# Patient Record
Sex: MALE | Race: OTHER | HISPANIC OR LATINO | Employment: UNEMPLOYED | URBAN - METROPOLITAN AREA
[De-identification: names, ages, dates, MRNs, and addresses within clinical notes are randomized per-mention and may not be internally consistent; named-entity substitution may affect disease eponyms.]

---

## 2017-08-04 ENCOUNTER — RESOLUTE PROFESSIONAL BILLING HOSPITAL PROF FEE (OUTPATIENT)
Dept: HOSPITALIST | Facility: MEDICAL CENTER | Age: 37
End: 2017-08-04
Payer: MEDICAID

## 2017-08-04 ENCOUNTER — HOSPITAL ENCOUNTER (INPATIENT)
Facility: MEDICAL CENTER | Age: 37
LOS: 3 days | DRG: 249 | End: 2017-08-07
Attending: EMERGENCY MEDICINE | Admitting: INTERNAL MEDICINE
Payer: MEDICAID

## 2017-08-04 ENCOUNTER — HOSPITAL ENCOUNTER (OUTPATIENT)
Dept: RADIOLOGY | Facility: MEDICAL CENTER | Age: 37
End: 2017-08-04

## 2017-08-04 DIAGNOSIS — I21.4 NSTEMI (NON-ST ELEVATED MYOCARDIAL INFARCTION) (HCC): ICD-10-CM

## 2017-08-04 PROBLEM — Z91.199 MEDICALLY NONCOMPLIANT: Status: ACTIVE | Noted: 2017-08-04

## 2017-08-04 PROBLEM — I25.10 CAD (CORONARY ARTERY DISEASE): Status: ACTIVE | Noted: 2017-08-04

## 2017-08-04 LAB
ANION GAP SERPL CALC-SCNC: 10 MMOL/L (ref 0–11.9)
BUN SERPL-MCNC: 8 MG/DL (ref 8–22)
CALCIUM SERPL-MCNC: 9.1 MG/DL (ref 8.5–10.5)
CHLORIDE SERPL-SCNC: 104 MMOL/L (ref 96–112)
CO2 SERPL-SCNC: 22 MMOL/L (ref 20–33)
CREAT SERPL-MCNC: 0.58 MG/DL (ref 0.5–1.4)
EKG IMPRESSION: NORMAL
EKG IMPRESSION: NORMAL
ERYTHROCYTE [DISTWIDTH] IN BLOOD BY AUTOMATED COUNT: 41.8 FL (ref 35.9–50)
GFR SERPL CREATININE-BSD FRML MDRD: >60 ML/MIN/1.73 M 2
GLUCOSE SERPL-MCNC: 111 MG/DL (ref 65–99)
HCT VFR BLD AUTO: 44.5 % (ref 42–52)
HGB BLD-MCNC: 15.2 G/DL (ref 14–18)
INR PPP: 0.9 (ref 0.87–1.13)
MCH RBC QN AUTO: 30.6 PG (ref 27–33)
MCHC RBC AUTO-ENTMCNC: 34.2 G/DL (ref 33.7–35.3)
MCV RBC AUTO: 89.5 FL (ref 81.4–97.8)
PLATELET # BLD AUTO: 333 K/UL (ref 164–446)
PMV BLD AUTO: 9.7 FL (ref 9–12.9)
POTASSIUM SERPL-SCNC: 3.8 MMOL/L (ref 3.6–5.5)
PROTHROMBIN TIME: 12.4 SEC (ref 12–14.6)
RBC # BLD AUTO: 4.97 M/UL (ref 4.7–6.1)
SODIUM SERPL-SCNC: 136 MMOL/L (ref 135–145)
TROPONIN I SERPL-MCNC: 0.44 NG/ML (ref 0–0.04)
TROPONIN I SERPL-MCNC: 3.74 NG/ML (ref 0–0.04)
TROPONIN I SERPL-MCNC: 7.98 NG/ML (ref 0–0.04)
TSH SERPL DL<=0.005 MIU/L-ACNC: 1.25 UIU/ML (ref 0.3–3.7)
WBC # BLD AUTO: 9 K/UL (ref 4.8–10.8)

## 2017-08-04 PROCEDURE — 85027 COMPLETE CBC AUTOMATED: CPT

## 2017-08-04 PROCEDURE — 770020 HCHG ROOM/CARE - TELE (206)

## 2017-08-04 PROCEDURE — 85610 PROTHROMBIN TIME: CPT

## 2017-08-04 PROCEDURE — 93005 ELECTROCARDIOGRAM TRACING: CPT | Performed by: INTERNAL MEDICINE

## 2017-08-04 PROCEDURE — A9270 NON-COVERED ITEM OR SERVICE: HCPCS | Performed by: INTERNAL MEDICINE

## 2017-08-04 PROCEDURE — 93005 ELECTROCARDIOGRAM TRACING: CPT | Performed by: EMERGENCY MEDICINE

## 2017-08-04 PROCEDURE — 700102 HCHG RX REV CODE 250 W/ 637 OVERRIDE(OP): Performed by: INTERNAL MEDICINE

## 2017-08-04 PROCEDURE — 36415 COLL VENOUS BLD VENIPUNCTURE: CPT

## 2017-08-04 PROCEDURE — 80048 BASIC METABOLIC PNL TOTAL CA: CPT

## 2017-08-04 PROCEDURE — 304562 HCHG STAT O2 MASK/CANNULA

## 2017-08-04 PROCEDURE — 93010 ELECTROCARDIOGRAM REPORT: CPT | Performed by: INTERNAL MEDICINE

## 2017-08-04 PROCEDURE — 99285 EMERGENCY DEPT VISIT HI MDM: CPT

## 2017-08-04 PROCEDURE — 94760 N-INVAS EAR/PLS OXIMETRY 1: CPT

## 2017-08-04 PROCEDURE — 700111 HCHG RX REV CODE 636 W/ 250 OVERRIDE (IP): Performed by: INTERNAL MEDICINE

## 2017-08-04 PROCEDURE — 700105 HCHG RX REV CODE 258: Performed by: INTERNAL MEDICINE

## 2017-08-04 PROCEDURE — 99223 1ST HOSP IP/OBS HIGH 75: CPT | Performed by: INTERNAL MEDICINE

## 2017-08-04 PROCEDURE — 84443 ASSAY THYROID STIM HORMONE: CPT

## 2017-08-04 PROCEDURE — 84484 ASSAY OF TROPONIN QUANT: CPT

## 2017-08-04 RX ORDER — PROMETHAZINE HYDROCHLORIDE 25 MG/1
12.5-25 SUPPOSITORY RECTAL EVERY 4 HOURS PRN
Status: DISCONTINUED | OUTPATIENT
Start: 2017-08-04 | End: 2017-08-07 | Stop reason: HOSPADM

## 2017-08-04 RX ORDER — PROMETHAZINE HYDROCHLORIDE 25 MG/1
12.5-25 TABLET ORAL EVERY 4 HOURS PRN
Status: DISCONTINUED | OUTPATIENT
Start: 2017-08-04 | End: 2017-08-07 | Stop reason: HOSPADM

## 2017-08-04 RX ORDER — ASPIRIN 81 MG/1
324 TABLET, CHEWABLE ORAL DAILY
Status: DISCONTINUED | OUTPATIENT
Start: 2017-08-05 | End: 2017-08-07 | Stop reason: HOSPADM

## 2017-08-04 RX ORDER — ROSUVASTATIN CALCIUM 20 MG/1
20 TABLET, COATED ORAL EVERY EVENING
Status: DISCONTINUED | OUTPATIENT
Start: 2017-08-04 | End: 2017-08-07 | Stop reason: HOSPADM

## 2017-08-04 RX ORDER — LISINOPRIL 5 MG/1
5 TABLET ORAL
Status: DISCONTINUED | OUTPATIENT
Start: 2017-08-04 | End: 2017-08-07 | Stop reason: HOSPADM

## 2017-08-04 RX ORDER — ASPIRIN 300 MG/1
300 SUPPOSITORY RECTAL DAILY
Status: DISCONTINUED | OUTPATIENT
Start: 2017-08-05 | End: 2017-08-07 | Stop reason: HOSPADM

## 2017-08-04 RX ORDER — ONDANSETRON 2 MG/ML
4 INJECTION INTRAMUSCULAR; INTRAVENOUS EVERY 4 HOURS PRN
Status: DISCONTINUED | OUTPATIENT
Start: 2017-08-04 | End: 2017-08-05

## 2017-08-04 RX ORDER — MORPHINE SULFATE 4 MG/ML
2-4 INJECTION, SOLUTION INTRAMUSCULAR; INTRAVENOUS
Status: DISCONTINUED | OUTPATIENT
Start: 2017-08-04 | End: 2017-08-07 | Stop reason: HOSPADM

## 2017-08-04 RX ORDER — SODIUM CHLORIDE 9 MG/ML
INJECTION, SOLUTION INTRAVENOUS CONTINUOUS
Status: DISCONTINUED | OUTPATIENT
Start: 2017-08-04 | End: 2017-08-04

## 2017-08-04 RX ORDER — ATORVASTATIN CALCIUM 20 MG/1
80 TABLET, FILM COATED ORAL EVERY EVENING
Status: DISCONTINUED | OUTPATIENT
Start: 2017-08-04 | End: 2017-08-04

## 2017-08-04 RX ORDER — NITROGLYCERIN 0.4 MG/1
0.4 TABLET SUBLINGUAL
Status: DISCONTINUED | OUTPATIENT
Start: 2017-08-04 | End: 2017-08-05

## 2017-08-04 RX ORDER — BISACODYL 10 MG
10 SUPPOSITORY, RECTAL RECTAL
Status: DISCONTINUED | OUTPATIENT
Start: 2017-08-04 | End: 2017-08-07 | Stop reason: HOSPADM

## 2017-08-04 RX ORDER — ASPIRIN 325 MG
325 TABLET ORAL DAILY
Status: DISCONTINUED | OUTPATIENT
Start: 2017-08-05 | End: 2017-08-07 | Stop reason: HOSPADM

## 2017-08-04 RX ORDER — ONDANSETRON 4 MG/1
4 TABLET, ORALLY DISINTEGRATING ORAL EVERY 4 HOURS PRN
Status: DISCONTINUED | OUTPATIENT
Start: 2017-08-04 | End: 2017-08-07 | Stop reason: HOSPADM

## 2017-08-04 RX ORDER — ASPIRIN 81 MG/1
486 TABLET, CHEWABLE ORAL ONCE
COMMUNITY
End: 2020-11-10 | Stop reason: SDUPTHER

## 2017-08-04 RX ORDER — POLYETHYLENE GLYCOL 3350 17 G/17G
1 POWDER, FOR SOLUTION ORAL
Status: DISCONTINUED | OUTPATIENT
Start: 2017-08-04 | End: 2017-08-07 | Stop reason: HOSPADM

## 2017-08-04 RX ORDER — SODIUM CHLORIDE 9 MG/ML
INJECTION, SOLUTION INTRAVENOUS CONTINUOUS
Status: DISCONTINUED | OUTPATIENT
Start: 2017-08-04 | End: 2017-08-05

## 2017-08-04 RX ORDER — ASPIRIN 325 MG
325 TABLET ORAL EVERY 6 HOURS PRN
Status: DISCONTINUED | OUTPATIENT
Start: 2017-08-04 | End: 2017-08-04

## 2017-08-04 RX ORDER — ACETAMINOPHEN 325 MG/1
650 TABLET ORAL EVERY 6 HOURS PRN
Status: DISCONTINUED | OUTPATIENT
Start: 2017-08-04 | End: 2017-08-07 | Stop reason: HOSPADM

## 2017-08-04 RX ORDER — AMOXICILLIN 250 MG
2 CAPSULE ORAL 2 TIMES DAILY
Status: DISCONTINUED | OUTPATIENT
Start: 2017-08-05 | End: 2017-08-07 | Stop reason: HOSPADM

## 2017-08-04 RX ORDER — LABETALOL HYDROCHLORIDE 5 MG/ML
10 INJECTION, SOLUTION INTRAVENOUS EVERY 4 HOURS PRN
Status: DISCONTINUED | OUTPATIENT
Start: 2017-08-04 | End: 2017-08-07 | Stop reason: HOSPADM

## 2017-08-04 RX ADMIN — SODIUM CHLORIDE: 9 INJECTION, SOLUTION INTRAVENOUS at 20:26

## 2017-08-04 RX ADMIN — ROSUVASTATIN CALCIUM 20 MG: 20 TABLET, FILM COATED ORAL at 20:26

## 2017-08-04 RX ADMIN — ACETAMINOPHEN 650 MG: 325 TABLET, FILM COATED ORAL at 20:36

## 2017-08-04 RX ADMIN — ENOXAPARIN SODIUM 120 MG: 150 INJECTION SUBCUTANEOUS at 20:26

## 2017-08-04 ASSESSMENT — COGNITIVE AND FUNCTIONAL STATUS - GENERAL
MOBILITY SCORE: 24
DAILY ACTIVITIY SCORE: 24
SUGGESTED CMS G CODE MODIFIER MOBILITY: CH
SUGGESTED CMS G CODE MODIFIER DAILY ACTIVITY: CH

## 2017-08-04 ASSESSMENT — PATIENT HEALTH QUESTIONNAIRE - PHQ9
SUM OF ALL RESPONSES TO PHQ9 QUESTIONS 1 AND 2: 0
1. LITTLE INTEREST OR PLEASURE IN DOING THINGS: NOT AT ALL
2. FEELING DOWN, DEPRESSED, IRRITABLE, OR HOPELESS: NOT AT ALL
SUM OF ALL RESPONSES TO PHQ QUESTIONS 1-9: 0

## 2017-08-04 ASSESSMENT — ENCOUNTER SYMPTOMS
FOCAL WEAKNESS: 0
CHILLS: 0
SPEECH CHANGE: 0
FLANK PAIN: 0
WEAKNESS: 0
NERVOUS/ANXIOUS: 0
MEMORY LOSS: 0
BACK PAIN: 0
NAUSEA: 0
ABDOMINAL PAIN: 0
DEPRESSION: 0
HEADACHES: 0
MYALGIAS: 0
COUGH: 0
FEVER: 0
VOMITING: 0
PALPITATIONS: 0
DIZZINESS: 0
SHORTNESS OF BREATH: 0
SENSORY CHANGE: 0

## 2017-08-04 ASSESSMENT — LIFESTYLE VARIABLES
EVER_SMOKED: YES
EVER_SMOKED: YES
DO YOU DRINK ALCOHOL: NO
DO YOU DRINK ALCOHOL: NO

## 2017-08-04 ASSESSMENT — COPD QUESTIONNAIRES
HAVE YOU SMOKED AT LEAST 100 CIGARETTES IN YOUR ENTIRE LIFE: YES
DURING THE PAST 4 WEEKS HOW MUCH DID YOU FEEL SHORT OF BREATH: NONE/LITTLE OF THE TIME
COPD SCREENING SCORE: 2
DO YOU EVER COUGH UP ANY MUCUS OR PHLEGM?: NO/ONLY WITH OCCASIONAL COLDS OR INFECTIONS

## 2017-08-04 ASSESSMENT — PAIN SCALES - GENERAL
PAINLEVEL_OUTOF10: 3
PAINLEVEL_OUTOF10: 2

## 2017-08-04 NOTE — ED NOTES
Med rec completed per pt at bedside  Allergies reviewed - NKDA  No ABX in last 30 days   No prescription medications, no preferred pharmacy

## 2017-08-04 NOTE — IP AVS SNAPSHOT
8/7/2017    Kyle Gonzalez  [address] [place] [address]    Dear Kyle:    [facility] wants to ensure your discharge home is safe and you or your loved ones have had all of your questions answered regarding your care after you leave the hospital.    Below is a list of resources and contact information should you have any questions regarding your hospital stay, follow-up instructions, or active medical symptoms.    Questions or Concerns Regarding… Contact   Medical Questions Related to Your Discharge  (7 days a week, 8am-5pm) Contact a Nurse Care Coordinator   [phone]   Medical Questions Not Related to Your Discharge  (24 hours a day / 7 days a week)  Contact the Nurse Health Line   [phone]    Medications or Discharge Instructions Refer to your discharge packet   or contact your [facility] Primary Care Provider   [phone]   Follow-up Appointment(s) Schedule your appointment via [employer]   or contact Scheduling [phone]   Billing Review your statement via [employer]  or contact Billing [phone]   Medical Records Review your records via [employer]   or contact Medical Records [phone]     You may receive a telephone call within two days of discharge. This call is to make certain you understand your discharge instructions and have the opportunity to have any questions answered. You can also easily access your medical information, test results and upcoming appointments via the CrowdStrike free online health management tool. You can learn more and sign up at [email or web address]. For assistance setting up your CrowdStrike account, please call [phone].    Once again, we want to ensure your discharge home is safe and that you have a clear understanding of any next steps in your care. If you have any questions or concerns, please do not hesitate to contact us, we are here for you. Thank you for choosing [facility] for your healthcare needs.    Sincerely,    Your [facility] Healthcare Team

## 2017-08-04 NOTE — IP AVS SNAPSHOT
Breeze Access Code: [email or web address]  Expires: 9/6/2017  2:42 PM    Your email address is not on file at [facility].  Email Addresses are required for you to sign up for Breeze, please contact [phone] to verify your personal information and to provide your email address prior to attempting to register for Breeze.    Kyle Gonzalez  [address] [place] [address]  [place] [address]    Breeze  A secure, online tool to manage your health information     [facility]’s Breeze® is a secure, online tool that connects you to your personalized health information from the privacy of your home -- day or night - making it very easy for you to manage your healthcare. Once the activation process is completed, you can even access your medical information using the Breeze ferdinand, which is available for free in the Apple Ferdinand store or Google Play store.     To learn more about Breeze, visit [email or web address]    There are two levels of access available (as shown below):   My Chart Features  [facility] Primary Care Doctor [facility]  Specialists [facility]  Urgent  Care Non-[facility] Primary Care Doctor   Email your healthcare team securely and privately 24/7 X X X    Manage appointments: schedule your next appointment; view details of past/upcoming appointments X      Request prescription refills. X      View recent personal medical records, including lab and immunizations X X X X   View health record, including health history, allergies, medications X X X X   Read reports about your outpatient visits, procedures, consult and ER notes X X X X   See your discharge summary, which is a recap of your hospital and/or ER visit that includes your diagnosis, lab results, and care plan X X  X     How to register for Breeze:  Once your e-mail address has been verified, follow the following steps to sign up for Breeze.     1. Go to  [email or web address]  2. Click on the Sign Up Now box, which takes you to the New Member Sign Up page. You will  need to provide the following information:  a. Enter your NeighborMD Access Code exactly as it appears at the top of this page. (You will not need to use this code after you’ve completed the sign-up process. If you do not sign up before the expiration date, you must request a new code.)   b. Enter your date of birth.   c. Enter your home email address.   d. Click Submit, and follow the next screen’s instructions.  3. Create a Software 2000t ID. This will be your NeighborMD login ID and cannot be changed, so think of one that is secure and easy to remember.  4. Create a NeighborMD password. You can change your password at any time.  5. Enter your Password Reset Question and Answer. This can be used at a later time if you forget your password.   6. Enter your e-mail address. This allows you to receive e-mail notifications when new information is available in NeighborMD.  7. Click Sign Up. You can now view your health information.    For assistance activating your NeighborMD account, call [phone]

## 2017-08-04 NOTE — ED NOTES
Chief Complaint   Patient presents with   • Chest Pain     Pt BIB EMS/Flight from [facility]/skilled nursing, pt reports sudden onset CP while at rest/ midsternal radiating to left arm/ denies N/V/ reports hx of MI/ x 3 stents. pt given Nitro x1/ 324mg ASA PTA/ pt from 10/10- 3/10 CP.

## 2017-08-04 NOTE — CONSULTS
Cardiology consultation note      Requesting physician: Dr. Joyce (ER MD)    Reason for consultation: NSTEMI, known CAD with prior multiple stents    HPI    Kyle Gonzalez is a 37 y.o. male with prior inferior infarct with RCA stents in 2013, and NSTEMI with LCX stents in  with severe hypercholesterolemia from likely familial hypercholesterolemia (LDL over 300) who was brought in for evaluation of chest pain. The patient is currently in retirement in [facility].   He reports that he has not been taking his Plavix as well as any of his other medications for sometime. He said he quit smoking about 2 years ago.   He did well until a few days ago when he first noted chest discomfort after he did some push up and jogging. It subsided after he rested. This AM around 11, he was awaken with classic substernal chest heaviness radiating to left arm with some mild dizziness without syncope. He is given nitroglycerin and aspirin prior to arrival and his chest pain went from 10 out of 10 down to 3 out of 10 and on arrival. He is currently chest pain-free.     His initial EKG did not demonstrate any ST elevation and his initial troponin was negative from the transferring facility    EKG here by my review showed ST depression in I and aVL along with inferior scar but no ST elevation  Troponin here is mildly elevated at 0.44    NKDA    Past Medical History   Diagnosis Date   • Hyperlipidemia    • Heart attack    • Hypertension    • Medical non-compliance 2015     Past Surgical History   Procedure Laterality Date   • Pr transcath stent init vessel,open       x4     Family History   Problem Relation Age of Onset   • Diabetes Mother         MI starting in her 30s ,  in her late 40s         Mother  Social History     Social History   • Marital Status: Single     Spouse Name: N/A   • Number of Children: N/A   • Years of Education: N/A     Occupational History   • Not on file.     Social History Main Topics   •  Smoking status: Former Smoker     Quit date: 05/13/2015   • Smokeless tobacco: Not on file   • Alcohol Use: Yes      Comment: 2-3 beers a day   • Drug Use: No   • Sexual Activity: Not on file     Other Topics Concern   • Not on file     Social History Narrative     Review Of Systems:  Abdominal pain when he took atorvastatin in the past  No fever or chills,   No weight loss  No visual change  No nasal discharge, tinnitus or vertigo  No productive cough or hemoptysis  No syncope, orthopnea or PND  No abdominal pain, hematemesis, nausea, vomiting, diarrhea or melena  No claudication, non healing leg wound  No headache, new focal weakness or numbness  All other ROS were negative    Physical Examination;    General well nourished, well developed, not in acute distress    Vital signs: /65 mmHg, Heart rate 68 bpm, RR normal    HEENT: head atraumatic, neck supple, no JVD, no thyromegaly or lymphadenopathy  Chest: good expansion, normal breath sound, no rales or wheezing  Heart PMI not displaced, regular rhythm, normal rate, normal S1 and S2, no murmur, no rub or gallop, no carotid bruits  Abdomen Soft, no rebound tenderness, no mass or bruits  Extremity  No clubbing or cyanosis, no edema  Musculoskeletal no deformity, good range of motion  Skin No ecchymosis or petechiae, no rash  Neurological Alert, orientedx3, no focal deficit  Psychiatry normal mood and affect    Labs from other facility BUN 9 Cr 0.62, LFT NL  Hb 15, plt 329    Impressions    1. NSTEMI. He has severe likely familial hypercholesterolemia. He also has known CAD with multiple prior stents. He has not been on any cholesterol lowering medications. He is likely having progression of his CAD.    2. Hypercholesterolemia but intolerant to atorvastatin in the past    3. Obesity    Recommendations;  Agree with IV heparin, aspirin and betablocker.  Could try rosuvastatin. Will probably need injectable Praluent or Repatha but may have difficulty affording  those.  I advised him to under go cardiac catheterization in AM.  Risks and benefits of the procedure were discussed at length.   The patient understood, accepted the risks and wishes to proceed.   Will follow.We will keep you posted about our findings and further recommendations as they become available.   Please also do not hesitate to call for any questions.  Thank you kindly for allowing me to participate in the care of this patient.

## 2017-08-04 NOTE — IP AVS SNAPSHOT
" <p align=\"LEFT\"><IMG SRC=\"//[email or web address]$/[email or web address]\" alt=\"Image\" WIDTH=\"50%\" HEIGHT=\"200\" BORDER=\"\"></p>                   Name:Kyle Gonzalez  Medical Record Number:[id number]  CSN: [id number]    YOB: 1980   Age: 37 y.o.  Sex: male  HT:1.651 m (5' 5\") WT: 115.6 kg (254 lb 13.6 oz)          Admit Date: 8/4/2017     Discharge Date:   Today's Date: 8/7/2017  Attending Doctor:  Karly Youngblood M.D.                  Allergies:  Review of patient's allergies indicates no known allergies.          Your appointments     Aug 16, 2017  9:00 AM   NEW PATIENT with Catalino Joseph M.D.   [facility]    [address] [place] [id number] [phone]                 Medication List      Take these Medications        Instructions    aspirin 325 MG Tabs   Commonly known as:  ASA   Notes to Patient:  As needed     Take 325 mg by mouth every 6 hours as needed for Mild Pain.   Dose:  325 mg       clopidogrel 75 MG Tabs   Commonly known as:  PLAVIX   Notes to Patient:  Tomorrow morning    Take 1 Tab by mouth every day.   Dose:  75 mg       lisinopril 5 MG Tabs   Commonly known as:  PRINIVIL   Notes to Patient:  Tomorrow morning    Take 1 Tab by mouth every day.   Dose:  5 mg       metoprolol 25 MG Tabs   Commonly known as:  LOPRESSOR   Notes to Patient:  Tonight     Take 1 Tab by mouth 2 Times a Day.   Dose:  25 mg       nitroglycerin 0.4 MG Subl   Commonly known as:  NITROSTAT   Notes to Patient:  As needed     Place 1 Tab under tongue as needed for Chest Pain (angina).   Dose:  0.4 mg       rosuvastatin 20 MG Tabs   Commonly known as:  CRESTOR   Notes to Patient:  Tonight     Take 1 Tab by mouth every evening.   Dose:  20 mg         "

## 2017-08-04 NOTE — IP AVS SNAPSHOT
" Home Care Instructions                                                                                                                  Name:Kyle Gonzalez  Medical Record Number:[id number]  CSN: [id number]    YOB: 1980   Age: 37 y.o.  Sex: male  HT:1.651 m (5' 5\") WT: 115.6 kg (254 lb 13.6 oz)          Admit Date: 8/4/2017     Discharge Date:   Today's Date: 8/7/2017  Attending Doctor:  Karly Youngblood M.D.                  Allergies:  Review of patient's allergies indicates no known allergies.            Discharge Instructions       Discharge Instructions    Discharged to home by car with friend. Discharged via walking, hospital escort: Refused.  Special equipment needed: Not Applicable    Be sure to schedule a follow-up appointment with your primary care doctor or any specialists as instructed.     Discharge Plan:   Diet Plan: Discussed  Activity Level: Discussed  Smoking Cessation Offered: Patient Refused  Confirmed Follow up Appointment: Appointment Scheduled  Confirmed Symptoms Management: Discussed  Medication Reconciliation Updated: Yes  Influenza Vaccine Indication: Patient Refuses    I understand that a diet low in cholesterol, fat, and sodium is recommended for good health. Unless I have been given specific instructions below for another diet, I accept this instruction as my diet prescription.   Other diet: Cardiac    Special Instructions: Diagnosis:  Acute Coronary Syndrome (ACS) is a diagnosis that encompasses cardiac-related chest pain and heart attack. ACS occurs when the blood flow to the heart muscle is severely reduced or cut off completely due to a slow process called atherosclerosis.  Atherosclerosis is a disease in which the coronary arteries become narrow from a buildup of fat, cholesterol, and other substances that combine to form plaque. If the plaque breaks, a blood clot will form and block the blood flow to the heart muscle. This lack of blood flow can cause damage or death to the " heart muscle which is called a heart attack or Myocardial Infarction (MI). There are two different types of MIs:  ST Elevation Myocardial Infarction or STEMI (the most severe type of heart attack) and Non-ST Elevation Myocardial Infarction or NSTEMI.    Treatment Plan:  · Cardiac Diet  - Low fat, low salt, low cholesterol   · Cardiac Rehab  - Your doctor has ordered you a referral to [facility].  Call [phone] to schedule an appointment.  · Attend my follow-up appointment with my Cardiologist.  · Take my medications as prescribed by my doctor  · Exercise daily  · Quit Smoking, Lower my bad cholesterol and raise my good cholesterol, lower my blood pressure and Reduce stress    Medications:  Certain medications are used to treat ACS.  Remember to always take medications as prescribed and never stop talking medications unless told by your doctor.    You have been prescribed the following medicatons:    Aspirin - Aspirin is used as a blood thinning medication and you will require this medication indefinitely.  Anti-platelet/blood thinner - Your Anti-platelet/Blood thinning medication is called Plavix, and is used in combination with aspirin to prevent clots from forming in your heart and/or around your stent.  Your doctor will determine how long you need to be on this medicine.  Beta-Blocker - Beta-Blocker Metoprolol is used to lower blood pressure and heart rate, and/or helps your heart heal after a heart attack.  Statin - Statin Crestor is used to lower cholesterol.  Angiotensin Converting Enzyme (ACE) Inhibitor - Angiotensin Converting Enzyme Inhibitor Lisinopril is used to lower blood pressure and treat heart failure.  Nitroglycerine - Nitroglycerine is used to relieve chest pain.    · Is patient discharged on Warfarin / Coumadin?   No     · Is patient Post Blood Transfusion?  No  Non-ST Segment Elevation Heart Attack  A heart attack (myocardial infarction) happens when some of the heart muscle is injured or dies  because it does not get enough oxygen. A non-ST segment elevation heart attack is a type of heart attack. It happens when the body does not get enough oxygen because an artery carrying blood to the heart muscles (coronary artery) becomes partly or temporarily blocked. This type of heart attack is usually less severe than the type of heart attack in which a coronary artery becomes completely blocked.  CAUSES  The most common cause of this condition is a blocked coronary artery. A coronary artery can become blocked from a gradual buildup of cholesterol, fat, and plaque. A blood clot can form over the plaque and block blood flow.  RISK FACTORS  This condition is more likely to develop in:  · Smokers.  · Males.  · Older adults.  · Overweight and obese adults.  · People with high blood pressure (hypertension), high cholesterol, or diabetes.  · People with a family history of heart disease.  · People who do not get enough exercise.  · People who are under a lot of stress.  · People who drink too much alcohol.  · People who use illegal street drugs that increase the heart rate, such as cocaine and methamphetamines.  SYMPTOMS  Symptoms of this condition include:  · Chest pain or a feeling of pressure in the chest. It may feel like something is crushing or squeezing the chest.  · Discomfort in the upper back or in the area between the shoulder blades.  · Upper back pain.  · Tingling in the hands and arms.  · Shortness of breath.  · Heartburn or indigestion.  · Sudden cold sweats.  · Unexplained sweating.  · Sudden lightheadedness.  · Unexplained feelings of nervousness or anxiety.  · Feeling of tiredness, or not feeling well.  DIAGNOSIS  This condition is diagnosed based on a person's signs and symptoms and a physical exam. You may also have tests done, including:  · Blood tests.  · A chest X-ray.  · An test to measure the electrical activity of the heart (electrocardiogram).  · A test that uses sound waves to produce a  picture of the heart (echocardiogram).  · A test to look at the heart arteries (coronary angiogram).  If you are still having chest pain after 12-24 hours, or if your health care providers think your heart is at risk, you may have a procedure called cardiac catheterization. In this procedure, a long, thin tube is inserted into an artery in your groin and moved up to the arteries in your heart. This procedure helps your health care provider figure out the source of the problem.   TREATMENT  This condition may be treated with:  · Bed rest in the hospital.  · Medicines to relieve chest pain.  · Medicines to protect the heart.  · If you have a blockage, a procedure in which the artery is opened (angioplasty) and a stent is placed to keep the artery open.  After initial treatment you may need to take medicine to:  · Keep your blood from clotting too easily.  · Control your blood pressure.  · Lower your cholesterol.  · Control abnormal heart rhythms (arrhythmias).  HOME CARE INSTRUCTIONS  · Take medicines only as directed by your health care provider.  · Do not take the following medicines unless your health care provider approves:  ¨ Nonsteroidal anti-inflammatory drugs (NSAIDs), such as ibuprofen, naproxen, or celecoxib.  ¨ Vitamin supplements that contain vitamin A, vitamin E, or both.  ¨ Hormone replacement therapy that contains estrogen with or without progestin.  · Make lifestyle changes as directed by your health care provider. These may include:  ¨ Using no tobacco products, including cigarettes, chewing tobacco, and electronic cigarettes. If you are struggling to quit, ask your health care provider for help.  ¨ Exercising as directed by your health care provider. Ask for a list of activities that are safe for you.  ¨ Eating a heart-healthy diet. Work with a registered dietitian to learn healthy eating options.  ¨ Maintaining a healthy weight.  ¨ Managing other medical conditions, like diabetes.  ¨ Reducing  stress.  ¨ Limiting how much alcohol you drink as directed by your health care provider.  SEEK IMMEDIATE MEDICAL CARE IF:  · You have any symptoms of this condition.     This information is not intended to replace advice given to you by your health care provider. Make sure you discuss any questions you have with your health care provider.     Document Released: 07/17/2006 Document Revised: 03/11/2013 Document Reviewed: 11/25/2015  [employer] Patient Education ©2016 [employer]    Angiogram, Angioplasty, or Stent Placement  Care After  One of the following procedures was done today.  ANGIOGRAM:  A catheter was placed through the blood vessel in your groin, contrast was injected into the vessels, and pictures were taken.  ANGIOPLASTY:  A catheter was placed through the blood vessel in your groin and directed to an area of blocked blood flow. A balloon, and possibly a metal stent were used to open the blockage. If no other blockages are present below this area, your symptoms should improve. If blockages are present below this area, surgery may still be necessary.  STENT:  A catheter was placed in your groin through which a metal mesh tube was placed in a narrowed part of the artery to facilitate blood flow.  You were given intravenous sedation. These medications are rapidly cleared from your bloodstream. You may feel some discomfort at the insertion site after the local anesthetic wears off. This discomfort should gradually improve over the next several days.  · Only take over-the-counter or prescription medicines for pain, discomfort, or fever as directed by your caregiver.  · Complications are very uncommon after this procedure. Go to the nearest emergency department if you develop any of the following symptoms:  · Worsening pain.  · Bleeding.  · Swelling at the puncture site.  · Lightheadedness.  · Dizziness or fainting.  · Fever or chills.  · If oozing, bleeding, or a lump appears at the puncture site,  apply firm pressure directly to the site steadily for 15 minutes and go to the emergency department.  · Keep the skin around the insertion site dry. You may take showers after 24 hours. If the area does get wet, dry the skin completely. Avoid baths until the skin puncture site heals, usually 5 to 7 days.  · Development of redness, increased soreness, or swelling may be signs of a skin infection. Contact your physician.  · Rest for the remainder of the day and avoid any heavy lifting (more than 10 pounds or 4.5 kg). Do not operate heavy machinery, drive, or make legal decisions for the first 24 hours after the procedure. Have a responsible person drive you home.  · You may resume your usual diet after the procedure. Avoid alcoholic beverages for 24 hours after the procedure.  Document Released: 12/18/2006 Document Revised: 03/11/2013 Document Reviewed: 10/17/2007  [employer]® Patient Information ©2014 [employer]    Clopidogrel tablets  What is this medicine?  CLOPIDOGREL (kloh PID oh grel) helps to prevent blood clots. This medicine is used to prevent heart attack, stroke, or other vascular events in people who are at high risk.  This medicine may be used for other purposes; ask your health care provider or pharmacist if you have questions.  COMMON BRAND NAME(S): Plavix  What should I tell my health care provider before I take this medicine?  They need to know if you have any of the following conditions:  -bleeding disorder  -bleeding in the brain  -planned surgery  -stomach or intestinal ulcers  -stroke or transient ischemic attack  -an unusual or allergic reaction to clopidogrel, other medicines, foods, dyes, or preservatives  -pregnant or trying to get pregnant  -breast-feeding  How should I use this medicine?  Take this medicine by mouth with a drink of water. Follow the directions on the prescription label. You may take this medicine with or without food. Take your medicine at regular intervals. Do not take  your medicine more often than directed.  Talk to your pediatrician regarding the use of this medicine in children. Special care may be needed.  Overdosage: If you think you have taken too much of this medicine contact a poison control center or emergency room at once.  NOTE: This medicine is only for you. Do not share this medicine with others.  What if I miss a dose?  If you miss a dose, take it as soon as you can. If it is almost time for your next dose, take only that dose. Do not take double or extra doses.  What may interact with this medicine?  -aspirin  -blood thinners like cilostazol, enoxaparin, ticlopidine, and warfarin  -certain medicines for depression like citalopram, fluoxetine, and fluvoxamine  -certain medicines for fungal infections like ketoconazole, fluconazole, and voriconazole  -certain medicines for HIV infection like delavirdine, efavirenz, and etravirine  -certain medicines for seizures like felbamate, oxcarbazepine, and phenytoin  -chloramphenicol  -fluvastatin  -isoniazid, INH  -medicines for inflammation like ibuprofen and naproxen  -modafinil  -nicardipine  -over-the counter supplements like echinacea, feverfew, fish oil, garlic, caitlin, ginkgo, green tea, horse chestnut  -quinine  -stomach acid blockers like cimetidine, omeprazole, and esomeprazole  -tamoxifen  -tolbutamide  -topiramate  -torsemide  This list may not describe all possible interactions. Give your health care provider a list of all the medicines, herbs, non-prescription drugs, or dietary supplements you use. Also tell them if you smoke, drink alcohol, or use illegal drugs. Some items may interact with your medicine.  What should I watch for while using this medicine?  Visit your doctor or health care professional for regular check ups. Do not stop taking your medicine unless your doctor tells you to.  If you are going to have surgery or dental work, tell your doctor or health care professional that you are taking this  medicine.  Certain genetic factors may reduce the effect of this medicine. Your doctor may use genetic tests to determine treatment.  What side effects may I notice from receiving this medicine?  Side effects that you should report to your doctor or health care professional as soon as possible:  -allergic reactions like skin rash, itching or hives, swelling of the face, lips, or tongue  -black, tarry stools  -blood in urine or vomit  -breathing problems  -changes in vision  -fever  -sudden weakness  -unusual bleeding or bruising  Side effects that usually do not require medical attention (report to your doctor or health care professional if they continue or are bothersome):  -constipation or diarrhea  -headache  -pain in back or joints  -stomach upset  This list may not describe all possible side effects. Call your doctor for medical advice about side effects. You may report side effects to FDA at 0-503-FDA-8527.  Where should I keep my medicine?  Keep out of the reach of children.  Store at room temperature of 59 to 86 degrees F (15 to 30 degrees C). Throw away any unused medicine after the expiration date.  NOTE: This sheet is a summary. It may not cover all possible information. If you have questions about this medicine, talk to your doctor, pharmacist, or health care provider.  © 2014, [employer]. (6/8/2010 9:41:49 AM)    Metoprolol tablets  What is this medicine?  METOPROLOL (me TOE proe lole) is a beta-blocker. Beta-blockers reduce the workload on the heart and help it to beat more regularly. This medicine is used to treat high blood pressure and to prevent chest pain. It is also used to after a heart attack and to prevent an additional heart attack from occurring.  This medicine may be used for other purposes; ask your health care provider or pharmacist if you have questions.  COMMON BRAND NAME(S): Lopressor  What should I tell my health care provider before I take this medicine?  They need to  know if you have any of these conditions:  -diabetes  -heart or vessel disease like slow heart rate, worsening heart failure, heart block, sick sinus syndrome or Raynaud's disease  -kidney disease  -liver disease  -lung or breathing disease, like asthma or emphysema  -pheochromocytoma  -thyroid disease  -an unusual or allergic reaction to metoprolol, other beta-blockers, medicines, foods, dyes, or preservatives  -pregnant or trying to get pregnant  -breast-feeding  How should I use this medicine?  Take this medicine by mouth with a drink of water. Follow the directions on the prescription label. Take this medicine immediately after meals. Take your doses at regular intervals. Do not take more medicine than directed. Do not stop taking this medicine suddenly. This could lead to serious heart-related effects.  Talk to your pediatrician regarding the use of this medicine in children. Special care may be needed.  Overdosage: If you think you have taken too much of this medicine contact a poison control center or emergency room at once.  NOTE: This medicine is only for you. Do not share this medicine with others.  What if I miss a dose?  If you miss a dose, take it as soon as you can. If it is almost time for your next dose, take only that dose. Do not take double or extra doses.  What may interact with this medicine?  Do not take this medicine with any of the following medications:  -sotalol  This medicine may also interact with the following medications:  -clonidine  -digoxin  -dobutamine  -epinephrine  -isoproterenol  -medicine to control heart rhythm like quinidine, propafenone  -medicine for depression like monoamine oxidase (MAO) inhibitors, fluoxetine, and paroxetine  -medicine for high blood pressure like calcium channel blockers  -reserpine  This list may not describe all possible interactions. Give your health care provider a list of all the medicines, herbs, non-prescription drugs, or dietary supplements you  use. Also tell them if you smoke, drink alcohol, or use illegal drugs. Some items may interact with your medicine.  What should I watch for while using this medicine?  Visit your doctor or health care professional for regular check ups. Contact your doctor right away if your symptoms worsen. Check your blood pressure and pulse rate regularly. Ask your health care professional what your blood pressure and pulse rate should be, and when you should contact them.  You may get drowsy or dizzy. Do not drive, use machinery, or do anything that needs mental alertness until you know how this medicine affects you. Do not sit or stand up quickly, especially if you are an older patient. This reduces the risk of dizzy or fainting spells. Contact your doctor if these symptoms continue. Alcohol may interfere with the effect of this medicine. Avoid alcoholic drinks.  What side effects may I notice from receiving this medicine?  Side effects that you should report to your doctor or health care professional as soon as possible:  -allergic reactions like skin rash, itching or hives  -cold or numb hands or feet  -depression  -difficulty breathing  -faint  -fever with sore throat  -irregular heartbeat, chest pain  -rapid weight gain  -swollen legs or ankles  Side effects that usually do not require medical attention (report to your doctor or health care professional if they continue or are bothersome):  -anxiety or nervousness  -change in sex drive or performance  -dry skin  -headache  -nightmares or trouble sleeping  -short term memory loss  -stomach upset or diarrhea  -unusually tired  This list may not describe all possible side effects. Call your doctor for medical advice about side effects. You may report side effects to FDA at 1-036-FDA-5730.  Where should I keep my medicine?  Keep out of the reach of children.  Store at room temperature between 15 and 30 degrees C (59 and 86 degrees F). Throw away any unused medicine after the  expiration date.  NOTE: This sheet is a summary. It may not cover all possible information. If you have questions about this medicine, talk to your doctor, pharmacist, or health care provider.  © 2014, [employer]. (2/27/2009 4:11:19 PM)      Rosuvastatin Tablets  What is this medicine?  ROSUVASTATIN (sandee SUNSHINE va sta tin) is known as a HMG-CoA reductase inhibitor or 'statin'. It lowers cholesterol and triglycerides in the blood. This drug may also reduce the risk of heart attack, stroke, or other health problems in patients with risk factors for heart disease. Diet and lifestyle changes are often used with this drug.  This medicine may be used for other purposes; ask your health care provider or pharmacist if you have questions.  COMMON BRAND NAME(S): Crestor  What should I tell my health care provider before I take this medicine?  They need to know if you have any of these conditions:  -frequently drink alcoholic beverages  -kidney disease  -liver disease  -muscle aches or weakness  -other medical condition  -an unusual or allergic reaction to rosuvastatin, other medicines, foods, dyes, or preservatives  -pregnant or trying to get pregnant  -breast-feeding  How should I use this medicine?  Take this medicine by mouth with a glass of water. Follow the directions on the prescription label. You can take this medicine with or without food. Take your doses at regular intervals. Do not take your medicine more often than directed.  Talk to your pediatrician regarding the use of this medicine in children. While this drug may be prescribed for children as young as 10 years old for selected conditions, precautions do apply.  Overdosage: If you think you have taken too much of this medicine contact a poison control center or emergency room at once.  NOTE: This medicine is only for you. Do not share this medicine with others.  What if I miss a dose?  If you miss a dose, take it as soon as you can. If it is almost  time for your next dose, take only that dose. Do not take double or extra doses.  What may interact with this medicine?  Do not take this medicine with any of the following medications:  -herbal medicines like red yeast rice  This medicine may also interact with the following medications:  -alcohol  -antacids containing aluminum hydroxide or magnesium hydroxide  -cyclosporine  -other medicines for high cholesterol  -some medicines for HIV infection  -warfarin  This list may not describe all possible interactions. Give your health care provider a list of all the medicines, herbs, non-prescription drugs, or dietary supplements you use. Also tell them if you smoke, drink alcohol, or use illegal drugs. Some items may interact with your medicine.  What should I watch for while using this medicine?  Visit your doctor or health care professional for regular check-ups. You may need regular tests to make sure your liver is working properly.  Tell your doctor or health care professional right away if you get any unexplained muscle pain, tenderness, or weakness, especially if you also have a fever and tiredness. Your doctor or health care professional may tell you to stop taking this medicine if you develop muscle problems. If your muscle problems do not go away after stopping this medicine, contact your health care professional.  This medicine may affect blood sugar levels. If you have diabetes, check with your doctor or health care professional before you change your diet or the dose of your diabetic medicine.  Avoid taking antacids containing aluminum, calcium or magnesium within 2 hours of taking this medicine.  This drug is only part of a total heart-health program. Your doctor or a dietician can suggest a low-cholesterol and low-fat diet to help. Avoid alcohol and smoking, and keep a proper exercise schedule.  Do not use this drug if you are pregnant or breast-feeding. Serious side effects to an unborn child or to an  infant are possible. Talk to your doctor or pharmacist for more information.  What side effects may I notice from receiving this medicine?  Side effects that you should report to your doctor or health care professional as soon as possible:  -allergic reactions like skin rash, itching or hives, swelling of the face, lips, or tongue  -dark urine  -fever  -joint pain  -muscle cramps, pain  -redness, blistering, peeling or loosening of the skin, including inside the mouth  -trouble passing urine or change in the amount of urine  -unusually weak or tired  -yellowing of the eyes or skin  Side effects that usually do not require medical attention (report to your doctor or health care professional if they continue or are bothersome):  -constipation  -heartburn  -nausea  -stomach gas, pain, upset  This list may not describe all possible side effects. Call your doctor for medical advice about side effects. You may report side effects to FDA at 0-900-FDA-5578.  Where should I keep my medicine?  Keep out of the reach of children.  Store at room temperature between 20 and 25 degrees C (68 and 77 degrees F). Keep container tightly closed (protect from moisture). Throw away any unused medicine after the expiration date.  NOTE: This sheet is a summary. It may not cover all possible information. If you have questions about this medicine, talk to your doctor, pharmacist, or health care provider.  © 2014, Elsevier/Gold Standard. (11/5/2012 4:37:13 PM)  Nitroglycerin sublingual tablets  What is this medicine?  NITROGLYCERIN (keturah troe GLI ser in) is a type of vasodilator. It relaxes blood vessels, increasing the blood and oxygen supply to your heart. This medicine is used to relieve chest pain caused by angina. It is also used to prevent chest pain before activities like climbing stairs, going outdoors in cold weather, or sexual activity.  This medicine may be used for other purposes; ask your health care provider or pharmacist if you  have questions.  COMMON BRAND NAME(S): Nitroquick, Nitrostat, Nitrotab  What should I tell my health care provider before I take this medicine?  They need to know if you have any of these conditions:  -anemia  -head injury, recent stroke, or bleeding in the brain  -liver disease  -previous heart attack  -an unusual or allergic reaction to nitroglycerin, other medicines, foods, dyes, or preservatives  -pregnant or trying to get pregnant  -breast-feeding  How should I use this medicine?  Take this medicine by mouth as needed. At the first sign of an angina attack (chest pain or tightness) place one tablet under your tongue. You can also take this medicine 5 to 10 minutes before an event likely to produce chest pain. Follow the directions on the prescription label. Let the tablet dissolve under the tongue. Do not swallow whole. Replace the dose if you accidentally swallow it. It will help if your mouth is not dry. Saliva around the tablet will help it to dissolve more quickly. Do not eat or drink, smoke or chew tobacco while a tablet is dissolving. If you are not better within 5 minutes after taking ONE dose of nitroglycerin, call 9-1-1 immediately to seek emergency medical care. Do not take more than 3 nitroglycerin tablets over 15 minutes.  If you take this medicine often to relieve symptoms of angina, your doctor or health care professional may provide you with different instructions to manage your symptoms. If symptoms do not go away after following these instructions, it is important to call 9-1-1 immediately. Do not take more than 3 nitroglycerin tablets over 15 minutes.  Talk to your pediatrician regarding the use of this medicine in children. Special care may be needed.  Overdosage: If you think you have taken too much of this medicine contact a poison control center or emergency room at once.  NOTE: This medicine is only for you. Do not share this medicine with others.  What if I miss a dose?  This does not  apply. This medicine is only used as needed.  What may interact with this medicine?  Do not take this medicine with any of the following medications:  -certain migraine medicines like ergotamine and dihydroergotamine (DHE)  -medicines used to treat erectile dysfunction like sildenafil, tadalafil, and vardenafil  This medicine may also interact with the following medications:  -alteplase  -aspirin  -heparin  -medicines for high blood pressure  -medicines for mental depression  -other medicines used to treat angina  -phenothiazines like chlorpromazine, mesoridazine, prochlorperazine, thioridazine  This list may not describe all possible interactions. Give your health care provider a list of all the medicines, herbs, non-prescription drugs, or dietary supplements you use. Also tell them if you smoke, drink alcohol, or use illegal drugs. Some items may interact with your medicine.  What should I watch for while using this medicine?  Tell your doctor or health care professional if you feel your medicine is no longer working.  Keep this medicine with you at all times. Sit or lie down when you take your medicine to prevent falling if you feel dizzy or faint after using it. Try to remain calm. This will help you to feel better faster. If you feel dizzy, take several deep breaths and lie down with your feet propped up, or bend forward with your head resting between your knees.  You may get drowsy or dizzy. Do not drive, use machinery, or do anything that needs mental alertness until you know how this drug affects you. Do not stand or sit up quickly, especially if you are an older patient. This reduces the risk of dizzy or fainting spells. Alcohol can make you more drowsy and dizzy. Avoid alcoholic drinks.  Do not treat yourself for coughs, colds, or pain while you are taking this medicine without asking your doctor or health care professional for advice. Some ingredients may increase your blood pressure.  What side effects  may I notice from receiving this medicine?  Side effects that you should report to your doctor or health care professional as soon as possible:  -blurred vision  -dry mouth  -skin rash  -sweating  -the feeling of extreme pressure in the head  -unusually weak or tired  Side effects that usually do not require medical attention (report to your doctor or health care professional if they continue or are bothersome):  -flushing of the face or neck  -headache  -irregular heartbeat, palpitations  -nausea, vomiting  This list may not describe all possible side effects. Call your doctor for medical advice about side effects. You may report side effects to FDA at 8-867-QDW-9603.  Where should I keep my medicine?  Keep out of the reach of children.  Store at room temperature between 20 and 25 degrees C (68 and 77 degrees F). Store in original container. Protect from light and moisture. Keep tightly closed. Throw away any unused medicine after the expiration date.  NOTE: This sheet is a summary. It may not cover all possible information. If you have questions about this medicine, talk to your doctor, pharmacist, or health care provider.  © 2014, Elsevier/Gold Standard. (7/10/2009 5:16:24 PM)    Depression / Suicide Risk    As you are discharged from this [facility] facility, it is important to learn how to keep safe from harming yourself.    Recognize the warning signs:  · Abrupt changes in personality, positive or negative- including increase in energy   · Giving away possessions  · Change in eating patterns- significant weight changes-  positive or negative  · Change in sleeping patterns- unable to sleep or sleeping all the time   · Unwillingness or inability to communicate  · Depression  · Unusual sadness, discouragement and loneliness  · Talk of wanting to die  · Neglect of personal appearance   · Rebelliousness- reckless behavior  · Withdrawal from people/activities they love  · Confusion- inability to  concentrate     If you or a loved one observes any of these behaviors or has concerns about self-harm, here's what you can do:  · Talk about it- your feelings and reasons for harming yourself  · Remove any means that you might use to hurt yourself (examples: pills, rope, extension cords, firearm)  · Get professional help from the community (Mental Health, Substance Abuse, psychological counseling)  · Do not be alone:Call your Safe Contact- someone whom you trust who will be there for you.  · Call your local CRISIS HOTLINE 902-8602 or 119-464-7771  · Call your local Children's Mobile Crisis Response Team [place] (106) 071-8260 or www.Informed Trades  · Call the toll free National Suicide Prevention Hotlines   · National Suicide Prevention Lifeline 234-879-KCWF (0749)  · [place] [facility] 800-SUICIDE (005-1405)        Your appointments     Aug 16, 2017  9:00 AM   NEW PATIENT with Catalino Joseph M.D.   [facility] (--)    [address] [place] [id number] [phone]                 Discharge Medication Instructions:    Below are the medications your physician expects you to take upon discharge:    Review all your home medications and newly ordered medications with your doctor and/or pharmacist. Follow medication instructions as directed by your doctor and/or pharmacist.    Please keep your medication list with you and share with your physician.               Medication List      START taking these medications        Instructions    Morning Afternoon Evening Bedtime    clopidogrel 75 MG Tabs   Last time this was given:  75 mg on 8/7/2017  9:18 AM   Commonly known as:  PLAVIX   Next Dose Due:  8/8/17   Notes to Patient:  Tomorrow morning        Take 1 Tab by mouth every day.   Dose:  75 mg                        lisinopril 5 MG Tabs   Last time this was given:  5 mg on 8/7/2017  9:18 AM   Commonly known as:  PRINIVIL   Next Dose Due:  8/8/17    Notes to Patient:  Tomorrow morning        Take 1 Tab by mouth every day.   Dose:  5 mg                        metoprolol 25 MG Tabs   Last time this was given:  25 mg on 8/7/2017  9:19 AM   Commonly known as:  LOPRESSOR   Next Dose Due:  8/7/17   Notes to Patient:  Tonight         Take 1 Tab by mouth 2 Times a Day.   Dose:  25 mg                        nitroglycerin 0.4 MG Subl   Commonly known as:  NITROSTAT   Next Dose Due:  8/7/17   Notes to Patient:  As needed         Place 1 Tab under tongue as needed for Chest Pain (angina).   Dose:  0.4 mg                        rosuvastatin 20 MG Tabs   Last time this was given:  20 mg on 8/6/2017  8:54 PM   Commonly known as:  CRESTOR   Next Dose Due:  8/7/17   Notes to Patient:  Tonight         Take 1 Tab by mouth every evening.   Dose:  20 mg                          CONTINUE taking these medications        Instructions    Morning Afternoon Evening Bedtime    aspirin 325 MG Tabs   Last time this was given:  325 mg on 8/7/2017  9:18 AM   Commonly known as:  ASA   Next Dose Due:  8/7/17   Notes to Patient:  As needed         Take 325 mg by mouth every 6 hours as needed for Mild Pain.   Dose:  325 mg                             Where to Get Your Medications      Information about where to get these medications is not yet available     ! Ask your nurse or doctor about these medications    - clopidogrel 75 MG Tabs  - lisinopril 5 MG Tabs  - metoprolol 25 MG Tabs  - nitroglycerin 0.4 MG Subl  - rosuvastatin 20 MG Tabs            Orders for after discharge     REFERRAL TO INTENSIVE CARDIAC REHAB/CARDIAC REHAB    Complete by:  As directed    Qualifying Diagnosis for Cardiac Rehab:    -Myocardial Infarction  -Old Myocardial Infarction  -Coronary Artery Bypass Surgery  -Stable Angina Pectoris  -PTCA Status with or without Stents  -Heart Valve Replaced by other means  -Heart Transplant   -Aneurysm Repair    Provider Exercise Prescription for Treatment Plan:  -Outpatient Cardiac  Rehab (CR)/Intensive Cardiac Rehab (ICR), duration based on patient progress 1-3 times per week up to a total of 36/72 sessions over a period of up to 18/36 weeks    -Progressive interval exercise training for 20-45 minutes, 1-3 times per week in Cardiac Rehab utilizing Treadmill, Elliptical, Stationary Cycling, Arm Ergometer, other conditioning activities and appropriate home program supplement    -Light resistance training 2-4 weeks post PTCA, 2-4 weeks post MI, or 4-6 weeks post CABG, 4-6 weeks post aneurysm repair (20 # max)    -% TARGET HEART RATE OR MET’s:        RPE of 11-16 on a scale of 6-20       GXT Data:  40% - 80% exercise capacity using %HR max       HR below ischemic threshold (if determined, HR restriction)    -Education to promote an active healthy lifestyle and reduction of personal health risk factors    -Follow [facility] Policies and Procedures for Phase II CR/ICR             Instructions           Diet / Nutrition:    Follow any diet instructions given to you by your doctor or the dietician, including how much salt (sodium) you are allowed each day.    If you are overweight, talk to your doctor about a weight reduction plan.    Activity:    Remain physically active following your doctor's instructions about exercise and activity.    Rest often.     Any time you become even a little tired or short of breath, SIT DOWN and rest.    Worsening Symptoms:    Report any of the following signs and symptoms to the doctor's office immediately:    *Pain of jaw, arm, or neck  *Chest pain not relieved by medication                               *Dizziness or loss of consciousness  *Difficulty breathing even when at rest   *More tired than usual                                       *Bleeding drainage or swelling of surgical site  *Swelling of feet, ankles, legs or stomach                 *Fever (>100ºF)  *Pink or blood tinged sputum  *Weight gain (3lbs/day or 5lbs /week)           *Shock from internal  defibrillator (if applicable)  *Palpitations or irregular heartbeats                *Cool and/or numb extremities    Stroke Awareness    Common Risk Factors for Stroke include:    Age  Atrial Fibrillation  Carotid Artery Stenosis  Diabetes Mellitus  Excessive alcohol consumption  High blood pressure  Overweight   Physical inactivity  Smoking    Warning signs and symptoms of a stroke include:    *Sudden numbness or weakness of the face, arm or leg (especially on one side of the body).  *Sudden confusion, trouble speaking or understanding.  *Sudden trouble seeing in one or both eyes.  *Sudden trouble walking, dizziness, loss of balance or coordination.Sudden severe headache with no known cause.    It is very important to get treatment quickly when a stroke occurs. If you experience any of the above warning signs, call 911 immediately.                   Disclaimer         Quit Smoking / Tobacco Use:    I understand the use of any tobacco products increases my chance of suffering from future heart disease or stroke and could cause other illnesses which may shorten my life. Quitting the use of tobacco products is the single most important thing I can do to improve my health. For further information on smoking / tobacco cessation call a Toll Free Quit Line at 1-559.520.4596 (*National Cancer [place]) or 1-518.822.4032 (American Lung Association) or you can access the web based program at www.lungusa.org.    [place] Tobacco Users Help Line:  (583) 347-8891       Toll Free: 1-389.454.4102  Quit Tobacco Program [facility] (011)759-7143    Crisis Hotline:    [place] Crisis Hotline:  9-575-MBTMBUL or 1-186.343.3021    [place] Crisis Hotline:    1-643.812.3293 or 837-319-7271    Discharge Survey:   Thank you for choosing [facility]. We hope we did everything we could to make your hospital stay a pleasant one. You may be receiving a phone survey and we would appreciate your time and participation in  answering the questions. Your input is very valuable to us in our efforts to improve our service to our patients and their families.        My signature on this form indicates that:    1. I have reviewed and understand the above information.  2. My questions regarding this information have been answered to my satisfaction.  3. I have formulated a plan with my discharge nurse to obtain my prescribed medications for home.                  Disclaimer         __________________________________                     __________       ________                       Patient Signature                                                 Date                    Time

## 2017-08-04 NOTE — ED PROVIDER NOTES
ED Provider Note    CHIEF COMPLAINT  Chief Complaint   Patient presents with   • Chest Pain     Pt BIB EMS/Flight from [facility], pt reports sudden onset CP while at rest/ midsternal radiating to left arm/ denies N/V/ reports hx of MI/ x 3 stents. pt given Nitro x1/ 324mg ASA PTA/ pt from 10/10- 3/10 CP.        HPI  Kyle Gonzalez is a 37 y.o. male who presents by helicopter for evaluation of chest pain. The patient is currently in intermediate in [facility]. He has extensive history of coronary artery disease with 2 myocardial infarctions with stents. Last time he had a N STEMI about 2 years ago and was evaluated here. He reports that he has not been taking his Plavix as well as any of his other medications. He said he quit smoking about 2 years ago. He denies any leg swelling hemoptysis night sweats or weight loss. He report classic substernal chest heaviness radiating to left arm with some mild dizziness without syncope. He is given nitroglycerin and aspirin prior to arrival and his chest pain went from 10 out of 10 down to 3 out of 10 and on arrival he is currently chest pain-free. His initial EKG did not demonstrate any clear ischemia and his initial troponin were negative from the transferring facility    REVIEW OF SYSTEMS  See HPI for further details. No night sweats or weight loss numbness tingling weakness fevers or chills All other systems are negative.     PAST MEDICAL HISTORY  Past Medical History   Diagnosis Date   • Hyperlipidemia    • Heart attack    • Hypertension    • Medical non-compliance 9/13/2015    coronary artery disease    FAMILY HISTORY  History of coronary artery disease    SOCIAL HISTORY  Social History     Social History   • Marital Status: Single     Spouse Name: N/A   • Number of Children: N/A   • Years of Education: N/A     Social History Main Topics   • Smoking status: Former Smoker     Quit date: 05/13/2015   • Smokeless tobacco: Not on file   • Alcohol Use: Yes      Comment: 2-3  "beers a day   • Drug Use: No   • Sexual Activity: Not on file     Other Topics Concern   • Not on file     Social History Narrative    Nuys illicit drug use    SURGICAL HISTORY  Past Surgical History   Procedure Laterality Date   • Pr transcath stent init vessel,open       x4       CURRENT MEDICATIONS  No current facility-administered medications for this encounter.    Current outpatient prescriptions:   •  clopidogrel (PLAVIX) 75 MG Tab, Take 1 Tab by mouth every day., Disp: 90 Tab, Rfl: 11  Noncompliant    ALLERGIES  No Known Allergies    PHYSICAL EXAM  VITAL SIGNS: /91 mmHg  Pulse 73  Temp(Src) 37.2 °C (98.9 °F)  Resp 16  Ht 1.651 m (5' 5\")  Wt 113.399 kg (250 lb)  BMI 41.60 kg/m2  SpO2 97% Room air O2: 94    Constitutional: Well developed, Well nourished, No acute distress, Non-toxic appearance.   HENT: Normocephalic, Atraumatic, Bilateral external ears normal, Oropharynx moist, No oral exudates, Nose normal.   Eyes: PERRLA, EOMI, Conjunctiva normal, No discharge.   Neck: Normal range of motion, No tenderness, Supple, No stridor.   Cardiovascular: Normal heart rate, Normal rhythm, No murmurs, No rubs, No gallops.   Thorax & Lungs: Normal breath sounds, No respiratory distress, No wheezing, No chest tenderness.   Abdomen: Bowel sounds normal, Soft, No tenderness, No masses, No pulsatile masses.   Skin: Warm, Dry, No erythema, No rash.   Back: No tenderness, No CVA tenderness.   Extremities: Intact distal pulses, No edema, No tenderness, No cyanosis, No clubbing.   Neurologic: Alert & oriented x 3, Normal motor function, Normal sensory function, No focal deficits noted.   Psychiatric: Affect normal, Judgment normal, Mood normal.     EKG  EKG Interpretation    Interpreted by me    Rhythm: normal sinus   Rate: normal  Axis: normal  Ectopy: none  Conduction: normal  ST Segments: no acute change  T Waves: New T-wave inversions in the inferior leads Q Waves: none    Clinical Impression: no acute changes " and normal EKG    RADIOLOGY/PROCEDURES  Chest x-ray from outside facilities unremarkable  Results for orders placed or performed during the hospital encounter of 17   TROPONIN   Result Value Ref Range    Troponin I 0.44 (H) 0.00 - 0.04 ng/mL   EKG (ER)   Result Value Ref Range    Report       [facility] Emergency Dept.    Test Date:  2017  Pt Name:    CHANDRIKA DAVIDSON             Department: ER  MRN:        [id number]                      Room:       [facility]  Gender:     M                            Technician: [id number]  :        1980                   Requested By:HANK LAUREN  Order #:    [id number]                    Reading MD: HANK LAUREN MD    Measurements  Intervals                                Axis  Rate:       72                           P:          16  MA:         148                          QRS:        -16  QRSD:       82                           T:          -2  QT:         400  QTc:        438    Interpretive Statements  SINUS RHYTHM  EARLY PRECORDIAL R/S TRANSITION  LEFT VENTRICULAR HYPERTROPHY  BORDERLINE T ABNORMALITIES, INFERIOR LEADS  Compared to ECG 2015 14:53:19  T-wave abnormality now present  Myocardial infarct finding no longer present    Electronically Signed On 2017 15:46:19 PDT by HANK LAUREN MD        Laboratory studies from outside facility demonstrated what was OF 8, HEMOGLOBIN OF 15 CREATININE 0.62 CHLORIDE 102 CALCIUM 8.7 ALBUMIN 3.3 POTASSIUM 3.7 PLATELETS 329 TRANSAMINASES NORMAL TOTAL BILIRUBIN 0.3. INITIAL TROPONIN IS LESS THAN 0.017  COURSE & MEDICAL DECISION MAKING  Pertinent Labs & Imaging studies reviewed. (See chart for details)  Patient presented here and did not have any ongoing chest pain. Repeat EKG did not demonstrate any dynamic changes however his troponin is noted to be elevated at 0.44. With his history of known extensive coronary artery disease he'll be admitted to internal medicine. Consultation with cardiology  was obtained with Dr. Ge. He will be admitted to internal medicine with cardiology consultation    FINAL IMPRESSION  1. Angina  2. Indeterminate troponin      Electronically signed by: Jet Joyce, 8/4/2017 3:02 PM

## 2017-08-05 ENCOUNTER — APPOINTMENT (OUTPATIENT)
Dept: RADIOLOGY | Facility: MEDICAL CENTER | Age: 37
DRG: 249 | End: 2017-08-05
Attending: INTERNAL MEDICINE
Payer: MEDICAID

## 2017-08-05 LAB
ALBUMIN SERPL BCP-MCNC: 3.4 G/DL (ref 3.2–4.9)
ALBUMIN/GLOB SERPL: 0.9 G/DL
ALP SERPL-CCNC: 88 U/L (ref 30–99)
ALT SERPL-CCNC: 30 U/L (ref 2–50)
ANION GAP SERPL CALC-SCNC: 7 MMOL/L (ref 0–11.9)
AST SERPL-CCNC: 45 U/L (ref 12–45)
BILIRUB SERPL-MCNC: 0.3 MG/DL (ref 0.1–1.5)
BUN SERPL-MCNC: 10 MG/DL (ref 8–22)
CALCIUM SERPL-MCNC: 9.1 MG/DL (ref 8.5–10.5)
CHLORIDE SERPL-SCNC: 105 MMOL/L (ref 96–112)
CHOLEST SERPL-MCNC: 340 MG/DL (ref 100–199)
CO2 SERPL-SCNC: 24 MMOL/L (ref 20–33)
CREAT SERPL-MCNC: 0.63 MG/DL (ref 0.5–1.4)
ERYTHROCYTE [DISTWIDTH] IN BLOOD BY AUTOMATED COUNT: 43 FL (ref 35.9–50)
GFR SERPL CREATININE-BSD FRML MDRD: >60 ML/MIN/1.73 M 2
GLOBULIN SER CALC-MCNC: 3.7 G/DL (ref 1.9–3.5)
GLUCOSE SERPL-MCNC: 96 MG/DL (ref 65–99)
HCT VFR BLD AUTO: 44.3 % (ref 42–52)
HDLC SERPL-MCNC: 31 MG/DL
HGB BLD-MCNC: 15 G/DL (ref 14–18)
LDLC SERPL CALC-MCNC: 266 MG/DL
LV EJECT FRACT  99904: 50
LV EJECT FRACT MOD 2C 99903: 57.68
LV EJECT FRACT MOD 4C 99902: 47.46
LV EJECT FRACT MOD BP 99901: 52.29
MCH RBC QN AUTO: 31 PG (ref 27–33)
MCHC RBC AUTO-ENTMCNC: 33.9 G/DL (ref 33.7–35.3)
MCV RBC AUTO: 91.5 FL (ref 81.4–97.8)
PLATELET # BLD AUTO: 378 K/UL (ref 164–446)
PMV BLD AUTO: 10.2 FL (ref 9–12.9)
POTASSIUM SERPL-SCNC: 3.7 MMOL/L (ref 3.6–5.5)
PROT SERPL-MCNC: 7.1 G/DL (ref 6–8.2)
RBC # BLD AUTO: 4.84 M/UL (ref 4.7–6.1)
SODIUM SERPL-SCNC: 136 MMOL/L (ref 135–145)
TRIGL SERPL-MCNC: 215 MG/DL (ref 0–149)
TROPONIN I SERPL-MCNC: 4.37 NG/ML (ref 0–0.04)
TROPONIN I SERPL-MCNC: 6.74 NG/ML (ref 0–0.04)
WBC # BLD AUTO: 7.2 K/UL (ref 4.8–10.8)

## 2017-08-05 PROCEDURE — C1769 GUIDE WIRE: HCPCS

## 2017-08-05 PROCEDURE — C1894 INTRO/SHEATH, NON-LASER: HCPCS

## 2017-08-05 PROCEDURE — 36415 COLL VENOUS BLD VENIPUNCTURE: CPT

## 2017-08-05 PROCEDURE — 700101 HCHG RX REV CODE 250

## 2017-08-05 PROCEDURE — 93005 ELECTROCARDIOGRAM TRACING: CPT | Performed by: INTERNAL MEDICINE

## 2017-08-05 PROCEDURE — 93306 TTE W/DOPPLER COMPLETE: CPT

## 2017-08-05 PROCEDURE — C1876 STENT, NON-COA/NON-COV W/DEL: HCPCS

## 2017-08-05 PROCEDURE — 80053 COMPREHEN METABOLIC PANEL: CPT

## 2017-08-05 PROCEDURE — 84484 ASSAY OF TROPONIN QUANT: CPT | Mod: 91

## 2017-08-05 PROCEDURE — 92928 PRQ TCAT PLMT NTRAC ST 1 LES: CPT | Mod: LC

## 2017-08-05 PROCEDURE — A9270 NON-COVERED ITEM OR SERVICE: HCPCS | Performed by: INTERNAL MEDICINE

## 2017-08-05 PROCEDURE — 85027 COMPLETE CBC AUTOMATED: CPT

## 2017-08-05 PROCEDURE — A9270 NON-COVERED ITEM OR SERVICE: HCPCS

## 2017-08-05 PROCEDURE — B2151ZZ FLUOROSCOPY OF LEFT HEART USING LOW OSMOLAR CONTRAST: ICD-10-PCS | Performed by: INTERNAL MEDICINE

## 2017-08-05 PROCEDURE — 92941 PRQ TRLML REVSC TOT OCCL AMI: CPT | Mod: LC

## 2017-08-05 PROCEDURE — 99153 MOD SED SAME PHYS/QHP EA: CPT

## 2017-08-05 PROCEDURE — 93010 ELECTROCARDIOGRAM REPORT: CPT | Mod: 76 | Performed by: INTERNAL MEDICINE

## 2017-08-05 PROCEDURE — C1887 CATHETER, GUIDING: HCPCS

## 2017-08-05 PROCEDURE — 700102 HCHG RX REV CODE 250 W/ 637 OVERRIDE(OP): Performed by: INTERNAL MEDICINE

## 2017-08-05 PROCEDURE — 93010 ELECTROCARDIOGRAM REPORT: CPT | Performed by: INTERNAL MEDICINE

## 2017-08-05 PROCEDURE — C1725 CATH, TRANSLUMIN NON-LASER: HCPCS

## 2017-08-05 PROCEDURE — 99233 SBSQ HOSP IP/OBS HIGH 50: CPT | Performed by: HOSPITALIST

## 2017-08-05 PROCEDURE — 99152 MOD SED SAME PHYS/QHP 5/>YRS: CPT

## 2017-08-05 PROCEDURE — 700111 HCHG RX REV CODE 636 W/ 250 OVERRIDE (IP): Performed by: INTERNAL MEDICINE

## 2017-08-05 PROCEDURE — 700105 HCHG RX REV CODE 258: Performed by: INTERNAL MEDICINE

## 2017-08-05 PROCEDURE — 93458 L HRT ARTERY/VENTRICLE ANGIO: CPT

## 2017-08-05 PROCEDURE — 700111 HCHG RX REV CODE 636 W/ 250 OVERRIDE (IP)

## 2017-08-05 PROCEDURE — B2111ZZ FLUOROSCOPY OF MULTIPLE CORONARY ARTERIES USING LOW OSMOLAR CONTRAST: ICD-10-PCS | Performed by: INTERNAL MEDICINE

## 2017-08-05 PROCEDURE — 700117 HCHG RX CONTRAST REV CODE 255: Performed by: INTERNAL MEDICINE

## 2017-08-05 PROCEDURE — 770020 HCHG ROOM/CARE - TELE (206)

## 2017-08-05 PROCEDURE — 93306 TTE W/DOPPLER COMPLETE: CPT | Mod: 26 | Performed by: INTERNAL MEDICINE

## 2017-08-05 PROCEDURE — 360979 HCHG DIAGNOSTIC CATH

## 2017-08-05 PROCEDURE — 02703DZ DILATION OF CORONARY ARTERY, ONE ARTERY WITH INTRALUMINAL DEVICE, PERCUTANEOUS APPROACH: ICD-10-PCS | Performed by: INTERNAL MEDICINE

## 2017-08-05 PROCEDURE — 700102 HCHG RX REV CODE 250 W/ 637 OVERRIDE(OP)

## 2017-08-05 PROCEDURE — 4A023N7 MEASUREMENT OF CARDIAC SAMPLING AND PRESSURE, LEFT HEART, PERCUTANEOUS APPROACH: ICD-10-PCS | Performed by: INTERNAL MEDICINE

## 2017-08-05 PROCEDURE — 307093 HCHG TR BAND RADIAL

## 2017-08-05 PROCEDURE — 80061 LIPID PANEL: CPT

## 2017-08-05 RX ORDER — MIDAZOLAM HYDROCHLORIDE 1 MG/ML
INJECTION INTRAMUSCULAR; INTRAVENOUS
Status: COMPLETED
Start: 2017-08-05 | End: 2017-08-05

## 2017-08-05 RX ORDER — SODIUM CHLORIDE 9 MG/ML
INJECTION, SOLUTION INTRAVENOUS CONTINUOUS
Status: ACTIVE | OUTPATIENT
Start: 2017-08-05 | End: 2017-08-05

## 2017-08-05 RX ORDER — HALOPERIDOL 5 MG/ML
1 INJECTION INTRAMUSCULAR EVERY 6 HOURS PRN
Status: DISCONTINUED | OUTPATIENT
Start: 2017-08-05 | End: 2017-08-07 | Stop reason: HOSPADM

## 2017-08-05 RX ORDER — ONDANSETRON 2 MG/ML
4 INJECTION INTRAMUSCULAR; INTRAVENOUS EVERY 4 HOURS PRN
Status: DISCONTINUED | OUTPATIENT
Start: 2017-08-05 | End: 2017-08-07 | Stop reason: HOSPADM

## 2017-08-05 RX ORDER — SCOLOPAMINE TRANSDERMAL SYSTEM 1 MG/1
1 PATCH, EXTENDED RELEASE TRANSDERMAL
Status: DISCONTINUED | OUTPATIENT
Start: 2017-08-05 | End: 2017-08-07 | Stop reason: HOSPADM

## 2017-08-05 RX ORDER — HEPARIN SODIUM,PORCINE 1000/ML
VIAL (ML) INJECTION
Status: COMPLETED
Start: 2017-08-05 | End: 2017-08-05

## 2017-08-05 RX ORDER — CLOPIDOGREL BISULFATE 75 MG/1
75 TABLET ORAL DAILY
Status: DISCONTINUED | OUTPATIENT
Start: 2017-08-06 | End: 2017-08-07 | Stop reason: HOSPADM

## 2017-08-05 RX ORDER — DIPHENHYDRAMINE HYDROCHLORIDE 50 MG/ML
25 INJECTION INTRAMUSCULAR; INTRAVENOUS EVERY 6 HOURS PRN
Status: DISCONTINUED | OUTPATIENT
Start: 2017-08-05 | End: 2017-08-07 | Stop reason: HOSPADM

## 2017-08-05 RX ORDER — CLOPIDOGREL BISULFATE 75 MG/1
600 TABLET ORAL ONCE
Status: COMPLETED | OUTPATIENT
Start: 2017-08-05 | End: 2017-08-05

## 2017-08-05 RX ORDER — HEPARIN SODIUM 5000 [USP'U]/ML
5000 INJECTION, SOLUTION INTRAVENOUS; SUBCUTANEOUS EVERY 8 HOURS
Status: DISCONTINUED | OUTPATIENT
Start: 2017-08-05 | End: 2017-08-07 | Stop reason: HOSPADM

## 2017-08-05 RX ORDER — CLOPIDOGREL 300 MG/1
TABLET, FILM COATED ORAL
Status: COMPLETED
Start: 2017-08-05 | End: 2017-08-05

## 2017-08-05 RX ORDER — VERAPAMIL HYDROCHLORIDE 2.5 MG/ML
INJECTION, SOLUTION INTRAVENOUS
Status: COMPLETED
Start: 2017-08-05 | End: 2017-08-05

## 2017-08-05 RX ORDER — DEXAMETHASONE SODIUM PHOSPHATE 4 MG/ML
4 INJECTION, SOLUTION INTRA-ARTICULAR; INTRALESIONAL; INTRAMUSCULAR; INTRAVENOUS; SOFT TISSUE
Status: DISCONTINUED | OUTPATIENT
Start: 2017-08-05 | End: 2017-08-07 | Stop reason: HOSPADM

## 2017-08-05 RX ORDER — BIVALIRUDIN 250 MG/5ML
INJECTION, POWDER, LYOPHILIZED, FOR SOLUTION INTRAVENOUS
Status: COMPLETED
Start: 2017-08-05 | End: 2017-08-05

## 2017-08-05 RX ORDER — NITROGLYCERIN 0.4 MG/1
0.4 TABLET SUBLINGUAL
Status: DISCONTINUED | OUTPATIENT
Start: 2017-08-05 | End: 2017-08-07 | Stop reason: HOSPADM

## 2017-08-05 RX ORDER — LIDOCAINE HYDROCHLORIDE 20 MG/ML
INJECTION, SOLUTION INFILTRATION; PERINEURAL
Status: COMPLETED
Start: 2017-08-05 | End: 2017-08-05

## 2017-08-05 RX ADMIN — VERAPAMIL HYDROCHLORIDE 5 MG: 2.5 INJECTION, SOLUTION INTRAVENOUS at 11:14

## 2017-08-05 RX ADMIN — MIDAZOLAM 2 MG: 1 INJECTION INTRAMUSCULAR; INTRAVENOUS at 11:15

## 2017-08-05 RX ADMIN — IOHEXOL 179 ML: 350 INJECTION, SOLUTION INTRAVENOUS at 12:00

## 2017-08-05 RX ADMIN — METOPROLOL TARTRATE 25 MG: 25 TABLET, FILM COATED ORAL at 10:01

## 2017-08-05 RX ADMIN — CLOPIDOGREL BISULFATE 600 MG: 300 TABLET, FILM COATED ORAL at 11:32

## 2017-08-05 RX ADMIN — HEPARIN SODIUM: 1000 INJECTION, SOLUTION INTRAVENOUS; SUBCUTANEOUS at 10:54

## 2017-08-05 RX ADMIN — SODIUM CHLORIDE: 9 INJECTION, SOLUTION INTRAVENOUS at 12:05

## 2017-08-05 RX ADMIN — HEPARIN SODIUM 5000 UNITS: 5000 INJECTION, SOLUTION INTRAVENOUS; SUBCUTANEOUS at 20:34

## 2017-08-05 RX ADMIN — ROSUVASTATIN CALCIUM 20 MG: 20 TABLET, FILM COATED ORAL at 20:34

## 2017-08-05 RX ADMIN — METOPROLOL TARTRATE 25 MG: 25 TABLET, FILM COATED ORAL at 20:34

## 2017-08-05 RX ADMIN — HEPARIN SODIUM 2000 UNITS: 200 INJECTION, SOLUTION INTRAVENOUS at 11:15

## 2017-08-05 RX ADMIN — LISINOPRIL 5 MG: 5 TABLET ORAL at 10:01

## 2017-08-05 RX ADMIN — NITROGLYCERIN 10 ML: 20 INJECTION INTRAVENOUS at 11:14

## 2017-08-05 RX ADMIN — FENTANYL CITRATE 100 MCG: 50 INJECTION, SOLUTION INTRAMUSCULAR; INTRAVENOUS at 11:15

## 2017-08-05 RX ADMIN — LIDOCAINE HYDROCHLORIDE: 20 INJECTION, SOLUTION INFILTRATION; PERINEURAL at 10:54

## 2017-08-05 RX ADMIN — BIVALIRUDIN 250 MG: 250 INJECTION, POWDER, LYOPHILIZED, FOR SOLUTION INTRAVENOUS at 11:13

## 2017-08-05 RX ADMIN — ASPIRIN 325 MG: 325 TABLET, COATED ORAL at 10:01

## 2017-08-05 ASSESSMENT — ENCOUNTER SYMPTOMS
SHORTNESS OF BREATH: 0
ABDOMINAL PAIN: 0
WHEEZING: 0
DIZZINESS: 0
FOCAL WEAKNESS: 0
SORE THROAT: 0
PALPITATIONS: 0
NAUSEA: 0
CHILLS: 0
DIARRHEA: 0
MYALGIAS: 0
COUGH: 0
DEPRESSION: 0
PHOTOPHOBIA: 0
HEADACHES: 0
VOMITING: 0
FEVER: 0
TINGLING: 0

## 2017-08-05 ASSESSMENT — PATIENT HEALTH QUESTIONNAIRE - PHQ9
1. LITTLE INTEREST OR PLEASURE IN DOING THINGS: NOT AT ALL
SUM OF ALL RESPONSES TO PHQ QUESTIONS 1-9: 0
SUM OF ALL RESPONSES TO PHQ9 QUESTIONS 1 AND 2: 0
2. FEELING DOWN, DEPRESSED, IRRITABLE, OR HOPELESS: NOT AT ALL

## 2017-08-05 ASSESSMENT — PAIN SCALES - GENERAL
PAINLEVEL_OUTOF10: 0

## 2017-08-05 NOTE — PROGRESS NOTES
[facility] Hospitalist Progress Note    Date of Service: 2017    Chief Complaint  37 y.o. male transferred on 2017 from [facility] for chest pain    Interval Problem Update  Now asymptomatic, last trop still trending up    Consultants/Specialty  Cardiology    Disposition  TBD        Review of Systems   Constitutional: Negative for fever and chills.   HENT: Negative for congestion and sore throat.    Eyes: Negative for photophobia.   Respiratory: Negative for cough, shortness of breath and wheezing.    Cardiovascular: Negative for chest pain and palpitations.   Gastrointestinal: Negative for nausea, vomiting, abdominal pain and diarrhea.   Genitourinary: Negative for dysuria.   Musculoskeletal: Negative for myalgias.   Skin: Negative.    Neurological: Negative for dizziness, tingling, focal weakness and headaches.   Psychiatric/Behavioral: Negative for depression and suicidal ideas.      Physical Exam  Laboratory/Imaging   Hemodynamics  Temp (24hrs), Av.6 °C (97.9 °F), Min:36.2 °C (97.1 °F), Max:37.2 °C (98.9 °F)   Temperature: 36.6 °C (97.9 °F)  Pulse  Av.2  Min: 68  Max: 92 Heart Rate (Monitored): 88  Blood Pressure: 108/68 mmHg, NIBP: 125/72 mmHg      Respiratory      Respiration: 16, Pulse Oximetry: 93 %, O2 Daily Delivery Respiratory : Room Air with O2 Available        RUL Breath Sounds: Clear, RML Breath Sounds: Clear, RLL Breath Sounds: Clear, AYAZ Breath Sounds: Clear, LLL Breath Sounds: Clear    Fluids    Intake/Output Summary (Last 24 hours) at 17 0847  Last data filed at 17 0556   Gross per 24 hour   Intake   1490 ml   Output      0 ml   Net   1490 ml       Nutrition  Orders Placed This Encounter   Procedures   • Diet Order     Standing Status: Standing      Number of Occurrences: 1      Standing Expiration Date:      Order Specific Question:  Diet:     Answer:  Cardiac [6]     Order Specific Question:  Diet:     Answer:  2 Gram Sodium [7]     Physical Exam   Constitutional: He  is oriented to person, place, and time. No distress.   HENT:   Head: Normocephalic and atraumatic.   Right Ear: External ear normal.   Left Ear: External ear normal.   Eyes: EOM are normal. Right eye exhibits no discharge. Left eye exhibits no discharge.   Neck: Neck supple. No JVD present.   Cardiovascular: Normal rate, regular rhythm and normal heart sounds.    Pulmonary/Chest: Effort normal and breath sounds normal. No respiratory distress. He exhibits no tenderness.   Abdominal: Soft. Bowel sounds are normal. He exhibits no distension. There is no tenderness.   Musculoskeletal: He exhibits no edema.   Neurological: He is alert and oriented to person, place, and time. No cranial nerve deficit.   Skin: Skin is dry. He is not diaphoretic. No erythema.   Psychiatric: He has a normal mood and affect. His behavior is normal.   Nursing note and vitals reviewed.      Recent Labs      08/04/17 1740 08/05/17   0325   WBC  9.0  7.2   RBC  4.97  4.84   HEMOGLOBIN  15.2  15.0   HEMATOCRIT  44.5  44.3   MCV  89.5  91.5   MCH  30.6  31.0   MCHC  34.2  33.9   RDW  41.8  43.0   PLATELETCT  333  378   MPV  9.7  10.2     Recent Labs      08/04/17   1740  08/05/17   0325   SODIUM  136  136   POTASSIUM  3.8  3.7   CHLORIDE  104  105   CO2  22  24   GLUCOSE  111*  96   BUN  8  10   CREATININE  0.58  0.63   CALCIUM  9.1  9.1     Recent Labs      08/04/17   1740   INR  0.90         Recent Labs      08/05/17   0325   TRIGLYCERIDE  215*   HDL  31*   LDL  266*          Assessment/Plan     * NSTEMI (non-ST elevated myocardial infarction) (CMS-[facility]) (present on admission)  Assessment & Plan  - Patient with known CAD s/p stent and strong family history of early cardiac disease  - Troponin continues to climb but patient currently asymptomatic  - Trend enzymes  - Continue telemonitoring  - Continue ASA, crestor, metoprolol, prinivil  - Cardiology following for cath today    Hyperlipemia (present on admission)  Assessment & Plan  - Treatment  as noted above    Stented coronary artery,  bare metal stent X 2 (present on admission)  Assessment & Plan  - Treatment as noted above    CAD (coronary artery disease)  Assessment & Plan  - Treatment as noted above    Medically noncompliant  Assessment & Plan  - Patient reports being off medications because he can't afford them  - SW consulted for assistance      Labs reviewed and Medications reviewed        DVT Prophylaxis: Heparin

## 2017-08-05 NOTE — H&P
Hospital Medicine History and Physical    Date of Service  8/4/2017    Chief Complaint  Chief Complaint   Patient presents with   • Chest Pain     Pt BIB EMS/Flight from [facility]/alf, pt reports sudden onset CP while at rest/ midsternal radiating to left arm/ denies N/V/ reports hx of MI/ x 3 stents. pt given Nitro x1/ 324mg ASA PTA/ pt from 10/10- 3/10 CP.        History of Presenting Illness  37 y.o. male who presented 8/4/2017 with h/o CAD s/p stent 2014 x 2, medically noncompliant presents with complaints of CP from half-way.  Pt reports onset around 1030 am while resting, describes as aching, last 1 hour, intermittent with associated sob, substernal, neg radiation, neg diaphoresis, n/v.  No recent illness    Quit smoking in may, 30+pack year, + vape- nonnicotine  Incarcerated x 1 month    On exam, CP free, denies sob.    Primary Care Physician  Pcp Pt States None    Consultants  Cardiology, Dr. WATSON    Code Status  Full    Review of Systems  Review of Systems   Constitutional: Negative for fever and chills.   HENT: Negative for congestion and hearing loss.    Respiratory: Negative for cough and shortness of breath.    Cardiovascular: Positive for chest pain. Negative for palpitations and leg swelling.   Gastrointestinal: Negative for nausea, vomiting and abdominal pain.   Genitourinary: Negative for dysuria and flank pain.   Musculoskeletal: Negative for myalgias, back pain and joint pain.   Neurological: Negative for dizziness, sensory change, speech change, focal weakness, weakness and headaches.   Psychiatric/Behavioral: Negative for depression and memory loss. The patient is not nervous/anxious.           Past Medical History  Past Medical History   Diagnosis Date   • Hyperlipidemia    • Heart attack    • Hypertension    • Medical non-compliance 9/13/2015       Surgical History  Past Surgical History   Procedure Laterality Date   • Pr transcath stent init vessel,open       x4       Medications  No current  facility-administered medications on file prior to encounter.     No current outpatient prescriptions on file prior to encounter.       Family History  Family History   Problem Relation Age of Onset   • Diabetes Mother        Social History  Social History   Substance Use Topics   • Smoking status: Former Smoker     Quit date: 2015   • Smokeless tobacco: Not on file   • Alcohol Use: Yes      Comment: 2-3 beers a day       Allergies  No Known Allergies     Physical Exam  Laboratory   Hemodynamics  Temp (24hrs), Av.2 °C (98.9 °F), Min:37.2 °C (98.9 °F), Max:37.2 °C (98.9 °F)   Temperature: 37.2 °C (98.9 °F)  Pulse  Av.1  Min: 68  Max: 92 Heart Rate (Monitored): 88  Blood Pressure: 132/91 mmHg, NIBP: 125/72 mmHg      Respiratory      Respiration: 20, Pulse Oximetry: 97 %             Physical Exam   Constitutional: He is oriented to person, place, and time. He appears well-nourished. No distress.   HENT:   Head: Normocephalic and atraumatic.   Nose: Nose normal.   Eyes: EOM are normal. Pupils are equal, round, and reactive to light. Right eye exhibits no discharge. Left eye exhibits no discharge.   Neck: Neck supple. No JVD present. No thyromegaly present.   Cardiovascular: Normal rate and intact distal pulses.    No murmur heard.  Pulmonary/Chest: Effort normal and breath sounds normal. No respiratory distress. He has no wheezes.   Abdominal: Soft. Bowel sounds are normal. He exhibits no distension. There is no tenderness.   Musculoskeletal: He exhibits no edema or tenderness.   Neurological: He is alert and oriented to person, place, and time. No cranial nerve deficit.   Skin: Skin is warm and dry. No rash noted. He is not diaphoretic. No erythema.   Psychiatric: He has a normal mood and affect. His behavior is normal. Judgment and thought content normal.   Nursing note and vitals reviewed.              No results for input(s): ALTSGPT, ASTSGOT, ALKPHOSPHAT, TBILIRUBIN, DBILIRUBIN, GAMMAGT, AMYLASE,  LIPASE, ALB, PREALBUMIN, GLUCOSE in the last 72 hours.              Lab Results   Component Value Date    TROPONINI 0.44* 08/04/2017       Imaging  ekg SR, NS st-t changes  Cxr neg per er     Assessment/Plan     I anticipate this patient will require at least two midnights for appropriate medical management, necessitating inpatient admission.    * NSTEMI (non-ST elevated myocardial infarction) (CMS-[facility]) (present on admission)  Assessment & Plan  Admit to tele  F/u trop  Cardiology, Dr SHIRLEY Bird ASA, statin, bb, acei  Start lovenox  Npo at [place]n for cath in am  D/w cardiology    Hyperlipemia (present on admission)  Assessment & Plan  Start statin    Old inferior wall myocardial infarction (present on admission)  Assessment & Plan  h/o    Stented coronary artery,  bare metal stent X 2 (present on admission)  Assessment & Plan  As above    CAD (coronary artery disease)  Assessment & Plan  As above    Medically noncompliant  Assessment & Plan  Off all rx d/t cost  SW to assist on dc        VTE prophylaxis: lovenox.

## 2017-08-05 NOTE — PROGRESS NOTES
tech from Lab called with critical result of troponin at 7.98. Critical lab result read back to .   Dr. Delarosa notified of critical lab result at 2816.  Critical lab result read back by Dr. Delarosa's nurse as he was in a cath lab procedure. No new orders at this time

## 2017-08-05 NOTE — CATH LAB
Immediate Post-Operative Note      PreOp Diagnosis: NSTEMI, known LCX and RCA stents    PostOp Diagnosis: 99% OM with ABBY II flow,  RCA, pstent old LCX stents, 70-8-% D1 ostial, NL EF  S/p bare metal stent 2.5x18 Vision stent to OM no residual, ABBY III flow    Procedure(s) :  Coronary Angiography, Left Heart Catheterization, Left Ventriculography    Surgeon(s):  Renetta Sanchez M.D.    Type of Anesthesia: Moderate Sedation    Specimen: None        Findings: As above    Complications: none      Renetta Sanchez M.D.  8/5/2017 11:41 AM

## 2017-08-05 NOTE — ASSESSMENT & PLAN NOTE
- Patient reports being off medications because he can't afford them  - SW consulted for assistance

## 2017-08-05 NOTE — PROGRESS NOTES
Pt arrived to unit via transport at 1630. Pt oriented to room, unit, and plan of care. Tele-monitor placed and monitor room notified. All questions answered at this time. Call light within reach; fall precautions in place. Pt denies chest pain at this time.

## 2017-08-05 NOTE — PROGRESS NOTES
Pt arrived from cath lab.    R radial site with hemoband in place. No bleeding or hematoma.  Monitor placed and monitor room notified.

## 2017-08-05 NOTE — DIETARY
Nutrition Services:   Consult received for cardiac rehab diet education. Attempted to visit pt for cardiac diet education, pt was not in room. RD will follow up for diet education.     Pt with BMI >40 (=43.47). Weight loss counseling not appropriate in acute care setting.   RECOMMEND - Referral to outpatient nutrition services for weight management after D/C.     RD available prn.

## 2017-08-05 NOTE — CATH LAB
Cardiac catheterization report    Procedure date August 5, 2017      PreOp Diagnosis: NSTEMI, prior RCA and LCX stents    PostOp Diagnosis:   1. NSTEMI due to 99% stenosis of the second obtuse marginal (OM) branch of left circumflex artery (LCX)  2. Chronic total occlusion of mid to distal right coronary artery  3. 70-80% ostial large first diagonal branch of the left anterior descending artery stenosis  4. Patent left circumflex artery stents  5. Inferior wall hypokinesis with joseph overall left ventricular systolic function  6. Status post stenting of the second OM with 2.5x18 mm Vision bare metal stent    Procedure(s) :  Coronary Angiography, Left Heart Catheterization, Left Ventriculography  Percutaneous coronary intervention of the second OM of the LCX    Surgeon(s):  Renetta Sanchez M.D.    Type of Anesthesia: Moderate Sedation    Complications: none    Description of the procedure;    After inform consent was obtained, the patient was brought to cardiac catheterization laboratory in fasting state.   Alec test was carried out on the right hand and was found to be negative.  Right wrist and right groin were then prepped and drapped in sterile fashion.  Versed and fentanyl were used for conscious sedation.  Lidocaine 2% was used to anesthetize the area.  A 6 Cymraes Terumo sheath was then placed in the right radial artery using Seldinger technique.  A 2.5 mg of verapamil, 100 microgram of nitroglycerine and 4000 units of heparin were administered into the radial sheath.    Selective angiography of the left coronary artery were then performed in multiple views using 5 Cymraes TIG catheter.   Selective coronary angiography of the right coronary artery was performed using the same catheter.   The catheter was then exchanged to a pigtail catheter.  Left ventriculography was performed using 30 cc of contrast injected over 3 seconds using pigtail catheter.  Left heart pullback was then performed.    After  reviewing diagnostic angiography, it was felt that intervention of the second obtuse marginal branch of the left circumflex artery was indicated.  Bivalrudin was started for anticoagulation.  A 6 Lao EBU 3.5 guide catheter was used.  0.014 BMW wire was then advanced past the stenosis.  The lesion was dilated with 2x15 mm balloon.  A 2.5x18 bare metal Vision stent was deployed with Guidezilla support. The stent was post dilated with 2.5x15 mm non-compliance balloon upto 12 ATMs.  Subsequent angiography showed no significant residual stenosis with good flow.   The guide wire was then removed. The final angiography wasthen performed and confirmed good result.  The guiding catheter was then removed. Radial sheath was then removed.  Hemostasis was obtained using Talentodayumo TR wrist band.  The patient was given loading dose of clopidogrel.  Bivalirudin was reduced to low dose infusion.  The patient tolerated procedure well and left cardiac catheterization laboratory in stable condition.    Findings:     There is no gradient across aortic valve.  Left ventricular end-diastolic pressure was normal.    Left ventriculography showed hypokinesis of the inferior wall.  Overall LV systolic function is normal .  Ejection fraction is calculated to be 50--55 %.    Coronary artery disease    This is right dominant system.    Left main is large and without flow limiting disease.  It bifurcated into left anterior descending and left circumflex artery.    Left anterior descending artery (LAD) is large caliber vessel and extends to the apex.  It gives rises to a large first diagonal branch proximally and a couplel small branches distally.  There was 70-80% stenosis at the ostium of the first diagonal branch but normal antegrade flow.  There is 30% stenosis in the LAD around the first diagonal origin but no flow limiting disease in the left anterior descending artery itself. The antegrade flow is normal.    Left circumflex artery (LCX) is  large caliber. Previously placed stents were noted in the proximal to mid LCX.  The stents were widely patent.  The LCX gives rise to several obtuse marginal branches.  The second OM is medium sized. It had 99% proximal stenosis with ABBY II flow.  There is no other flow limting disease in the LCX system.  The antegrade flow in the main LCX is normal.    Right coronary artery (RCA) is large caliber. It gives rise to several medium sized acute marginal branches and became occluded in its mid portion. Multiple stents were noted in the proximal to distal RCA.  Some collateral flow was seen into posterior descending artery from acute marginal branch.      After intervention, there was 0% residual stenosis in second OM of the LCX artery with ABBY III flow.    Plans: Dual antiplatelet therapy for one year. Risk factor modification.  Limit right wrist movement for 24 hours.      Renetta Sanchez M.D.  8/5/2017 12:43 PM

## 2017-08-05 NOTE — PROGRESS NOTES
Lab called with critical result of troponin of 3.74 at 1836. Critical lab result read back to lab. Cardiology paged to notify at 0700; report and phone given to Kamilah BAUM.

## 2017-08-05 NOTE — ASSESSMENT & PLAN NOTE
- Patient with known CAD s/p stent and strong family history of early cardiac disease  - Troponin peaked and has begun to trend down  - s/p cath with new stent place  - Continue to monitor on telemetry for reperfusion arrhythmias  - Continue ASA, crestor, metoprolol, prinivil  - Cardiology following  - SW following to see if patient qualifies for insurance or assistance programs as he has been medically noncompliant with his cardiac medications because of difficulty with finances

## 2017-08-05 NOTE — PROGRESS NOTES
Patient admission complete. Oriented to smoking and visitor policy, along with unit specifics. Discussed plan of care. Denies questions at this time.

## 2017-08-05 NOTE — CARE PLAN
Problem: Safety  Goal: Will remain free from falls  Outcome: PROGRESSING AS EXPECTED  Patient calls for assistance as needed. Wearing treaded socks.     Problem: Pain Management  Goal: Pain level will decrease to patient’s comfort goal  Outcome: PROGRESSING AS EXPECTED  Patient states chest pain is currently gone- encouraged to let staff know if pain returns

## 2017-08-05 NOTE — DISCHARGE PLANNING
SW received page stating that pt needs help with his medications. Per charge RN, pt has multiple stents and will be getting another one today. She reports that he has not been taking his medications because he can't afford them. ISABELL requested PFA screen pt for medicaid. If pt does not qualify for medicaid, SW to look into info on JOEY and other medication assistance options.

## 2017-08-06 LAB
ANION GAP SERPL CALC-SCNC: 7 MMOL/L (ref 0–11.9)
BUN SERPL-MCNC: 10 MG/DL (ref 8–22)
CALCIUM SERPL-MCNC: 9.2 MG/DL (ref 8.5–10.5)
CHLORIDE SERPL-SCNC: 103 MMOL/L (ref 96–112)
CO2 SERPL-SCNC: 25 MMOL/L (ref 20–33)
CREAT SERPL-MCNC: 0.69 MG/DL (ref 0.5–1.4)
EKG IMPRESSION: NORMAL
EKG IMPRESSION: NORMAL
ERYTHROCYTE [DISTWIDTH] IN BLOOD BY AUTOMATED COUNT: 41.4 FL (ref 35.9–50)
GFR SERPL CREATININE-BSD FRML MDRD: >60 ML/MIN/1.73 M 2
GLUCOSE SERPL-MCNC: 114 MG/DL (ref 65–99)
HCT VFR BLD AUTO: 43.2 % (ref 42–52)
HGB BLD-MCNC: 14.5 G/DL (ref 14–18)
MCH RBC QN AUTO: 30.1 PG (ref 27–33)
MCHC RBC AUTO-ENTMCNC: 33.6 G/DL (ref 33.7–35.3)
MCV RBC AUTO: 89.8 FL (ref 81.4–97.8)
PLATELET # BLD AUTO: 351 K/UL (ref 164–446)
PMV BLD AUTO: 9.8 FL (ref 9–12.9)
POTASSIUM SERPL-SCNC: 3.6 MMOL/L (ref 3.6–5.5)
RBC # BLD AUTO: 4.81 M/UL (ref 4.7–6.1)
SODIUM SERPL-SCNC: 135 MMOL/L (ref 135–145)
WBC # BLD AUTO: 8.5 K/UL (ref 4.8–10.8)

## 2017-08-06 PROCEDURE — 85027 COMPLETE CBC AUTOMATED: CPT

## 2017-08-06 PROCEDURE — 97161 PT EVAL LOW COMPLEX 20 MIN: CPT

## 2017-08-06 PROCEDURE — 700102 HCHG RX REV CODE 250 W/ 637 OVERRIDE(OP): Performed by: INTERNAL MEDICINE

## 2017-08-06 PROCEDURE — A9270 NON-COVERED ITEM OR SERVICE: HCPCS | Performed by: INTERNAL MEDICINE

## 2017-08-06 PROCEDURE — G8978 MOBILITY CURRENT STATUS: HCPCS | Mod: CI

## 2017-08-06 PROCEDURE — 36415 COLL VENOUS BLD VENIPUNCTURE: CPT

## 2017-08-06 PROCEDURE — G8979 MOBILITY GOAL STATUS: HCPCS | Mod: CI

## 2017-08-06 PROCEDURE — G8980 MOBILITY D/C STATUS: HCPCS | Mod: CI

## 2017-08-06 PROCEDURE — 99232 SBSQ HOSP IP/OBS MODERATE 35: CPT | Performed by: HOSPITALIST

## 2017-08-06 PROCEDURE — 770020 HCHG ROOM/CARE - TELE (206)

## 2017-08-06 PROCEDURE — 80048 BASIC METABOLIC PNL TOTAL CA: CPT

## 2017-08-06 RX ADMIN — METOPROLOL TARTRATE 25 MG: 25 TABLET, FILM COATED ORAL at 20:54

## 2017-08-06 RX ADMIN — ROSUVASTATIN CALCIUM 20 MG: 20 TABLET, FILM COATED ORAL at 20:54

## 2017-08-06 RX ADMIN — METOPROLOL TARTRATE 25 MG: 25 TABLET, FILM COATED ORAL at 08:12

## 2017-08-06 RX ADMIN — ASPIRIN 325 MG: 325 TABLET, COATED ORAL at 08:11

## 2017-08-06 RX ADMIN — CLOPIDOGREL 75 MG: 75 TABLET, FILM COATED ORAL at 09:00

## 2017-08-06 RX ADMIN — LISINOPRIL 5 MG: 5 TABLET ORAL at 08:11

## 2017-08-06 ASSESSMENT — ENCOUNTER SYMPTOMS
DIZZINESS: 0
DEPRESSION: 0
COUGH: 0
MYALGIAS: 0
ABDOMINAL PAIN: 0
HEADACHES: 0
CLAUDICATION: 0
WHEEZING: 0
PHOTOPHOBIA: 0
DIARRHEA: 0
CHILLS: 0
PALPITATIONS: 0
FOCAL WEAKNESS: 0
PND: 0
TINGLING: 0
ORTHOPNEA: 0
NAUSEA: 0
VOMITING: 0
FEVER: 0
SORE THROAT: 0
SHORTNESS OF BREATH: 0

## 2017-08-06 ASSESSMENT — COGNITIVE AND FUNCTIONAL STATUS - GENERAL
MOBILITY SCORE: 24
SUGGESTED CMS G CODE MODIFIER MOBILITY: CH

## 2017-08-06 ASSESSMENT — PAIN SCALES - GENERAL
PAINLEVEL_OUTOF10: 0

## 2017-08-06 ASSESSMENT — GAIT ASSESSMENTS
GAIT LEVEL OF ASSIST: SUPERVISED
DISTANCE (FEET): 500

## 2017-08-06 NOTE — THERAPY
"36 y/o male adm for CP dx: NSTEMI, hx MI x 3, CAD, medical non compliance, S/P heart cath. No reports of CP during mobility. Cardiac education, activity progression give. No further acute PT services required at this time.    Physical Therapy Evaluation completed.   Bed Mobility:  Supine to Sit: Modified Independent  Transfers: Sit to Stand: Supervised  Gait: Level Of Assist: Supervised with No Equipment Needed       Plan of Care: Patient with no further skilled PT needs in the acute care setting at this time  Discharge Recommendations: Equipment: No Equipment Needed. Post-acute therapy Currently anticipate no further skilled therapy needs once patient is discharged from the inpatient setting.    See \"Rehab Therapy-Acute\" Patient Summary Report for complete documentation.     "

## 2017-08-06 NOTE — CARE PLAN
Problem: Knowledge Deficit  Goal: Knowledge of disease process/condition, treatment plan, diagnostic tests, and medications will improve  Intervention: Assess knowledge level of disease process/condition, treatment plan, diagnostic tests, and medications  Pt educated regarding plan of care and medications. All questions answered.

## 2017-08-06 NOTE — CARE PLAN
Problem: Communication  Goal: The ability to communicate needs accurately and effectively will improve  Outcome: PROGRESSING AS EXPECTED  POC discussed with pt. at bedside. All questions and concerns have been addressed at this time. Verbal understanding recieved    Problem: Knowledge Deficit  Goal: Knowledge of disease process/condition, treatment plan, diagnostic tests, and medications will improve  Outcome: PROGRESSING AS EXPECTED  Verbal education provided to pt. about disease process/condition and corresponding treatment. All questions and concerns have been addressed at this time.

## 2017-08-06 NOTE — PROGRESS NOTES
[facility] Hospitalist Progress Note    Date of Service: 2017    Chief Complaint  37 y.o. male transferred on 2017 from [facility] for chest pain    Interval Problem Update  Feels better, s/p cath with new stent placed    Consultants/Specialty  Cardiology    Disposition  TBD        Review of Systems   Constitutional: Negative for fever and chills.   HENT: Negative for congestion and sore throat.    Eyes: Negative for photophobia.   Respiratory: Negative for cough, shortness of breath and wheezing.    Cardiovascular: Negative for chest pain and palpitations.   Gastrointestinal: Negative for nausea, vomiting, abdominal pain and diarrhea.   Genitourinary: Negative for dysuria.   Musculoskeletal: Negative for myalgias.   Skin: Negative.    Neurological: Negative for dizziness, tingling, focal weakness and headaches.   Psychiatric/Behavioral: Negative for depression and suicidal ideas.      Physical Exam  Laboratory/Imaging   Hemodynamics  Temp (24hrs), Av.8 °C (98.2 °F), Min:36.3 °C (97.4 °F), Max:37.1 °C (98.7 °F)   Temperature: 36.8 °C (98.2 °F)  Pulse  Av.6  Min: 61  Max: 92    Blood Pressure: 120/76 mmHg      Respiratory      Respiration: 16, Pulse Oximetry: 94 %        RUL Breath Sounds: Clear, RML Breath Sounds: Clear, RLL Breath Sounds: Clear, AYAZ Breath Sounds: Clear, LLL Breath Sounds: Clear    Fluids    Intake/Output Summary (Last 24 hours) at 17 0837  Last data filed at 17 1800   Gross per 24 hour   Intake   1630 ml   Output      0 ml   Net   1630 ml       Nutrition  Orders Placed This Encounter   Procedures   • Diet Order     Standing Status: Standing      Number of Occurrences: 1      Standing Expiration Date:      Order Specific Question:  Diet:     Answer:  Cardiac [6]     Physical Exam   Constitutional: He is oriented to person, place, and time. No distress.   HENT:   Head: Normocephalic and atraumatic.   Right Ear: External ear normal.   Left Ear: External ear normal.    Eyes: EOM are normal. Right eye exhibits no discharge. Left eye exhibits no discharge.   Neck: Neck supple. No JVD present.   Cardiovascular: Normal rate, regular rhythm and normal heart sounds.    Pulmonary/Chest: Effort normal and breath sounds normal. No respiratory distress. He exhibits no tenderness.   Abdominal: Soft. Bowel sounds are normal. He exhibits no distension. There is no tenderness.   Musculoskeletal: He exhibits no edema.   Neurological: He is alert and oriented to person, place, and time. No cranial nerve deficit.   Skin: Skin is dry. He is not diaphoretic. No erythema.   Psychiatric: He has a normal mood and affect. His behavior is normal.   Nursing note and vitals reviewed.      Recent Labs      08/04/17   1740  08/05/17   0325  08/06/17   0133   WBC  9.0  7.2  8.5   RBC  4.97  4.84  4.81   HEMOGLOBIN  15.2  15.0  14.5   HEMATOCRIT  44.5  44.3  43.2   MCV  89.5  91.5  89.8   MCH  30.6  31.0  30.1   MCHC  34.2  33.9  33.6*   RDW  41.8  43.0  41.4   PLATELETCT  333  378  351   MPV  9.7  10.2  9.8     Recent Labs      08/04/17   1740  08/05/17   0325  08/06/17   0133   SODIUM  136  136  135   POTASSIUM  3.8  3.7  3.6   CHLORIDE  104  105  103   CO2  22  24  25   GLUCOSE  111*  96  114*   BUN  8  10  10   CREATININE  0.58  0.63  0.69   CALCIUM  9.1  9.1  9.2     Recent Labs      08/04/17   1740   INR  0.90         Recent Labs      08/05/17   0325   TRIGLYCERIDE  215*   HDL  31*   LDL  266*          Assessment/Plan     * NSTEMI (non-ST elevated myocardial infarction) (CMS-HCC) (present on admission)  Assessment & Plan  - Patient with known CAD s/p stent and strong family history of early cardiac disease  - Troponin peaked and has begun to trend down  - s/p cath with new stent place  - Continue to monitor on telemetry for reperfusion arrhythmias  - Continue ASA, crestor, metoprolol, prinivil  - Cardiology following  - SW following to see if patient qualifies for insurance or assistance programs as he  has been medically noncompliant with his cardiac medications because of difficulty with finances    Hyperlipemia (present on admission)  Assessment & Plan  - Treatment as noted above    Stented coronary artery,  bare metal stent X 2 (present on admission)  Assessment & Plan  - Treatment as noted above    CAD (coronary artery disease)  Assessment & Plan  - Treatment as noted above    Medically noncompliant  Assessment & Plan  - Patient reports being off medications because he can't afford them  - SW consulted for assistance      Labs reviewed and Medications reviewed        DVT Prophylaxis: Heparin

## 2017-08-06 NOTE — PROGRESS NOTES
VS designated to CNA. Unfortunately the vital machine turned off and did not save vital signs for post-procedure vitals. Pt's BP was visualized and was stable throughout the post-procedure period. The lowest blood pressure was 88/57, but the systolic blood pressure averaged between 95 and 105.

## 2017-08-06 NOTE — PROGRESS NOTES
Pt denies chest pain. Ambulating around unit and up to bathroom. Call light in place and bed in lowest position.

## 2017-08-06 NOTE — PROGRESS NOTES
Cardiology Progress Note               Author: Tereso Wilson Date & Time created: 2017  9:10 AM     Interval History:  Consultation for non-STEMI (troponin peaked at 8),     Admitted with    History of CAD, prior inferior MI, RCA stents in , left circumflex stents in , hypercholesterolemia, likely familial hypercholesterolemia, severe, LDL over 300, currently in [facility]term in [facility], obesity    Labs reviewed  Sodium, potassium, creatinine stable            BP = 120/76  HR = 84  NSR          Coronary angiography 17 status post bare metal stent to obtuse marginal (99% obtuse marginal,  RCA, 70-80% diagonal one ostial, normal EF, old left circumflex stents patent)        Echocardiogram on 17, LVEF 50%, mildly reduced LV systolic function, hypokinetic basal inferior wall           Review of Systems   Respiratory: Negative for cough and shortness of breath.    Cardiovascular: Negative for chest pain, palpitations, orthopnea, claudication, leg swelling and PND.       Physical Exam   Constitutional: He is oriented to person, place, and time.   overweight   HENT:   Head: Normocephalic.   Eyes: Conjunctivae are normal.   Neck: No JVD present. No thyromegaly present.   Cardiovascular: Normal rate and regular rhythm.    Pulses:       Carotid pulses are 2+ on the right side, and 2+ on the left side.       Radial pulses are 2+ on the right side, and 2+ on the left side.        Posterior tibial pulses are 2+ on the right side, and 2+ on the left side.   Pulmonary/Chest: He has no wheezes.   Abdominal: Soft.   Musculoskeletal: He exhibits no edema.   Neurological: He is alert and oriented to person, place, and time.   Skin: Skin is warm and dry.       Hemodynamics:  Temp (24hrs), Av.8 °C (98.2 °F), Min:36.3 °C (97.4 °F), Max:37.1 °C (98.7 °F)  Temperature: 36.8 °C (98.2 °F)  Pulse  Av.6  Min: 61  Max: 92   Blood Pressure: 120/76 mmHg     Respiratory:    Respiration: 16, Pulse  Oximetry: 94 %        RUL Breath Sounds: Clear, RML Breath Sounds: Clear, RLL Breath Sounds: Clear, AYAZ Breath Sounds: Clear, LLL Breath Sounds: Clear  Fluids:     Weight: 119.8 kg (264 lb 1.8 oz)  GI/Nutrition:  Orders Placed This Encounter   Procedures   • Diet Order     Standing Status: Standing      Number of Occurrences: 1      Standing Expiration Date:      Order Specific Question:  Diet:     Answer:  Cardiac [6]     Lab Results:  Recent Labs      08/04/17   1740  08/05/17   0325  08/06/17   0133   WBC  9.0  7.2  8.5   RBC  4.97  4.84  4.81   HEMOGLOBIN  15.2  15.0  14.5   HEMATOCRIT  44.5  44.3  43.2   MCV  89.5  91.5  89.8   MCH  30.6  31.0  30.1   MCHC  34.2  33.9  33.6*   RDW  41.8  43.0  41.4   PLATELETCT  333  378  351   MPV  9.7  10.2  9.8     Recent Labs      08/04/17   1740  08/05/17   0325  08/06/17   0133   SODIUM  136  136  135   POTASSIUM  3.8  3.7  3.6   CHLORIDE  104  105  103   CO2  22  24  25   GLUCOSE  111*  96  114*   BUN  8  10  10   CREATININE  0.58  0.63  0.69   CALCIUM  9.1  9.1  9.2     Recent Labs      08/04/17   1740   INR  0.90         Recent Labs      08/04/17   2138  08/05/17   1427  08/05/17   1950   TROPONINI  7.98*  6.74*  4.37*     Recent Labs      08/05/17   0325   TRIGLYCERIDE  215*   HDL  31*   LDL  266*         Medical Decision Making, by Problem:  Active Hospital Problems    Diagnosis   • *NSTEMI (non-ST elevated myocardial infarction) (CMS-HCC) [I21.4]   • Stented coronary artery,  bare metal stent X 2 [Z95.5]   • Hyperlipemia [E78.5]   • CAD (coronary artery disease) [I25.10]   • Medically noncompliant [Z91.19]       Plan:  Non-STEMI (troponin peaked at 8) status post BMS to OM  EF 50%, hypokinetic basal inferior wall  Continue with DAPT ( ASA, Plavix )  Continue with metoprolol 25, lisinopril 5, on Crestor 20  WCY=114, repeat lipid panel after on crestor for 6-8 weeks  Blood pressure well controlled 120/76  No rhythm issues overnight,NSR with BBB  SW to provide  assistance with meds and follow up  We'll schedule  follow-up appointmens with our cardiology office  OK to shower  Medications reviewed and Labs reviewed

## 2017-08-06 NOTE — CARE PLAN
Problem: Safety  Goal: Will remain free from falls  Intervention: Assess risk factors for falls  Fall precautions in place. Bed in lowest position. Non-skid socks in place. Personal possessions within reach. Mobility sign on door. Call light within reach. Pt educated regarding fall prevention and states understanding.

## 2017-08-06 NOTE — PROGRESS NOTES
Report received, pt care assumed.  Pt resting comfortably in bed, denies needs.  POC reviewed with pt, pt denies questions.  Bed in low position, call light in reach, bed alarm refused, will continue to monitor.

## 2017-08-06 NOTE — PROGRESS NOTES
Assumed care of pt. at 1900    Bedside report received from SARIKA Caruso   POC discussed with pt. At bedside and Pt. verbalizes understanding.  Pt. Is Awake and AOx 4  Call light within reach  with appropriate instructions to call for assistance  Bed locked and in lowest position.    Will continue to monitor

## 2017-08-06 NOTE — PROGRESS NOTES
Pt laying in bed; no signs of distress. States no pain at this time. No change from previous assessment. Fall precautions in place. Will continue to monitor. Bed in lowest position and call light in place

## 2017-08-07 VITALS
SYSTOLIC BLOOD PRESSURE: 111 MMHG | TEMPERATURE: 97.1 F | BODY MASS INDEX: 42.46 KG/M2 | RESPIRATION RATE: 18 BRPM | HEART RATE: 67 BPM | WEIGHT: 254.85 LBS | OXYGEN SATURATION: 95 % | HEIGHT: 65 IN | DIASTOLIC BLOOD PRESSURE: 77 MMHG

## 2017-08-07 PROCEDURE — 99239 HOSP IP/OBS DSCHRG MGMT >30: CPT | Performed by: HOSPITALIST

## 2017-08-07 PROCEDURE — A9270 NON-COVERED ITEM OR SERVICE: HCPCS | Performed by: INTERNAL MEDICINE

## 2017-08-07 PROCEDURE — 700102 HCHG RX REV CODE 250 W/ 637 OVERRIDE(OP): Performed by: INTERNAL MEDICINE

## 2017-08-07 RX ORDER — NITROGLYCERIN 0.4 MG/1
0.4 TABLET SUBLINGUAL PRN
Qty: 25 TAB | Refills: 0 | Status: SHIPPED | OUTPATIENT
Start: 2017-08-07 | End: 2017-08-16 | Stop reason: SDUPTHER

## 2017-08-07 RX ORDER — CLOPIDOGREL BISULFATE 75 MG/1
75 TABLET ORAL DAILY
Qty: 30 TAB | Refills: 0 | Status: SHIPPED | OUTPATIENT
Start: 2017-08-07 | End: 2017-08-16 | Stop reason: SDUPTHER

## 2017-08-07 RX ORDER — LISINOPRIL 5 MG/1
5 TABLET ORAL DAILY
Qty: 30 TAB | Refills: 0 | Status: SHIPPED | OUTPATIENT
Start: 2017-08-07 | End: 2017-08-16 | Stop reason: SDUPTHER

## 2017-08-07 RX ORDER — ROSUVASTATIN CALCIUM 20 MG/1
20 TABLET, COATED ORAL EVERY EVENING
Qty: 30 TAB | Refills: 0 | Status: SHIPPED | OUTPATIENT
Start: 2017-08-07 | End: 2017-08-16 | Stop reason: SDUPTHER

## 2017-08-07 RX ADMIN — ASPIRIN 325 MG: 325 TABLET, COATED ORAL at 09:18

## 2017-08-07 RX ADMIN — METOPROLOL TARTRATE 25 MG: 25 TABLET, FILM COATED ORAL at 09:19

## 2017-08-07 RX ADMIN — LISINOPRIL 5 MG: 5 TABLET ORAL at 09:18

## 2017-08-07 RX ADMIN — CLOPIDOGREL 75 MG: 75 TABLET, FILM COATED ORAL at 09:18

## 2017-08-07 ASSESSMENT — COGNITIVE AND FUNCTIONAL STATUS - GENERAL
DAILY ACTIVITIY SCORE: 24
SUGGESTED CMS G CODE MODIFIER MOBILITY: CH
SUGGESTED CMS G CODE MODIFIER DAILY ACTIVITY: CH
MOBILITY SCORE: 24

## 2017-08-07 ASSESSMENT — LIFESTYLE VARIABLES: EVER_SMOKED: NEVER

## 2017-08-07 ASSESSMENT — PAIN SCALES - GENERAL
PAINLEVEL_OUTOF10: 0

## 2017-08-07 ASSESSMENT — ENCOUNTER SYMPTOMS
COUGH: 0
DIZZINESS: 0
PALPITATIONS: 0
SHORTNESS OF BREATH: 0

## 2017-08-07 NOTE — PROGRESS NOTES
Bedside report received. Patient A&O x4. RA. No complaints of pain. POC discussed with patient. Pt waiting on approval from medicaid before he can be discharged.  Patient verbalized understanding. Call light and belongings within reach. Bed locked and in lowest position, alarm and fall precautions in place.

## 2017-08-07 NOTE — CARE PLAN
Problem: Nutritional:  Goal: Patient to verbalize or demonstrate understanding of diet  Outcome: MET Date Met:  08/07/17  Cardiac ed

## 2017-08-07 NOTE — DISCHARGE SUMMARY
CHIEF COMPLAINT ON ADMISSION  Chief Complaint   Patient presents with   • Chest Pain     Pt BIB EMS/Flight from [facility], pt reports sudden onset CP while at rest/ midsternal radiating to left arm/ denies N/V/ reports hx of MI/ x 3 stents. pt given Nitro x1/ 324mg ASA PTA/ pt from 10/10- 3/10 CP.        CODE STATUS  Full Code    HPI & HOSPITAL COURSE  This is a 37 y.o. male with known history of coronary artery disease status post previous stent and strong family history of early cardiovascular disease who presented with chest pain. He was initially seen at an outlying facility and [facility]. Upon assessment in the ER, the patient was noted to have elevated troponin levels and was admitted for a NSTEMI, cardiology was consulted in this patient subsequently underwent cardiac catheterization with placement of a new cardiac stent. We monitored him postoperatively for reperfusion arrhythmias and none were noted. Remained asymptomatic and medically stable. Social work was consulted during this hospitalization to help the patient with medication refills as he is currently uninsured, unemployed, and was medically noncompliant with his medications because of financial difficulties. They have been able to find coverage for one month. At that point, the patient states that his Medicaid will begin plus he will be incarcerated as of September which is next month and his medication will be covered through the halfway. He has been cleared by cardiology for discharge home and remains medically stable.    Therefore, he is discharged in fair and stable condition with close outpatient follow-up.    SPECIFIC OUTPATIENT FOLLOW-UP  Cardiology as scheduled    DISCHARGE PROBLEM LIST  Principal Problem:    NSTEMI (non-ST elevated myocardial infarction) (CMS-[facility]) POA: Yes  Active Problems:    Hyperlipemia POA: Yes    Stented coronary artery,  bare metal stent X 2 POA: Yes       Overview: To [facility] 10/2014    CAD (coronary artery disease) POA: Unknown    Medically noncompliant POA: Unknown  Resolved Problems:    * No resolved hospital problems. *      FOLLOW UP  Future Appointments  Date Time Provider Department Center   8/16/2017 9:00 AM Catalino Joseph M.D. [facility] None     No follow-up provider specified.    MEDICATIONS ON DISCHARGE   Kyle Gonzalez   Home Medication Instructions RICHIE:[id number]    Printed on:08/07/17 0017   Medication Information                      aspirin (ASA) 325 MG Tab  Take 325 mg by mouth every 6 hours as needed for Mild Pain.             clopidogrel (PLAVIX) 75 MG Tab  Take 1 Tab by mouth every day.             lisinopril (PRINIVIL) 5 MG Tab  Take 1 Tab by mouth every day.             metoprolol (LOPRESSOR) 25 MG Tab  Take 1 Tab by mouth 2 Times a Day.             nitroglycerin (NITROSTAT) 0.4 MG SL Tab  Place 1 Tab under tongue as needed for Chest Pain (angina).             rosuvastatin (CRESTOR) 20 MG Tab  Take 1 Tab by mouth every evening.                 DIET  Orders Placed This Encounter   Procedures   • Diet Order     Standing Status: Standing      Number of Occurrences: 1      Standing Expiration Date:      Order Specific Question:  Diet:     Answer:  Cardiac [6]       ACTIVITY  As tolerated.  Weight bearing as tolerated      CONSULTATIONS  Etiology    PROCEDURES  Coronary artery angiogram    LABORATORY  Lab Results   Component Value Date/Time    SODIUM 135 08/06/2017 01:33 AM    POTASSIUM 3.6 08/06/2017 01:33 AM    CHLORIDE 103 08/06/2017 01:33 AM    CO2 25 08/06/2017 01:33 AM    GLUCOSE 114* 08/06/2017 01:33 AM    BUN 10 08/06/2017 01:33 AM    CREATININE 0.69 08/06/2017 01:33 AM        Lab Results   Component Value Date/Time    WBC 8.5 08/06/2017 01:33 AM    HEMOGLOBIN 14.5 08/06/2017 01:33 AM    HEMATOCRIT 43.2 08/06/2017 01:33 AM    PLATELET COUNT 351 08/06/2017 01:33 AM        Total time of the discharge process exceeds 36 minutes

## 2017-08-07 NOTE — CARE PLAN
Problem: Communication  Goal: The ability to communicate needs accurately and effectively will improve  Outcome: MET Date Met:  08/07/17  Discussed POC and medications with patient. Pt verbalized understanding.        Problem: Safety  Goal: Will remain free from injury  Intervention: Provide assistance with mobility  Bed locked and in lowest position. Bed alarm on. Treaded socks provided. Call light and belongings within reach.  Patient educated to call for assistance. Pt verbalized understanding. Hourly rounding in place.

## 2017-08-07 NOTE — CARE PLAN
Problem: Venous Thromboembolism (VTW)/Deep Vein Thrombosis (DVT) Prevention:  Goal: Patient will participate in Venous Thrombosis (VTE)/Deep Vein Thrombosis (DVT)Prevention Measures  Outcome: PROGRESSING AS EXPECTED  Pt receiving heparin for VTE.

## 2017-08-07 NOTE — PROGRESS NOTES
Cardiovascular Nurse Navigator ([id number]) Note:    Reviewed ACS/PCI medications:  Dual Antiplatelet Therapy (DAPT):  aspirin + clopidogrel  Beta-Blocker:  metoprolol  Statin:  rosuvastatin    Consider for aldosterone blockade?   N/a--EF 50%    Intensive Cardiac Rehab (ICR) Referral:  Referred on 8/5; has current inpatient orders for nutrition consult & PT for Phase I ICR    Cardiology Follow-Up:  Dr. OLGA Joseph on 8/16     Inpatient & Discharge Patient Education:  Bedside nursing to continually provide patient education on ACS meds, signs and symptoms to monitor for, and risk factor modification. Also at discharge please complete the “ACS” special instructions on the AVS.  Thank you and please call with questions.

## 2017-08-07 NOTE — DISCHARGE PLANNING
"Medical Social Work    Met with pt at bedside to discuss D/C. Pt states he had not been taking his medications prior to admission due to not being able to afford them. Pt reports he currently isn't working, has no income, and doesn't have much support to assist him. ISABELL informed pt SS dept can possibly assist him with 1 month worth of medications, however, [facility] will not be able to assist again with medications. Pt expressed understanding and stated he expects his medicaid to be active within the next month. Pt also states he will be going to [facility] in September and will be \"taken care of\" there.     ISABELL obtained rx's that pt will need at D/C. Faxed to [facility] to get approved service amount.   "

## 2017-08-07 NOTE — DISCHARGE PLANNING
Medical Social Work    Received $51.39 approved service amount for [facility]. Approved service form completed and emailed to management for consideration.

## 2017-08-07 NOTE — PROGRESS NOTES
Dolly Waddell Fall Risk Assessment:     Last Known Fall: No falls  Mobility: No limitations  Medications: Cardiovascular or central nervous system meds  Mental Status/LOC/Awareness: Awake, alert, and oriented to date, place, and person  Toileting Needs: No needs  Volume/Electrolyte Status: No problems  Communication/Sensory: No deficits  Behavior: Appropriate behavior  Dolly Waddell Fall Risk Total: 2  Fall Risk Level: NO RISK    Universal Fall Precautions:  call light/belongings in reach, bed in low position and locked, wheelchairs and assistive devices out of sight, clutter free and spill free environment, educate on level of risk, educate to call for assistance, adequate lighting, use non-slip footwear, siderails up x 2    Fall Risk Level Interventions:          Patient Specific Interventions:     Medication: not applicable  Mental Status/LOC/Awareness: not applicable  Toileting: not applicable  Volume/Electrolyte Status: not applicable  Communication/Sensory: not applicable  Behavioral: not applicable  Mobility: ensure bed is locked and in lowest position

## 2017-08-07 NOTE — DISCHARGE PLANNING
Medical Social Work    Received approval for  dept to assist pt with his medications. Approved Service form faxed to [facility] to fill pt's rx.

## 2017-08-07 NOTE — PROGRESS NOTES
Pt. being discharged. Pt. educated on discharge instructions and new prescriptions. Pt verbalizes understanding. Follow up appointment made with Dr. Joseph 8/16/17. PIV removed, monitor checked in. Pt. Going home with friend. RN to call transport for escort out, will monitor pt. untill off unit.

## 2017-08-07 NOTE — DISCHARGE INSTRUCTIONS
Discharge Instructions    Discharged to home by car with friend. Discharged via walking, hospital escort: Refused.  Special equipment needed: Not Applicable    Be sure to schedule a follow-up appointment with your primary care doctor or any specialists as instructed.     Discharge Plan:   Diet Plan: Discussed  Activity Level: Discussed  Smoking Cessation Offered: Patient Refused  Confirmed Follow up Appointment: Appointment Scheduled  Confirmed Symptoms Management: Discussed  Medication Reconciliation Updated: Yes  Influenza Vaccine Indication: Patient Refuses    I understand that a diet low in cholesterol, fat, and sodium is recommended for good health. Unless I have been given specific instructions below for another diet, I accept this instruction as my diet prescription.   Other diet: Cardiac    Special Instructions: Diagnosis:  Acute Coronary Syndrome (ACS) is a diagnosis that encompasses cardiac-related chest pain and heart attack. ACS occurs when the blood flow to the heart muscle is severely reduced or cut off completely due to a slow process called atherosclerosis.  Atherosclerosis is a disease in which the coronary arteries become narrow from a buildup of fat, cholesterol, and other substances that combine to form plaque. If the plaque breaks, a blood clot will form and block the blood flow to the heart muscle. This lack of blood flow can cause damage or death to the heart muscle which is called a heart attack or Myocardial Infarction (MI). There are two different types of MIs:  ST Elevation Myocardial Infarction or STEMI (the most severe type of heart attack) and Non-ST Elevation Myocardial Infarction or NSTEMI.    Treatment Plan:  · Cardiac Diet  - Low fat, low salt, low cholesterol   · Cardiac Rehab  - Your doctor has ordered you a referral to [facility].  Call [phone] to schedule an appointment.  · Attend my follow-up appointment with my Cardiologist.  · Take my medications as prescribed by my  doctor  · Exercise daily  · Quit Smoking, Lower my bad cholesterol and raise my good cholesterol, lower my blood pressure and Reduce stress    Medications:  Certain medications are used to treat ACS.  Remember to always take medications as prescribed and never stop talking medications unless told by your doctor.    You have been prescribed the following medicatons:    Aspirin - Aspirin is used as a blood thinning medication and you will require this medication indefinitely.  Anti-platelet/blood thinner - Your Anti-platelet/Blood thinning medication is called Plavix, and is used in combination with aspirin to prevent clots from forming in your heart and/or around your stent.  Your doctor will determine how long you need to be on this medicine.  Beta-Blocker - Beta-Blocker Metoprolol is used to lower blood pressure and heart rate, and/or helps your heart heal after a heart attack.  Statin - Statin Crestor is used to lower cholesterol.  Angiotensin Converting Enzyme (ACE) Inhibitor - Angiotensin Converting Enzyme Inhibitor Lisinopril is used to lower blood pressure and treat heart failure.  Nitroglycerine - Nitroglycerine is used to relieve chest pain.    · Is patient discharged on Warfarin / Coumadin?   No     · Is patient Post Blood Transfusion?  No  Non-ST Segment Elevation Heart Attack  A heart attack (myocardial infarction) happens when some of the heart muscle is injured or dies because it does not get enough oxygen. A non-ST segment elevation heart attack is a type of heart attack. It happens when the body does not get enough oxygen because an artery carrying blood to the heart muscles (coronary artery) becomes partly or temporarily blocked. This type of heart attack is usually less severe than the type of heart attack in which a coronary artery becomes completely blocked.  CAUSES  The most common cause of this condition is a blocked coronary artery. A coronary artery can become blocked from a gradual buildup of  cholesterol, fat, and plaque. A blood clot can form over the plaque and block blood flow.  RISK FACTORS  This condition is more likely to develop in:  · Smokers.  · Males.  · Older adults.  · Overweight and obese adults.  · People with high blood pressure (hypertension), high cholesterol, or diabetes.  · People with a family history of heart disease.  · People who do not get enough exercise.  · People who are under a lot of stress.  · People who drink too much alcohol.  · People who use illegal street drugs that increase the heart rate, such as cocaine and methamphetamines.  SYMPTOMS  Symptoms of this condition include:  · Chest pain or a feeling of pressure in the chest. It may feel like something is crushing or squeezing the chest.  · Discomfort in the upper back or in the area between the shoulder blades.  · Upper back pain.  · Tingling in the hands and arms.  · Shortness of breath.  · Heartburn or indigestion.  · Sudden cold sweats.  · Unexplained sweating.  · Sudden lightheadedness.  · Unexplained feelings of nervousness or anxiety.  · Feeling of tiredness, or not feeling well.  DIAGNOSIS  This condition is diagnosed based on a person's signs and symptoms and a physical exam. You may also have tests done, including:  · Blood tests.  · A chest X-ray.  · An test to measure the electrical activity of the heart (electrocardiogram).  · A test that uses sound waves to produce a picture of the heart (echocardiogram).  · A test to look at the heart arteries (coronary angiogram).  If you are still having chest pain after 12-24 hours, or if your health care providers think your heart is at risk, you may have a procedure called cardiac catheterization. In this procedure, a long, thin tube is inserted into an artery in your groin and moved up to the arteries in your heart. This procedure helps your health care provider figure out the source of the problem.   TREATMENT  This condition may be treated with:  · Bed rest in the  hospital.  · Medicines to relieve chest pain.  · Medicines to protect the heart.  · If you have a blockage, a procedure in which the artery is opened (angioplasty) and a stent is placed to keep the artery open.  After initial treatment you may need to take medicine to:  · Keep your blood from clotting too easily.  · Control your blood pressure.  · Lower your cholesterol.  · Control abnormal heart rhythms (arrhythmias).  HOME CARE INSTRUCTIONS  · Take medicines only as directed by your health care provider.  · Do not take the following medicines unless your health care provider approves:  ¨ Nonsteroidal anti-inflammatory drugs (NSAIDs), such as ibuprofen, naproxen, or celecoxib.  ¨ Vitamin supplements that contain vitamin A, vitamin E, or both.  ¨ Hormone replacement therapy that contains estrogen with or without progestin.  · Make lifestyle changes as directed by your health care provider. These may include:  ¨ Using no tobacco products, including cigarettes, chewing tobacco, and electronic cigarettes. If you are struggling to quit, ask your health care provider for help.  ¨ Exercising as directed by your health care provider. Ask for a list of activities that are safe for you.  ¨ Eating a heart-healthy diet. Work with a registered dietitian to learn healthy eating options.  ¨ Maintaining a healthy weight.  ¨ Managing other medical conditions, like diabetes.  ¨ Reducing stress.  ¨ Limiting how much alcohol you drink as directed by your health care provider.  SEEK IMMEDIATE MEDICAL CARE IF:  · You have any symptoms of this condition.     This information is not intended to replace advice given to you by your health care provider. Make sure you discuss any questions you have with your health care provider.     Document Released: 07/17/2006 Document Revised: 03/11/2013 Document Reviewed: 11/25/2015  [employer] Patient Education ©2016 [employer]    Angiogram, Angioplasty, or Stent Placement  Care After  One  of the following procedures was done today.  ANGIOGRAM:  A catheter was placed through the blood vessel in your groin, contrast was injected into the vessels, and pictures were taken.  ANGIOPLASTY:  A catheter was placed through the blood vessel in your groin and directed to an area of blocked blood flow. A balloon, and possibly a metal stent were used to open the blockage. If no other blockages are present below this area, your symptoms should improve. If blockages are present below this area, surgery may still be necessary.  STENT:  A catheter was placed in your groin through which a metal mesh tube was placed in a narrowed part of the artery to facilitate blood flow.  You were given intravenous sedation. These medications are rapidly cleared from your bloodstream. You may feel some discomfort at the insertion site after the local anesthetic wears off. This discomfort should gradually improve over the next several days.  · Only take over-the-counter or prescription medicines for pain, discomfort, or fever as directed by your caregiver.  · Complications are very uncommon after this procedure. Go to the nearest emergency department if you develop any of the following symptoms:  · Worsening pain.  · Bleeding.  · Swelling at the puncture site.  · Lightheadedness.  · Dizziness or fainting.  · Fever or chills.  · If oozing, bleeding, or a lump appears at the puncture site, apply firm pressure directly to the site steadily for 15 minutes and go to the emergency department.  · Keep the skin around the insertion site dry. You may take showers after 24 hours. If the area does get wet, dry the skin completely. Avoid baths until the skin puncture site heals, usually 5 to 7 days.  · Development of redness, increased soreness, or swelling may be signs of a skin infection. Contact your physician.  · Rest for the remainder of the day and avoid any heavy lifting (more than 10 pounds or 4.5 kg). Do not operate heavy machinery,  drive, or make legal decisions for the first 24 hours after the procedure. Have a responsible person drive you home.  · You may resume your usual diet after the procedure. Avoid alcoholic beverages for 24 hours after the procedure.  Document Released: 12/18/2006 Document Revised: 03/11/2013 Document Reviewed: 10/17/2007  [employer]® Patient Information ©2014 [employer]    Clopidogrel tablets  What is this medicine?  CLOPIDOGREL (kloh PID oh grel) helps to prevent blood clots. This medicine is used to prevent heart attack, stroke, or other vascular events in people who are at high risk.  This medicine may be used for other purposes; ask your health care provider or pharmacist if you have questions.  COMMON BRAND NAME(S): Plavix  What should I tell my health care provider before I take this medicine?  They need to know if you have any of the following conditions:  -bleeding disorder  -bleeding in the brain  -planned surgery  -stomach or intestinal ulcers  -stroke or transient ischemic attack  -an unusual or allergic reaction to clopidogrel, other medicines, foods, dyes, or preservatives  -pregnant or trying to get pregnant  -breast-feeding  How should I use this medicine?  Take this medicine by mouth with a drink of water. Follow the directions on the prescription label. You may take this medicine with or without food. Take your medicine at regular intervals. Do not take your medicine more often than directed.  Talk to your pediatrician regarding the use of this medicine in children. Special care may be needed.  Overdosage: If you think you have taken too much of this medicine contact a poison control center or emergency room at once.  NOTE: This medicine is only for you. Do not share this medicine with others.  What if I miss a dose?  If you miss a dose, take it as soon as you can. If it is almost time for your next dose, take only that dose. Do not take double or extra doses.  What may interact with this  medicine?  -aspirin  -blood thinners like cilostazol, enoxaparin, ticlopidine, and warfarin  -certain medicines for depression like citalopram, fluoxetine, and fluvoxamine  -certain medicines for fungal infections like ketoconazole, fluconazole, and voriconazole  -certain medicines for HIV infection like delavirdine, efavirenz, and etravirine  -certain medicines for seizures like felbamate, oxcarbazepine, and phenytoin  -chloramphenicol  -fluvastatin  -isoniazid, INH  -medicines for inflammation like ibuprofen and naproxen  -modafinil  -nicardipine  -over-the counter supplements like echinacea, feverfew, fish oil, garlic, caitlin, ginkgo, green tea, horse chestnut  -quinine  -stomach acid blockers like cimetidine, omeprazole, and esomeprazole  -tamoxifen  -tolbutamide  -topiramate  -torsemide  This list may not describe all possible interactions. Give your health care provider a list of all the medicines, herbs, non-prescription drugs, or dietary supplements you use. Also tell them if you smoke, drink alcohol, or use illegal drugs. Some items may interact with your medicine.  What should I watch for while using this medicine?  Visit your doctor or health care professional for regular check ups. Do not stop taking your medicine unless your doctor tells you to.  If you are going to have surgery or dental work, tell your doctor or health care professional that you are taking this medicine.  Certain genetic factors may reduce the effect of this medicine. Your doctor may use genetic tests to determine treatment.  What side effects may I notice from receiving this medicine?  Side effects that you should report to your doctor or health care professional as soon as possible:  -allergic reactions like skin rash, itching or hives, swelling of the face, lips, or tongue  -black, tarry stools  -blood in urine or vomit  -breathing problems  -changes in vision  -fever  -sudden weakness  -unusual bleeding or bruising  Side effects  that usually do not require medical attention (report to your doctor or health care professional if they continue or are bothersome):  -constipation or diarrhea  -headache  -pain in back or joints  -stomach upset  This list may not describe all possible side effects. Call your doctor for medical advice about side effects. You may report side effects to FDA at 1-986-VIO-9809.  Where should I keep my medicine?  Keep out of the reach of children.  Store at room temperature of 59 to 86 degrees F (15 to 30 degrees C). Throw away any unused medicine after the expiration date.  NOTE: This sheet is a summary. It may not cover all possible information. If you have questions about this medicine, talk to your doctor, pharmacist, or health care provider.  © 2014, [employer]. (6/8/2010 9:41:49 AM)    Metoprolol tablets  What is this medicine?  METOPROLOL (me TOE proe lole) is a beta-blocker. Beta-blockers reduce the workload on the heart and help it to beat more regularly. This medicine is used to treat high blood pressure and to prevent chest pain. It is also used to after a heart attack and to prevent an additional heart attack from occurring.  This medicine may be used for other purposes; ask your health care provider or pharmacist if you have questions.  COMMON BRAND NAME(S): Lopressor  What should I tell my health care provider before I take this medicine?  They need to know if you have any of these conditions:  -diabetes  -heart or vessel disease like slow heart rate, worsening heart failure, heart block, sick sinus syndrome or Raynaud's disease  -kidney disease  -liver disease  -lung or breathing disease, like asthma or emphysema  -pheochromocytoma  -thyroid disease  -an unusual or allergic reaction to metoprolol, other beta-blockers, medicines, foods, dyes, or preservatives  -pregnant or trying to get pregnant  -breast-feeding  How should I use this medicine?  Take this medicine by mouth with a drink of  water. Follow the directions on the prescription label. Take this medicine immediately after meals. Take your doses at regular intervals. Do not take more medicine than directed. Do not stop taking this medicine suddenly. This could lead to serious heart-related effects.  Talk to your pediatrician regarding the use of this medicine in children. Special care may be needed.  Overdosage: If you think you have taken too much of this medicine contact a poison control center or emergency room at once.  NOTE: This medicine is only for you. Do not share this medicine with others.  What if I miss a dose?  If you miss a dose, take it as soon as you can. If it is almost time for your next dose, take only that dose. Do not take double or extra doses.  What may interact with this medicine?  Do not take this medicine with any of the following medications:  -sotalol  This medicine may also interact with the following medications:  -clonidine  -digoxin  -dobutamine  -epinephrine  -isoproterenol  -medicine to control heart rhythm like quinidine, propafenone  -medicine for depression like monoamine oxidase (MAO) inhibitors, fluoxetine, and paroxetine  -medicine for high blood pressure like calcium channel blockers  -reserpine  This list may not describe all possible interactions. Give your health care provider a list of all the medicines, herbs, non-prescription drugs, or dietary supplements you use. Also tell them if you smoke, drink alcohol, or use illegal drugs. Some items may interact with your medicine.  What should I watch for while using this medicine?  Visit your doctor or health care professional for regular check ups. Contact your doctor right away if your symptoms worsen. Check your blood pressure and pulse rate regularly. Ask your health care professional what your blood pressure and pulse rate should be, and when you should contact them.  You may get drowsy or dizzy. Do not drive, use machinery, or do anything that needs  mental alertness until you know how this medicine affects you. Do not sit or stand up quickly, especially if you are an older patient. This reduces the risk of dizzy or fainting spells. Contact your doctor if these symptoms continue. Alcohol may interfere with the effect of this medicine. Avoid alcoholic drinks.  What side effects may I notice from receiving this medicine?  Side effects that you should report to your doctor or health care professional as soon as possible:  -allergic reactions like skin rash, itching or hives  -cold or numb hands or feet  -depression  -difficulty breathing  -faint  -fever with sore throat  -irregular heartbeat, chest pain  -rapid weight gain  -swollen legs or ankles  Side effects that usually do not require medical attention (report to your doctor or health care professional if they continue or are bothersome):  -anxiety or nervousness  -change in sex drive or performance  -dry skin  -headache  -nightmares or trouble sleeping  -short term memory loss  -stomach upset or diarrhea  -unusually tired  This list may not describe all possible side effects. Call your doctor for medical advice about side effects. You may report side effects to FDA at 6-178-FDA-0420.  Where should I keep my medicine?  Keep out of the reach of children.  Store at room temperature between 15 and 30 degrees C (59 and 86 degrees F). Throw away any unused medicine after the expiration date.  NOTE: This sheet is a summary. It may not cover all possible information. If you have questions about this medicine, talk to your doctor, pharmacist, or health care provider.  © 2014, [employer]. (2/27/2009 4:11:19 PM)      Rosuvastatin Tablets  What is this medicine?  ROSUVASTATIN (sandee SUNSHINE va sta tin) is known as a HMG-CoA reductase inhibitor or 'statin'. It lowers cholesterol and triglycerides in the blood. This drug may also reduce the risk of heart attack, stroke, or other health problems in patients with risk  factors for heart disease. Diet and lifestyle changes are often used with this drug.  This medicine may be used for other purposes; ask your health care provider or pharmacist if you have questions.  COMMON BRAND NAME(S): Crestor  What should I tell my health care provider before I take this medicine?  They need to know if you have any of these conditions:  -frequently drink alcoholic beverages  -kidney disease  -liver disease  -muscle aches or weakness  -other medical condition  -an unusual or allergic reaction to rosuvastatin, other medicines, foods, dyes, or preservatives  -pregnant or trying to get pregnant  -breast-feeding  How should I use this medicine?  Take this medicine by mouth with a glass of water. Follow the directions on the prescription label. You can take this medicine with or without food. Take your doses at regular intervals. Do not take your medicine more often than directed.  Talk to your pediatrician regarding the use of this medicine in children. While this drug may be prescribed for children as young as 10 years old for selected conditions, precautions do apply.  Overdosage: If you think you have taken too much of this medicine contact a poison control center or emergency room at once.  NOTE: This medicine is only for you. Do not share this medicine with others.  What if I miss a dose?  If you miss a dose, take it as soon as you can. If it is almost time for your next dose, take only that dose. Do not take double or extra doses.  What may interact with this medicine?  Do not take this medicine with any of the following medications:  -herbal medicines like red yeast rice  This medicine may also interact with the following medications:  -alcohol  -antacids containing aluminum hydroxide or magnesium hydroxide  -cyclosporine  -other medicines for high cholesterol  -some medicines for HIV infection  -warfarin  This list may not describe all possible interactions. Give your health care provider a  list of all the medicines, herbs, non-prescription drugs, or dietary supplements you use. Also tell them if you smoke, drink alcohol, or use illegal drugs. Some items may interact with your medicine.  What should I watch for while using this medicine?  Visit your doctor or health care professional for regular check-ups. You may need regular tests to make sure your liver is working properly.  Tell your doctor or health care professional right away if you get any unexplained muscle pain, tenderness, or weakness, especially if you also have a fever and tiredness. Your doctor or health care professional may tell you to stop taking this medicine if you develop muscle problems. If your muscle problems do not go away after stopping this medicine, contact your health care professional.  This medicine may affect blood sugar levels. If you have diabetes, check with your doctor or health care professional before you change your diet or the dose of your diabetic medicine.  Avoid taking antacids containing aluminum, calcium or magnesium within 2 hours of taking this medicine.  This drug is only part of a total heart-health program. Your doctor or a dietician can suggest a low-cholesterol and low-fat diet to help. Avoid alcohol and smoking, and keep a proper exercise schedule.  Do not use this drug if you are pregnant or breast-feeding. Serious side effects to an unborn child or to an infant are possible. Talk to your doctor or pharmacist for more information.  What side effects may I notice from receiving this medicine?  Side effects that you should report to your doctor or health care professional as soon as possible:  -allergic reactions like skin rash, itching or hives, swelling of the face, lips, or tongue  -dark urine  -fever  -joint pain  -muscle cramps, pain  -redness, blistering, peeling or loosening of the skin, including inside the mouth  -trouble passing urine or change in the amount of urine  -unusually weak or  tired  -yellowing of the eyes or skin  Side effects that usually do not require medical attention (report to your doctor or health care professional if they continue or are bothersome):  -constipation  -heartburn  -nausea  -stomach gas, pain, upset  This list may not describe all possible side effects. Call your doctor for medical advice about side effects. You may report side effects to FDA at 9-779-FDA-4820.  Where should I keep my medicine?  Keep out of the reach of children.  Store at room temperature between 20 and 25 degrees C (68 and 77 degrees F). Keep container tightly closed (protect from moisture). Throw away any unused medicine after the expiration date.  NOTE: This sheet is a summary. It may not cover all possible information. If you have questions about this medicine, talk to your doctor, pharmacist, or health care provider.  © 2014, Elsevier/Gold Standard. (11/5/2012 4:37:13 PM)  Nitroglycerin sublingual tablets  What is this medicine?  NITROGLYCERIN (keturah troe GLI ser in) is a type of vasodilator. It relaxes blood vessels, increasing the blood and oxygen supply to your heart. This medicine is used to relieve chest pain caused by angina. It is also used to prevent chest pain before activities like climbing stairs, going outdoors in cold weather, or sexual activity.  This medicine may be used for other purposes; ask your health care provider or pharmacist if you have questions.  COMMON BRAND NAME(S): Nitroquick, Nitrostat, Nitrotab  What should I tell my health care provider before I take this medicine?  They need to know if you have any of these conditions:  -anemia  -head injury, recent stroke, or bleeding in the brain  -liver disease  -previous heart attack  -an unusual or allergic reaction to nitroglycerin, other medicines, foods, dyes, or preservatives  -pregnant or trying to get pregnant  -breast-feeding  How should I use this medicine?  Take this medicine by mouth as needed. At the first sign of  an angina attack (chest pain or tightness) place one tablet under your tongue. You can also take this medicine 5 to 10 minutes before an event likely to produce chest pain. Follow the directions on the prescription label. Let the tablet dissolve under the tongue. Do not swallow whole. Replace the dose if you accidentally swallow it. It will help if your mouth is not dry. Saliva around the tablet will help it to dissolve more quickly. Do not eat or drink, smoke or chew tobacco while a tablet is dissolving. If you are not better within 5 minutes after taking ONE dose of nitroglycerin, call 9-1-1 immediately to seek emergency medical care. Do not take more than 3 nitroglycerin tablets over 15 minutes.  If you take this medicine often to relieve symptoms of angina, your doctor or health care professional may provide you with different instructions to manage your symptoms. If symptoms do not go away after following these instructions, it is important to call 9-1-1 immediately. Do not take more than 3 nitroglycerin tablets over 15 minutes.  Talk to your pediatrician regarding the use of this medicine in children. Special care may be needed.  Overdosage: If you think you have taken too much of this medicine contact a poison control center or emergency room at once.  NOTE: This medicine is only for you. Do not share this medicine with others.  What if I miss a dose?  This does not apply. This medicine is only used as needed.  What may interact with this medicine?  Do not take this medicine with any of the following medications:  -certain migraine medicines like ergotamine and dihydroergotamine (DHE)  -medicines used to treat erectile dysfunction like sildenafil, tadalafil, and vardenafil  This medicine may also interact with the following medications:  -alteplase  -aspirin  -heparin  -medicines for high blood pressure  -medicines for mental depression  -other medicines used to treat angina  -phenothiazines like  chlorpromazine, mesoridazine, prochlorperazine, thioridazine  This list may not describe all possible interactions. Give your health care provider a list of all the medicines, herbs, non-prescription drugs, or dietary supplements you use. Also tell them if you smoke, drink alcohol, or use illegal drugs. Some items may interact with your medicine.  What should I watch for while using this medicine?  Tell your doctor or health care professional if you feel your medicine is no longer working.  Keep this medicine with you at all times. Sit or lie down when you take your medicine to prevent falling if you feel dizzy or faint after using it. Try to remain calm. This will help you to feel better faster. If you feel dizzy, take several deep breaths and lie down with your feet propped up, or bend forward with your head resting between your knees.  You may get drowsy or dizzy. Do not drive, use machinery, or do anything that needs mental alertness until you know how this drug affects you. Do not stand or sit up quickly, especially if you are an older patient. This reduces the risk of dizzy or fainting spells. Alcohol can make you more drowsy and dizzy. Avoid alcoholic drinks.  Do not treat yourself for coughs, colds, or pain while you are taking this medicine without asking your doctor or health care professional for advice. Some ingredients may increase your blood pressure.  What side effects may I notice from receiving this medicine?  Side effects that you should report to your doctor or health care professional as soon as possible:  -blurred vision  -dry mouth  -skin rash  -sweating  -the feeling of extreme pressure in the head  -unusually weak or tired  Side effects that usually do not require medical attention (report to your doctor or health care professional if they continue or are bothersome):  -flushing of the face or neck  -headache  -irregular heartbeat, palpitations  -nausea, vomiting  This list may not describe  all possible side effects. Call your doctor for medical advice about side effects. You may report side effects to FDA at 1-099-PCE-6474.  Where should I keep my medicine?  Keep out of the reach of children.  Store at room temperature between 20 and 25 degrees C (68 and 77 degrees F). Store in original container. Protect from light and moisture. Keep tightly closed. Throw away any unused medicine after the expiration date.  NOTE: This sheet is a summary. It may not cover all possible information. If you have questions about this medicine, talk to your doctor, pharmacist, or health care provider.  © 2014, Elsevier/Gold Standard. (7/10/2009 5:16:24 PM)    Depression / Suicide Risk    As you are discharged from this [facility] facility, it is important to learn how to keep safe from harming yourself.    Recognize the warning signs:  · Abrupt changes in personality, positive or negative- including increase in energy   · Giving away possessions  · Change in eating patterns- significant weight changes-  positive or negative  · Change in sleeping patterns- unable to sleep or sleeping all the time   · Unwillingness or inability to communicate  · Depression  · Unusual sadness, discouragement and loneliness  · Talk of wanting to die  · Neglect of personal appearance   · Rebelliousness- reckless behavior  · Withdrawal from people/activities they love  · Confusion- inability to concentrate     If you or a loved one observes any of these behaviors or has concerns about self-harm, here's what you can do:  · Talk about it- your feelings and reasons for harming yourself  · Remove any means that you might use to hurt yourself (examples: pills, rope, extension cords, firearm)  · Get professional help from the community (Mental Health, Substance Abuse, psychological counseling)  · Do not be alone:Call your Safe Contact- someone whom you trust who will be there for you.  · Call your local CRISIS HOTLINE 560-7644 or  824-410-2395  · Call your local Children's Mobile Crisis Response Team [place] (649) 018-1031 or www.iSECUREtrac.BioNitrogen  · Call the toll free National Suicide Prevention Hotlines   · National Suicide Prevention Lifeline 369-429-JCTZ (9436)  · National [employer]berry Line Network 800-SUICIDE (928-4216)

## 2017-08-07 NOTE — PROGRESS NOTES
Cardiology Progress Note               Author: Keven Dunbar Date & Time created: 2017  6:35 AM     Interval History:  : BP 98/66. Pulse 80. Sinus rhythm. No cardiac symptoms. No arrhythmias.    Review of Systems   Respiratory: Negative for cough and shortness of breath.    Cardiovascular: Negative for chest pain and palpitations.   Neurological: Negative for dizziness.       Physical Exam   Constitutional: He is oriented to person, place, and time. He appears well-nourished. No distress.   HENT:   Head: Normocephalic.   Eyes: EOM are normal. No scleral icterus.   Neck: Carotid bruit is not present.   Normal jugular venous pressure.   Cardiovascular: Normal rate, regular rhythm, S1 normal, S2 normal and normal heart sounds.  Exam reveals no gallop and no friction rub.    No murmur heard.  Pulses:       Carotid pulses are 2+ on the right side, and 2+ on the left side.       Radial pulses are 2+ on the right side, and 2+ on the left side.   Pulmonary/Chest: Effort normal and breath sounds normal. He has no wheezes. He has no rhonchi. He has no rales.   Musculoskeletal: He exhibits no edema.   Neurological: He is alert and oriented to person, place, and time.   Skin: Skin is warm and dry.   Psychiatric: He has a normal mood and affect. His behavior is normal.       Hemodynamics:  Temp (24hrs), Av.5 °C (97.7 °F), Min:36 °C (96.8 °F), Max:36.8 °C (98.2 °F)  Temperature: 36.6 °C (97.9 °F)  Pulse  Av.9  Min: 61  Max: 92   Blood Pressure: (!) 98/66 mmHg (rn notified)     Respiratory:    Respiration: 16, Pulse Oximetry: 94 %        RUL Breath Sounds: Clear, RML Breath Sounds: Clear, RLL Breath Sounds: Clear, AYAZ Breath Sounds: Clear, LLL Breath Sounds: Clear  Fluids:     Weight: 115.6 kg (254 lb 13.6 oz)  GI/Nutrition:  Orders Placed This Encounter   Procedures   • Diet Order     Standing Status: Standing      Number of Occurrences: 1      Standing Expiration Date:      Order Specific Question:  Diet:      Answer:  Cardiac [6]     Lab Results:  Recent Labs      08/04/17   1740  08/05/17   0325  08/06/17   0133   WBC  9.0  7.2  8.5   RBC  4.97  4.84  4.81   HEMOGLOBIN  15.2  15.0  14.5   HEMATOCRIT  44.5  44.3  43.2   MCV  89.5  91.5  89.8   MCH  30.6  31.0  30.1   MCHC  34.2  33.9  33.6*   RDW  41.8  43.0  41.4   PLATELETCT  333  378  351   MPV  9.7  10.2  9.8     Recent Labs      08/04/17   1740  08/05/17   0325  08/06/17   0133   SODIUM  136  136  135   POTASSIUM  3.8  3.7  3.6   CHLORIDE  104  105  103   CO2  22  24  25   GLUCOSE  111*  96  114*   BUN  8  10  10   CREATININE  0.58  0.63  0.69   CALCIUM  9.1  9.1  9.2     Recent Labs      08/04/17   1740   INR  0.90         Recent Labs      08/04/17   2138  08/05/17   1427  08/05/17   1950   TROPONINI  7.98*  6.74*  4.37*     Recent Labs      08/05/17   0325   TRIGLYCERIDE  215*   HDL  31*   LDL  266*     08/5/2017 ECHOCARDIOGRAM  Hypokinetic basal inferior wall. Left ventricular ejection fraction is   visually estimated to be 50%.  Trace mitral regurgitation.  Unable to estimate pulmonary artery pressure due to an inadequate   tricuspid regurgitant jet.  No pericardial effusion seen.   Compared to the images of the prior study done 10/21/2014 there has   been no significant change.  Procedure date August 5, 2017      PreOp Diagnosis: NSTEMI, prior RCA and LCX stents    PostOp Diagnosis:    1. NSTEMI due to 99% stenosis of the second obtuse marginal (OM) branch of left circumflex artery (LCX)  2. Chronic total occlusion of mid to distal right coronary artery  3. 70-80% ostial large first diagonal branch of the left anterior descending artery stenosis  4. Patent left circumflex artery stents  5. Inferior wall hypokinesis with joseph overall left ventricular systolic function  6. Status post stenting of the second OM with 2.5x18 mm Vision bare metal stent    Procedure(s) :  Coronary Angiography, Left Heart Catheterization, Left Ventriculography  Percutaneous  coronary intervention of the second OM of the LCX    Surgeon(s):  Renetta Sanchez M.D.      Medical Decision Making, by Problem:  Active Hospital Problems    Diagnosis   • *NSTEMI (non-ST elevated myocardial infarction) (CMS-[facility]) [I21.4]   • Stented coronary artery,  bare metal stent X 2 [Z95.5]   • Hyperlipemia [E78.5]   • CAD (coronary artery disease) [I25.10]   • Medically noncompliant [Z91.19]       Assessment and Plan:  NSTEMI. 8/4/2017.    Circumflex stent. BMS. 8/5/2017.    Previous circumflex stent.  RCA.    Hyperlipidemia. On Crestor.      Plan  Discharge home once medication availability is secured through insurance.  Follow-up in [place] cardiology clinic.    EKG reviewed, Medications reviewed, Labs reviewed and Radiology images reviewed

## 2017-08-16 ENCOUNTER — OFFICE VISIT (OUTPATIENT)
Dept: CARDIOLOGY | Facility: CLINIC | Age: 37
End: 2017-08-16
Payer: MEDICAID

## 2017-08-16 VITALS
BODY MASS INDEX: 39.99 KG/M2 | DIASTOLIC BLOOD PRESSURE: 80 MMHG | WEIGHT: 240 LBS | HEIGHT: 65 IN | SYSTOLIC BLOOD PRESSURE: 120 MMHG | HEART RATE: 84 BPM

## 2017-08-16 DIAGNOSIS — I10 ESSENTIAL HYPERTENSION: ICD-10-CM

## 2017-08-16 DIAGNOSIS — I25.10 MULTIPLE VESSEL CORONARY ARTERY DISEASE: ICD-10-CM

## 2017-08-16 DIAGNOSIS — Z91.199 MEDICALLY NONCOMPLIANT: ICD-10-CM

## 2017-08-16 DIAGNOSIS — Z95.5 STENTED CORONARY ARTERY: ICD-10-CM

## 2017-08-16 DIAGNOSIS — I21.4 NSTEMI (NON-ST ELEVATED MYOCARDIAL INFARCTION) (HCC): Primary | ICD-10-CM

## 2017-08-16 DIAGNOSIS — I25.10 CORONARY ARTERY DISEASE INVOLVING NATIVE CORONARY ARTERY OF NATIVE HEART WITHOUT ANGINA PECTORIS: ICD-10-CM

## 2017-08-16 PROCEDURE — 99214 OFFICE O/P EST MOD 30 MIN: CPT | Performed by: INTERNAL MEDICINE

## 2017-08-16 RX ORDER — ROSUVASTATIN CALCIUM 20 MG/1
20 TABLET, COATED ORAL EVERY EVENING
Qty: 90 TAB | Refills: 3 | Status: SHIPPED | OUTPATIENT
Start: 2017-08-16 | End: 2020-10-25

## 2017-08-16 RX ORDER — LISINOPRIL 5 MG/1
5 TABLET ORAL DAILY
Qty: 90 TAB | Refills: 3 | Status: SHIPPED | OUTPATIENT
Start: 2017-08-16 | End: 2020-10-25

## 2017-08-16 RX ORDER — CLOPIDOGREL BISULFATE 75 MG/1
75 TABLET ORAL DAILY
Qty: 90 TAB | Refills: 3 | Status: SHIPPED | OUTPATIENT
Start: 2017-08-16 | End: 2020-10-25

## 2017-08-16 RX ORDER — NITROGLYCERIN 0.4 MG/1
0.4 TABLET SUBLINGUAL PRN
Qty: 25 TAB | Refills: 5 | Status: SHIPPED | OUTPATIENT
Start: 2017-08-16 | End: 2020-10-25

## 2017-08-16 ASSESSMENT — ENCOUNTER SYMPTOMS: CARDIOVASCULAR NEGATIVE: 1

## 2017-08-16 NOTE — PROGRESS NOTES
"Subjective:   Kyle Gonzalez is a 37 -year-old man with premature coronary artery disease status post former PCI of the circumflex, and more recent PCI of his OM (8/5/2017) in the setting of a non-ST elevation MI.    He tells me today about his anginal chest pain prior to recent PCI to his OM branch at the time of his non-ST elevation MI. He describes it as a pressure-like, \"elephant,\" exertional chest discomfort. He tells me that it would last about 5-10 minutes at a time, was not severe, and relieved with rest and nitroglycerin. He relates to me that he has not had none since his recent hospitalization.    He does have some medical bills pending, and is in the process of getting Medicaid. He tells me though that he is able to afford his medications.    He is back at work.    Past Medical History   Diagnosis Date   • Hyperlipidemia    • Heart attack (CMS-HCC)    • Hypertension    • Medical non-compliance 9/13/2015     Past Surgical History   Procedure Laterality Date   • Pr transcath stent init vessel,open       x4     Family History   Problem Relation Age of Onset   • Diabetes Mother    • Heart Attack Mother    • Heart Attack Father      History   Smoking status   • Former Smoker   • Quit date: 05/13/2015   Smokeless tobacco   • Not on file     No Known Allergies  Outpatient Encounter Prescriptions as of 8/16/2017   Medication Sig Dispense Refill   • rosuvastatin (CRESTOR) 20 MG Tab Take 1 Tab by mouth every evening. 90 Tab 3   • clopidogrel (PLAVIX) 75 MG Tab Take 1 Tab by mouth every day. 90 Tab 3   • lisinopril (PRINIVIL) 5 MG Tab Take 1 Tab by mouth every day. 90 Tab 3   • metoprolol (LOPRESSOR) 25 MG Tab Take 1 Tab by mouth 2 Times a Day. 180 Tab 3   • nitroglycerin (NITROSTAT) 0.4 MG SL Tab Place 1 Tab under tongue as needed for Chest Pain (angina). 25 Tab 5   • aspirin (ASA) 325 MG Tab Take 325 mg by mouth every 6 hours as needed for Mild Pain.     • [DISCONTINUED] clopidogrel (PLAVIX) 75 MG Tab Take 1 " "Tab by mouth every day. 30 Tab 0   • [DISCONTINUED] metoprolol (LOPRESSOR) 25 MG Tab Take 1 Tab by mouth 2 Times a Day. 60 Tab 0   • [DISCONTINUED] lisinopril (PRINIVIL) 5 MG Tab Take 1 Tab by mouth every day. 30 Tab 0   • [DISCONTINUED] nitroglycerin (NITROSTAT) 0.4 MG SL Tab Place 1 Tab under tongue as needed for Chest Pain (angina). 25 Tab 0   • [DISCONTINUED] rosuvastatin (CRESTOR) 20 MG Tab Take 1 Tab by mouth every evening. 30 Tab 0     No facility-administered encounter medications on file as of 8/16/2017.     Review of Systems   Cardiovascular: Negative.    All other systems reviewed and are negative.       Objective:   /80 mmHg  Pulse 84  Ht 1.651 m (5' 5\")  Wt 108.863 kg (240 lb)  BMI 39.94 kg/m2    Physical Exam   Constitutional: He is oriented to person, place, and time. He appears well-developed and well-nourished. No distress.   Pleasant, obese, disheveled, younger man in no distress   HENT:   Head: Normocephalic and atraumatic.   Eyes: Conjunctivae and EOM are normal. Pupils are equal, round, and reactive to light. No scleral icterus.   Neck: Neck supple. No JVD present. No tracheal deviation present.   Cardiovascular: Normal rate, regular rhythm, normal heart sounds and intact distal pulses.  Exam reveals no gallop and no friction rub.    No murmur heard.  Pulses:       Dorsalis pedis pulses are 2+ on the right side, and 2+ on the left side.   No carotid bruits   Pulmonary/Chest: Effort normal and breath sounds normal. No stridor. No respiratory distress. He has no wheezes. He has no rales.   Abdominal: Soft. Bowel sounds are normal. He exhibits no distension.   Musculoskeletal: He exhibits no edema.   Neurological: He is alert and oriented to person, place, and time.   Skin: Skin is warm and dry. No rash noted. He is not diaphoretic. No erythema. No pallor.   Psychiatric: He has a normal mood and affect. Judgment and thought content normal.   Vitals reviewed.    Lab Results   Component " "Value Date/Time    WBC 8.5 08/06/2017 01:33 AM    RBC 4.81 08/06/2017 01:33 AM    HEMOGLOBIN 14.5 08/06/2017 01:33 AM    HEMATOCRIT 43.2 08/06/2017 01:33 AM    MCV 89.8 08/06/2017 01:33 AM    MCH 30.1 08/06/2017 01:33 AM    MCHC 33.6* 08/06/2017 01:33 AM    MPV 9.8 08/06/2017 01:33 AM        Lab Results   Component Value Date/Time    SODIUM 135 08/06/2017 01:33 AM    POTASSIUM 3.6 08/06/2017 01:33 AM    CHLORIDE 103 08/06/2017 01:33 AM    CO2 25 08/06/2017 01:33 AM    GLUCOSE 114* 08/06/2017 01:33 AM    BUN 10 08/06/2017 01:33 AM    CREATININE 0.69 08/06/2017 01:33 AM        Lab Results   Component Value Date/Time    AST(SGOT) 45 08/05/2017 03:25 AM    ALT(SGPT) 30 08/05/2017 03:25 AM        Lab Results   Component Value Date/Time    CHOLESTEROL,* 08/05/2017 03:25 AM    * 08/05/2017 03:25 AM    HDL 31* 08/05/2017 03:25 AM    TRIGLYCERIDES 215* 08/05/2017 03:25 AM       Cardiac catheterization, 8/5/2017:  \"PostOp Diagnosis:    1. NSTEMI due to 99% stenosis of the second obtuse marginal (OM) branch of left circumflex artery (LCX)  2. Chronic total occlusion of mid to distal right coronary artery  3. 70-80% ostial large first diagonal branch of the left anterior descending artery stenosis  4. Patent left circumflex artery stents  5. Inferior wall hypokinesis with joseph overall left ventricular systolic function  6. Status post stenting of the second OM with 2.5x18 mm Vision bare metal stent    Procedure(s) :  Coronary Angiography, Left Heart Catheterization, Left Ventriculography  Percutaneous coronary intervention of the second OM of the LCX\"    Echocardiogram, 8/5/2017:  \"CONCLUSIONS  Hypokinetic basal inferior wall. Left ventricular ejection fraction is   visually estimated to be 50%.  Trace mitral regurgitation.  Unable to estimate pulmonary artery pressure due to an inadequate   tricuspid regurgitant jet.  No pericardial effusion seen.   Compared to the images of the prior study done 10/21/2014 there " "has   been no significant change.\"    Assessment:     1. NSTEMI (non-ST elevated myocardial infarction) (CMS-[facility])  rosuvastatin (CRESTOR) 20 MG Tab    clopidogrel (PLAVIX) 75 MG Tab    lisinopril (PRINIVIL) 5 MG Tab    metoprolol (LOPRESSOR) 25 MG Tab    nitroglycerin (NITROSTAT) 0.4 MG SL Tab    LIPID PANEL   2. Essential hypertension     3. Stented coronary artery,  bare metal stent X 2     4. Multiple vessel coronary artery disease     5. Medically noncompliant     6. Coronary artery disease involving native coronary artery of native heart without angina pectoris         Medical Decision Making:  Today's Assessment / Status / Plan:     He is doing well today in the aftermath of his non-ST elevation myocardial infarction with no anginal chest pain, and adherent to his medical regimen as that has been covered for him. I reinforced the necessity for dual antiplatelet therapy one year out from his event. Certainly, that we'll be most important from 1-3 months out from the placement of his bare metal stent.  Otherwise, his cardiovascular regimen includes lisinopril, metoprolol, Crestor, and sublingual nitroglycerin. I related to him that if her Crestor becomes prohibitively expensive to please call us and we can switch that at that point most likely to atorvastatin.    Catalino Joseph MD  Cardiologist, [facility] [place]     [facility] in 6 months after his lipid panel  "

## 2017-10-17 NOTE — ADDENDUM NOTE
Encounter addended by: Maryann Traylor R.N. on: 10/16/2017  5:12 PM<BR>    Actions taken: Flowsheet accepted

## 2020-10-25 ENCOUNTER — HOSPITAL ENCOUNTER (INPATIENT)
Facility: MEDICAL CENTER | Age: 40
LOS: 2 days | DRG: 246 | End: 2020-10-27
Attending: EMERGENCY MEDICINE | Admitting: INTERNAL MEDICINE

## 2020-10-25 ENCOUNTER — APPOINTMENT (OUTPATIENT)
Dept: CARDIOLOGY | Facility: MEDICAL CENTER | Age: 40
DRG: 246 | End: 2020-10-25
Attending: INTERNAL MEDICINE

## 2020-10-25 ENCOUNTER — APPOINTMENT (OUTPATIENT)
Dept: RADIOLOGY | Facility: MEDICAL CENTER | Age: 40
DRG: 246 | End: 2020-10-25
Attending: EMERGENCY MEDICINE

## 2020-10-25 DIAGNOSIS — I21.4 NSTEMI (NON-ST ELEVATED MYOCARDIAL INFARCTION) (HCC): ICD-10-CM

## 2020-10-25 PROBLEM — K92.1 BLOODY STOOLS: Status: ACTIVE | Noted: 2020-10-25

## 2020-10-25 PROBLEM — I50.20 HEART FAILURE WITH REDUCED EJECTION FRACTION (HCC): Status: ACTIVE | Noted: 2020-10-25

## 2020-10-25 LAB
ALBUMIN SERPL BCP-MCNC: 4.1 G/DL (ref 3.2–4.9)
ALBUMIN/GLOB SERPL: 1 G/DL
ALP SERPL-CCNC: 140 U/L (ref 30–99)
ALT SERPL-CCNC: 41 U/L (ref 2–50)
ANION GAP SERPL CALC-SCNC: 12 MMOL/L (ref 7–16)
APTT PPP: 54.4 SEC (ref 24.7–36)
AST SERPL-CCNC: 34 U/L (ref 12–45)
BASOPHILS # BLD AUTO: 0.6 % (ref 0–1.8)
BASOPHILS # BLD: 0.05 K/UL (ref 0–0.12)
BILIRUB SERPL-MCNC: 0.3 MG/DL (ref 0.1–1.5)
BUN SERPL-MCNC: 13 MG/DL (ref 8–22)
CALCIUM SERPL-MCNC: 9.1 MG/DL (ref 8.5–10.5)
CHLORIDE SERPL-SCNC: 97 MMOL/L (ref 96–112)
CO2 SERPL-SCNC: 23 MMOL/L (ref 20–33)
CREAT SERPL-MCNC: 0.58 MG/DL (ref 0.5–1.4)
EKG IMPRESSION: NORMAL
EOSINOPHIL # BLD AUTO: 0.08 K/UL (ref 0–0.51)
EOSINOPHIL NFR BLD: 0.9 % (ref 0–6.9)
ERYTHROCYTE [DISTWIDTH] IN BLOOD BY AUTOMATED COUNT: 44.3 FL (ref 35.9–50)
GLOBULIN SER CALC-MCNC: 4 G/DL (ref 1.9–3.5)
GLUCOSE SERPL-MCNC: 208 MG/DL (ref 65–99)
HCT VFR BLD AUTO: 48.9 % (ref 42–52)
HGB BLD-MCNC: 16.6 G/DL (ref 14–18)
IMM GRANULOCYTES # BLD AUTO: 0.03 K/UL (ref 0–0.11)
IMM GRANULOCYTES NFR BLD AUTO: 0.3 % (ref 0–0.9)
INR PPP: 0.86 (ref 0.87–1.13)
LV EJECT FRACT  99904: 20
LV EJECT FRACT MOD 2C 99903: 8.15
LV EJECT FRACT MOD 4C 99902: 26.75
LV EJECT FRACT MOD BP 99901: 18.74
LYMPHOCYTES # BLD AUTO: 2.19 K/UL (ref 1–4.8)
LYMPHOCYTES NFR BLD: 24.5 % (ref 22–41)
MCH RBC QN AUTO: 31.5 PG (ref 27–33)
MCHC RBC AUTO-ENTMCNC: 33.9 G/DL (ref 33.7–35.3)
MCV RBC AUTO: 92.8 FL (ref 81.4–97.8)
MONOCYTES # BLD AUTO: 0.65 K/UL (ref 0–0.85)
MONOCYTES NFR BLD AUTO: 7.3 % (ref 0–13.4)
NEUTROPHILS # BLD AUTO: 5.94 K/UL (ref 1.82–7.42)
NEUTROPHILS NFR BLD: 66.4 % (ref 44–72)
NRBC # BLD AUTO: 0 K/UL
NRBC BLD-RTO: 0 /100 WBC
NT-PROBNP SERPL IA-MCNC: 239 PG/ML (ref 0–125)
PLATELET # BLD AUTO: 323 K/UL (ref 164–446)
PMV BLD AUTO: 9.7 FL (ref 9–12.9)
POTASSIUM SERPL-SCNC: 4.3 MMOL/L (ref 3.6–5.5)
PROT SERPL-MCNC: 8.1 G/DL (ref 6–8.2)
PROTHROMBIN TIME: 12 SEC (ref 12–14.6)
RBC # BLD AUTO: 5.27 M/UL (ref 4.7–6.1)
SODIUM SERPL-SCNC: 132 MMOL/L (ref 135–145)
TROPONIN T SERPL-MCNC: 163 NG/L (ref 6–19)
TROPONIN T SERPL-MCNC: 169 NG/L (ref 6–19)
UFH PPP CHRO-ACNC: <0.1 IU/ML
WBC # BLD AUTO: 8.9 K/UL (ref 4.8–10.8)

## 2020-10-25 PROCEDURE — 83880 ASSAY OF NATRIURETIC PEPTIDE: CPT

## 2020-10-25 PROCEDURE — 85730 THROMBOPLASTIN TIME PARTIAL: CPT

## 2020-10-25 PROCEDURE — 700111 HCHG RX REV CODE 636 W/ 250 OVERRIDE (IP): Performed by: INTERNAL MEDICINE

## 2020-10-25 PROCEDURE — 700102 HCHG RX REV CODE 250 W/ 637 OVERRIDE(OP): Performed by: INTERNAL MEDICINE

## 2020-10-25 PROCEDURE — 85610 PROTHROMBIN TIME: CPT

## 2020-10-25 PROCEDURE — 80053 COMPREHEN METABOLIC PANEL: CPT

## 2020-10-25 PROCEDURE — 36415 COLL VENOUS BLD VENIPUNCTURE: CPT

## 2020-10-25 PROCEDURE — A9270 NON-COVERED ITEM OR SERVICE: HCPCS | Performed by: INTERNAL MEDICINE

## 2020-10-25 PROCEDURE — 770020 HCHG ROOM/CARE - TELE (206)

## 2020-10-25 PROCEDURE — 700117 HCHG RX CONTRAST REV CODE 255: Performed by: INTERNAL MEDICINE

## 2020-10-25 PROCEDURE — 93005 ELECTROCARDIOGRAM TRACING: CPT | Performed by: EMERGENCY MEDICINE

## 2020-10-25 PROCEDURE — 84484 ASSAY OF TROPONIN QUANT: CPT

## 2020-10-25 PROCEDURE — 85025 COMPLETE CBC W/AUTO DIFF WBC: CPT

## 2020-10-25 PROCEDURE — 96365 THER/PROPH/DIAG IV INF INIT: CPT

## 2020-10-25 PROCEDURE — 99285 EMERGENCY DEPT VISIT HI MDM: CPT

## 2020-10-25 PROCEDURE — 700111 HCHG RX REV CODE 636 W/ 250 OVERRIDE (IP): Performed by: EMERGENCY MEDICINE

## 2020-10-25 PROCEDURE — 96366 THER/PROPH/DIAG IV INF ADDON: CPT

## 2020-10-25 PROCEDURE — 93306 TTE W/DOPPLER COMPLETE: CPT | Mod: 26 | Performed by: INTERNAL MEDICINE

## 2020-10-25 PROCEDURE — 85520 HEPARIN ASSAY: CPT

## 2020-10-25 PROCEDURE — 93306 TTE W/DOPPLER COMPLETE: CPT

## 2020-10-25 PROCEDURE — 94760 N-INVAS EAR/PLS OXIMETRY 1: CPT

## 2020-10-25 PROCEDURE — 99255 IP/OBS CONSLTJ NEW/EST HI 80: CPT | Performed by: INTERNAL MEDICINE

## 2020-10-25 PROCEDURE — 71045 X-RAY EXAM CHEST 1 VIEW: CPT

## 2020-10-25 RX ORDER — AMOXICILLIN 250 MG
2 CAPSULE ORAL 2 TIMES DAILY
Status: DISCONTINUED | OUTPATIENT
Start: 2020-10-25 | End: 2020-10-27 | Stop reason: HOSPADM

## 2020-10-25 RX ORDER — BISACODYL 10 MG
10 SUPPOSITORY, RECTAL RECTAL
Status: DISCONTINUED | OUTPATIENT
Start: 2020-10-25 | End: 2020-10-27 | Stop reason: HOSPADM

## 2020-10-25 RX ORDER — HEPARIN SODIUM 1000 [USP'U]/ML
2000 INJECTION, SOLUTION INTRAVENOUS; SUBCUTANEOUS PRN
Status: DISCONTINUED | OUTPATIENT
Start: 2020-10-25 | End: 2020-10-26

## 2020-10-25 RX ORDER — POLYETHYLENE GLYCOL 3350 17 G/17G
1 POWDER, FOR SOLUTION ORAL
Status: DISCONTINUED | OUTPATIENT
Start: 2020-10-25 | End: 2020-10-27 | Stop reason: HOSPADM

## 2020-10-25 RX ORDER — LOSARTAN POTASSIUM 25 MG/1
12.5 TABLET ORAL
Status: DISCONTINUED | OUTPATIENT
Start: 2020-10-25 | End: 2020-10-27 | Stop reason: HOSPADM

## 2020-10-25 RX ORDER — FUROSEMIDE 10 MG/ML
20 INJECTION INTRAMUSCULAR; INTRAVENOUS ONCE
Status: COMPLETED | OUTPATIENT
Start: 2020-10-25 | End: 2020-10-25

## 2020-10-25 RX ORDER — HEPARIN SODIUM 5000 [USP'U]/100ML
0-30 INJECTION, SOLUTION INTRAVENOUS CONTINUOUS
Status: DISCONTINUED | OUTPATIENT
Start: 2020-10-25 | End: 2020-10-26

## 2020-10-25 RX ORDER — ROSUVASTATIN CALCIUM 20 MG/1
20 TABLET, COATED ORAL EVERY EVENING
Status: DISCONTINUED | OUTPATIENT
Start: 2020-10-25 | End: 2020-10-25

## 2020-10-25 RX ORDER — NICOTINE 21 MG/24HR
21 PATCH, TRANSDERMAL 24 HOURS TRANSDERMAL
Status: DISCONTINUED | OUTPATIENT
Start: 2020-10-26 | End: 2020-10-27 | Stop reason: HOSPADM

## 2020-10-25 RX ORDER — ROSUVASTATIN CALCIUM 20 MG/1
40 TABLET, COATED ORAL EVERY EVENING
Status: DISCONTINUED | OUTPATIENT
Start: 2020-10-25 | End: 2020-10-27 | Stop reason: HOSPADM

## 2020-10-25 RX ORDER — NITROGLYCERIN 0.4 MG/1
0.4 TABLET SUBLINGUAL
Status: DISCONTINUED | OUTPATIENT
Start: 2020-10-25 | End: 2020-10-27 | Stop reason: HOSPADM

## 2020-10-25 RX ADMIN — HUMAN ALBUMIN MICROSPHERES AND PERFLUTREN 3 ML: 10; .22 INJECTION, SOLUTION INTRAVENOUS at 13:34

## 2020-10-25 RX ADMIN — HEPARIN SODIUM 2000 UNITS: 1000 INJECTION INTRAVENOUS; SUBCUTANEOUS at 19:49

## 2020-10-25 RX ADMIN — LOSARTAN POTASSIUM 12.5 MG: 25 TABLET, FILM COATED ORAL at 18:19

## 2020-10-25 RX ADMIN — FUROSEMIDE 20 MG: 10 INJECTION, SOLUTION INTRAMUSCULAR; INTRAVENOUS at 18:19

## 2020-10-25 RX ADMIN — HEPARIN SODIUM 12 UNITS/KG/HR: 5000 INJECTION, SOLUTION INTRAVENOUS at 12:11

## 2020-10-25 RX ADMIN — ROSUVASTATIN CALCIUM 40 MG: 20 TABLET, FILM COATED ORAL at 18:21

## 2020-10-25 SDOH — HEALTH STABILITY: MENTAL HEALTH: HOW MANY STANDARD DRINKS CONTAINING ALCOHOL DO YOU HAVE ON A TYPICAL DAY?: 10 OR MORE

## 2020-10-25 SDOH — HEALTH STABILITY: MENTAL HEALTH: HOW OFTEN DO YOU HAVE A DRINK CONTAINING ALCOHOL?: 4 OR MORE TIMES A WEEK

## 2020-10-25 ASSESSMENT — LIFESTYLE VARIABLES
AVERAGE NUMBER OF DAYS PER WEEK YOU HAVE A DRINK CONTAINING ALCOHOL: 7
HOW MANY TIMES IN THE PAST YEAR HAVE YOU HAD 5 OR MORE DRINKS IN A DAY: 300
HAVE YOU EVER FELT YOU SHOULD CUT DOWN ON YOUR DRINKING: YES
DOES PATIENT WANT TO STOP DRINKING: YES
CONSUMPTION TOTAL: POSITIVE
EVER HAD A DRINK FIRST THING IN THE MORNING TO STEADY YOUR NERVES TO GET RID OF A HANGOVER: YES
DO YOU DRINK ALCOHOL: YES
ALCOHOL_USE: YES
SUBSTANCE_ABUSE: 0
EVER HAD A DRINK FIRST THING IN THE MORNING TO STEADY YOUR NERVES TO GET RID OF A HANGOVER: YES
TOTAL SCORE: 3
DOES PATIENT WANT TO TALK TO SOMEONE ABOUT QUITTING: YES
TOTAL SCORE: 2
DOES PATIENT WANT TO STOP DRINKING: NO
CONSUMPTION TOTAL: INCOMPLETE
HAVE PEOPLE ANNOYED YOU BY CRITICIZING YOUR DRINKING: NO
TOTAL SCORE: 2
ON A TYPICAL DAY WHEN YOU DRINK ALCOHOL HOW MANY DRINKS DO YOU HAVE: 12
TOTAL SCORE: 3
EVER FELT BAD OR GUILTY ABOUT YOUR DRINKING: NO
HAVE YOU EVER FELT YOU SHOULD CUT DOWN ON YOUR DRINKING: YES
TOTAL SCORE: 3
HAVE PEOPLE ANNOYED YOU BY CRITICIZING YOUR DRINKING: YES
TOTAL SCORE: 2
EVER FELT BAD OR GUILTY ABOUT YOUR DRINKING: NO

## 2020-10-25 ASSESSMENT — ENCOUNTER SYMPTOMS
MYALGIAS: 0
HEARTBURN: 0
PHOTOPHOBIA: 0
DEPRESSION: 0
HALLUCINATIONS: 0
EYE PAIN: 0
PND: 0
SPUTUM PRODUCTION: 0
FALLS: 0
FOCAL WEAKNESS: 0
SHORTNESS OF BREATH: 0
PALPITATIONS: 0
VOMITING: 0
DOUBLE VISION: 0
NAUSEA: 0
NECK PAIN: 0
SORE THROAT: 0
SEIZURES: 0
BACK PAIN: 0
DIZZINESS: 0
COUGH: 0
STRIDOR: 0
CLAUDICATION: 0
DIARRHEA: 0
HEADACHES: 0
HEMOPTYSIS: 0
BLURRED VISION: 0
SPEECH CHANGE: 0
WEIGHT LOSS: 0
TREMORS: 0
WEAKNESS: 0
ORTHOPNEA: 0
CONSTIPATION: 0
TINGLING: 0
CHILLS: 0
FLANK PAIN: 0
FEVER: 0
BLOOD IN STOOL: 1
ABDOMINAL PAIN: 0
LOSS OF CONSCIOUSNESS: 0
SENSORY CHANGE: 0
INSOMNIA: 0
DIAPHORESIS: 0

## 2020-10-25 ASSESSMENT — COGNITIVE AND FUNCTIONAL STATUS - GENERAL
DAILY ACTIVITIY SCORE: 24
MOBILITY SCORE: 24
SUGGESTED CMS G CODE MODIFIER DAILY ACTIVITY: CH
SUGGESTED CMS G CODE MODIFIER MOBILITY: CH

## 2020-10-25 ASSESSMENT — PATIENT HEALTH QUESTIONNAIRE - PHQ9
2. FEELING DOWN, DEPRESSED, IRRITABLE, OR HOPELESS: NOT AT ALL
SUM OF ALL RESPONSES TO PHQ9 QUESTIONS 1 AND 2: 0
1. LITTLE INTEREST OR PLEASURE IN DOING THINGS: NOT AT ALL

## 2020-10-25 ASSESSMENT — PAIN DESCRIPTION - DESCRIPTORS: DESCRIPTORS: OTHER (COMMENT)

## 2020-10-25 NOTE — ED TRIAGE NOTES
"Kyle Gonzalez  Chief Complaint   Patient presents with   • Chest Pain     transfer from [place], elevated troponin     Pt transferred from [place] for further eval for elevated troponin.    Pt woke this am at 0445, with \"severe crushing\" chest pain that radiated to (L) arm, hx of prior MIs with stent placement x 2.    Arrives with heparin drip infusing at 1200un/ hr, that was placed on out pump and continued at that rate.  Also received 324 ASA and 5000 unit herparin bolus PTA.      VSS, no acute distress.  On monitor.  Call light in reach.   "

## 2020-10-25 NOTE — ED PROVIDER NOTES
ED Provider Note    Scribed for Dr. Wilton Mills M.D. by Rosalind Gaston. 10/25/2020  11:17 AM    Primary care provider: None  Means of arrival: Ambulance  History obtained from: Patient  History limited by: None    CHIEF COMPLAINT  •  Transfer from [facility] [place] for treatment of NSTEMI.    HPI  Kyle Gonzalez is a 40 y.o. male with PMHx significant for HTN, DM , medication non-compliance, multiple STEMIs and NSTEMIs ( 4 times ,, &) s/p  X 3 bare metal stents. He presents to the Emergency Department by care flight  for NSTEMI. Patient states that this morning at around ~3:50 AM he started experiencing pressure/heaviness, intermittent, central chest pain lasted for 20 minutes, radiating to the left arm and jaw. Associated with diaphoresis and shortness of breath. Patient states he took Aspirin prior to presenting to the ED at OSH. Patient states chest pain has now subsided and denies any current symptoms. At OSH, patient was found to have elevated serum troponin and was started on ASA and Heparin drip.      REVIEW OF SYSTEMS  As above, all other systems reviewed and are negative. See HPI for further details.       PAST MEDICAL HISTORY  Patient has a past medical history of Heart attack, Hyperlipidemia, Hypertension, and Medical non-compliance (2015).      SURGICAL HISTORY  Patient has a past surgical history that includes transcath stent init vessel,open.      SOCIAL HISTORY  Social History     Tobacco Use   • Smoking status: Current Every Day Smoker     Packs/day: 1.00     Types: Cigarettes     Last attempt to quit: 2015     Years since quittin.4   Substance Use Topics   • Alcohol use: Yes     Frequency: 4 or more times a week     Drinks per session: 10 or more   • Drug use: Yes     Comment: occasional marijuana       Social History     Substance and Sexual Activity   Drug Use Yes    Comment: occasional marijuana        FAMILY HISTORY  Family History   Problem Relation Age  "of Onset   • Diabetes Mother    • Heart Attack Mother    • Heart Attack Father        CURRENT MEDICATIONS  Home Medications     Reviewed by Irasema Mercado R.N. (Registered Nurse) on 10/25/20 at 1211  Med List Status: Complete   Medication Last Dose Status   aspirin (ASA) 81 MG Chew Tab chewable tablet 10/25/2020 Active                ALLERGIES  None    PHYSICAL EXAM  VITAL SIGNS: /89   Pulse 94   Temp 36.8 °C (98.3 °F) (Temporal)   Resp 18   Ht 1.651 m (5' 5\")   Wt 108.8 kg (239 lb 13.8 oz)   SpO2 95%   BMI 39.91 kg/m²     Constitutional: Well developed, Well nourished, not in acute distress, Non-toxic appearance.   HENT: Normocephalic, Atraumatic, Bilateral external ears normal, Oropharynx moist, No oral exudates.   Eyes: PERRLA, EOMI, Conjunctiva normal, No discharge.   Neck: No tenderness, Supple, No stridor.   Lymphatic: No lymphadenopathy noted.   Cardiovascular: Normal heart rate, Normal rhythm.   Thorax & Lungs: Clear to auscultation bilaterally, No respiratory distress, No wheezing, No crackles.   Abdomen: Soft, No tenderness, No masses, No pulsatile masses.   Skin: Warm, Dry, No erythema, No rash.   Extremities:, No edema No cyanosis.   Musculoskeletal: No tenderness to palpation or major deformities noted.  Intact distal pulses  Neurologic: Awake, alert. Moves all extremities spontaneously.  Psychiatric: Affect normal, Judgment normal, Mood normal.       EKG  See labs below. Interpreted by me.        LABS  Results for orders placed or performed during the hospital encounter of 10/25/20   CBC WITH DIFFERENTIAL   Result Value Ref Range    WBC 8.9 4.8 - 10.8 K/uL    RBC 5.27 4.70 - 6.10 M/uL    Hemoglobin 16.6 14.0 - 18.0 g/dL    Hematocrit 48.9 42.0 - 52.0 %    MCV 92.8 81.4 - 97.8 fL    MCH 31.5 27.0 - 33.0 pg    MCHC 33.9 33.7 - 35.3 g/dL    RDW 44.3 35.9 - 50.0 fL    Platelet Count 323 164 - 446 K/uL    MPV 9.7 9.0 - 12.9 fL    Neutrophils-Polys 66.40 44.00 - 72.00 %    Lymphocytes " 24.50 22.00 - 41.00 %    Monocytes 7.30 0.00 - 13.40 %    Eosinophils 0.90 0.00 - 6.90 %    Basophils 0.60 0.00 - 1.80 %    Immature Granulocytes 0.30 0.00 - 0.90 %    Nucleated RBC 0.00 /100 WBC    Neutrophils (Absolute) 5.94 1.82 - 7.42 K/uL    Lymphs (Absolute) 2.19 1.00 - 4.80 K/uL    Monos (Absolute) 0.65 0.00 - 0.85 K/uL    Eos (Absolute) 0.08 0.00 - 0.51 K/uL    Baso (Absolute) 0.05 0.00 - 0.12 K/uL    Immature Granulocytes (abs) 0.03 0.00 - 0.11 K/uL    NRBC (Absolute) 0.00 K/uL   COMP METABOLIC PANEL   Result Value Ref Range    Sodium 132 (L) 135 - 145 mmol/L    Potassium 4.3 3.6 - 5.5 mmol/L    Chloride 97 96 - 112 mmol/L    Co2 23 20 - 33 mmol/L    Anion Gap 12.0 7.0 - 16.0    Glucose 208 (H) 65 - 99 mg/dL    Bun 13 8 - 22 mg/dL    Creatinine 0.58 0.50 - 1.40 mg/dL    Calcium 9.1 8.5 - 10.5 mg/dL    AST(SGOT) 34 12 - 45 U/L    ALT(SGPT) 41 2 - 50 U/L    Alkaline Phosphatase 140 (H) 30 - 99 U/L    Total Bilirubin 0.3 0.1 - 1.5 mg/dL    Albumin 4.1 3.2 - 4.9 g/dL    Total Protein 8.1 6.0 - 8.2 g/dL    Globulin 4.0 (H) 1.9 - 3.5 g/dL    A-G Ratio 1.0 g/dL   TROPONIN   Result Value Ref Range    Troponin T 169 (H) 6 - 19 ng/L   aPTT   Result Value Ref Range    APTT 54.4 (H) 24.7 - 36.0 sec   Prothrombin Time   Result Value Ref Range    PT 12.0 12.0 - 14.6 sec    INR 0.86 (L) 0.87 - 1.13   ESTIMATED GFR   Result Value Ref Range    GFR If African American >60 >60 mL/min/1.73 m 2    GFR If Non African American >60 >60 mL/min/1.73 m 2   EKG (NOW)   Result Value Ref Range    Report       [facility] Emergency Dept.    Test Date:  2020-10-25  Pt Name:    CHANDRIKA DAVIDSON             Department: ER  MRN:        [id number]                      Room:        01  Gender:     Male                         Technician: [id number]  :        1980                   Requested By:WALDO GUDINO  Order #:    [id number]                    Reading MD: WALDO GUDINO MD    Measurements  Intervals                                 Axis  Rate:       98                           P:          22  TN:         144                          QRS:        -11  QRSD:       86                           T:          5  QT:         364  QTc:        465    Interpretive Statements  SINUS RHYTHM  LEFT VENTRICULAR HYPERTROPHY  INFERIOR INFARCT, AGE INDETERMINATE  Compared to ECG 08/05/2017 12:17:34  No significant changes  Electronically Signed On 10- 12:45:00 PDT by WALDO GUDINO MD        All labs reviewed by me.       RADIOLOGY  DX-CHEST-PORTABLE (1 VIEW)   Final Result      No acute cardiopulmonary abnormality.      EC-ECHOCARDIOGRAM COMPLETE W/O CONT    (Results Pending)     The radiologist's interpretation of all radiological studies have been reviewed by me.      COURSE & MEDICAL DECISION MAKING  Pertinent Labs & Imaging studies reviewed. (See chart for details)    Differential Diagnoses include but are not limited to: NSTEMI,demand ischemia,muscloskeltal pain    11:18 AM Obtained and reviewed the records from the outlying facility.    11:25 AM Patient seen and examined at bedside. I verified that the patient was wearing a mask and I was wearing appropriate PPE every time I entered the room. The patient's mask was on the patient at all times during my encounter except for a brief view of the oropharynx.    Initial orders in the Emergency Department included stat EKG,CXR and laboratory testing: CBC,CMP,Troponin.  Initial treatment in the Emergency Department included continuation of heparin drip.    11:55 AM Paged Cardiology.    11:59 AM Critical lab result reported: troponin is elevated at 169.    12:10 PM Spoke with Dr. Franco, Cardiology, regarding the patient's presenting symptoms and diagnostic results. She agrees with admission and will evaluate the patient.     12:23 PM Spoke with Dr. Zepeda, Hospitalist, regarding the patient's presenting symptoms and diagnostic results. They will assess the patient for hospitalization.        DISPOSITION  Patient will be hospitalized by Dr. Zepeda, Hospitalist, in stable condition.       DIAGNOSIS  1. NSTEMI (non-ST elevated myocardial infarction) (HCC)           IRosalind (Scribe), am scribing for, and in the presence of, Wilton Mills M.D.    Electronically signed by: Rosalind Gaston (Scribe), 10/25/2020    Wilton ESPINOZA M.D. personally performed the services described in this documentation, as scribed by Rosalind Gaston in my presence, and it is both accurate and complete. C.    The note accurately reflects work and decisions made by me.  Wilton Mills M.D.  10/25/2020  1:26 PM

## 2020-10-25 NOTE — ED NOTES
No assessment changes,  Crackers and water given, updated on  POC.  Denies other needs at this time

## 2020-10-25 NOTE — CONSULTS
"Cardiology Initial Consultation    Date of Service  10/25/2020    Referring Physician  Wilton Mills M.D.    Reason for Consultation  NSTEMI    History of Presenting Illness  Kyle Gonzalez is a 40 y.o. male with premature coronary disease who was transferred from outside hospital for management of an NSTEMI.    Patient's first PCI was in 2013 to the RCA.  In 2014 he underwent PCI to the circumflex.  Had mild in-stent restenosis in the RCA at that time.  In 2017 he underwent PCI to his OM.    He reports being incarcerated for a year and was released in December 2019.  Since then he has been off of all medications except aspirin partly because of insurance issues but also partly because he thought that they were \"not working\" for him.  He was in his usual state of health till about 2 weeks ago when he started noticing chest discomfort with walking that would resolve within a few minutes with resting.  This morning he woke up at 4 AM with severe substernal chest pressure/burning pain radiating to his jaw associated with dyspnea and diaphoresis but no nausea.  His symptoms continue to progress and he decided to call 911.  He received aspirin and nitroglycerin which helped with his symptoms and after receiving Norco he has been chest pain-free.  He reports that prior to his PCI he had similar symptoms however they were more severe today than they have been in the past.    He denies any heart failure symptoms.    Review of Systems  Review of Systems   Constitutional: Negative for malaise/fatigue.   Respiratory: Negative for shortness of breath.    Cardiovascular: Positive for chest pain. Negative for palpitations, orthopnea, leg swelling and PND.   Gastrointestinal: Negative for abdominal pain.   Musculoskeletal: Negative for falls.   Neurological: Negative for dizziness and loss of consciousness.   Psychiatric/Behavioral: Negative for depression.   All other systems reviewed and are negative.      Past Medical " History  Coronary artery disease  Hypertension  Hyperlipidemia  Diabetes    Surgical History  Status post PCI as above    Family History  Multiple family members have premature coronary artery disease.  Mother  in her early 40s of cardiac complications.    Social History  He smokes a pack a day and has been smoking since the age of 13 or 14.  He drinks a 12 pack of beer a day.  Used to smoke and snort methamphetamines and cocaine but has not done so in about 8 years.    Home Medications   Prior to Admission Medications   Prescriptions Last Dose Informant Patient Reported? Taking?   aspirin (ASA) 81 MG Chew Tab chewable tablet 10/25/2020 at [facility] Family Member Yes No   Sig: Take 486 mg by mouth Once.      Facility-Administered Medications: None       Inpatient Medications  • heparin  0-30 Units/kg/hr (Adjusted) Continuous   • heparin  2,000 Units PRN       Allergies  No Known Allergies    Vital signs in last 24 hours  Temp:  [36.8 °C (98.3 °F)] 36.8 °C (98.3 °F)  Pulse:  [94] 94  Resp:  [18] 18  BP: (134)/(89) 134/89  SpO2:  [95 %] 95 %    Physical Exam  Physical Exam   Constitutional: He is oriented to person, place, and time and well-developed, well-nourished, and in no distress. No distress.   HENT:   Head: Normocephalic and atraumatic.   Eyes: Conjunctivae are normal. No scleral icterus.   Neck: Normal range of motion. Neck supple. No JVD present.   Cardiovascular: Normal rate, regular rhythm and intact distal pulses. Exam reveals no gallop and no friction rub.   No murmur heard.  Pulmonary/Chest: Effort normal and breath sounds normal. No respiratory distress. He has no wheezes. He has no rales. He exhibits no tenderness.   Abdominal: Soft. Bowel sounds are normal. He exhibits no distension. There is no abdominal tenderness.   Musculoskeletal: Normal range of motion.         General: No edema.   Neurological: He is alert and oriented to person, place, and time.   Skin: Skin is warm and dry. No rash noted. He  is not diaphoretic.   Psychiatric: Mood, affect and judgment normal.   Nursing note and vitals reviewed.      Lab Review  Recent Labs     10/25/20  1115   WBC 8.9   RBC 5.27   HEMOGLOBIN 16.6   HEMATOCRIT 48.9   PLATELETCT 323   MCV 92.8   MPV 9.7     Recent Labs     10/25/20  1115   SODIUM 132*   POTASSIUM 4.3   CHLORIDE 97   CO2 23   GLUCOSE 208*   BUN 13   CREATININE 0.58      Lab Results   Component Value Date/Time    TROPONINI 4.37 (H) 08/05/2017 07:50 PM    TROPONINT 169 (H) 10/25/2020 11:15 AM       Lab Results   Component Value Date/Time    ASTSGOT 34 10/25/2020 11:15 AM    ALTSGPT 41 10/25/2020 11:15 AM     Lab Results   Component Value Date/Time    CHOLSTRLTOT 340 (H) 08/05/2017 03:25 AM     (H) 08/05/2017 03:25 AM    HDL 31 (A) 08/05/2017 03:25 AM    TRIGLYCERIDE 215 (H) 08/05/2017 03:25 AM       Labs reviewed as noted above.  Normal hemoglobin and creatinine elevated troponin.    Cardiac Imaging and Procedures Review  EKG performed today at 11:10 AM was personally reviewed and per my interpretation shows sinus rhythm in the 90s.  Left ventricular hypertrophy.  Inferior Q waves.    Echocardiogram performed today was personally reviewed and per my interpretation showed severe cardiomyopathy.     Assessment/Plan    NSTEMI:  Known premature coronary disease:  Cardiomyopathy:    Patient is currently chest pain free.  Echocardiogram performed today shows severe cardiomyopathy which is a new diagnosis for the patient.  He will be referred for a coronary angiogram tomorrow for ischemic evaluation.   N.p.o. after midnight.  He does not appear severely fluid overloaded.  We will give 1 dose of Lasix 20 mg IV.  Reassess fluid status tomorrow.  BNP ordered.  Start aspirin and statin.  Agree with heparin drip.  We will start losartan 12.5 mg once daily for afterload reduction.  Holding off on beta-blockers for now as his tachycardia is likely physiologic.  Holding off on Aldactone for now as well.  Need to  first maximize afterload reduction and beta-blockers.    Thank you for allowing me to participate in the care of this patient. Please do not hesitate to contact me with any questions.    Shira Franco MD, [facility]  Cardiologist  [facility]

## 2020-10-25 NOTE — PROGRESS NOTES
[facility] TRIAGE [facility]ICER ER REPORT  Consult/Admission requested by: Dr Mills  Chief complaint: chest pain  Pertinent history/ER Course: Mr. Gonzalez has a hx of multiple prior cardiac stents that presented to [facility] with CP and troponin of 1.4 (which would be 1,400 by our new troponin). Reportedly he has not been taking his meds per Dr. Mills. He is on a heparin drip. Cardiology has been consulted.  Code Status: full until addressed by admission team  Patient meets admission criterion: Yes..  Recommendations given or work up & consultations requested per triage officer: none  Consultants involved and pertinent input from consultants: Cardiology has been consulted.  Admission status: To be reviewed by case management and admitting physician.   Admission order placed: To be placed by admitting physician.   Floor requested: will require tele  Assigned hospitalist: Dr. Burden [facility] senior resident has graciously agreed to admit.

## 2020-10-25 NOTE — H&P
"History & Physical Note    Date of Admission: 10/25/2020  Admission Status: Inpatient  [facility] Team: [facility]ellow [facility]  Attending: JOE West  Senior Resident: Dr. House  Contact Number: [phone]    Chief Complaint: chest pain     History of Present Illness (HPI):   Kyle is a 40 y.o. male:  With PMH tobacco abuse, alcohol use disorder, CAD s/p 3 bare metal stents placements ( 2013, 2014 and 2017) , strong family history of heart diseases who presented on 10/25/2020 by care flight for acute chest pain.  Patient reports that early this morning around 3:34 AM he woke up from sleep due to severe substernal chest pain which was pressure-like.  He took 1 pill of aspirin and had a can of beer ( states \" it thins the blood \".) . He started to get short of breath, diaphoretic and the pain was radiating to his left jaw and left arm which prompted him to call 911.  While in the EMS his chest pain resolved after taking another pill of aspirin and norco .He was last taken to [facility].  Was found to have elevated troponin.Patient was started on heparin drip and transferred to [facility] for further management of NSTEMI .   Patient reports that he had his first chest pain in 2013 when he had CAD and stent placed.  Last time he was admitted in hospital for chest pain was in 2017 when he had another PCI and stent placement.  He was sent on statin, beta-blockers, ACE inhibitors, aspirin and Plavix but he has been noncompliant with his medications.  Reports taking his medications when he was in senior care but since he has been released in last December 2019 he has not been taking any of his medications except aspirin.  He reports having intermittent chest discomfort and shortness of breath for last couple of weeks which was associated with exertion and relieved with rest.      Review of Systems:  Review of Systems   Constitutional: Positive for malaise/fatigue. Negative for chills, diaphoresis, fever and weight loss. "   HENT: Negative for congestion, hearing loss and sore throat.    Eyes: Negative for blurred vision, double vision, photophobia and pain.   Respiratory: Negative for cough, hemoptysis, sputum production, shortness of breath and stridor.         SOB resolved now   Cardiovascular: Positive for chest pain. Negative for palpitations, orthopnea, claudication, leg swelling and PND.   Gastrointestinal: Positive for blood in stool. Negative for abdominal pain, constipation, diarrhea, heartburn, melena, nausea and vomiting.   Genitourinary: Negative for dysuria, flank pain, frequency, hematuria and urgency.   Musculoskeletal: Negative for back pain, falls, joint pain, myalgias and neck pain.   Skin: Negative for itching and rash.   Neurological: Negative for dizziness, tingling, tremors, sensory change, speech change, focal weakness, seizures, loss of consciousness, weakness and headaches.   Psychiatric/Behavioral: Negative for depression, hallucinations, substance abuse and suicidal ideas. The patient does not have insomnia.        .  Past Medical History:   Past Medical History was reviewed with patient.   has a past medical history of Diabetes ([facility]), Heart attack ([facility]), Hyperlipidemia, Hypertension, and Medical non-compliance (9/13/2015).    Past Surgical History: Past Surgical History was reviewed with patient.   has a past surgical history that includes pr transcath stent init vessel,open.    Medications: Medications have been reviewed with patient.  Prior to Admission Medications   Prescriptions Last Dose Informant Patient Reported? Taking?   aspirin (ASA) 81 MG Chew Tab chewable tablet 10/25/2020 at ONCE Family Member Yes No   Sig: Take 486 mg by mouth Once.      Facility-Administered Medications: None        Allergies: Allergies have been reviewed with patient.  No Known Allergies    Family History:  family history includes Diabetes in his mother; Heart Attack in his father and mother.     Social History:   Tobacco:  1 PPD , > 25 pack years  Alcohol: 6-12 cans of beer daily   Recreational drugs (illegal and prescription):  None ( remote h/o meth,cocaine and weed use - no IVDU)  Employment: previously    Activity Level: sedentary  Living situation: lives alone   Recent travel:  none  Primary Care Provider: reviewed Pcp Pt States None  Physical Exam:   Vitals:  Temp:  [36.8 °C (98.3 °F)] 36.8 °C (98.3 °F)  Pulse:  [84-94] 89  Resp:  [18] 18  BP: (117-134)/(56-89) 122/59  SpO2:  [93 %-96 %] 95 %    Physical Exam  Constitutional:       General: He is not in acute distress.     Appearance: Normal appearance. He is obese. He is not ill-appearing, toxic-appearing or diaphoretic.   HENT:      Head: Normocephalic and atraumatic.      Right Ear: External ear normal.      Left Ear: External ear normal.      Nose: Nose normal. No congestion or rhinorrhea.      Mouth/Throat:      Mouth: Mucous membranes are moist.      Pharynx: Oropharynx is clear. No oropharyngeal exudate or posterior oropharyngeal erythema.   Eyes:      General: No scleral icterus.     Extraocular Movements: Extraocular movements intact.      Conjunctiva/sclera: Conjunctivae normal.      Pupils: Pupils are equal, round, and reactive to light.   Neck:      Musculoskeletal: Normal range of motion and neck supple.      Vascular: No carotid bruit.      Comments: No JVD  Cardiovascular:      Rate and Rhythm: Normal rate and regular rhythm.      Pulses: Normal pulses.      Heart sounds: Normal heart sounds. No murmur.   Pulmonary:      Effort: Pulmonary effort is normal. No respiratory distress.      Breath sounds: Normal breath sounds. No wheezing or rales.   Abdominal:      General: Bowel sounds are normal. There is no distension.      Palpations: Abdomen is soft. There is no mass.      Tenderness: There is no abdominal tenderness. There is no right CVA tenderness, left CVA tenderness or guarding.      Hernia: No hernia is present.   Musculoskeletal: Normal  range of motion.      Right lower leg: No edema.      Left lower leg: No edema.   Skin:     General: Skin is warm.      Capillary Refill: Capillary refill takes less than 2 seconds.      Coloration: Skin is not jaundiced or pale.      Findings: No bruising or erythema.   Neurological:      General: No focal deficit present.      Mental Status: He is alert and oriented to person, place, and time.      Cranial Nerves: No cranial nerve deficit.      Sensory: No sensory deficit.      Motor: No weakness.      Coordination: Coordination normal.   Psychiatric:         Mood and Affect: Mood normal.         Behavior: Behavior normal.         Labs:     Recent Labs     10/25/20  1115   WBC 8.9   RBC 5.27   HEMOGLOBIN 16.6   HEMATOCRIT 48.9   MCV 92.8   MCH 31.5   RDW 44.3   PLATELETCT 323   MPV 9.7   NEUTSPOLYS 66.40   LYMPHOCYTES 24.50   MONOCYTES 7.30   EOSINOPHILS 0.90   BASOPHILS 0.60     Recent Labs     10/25/20  1115   SODIUM 132*   POTASSIUM 4.3   CHLORIDE 97   CO2 23   GLUCOSE 208*   BUN 13     Recent Labs     10/25/20  1115   ALBUMIN 4.1   TBILIRUBIN 0.3   ALKPHOSPHAT 140*   TOTPROTEIN 8.1   ALTSGPT 41   ASTSGOT 34   CREATININE 0.58       EKG:    No ST elevation or depression     Results for orders placed or performed during the hospital encounter of 10/25/20   EKG (NOW)   Result Value Ref Range    Report       [facility] Emergency Dept.    Test Date:  2020-10-25  Pt Name:    CHANDRIKA DAVIDSON             Department: ER  MRN:        [id number]                      Room:       [facility]  Gender:     Male                         Technician: [id number]  :        1980                   Requested By:WALDO GUDINO  Order #:    [id number]                    Reading MD: WALDO GUDINO MD    Measurements  Intervals                                Axis  Rate:       98                           P:          22  VA:         144                          QRS:        -11  QRSD:       86                           T:           5  QT:         364  QTc:        465    Interpretive Statements  SINUS RHYTHM  LEFT VENTRICULAR HYPERTROPHY  INFERIOR INFARCT, AGE INDETERMINATE  Compared to ECG 08/05/2017 12:17:34  No significant changes  Electronically Signed On 10- 12:45:00 PDT by WALDO GUDINO MD         Imaging:   EC-ECHOCARDIOGRAM COMPLETE W/ CONT   Final Result      DX-CHEST-PORTABLE (1 VIEW)   Final Result      No acute cardiopulmonary abnormality.        Echo : Showed severe cardiomyopathy . HFrEF 20% . Severely reduced LV function. Grade I diastolic dysfunction.    Previous Data Review: reviewed    Problem Representation:     * NSTEMI (non-ST elevated myocardial infarction) (HCC)- (present on admission)  Assessment & Plan  40 y.o with history of multiple CAD s/p stent had a chest pain.   Pain resolved when he presented to [facility].   Troponin elevated but no ST changes in EKG.   Echocardiogram performed showed severely reduced left ventricular systolic function with EF of 20%.  Cardiology was consulted in the ED  Plan:  Admit to telemetry floor   Continue to trend troponin  EKG prn for chest pain  Start IV heparin drip  Aspirin and statin added   Cardiology planning coronary angiogram tomorrow for ischemic evaluation.    N.p.o. after midnight.      Heart failure with reduced ejection fraction (HCC)- (present on admission)  Assessment & Plan  Patient with remote history of meth , cocaine, marijuana use , CAD with multiple episodes of chest pain s/p stents.  presented for chest pain evaluation.   Echo done today 10/25/2020 which showed severe cardiomyopathy. Reduced EF of 20% ( 2017 EF 50%)  Plan:   telemetry   Cardiology on board   Undergoing angiogram tomorrow  On physical exam does not look fluid overloaded  1 time dose of Lasix 20 mg IV ordered by cardiology . Reassess fluid status tomorrow.    BNP pending.  Started on  losartan 12.5 mg once daily for afterload reduction.  Holding off on beta-blockers for now as his  tachycardia is likely physiologic.  Restarted statin therapy   counseling provided for medication compliance     Tobacco use- (present on admission)  Assessment & Plan  1 PPD - active smoker   More than 25 pack years ( smoking since age 14 )  Plan:  Counseling for smoking cessation   Nicotine patches     Essential hypertension- (present on admission)  Assessment & Plan  Stable  BP   Will start on low dose losartan 12.5 mg for afterload reduction     Medical non-compliance- (present on admission)  Assessment & Plan  counseling provided   Recommended  and heart failure clinic consult     Hyperlipemia- (present on admission)  Assessment & Plan  History of hypercholesterolemia with elevated total cholesterol (> 300) , Elev LDL > 200 and TG   Supposed to be taking statin but non compliant with medications   Plan:  Start on high intensity statin   Lipid panel pending     Multiple vessel coronary artery disease- (present on admission)  Assessment & Plan  Non compliance with medications   Not taking any cardiac meds for more than 1 year  H/o stents placement in multiple Coronary arteries ( RCA, OM and circumflex)    Plan:  Start on statin, aspirin   currnetly on IV heparin drip   Would need Plavix on discharge         Bloody stools  Assessment & Plan  Intermittent episode of blood in stool - streaks of fresh red blood noticed in stools   Currently no blood in stools as per patient   Plan:  Will do occult blood in stool   Most likely not an acute problem   Possible h/o fissures vs hemorrhoids

## 2020-10-25 NOTE — ED NOTES
Med Rec complete per phone interview with pt and pt's family  Allergies Reviewed  No ABX in the last 14 days    Pt took once dose of ASPRIN  =486 mg (81 mg X 6 tablets) this morning.  Pt reports pt has not been taking any RX's for more than 1 year.

## 2020-10-25 NOTE — ED NOTES
tech from Lab called with critical result of trop 169 at 1159. Critical lab result read back to tech.   Dr. Mills notified of critical lab result at 1159.  Critical lab result read back by Dr. Mills.

## 2020-10-26 ENCOUNTER — HOSPITAL ENCOUNTER (OUTPATIENT)
Dept: RADIOLOGY | Facility: MEDICAL CENTER | Age: 40
End: 2020-10-26

## 2020-10-26 ENCOUNTER — APPOINTMENT (OUTPATIENT)
Dept: CARDIOLOGY | Facility: MEDICAL CENTER | Age: 40
DRG: 246 | End: 2020-10-26
Attending: PHYSICIAN ASSISTANT

## 2020-10-26 ENCOUNTER — PATIENT OUTREACH (OUTPATIENT)
Dept: HEALTH INFORMATION MANAGEMENT | Facility: OTHER | Age: 40
End: 2020-10-26

## 2020-10-26 LAB
ACT BLD: 175 SEC (ref 74–137)
ACT BLD: 340 SEC (ref 74–137)
ANION GAP SERPL CALC-SCNC: 12 MMOL/L (ref 7–16)
ANION GAP SERPL CALC-SCNC: 14 MMOL/L (ref 7–16)
APTT PPP: 42 SEC (ref 24.7–36)
BASOPHILS # BLD AUTO: 0.5 % (ref 0–1.8)
BASOPHILS # BLD: 0.04 K/UL (ref 0–0.12)
BUN SERPL-MCNC: 16 MG/DL (ref 8–22)
BUN SERPL-MCNC: 17 MG/DL (ref 8–22)
CALCIUM SERPL-MCNC: 9.4 MG/DL (ref 8.5–10.5)
CALCIUM SERPL-MCNC: 9.5 MG/DL (ref 8.5–10.5)
CHLORIDE SERPL-SCNC: 93 MMOL/L (ref 96–112)
CHLORIDE SERPL-SCNC: 96 MMOL/L (ref 96–112)
CHOLEST SERPL-MCNC: 479 MG/DL (ref 100–199)
CO2 SERPL-SCNC: 24 MMOL/L (ref 20–33)
CO2 SERPL-SCNC: 24 MMOL/L (ref 20–33)
CREAT SERPL-MCNC: 0.59 MG/DL (ref 0.5–1.4)
CREAT SERPL-MCNC: 0.64 MG/DL (ref 0.5–1.4)
EOSINOPHIL # BLD AUTO: 0.04 K/UL (ref 0–0.51)
EOSINOPHIL NFR BLD: 0.5 % (ref 0–6.9)
ERYTHROCYTE [DISTWIDTH] IN BLOOD BY AUTOMATED COUNT: 45.1 FL (ref 35.9–50)
ERYTHROCYTE [DISTWIDTH] IN BLOOD BY AUTOMATED COUNT: 45.2 FL (ref 35.9–50)
GLUCOSE SERPL-MCNC: 251 MG/DL (ref 65–99)
GLUCOSE SERPL-MCNC: 274 MG/DL (ref 65–99)
HCT VFR BLD AUTO: 48.4 % (ref 42–52)
HCT VFR BLD AUTO: 49.6 % (ref 42–52)
HDLC SERPL-MCNC: 46 MG/DL
HGB BLD-MCNC: 16.3 G/DL (ref 14–18)
HGB BLD-MCNC: 16.6 G/DL (ref 14–18)
IMM GRANULOCYTES # BLD AUTO: 0.04 K/UL (ref 0–0.11)
IMM GRANULOCYTES NFR BLD AUTO: 0.5 % (ref 0–0.9)
LDLC SERPL CALC-MCNC: ABNORMAL MG/DL
LYMPHOCYTES # BLD AUTO: 2.2 K/UL (ref 1–4.8)
LYMPHOCYTES NFR BLD: 27.4 % (ref 22–41)
MAGNESIUM SERPL-MCNC: 1.9 MG/DL (ref 1.5–2.5)
MCH RBC QN AUTO: 31.3 PG (ref 27–33)
MCH RBC QN AUTO: 31.5 PG (ref 27–33)
MCHC RBC AUTO-ENTMCNC: 33.5 G/DL (ref 33.7–35.3)
MCHC RBC AUTO-ENTMCNC: 33.7 G/DL (ref 33.7–35.3)
MCV RBC AUTO: 93.4 FL (ref 81.4–97.8)
MCV RBC AUTO: 93.4 FL (ref 81.4–97.8)
MONOCYTES # BLD AUTO: 0.57 K/UL (ref 0–0.85)
MONOCYTES NFR BLD AUTO: 7.1 % (ref 0–13.4)
NEUTROPHILS # BLD AUTO: 5.14 K/UL (ref 1.82–7.42)
NEUTROPHILS NFR BLD: 64 % (ref 44–72)
NRBC # BLD AUTO: 0 K/UL
NRBC BLD-RTO: 0 /100 WBC
PLATELET # BLD AUTO: 299 K/UL (ref 164–446)
PLATELET # BLD AUTO: 326 K/UL (ref 164–446)
PMV BLD AUTO: 9.7 FL (ref 9–12.9)
PMV BLD AUTO: 9.9 FL (ref 9–12.9)
POTASSIUM SERPL-SCNC: 3.8 MMOL/L (ref 3.6–5.5)
POTASSIUM SERPL-SCNC: 3.9 MMOL/L (ref 3.6–5.5)
RBC # BLD AUTO: 5.18 M/UL (ref 4.7–6.1)
RBC # BLD AUTO: 5.31 M/UL (ref 4.7–6.1)
SODIUM SERPL-SCNC: 129 MMOL/L (ref 135–145)
SODIUM SERPL-SCNC: 134 MMOL/L (ref 135–145)
TRIGL SERPL-MCNC: 819 MG/DL (ref 0–149)
TROPONIN T SERPL-MCNC: 114 NG/L (ref 6–19)
UFH PPP CHRO-ACNC: 0.1 IU/ML
UFH PPP CHRO-ACNC: 0.77 IU/ML
UFH PPP CHRO-ACNC: <0.1 IU/ML
UFH PPP CHRO-ACNC: <0.1 IU/ML
WBC # BLD AUTO: 7.4 K/UL (ref 4.8–10.8)
WBC # BLD AUTO: 8 K/UL (ref 4.8–10.8)

## 2020-10-26 PROCEDURE — 99152 MOD SED SAME PHYS/QHP 5/>YRS: CPT | Performed by: INTERNAL MEDICINE

## 2020-10-26 PROCEDURE — 700102 HCHG RX REV CODE 250 W/ 637 OVERRIDE(OP): Performed by: STUDENT IN AN ORGANIZED HEALTH CARE EDUCATION/TRAINING PROGRAM

## 2020-10-26 PROCEDURE — 700111 HCHG RX REV CODE 636 W/ 250 OVERRIDE (IP)

## 2020-10-26 PROCEDURE — 700102 HCHG RX REV CODE 250 W/ 637 OVERRIDE(OP): Performed by: PHYSICIAN ASSISTANT

## 2020-10-26 PROCEDURE — 85520 HEPARIN ASSAY: CPT

## 2020-10-26 PROCEDURE — 92928 PRQ TCAT PLMT NTRAC ST 1 LES: CPT | Mod: LD | Performed by: INTERNAL MEDICINE

## 2020-10-26 PROCEDURE — 4A023N7 MEASUREMENT OF CARDIAC SAMPLING AND PRESSURE, LEFT HEART, PERCUTANEOUS APPROACH: ICD-10-PCS | Performed by: INTERNAL MEDICINE

## 2020-10-26 PROCEDURE — 99223 1ST HOSP IP/OBS HIGH 75: CPT | Mod: GC | Performed by: INTERNAL MEDICINE

## 2020-10-26 PROCEDURE — 84484 ASSAY OF TROPONIN QUANT: CPT

## 2020-10-26 PROCEDURE — A9270 NON-COVERED ITEM OR SERVICE: HCPCS | Performed by: STUDENT IN AN ORGANIZED HEALTH CARE EDUCATION/TRAINING PROGRAM

## 2020-10-26 PROCEDURE — A9270 NON-COVERED ITEM OR SERVICE: HCPCS | Performed by: INTERNAL MEDICINE

## 2020-10-26 PROCEDURE — 700102 HCHG RX REV CODE 250 W/ 637 OVERRIDE(OP)

## 2020-10-26 PROCEDURE — C1887 CATHETER, GUIDING: HCPCS

## 2020-10-26 PROCEDURE — 85027 COMPLETE CBC AUTOMATED: CPT

## 2020-10-26 PROCEDURE — 80061 LIPID PANEL: CPT

## 2020-10-26 PROCEDURE — 93005 ELECTROCARDIOGRAM TRACING: CPT | Performed by: INTERNAL MEDICINE

## 2020-10-26 PROCEDURE — 93571 IV DOP VEL&/PRESS C FLO 1ST: CPT | Mod: 26,52,LD | Performed by: INTERNAL MEDICINE

## 2020-10-26 PROCEDURE — A9270 NON-COVERED ITEM OR SERVICE: HCPCS

## 2020-10-26 PROCEDURE — 83735 ASSAY OF MAGNESIUM: CPT

## 2020-10-26 PROCEDURE — A9270 NON-COVERED ITEM OR SERVICE: HCPCS | Performed by: PHYSICIAN ASSISTANT

## 2020-10-26 PROCEDURE — 027034Z DILATION OF CORONARY ARTERY, ONE ARTERY WITH DRUG-ELUTING INTRALUMINAL DEVICE, PERCUTANEOUS APPROACH: ICD-10-PCS | Performed by: INTERNAL MEDICINE

## 2020-10-26 PROCEDURE — 700101 HCHG RX REV CODE 250

## 2020-10-26 PROCEDURE — 700117 HCHG RX CONTRAST REV CODE 255: Performed by: INTERNAL MEDICINE

## 2020-10-26 PROCEDURE — B2111ZZ FLUOROSCOPY OF MULTIPLE CORONARY ARTERIES USING LOW OSMOLAR CONTRAST: ICD-10-PCS | Performed by: INTERNAL MEDICINE

## 2020-10-26 PROCEDURE — 4A033BC MEASUREMENT OF ARTERIAL PRESSURE, CORONARY, PERCUTANEOUS APPROACH: ICD-10-PCS | Performed by: INTERNAL MEDICINE

## 2020-10-26 PROCEDURE — 700111 HCHG RX REV CODE 636 W/ 250 OVERRIDE (IP): Performed by: EMERGENCY MEDICINE

## 2020-10-26 PROCEDURE — 85025 COMPLETE CBC W/AUTO DIFF WBC: CPT

## 2020-10-26 PROCEDURE — 85347 COAGULATION TIME ACTIVATED: CPT

## 2020-10-26 PROCEDURE — 99232 SBSQ HOSP IP/OBS MODERATE 35: CPT | Mod: 25 | Performed by: INTERNAL MEDICINE

## 2020-10-26 PROCEDURE — 36415 COLL VENOUS BLD VENIPUNCTURE: CPT

## 2020-10-26 PROCEDURE — 93458 L HRT ARTERY/VENTRICLE ANGIO: CPT | Mod: 26,59 | Performed by: INTERNAL MEDICINE

## 2020-10-26 PROCEDURE — B2151ZZ FLUOROSCOPY OF LEFT HEART USING LOW OSMOLAR CONTRAST: ICD-10-PCS | Performed by: INTERNAL MEDICINE

## 2020-10-26 PROCEDURE — 85730 THROMBOPLASTIN TIME PARTIAL: CPT

## 2020-10-26 PROCEDURE — 770020 HCHG ROOM/CARE - TELE (206)

## 2020-10-26 PROCEDURE — 700102 HCHG RX REV CODE 250 W/ 637 OVERRIDE(OP): Performed by: INTERNAL MEDICINE

## 2020-10-26 PROCEDURE — 80048 BASIC METABOLIC PNL TOTAL CA: CPT

## 2020-10-26 RX ORDER — MAGNESIUM SULFATE HEPTAHYDRATE 40 MG/ML
4 INJECTION, SOLUTION INTRAVENOUS ONCE
Status: DISCONTINUED | OUTPATIENT
Start: 2020-10-26 | End: 2020-10-26

## 2020-10-26 RX ORDER — MIDAZOLAM HYDROCHLORIDE 1 MG/ML
INJECTION INTRAMUSCULAR; INTRAVENOUS
Status: COMPLETED
Start: 2020-10-26 | End: 2020-10-26

## 2020-10-26 RX ORDER — HEPARIN SODIUM 200 [USP'U]/100ML
INJECTION, SOLUTION INTRAVENOUS
Status: DISCONTINUED
Start: 2020-10-26 | End: 2020-10-26

## 2020-10-26 RX ORDER — METOPROLOL SUCCINATE 25 MG/1
25 TABLET, EXTENDED RELEASE ORAL EVERY EVENING
Status: DISCONTINUED | OUTPATIENT
Start: 2020-10-26 | End: 2020-10-27 | Stop reason: HOSPADM

## 2020-10-26 RX ORDER — VERAPAMIL HYDROCHLORIDE 2.5 MG/ML
INJECTION, SOLUTION INTRAVENOUS
Status: COMPLETED
Start: 2020-10-26 | End: 2020-10-26

## 2020-10-26 RX ORDER — HEPARIN SODIUM,PORCINE 1000/ML
VIAL (ML) INJECTION
Status: DISCONTINUED
Start: 2020-10-26 | End: 2020-10-26

## 2020-10-26 RX ORDER — SPIRONOLACTONE 25 MG/1
25 TABLET ORAL
Status: DISCONTINUED | OUTPATIENT
Start: 2020-10-26 | End: 2020-10-27 | Stop reason: HOSPADM

## 2020-10-26 RX ORDER — EZETIMIBE 10 MG/1
10 TABLET ORAL DAILY
Status: DISCONTINUED | OUTPATIENT
Start: 2020-10-26 | End: 2020-10-27 | Stop reason: HOSPADM

## 2020-10-26 RX ORDER — HEPARIN SODIUM,PORCINE 1000/ML
VIAL (ML) INJECTION
Status: COMPLETED
Start: 2020-10-26 | End: 2020-10-26

## 2020-10-26 RX ORDER — POTASSIUM CHLORIDE 20 MEQ/1
20 TABLET, EXTENDED RELEASE ORAL ONCE
Status: COMPLETED | OUTPATIENT
Start: 2020-10-26 | End: 2020-10-26

## 2020-10-26 RX ORDER — HEPARIN SODIUM 200 [USP'U]/100ML
INJECTION, SOLUTION INTRAVENOUS
Status: COMPLETED
Start: 2020-10-26 | End: 2020-10-26

## 2020-10-26 RX ORDER — LIDOCAINE HYDROCHLORIDE 20 MG/ML
INJECTION, SOLUTION INFILTRATION; PERINEURAL
Status: COMPLETED
Start: 2020-10-26 | End: 2020-10-26

## 2020-10-26 RX ADMIN — METOPROLOL SUCCINATE 25 MG: 25 TABLET, EXTENDED RELEASE ORAL at 17:10

## 2020-10-26 RX ADMIN — FENTANYL CITRATE 100 MCG: 50 INJECTION, SOLUTION INTRAMUSCULAR; INTRAVENOUS at 15:34

## 2020-10-26 RX ADMIN — EZETIMIBE 10 MG: 10 TABLET ORAL at 13:40

## 2020-10-26 RX ADMIN — LOSARTAN POTASSIUM 12.5 MG: 25 TABLET, FILM COATED ORAL at 05:43

## 2020-10-26 RX ADMIN — MIDAZOLAM HYDROCHLORIDE 2 MG: 1 INJECTION, SOLUTION INTRAMUSCULAR; INTRAVENOUS at 15:34

## 2020-10-26 RX ADMIN — HEPARIN SODIUM 2000 UNITS: 200 INJECTION, SOLUTION INTRAVENOUS at 14:50

## 2020-10-26 RX ADMIN — DOCUSATE SODIUM 50 MG AND SENNOSIDES 8.6 MG 2 TABLET: 8.6; 5 TABLET, FILM COATED ORAL at 17:11

## 2020-10-26 RX ADMIN — FENTANYL CITRATE 100 MCG: 50 INJECTION, SOLUTION INTRAMUSCULAR; INTRAVENOUS at 14:55

## 2020-10-26 RX ADMIN — LIDOCAINE HYDROCHLORIDE: 20 INJECTION, SOLUTION INFILTRATION; PERINEURAL at 14:50

## 2020-10-26 RX ADMIN — TICAGRELOR 180 MG: 90 TABLET ORAL at 15:40

## 2020-10-26 RX ADMIN — NITROGLYCERIN 10 ML: 20 INJECTION INTRAVENOUS at 14:50

## 2020-10-26 RX ADMIN — ROSUVASTATIN CALCIUM 40 MG: 20 TABLET, FILM COATED ORAL at 17:10

## 2020-10-26 RX ADMIN — MIDAZOLAM HYDROCHLORIDE 2 MG: 1 INJECTION, SOLUTION INTRAMUSCULAR; INTRAVENOUS at 14:55

## 2020-10-26 RX ADMIN — POTASSIUM CHLORIDE 20 MEQ: 1500 TABLET, EXTENDED RELEASE ORAL at 13:40

## 2020-10-26 RX ADMIN — ASPIRIN 81 MG: 81 TABLET, COATED ORAL at 05:43

## 2020-10-26 RX ADMIN — HEPARIN SODIUM 2000 UNITS: 1000 INJECTION INTRAVENOUS; SUBCUTANEOUS at 02:43

## 2020-10-26 RX ADMIN — IOHEXOL 190 ML: 350 INJECTION, SOLUTION INTRAVENOUS at 15:40

## 2020-10-26 RX ADMIN — SPIRONOLACTONE 25 MG: 25 TABLET ORAL at 10:52

## 2020-10-26 RX ADMIN — NICOTINE TRANSDERMAL SYSTEM 21 MG: 21 PATCH, EXTENDED RELEASE TRANSDERMAL at 05:44

## 2020-10-26 RX ADMIN — VERAPAMIL HYDROCHLORIDE 5 MG: 2.5 INJECTION, SOLUTION INTRAVENOUS at 14:50

## 2020-10-26 RX ADMIN — HEPARIN SODIUM: 1000 INJECTION INTRAVENOUS; SUBCUTANEOUS at 15:34

## 2020-10-26 RX ADMIN — HEPARIN SODIUM 20 UNITS/KG/HR: 5000 INJECTION, SOLUTION INTRAVENOUS at 08:32

## 2020-10-26 RX ADMIN — HEPARIN SODIUM 2000 UNITS: 1000 INJECTION INTRAVENOUS; SUBCUTANEOUS at 12:32

## 2020-10-26 ASSESSMENT — ENCOUNTER SYMPTOMS
BRUISES/BLEEDS EASILY: 0
MYALGIAS: 0
WEAKNESS: 0
SHORTNESS OF BREATH: 0
TINGLING: 0
SEIZURES: 0
INSOMNIA: 0
DIAPHORESIS: 0
FLANK PAIN: 0
PHOTOPHOBIA: 0
SENSORY CHANGE: 0
SPUTUM PRODUCTION: 0
BLOOD IN STOOL: 1
CONSTIPATION: 0
BLURRED VISION: 0
EYE PAIN: 0
LOSS OF CONSCIOUSNESS: 0
FOCAL WEAKNESS: 0
STRIDOR: 0
DEPRESSION: 0
NECK PAIN: 0
COUGH: 0
HEMOPTYSIS: 0
SORE THROAT: 0
DIZZINESS: 0
WEIGHT LOSS: 0
DOUBLE VISION: 0
DIARRHEA: 0
PALPITATIONS: 0
FALLS: 0
HEARTBURN: 0
HEADACHES: 0
ABDOMINAL PAIN: 0
TREMORS: 0
CHILLS: 0
CLAUDICATION: 0
CHEST TIGHTNESS: 0
FATIGUE: 0
ORTHOPNEA: 0
VOMITING: 0
ABDOMINAL DISTENTION: 0
BACK PAIN: 0
UNEXPECTED WEIGHT CHANGE: 0
SPEECH CHANGE: 0
HALLUCINATIONS: 0
FEVER: 0
PND: 0
NAUSEA: 0
LIGHT-HEADEDNESS: 0

## 2020-10-26 ASSESSMENT — LIFESTYLE VARIABLES: SUBSTANCE_ABUSE: 0

## 2020-10-26 ASSESSMENT — PAIN DESCRIPTION - PAIN TYPE: TYPE: ACUTE PAIN

## 2020-10-26 NOTE — PROGRESS NOTES
Notified Dr. Hendrix, [facility] yellow team, that patient Heparin Xa is still not therapeutic at <.15. Will wait for orders

## 2020-10-26 NOTE — PROGRESS NOTES
"Daily Progress Note:     Date of Service: 10/26/2020  Primary Team: [facility] Yellow Team   Attending: Dr. Cornelio Payan M.D.   Senior Resident: Dr. Hendrix  Contact:  [phone]        Chief Complaint: chest pain      Subjective:Patient denies chest pain this morning, felt that he had it \"coming on\" and it self-resolved; states he has not exerted himself; no sob, diaphoresis, vomit          Review of Systems   Constitutional: Positive for malaise/fatigue. Negative for chills, diaphoresis, fever and weight loss.   HENT: Negative for congestion, ear pain, hearing loss, sore throat and tinnitus.    Eyes: Negative for blurred vision, double vision, photophobia and pain.   Respiratory: Negative for cough, hemoptysis, sputum production, shortness of breath and stridor.         SOB resolved now   Cardiovascular: Negative for chest pain, palpitations, orthopnea, claudication, leg swelling and PND.   Gastrointestinal: Positive for blood in stool. Negative for abdominal pain, constipation, diarrhea, heartburn, melena, nausea and vomiting.   Genitourinary: Negative for dysuria, flank pain, frequency, hematuria and urgency.   Musculoskeletal: Negative for back pain, falls, joint pain, myalgias and neck pain.   Skin: Negative for itching and rash.   Neurological: Negative for dizziness, tingling, tremors, sensory change, speech change, focal weakness, seizures, loss of consciousness, weakness and headaches.   Endo/Heme/Allergies: Negative for environmental allergies. Does not bruise/bleed easily.   Psychiatric/Behavioral: Negative for depression, hallucinations, substance abuse and suicidal ideas. The patient does not have insomnia.         Disposition/Barriers to discharge:   Clinical improvement    Consultants/Specialty  Cardiology          Physical Exam   Physical Exam  Constitutional:       General: He is not in acute distress.     Appearance: Normal appearance. He is obese. He is not ill-appearing, toxic-appearing or " diaphoretic.   HENT:      Head: Normocephalic and atraumatic.      Right Ear: External ear normal.      Left Ear: External ear normal.      Nose: Nose normal. No congestion or rhinorrhea.      Mouth/Throat:      Mouth: Mucous membranes are moist.      Pharynx: Oropharynx is clear. No oropharyngeal exudate or posterior oropharyngeal erythema.   Eyes:      General: No scleral icterus.     Extraocular Movements: Extraocular movements intact.      Conjunctiva/sclera: Conjunctivae normal.      Pupils: Pupils are equal, round, and reactive to light.   Neck:      Musculoskeletal: Normal range of motion and neck supple.      Vascular: No carotid bruit.      Comments: No JVD  Cardiovascular:      Rate and Rhythm: Normal rate and regular rhythm.      Pulses: Normal pulses.      Heart sounds: Normal heart sounds. No murmur.   Pulmonary:      Effort: Pulmonary effort is normal. No respiratory distress.      Breath sounds: Normal breath sounds. No wheezing or rales.   Abdominal:      General: Bowel sounds are normal. There is no distension.      Palpations: Abdomen is soft. There is no mass.      Tenderness: There is no abdominal tenderness. There is no right CVA tenderness, left CVA tenderness or guarding.      Hernia: No hernia is present.   Musculoskeletal: Normal range of motion.      Right lower leg: No edema.      Left lower leg: No edema.   Skin:     General: Skin is warm.      Capillary Refill: Capillary refill takes less than 2 seconds.      Coloration: Skin is not jaundiced or pale.      Findings: No bruising or erythema.   Neurological:      General: No focal deficit present.      Mental Status: He is alert and oriented to person, place, and time.      Cranial Nerves: No cranial nerve deficit.      Sensory: No sensory deficit.      Motor: No weakness.      Coordination: Coordination normal.   Psychiatric:         Mood and Affect: Mood normal.         Behavior: Behavior normal.       Vitals:    10/25/20 2321 10/26/20  0540 10/26/20 0800 10/26/20 1200   BP: 106/66 122/73 123/80 131/80   Pulse: 86 85 (!) 102 96   Resp: 16 16 16 17   Temp: 36 °C (96.8 °F) 36.3 °C (97.3 °F) 36.3 °C (97.3 °F) 36.8 °C (98.2 °F)   TempSrc: Temporal Temporal Temporal Temporal   SpO2: 95% 95% 95% 93%   Weight:       Height:          Body mass index is 39.91 kg/m².   Pulse Oximetry: 93 %, O2 (LPM): 0, O2 Delivery Device: None - Room Air      Recent Labs     10/25/20  1115 10/26/20  0206 10/26/20  1202   WBC 8.9 7.4 8.0   RBC 5.27 5.18 5.31   HEMOGLOBIN 16.6 16.3 16.6   HEMATOCRIT 48.9 48.4 49.6   MCV 92.8 93.4 93.4   MCH 31.5 31.5 31.3   RDW 44.3 45.1 45.2   PLATELETCT 323 299 326   MPV 9.7 9.7 9.9   NEUTSPOLYS 66.40  --  64.00   LYMPHOCYTES 24.50  --  27.40   MONOCYTES 7.30  --  7.10   EOSINOPHILS 0.90  --  0.50   BASOPHILS 0.60  --  0.50      Recent Labs     10/25/20  1115 10/26/20  0206 10/26/20  1202   SODIUM 132* 129* 134*   POTASSIUM 4.3 3.8 3.9   CHLORIDE 97 93* 96   CO2 23 24 24   GLUCOSE 208* 274* 251*   BUN 13 17 16      OUTSIDE IMAGES-DX CHEST   Final Result      EC-ECHOCARDIOGRAM COMPLETE W/ CONT   Final Result      DX-CHEST-PORTABLE (1 VIEW)   Final Result      No acute cardiopulmonary abnormality.      CL-LEFT HEART CATHETERIZATION WITH POSSIBLE INTERVENTION    (Results Pending)      metoprolol SR, 25 mg, Oral, Q EVENING  spironolactone, 25 mg, Oral, Q DAY  ezetimibe, 10 mg, Oral, DAILY  heparin, , ,   [START ON 10/27/2020] aspirin EC, 81 mg, Oral, DAILY  [START ON 10/27/2020] ticagrelor, 90 mg, Oral, BID  senna-docusate, 2 Tab, Oral, BID  losartan, 12.5 mg, Oral, Q DAY  rosuvastatin, 40 mg, Oral, Q EVENING  nicotine, 21 mg, Transdermal, Daily-0600       Home Medications     Reviewed by Irasema Mercado R.N. (Registered Nurse) on 10/25/20 at 1211  Med List Status: Complete   Medication Last Dose Status   aspirin (ASA) 81 MG Chew Tab chewable tablet 10/25/2020 Active                 Assessment/Plan   * NSTEMI (non-ST elevated myocardial  infarction) (HCC)- (present on admission)  Assessment & Plan  39 y/o male with a PMH of CAD s/p stent presented to ED with chest pain. Troponin was elevated with no ST changes. Echocardiogram severely reduced LVSF with EF of 20%.    Cath was done today 10/25.    Plan:  - start Metoprolol SR this evening  - start [place] 25mg today   -high intensity statin  - losartan 12.5mg    Heart failure with reduced ejection fraction (HCC)- (present on admission)  Assessment & Plan  DDx: ischemic cardiomyopathy vs secondary to etoh    Plan:  -follow cardio recs- losartan 12.5 mg  -Metoprolol SR  -spironolactone 25 mg  -f/u hiv       Tobacco use- (present on admission)  Assessment & Plan  1 PPD - active smoker   More than 25 pack years ( smoking since age 14 )      Plan:  Smoking cessation counseling  Nicotine patches not ideal in setting of NSTEMI    Essential hypertension- (present on admission)  Assessment & Plan  Plan:ARB as noted under HF assessment     Hyperlipemia- (present on admission)  Assessment & Plan  , , LDL erroneous d/t TG per lab read    Plan to start:  High- intensity statin   Zetia        Bloody stools  Assessment & Plan  DDx: h/o fissures vs hemorrhoids, suspect secondary to this as he has noticed this intermittently as in his past    Plan:   -f/u occult test

## 2020-10-26 NOTE — ASSESSMENT & PLAN NOTE
DDx: h/o fissures vs hemorrhoids, suspect secondary to this as he has noticed this intermittently as in his past    Plan:   -f/u occult test

## 2020-10-26 NOTE — CARE PLAN
Problem: Venous Thromboembolism (VTW)/Deep Vein Thrombosis (DVT) Prevention:  Goal: Patient will participate in Venous Thrombosis (VTE)/Deep Vein Thrombosis (DVT)Prevention Measures  Outcome: PROGRESSING AS EXPECTED     Problem: Knowledge Deficit  Goal: Knowledge of disease process/condition, treatment plan, diagnostic tests, and medications will improve  Outcome: PROGRESSING AS EXPECTED  Goal: Knowledge of the prescribed therapeutic regimen will improve  Outcome: PROGRESSING AS EXPECTED     Problem: Fluid Volume:  Goal: Will maintain balanced intake and output  Outcome: PROGRESSING AS EXPECTED

## 2020-10-26 NOTE — HEART FAILURE PROGRAM
Patient has a diagnosis of nSTEMI. Notes say angiogram today. No cath report yet.    Echocardiogram shows EF of 20 %. Patient has been diagnosed with cardiomyopathy, not HF.    Both cardiology and Hospital Medicine are noting that patient is not fluid overloaded. Therefore, he should be educated on monitoring for fluid overload and seeking help immediately but he does not yet have a heart failure diagnosis..     Below are the defect free care measures that must be met should patient continue with an ACS diagnosis after angiogram.     Meds to Beds BEDSIDE NURSING RESPONSIBILITIES:    If not already done, please assess patient for Meds to Beds Opt-In which can be found in the admit navigator. Evidence shows that meds to beds improves medication compliance and patient outcomes.      ACS Measures:    1. ASA prescribed at discharge  2. P2Y12 Inhibitor prescribed at discharge; Please note: for ACS patients who are treated medically without PCI and stenting, DAPT has been demonstrated to reduce recurrent CV events. Source: 2013 ACCF/AHA Guideline for the management of STEMI  3. Beta blockade prescribed at discharge, if patient also has HFrEF (EF less than or equal to 40%), this needs to be one of the three evidence based beta blockers: carvedilol, bisoprolol, Toprol XL  4. High intensity statin prescribed at discharge (atorvastatin 40 mg or rosuvastatin 20 mg)  5. ACE-I or ARB prescribed on discharge for LVEF less than 40%  6. Aldosterone blockade prescribed for patients with EF less than 40% AND history of diabetes mellitus OR history of heart failure, heart failure on presentation or in-hospital event  7. Intensive Cardiac Rehab referral order is placed  8. Use the Acute Coronary Syndrome discharge instructions to document that patient has been provided with the contact information for Intensive Cardiac Rehab  9. Evaluation of LV systolic function can be by angiogram, or echo before discharge, can not be a future plan  for LVSF assessment  10. Daily documentation in education tab of ACS education  11. Smoking cessation documented if indicated       What if any of the above ACS Measures are contraindicated?    · Request that the discharging provider document the medication/intervention and the contraindication specifically in a progress note  · For example: “no ACE-I meds due to hypotension” is not enough. It needs to say: “No ACE-I, ARNI, ARB due to hypotension”; “No Beta Blockade due to bradycardia”…

## 2020-10-26 NOTE — ASSESSMENT & PLAN NOTE
DDx: ischemic cardiomyopathy vs secondary to etoh    Plan:  -follow cardio recs- losartan 12.5 mg  -Metoprolol SR  -spironolactone 25 mg  -f/u hiv

## 2020-10-26 NOTE — ASSESSMENT & PLAN NOTE
39 y/o male with a PMH of CAD s/p stent presented to ED with chest pain. Troponin was elevated with no ST changes. Echocardiogram severely reduced LVSF with EF of 20%.    Cath was done today 10/25.    Plan:  - start Metoprolol SR this evening  - start [place] 25mg today   -high intensity statin  - losartan 12.5mg

## 2020-10-26 NOTE — NON-PROVIDER
Internal Medicine Admitting History and Physical    Note Author: Danna Soto, Student       Name Kyle Gonzalez       1980   Age/Sex 40 y.o. male   MRN [id number]   Code Status FULL     Chief Complaint:  Chest pain with radiation to the L arm.      HPI:  Kyle is a 39 yo male with a PMHx of CAD s/p 3 bare metal stent placements (, , ), tobacco abuse, alcohol use disorder and strong family history of heart disease who presented by care flight for acute chest pain. His chest pain began this morning at 3:34 am. He states he was sleeping at the time when the chest pain woke him up. He describes the pain as a substernal chest pain. He took an aspirin and had a can of beer to try and relieve his symptoms but he said the chest pain got progressively worse. He started feeling short of breath, diaphoretic, and the pain began to radiate to his left jaw and left arm. He called EMS who gave him aspirin and norco and this resolved his pain. [place] [facility] [place] found to have elevated troponin levels. There they began a heparin drip and transferred him to [facility].  Pt states last time he was in the hospital was in 2017 when he was admitted for chest pain and received another PCI and stent placement. He was discharged with a statin, beta-blocker, ACE inhibitor, ASA, and plavix but he said he has only been compliant with the ASA. He states his symptoms of chest pain and discomfort have not recurred since EMS gave him ASA and norco.         ROS              Past Medical History (Chronic medical problem, known complications and current treatment)    DM, Heart attack, Hyperlipidemia, HTN    Past Surgical History:  Past Surgical History:   Procedure Laterality Date   • [place] TRANSCATH STENT INIT VESSEL,OPEN      x4       Current Outpatient Medications:  Home Medications     Reviewed by Irasema Mercado R.N. (Registered Nurse) on 10/25/20 at 1211  Med List Status: Complete   Medication Last Dose  Status   aspirin (ASA) 81 MG Chew Tab chewable tablet 10/25/2020 Active              Meds now:   Losartan 12.5 given yesterday and today   Nicotine patch started today  Rosuvastatin 40 mg yesterday   Heparin received yesterday IV     Medication Allergy/Sensitivities:  No Known Allergies        Family History  Father - Heart attack  Mother - Heart attack.  of stroke at 45       Social History  Living situation- Lives with his two kids and girlfriend  Occupation- previous   Smoking hx- 25 pack year history  EtOH- 6-12 cans of beers daily  recreational/illicit drugs- denies any recent use. Admits to coke and meth use a few years ago.     Physical Exam     Vitals:    10/25/20 1615 10/25/20 1941 10/25/20 2321 10/26/20 0540   BP:  127/79 106/66 122/73   Pulse: 89 94 86 85   Resp:  16 16 16   Temp:  37.1 °C (98.8 °F) 36 °C (96.8 °F) 36.3 °C (97.3 °F)   TempSrc:  Temporal Temporal Temporal   SpO2:  96% 95% 95%   Weight:       Height:         Body mass index is 39.91 kg/m².  O2 therapy: Pulse Oximetry: 95 %, O2 Delivery Device: None - Room Air  Vitals: 97.3   HR: 85  BP: 122/73  RR: 16  Physical Exam  Physical Exam  Constitutional:       General: He is not in acute distress.     Appearance: Normal appearance. He is obese. He is not ill-appearing, toxic-appearing or diaphoretic.   HENT:      Head: Normocephalic and atraumatic.      Right Ear: External ear normal.      Left Ear: External ear normal.      Nose: Nose normal. No congestion or rhinorrhea.      Mouth/Throat:      Mouth: Mucous membranes are moist.      Pharynx: Oropharynx is clear. No oropharyngeal exudate or posterior oropharyngeal erythema.   Eyes:      General: No scleral icterus.     Extraocular Movements: Extraocular movements intact.      Conjunctiva/sclera: Conjunctivae normal.      Pupils: Pupils are equal, round, and reactive to light.   Neck:      Musculoskeletal: Normal range of motion and neck supple.      Vascular: No carotid  bruit.      Comments: No JVD  Cardiovascular:      Rate and Rhythm: Normal rate and regular rhythm.      Pulses: Normal pulses.      Heart sounds: Normal heart sounds. No murmur.   Pulmonary:      Effort: Pulmonary effort is normal. No respiratory distress.      Breath sounds: Normal breath sounds. No wheezing or rales.   Abdominal:      General: Bowel sounds are normal. There is no distension.      Palpations: Abdomen is soft. There is no mass.      Tenderness: There is no abdominal tenderness. There is no right CVA tenderness, left CVA tenderness or guarding.      Hernia: No hernia is present.   Musculoskeletal: Normal range of motion.      Right lower leg: No edema.      Left lower leg: No edema.   Skin:     General: Skin is warm.      Capillary Refill: Capillary refill takes less than 2 seconds.      Coloration: Skin is not jaundiced or pale.      Findings: No bruising or erythema.   Neurological:      General: No focal deficit present.      Mental Status: He is alert and oriented to person, place, and time.      Cranial Nerves: No cranial nerve deficit.      Sensory: No sensory deficit.      Motor: No weakness.      Coordination: Coordination normal.   Psychiatric:         Mood and Affect: Mood normal.         Behavior: Behavior normal.     Data Review     Lab Data Review:  Recent Results (from the past 24 hour(s))   EKG (NOW)    Collection Time: 10/25/20 11:10 AM   Result Value Ref Range    Report       [facility] Emergency Dept.    Test Date:  2020-10-25  Pt Name:    CHANDRIKA DAVIDSON             Department: ER  MRN:        [id number]                      Room:       [facility]  Gender:     Male                         Technician: [id number]  :        1980                   Requested By:WALDO GUDINO  Order #:    [id number]                    Reading MD: WALDO GUDINO MD    Measurements  Intervals                                Axis  Rate:       98                           P:           22  NY:         144                          QRS:        -11  QRSD:       86                           T:          5  QT:         364  QTc:        465    Interpretive Statements  SINUS RHYTHM  LEFT VENTRICULAR HYPERTROPHY  INFERIOR INFARCT, AGE INDETERMINATE  Compared to ECG 08/05/2017 12:17:34  No significant changes  Electronically Signed On 10- 12:45:00 PDT by WALDO GUDINO MD     CBC WITH DIFFERENTIAL    Collection Time: 10/25/20 11:15 AM   Result Value Ref Range    WBC 8.9 4.8 - 10.8 K/uL    RBC 5.27 4.70 - 6.10 M/uL    Hemoglobin 16.6 14.0 - 18.0 g/dL    Hematocrit 48.9 42.0 - 52.0 %    MCV 92.8 81.4 - 97.8 fL    MCH 31.5 27.0 - 33.0 pg    MCHC 33.9 33.7 - 35.3 g/dL    RDW 44.3 35.9 - 50.0 fL    Platelet Count 323 164 - 446 K/uL    MPV 9.7 9.0 - 12.9 fL    Neutrophils-Polys 66.40 44.00 - 72.00 %    Lymphocytes 24.50 22.00 - 41.00 %    Monocytes 7.30 0.00 - 13.40 %    Eosinophils 0.90 0.00 - 6.90 %    Basophils 0.60 0.00 - 1.80 %    Immature Granulocytes 0.30 0.00 - 0.90 %    Nucleated RBC 0.00 /100 WBC    Neutrophils (Absolute) 5.94 1.82 - 7.42 K/uL    Lymphs (Absolute) 2.19 1.00 - 4.80 K/uL    Monos (Absolute) 0.65 0.00 - 0.85 K/uL    Eos (Absolute) 0.08 0.00 - 0.51 K/uL    Baso (Absolute) 0.05 0.00 - 0.12 K/uL    Immature Granulocytes (abs) 0.03 0.00 - 0.11 K/uL    NRBC (Absolute) 0.00 K/uL   COMP METABOLIC PANEL    Collection Time: 10/25/20 11:15 AM   Result Value Ref Range    Sodium 132 (L) 135 - 145 mmol/L    Potassium 4.3 3.6 - 5.5 mmol/L    Chloride 97 96 - 112 mmol/L    Co2 23 20 - 33 mmol/L    Anion Gap 12.0 7.0 - 16.0    Glucose 208 (H) 65 - 99 mg/dL    Bun 13 8 - 22 mg/dL    Creatinine 0.58 0.50 - 1.40 mg/dL    Calcium 9.1 8.5 - 10.5 mg/dL    AST(SGOT) 34 12 - 45 U/L    ALT(SGPT) 41 2 - 50 U/L    Alkaline Phosphatase 140 (H) 30 - 99 U/L    Total Bilirubin 0.3 0.1 - 1.5 mg/dL    Albumin 4.1 3.2 - 4.9 g/dL    Total Protein 8.1 6.0 - 8.2 g/dL    Globulin 4.0 (H) 1.9 - 3.5 g/dL    A-G Ratio 1.0  g/dL   TROPONIN    Collection Time: 10/25/20 11:15 AM   Result Value Ref Range    Troponin T 169 (H) 6 - 19 ng/L   aPTT    Collection Time: 10/25/20 11:15 AM   Result Value Ref Range    APTT 54.4 (H) 24.7 - 36.0 sec   Prothrombin Time    Collection Time: 10/25/20 11:15 AM   Result Value Ref Range    PT 12.0 12.0 - 14.6 sec    INR 0.86 (L) 0.87 - 1.13   ESTIMATED GFR    Collection Time: 10/25/20 11:15 AM   Result Value Ref Range    GFR If African American >60 >60 mL/min/1.73 m 2    GFR If Non African American >60 >60 mL/min/1.73 m 2   EC-ECHOCARDIOGRAM COMPLETE W/ CONT    Collection Time: 10/25/20  1:31 PM   Result Value Ref Range    Eject.Frac. MOD BP 18.74     Eject.Frac. MOD 4C 26.75     Eject.Frac. MOD 2C 8.153     Left Ventrical Ejection Fraction 20    Troponin in four (4) hours    Collection Time: 10/25/20  4:10 PM   Result Value Ref Range    Troponin T 163 (H) 6 - 19 ng/L   proBrain Natriuretic Peptide, NT    Collection Time: 10/25/20  4:10 PM   Result Value Ref Range    NT-proBNP 239 (H) 0 - 125 pg/mL   Heparin Xa (Unfractionated)    Collection Time: 10/25/20  6:54 PM   Result Value Ref Range    Heparin Xa (UFH) <0.10 IU/mL   Heparin Xa (Unfractionated)    Collection Time: 10/26/20  2:06 AM   Result Value Ref Range    Heparin Xa (UFH) <0.10 IU/mL   CBC without Differential    Collection Time: 10/26/20  2:06 AM   Result Value Ref Range    WBC 7.4 4.8 - 10.8 K/uL    RBC 5.18 4.70 - 6.10 M/uL    Hemoglobin 16.3 14.0 - 18.0 g/dL    Hematocrit 48.4 42.0 - 52.0 %    MCV 93.4 81.4 - 97.8 fL    MCH 31.5 27.0 - 33.0 pg    MCHC 33.7 33.7 - 35.3 g/dL    RDW 45.1 35.9 - 50.0 fL    Platelet Count 299 164 - 446 K/uL    MPV 9.7 9.0 - 12.9 fL   Basic Metabolic Panel (BMP)    Collection Time: 10/26/20  2:06 AM   Result Value Ref Range    Sodium 129 (L) 135 - 145 mmol/L    Potassium 3.8 3.6 - 5.5 mmol/L    Chloride 93 (L) 96 - 112 mmol/L    Co2 24 20 - 33 mmol/L    Glucose 274 (H) 65 - 99 mg/dL    Bun 17 8 - 22 mg/dL     Creatinine 0.64 0.50 - 1.40 mg/dL    Calcium 9.5 8.5 - 10.5 mg/dL    Anion Gap 12.0 7.0 - 16.0   Lipid Profile (Tomorrow AM)    Collection Time: 10/26/20  2:06 AM   Result Value Ref Range    Cholesterol,Tot 479 (H) 100 - 199 mg/dL    Triglycerides 819 (H) 0 - 149 mg/dL    HDL 46 >=40 mg/dL    LDL see below <100 mg/dL   TROPONIN    Collection Time: 10/26/20  2:06 AM   Result Value Ref Range    Troponin T 114 (H) 6 - 19 ng/L   ESTIMATED GFR    Collection Time: 10/26/20  2:06 AM   Result Value Ref Range    GFR If African American >60 >60 mL/min/1.73 m 2    GFR If Non African American >60 >60 mL/min/1.73 m 2    Troponin: 163 elevated yesterday 10/26: 114  BNP: 239 elevated   Cholesterol:479 elevated   Triglycerides: 819 elevated    Imaging/Procedures Review:    EC-ECHOCARDIOGRAM COMPLETE W/ CONT   Final Result      DX-CHEST-PORTABLE (1 VIEW)   Final Result      No acute cardiopulmonary abnormality.                  Echo: Severely reduced left ventricular systolic function. Left ventricular ejection fraction is visually estimated to be 20%.  Grade I diastolic dysfunction.    Assessment/Plan         1. NSTEMI-   · 40 y.o with history of multiple CAD s/p stent had a chest pain. Pain resolved when he presented to [facility]. Troponin elevated but no ST changes in EKG. Echocardiogram performed showed severely reduced left ventricular systolic function with EF of 20%. Cardiology was consulted in the ED   - Admit to telemetry floor    - Continue to trend troponin   - EKG prn for chest pain   - Start IV heparin drip   - Aspirin and statin added    - Cardiology planning coronary angiogram tomorrow for ischemic evaluation.    - Given one dose of lasix 20mg IV last night     - N.p.o. after midnight.    2. Heart failure with reduced ejection fraction-  · Patient with remote history of meth , cocaine, marijuana use , CAD with multiple episodes of chest pain s/p stents.  presented for chest pain evaluation. Echo done today 10/25/2020  which showed severe cardiomyopathy. Reduced EF of 20% ( 2017 EF 50%). On physical exam does not look fluid overloaded.   - Undergoing angiogram tomorrow   - 1 time dose of Lasix 20 mg IV ordered by cardiology  . Reassess fluid status tomorrow.     - BNP pending.   - Started on  losartan 12.5 mg once daily for afterload  reduction.   - Holding off on beta-blockers for now as his tachycardia is  likely physiologic.   - Restarted statin therapy    - holding beta blockers as tachy is likely physiologic    - holding off on aldactone need to maximize after load reduction      3. Tobacco use disorder-  • Over 25 pack year history    - pt counseled on smoking cessation    - begin nicotine patches  4. Essential HTN-   · BP stable    - Will begin pt on 12.5 mg of losartan for afterload  Reduction  5. Hyperlipidemia-    · History of hypercholesterolemia with elevated total cholesterol (> 300) , Elev LDL > 200 and TG. Supposed to be taking statin but non compliant with medications    - Start on high intensity statin    - Lipid panel pending

## 2020-10-26 NOTE — ASSESSMENT & PLAN NOTE
Non compliance with medications   Not taking any cardiac meds for more than 1 year  H/o stents placement in multiple Coronary arteries ( RCA, OM and circumflex)    Plan:  Start on statin, aspirin   currnetly on IV heparin drip   Would need Plavix on discharge

## 2020-10-26 NOTE — THERAPY
Physical Therapy Contact Note    PT cardiac rehab consult received and acknowledged. Per chart review and discussion with RN patient pending angiogram. Will hold cardiac rehab education until cardiac diagnostics and intervention complete for accurate assessment of needs.    Danette Zimmerman, PT, DPT  [phone]

## 2020-10-26 NOTE — PROGRESS NOTES
Cardiology Follow Up Progress Note    Date of Service  10/26/2020    Attending Physician  No att. providers found    Chief Complaint   Chest pain    HPI  Kyle Gonzalez is a 40 y.o. male admitted 10/25/2020 with NSTEMI. Echo prior to cath showed severely reduced EF of 20% with global hypokinesis.     Current medical problems include known CAD s/p PCI to RCA in  (at 34yo), PCI to OM in 2017, poorly controled hyperlipidemia, likely FH, tobacco use, history of incarceration, released Dec 2019 with medication noncompliance (due to affordability).     Interim Events  No chest pains overnight, remains on heparin ggt. He denies leg swelling or orthopnea.     This morning we discussed his new diagnosis of heart failure, he expressed motivation to quit substance abuse including tobacco use and alcohol.     His mother  at age 45 from stroke, his father had a heart attack in his early 50s. He does not know if his siblings have heart disease.     Review of Systems  Review of Systems   Constitutional: Negative for chills, diaphoresis, fatigue, fever and unexpected weight change.   Respiratory: Negative for cough, chest tightness and shortness of breath.    Cardiovascular: Negative for chest pain, palpitations and leg swelling.   Gastrointestinal: Negative for abdominal distention.   Neurological: Negative for dizziness and light-headedness.   Hematological: Does not bruise/bleed easily.       Vital signs in last 24 hours  Temp:  [36 °C (96.8 °F)-37.1 °C (98.8 °F)] 36.3 °C (97.3 °F)  Pulse:  [84-94] 85  Resp:  [16-18] 16  BP: (106-134)/(56-89) 122/73  SpO2:  [93 %-96 %] 95 %    Physical Exam  Physical Exam  Vitals signs reviewed.   Constitutional:       General: He is not in acute distress.     Comments: Obese male, BMI39   HENT:      Head: Normocephalic and atraumatic.   Eyes:      General: No scleral icterus.  Neck:      Comments: No JVD  Cardiovascular:      Rate and Rhythm: Normal rate and regular rhythm.      Heart  sounds: No murmur.      Comments: Radial pulses 2+ bilaterally  PT pulses 2+ bilaterally    Pulmonary:      Effort: Pulmonary effort is normal. No respiratory distress.      Breath sounds: No rales.   Abdominal:      General: There is no distension.   Musculoskeletal:      Right lower leg: No edema.      Left lower leg: No edema.   Skin:     General: Skin is warm and dry.   Neurological:      General: No focal deficit present.      Mental Status: He is alert.      Gait: Gait normal.   Psychiatric:         Judgment: Judgment normal.         Lab Review  Lab Results   Component Value Date/Time    WBC 7.4 10/26/2020 02:06 AM    RBC 5.18 10/26/2020 02:06 AM    HEMOGLOBIN 16.3 10/26/2020 02:06 AM    HEMATOCRIT 48.4 10/26/2020 02:06 AM    MCV 93.4 10/26/2020 02:06 AM    MCH 31.5 10/26/2020 02:06 AM    MCHC 33.7 10/26/2020 02:06 AM    MPV 9.7 10/26/2020 02:06 AM      Lab Results   Component Value Date/Time    SODIUM 129 (L) 10/26/2020 02:06 AM    POTASSIUM 3.8 10/26/2020 02:06 AM    CHLORIDE 93 (L) 10/26/2020 02:06 AM    CO2 24 10/26/2020 02:06 AM    GLUCOSE 274 (H) 10/26/2020 02:06 AM    BUN 17 10/26/2020 02:06 AM    CREATININE 0.64 10/26/2020 02:06 AM      Lab Results   Component Value Date/Time    ASTSGOT 34 10/25/2020 11:15 AM    ALTSGPT 41 10/25/2020 11:15 AM     Lab Results   Component Value Date/Time    CHOLSTRLTOT 479 (H) 10/26/2020 02:06 AM    LDL see below 10/26/2020 02:06 AM    HDL 46 10/26/2020 02:06 AM    TRIGLYCERIDE 819 (H) 10/26/2020 02:06 AM    TROPONINT 114 (H) 10/26/2020 02:06 AM       Recent Labs     10/25/20  1610   NTPROBNP 239*       Cardiac Imaging and Procedures Review  EKG 10/25/20: sinus rhythm with LVH and inferior infarct, no acute ST segment changes, similar compared to previous in 2017    Echocardiogram:  10/25/20  CONCLUSIONS  Severely reduced left ventricular systolic function. Left ventricular   ejection fraction is visually estimated to be 20%.  Grade I diastolic dysfunction.    Left  Ventricle  Normal left ventricular chamber size. Normal left ventricular wall   thickness. Severely reduced left ventricular systolic function. Left   ventricular ejection fraction is visually estimated to be 20%. Global   hypokinesis. Grade I diastolic dysfunction.       Cardiac Catheterization:  10/19/17     FINDINGS:  The right coronary artery:  The RCA is a dominant vessel and that   the PDA and a well-developed posterolateral ventricular system emanate from   it.  There are previously placed stents in the proximal and mid segment with   in-stent restenosis of approximately 20% longitudinally throughout the stented   segment.  An acute marginal is jailed by the stents and has a high grade   ostial stenosis of 90%.  There are luminal irregularities throughout the   remainder of the vessel, but no high-grade obstruction.     The left main is a large vessel that bifurcates into the LAD and circumflex.    The LAD gives off a single diagonal vessel.  Again, luminal irregularities are   seen throughout this system.  The LAD is somewhat long wrapping around the   apex.  It gives rise to a proximal modest size first diagonal as well as a   large second diagonal and small third diagonal.  The ostium of the second   diagonal has a modest 30% to 40% stenosis.     The circumflex is a nondominant vessel, but is very large and gives off a   first, second, and third obtuse marginal vessel.  Immediately after the   takeoff of the first obtuse marginal, there is a high-grade 80% stenosis that   is focal.  Proximal to this in the proximal to mid transition, there is a   high-grade 80% to 90% stenosis that is complex, ulcerated and eccentric.     Left ventricular end-diastolic pressure is estimated at 11 mmHg and again no   ventriculogram was performed.  No aortic pullback gradient was noted.     Post-intervention, there are widely patent, well-apposed, well-expanded stents   in the mid and distal portion of the circumflex.  The  first obtuse marginal   is jailed but ABBY-3 flow is noted throughout the stented segment and distal   to that as well.  No evidence of dissection or distal thromboembolism is   noted.     IMPRESSION:  1.  Mild in-stent restenosis of the previously stented right coronary artery.  2.  High grade tandem lesions in the mid circumflex and distal circumflex.  3.  Successful placement of overlapping stents of 4.0 x 12 mm bare metal stent   distally and a 4.0 x 18 mm bare metal stent more proximally.  4.  Normal end-diastolic pressure.    Assessment/Plan  NSTEMI in setting of known CAD  - Cath today, discussed the risks, benefits and alternatives with patient, also dicussed possibility of triple vessel disease and requiring bypass surgery    New diagnosis of Cardiomyopathy  - losartan 12.5mg  - start Metoprolol SR this evening  - start Wilfrido 25mg today  - differentials include ischemic, ETOH, will check HIV    Mixed dyslipidemia  - no xanthomas on exam. Likely FH given LDL>200 and his history, will require a PCSK9 inhibitor most likely  - high intensity statin and will start zetia    Cath today, start GDMT for HF  Staffed with Dr. Rainey

## 2020-10-26 NOTE — PROGRESS NOTES
2 RN Skin Check    2 RN skin check complete.   Devices in place: PIV, telemetry monitoring.  Skin assessed under devices: yes.  Confirmed pressure ulcers found on: n/a.  New potential pressure ulcers noted on n/a. Wound consult placed N/A.  The following interventions in place Pillows.    Pt skin is CDI

## 2020-10-26 NOTE — PROGRESS NOTES
· GALEN Toro went to visit patient bedside on 10/26/2020 but patient was not in his hospital room. CHW checked [place] Medicaid web site to check eligibility for health insurance. As of 10-, patient is ineligible.      · GALEN Toro attempted to introduce CCM services again on 10/27/2020 but katiuska was not in his bed again.      · Patient discharged over night on 10/27/2020. CHW Vinson called patients mobile phone on 10/28/2020.  The voicemail box was not set up yet so no voicemail as left. CHW called the patient's home phone number and it is his fathers cell. His father was at work and could not talk. Stated that the patient was at home and to call him. Asked father to have son call me back.     · GALEN Toro attempted to call patient via mobile phone on 10/29/2020. Patient forwarded my call and has a voicemail box that is not set up yet. Unble to leave a message. CHW will call patient back on 11/2/2020 for last follow up attempt.     · CHW Vinson called patient on mobile phone in demographics and received a message stating that [employer] was unable to complete my call due to restrictions on this line. Unable to reach patient or leave a message.     Plan: CHW was unable to complete assessment with patient due to lack of contact x3 times. CCM will not follow at this time.

## 2020-10-26 NOTE — PROGRESS NOTES
Pt on unit from ED, assumed pt care. Pt assessment complete. Pt A&Ox4. Reviewed plan of care with pt. Tele box on and rhythm verified. Chart and labs reviewed. Bed in lowest position, and 2 side rails up. Pt educated on call light; call light with in reach.

## 2020-10-26 NOTE — OP REPORT
Cardiac catheterization report    Procedure date: 10/26/2020    Referring physician: Dr. Jin Cifuentes    Pre-operative Diagnosis:  1. NSTEMI  2. History of stenting of the left circumflex in 2017 and remote right coronary artery stent with known chronic total occlusion of the right coronary artery    Post-operative Diagnosis:   1.  Severely reduced left ventricle systolic function again fraction 25%  2.  70-80% ostial left anterior descending artery stenosis, IFR less than 0.6  3.  Chronic total occlusion of mid to distal right coronary artery with faint visualization of distal right coronary artery from left to right collateral  4.  Status post stenting of the ostial left anterior descending artery with 3.5 x 8 mm Synergy drug eluting stent with no significant residual stenosis ABBY-3 flow    Procedure:  1.  Left heart catheterization with left ventriculography  2.  Selective coronary angiography  3.  Instantaneous wave free ratio analysis of the left anterior descending artery  4.  Percutaneous coronary intervention of the left anterior descending artery  5. Supervision of moderate conscious sedation    Complications: None    Description of Procedure:  After informed consent was obtained, the patient was brought to cardiac catheterization laboratory in fasting state.   Alec test was carried out on the right hand and was found to be negative.  Right wrist and right groin were then prepped and drapped in sterile fashion.  Versed and fentanyl were used for conscious sedation.  Lidocaine 2% was used to anesthetize the area.  A 6 Yi Terumo sheath was then placed in the right radial artery using Seldinger technique.  A 2.5 mg of verapamil, 100 microgram of nitroglycerine and 3000 units of heparin were administered into the radial sheath.    Selective angiography of the right and left coronary artery were performed in multiple views using 5 Yi TIG catheter.   The TIG catheter was used to enter the left  ventricle.    Left ventricular pressure was recorded.   Left ventriculography was performed using 16 cc of contrast injected over 2 seconds.     After reviewing of diagnostic angiography, we decided to further interrogate the stenosis in the left anterior descending (LAD) with instantaneous wave free ratio (IFR) analysis.  A 6 Zimbabwean JL3.5 guide catheter was used.  Additional heparin was administered for anticoagulation.  After appropriate calibration, an IFR wire was advanced pass the stenosis into the distal portion of the artery.  The FFR was consistent with flow-limiting stenosis, we therefore proceeded with intervention.    The lesion was dilated with a 3x12 mm balloon.  A 3.5x8 mm Synergydrug eluting stent was deployed and post dilated with 3.5 mm non-compliance balloon.  Subsequent angiography showed no significant residual stenosis with ABBY III flow.   The guide wire was subsequently removed and final angiography was performed.  It confirmed good result. The guide catheter was then removed.   The radial sheath was subsequently removed.  Hemostasis was obtained using a Terumo TR wrist band.  The patient was then given a loading dose of Brilinta.  The patient tolerated procedure well and left cardiac catheterization laboratory in stable condition.    I supervised monitoring the patient under moderate conscious sedation beginning at 2:42 PM until the end of the case at 3:42 PM.    Findings:  There is no gradient across aortic valve.  Left ventricular end-diastolic pressure was 14 mmHg.    Left ventriculography showed global hypokinesis.  Overall LV systolic function is severely reduced.  Ejection fraction is calculated to be 25 %.    Two vessel coronary artery disease    This is right dominant system.    Left main is large and without flow limiting disease.  It bifurcated into left anterior descending and left circumflex artery.     Left anterior descending artery is large caliber vessel and extends to the  apex.  It gives rises to a couple diagonal branches.  At the beginning, there was eccentric 70-80% at the ostium of the left anterior descending artery (LAD).  The antegrade flow is normal.    Left circumflex artery is large in caliber.   It gives rise to several obtuse marginal branches.  Previously placed stent was noted in the mid portion of the left circumflex artery.  The stent was patent.  There is no other significant disease in the LCX system.  The antegrade flow is normal.    Right coronary artery (RCA) is medium in caliber. It is occluded in the mid portion after small acute marginal branch. Faint visualization of distal RCA was noted via collaterals from left system.      After intervention, there was no residual stenosis in LAD with ABBY III flow.      Plans;  Dual antiplatelet therapy for one year.   Continue low-dose bivalirudin infusion for 4 hours.   Risk factor modification.  Limit right wrist movement for 24 hours.  At the end of the case, the catheter and the radial sheath were removed.   Hemostasis was obtained using Terumo TR wrist band.  The patient tolerated procedure well and left cardiac catheterization laboratory in stable condition.        Renetta Sanchez M.D.

## 2020-10-26 NOTE — ASSESSMENT & PLAN NOTE
1 PPD - active smoker   More than 25 pack years ( smoking since age 14 )      Plan:  Smoking cessation counseling  Nicotine patches not ideal in setting of NSTEMI

## 2020-10-26 NOTE — PROGRESS NOTES
Bedside report received, patient awake sitting at edge of bed, tele monitor in place, on room air. RN assessed needs, no additional needs at this time. Call light within reach, patient verbalized the understanding on the need to call when needing assistance. Bed locked in lowest position, non skid socks on, bed rails up x2, hourly rounding in place.

## 2020-10-27 VITALS
RESPIRATION RATE: 18 BRPM | HEART RATE: 92 BPM | BODY MASS INDEX: 39.96 KG/M2 | HEIGHT: 65 IN | SYSTOLIC BLOOD PRESSURE: 131 MMHG | DIASTOLIC BLOOD PRESSURE: 82 MMHG | TEMPERATURE: 97.7 F | WEIGHT: 239.86 LBS | OXYGEN SATURATION: 96 %

## 2020-10-27 LAB
ANION GAP SERPL CALC-SCNC: 12 MMOL/L (ref 7–16)
BASOPHILS # BLD AUTO: 0.5 % (ref 0–1.8)
BASOPHILS # BLD: 0.04 K/UL (ref 0–0.12)
BUN SERPL-MCNC: 16 MG/DL (ref 8–22)
CALCIUM SERPL-MCNC: 9.1 MG/DL (ref 8.5–10.5)
CHLORIDE SERPL-SCNC: 97 MMOL/L (ref 96–112)
CO2 SERPL-SCNC: 23 MMOL/L (ref 20–33)
CREAT SERPL-MCNC: 0.54 MG/DL (ref 0.5–1.4)
EKG IMPRESSION: NORMAL
EOSINOPHIL # BLD AUTO: 0.09 K/UL (ref 0–0.51)
EOSINOPHIL NFR BLD: 1 % (ref 0–6.9)
ERYTHROCYTE [DISTWIDTH] IN BLOOD BY AUTOMATED COUNT: 44.9 FL (ref 35.9–50)
GLUCOSE SERPL-MCNC: 206 MG/DL (ref 65–99)
HCT VFR BLD AUTO: 49.1 % (ref 42–52)
HGB BLD-MCNC: 16.3 G/DL (ref 14–18)
HIV 1+2 AB+HIV1 P24 AG SERPL QL IA: NORMAL
IMM GRANULOCYTES # BLD AUTO: 0.12 K/UL (ref 0–0.11)
IMM GRANULOCYTES NFR BLD AUTO: 1.4 % (ref 0–0.9)
LYMPHOCYTES # BLD AUTO: 2.11 K/UL (ref 1–4.8)
LYMPHOCYTES NFR BLD: 24.6 % (ref 22–41)
MAGNESIUM SERPL-MCNC: 1.9 MG/DL (ref 1.5–2.5)
MCH RBC QN AUTO: 31.2 PG (ref 27–33)
MCHC RBC AUTO-ENTMCNC: 33.2 G/DL (ref 33.7–35.3)
MCV RBC AUTO: 94.1 FL (ref 81.4–97.8)
MONOCYTES # BLD AUTO: 0.66 K/UL (ref 0–0.85)
MONOCYTES NFR BLD AUTO: 7.7 % (ref 0–13.4)
NEUTROPHILS # BLD AUTO: 5.57 K/UL (ref 1.82–7.42)
NEUTROPHILS NFR BLD: 64.8 % (ref 44–72)
NRBC # BLD AUTO: 0 K/UL
NRBC BLD-RTO: 0 /100 WBC
PLATELET # BLD AUTO: 324 K/UL (ref 164–446)
PMV BLD AUTO: 10.1 FL (ref 9–12.9)
POTASSIUM SERPL-SCNC: 3.9 MMOL/L (ref 3.6–5.5)
RBC # BLD AUTO: 5.22 M/UL (ref 4.7–6.1)
SODIUM SERPL-SCNC: 132 MMOL/L (ref 135–145)
WBC # BLD AUTO: 8.6 K/UL (ref 4.8–10.8)

## 2020-10-27 PROCEDURE — 99232 SBSQ HOSP IP/OBS MODERATE 35: CPT | Performed by: INTERNAL MEDICINE

## 2020-10-27 PROCEDURE — 700102 HCHG RX REV CODE 250 W/ 637 OVERRIDE(OP): Performed by: STUDENT IN AN ORGANIZED HEALTH CARE EDUCATION/TRAINING PROGRAM

## 2020-10-27 PROCEDURE — 83735 ASSAY OF MAGNESIUM: CPT

## 2020-10-27 PROCEDURE — 700102 HCHG RX REV CODE 250 W/ 637 OVERRIDE(OP): Performed by: INTERNAL MEDICINE

## 2020-10-27 PROCEDURE — A9270 NON-COVERED ITEM OR SERVICE: HCPCS | Performed by: PHYSICIAN ASSISTANT

## 2020-10-27 PROCEDURE — 85025 COMPLETE CBC W/AUTO DIFF WBC: CPT

## 2020-10-27 PROCEDURE — A9270 NON-COVERED ITEM OR SERVICE: HCPCS | Performed by: STUDENT IN AN ORGANIZED HEALTH CARE EDUCATION/TRAINING PROGRAM

## 2020-10-27 PROCEDURE — 80048 BASIC METABOLIC PNL TOTAL CA: CPT

## 2020-10-27 PROCEDURE — RXMED WILLOW AMBULATORY MEDICATION CHARGE: Performed by: STUDENT IN AN ORGANIZED HEALTH CARE EDUCATION/TRAINING PROGRAM

## 2020-10-27 PROCEDURE — 36415 COLL VENOUS BLD VENIPUNCTURE: CPT

## 2020-10-27 PROCEDURE — 93010 ELECTROCARDIOGRAM REPORT: CPT | Performed by: INTERNAL MEDICINE

## 2020-10-27 PROCEDURE — A9270 NON-COVERED ITEM OR SERVICE: HCPCS | Performed by: INTERNAL MEDICINE

## 2020-10-27 PROCEDURE — 87389 HIV-1 AG W/HIV-1&-2 AB AG IA: CPT

## 2020-10-27 PROCEDURE — 99239 HOSP IP/OBS DSCHRG MGMT >30: CPT | Mod: GC | Performed by: INTERNAL MEDICINE

## 2020-10-27 PROCEDURE — 700102 HCHG RX REV CODE 250 W/ 637 OVERRIDE(OP): Performed by: PHYSICIAN ASSISTANT

## 2020-10-27 PROCEDURE — 700111 HCHG RX REV CODE 636 W/ 250 OVERRIDE (IP): Performed by: STUDENT IN AN ORGANIZED HEALTH CARE EDUCATION/TRAINING PROGRAM

## 2020-10-27 RX ORDER — SPIRONOLACTONE 25 MG/1
25 TABLET ORAL DAILY
Qty: 30 TAB | Refills: 3 | Status: SHIPPED | OUTPATIENT
Start: 2020-10-28 | End: 2020-11-10 | Stop reason: SDUPTHER

## 2020-10-27 RX ORDER — LOSARTAN POTASSIUM 25 MG/1
12.5 TABLET ORAL DAILY
Qty: 30 TAB | Refills: 3 | Status: SHIPPED | OUTPATIENT
Start: 2020-10-28 | End: 2020-11-10 | Stop reason: SDUPTHER

## 2020-10-27 RX ORDER — CLOPIDOGREL BISULFATE 75 MG/1
75 TABLET ORAL DAILY
Qty: 30 TAB | Refills: 3 | Status: SHIPPED | OUTPATIENT
Start: 2020-10-28 | End: 2020-11-10 | Stop reason: SDUPTHER

## 2020-10-27 RX ORDER — NITROGLYCERIN 0.4 MG/1
0.4 TABLET SUBLINGUAL PRN
Qty: 25 TAB | Refills: 3 | Status: SHIPPED | OUTPATIENT
Start: 2020-10-27 | End: 2021-03-22 | Stop reason: SDUPTHER

## 2020-10-27 RX ORDER — METOPROLOL SUCCINATE 25 MG/1
25 TABLET, EXTENDED RELEASE ORAL EVERY EVENING
Qty: 30 TAB | Refills: 3 | Status: SHIPPED | OUTPATIENT
Start: 2020-10-27 | End: 2020-11-10 | Stop reason: SDUPTHER

## 2020-10-27 RX ORDER — CLOPIDOGREL BISULFATE 75 MG/1
75 TABLET ORAL DAILY
Status: DISCONTINUED | OUTPATIENT
Start: 2020-10-28 | End: 2020-10-27 | Stop reason: HOSPADM

## 2020-10-27 RX ORDER — CLOPIDOGREL 300 MG/1
600 TABLET, FILM COATED ORAL ONCE
Status: COMPLETED | OUTPATIENT
Start: 2020-10-27 | End: 2020-10-27

## 2020-10-27 RX ORDER — EZETIMIBE 10 MG/1
10 TABLET ORAL DAILY
Qty: 30 TAB | Refills: 3 | Status: SHIPPED | OUTPATIENT
Start: 2020-10-28 | End: 2020-11-10 | Stop reason: SDUPTHER

## 2020-10-27 RX ORDER — ROSUVASTATIN CALCIUM 40 MG/1
40 TABLET, COATED ORAL EVERY EVENING
Qty: 30 TAB | Refills: 11 | Status: SHIPPED | OUTPATIENT
Start: 2020-10-27 | End: 2020-11-10 | Stop reason: SDUPTHER

## 2020-10-27 RX ADMIN — METOPROLOL SUCCINATE 25 MG: 25 TABLET, EXTENDED RELEASE ORAL at 17:03

## 2020-10-27 RX ADMIN — ENOXAPARIN SODIUM 40 MG: 40 INJECTION SUBCUTANEOUS at 08:20

## 2020-10-27 RX ADMIN — EZETIMIBE 10 MG: 10 TABLET ORAL at 05:12

## 2020-10-27 RX ADMIN — NICOTINE TRANSDERMAL SYSTEM 21 MG: 21 PATCH, EXTENDED RELEASE TRANSDERMAL at 05:13

## 2020-10-27 RX ADMIN — CLOPIDOGREL BISULFATE 600 MG: 300 TABLET, FILM COATED ORAL at 17:04

## 2020-10-27 RX ADMIN — ASPIRIN 81 MG: 81 TABLET, COATED ORAL at 05:12

## 2020-10-27 RX ADMIN — ROSUVASTATIN CALCIUM 40 MG: 20 TABLET, FILM COATED ORAL at 17:02

## 2020-10-27 RX ADMIN — SPIRONOLACTONE 25 MG: 25 TABLET ORAL at 05:12

## 2020-10-27 RX ADMIN — TICAGRELOR 90 MG: 90 TABLET ORAL at 05:14

## 2020-10-27 RX ADMIN — LOSARTAN POTASSIUM 12.5 MG: 25 TABLET, FILM COATED ORAL at 05:12

## 2020-10-27 ASSESSMENT — ENCOUNTER SYMPTOMS
PALPITATIONS: 0
DIZZINESS: 0
COUGH: 0
CHEST TIGHTNESS: 0
SHORTNESS OF BREATH: 0
UNEXPECTED WEIGHT CHANGE: 0
ABDOMINAL DISTENTION: 0
LIGHT-HEADEDNESS: 0
BRUISES/BLEEDS EASILY: 0
CHILLS: 0
FATIGUE: 0
FEVER: 0
DIAPHORESIS: 0

## 2020-10-27 NOTE — DISCHARGE SUMMARY
Discharge Summary    Date of Admission: 10/25/2020  Date of Discharge: 10/27/20  Discharging Attending: Cornelio Payan M.D.   Discharging Senior Resident: Dr. Hendrix      CHIEF COMPLAINT ON ADMISSION  Chief Complaint   Patient presents with   • Chest Pain     transfer from [place], elevated troponin       Reason for Admission  NSTEMI    Admission Date  10/25/2020    CODE STATUS  Full Code    HPI & HOSPITAL COURSE  Kyle Gonzalez is a 39 yo male with a PMHx of HTN, multiple STEMIs and NSTEMIs (4 times 2013,2014,2015&2017) s/p 3 bare metal stents who arrived via care flight from [place] for further evaluation of an elevated troponin level of 1,400, concerning for an NSTEMI. He was given heparin drip infusing at 1200un/hr and ASA and 5000 unit heparin bolus before arriving in the ED. Once in the ED the patient's chest pain had resolved. ED orders included stat EKG, CXR, and laboratory testing: CBC, CMP, Troponin. Initial treatment in the ED included continuation of heparin drip.    NSTEMI:  The patient had a NSTEMI workup. Repeat troponin levels were found to be high at 169>163>114, elevated Cholesterol 479, LDL erroneous due to elevated triglyceride, HDL 46, and Triglyceride 819. EKG was performed and showed a sinus rhythm in the 90s with Left ventricular hyperophy and inferior Q waves. Echo showed severe cardiomyopathy with an EF of 20% and Grade I diastolic dysfunction. Per cardiology orders, the patient was referred for a coronary angiogram for ischemic evaluation and started on 1 dose of Lasix 20 mg IV, losartan 12.5 mg once daily for afterload reduction and beta-blockers as well as aldactone were held. NT-proBNP  ordered was found to be elevated at 239. Catheterization was done on 10/26/20 and showed severely reduced left ventricular systolic function at 25% with 70-80% ostial LAD stenosis, IFR less than 0.6. Also showed, chronic total occlusion of mid to distal right coronary artery with faint  visualization of distal right coronary artery from left to right collateral. Stent was placed in the ostial LAD artery with 3.5 x 8 mm synergy drug eluting stent with no significant residual stenosis. Patient started on a regimen recommended by interventional radiology including dual antiplatelet therapy for one year and low-dose bivalirudin infusion for 4 hours. Patient started on losartan 12.5 mg, metoprolol SR 25mg, spironolactone 25mg, crestor 40 mg plus Zetia 10, ASA 81mg, plavix with a loading dose of 600mg and continuous dose of 75mg once daily for 12 months. Patient referred for cardiac rehab. He was told to follow up with a primary care physician and cardiologist to manage his outpatient care. Told if his symptoms ever become severe again that he should come to the hospital immediately.    Past history of bloody stools: Patient has occasional streaks of blood in stools but not at this time of hospitalization. Vital signs stable. Bowel movements normal. Also instructed him to follow up with this primary care provider as occult was ordered inpatient but lab was never run. Guaiac negative.         Therefore, he is discharged in good and stable condition to home with close outpatient follow-up.    The patient met 2-midnight criteria for an inpatient stay at the time of discharge.    PHYSICAL EXAM ON DISCHARGE  Temp:  [36 °C (96.8 °F)-36.6 °C (97.9 °F)] 36.5 °C (97.7 °F)  Pulse:  [] 92  Resp:  [17-18] 18  BP: (100-131)/(56-89) 131/82  SpO2:  [93 %-98 %] 96 %    Physical Exam  Constitutional:       Appearance: Normal appearance.   HENT:      Head: Normocephalic and atraumatic.      Right Ear: Tympanic membrane normal.      Left Ear: Tympanic membrane normal.      Nose: Nose normal.      Mouth/Throat:      Mouth: Mucous membranes are moist.      Pharynx: Oropharynx is clear.   Eyes:      Extraocular Movements: Extraocular movements intact.      Conjunctiva/sclera: Conjunctivae normal.      Pupils: Pupils are  equal, round, and reactive to light.   Neck:      Musculoskeletal: No neck rigidity or muscular tenderness.   Cardiovascular:      Rate and Rhythm: Normal rate.      Heart sounds: No murmur. No gallop.    Pulmonary:      Effort: No respiratory distress.      Breath sounds: No stridor. No wheezing.   Abdominal:      General: There is no distension.      Tenderness: There is no abdominal tenderness. There is no rebound.   Musculoskeletal:         General: No deformity or signs of injury.   Skin:     Coloration: Skin is not jaundiced or pale.   Neurological:      Mental Status: He is alert.      Motor: No weakness.      Coordination: Coordination normal.          Discharge Date  10/27/20    FOLLOW UP ITEMS POST DISCHARGE  Cardiology and PCP follow-up    DISCHARGE DIAGNOSES  Principal Problem:    NSTEMI (non-ST elevated myocardial infarction) (HCC) POA: Yes  Active Problems:    Heart failure with reduced ejection fraction (HCC) POA: Yes    Hyperlipemia POA: Yes    Essential hypertension POA: Yes    Tobacco use POA: Yes    Bloody stools POA: No  Resolved Problems:    Multiple vessel coronary artery disease POA: Yes      Overview: Apr 2012 ACS, Cor angio: 90% RC>stent. LAD 40% p D1;Cx-50% p OM1    Medical non-compliance POA: Yes      FOLLOW UP  Future Appointments   Date Time Provider Department Center   11/2/2020  9:00 AM GUTIERREZ [facility] None     [address] [address] [phone]  Schedule an appointment as soon as possible for a visit  Please call to establish with a Primary Care Physicain and schedule your hospital follow up. Thank you.    [facility]  [address]  [address]  [facility] [id number] [phone]  Call  Your doctor has referred you to Cardiac Rehab, which is important to your recovery. Please call to make an appointment.    [facility] [place]  [address]   [id number]    [phone]  Schedule an appointment as soon as possible for a visit  establish PCP      MEDICATIONS ON DISCHARGE     Medication List      START taking these medications      Instructions   clopidogrel 75 MG Tabs  Start taking on: October 28, 2020  Commonly known as: PLAVIX   Take 1 tablet by mouth every day.  Dose: 75 mg     ezetimibe 10 MG Tabs  Start taking on: October 28, 2020  Commonly known as: ZETIA   Take 1 tablet by mouth every day.  Dose: 10 mg     losartan 25 MG Tabs  Start taking on: October 28, 2020  Commonly known as: COZAAR   Take one half tablet by mouth every day.  Dose: 12.5 mg     metoprolol SR 25 MG Tb24  Commonly known as: TOPROL XL   Take 1 tablet by mouth every evening.  Dose: 25 mg     nitroglycerin 0.4 MG Subl  Commonly known as: NITROSTAT   Doctor's comments: Patient advised as above :  up to 3 doses (if systolic blood pressure >90 and not on any viagra). call MD or report to hospital if not resolved after 3 doses  Place 1 Tab under tongue as needed for Chest Pain (up to 3 doses) if systolic BP >90 and no not on any viagra). call MD /hospital if not resolved after  Dose: 0.4 mg     rosuvastatin 40 MG tablet  Commonly known as: CRESTOR   Take 1 tablet by mouth every evening.  Dose: 40 mg     spironolactone 25 MG Tabs  Start taking on: October 28, 2020  Commonly known as: ALDACTONE   Take 1 tablet by mouth every day.  Dose: 25 mg        CONTINUE taking these medications      Instructions   aspirin 81 MG Chew chewable tablet  Commonly known as: ASA   Take 486 mg by mouth Once.  Dose: 486 mg            Allergies  No Known Allergies    DIET  Orders Placed This Encounter   Procedures   • Diet Order Cardiac, Diabetic     Standing Status:   Standing     Number of Occurrences:   1     Order Specific Question:   Diet:     Answer:   Cardiac [6]     Order Specific Question:   Diet:     Answer:   Diabetic [3]       ACTIVITY  As tolerated.  Weight bearing as tolerated    CONSULTATIONS    Jake with Cardiology consulted.  Treatment options were discussed and plan of care agreed upon.    PROCEDURES  Angiogram, Dr. Sanchez, no complications

## 2020-10-27 NOTE — PROGRESS NOTES
Bedside report received, patient awake sitting in chair, tele monitor in place, on room air. RN assessed needs, no additional needs at this time. Call light within reach, patient verbalized the understanding on the need to call when needing assistance. Bed locked in lowest position, non skid socks on, bed rails up x2, hourly rounding in place.

## 2020-10-27 NOTE — DISCHARGE PLANNING
Medical Social Work    Referral: Medications    Intervention: LSW contacted [facility] and spoke w/ Rhonda regarding Approved Service prices for pt's medications. Rhonda confirms receipt of medications in their system and Rhonda will call this LSW back w/ adrian.     Plan: LSW awaiting call back.

## 2020-10-27 NOTE — DISCHARGE PLANNING
Anticipated Discharge Disposition: Home w/ approved services for medications    Action: Patient chart reviewed.  Spoke with Dr. Hendrix.  Patient does not have any insurance coverage, therefore, we need to set him up with the cheapest options for medications to make sure he is going to be able to afford them.  Discussed with LI[facility] Pizano cardiology also.      Barriers to Discharge: pending medication orders for approved services.     Plan: RN CM to follow up with patient and pharmacy re: medications.

## 2020-10-27 NOTE — PROGRESS NOTES
Cardiovascular Nurse Navigator ([phone]) Note:    Reviewed ACS/PCI medications:  Dual Antiplatelet Therapy (DAPT):  aspirin + Brilinta  Beta-Blocker:  Toprol  Statin:  Crestor+Zetia  ACEI/ARB:  Cozaar  Aldosterone blocking agent:   Spironolactone    Intensive Cardiac Rehab (ICR) Referral:  Referred on October 26; has current inpatient orders for nutrition consult & PT for Phase I ICR  ICR follow-up and contact info added to AVS:  yes    Cardiology Follow-Up:   Please verify follow-up appointment with Cardiology prior to discharge.    Meds to Beds:  Patient received a stent; please consider opting in to Meds-to-Bed program via the admission navigator.    Inpatient & Discharge Patient Education:  Bedside nursing to continually provide patient education on ACS meds, signs and symptoms to monitor for, and risk factor modification. Also at discharge please select and complete the “ACS” special instructions on the AVS.      Thank you and please call with questions.

## 2020-10-27 NOTE — DISCHARGE PLANNING
Care Transition Team Assessment    ALEKSANDER, , Ham Gonzalez, [phone]    RN CM spoke with patient at bedside to complete transition assessment.  Patient lives in a mobile home with his 2 daughters and their 2 minor children.  States that when he is working full time he brings home around ~$2000/mo after child support is taken from his check. He has no PCP.  Discussed getting set up with one and he would like to follow up with the clinic in [place].  Information placed on discharge instructions as to how to contact the clinic to get an appointment set up.  Patient does not have insurance and requested that meds be as affordable as possible.  Patient states he will have a ride home on discharge.      DC Plan:  Home-approved services for meds.     Information Source  Orientation : Oriented x 4  Information Given By: Patient  Who is responsible for making decisions for patient? : Patient    Readmission Evaluation  Is this a readmission?: No    Elopement Risk  Legal Hold: No  Ambulatory or Self Mobile in Wheelchair: Yes  Disoriented: No  Psychiatric Symptoms: None  History of Wandering: No  Elopement this Admit: No  Vocalizing Wanting to Leave: No  Displays Behaviors, Body Language Wanting to Leave: No-Not at Risk for Elopement  Elopement Risk: Not at Risk for Elopement    Interdisciplinary Discharge Planning  Primary Care Physician: none-discussed establishing with the [facility] based clinic in [place]  Lives with - Patient's Self Care Capacity: Adult Children, Child Less than 18 Years of Age  Patient or legal guardian wants to designate a caregiver: No  Support Systems: Family Member(s), Friends / Neighbors  Housing / Facility: Mobile Home  Do You Take your Prescribed Medications Regularly: No  Reasons Why Not Taking Medications : Didn't Refill Prescriptions   Able to Return to Previous ADL's: Yes  Mobility Issues: No  Prior Services: Home-Independent  Patient Prefers to be Discharged to:: Home  Assistance  Needed: No  Durable Medical Equipment: Not Applicable    Discharge Preparedness  What is your plan after discharge?: Home with help  What are your discharge supports?: Child  Prior Functional Level: Ambulatory, Drives Self, Independent with Activities of Daily Living, Independent with Medication Management  Difficulity with ADLs: None  Difficulity with IADLs: None    Functional Assesment  Prior Functional Level: Ambulatory, Drives Self, Independent with Activities of Daily Living, Independent with Medication Management    Finances  Financial Barriers to Discharge: Yes  Average Monthly Income: 2000 $  Source of Income: Employed  Prescription Coverage: No  Prescription Coverage Comments: discussed using cheapest options for medications w/ Dr. Hendrix and cardiac APRN    Vision / Hearing Impairment  Vision Impairment : No  Hearing Impairment : No         Advance Directive  Advance Directive?: None  Advance Directive offered?: AD Booklet refused    Domestic Abuse  Have you ever been the victim of abuse or violence?: No  Physical Abuse or Sexual Abuse: No  Verbal Abuse or Emotional Abuse: No  Possible Abuse/Neglect Reported to:: Not Applicable    Psychological Assessment  History of Substance Abuse: Methamphetamine  Date Last Used - Methamphetamine: remote hx  Substance Abuse Comments: drinks a beer daily, but no meth use  History of Psychiatric Problems: No  Non-compliant with Treatment: Yes  Newly Diagnosed Illness: Yes    Discharge Risks or Barriers  Discharge risks or barriers?: No PCP, Uninsured / underinsured, Non-adherence to medication or treatment  Patient risk factors: Noncompliance, Uninsured or underinsured, Complex medical needs    Anticipated Discharge Information  Discharge Disposition: Discharged to home/self care (01)  Discharge Address: [address] [place] [address] [address]   [address] [address]  Discharge Contact Phone Number: [phone]

## 2020-10-27 NOTE — DISCHARGE PLANNING
Ongoing:    EMILY received call from Bethany simeon/ [facility] to Beds and she quoted the following approved service prices:    Plavix $8.76  Zetia $8.85  Crestor $9.45  Losartan $8.01  Metoprolol $8.52  Spironolactone $8.13  Nitroglycerin $10.13    LATONIAW completed Approved Services for $61.85 and faxed completed form to healthcare [facility] fax# [phone]. Meds to beds to deliver medications to pt's bedside within the hour.

## 2020-10-27 NOTE — PROGRESS NOTES
Bedside report received from night RN; assumed pt care. Pt assessment complete. Pt A&Ox4. Reviewed plan of care with pt. Tele box on and rhythm verified. Chart and labs reviewed. Bed in lowest position, and 2 side rails up. Pt educated on call light; call light with in reach.

## 2020-10-27 NOTE — PROGRESS NOTES
"Cardiology Follow Up Progress Note    Date of Service  10/27/2020    Attending Physician  No att. providers found    Chief Complaint   Chest pain    HPI  Kyle Gonzalez is a 40 y.o. male admitted 10/25/2020 with NSTEMI. Echo prior to cath showed severely reduced EF of 20% with global hypokinesis.     Current medical problems include known CAD s/p PCI to RCA in  (at 32yo), PCI to OM in , poorly controled hyperlipidemia, likely FH, tobacco use, history of incarceration, released Dec 2019 with medication noncompliance (due to affordability).     His mother  at age 45 from stroke, his father had a heart attack in his early 50s. He does not know if his siblings have heart disease.     Interim Events  Sitting up in bed today, no chest pain/pressure overnight. Has occasional shortness of breath, feels like his breath \"catches\", resolves quickly. Started just after the cath yesterday.     SR 72-93, BPs 100-130s    Review of Systems  Review of Systems   Constitutional: Negative for chills, diaphoresis, fatigue, fever and unexpected weight change.   Respiratory: Negative for cough, chest tightness and shortness of breath.    Cardiovascular: Negative for chest pain, palpitations and leg swelling.   Gastrointestinal: Negative for abdominal distention.   Neurological: Negative for dizziness and light-headedness.   Hematological: Does not bruise/bleed easily.       Vital signs in last 24 hours  Temp:  [36 °C (96.8 °F)-36.8 °C (98.2 °F)] 36.2 °C (97.2 °F)  Pulse:  [] 71  Resp:  [17-18] 17  BP: (100-131)/(56-89) 127/56  SpO2:  [92 %-98 %] 97 %    Physical Exam  Physical Exam  Vitals signs reviewed.   Constitutional:       General: He is not in acute distress.     Comments: Obese male, BMI39   HENT:      Head: Normocephalic and atraumatic.   Eyes:      General: No scleral icterus.  Neck:      Comments: No JVD  Cardiovascular:      Rate and Rhythm: Normal rate and regular rhythm.      Heart sounds: No murmur.      " Comments: Radial pulses 2+ bilaterally  PT pulses 2+ bilaterally    Pulmonary:      Effort: Pulmonary effort is normal. No respiratory distress.      Breath sounds: No rales.   Abdominal:      General: There is no distension.   Musculoskeletal:      Right lower leg: No edema.      Left lower leg: No edema.   Skin:     General: Skin is warm and dry.   Neurological:      General: No focal deficit present.      Mental Status: He is alert.      Gait: Gait normal.   Psychiatric:         Judgment: Judgment normal.         Lab Review  Lab Results   Component Value Date/Time    WBC 8.6 10/27/2020 04:39 AM    RBC 5.22 10/27/2020 04:39 AM    HEMOGLOBIN 16.3 10/27/2020 04:39 AM    HEMATOCRIT 49.1 10/27/2020 04:39 AM    MCV 94.1 10/27/2020 04:39 AM    MCH 31.2 10/27/2020 04:39 AM    MCHC 33.2 (L) 10/27/2020 04:39 AM    MPV 10.1 10/27/2020 04:39 AM      Lab Results   Component Value Date/Time    SODIUM 132 (L) 10/27/2020 04:39 AM    POTASSIUM 3.9 10/27/2020 04:39 AM    CHLORIDE 97 10/27/2020 04:39 AM    CO2 23 10/27/2020 04:39 AM    GLUCOSE 206 (H) 10/27/2020 04:39 AM    BUN 16 10/27/2020 04:39 AM    CREATININE 0.54 10/27/2020 04:39 AM      Lab Results   Component Value Date/Time    ASTSGOT 34 10/25/2020 11:15 AM    ALTSGPT 41 10/25/2020 11:15 AM     Lab Results   Component Value Date/Time    CHOLSTRLTOT 479 (H) 10/26/2020 02:06 AM    LDL see below 10/26/2020 02:06 AM    HDL 46 10/26/2020 02:06 AM    TRIGLYCERIDE 819 (H) 10/26/2020 02:06 AM    TROPONINT 114 (H) 10/26/2020 02:06 AM       Recent Labs     10/25/20  1610   NTPROBNP 239*       Cardiac Imaging and Procedures Review  EKG 10/25/20: sinus rhythm with LVH and inferior infarct, no acute ST segment changes, similar compared to previous in 2017    Echocardiogram:  10/25/20  CONCLUSIONS  Severely reduced left ventricular systolic function. Left ventricular   ejection fraction is visually estimated to be 20%.  Grade I diastolic dysfunction.    Left Ventricle  Normal left  ventricular chamber size. Normal left ventricular wall   thickness. Severely reduced left ventricular systolic function. Left   ventricular ejection fraction is visually estimated to be 20%. Global   hypokinesis. Grade I diastolic dysfunction.       Cardiac Catheterization:  10/19/17   IMPRESSION:  1.  Mild in-stent restenosis of the previously stented right coronary artery.  2.  High grade tandem lesions in the mid circumflex and distal circumflex.  3.  Successful placement of overlapping stents of 4.0 x 12 mm bare metal stent   distally and a 4.0 x 18 mm bare metal stent more proximally.  4.  Normal end-diastolic pressure.    Cath 10-26-20  Post-operative Diagnosis:   1.  Severely reduced left ventricle systolic function again fraction 25%  2.  70-80% ostial left anterior descending artery stenosis, IFR less than 0.6  3.  Chronic total occlusion of mid to distal right coronary artery with faint visualization of distal right coronary artery from left to right collateral  4.  Status post stenting of the ostial left anterior descending artery with 3.5 x 8 mm Synergy drug eluting stent with no significant residual stenosis ABBY-3 flow     Assessment/Plan  NSTEMI in setting of known CAD  - s/p TABATHA to ostial LAD with  of mid to distal RCA  - DAPT: Aspirin 81mg, Brilinta 90mg bid (likely cause of intermittent dyspnea)  - high intensity statin: Crestor 40mg plus Zetia 10  - beta blocker: metop SR  - Cardiac rehab: referred    Cardiomyopathy, likely ischemic  - no acute exacerbation, LVEDP low on cath  - losartan 12.5mg  - Metoprolol SR 25  - Cabins 25mg   - most likely ischemic, other differentials include ETOH,  HIV nonreactive    Mixed dyslipidemia  - no xanthomas on exam. Likely FH given LDL>200 and his history, will require a PCSK9 inhibitor most likely  - high intensity statin and will start zetia    Addendum: Patient not currently insured, Brilinta is not affordable, will change to Plavix, load with 600mg tonight,  12 hours after last Brilinta dose, then continue 75mg once daily for 12mo AT LEAST. Discussed with primary team.  I have arranged for follow up with our clinic in [place] and have discussed the cost of self-pay. Patient thinks he can afford it. I have also provided information for [facility] health [place].     Cardiology will sign off, please call with questions.     Staffed with Dr. Rainey

## 2020-10-27 NOTE — DISCHARGE INSTRUCTIONS
Discharge Instructions    Discharged to home by car with relative. Discharged via wheelchair, hospital escort: Yes.  Special equipment needed: Not Applicable    Be sure to schedule a follow-up appointment with your primary care doctor or any specialists as instructed.     Discharge Plan:   Diet Plan: Discussed  Activity Level: Discussed  Confirmed Follow up Appointment: Appointment Scheduled  Confirmed Symptoms Management: Discussed  Medication Reconciliation Updated: Yes  Influenza Vaccine Indication: Patient Refuses    I understand that a diet low in cholesterol, fat, and sodium is recommended for good health. Unless I have been given specific instructions below for another diet, I accept this instruction as my diet prescription.   Other diet: cardiac, diabetic    Special Instructions:   HF Patient Discharge Instructions  · Monitor your weight daily, and maintain a weight chart, to track your weight changes.   · Activity as tolerated, unless your Doctor has ordered otherwise. Other activity order: regular.  · Follow a low fat, low cholesterol, low salt diet unless instructed otherwise by your Doctor. Read the labels on the back of food products and track your intake of fat, cholesterol and salt.   · Fluid Restriction No. If a Fluid Restriction has been ordered by your Doctor, measure fluids with a measuring cup to ensure that you are not exceeding the restriction.   · No smoking.  · Oxygen No. If your Doctor has ordered that you wear Oxygen at home, it is important to wear it as ordered.  · Did you receive an explanation from staff on the importance of taking each of your medications and why it is necessary to keep taking them unless your doctor says to stop? Yes  · Were all of your questions answered about how to manage your heart failure and what to do if you have increased signs and symptoms after you go home? Yes  · Do you feel like your heart failure care team involved you in the care treatment plan and  allowed you to make decisions regarding your care while in the hospital and addressed any discharge needs you might have? Yes    See the educational handout provided at discharge for more information on monitoring your daily weight, activity and diet. This also explains more about Heart Failure, symptoms of a flare-up and some of the tests that you have undergone.     Warning Signs of a Flare-Up include:  · Swelling in the ankles or lower legs.  · Shortness of breath, while at rest, or while doing normal activities.   · Shortness of breath at night when in bed, or coughing in bed.   · Requiring more pillows to sleep at night, or needing to sit up at night to sleep.  · Feeling weak, dizzy or fatigued.     When to call your Doctor:  · Call [facility] seven days a week from 8:00 a.m. to 8:00 p.m. for medical questions [phone].  · Call your Primary Care Physician or Cardiologist if:   1. You experience any pain radiating to your jaw or neck.  2. You have any difficulty breathing.  3. You experience weight gain of 3 lbs in a day or 5 lbs in a week.   4. You feel any palpitations or irregular heartbeats.  5. You become dizzy or lose consciousness.   If you have had an angiogram or had a pacemaker or AICD placed, and experience:  1. Bleeding, drainage or swelling at the surgical / puncture site.  2. Fever greater than 100.0 F  3. Shock from internal defibrillator.  4. Cool and / or numb extremities.      · Is patient discharged on Warfarin / Coumadin?   No     Ezetimibe; Simvastatin tablets  What is this medicine?  EZETIMIBE; SIMVASTATIN (ez ET i mibe; SIM va stat in) blocks the body's ability to absorb and make cholesterol. It is used to lower cholesterol. It is only for patients whose cholesterol level is not controlled by diet.  This medicine may be used for other purposes; ask your health care provider or pharmacist if you have questions.  COMMON BRAND NAME(S): Vytorin  What should I tell my health  care provider before I take this medicine?  They need to know if you have any of these conditions:  diabetes  frequently drink alcoholic beverages  kidney or liver disease  muscle aches or weakness  an unusual or allergic reaction to ezetimibe, simvastatin, other medicines, foods, dyes, or preservatives  pregnant or trying to get pregnant  breast-feeding  How should I use this medicine?  Take this medicine by mouth with a glass of water. Follow the directions on the prescription label. Take this medicine in the evening with or without food. Take your doses at regular intervals. Do not take your medicine more often than directed.  Talk to your pediatrician regarding the use of this medicine in children. While this drug may be prescribed for children as young as 10 years, precautions do apply.  Overdosage: If you think you have taken too much of this medicine contact a poison control center or emergency room at once.  NOTE: This medicine is only for you. Do not share this medicine with others.  What if I miss a dose?  If you miss a dose, take it as soon as you can. If it is almost time for your next dose, take only that dose. Do not take double or extra doses.  What may interact with this medicine?  Do not take this medicine with any of the following:  antiviral medicines for HIV or AIDS  certain antibiotics like clarithromycin, erythromycin, telithromycin  certain medicines for fungal infections like ketoconazole, itraconazole, posaconazole, voriconazole  conivaptan  cyclosporine  danazol  gemfibrozil  idelalisib  mifepristone, RU-486  nefazodone  supplements like red yeast rice  This medicine may also interact with the following medications:  alcohol  certain medicines for blood pressure or heart disease like amlodipine, diltiazem, verapamil  certain medicines for irregular heart beat like amiodarone and dronedarone  certain medicines that treat or prevent blood clots like warfarin  colchicine  digoxin  grapefruit  juice  lomitapide  niacin  ranolazine  This list may not describe all possible interactions. Give your health care provider a list of all the medicines, herbs, non-prescription drugs, or dietary supplements you use. Also tell them if you smoke, drink alcohol, or use illegal drugs. Some items may interact with your medicine.  What should I watch for while using this medicine?  Visit your doctor or healthcare provider for regular check-ups. You may need regular tests to make sure your liver is working properly.  Tell your doctor or healthcare provider right away if you get any unexplained muscle pain, tenderness, or weakness, especially if you also have a fever and tiredness. Your doctor or healthcare provider may tell you to stop taking this medicine if you develop muscle problems. If your muscle problems do not go away after stopping this medicine, contact your health care professional.  This medicine may increase blood sugar. Ask your healthcare provider if changes in diet or medicines are needed if you have diabetes.  This drug is only part of a total heart-health program. Your doctor or a dietician can suggest a low-cholesterol and low-fat diet to help. Avoid alcohol and smoking, and keep a proper exercise schedule.  Do not use this drug if you are pregnant or breast-feeding. Serious side effects to an unborn child or to an infant are possible. Talk to your doctor or pharmacist for more information.  If you are going to have surgery tell your healthcare provider that you are taking this drug.  Some drugs may increase the risk of side effects from this medicine. If you are given certain antibiotics or antifungals, your doctor or healthcare provider may stop this medicine for a short time. Check with your doctor or pharmacist for advice.  This medicine may cause a decrease in Co-Enzyme Q-10. You should make sure that you get enough Co-Enzyme Q-10 while you are taking this medicine. Discuss the foods you eat and the  vitamins you take with your healthcare provider.  What side effects may I notice from receiving this medicine?  Side effects that you should report to your doctor or health care professional as soon as possible:  allergic reactions like skin rash, itching or hives, swelling of the face, lips, or tongue  confusion  dark yellow or brown urine  depression  fever  loss of memory  muscle pain, tenderness, cramps, or weakness  redness, blistering, peeling or loosening of the skin, including inside the mouth  signs and symptoms of high blood sugar such as being more thirsty or hungry or having to urinate more than normal. You may also feel very tired or have blurry vision.  trouble passing urine or change in the amount of urine  unusually weak  yellowing of the skin or eyes  Side effects that usually do not require medical attention (report to your doctor or health care professional if they continue or are bothersome):  constipation  gas  headache  heartburn  indigestion  stomach pain  This list may not describe all possible side effects. Call your doctor for medical advice about side effects. You may report side effects to FDA at 9-444-FDA-8301.  Where should I keep my medicine?  Keep out of the reach of children.  Store at room temperature between 20 and 25 degrees C (68 and 77 degrees F). Keep container tightly closed. Throw away any unused medicine after the expiration date.  NOTE: This sheet is a summary. It may not cover all possible information. If you have questions about this medicine, talk to your doctor, pharmacist, or health care provider.  © 2020 [employer] Standard (2020-03-09 22:27:39)  Losartan tablets  What is this medicine?  LOSARTAN (merline ARACELI tan) is used to treat high blood pressure and to reduce the risk of stroke in certain patients. This drug also slows the progression of kidney disease in patients with diabetes.  This medicine may be used for other purposes; ask your health care provider or  pharmacist if you have questions.  COMMON BRAND NAME(S): Cozaar  What should I tell my health care provider before I take this medicine?  They need to know if you have any of these conditions:  heart failure  kidney or liver disease  an unusual or allergic reaction to losartan, other medicines, foods, dyes, or preservatives  pregnant or trying to get pregnant  breast-feeding  How should I use this medicine?  Take this medicine by mouth with a glass of water. Follow the directions on the prescription label. This medicine can be taken with or without food. Take your doses at regular intervals. Do not take your medicine more often than directed.  Talk to your pediatrician regarding the use of this medicine in children. Special care may be needed.  Overdosage: If you think you have taken too much of this medicine contact a poison control center or emergency room at once.  NOTE: This medicine is only for you. Do not share this medicine with others.  What if I miss a dose?  If you miss a dose, take it as soon as you can. If it is almost time for your next dose, take only that dose. Do not take double or extra doses.  What may interact with this medicine?  blood pressure medicines  diuretics, especially triamterene, spironolactone, or amiloride  fluconazole  NSAIDs, medicines for pain and inflammation, like ibuprofen or naproxen  potassium salts or potassium supplements  rifampin  This list may not describe all possible interactions. Give your health care provider a list of all the medicines, herbs, non-prescription drugs, or dietary supplements you use. Also tell them if you smoke, drink alcohol, or use illegal drugs. Some items may interact with your medicine.  What should I watch for while using this medicine?  Visit your doctor or health care professional for regular checks on your progress. Check your blood pressure as directed. Ask your doctor or health care professional what your blood pressure should be and when  you should contact him or her. Call your doctor or health care professional if you notice an irregular or fast heart beat.  Women should inform their doctor if they wish to become pregnant or think they might be pregnant. There is a potential for serious side effects to an unborn child, particularly in the second or third trimester. Talk to your health care professional or pharmacist for more information.  You may get drowsy or dizzy. Do not drive, use machinery, or do anything that needs mental alertness until you know how this drug affects you. Do not stand or sit up quickly, especially if you are an older patient. This reduces the risk of dizzy or fainting spells. Alcohol can make you more drowsy and dizzy. Avoid alcoholic drinks.  Avoid salt substitutes unless you are told otherwise by your doctor or health care professional.  Do not treat yourself for coughs, colds, or pain while you are taking this medicine without asking your doctor or health care professional for advice. Some ingredients may increase your blood pressure.  What side effects may I notice from receiving this medicine?  Side effects that you should report to your doctor or health care professional as soon as possible:  confusion, dizziness, light headedness or fainting spells  decreased amount of urine passed  difficulty breathing or swallowing, hoarseness, or tightening of the throat  fast or irregular heart beat, palpitations, or chest pain  skin rash, itching  swelling of your face, lips, tongue, hands, or feet  Side effects that usually do not require medical attention (report to your doctor or health care professional if they continue or are bothersome):  cough  decreased sexual function or desire  headache  nasal congestion or stuffiness  nausea or stomach pain  sore or cramping muscles  This list may not describe all possible side effects. Call your doctor for medical advice about side effects. You may report side effects to FDA at  1-800-FDA-1088.  Where should I keep my medicine?  Keep out of the reach of children.  Store at room temperature between 15 and 30 degrees C (59 and 86 degrees F). Protect from light. Keep container tightly closed. Throw away any unused medicine after the expiration date.  NOTE: This sheet is a summary. It may not cover all possible information. If you have questions about this medicine, talk to your doctor, pharmacist, or health care provider.  © 2020 [employer]/Gold Standard (2009-02-27 16:42:18)  Metoprolol extended-release capsules  What is this medicine?  METOPROLOL (me TOE proe lole) is a beta-blocker. Beta-blockers reduce the workload on the heart and help it to beat more regularly. This medicine is used to treat high blood pressure and to prevent chest pain. It is also used after a heart attack to prevent an additional heart attack from occurring.  This medicine may be used for other purposes; ask your health care provider or pharmacist if you have questions.  COMMON BRAND NAME(S): KRYSTAL  What should I tell my health care provider before I take this medicine?  They need to know if you have any of these conditions:  diabetes  heart disease  liver disease  lung or breathing disease, like asthma  pheochromocytoma  thyroid disease  an unusual or allergic reaction to metoprolol, other beta-blockers, medicines, foods, dyes, or preservatives  pregnant or trying to get pregnant  breast-feeding  How should I use this medicine?  Take this medicine by mouth. The capsules can be swallowed whole or opened carefully and the contents sprinkled over a small amount (teaspoonful) of soft food, such as applesauce, pudding, or yogurt. This mixture must be swallowed within 60 minutes and not stored for future use. Do not chew this medicine. You can take it with or without food. If it upsets your stomach, take it with food. Take your medicine at regular intervals. Do not take it more often than directed. Do not stop taking  except on your doctor's advice.  Talk to your pediatrician regarding the use of this medicine in children. While this drug may be prescribed for children as young as 6 for selected conditions, precautions do apply.  Overdosage: If you think you have taken too much of this medicine contact a poison control center or emergency room at once.  NOTE: This medicine is only for you. Do not share this medicine with others.  What if I miss a dose?  If you miss a dose, take it as soon as you can. If it is almost time for your next dose, take only that dose. Do not take double or extra doses.  What may interact with this medicine?  This medicine may interact with the following medications:  certain medicines for blood pressure, heart disease, irregular heart beat  epinephrine  fluoxetine  MAOIs like Carbex, Eldepryl, Marplan, Nardil, and Parnate  paroxetine  reserpine  This list may not describe all possible interactions. Give your health care provider a list of all the medicines, herbs, non-prescription drugs, or dietary supplements you use. Also tell them if you smoke, drink alcohol, or use illegal drugs. Some items may interact with your medicine.  What should I watch for while using this medicine?  You may get drowsy or dizzy. Do not drive, use machinery, or do anything that needs mental alertness until you know how this medicine affects you. Do not stand or sit up quickly, especially if you are an older patient. This reduces the risk of dizzy or fainting spells. Alcohol may interfere with the effect of this medicine. Avoid alcoholic drinks.  Visit your doctor or health care professional for regular checks on your progress. Check your blood pressure as directed. Ask your doctor or health care professional what your blood pressure should be and when you should contact him or her.  Do not treat yourself for coughs, colds, or pain while you are using this medicine without asking your doctor or health care professional for  advice. Some ingredients may increase your blood pressure.  This medicine may increase blood sugar. Ask your healthcare provider if changes in diet or medicines are needed if you have diabetes.  What side effects may I notice from receiving this medicine?  Side effects that you should report to your doctor or health care professional as soon as possible:  allergic reactions like skin rash, itching or hives, swelling of the face, lips, or tongue  cold hands or feet  signs and symptoms of high blood sugar such as being more thirsty or hungry or having to urinate more than normal. You may also feel very tired or have blurry vision.  signs and symptoms of low blood pressure like dizziness; feeling faint or lightheaded, falls; unusually weak or tired  signs of worsening heart failure like breathing problems, swelling in your legs and feet  suicidal thoughts or other mood changes  unusually slow heartbeat  Side effects that usually do not require medical attention (report these to your doctor or health care professional if they continue or are bothersome):  anxious  change in sex drive or performance  diarrhea  headache  trouble sleeping  upset stomach  This list may not describe all possible side effects. Call your doctor for medical advice about side effects. You may report side effects to FDA at 3-523-FDA-5328.  Where should I keep my medicine?  Keep out of the reach of children.  Store at room temperature between 15 and 30 degrees C (59 and 86 degrees F). Throw away any unused medicine after the expiration date.  NOTE: This sheet is a summary. It may not cover all possible information. If you have questions about this medicine, talk to your doctor, pharmacist, or health care provider.  © 2020 [employer] Standard (2019-10-08 11:07:10)  Clopidogrel tablets  What is this medicine?  CLOPIDOGREL (kloh PID oh grel) helps to prevent blood clots. This medicine is used to prevent heart attack, stroke, or other vascular  events in people who are at high risk.  This medicine may be used for other purposes; ask your health care provider or pharmacist if you have questions.  COMMON BRAND NAME(S): Plavix  What should I tell my health care provider before I take this medicine?  They need to know if you have any of the following conditions:  · bleeding disorders  · bleeding in the brain  · having surgery  · history of stomach bleeding  · an unusual or allergic reaction to clopidogrel, other medicines, foods, dyes, or preservatives  · pregnant or trying to get pregnant  · breast-feeding  How should I use this medicine?  Take this medicine by mouth with a glass of water. Follow the directions on the prescription label. You may take this medicine with or without food. If it upsets your stomach, take it with food. Take your medicine at regular intervals. Do not take it more often than directed. Do not stop taking except on your doctor's advice.  A special MedGuide will be given to you by the pharmacist with each prescription and refill. Be sure to read this information carefully each time.  Talk to your pediatrician regarding the use of this medicine in children. Special care may be needed.  Overdosage: If you think you have taken too much of this medicine contact a poison control center or emergency room at once.  NOTE: This medicine is only for you. Do not share this medicine with others.  What if I miss a dose?  If you miss a dose, take it as soon as you can. If it is almost time for your next dose, take only that dose. Do not take double or extra doses.  What may interact with this medicine?  Do not take this medicine with the following medications:  · dasabuvir; ombitasvir; paritaprevir; ritonavir  · defibrotide  · selexipag  This medicine may also interact with the following medications:  · certain medicines that treat or prevent blood clots like warfarin  · narcotic medicines for pain  · NSAIDs, medicines for pain and inflammation,  like ibuprofen or naproxen  · repaglinide  · SNRIs, medicines for depression, like desvenlafaxine, duloxetine, levomilnacipran, venlafaxine  · SSRIs, medicines for depression, like citalopram, escitalopram, fluoxetine, fluvoxamine, paroxetine, sertraline  · stomach acid blockers like cimetidine, esomeprazole, omeprazole  This list may not describe all possible interactions. Give your health care provider a list of all the medicines, herbs, non-prescription drugs, or dietary supplements you use. Also tell them if you smoke, drink alcohol, or use illegal drugs. Some items may interact with your medicine.  What should I watch for while using this medicine?  Visit your doctor or health care professional for regular check-ups. Do not stop taking your medicine unless your doctor tells you to.  Notify your doctor or health care professional and seek emergency treatment if you develop breathing problems; changes in vision; chest pain; severe, sudden headache; pain, swelling, warmth in the leg; trouble speaking; sudden numbness or weakness of the face, arm or leg. These can be signs that your condition has gotten worse.  If you are going to have surgery or dental work, tell your doctor or health care professional that you are taking this medicine.  Certain genetic factors may reduce the effect of this medicine. Your doctor may use genetic tests to determine treatment.  Only take aspirin if you are instructed to. Low doses of aspirin are used with this medicine to treat some conditions. Taking aspirin with this medicine can increase your risk of bleeding so you must be careful. Talk to your doctor or pharmacist if you have questions.  What side effects may I notice from receiving this medicine?  Side effects that you should report to your doctor or health care professional as soon as possible:  · allergic reactions like skin rash, itching or hives, swelling of the face, lips, or tongue  · signs and symptoms of bleeding such  as bloody or black, tarry stools; red or dark-brown urine; spitting up blood or brown material that looks like coffee grounds; red spots on the skin; unusual bruising or bleeding from the eye, gums, or nose  · signs and symptoms of a blood clot such as breathing problems; changes in vision; chest pain; severe, sudden headache; pain, swelling, warmth in the leg; trouble speaking; sudden numbness or weakness of the face, arm or leg  · signs and symptoms of low blood sugar such as feeling anxious; confusion; dizziness; increased hunger; unusually weak or tired; increased sweating; shakiness; cold, clammy skin; irritable; headache; blurred vision; fast heartbeat; loss of consciousness  Side effects that usually do not require medical attention (report to your doctor or health care professional if they continue or are bothersome):  · constipation  · diarrhea  · headache  · upset stomach  This list may not describe all possible side effects. Call your doctor for medical advice about side effects. You may report side effects to FDA at 3-538-VAD-8719.  Where should I keep my medicine?  Keep out of the reach of children.  Store at room temperature of 59 to 86 degrees F (15 to 30 degrees C). Throw away any unused medicine after the expiration date.  NOTE: This sheet is a summary. It may not cover all possible information. If you have questions about this medicine, talk to your doctor, pharmacist, or health care provider.  © 2020 Elsevier/Gold Standard (2019-05-20 15:03:38)      Depression / Suicide Risk    As you are discharged from this [facility] facility, it is important to learn how to keep safe from harming yourself.    Recognize the warning signs:  · Abrupt changes in personality, positive or negative- including increase in energy   · Giving away possessions  · Change in eating patterns- significant weight changes-  positive or negative  · Change in sleeping patterns- unable to sleep or sleeping all the  time   · Unwillingness or inability to communicate  · Depression  · Unusual sadness, discouragement and loneliness  · Talk of wanting to die  · Neglect of personal appearance   · Rebelliousness- reckless behavior  · Withdrawal from people/activities they love  · Confusion- inability to concentrate     If you or a loved one observes any of these behaviors or has concerns about self-harm, here's what you can do:  · Talk about it- your feelings and reasons for harming yourself  · Remove any means that you might use to hurt yourself (examples: pills, rope, extension cords, firearm)  · Get professional help from the community (Mental Health, Substance Abuse, psychological counseling)  · Do not be alone:Call your Safe Contact- someone whom you trust who will be there for you.  · Call your local CRISIS HOTLINE 971-1887 or 209-449-2571  · Call your local Children's Mobile Crisis Response Team [place] (618) 417-9521 or www.[place]  · Call the toll free National Suicide Prevention Hotlines   · National Suicide Prevention Lifeline 767-557-ZEHP (6778)  · National Hope Line Network 800-SUICIDE (967-8986)

## 2020-10-27 NOTE — CARE PLAN
Problem: Venous Thromboembolism (VTW)/Deep Vein Thrombosis (DVT) Prevention:  Goal: Patient will participate in Venous Thrombosis (VTE)/Deep Vein Thrombosis (DVT)Prevention Measures  Outcome: PROGRESSING AS EXPECTED     Problem: Knowledge Deficit  Goal: Knowledge of disease process/condition, treatment plan, diagnostic tests, and medications will improve  Outcome: PROGRESSING AS EXPECTED  Goal: Knowledge of the prescribed therapeutic regimen will improve  Outcome: PROGRESSING AS EXPECTED     Problem: Discharge Barriers/Planning  Goal: Patient's continuum of care needs will be met  Outcome: PROGRESSING AS EXPECTED

## 2020-10-28 ENCOUNTER — PHARMACY VISIT (OUTPATIENT)
Dept: PHARMACY | Facility: MEDICAL CENTER | Age: 40
End: 2020-10-28
Payer: COMMERCIAL

## 2020-10-28 NOTE — PROGRESS NOTES
Pt. D/c'd with self to home. Discharge teaching completed, no further questions or concerns. All personal belongings with pt. Pt is A&OX4. No signs of distress. All lines removed, tele box removed and placed in monitor room, and monitors notified. Pt with doctors note, and meds to beds

## 2020-10-28 NOTE — DISCHARGE PLANNING
Meds-to-Beds: Discharge prescription orders listed below delivered to patient's bedside. RN notified. Patient counseled.       Kyle Gonzalez   Home Medication Instructions RICHIE:[id number]    Printed on:10/28/20 0840   Medication Information                      clopidogrel (PLAVIX) 75 MG Tab  Take 1 tablet by mouth every day.             ezetimibe (ZETIA) 10 MG Tab  Take 1 tablet by mouth every day.             losartan (COZAAR) 25 MG Tab  Take one half tablet by mouth every day.             metoprolol SR (TOPROL XL) 25 MG TABLET SR 24 HR  Take 1 tablet by mouth every evening.             nitroglycerin (NITROSTAT) 0.4 MG SL Tab  Place 1 Tab under tongue as needed for Chest Pain (up to 3 doses) if systolic BP >90 and no not on any viagra). call MD /hospital if not resolved after             rosuvastatin (CRESTOR) 40 MG tablet  Take 1 tablet by mouth every evening.             spironolactone (ALDACTONE) 25 MG Tab  Take 1 tablet by mouth every day.                 Lizeth Reilly, Pharmacy Intern

## 2020-11-09 NOTE — DOCUMENTATION QUERY
"                                                                         [facility]                                                                       Query Response Note      PATIENT:               CHANDRIKA DAVIDSON  ACCT #:                  [id number]  MRN:                     [id number]  :                      1980  ADMIT DATE:       10/25/2020 11:16 AM  DISCH DATE:        10/27/2020 6:29 PM  RESPONDING  PROVIDER #:        [id number]           QUERY TEXT:    Congestive Heart Failure is documented in the Medical Record. Please clarify the acuity (includes probable or suspected).     NOTE:  If an appropriate response is not listed below, please respond with a new note.    The patient's Clinical Indicators include:  Patient is a 39 y/o male with significant cardiac history transferred from [facility] for NSTEMI.  TABATHA was placed in LAD artery and ECHO on 10/25 showed EF 20%.  Patient received 1 dose of Lasix 20 mg IV push.  Please clarify the acuity of the patient's systolic congestive heart failure.    H&P notes \"Acute systolic heart failure without decompensation.\"      10/26 progress note \"This  morning we discussed his new diagnosis of heart failure...\"    10/27 progress note \"chronic systolic heart failure: Etiology is ischemic and nonischemic.\"    Risk Factors: obesity, medication non-compliance, smoking, alcohol use, HLD  Options provided:   -- Acute Systolic heart failure   -- Chronic Systolic heart failure   -- Acute on Chronic Systolic heart failure   -- Unable to determine      Query created by: Nicolasa Murphy on 11/3/2020 7:44 AM    RESPONSE TEXT:    Acute Systolic heart failure          Electronically signed by:  OSVALDO OLIVEROS MD 2020 8:21 AM              "

## 2020-11-10 ENCOUNTER — OFFICE VISIT (OUTPATIENT)
Dept: CARDIOLOGY | Facility: MEDICAL CENTER | Age: 40
End: 2020-11-10

## 2020-11-10 VITALS
OXYGEN SATURATION: 95 % | RESPIRATION RATE: 18 BRPM | SYSTOLIC BLOOD PRESSURE: 108 MMHG | HEART RATE: 64 BPM | BODY MASS INDEX: 39.65 KG/M2 | DIASTOLIC BLOOD PRESSURE: 82 MMHG | HEIGHT: 65 IN | WEIGHT: 238 LBS

## 2020-11-10 DIAGNOSIS — E78.01 FAMILIAL HYPERCHOLESTEREMIA: ICD-10-CM

## 2020-11-10 DIAGNOSIS — I50.20 HEART FAILURE WITH REDUCED EJECTION FRACTION (HCC): ICD-10-CM

## 2020-11-10 DIAGNOSIS — E78.00 PURE HYPERCHOLESTEROLEMIA: ICD-10-CM

## 2020-11-10 DIAGNOSIS — I25.5 ACC/AHA STAGE B CONGESTIVE HEART FAILURE DUE TO ISCHEMIC CARDIOMYOPATHY (HCC): ICD-10-CM

## 2020-11-10 DIAGNOSIS — I21.4 NSTEMI (NON-ST ELEVATED MYOCARDIAL INFARCTION) (HCC): ICD-10-CM

## 2020-11-10 DIAGNOSIS — I10 ESSENTIAL HYPERTENSION: ICD-10-CM

## 2020-11-10 DIAGNOSIS — I50.9 ACC/AHA STAGE B CONGESTIVE HEART FAILURE DUE TO ISCHEMIC CARDIOMYOPATHY (HCC): ICD-10-CM

## 2020-11-10 DIAGNOSIS — I25.5 ISCHEMIC CARDIOMYOPATHY: ICD-10-CM

## 2020-11-10 DIAGNOSIS — I25.10 CORONARY ARTERY DISEASE INVOLVING NATIVE CORONARY ARTERY OF NATIVE HEART WITHOUT ANGINA PECTORIS: ICD-10-CM

## 2020-11-10 PROCEDURE — 99213 OFFICE O/P EST LOW 20 MIN: CPT | Performed by: PHYSICIAN ASSISTANT

## 2020-11-10 RX ORDER — LOSARTAN POTASSIUM 25 MG/1
25 TABLET ORAL DAILY
Qty: 30 TAB | Refills: 11 | Status: SHIPPED | OUTPATIENT
Start: 2020-11-10 | End: 2020-12-16

## 2020-11-10 RX ORDER — ASPIRIN 81 MG/1
81 TABLET, CHEWABLE ORAL ONCE
Qty: 100 TAB | Refills: 3 | Status: SHIPPED | OUTPATIENT
Start: 2020-11-10 | End: 2020-11-10

## 2020-11-10 RX ORDER — EZETIMIBE 10 MG/1
10 TABLET ORAL DAILY
Qty: 30 TAB | Refills: 11 | Status: SHIPPED | OUTPATIENT
Start: 2020-11-10 | End: 2021-12-07

## 2020-11-10 RX ORDER — ROSUVASTATIN CALCIUM 40 MG/1
40 TABLET, COATED ORAL EVERY EVENING
Qty: 30 TAB | Refills: 11 | Status: SHIPPED | OUTPATIENT
Start: 2020-11-10 | End: 2022-02-23

## 2020-11-10 RX ORDER — CLOPIDOGREL BISULFATE 75 MG/1
75 TABLET ORAL DAILY
Qty: 30 TAB | Refills: 11 | Status: ON HOLD | OUTPATIENT
Start: 2020-11-10 | End: 2021-12-07

## 2020-11-10 RX ORDER — METOPROLOL SUCCINATE 25 MG/1
25 TABLET, EXTENDED RELEASE ORAL EVERY EVENING
Qty: 30 TAB | Refills: 3 | Status: SHIPPED | OUTPATIENT
Start: 2020-11-10 | End: 2021-03-22

## 2020-11-10 RX ORDER — SPIRONOLACTONE 25 MG/1
25 TABLET ORAL DAILY
Qty: 30 TAB | Refills: 11 | Status: ON HOLD
Start: 2020-11-10 | End: 2021-08-29

## 2020-11-10 ASSESSMENT — ENCOUNTER SYMPTOMS
ORTHOPNEA: 0
NERVOUS/ANXIOUS: 1
FALLS: 0
NAUSEA: 0
PALPITATIONS: 1
PND: 0
ROS GI COMMENTS: NO ABDOMINAL BLOATING, NO EARLY SATIETY
DIZZINESS: 0
SHORTNESS OF BREATH: 0

## 2020-11-10 ASSESSMENT — FIBROSIS 4 INDEX: FIB4 SCORE: 0.64

## 2020-11-10 NOTE — LETTER
November 10, 2020    To Whom It May Concern:         This is confirmation that Kyle Gonzalez attended his scheduled appointment with Jerica Joseph P.A.-C. on 11/10/20.         If you have any questions please do not hesitate to call me at the phone number listed below.    Sincerely,          Phyllis Herrera, Med Ass't  [phone]

## 2020-11-10 NOTE — PROGRESS NOTES
Chief Complaint   Patient presents with   • Congestive Heart Failure     Heart failure with reduced ejection fraction         Subjective:   Kyle Gonzalez is a 40 y.o. male who presents today for follow-up with his significant other Sheree.     Initially seen in  by Dr. Catalino Joseph and lost to follow up. He was seen in the hospital by Dr. Franco 2020 when he was admitted for chest pain, found to have NSTEMI s/p TABATHA to ostial LAD. Echo prior to cath showed severely reduced EF of 20% with global hypokinesis.      Current medical problems include premature CAD s/p PCI to RCA in  (at 34yo), PCI to OM in , poorly controled hyperlipidemia, likely FH, tobacco use, history of incarceration, released Dec 2019 with medication noncompliance (due to affordability).     Kyle denies any chest pain/pressure with exertion. He has dyspnea with moderate to heavy exertion such as playing in the snow or running across the parking lot today. Otherwise he denies nocturnal cough, orthopnea, early satiety, nausea, leg swelling, abdominal swelling, weight gain.     His biggest complaint is anxiety. He is anxious about working and affording his medications and doctor appointments. He is also providing for his daughter.     Since his discharge he has been taking his medications are prescribed. He has not had any missed doses. Additionally he has almost quit smoking, he takes a few puffs a day. He is not drinking alcohol at this point either.     He has been having trouble with keeping his water intake less than 2L, he loves to drink water. Sheree is cooking for him and helping him manage his salt intake.    His mother  at age 45 from stroke, his father had a heart attack in his early 50s. He does not know if his siblings have heart disease.     Past Medical History:   Diagnosis Date   • Diabetes (HCC)    • Heart attack (HCC)    • Hyperlipidemia    • Hypertension    • Medical non-compliance 2015          Past Surgical History:   Procedure Laterality Date   • MA TRANSCATH STENT INIT VESSEL,OPEN      x4         Family History   Problem Relation Age of Onset   • Diabetes Mother    • Heart Attack Mother    • Heart Attack Father          Social History     Tobacco Use   Smoking Status Current Some Day Smoker   • Packs/day: 1.00   • Types: Cigarettes   • Last attempt to quit: 2015   • Years since quittin.5   Smokeless Tobacco Never Used          Social History     Substance and Sexual Activity   Alcohol Use Yes   • Frequency: 4 or more times a week   • Drinks per session: 10 or more         No Known Allergies      Outpatient Encounter Medications as of 11/10/2020   Medication Sig Dispense Refill   • aspirin (ASA) 81 MG Chew Tab chewable tablet Take 1 Tab by mouth Once for 1 dose. 100 Tab 3   • losartan (COZAAR) 25 MG Tab Take 1 Tab by mouth every day. 30 Tab 11   • clopidogrel (PLAVIX) 75 MG Tab Take 1 tablet by mouth every day. 30 Tab 11   • ezetimibe (ZETIA) 10 MG Tab Take 1 tablet by mouth every day. 30 Tab 11   • metoprolol SR (TOPROL XL) 25 MG TABLET SR 24 HR Take 1 tablet by mouth every evening. 30 Tab 3   • rosuvastatin (CRESTOR) 40 MG tablet Take 1 tablet by mouth every evening. 30 Tab 11   • spironolactone (ALDACTONE) 25 MG Tab Take 1 tablet by mouth every day. 30 Tab 11   • nitroglycerin (NITROSTAT) 0.4 MG SL Tab Place 1 Tab under tongue as needed for Chest Pain (up to 3 doses) if systolic BP >90 and no not on any viagra). call MD /hospital if not resolved after 25 Tab 3   • [DISCONTINUED] ezetimibe (ZETIA) 10 MG Tab Take 1 tablet by mouth every day. 30 Tab 3   • [DISCONTINUED] losartan (COZAAR) 25 MG Tab Take one half tablet by mouth every day. 30 Tab 3   • [DISCONTINUED] metoprolol SR (TOPROL XL) 25 MG TABLET SR 24 HR Take 1 tablet by mouth every evening. 30 Tab 3   • [DISCONTINUED] rosuvastatin (CRESTOR) 40 MG tablet Take 1 tablet by mouth every evening. 30 Tab 11   • [DISCONTINUED]  "spironolactone (ALDACTONE) 25 MG Tab Take 1 tablet by mouth every day. 30 Tab 3   • [DISCONTINUED] clopidogrel (PLAVIX) 75 MG Tab Take 1 tablet by mouth every day. 30 Tab 3   • [DISCONTINUED] aspirin (ASA) 81 MG Chew Tab chewable tablet Take 486 mg by mouth Once.       No facility-administered encounter medications on file as of 11/10/2020.          Review of Systems   Constitutional: Negative for malaise/fatigue.        No weight gain   Respiratory: Negative for shortness of breath.    Cardiovascular: Positive for palpitations (at night). Negative for chest pain, orthopnea, leg swelling and PND.   Gastrointestinal: Negative for nausea.        No abdominal bloating, no early satiety   Musculoskeletal: Negative for falls.   Neurological: Negative for dizziness.        No lightheadedness    Psychiatric/Behavioral: The patient is nervous/anxious.           Objective:   /82 (BP Location: Left arm, Patient Position: Sitting, BP Cuff Size: Adult)   Pulse 64   Resp 18   Ht 1.651 m (5' 5\")   Wt 108 kg (238 lb)   SpO2 95%   BMI 39.61 kg/m²        Physical Exam  Vitals signs reviewed.   Constitutional:       General: He is not in acute distress.     Comments: Obese male, BMI 39. Anxious    HENT:      Head: Normocephalic and atraumatic.   Eyes:      General: No scleral icterus.  Neck:      Comments: JVP 7-8cm  Cardiovascular:      Rate and Rhythm: Normal rate and regular rhythm.      Heart sounds: No murmur.      Comments: Radial pulses 2+ bilaterally  PT pulses 2+ bilaterally    Pulmonary:      Effort: Pulmonary effort is normal. No respiratory distress.      Breath sounds: No rales.   Abdominal:      General: There is no distension.      Palpations: Abdomen is soft.   Musculoskeletal:      Right lower leg: No edema.      Left lower leg: No edema.   Skin:     General: Skin is warm and dry.   Neurological:      General: No focal deficit present.      Mental Status: He is alert.      Gait: Gait normal. "   Psychiatric:         Judgment: Judgment normal.          EKG 10/25/20: sinus rhythm with LVH and inferior infarct, no acute ST segment changes, similar compared to previous in 2017     Echocardiogram:  10/25/20  CONCLUSIONS  Severely reduced left ventricular systolic function. Left ventricular   ejection fraction is visually estimated to be 20%.  Grade I diastolic dysfunction.     Left Ventricle  Normal left ventricular chamber size. Normal left ventricular wall   thickness. Severely reduced left ventricular systolic function. Left   ventricular ejection fraction is visually estimated to be 20%. Global   hypokinesis. Grade I diastolic dysfunction.        Cardiac Catheterization:  10/19/17   IMPRESSION:  1.  Mild in-stent restenosis of the previously stented right coronary artery.  2.  High grade tandem lesions in the mid circumflex and distal circumflex.  3.  Successful placement of overlapping stents of 4.0 x 12 mm bare metal stent   distally and a 4.0 x 18 mm bare metal stent more proximally.  4.  Normal end-diastolic pressure.     Cath 10-26-20  Post-operative Diagnosis:   1.  Severely reduced left ventricle systolic function again fraction 25%  2.  70-80% ostial left anterior descending artery stenosis, IFR less than 0.6  3.  Chronic total occlusion of mid to distal right coronary artery with faint visualization of distal right coronary artery from left to right collateral  4.  Status post stenting of the ostial left anterior descending artery with 3.5 x 8 mm Synergy drug eluting stent with no significant residual stenosis ABBY-3 flow  Assessment:     1. Ischemic cardiomyopathy     2. ACC/AHA stage B congestive heart failure due to ischemic cardiomyopathy (HCC)     3. Heart failure with reduced ejection fraction (HCC)     4. NSTEMI (non-ST elevated myocardial infarction) ([facility])     5. Pure hypercholesterolemia     6. Essential hypertension     7. Coronary artery disease involving native coronary artery of  native heart without angina pectoris     8. Familial hypercholesteremia          Medical Decision Making:  Today's Assessment / Plan:    recent NSTEMI  Coronary artery disease without angina  - s/p TABATHA to ostial LAD with  of mid to distal RCA (previous BMS to RCA and LCx)  - DAPT: Aspirin 81mg, Plavix 75mg (ideally would be on Brilinta however he is uninsured and cannot afford this, once he is insured I will try to change P2Y12 inhibitor)  - high intensity statin: Crestor 40mg plus Zetia 10  - beta blocker: metop SR  - Cardiac rehab: referred  - may return to work without restrictions     Ischemic Cardiomyopathy  HFrEF Stage B   - no acute exacerbation I hospital, LVEDP low on cath  - losartan increase to 25mg  - Metoprolol SR 25  - Wilfrido 25mg   - most likely ischemic, other differentials include ETOH,  his HIV screening was nonreactive  - recheck echo after 3mo of optimized GDMT, if EF remains low then will discuss ICD placement     Mixed dyslipidemia  Familial Hypercholesterolemia   -  Likely FH given LDL>200 and his history, will require a PCSK9 inhibitor most likely but again I am limited by cost at this point. Ideally will check Lipids in January 2021 and adjust medication regimen to achieve LDL<70  - continue high intensity statin and zetia     Suspected sleep apnea    RTC in 1mo, ideally he will have insurance and we can order lipid panel, TTE

## 2020-11-10 NOTE — LETTER
November 10, 2020    To Whom It May Concern:         This is confirmation that Kyle Gonzalez is a patient at the [facility] and he attended his scheduled appointment with Jerica Joseph P.A.-C. on 11/10/20.     He may return to work on 11/15/20 without restrictions.          If you have any questions please do not hesitate to call me at the phone number listed below.    Sincerely,        Jerica Joseph P.A.-C.  [phone]

## 2020-12-16 ENCOUNTER — OFFICE VISIT (OUTPATIENT)
Dept: CARDIOLOGY | Facility: MEDICAL CENTER | Age: 40
End: 2020-12-16

## 2020-12-16 VITALS
BODY MASS INDEX: 38.57 KG/M2 | HEIGHT: 66 IN | OXYGEN SATURATION: 95 % | WEIGHT: 240 LBS | HEART RATE: 88 BPM | DIASTOLIC BLOOD PRESSURE: 87 MMHG | SYSTOLIC BLOOD PRESSURE: 135 MMHG

## 2020-12-16 DIAGNOSIS — E78.01 FAMILIAL HYPERCHOLESTEROLEMIA: ICD-10-CM

## 2020-12-16 DIAGNOSIS — Z95.5 STENTED CORONARY ARTERY: ICD-10-CM

## 2020-12-16 DIAGNOSIS — I50.20 HEART FAILURE WITH REDUCED EJECTION FRACTION (HCC): ICD-10-CM

## 2020-12-16 DIAGNOSIS — I25.10 CORONARY ARTERY DISEASE INVOLVING NATIVE CORONARY ARTERY OF NATIVE HEART WITHOUT ANGINA PECTORIS: ICD-10-CM

## 2020-12-16 DIAGNOSIS — I10 ESSENTIAL HYPERTENSION: ICD-10-CM

## 2020-12-16 PROCEDURE — 99213 OFFICE O/P EST LOW 20 MIN: CPT | Performed by: PHYSICIAN ASSISTANT

## 2020-12-16 RX ORDER — LOSARTAN POTASSIUM 50 MG/1
50 TABLET ORAL DAILY
Qty: 30 TAB | Refills: 11 | Status: ON HOLD
Start: 2020-12-16 | End: 2021-08-29

## 2020-12-16 ASSESSMENT — FIBROSIS 4 INDEX: FIB4 SCORE: 0.64

## 2020-12-16 NOTE — PATIENT INSTRUCTIONS
1) increase losartan from 25 mg to 50 mg in the morning.    2) try to get a BP cuff.  Goal Bp 120/70, HR goal <70.

## 2020-12-16 NOTE — PROGRESS NOTES
"      Cardiology Clinic Follow-up Note    Date of note:    12/16/2020  Primary Care Provider: Pcp Pt States None    Name:             Kyle Gonzalez  YOB: 1980  MRN:               [id number]    CC: HFrEF    Primary Cardiologist: Dr. Franco    Patient HPI:   Kyle Gonzalez is a 40 y.o. male with PMH including HFrEF 20%, CAD with hx of PCI to the ostial LAD in 10/2020, RCA in 2013 and 2014, OM in 2017, likely FH, hx of tobacco and ETOH use.  He also has suspected sleep apnea.  He has significant family hx of cardiovascular disease.      Interim History:  Mr. Gonzalez was last seen in this cardiology office by DARLENE Ramesh.  At that time he was doing better - almost quit smoking, had been taking his meds.  He did not have insurance at his last appt.    ROS     Past medical history, social history and family history reviewed.  No changes other than what is outlined in HPI.    Current Outpatient Medications   Medication Sig Dispense Refill   • losartan (COZAAR) 25 MG Tab Take 1 Tab by mouth every day. 30 Tab 11   • clopidogrel (PLAVIX) 75 MG Tab Take 1 tablet by mouth every day. 30 Tab 11   • ezetimibe (ZETIA) 10 MG Tab Take 1 tablet by mouth every day. 30 Tab 11   • metoprolol SR (TOPROL XL) 25 MG TABLET SR 24 HR Take 1 tablet by mouth every evening. 30 Tab 3   • rosuvastatin (CRESTOR) 40 MG tablet Take 1 tablet by mouth every evening. 30 Tab 11   • spironolactone (ALDACTONE) 25 MG Tab Take 1 tablet by mouth every day. 30 Tab 11   • nitroglycerin (NITROSTAT) 0.4 MG SL Tab Place 1 Tab under tongue as needed for Chest Pain (up to 3 doses) if systolic BP >90 and no not on any viagra). call MD /hospital if not resolved after 25 Tab 3     No current facility-administered medications for this visit.        No Known Allergies      Physical Exam:  Ambulatory Vitals  /87 (BP Location: Left arm, Patient Position: Sitting)   Pulse 88   Ht 1.676 m (5' 6\")   Wt 108.9 kg (240 lb)   SpO2 95%    "   BP Readings from Last 4 Encounters:   12/16/20 135/87   11/10/20 108/82   10/27/20 131/82   08/16/17 120/80       Weight/BMI: Body mass index is 38.74 kg/m².  Wt Readings from Last 4 Encounters:   12/16/20 108.9 kg (240 lb)   11/10/20 108 kg (238 lb)   10/25/20 108.8 kg (239 lb 13.8 oz)   08/16/17 108.9 kg (240 lb)       General: no apparent distress  Lungs: normal effort, without crackles, no wheezing or rhonchi.  Heart: normal rate, regular rhythm, no murmur, no rub  Ext:  no lower extremity edema.  Abdomen: soft, non tender, non distended,  Neurological: No focal deficits, no facial asymmetry.  Normal speech.  Psychiatric: Appropriate affect, alert and oriented x 3.   Skin: Warm extremities, no rash.      Lab Data Review:  Lab Results   Component Value Date/Time    CHOLSTRLTOT 479 (H) 10/26/2020 02:06 AM    LDL see below 10/26/2020 02:06 AM    HDL 46 10/26/2020 02:06 AM    TRIGLYCERIDE 819 (H) 10/26/2020 02:06 AM       Lab Results   Component Value Date/Time    SODIUM 132 (L) 10/27/2020 04:39 AM    POTASSIUM 3.9 10/27/2020 04:39 AM    CHLORIDE 97 10/27/2020 04:39 AM    CO2 23 10/27/2020 04:39 AM    GLUCOSE 206 (H) 10/27/2020 04:39 AM    BUN 16 10/27/2020 04:39 AM    CREATININE 0.54 10/27/2020 04:39 AM     Lab Results   Component Value Date/Time    ALKPHOSPHAT 140 (H) 10/25/2020 11:15 AM    ASTSGOT 34 10/25/2020 11:15 AM    ALTSGPT 41 10/25/2020 11:15 AM    TBILIRUBIN 0.3 10/25/2020 11:15 AM      Lab Results   Component Value Date/Time    WBC 8.6 10/27/2020 04:39 AM         Cardiac Imaging and Procedures Review:      Echo 10/25/20:  CONCLUSIONS  Severely reduced left ventricular systolic function. Left ventricular   ejection fraction is visually estimated to be 20%.  Grade I diastolic dysfunction.    [facility] 4/10/2013 Dr. Roe:  RCA - 3.5x38-mm Xience drug-eluting stent    [facility] 10/20/14:  IMPRESSION:  1.  Mild in-stent restenosis of the previously stented right coronary artery.  2.  High grade tandem lesions in the  mid circumflex and distal circumflex.  3.  Successful placement of overlapping stents of 4.0 x 12 mm bare metal stent   distally and a 4.0 x 18 mm bare metal stent more proximally.  4.  Normal end-diastolic pressure.    [facility] 2017:  PostOp Diagnosis:   1. NSTEMI due to 99% stenosis of the second obtuse marginal (OM) branch of left circumflex artery (LCX)  2. Chronic total occlusion of mid to distal right coronary artery  3. 70-80% ostial large first diagonal branch of the left anterior descending artery stenosis  4. Patent left circumflex artery stents  5. Inferior wall hypokinesis with joseph overall left ventricular systolic function  6. Status post stenting of the second OM with 2.5x18 mm Vision bare metal stent    [facility] 10/26/20:  Post-operative Diagnosis:   1.  Severely reduced left ventricle systolic function again fraction 25%  2.  70-80% ostial left anterior descending artery stenosis, IFR less than 0.6  3.  Chronic total occlusion of mid to distal right coronary artery with faint visualization of distal right coronary artery from left to right collateral  4.  Status post stenting of the ostial left anterior descending artery with 3.5 x 8 mm Synergy drug eluting stent with no significant residual stenosis ABBY-3 flow      Assessment and Clinical Decision Makin  CAD   -   Continue lifelong DAPT given risk   -   Continue high intensity statin + Zetia and BB    2  Ischemic cardiomyopathy EF 20%, chronic HFrEF, NYHA II, stage B  -   Appears compensated  -   Continue medial therapy including coreg, losartan, lorri, Toprol XL  -   Plan to advance GDMT as tolerated   -   At this time cannot afford to get repeat echo or ICD without insurance.     3  Familial hyperlipidemia   -   Continue Crestor 40 + Zetia 10  -   Will defer repeat lipid panel at this time due to ins coverage.    4   Essential hypertension   -   BP elevated today, increase losartan from 25 to 50 daily  -   Get monitor at home.  I will call him  in 1 week to see how his BP is doing.     Follow up in 3 months.   Will follow up by phone in regards to BP.       Ashley Millard PA-C     [facility]

## 2020-12-28 ENCOUNTER — TELEPHONE (OUTPATIENT)
Dept: CARDIOLOGY | Facility: MEDICAL CENTER | Age: 40
End: 2020-12-28

## 2020-12-29 NOTE — TELEPHONE ENCOUNTER
I called Kyle to check on his BP after I Increased losartan.  Needs BP cuff, if he did not get one, I will work on getting him one.     I left a message with his father to return my call.     Ashley Millard PA-C  12/28/2020

## 2021-03-17 ENCOUNTER — OFFICE VISIT (OUTPATIENT)
Dept: CARDIOLOGY | Facility: MEDICAL CENTER | Age: 41
End: 2021-03-17

## 2021-03-17 VITALS
HEIGHT: 65 IN | BODY MASS INDEX: 42.82 KG/M2 | WEIGHT: 257 LBS | SYSTOLIC BLOOD PRESSURE: 136 MMHG | RESPIRATION RATE: 14 BRPM | OXYGEN SATURATION: 96 % | DIASTOLIC BLOOD PRESSURE: 80 MMHG | HEART RATE: 98 BPM

## 2021-03-17 DIAGNOSIS — I10 ESSENTIAL HYPERTENSION: ICD-10-CM

## 2021-03-17 DIAGNOSIS — E78.2 MIXED HYPERLIPIDEMIA: ICD-10-CM

## 2021-03-17 DIAGNOSIS — I25.10 CORONARY ARTERY DISEASE INVOLVING NATIVE CORONARY ARTERY OF NATIVE HEART WITHOUT ANGINA PECTORIS: ICD-10-CM

## 2021-03-17 DIAGNOSIS — R00.2 PALPITATIONS: ICD-10-CM

## 2021-03-17 DIAGNOSIS — Z79.899 ENCOUNTER FOR LONG-TERM (CURRENT) USE OF HIGH-RISK MEDICATION: ICD-10-CM

## 2021-03-17 DIAGNOSIS — I25.5 ISCHEMIC CARDIOMYOPATHY: ICD-10-CM

## 2021-03-17 PROCEDURE — 99215 OFFICE O/P EST HI 40 MIN: CPT | Performed by: INTERNAL MEDICINE

## 2021-03-17 RX ORDER — FENOFIBRATE 48 MG/1
48 TABLET, COATED ORAL DAILY
Qty: 30 TABLET | Refills: 11 | Status: SHIPPED | OUTPATIENT
Start: 2021-03-17 | End: 2021-06-16 | Stop reason: SDUPTHER

## 2021-03-17 ASSESSMENT — ENCOUNTER SYMPTOMS
DEPRESSION: 0
NERVOUS/ANXIOUS: 1
SHORTNESS OF BREATH: 0
DIZZINESS: 0
ORTHOPNEA: 0
ABDOMINAL PAIN: 0
PALPITATIONS: 0
PND: 0
LOSS OF CONSCIOUSNESS: 0
FALLS: 0

## 2021-03-17 ASSESSMENT — FIBROSIS 4 INDEX: FIB4 SCORE: 0.66

## 2021-03-17 NOTE — PROGRESS NOTES
Chief Complaint   Patient presents with   • MI (Non ST Segment Elevation MI)   • Congestive Heart Failure     F/V Dx: Heart failure with reduced ejection fraction    • Coronary Artery Disease     F/V Dx: Stented coronary artery,  bare metal stent X 2 & Coronary artery disease involving native coronary artery of native heart without angina pectoris       Subjective:   Kyle Gonzalez is a 41 y.o. male who presents today to follow-up on coronary artery disease.    Pertinent history:  Coronary disease status post multivessel PCI (LAD, RCA, OM), most recent to the LAD in October 2020.  Ischemic cardiomyopathy  Hypertension  Hyperlipidemia  Diabetes      Patient reports having substernal chest pressure prior to his PCIs which has not recurred since his most recent PCI.  He reports being anxious and for a few days reported having palpitations because he was under a lot of stress but his symptoms have resolved since then.  His blood pressures at home are mostly in the 120s to 130s systolic.  He denies any heart failure symptoms.    Past Medical History:   Diagnosis Date   • Diabetes (HCC)    • Heart attack (HCC)    • Hyperlipidemia    • Hypertension    • Medical non-compliance 9/13/2015     Past Surgical History:   Procedure Laterality Date   • [place] TRANSCATH STENT INIT VESSEL,OPEN      x4     Family History   Problem Relation Age of Onset   • Diabetes Mother    • Heart Attack Mother    • Heart Attack Father      Social History     Socioeconomic History   • Marital status: Single     Spouse name: Not on file   • Number of children: Not on file   • Years of education: Not on file   • Highest education level: Not on file   Occupational History   • Not on file   Tobacco Use   • Smoking status: Current Some Day Smoker     Packs/day: 1.00     Years: 27.00     Pack years: 27.00     Types: Cigarettes     Start date: 3/17/1994   • Smokeless tobacco: Never Used   Substance and Sexual Activity   • Alcohol use: Yes   • Drug use: Yes      Comment: occasional marijuana    • Sexual activity: Not on file   Other Topics Concern   • Not on file   Social History Narrative   • Not on file     Social Determinants of Health     Financial Resource Strain:    • Difficulty of Paying Living Expenses:    Food Insecurity:    • Worried About Running Out of Food in the Last Year:    • Ran Out of Food in the Last Year:    Transportation Needs:    • Lack of Transportation (Medical):    • Lack of Transportation (Non-Medical):    Physical Activity:    • Days of Exercise per Week:    • Minutes of Exercise per Session:    Stress:    • Feeling of Stress :    Social Connections:    • Frequency of Communication with Friends and Family:    • Frequency of Social Gatherings with Friends and Family:    • Attends Holiness Services:    • Active Member of Clubs or Organizations:    • Attends Club or Organization Meetings:    • Marital Status:    Intimate Partner Violence:    • Fear of Current or Ex-Partner:    • Emotionally Abused:    • Physically Abused:    • Sexually Abused:      No Known Allergies  Outpatient Encounter Medications as of 3/17/2021   Medication Sig Dispense Refill   • fenofibrate (TRICOR) 48 MG Tab Take 1 tablet by mouth every day. 30 tablet 11   • losartan (COZAAR) 50 MG Tab Take 1 Tab by mouth every day. 30 Tab 11   • aspirin EC (ECOTRIN) 81 MG Tablet Delayed Response Take 1 Tab by mouth every day. 100 Tab 3   • clopidogrel (PLAVIX) 75 MG Tab Take 1 tablet by mouth every day. 30 Tab 11   • ezetimibe (ZETIA) 10 MG Tab Take 1 tablet by mouth every day. 30 Tab 11   • metoprolol SR (TOPROL XL) 25 MG TABLET SR 24 HR Take 1 tablet by mouth every evening. 30 Tab 3   • rosuvastatin (CRESTOR) 40 MG tablet Take 1 tablet by mouth every evening. 30 Tab 11   • spironolactone (ALDACTONE) 25 MG Tab Take 1 tablet by mouth every day. 30 Tab 11   • nitroglycerin (NITROSTAT) 0.4 MG SL Tab Place 1 Tab under tongue as needed for Chest Pain (up to 3 doses) if systolic BP >90 and  "no not on any viagra). call MD /hospital if not resolved after 25 Tab 3     No facility-administered encounter medications on file as of 3/17/2021.     Review of Systems   Constitutional: Negative for malaise/fatigue.   Respiratory: Negative for shortness of breath.    Cardiovascular: Negative for chest pain, palpitations, orthopnea, leg swelling and PND.   Gastrointestinal: Negative for abdominal pain.   Musculoskeletal: Negative for falls.   Neurological: Negative for dizziness and loss of consciousness.   Psychiatric/Behavioral: Negative for depression. The patient is nervous/anxious.    All other systems reviewed and are negative.       Objective:   /80 (BP Location: Left arm, Patient Position: Sitting, BP Cuff Size: Adult)   Pulse 98   Resp 14   Ht 1.651 m (5' 5\")   Wt 117 kg (257 lb)   SpO2 96%   BMI 42.77 kg/m²     Physical Exam   Constitutional: He is oriented to person, place, and time. He appears well-developed and well-nourished. No distress.   HENT:   Head: Normocephalic and atraumatic.   Eyes: Conjunctivae are normal. No scleral icterus.   Cardiovascular: Normal rate, regular rhythm and normal heart sounds. Exam reveals no gallop and no friction rub.   No murmur heard.  Pulmonary/Chest: Effort normal and breath sounds normal. No respiratory distress. He has no wheezes. He has no rales.   Musculoskeletal:         General: No edema.      Cervical back: Normal range of motion and neck supple.   Neurological: He is alert and oriented to person, place, and time.   Skin: Skin is warm and dry. He is not diaphoretic.   Psychiatric: He has a normal mood and affect. His behavior is normal. Judgment and thought content normal.   Nursing note and vitals reviewed.    Labs performed October 2020 were reviewed and showed normal renal function.  Triglycerides 819.    Echocardiogram performed October 2020 was personally reviewed and per my interpretation showed severely reduced LV systolic function with an " ejection fraction of 20%.    Assessment:     1. Coronary artery disease involving native coronary artery of native heart without angina pectoris     2. Ischemic cardiomyopathy  Basic Metabolic Panel   3. Palpitations     4. Mixed hyperlipidemia  Lipid Profile   5. Essential hypertension     6. Encounter for long-term (current) use of high-risk medication       Medical Decision Making:  Today's Assessment / Status / Plan:     Patient has known history of coronary disease status post multivessel PCI.  He is free of anginal symptoms.  Continue aspirin and Plavix.    Ischemic cardiomyopathy diagnosed in October 2020.  We discussed a repeat echocardiogram however patient is uninsured at this time and does not think he can afford this test.  He will work on getting an insurance plan and let us know.  We have discussed the importance of repeating an echocardiogram as he may need a ICD.  Continue losartan, metoprolol XL and Aldactone at current dose.    For hyperlipidemia his triglycerides are severely elevated.  Discussed fenofibrate which the patient is agreeable to starting.  Continue Zetia and Crestor at current dose.  He understands that myalgias may be more prevalent in patients who are on Crestor already.  Should he start experiencing the symptoms, he will let us know.  Lipid panel ordered to be done in 2 months after starting fenofibrate.    Hypertension fairly well controlled.  Medications as above.    His palpitations were only transient and he is currently symptom-free.  Possibly related to anxiety.  If he has recurrent symptoms, he will let us know.    Continues to chew tobacco.  We discussed the benefits of tobacco cessation.    Return to clinic in 3 months or earlier if needed.    Thank you for allowing me to participate in the care of this patient. Please do not hesitate to contact me with any questions.    Shira Franco MD, [facility]  Cardiologist  [facility]    PLEASE NOTE:  This dictation was created using voice recognition software.

## 2021-03-22 ENCOUNTER — TELEPHONE (OUTPATIENT)
Dept: CARDIOLOGY | Facility: MEDICAL CENTER | Age: 41
End: 2021-03-22

## 2021-03-22 DIAGNOSIS — I25.10 CORONARY ARTERY DISEASE INVOLVING NATIVE CORONARY ARTERY OF NATIVE HEART WITHOUT ANGINA PECTORIS: ICD-10-CM

## 2021-03-22 DIAGNOSIS — I10 ESSENTIAL HYPERTENSION: ICD-10-CM

## 2021-03-22 DIAGNOSIS — Z95.5 STENTED CORONARY ARTERY: ICD-10-CM

## 2021-03-22 DIAGNOSIS — R00.2 PALPITATIONS: ICD-10-CM

## 2021-03-22 DIAGNOSIS — I25.5 ISCHEMIC CARDIOMYOPATHY: ICD-10-CM

## 2021-03-22 RX ORDER — METOPROLOL SUCCINATE 25 MG/1
TABLET, EXTENDED RELEASE ORAL
Qty: 90 TABLET | Refills: 3 | Status: SHIPPED
Start: 2021-03-22 | End: 2021-08-06

## 2021-03-22 RX ORDER — NITROGLYCERIN 0.4 MG/1
0.4 TABLET SUBLINGUAL PRN
Qty: 25 TABLET | Refills: 1 | Status: ON HOLD | OUTPATIENT
Start: 2021-03-22 | End: 2021-08-29

## 2021-03-22 NOTE — TELEPHONE ENCOUNTER
AA    Pt called regarding Rx metoprolol SR (TOPROL XL) 25 MG TABLET SR 24 HR. Pt states he has 3 days left of medication and no more refills. Pt also states he lost the btl for Rx nitroglycerin (NITROSTAT) 0.4 MG SL Tab, that pt received in the hospital and is requesting a script for that as well. Pt uses the [facility] in [place]. Please call back if you have any further questions at [phone].      Thank you.

## 2021-04-15 ENCOUNTER — TELEPHONE (OUTPATIENT)
Dept: CARDIOLOGY | Facility: MEDICAL CENTER | Age: 41
End: 2021-04-15

## 2021-04-15 NOTE — TELEPHONE ENCOUNTER
Called Shira's pharmacy, spoke with Melody. She confirms prescription that was sent on 03/22/21 for 90 tabs/3 refills. She states a refill was picked up on 03/22 and so a refill is not ready at this time.    Called pt to discuss. He states when he picked up his refill, he only got 30 tablets. Advised pt to reach out to [facility]'s pharmacy to confirm as he should've gotten 90 tablets. If obtaining a refill is still an issue, he will give us a call back. Pt has 5 days left of medication. Pt verbalized understanding and appreciative of call.

## 2021-04-15 NOTE — TELEPHONE ENCOUNTER
AA    Pt called regarding Rx metoprolol SR (TOPROL XL) 25 MG TABLET SR 24 HR. Pt states that he only has 5 days left of this medication and would like it sent to the [facility] in [place]. Pt pharmacy is telling him that he has no refills and needs to call over to the Dr office. Pt also states that he thought it was a little weird when they only gave him a 30 day supply last month. Pt remembers talking about having a year supply of this medication with a 90 day dispense amount. Please call pt back to verify at [phone]    Thank you

## 2021-05-17 ENCOUNTER — TELEPHONE (OUTPATIENT)
Dept: CARDIOLOGY | Facility: MEDICAL CENTER | Age: 41
End: 2021-05-17

## 2021-05-17 NOTE — TELEPHONE ENCOUNTER
AA    Pt called asking for his lab orders to be sent to   [facility]   [address] [place] [id number]  # [phone]    Please call Pt when the orders have been sent to [phone].    Thank you

## 2021-05-17 NOTE — TELEPHONE ENCOUNTER
Spoke to patient in regards to standing lab orders, informed patient orders have been faxed to  [facility]:[id number] [facility] [facility] [address] [place]  [phone] [phone]. Confirmation fax has been to [facility]. Patient was grateful for call.

## 2021-05-22 DIAGNOSIS — I25.5 ISCHEMIC CARDIOMYOPATHY: ICD-10-CM

## 2021-05-22 DIAGNOSIS — E78.2 MIXED HYPERLIPIDEMIA: ICD-10-CM

## 2021-06-11 ENCOUNTER — TELEPHONE (OUTPATIENT)
Dept: CARDIOLOGY | Facility: MEDICAL CENTER | Age: 41
End: 2021-06-11

## 2021-06-11 NOTE — TELEPHONE ENCOUNTER
----- Message from Shira Franco M.D. sent at 6/10/2021  6:58 PM PDT -----    Labs reviewed.  Triglycerides not at goal.  Please increase fenofibrate to 145 mg daily.  Thank you  AA

## 2021-06-16 ENCOUNTER — OFFICE VISIT (OUTPATIENT)
Dept: CARDIOLOGY | Facility: MEDICAL CENTER | Age: 41
End: 2021-06-16
Payer: COMMERCIAL

## 2021-06-16 VITALS
HEIGHT: 65 IN | WEIGHT: 261 LBS | BODY MASS INDEX: 43.49 KG/M2 | OXYGEN SATURATION: 96 % | HEART RATE: 112 BPM | SYSTOLIC BLOOD PRESSURE: 132 MMHG | DIASTOLIC BLOOD PRESSURE: 80 MMHG

## 2021-06-16 DIAGNOSIS — I10 ESSENTIAL HYPERTENSION: ICD-10-CM

## 2021-06-16 DIAGNOSIS — E78.01 FAMILIAL HYPERCHOLESTEROLEMIA: ICD-10-CM

## 2021-06-16 DIAGNOSIS — R00.2 PALPITATIONS: Primary | ICD-10-CM

## 2021-06-16 DIAGNOSIS — I50.20 HEART FAILURE WITH REDUCED EJECTION FRACTION (HCC): ICD-10-CM

## 2021-06-16 DIAGNOSIS — I25.10 CORONARY ARTERY DISEASE INVOLVING NATIVE CORONARY ARTERY OF NATIVE HEART WITHOUT ANGINA PECTORIS: ICD-10-CM

## 2021-06-16 PROCEDURE — 99215 OFFICE O/P EST HI 40 MIN: CPT | Performed by: INTERNAL MEDICINE

## 2021-06-16 RX ORDER — FENOFIBRATE 120 MG/1
120 TABLET ORAL DAILY
Qty: 60 TABLET | Refills: 5 | Status: SHIPPED
Start: 2021-06-16 | End: 2021-08-06

## 2021-06-16 ASSESSMENT — ENCOUNTER SYMPTOMS
PALPITATIONS: 1
FALLS: 0
LOSS OF CONSCIOUSNESS: 0
DEPRESSION: 0
ABDOMINAL PAIN: 0
SHORTNESS OF BREATH: 0
PND: 0
ORTHOPNEA: 0
DIZZINESS: 0

## 2021-06-16 ASSESSMENT — FIBROSIS 4 INDEX: FIB4 SCORE: 0.66

## 2021-06-16 NOTE — PROGRESS NOTES
Chief Complaint   Patient presents with   • MI (Non ST Segment Elevation MI)     F/V Dx: Heart failure with reduced ejection fraction (HCC)   • Coronary Artery Disease     F/V Dx: Coronary artery disease involving native coronary artery of native heart without angina pectoris   • HTN (Controlled)   • Hyperlipidemia       Subjective:   Kyle Gonzalez is a 41 y.o. male who presents today to follow-up on coronary artery disease.    Pertinent history:  Coronary disease status post multivessel PCI (LAD, RCA, OM), most recent to the LAD in October 2020.  Ischemic cardiomyopathy  Hypertension  Hyperlipidemia  Diabetes      Patient's main concern today is palpitations that he experiences in the morning usually if he worked really hard the day before.  His symptoms usually occur for a few seconds but he has multiple episodes over 30 minutes usually with any associated dizziness.  His symptoms occur about twice a month.    He denies any heart failure or anginal symptoms.    Past Medical History:   Diagnosis Date   • Diabetes (HCC)    • Heart attack (HCC)    • Hyperlipidemia    • Hypertension    • Medical non-compliance 9/13/2015     Past Surgical History:   Procedure Laterality Date   • [place] TRANSCATH STENT INIT VESSEL,OPEN      x4     Family History   Problem Relation Age of Onset   • Diabetes Mother    • Heart Attack Mother    • Heart Attack Father      Social History     Socioeconomic History   • Marital status: Single     Spouse name: Not on file   • Number of children: Not on file   • Years of education: Not on file   • Highest education level: Not on file   Occupational History   • Not on file   Tobacco Use   • Smoking status: Current Some Day Smoker     Packs/day: 1.00     Years: 27.00     Pack years: 27.00     Types: Cigarettes     Start date: 3/17/1994   • Smokeless tobacco: Never Used   Vaping Use   • Vaping Use: Never used   Substance and Sexual Activity   • Alcohol use: Yes   • Drug use: Yes     Comment:  occasional marijuana    • Sexual activity: Not on file   Other Topics Concern   • Not on file   Social History Narrative   • Not on file     Social Determinants of Health     Financial Resource Strain:    • Difficulty of Paying Living Expenses:    Food Insecurity:    • Worried About Running Out of Food in the Last Year:    • Ran Out of Food in the Last Year:    Transportation Needs:    • Lack of Transportation (Medical):    • Lack of Transportation (Non-Medical):    Physical Activity:    • Days of Exercise per Week:    • Minutes of Exercise per Session:    Stress:    • Feeling of Stress :    Social Connections:    • Frequency of Communication with Friends and Family:    • Frequency of Social Gatherings with Friends and Family:    • Attends Denominational Services:    • Active Member of Clubs or Organizations:    • Attends Club or Organization Meetings:    • Marital Status:    Intimate Partner Violence:    • Fear of Current or Ex-Partner:    • Emotionally Abused:    • Physically Abused:    • Sexually Abused:      No Known Allergies  Outpatient Encounter Medications as of 6/16/2021   Medication Sig Dispense Refill   • fenofibrate 120 MG Tab Take 1 tablet by mouth every day. 60 tablet 5   • metoprolol SR (TOPROL XL) 25 MG TABLET SR 24 HR TAKE ONE TABLET BY MOUTH EVERY EVENING 90 tablet 3   • nitroglycerin (NITROSTAT) 0.4 MG SL Tab Place 1 Tab under tongue as needed for Chest Pain (up to 3 doses) if systolic BP >90 and no not on any viagra). call MD /hospital if not resolved after 25 tablet 1   • losartan (COZAAR) 50 MG Tab Take 1 Tab by mouth every day. 30 Tab 11   • aspirin EC (ECOTRIN) 81 MG Tablet Delayed Response Take 1 Tab by mouth every day. 100 Tab 3   • clopidogrel (PLAVIX) 75 MG Tab Take 1 tablet by mouth every day. 30 Tab 11   • ezetimibe (ZETIA) 10 MG Tab Take 1 tablet by mouth every day. 30 Tab 11   • rosuvastatin (CRESTOR) 40 MG tablet Take 1 tablet by mouth every evening. 30 Tab 11   • spironolactone  "(ALDACTONE) 25 MG Tab Take 1 tablet by mouth every day. 30 Tab 11   • [DISCONTINUED] fenofibrate (TRICOR) 48 MG Tab Take 1 tablet by mouth every day. 30 tablet 11     No facility-administered encounter medications on file as of 6/16/2021.     Review of Systems   Constitutional: Negative for malaise/fatigue.   Respiratory: Negative for shortness of breath.    Cardiovascular: Positive for palpitations. Negative for chest pain, orthopnea, leg swelling and PND.   Gastrointestinal: Negative for abdominal pain.   Musculoskeletal: Negative for falls.   Neurological: Negative for dizziness and loss of consciousness.   Psychiatric/Behavioral: Negative for depression.   All other systems reviewed and are negative.       Objective:   /80 (BP Location: Left arm, Patient Position: Sitting, BP Cuff Size: Adult)   Pulse (!) 112   Ht 1.651 m (5' 5\")   Wt 118 kg (261 lb)   SpO2 96%   BMI 43.43 kg/m²     Physical Exam   Constitutional: He is oriented to person, place, and time. He appears well-developed. No distress.   HENT:   Head: Normocephalic and atraumatic.   Eyes: Conjunctivae are normal. No scleral icterus.   Cardiovascular: Normal rate, regular rhythm and normal heart sounds. Exam reveals no gallop and no friction rub.   No murmur heard.  Pulmonary/Chest: Effort normal and breath sounds normal. No respiratory distress. He has no wheezes. He has no rales.   Musculoskeletal:      Cervical back: Normal range of motion and neck supple.   Neurological: He is alert and oriented to person, place, and time.   Skin: Skin is warm and dry. He is not diaphoretic.   Psychiatric: His behavior is normal. Judgment and thought content normal.   Nursing note and vitals reviewed.    Echocardiogram performed October 2020 showed severely reduced LV systolic function with an ejection fraction of 20%.    Assessment:     1. Palpitations  EC-ECHOCARDIOGRAM LTD W/O CONT   2. Coronary artery disease involving native coronary artery of native " heart without angina pectoris     3. Heart failure with reduced ejection fraction (HCC)  Cardiac Event Monitor   4. Essential hypertension     5. Familial hypercholesterolemia       Medical Decision Making:  Today's Assessment / Status / Plan:     Patient reports occasional palpitations.  He will be referred for an ambulatory EKG monitor for further evaluation.  If he is unable to afford this test, he will let us know.    He has known coronary artery disease status post multivessel PCI.  No anginal symptoms.  Continue aspirin and Plavix.    For his ischemic cardiomyopathy that was diagnosed in October 2020, we have been unable to repeat an echocardiogram because of patient being uninsured and financial constraints.  He now has access to healthcare and is willing to get a repeat echocardiogram done.  Limited echocardiogram ordered to reevaluate LV function.    Continue spironolactone, Toprol-XL and losartan at current dose.  Hypertension is well controlled.    Triglycerides still not at goal though improved from before.  Will increase fenofibrate to 120 mg daily.  Continue Crestor and Zetia at current dose.      41 minutes were spent on this patient's encounter today.  I was with the patient for 26 minutes.  The rest of the time was spent with chart review, documentation and coordination of care.    Return to clinic in 3 months or earlier if needed.    Thank you for allowing me to participate in the care of this patient. Please do not hesitate to contact me with any questions.    Shira Franco MD, [facility]  Cardiologist  [facility]    PLEASE NOTE: This dictation was created using voice recognition software.

## 2021-06-21 NOTE — TELEPHONE ENCOUNTER
Pt had appt w/ AA on 6/16, after AA orginially sent this message. AA increased pt's fenofibrate to 120 mg instead at that appt.

## 2021-06-25 ENCOUNTER — TELEPHONE (OUTPATIENT)
Dept: CARDIOLOGY | Facility: MEDICAL CENTER | Age: 41
End: 2021-06-25

## 2021-06-25 NOTE — TELEPHONE ENCOUNTER
AA    Pt called stating his prescription of fenofibrate 120 MG Tab is $1000 and is asking if there is a different medication he can take that is less expensive. Pt states he refilled the fenofibrate 48mg which cost $25.21 and is doubling the dose.   Pt would also like to discuss the cardiac event monitor AA told Pt to get.   Please call Pt back to discuss at [phone].    Thank you

## 2021-06-25 NOTE — TELEPHONE ENCOUNTER
Returned call. Pt states his pharmacy told him it is possible that the 120 mg of fenofibrate could cost up to $1000, but did not tell him what the actual co-pay would be, so he just asked for his 48 mg rx which costs $25 and will double up on his pills.     I asked pt to call his pharmacy and ask what his exact co-pay will be, informed him the 120 mg rx has been sent in. Advised pt that if co-pay if affordable then great, no further steps needed. If it is not affordable, I instructed pt to call his insurance and see if a tier exception can be done and also asked him to ask about their formulary alternatives in case tier exception is not able to be done or it is denied, then give us a call to update us. Also informed him about patient assistance.     Pt also wanted to know when he will get his heart monitor, I informed pt that I would ask the schedulers to call him to get this set up. He also asked about the cost of it, I informed him he will need to contact his insurance about this.

## 2021-08-04 ENCOUNTER — TELEPHONE (OUTPATIENT)
Dept: CARDIOLOGY | Facility: MEDICAL CENTER | Age: 41
End: 2021-08-04

## 2021-08-04 ENCOUNTER — NON-PROVIDER VISIT (OUTPATIENT)
Dept: CARDIOLOGY | Facility: MEDICAL CENTER | Age: 41
End: 2021-08-04
Payer: COMMERCIAL

## 2021-08-04 DIAGNOSIS — R00.2 PALPITATIONS: ICD-10-CM

## 2021-08-04 PROCEDURE — 99999 PR NO CHARGE: CPT | Performed by: STUDENT IN AN ORGANIZED HEALTH CARE EDUCATION/TRAINING PROGRAM

## 2021-08-04 NOTE — TELEPHONE ENCOUNTER
Home enrollment completed for the 30 day [employer] MCT Heart monitoring program per Patel Dunbar MD.  Monitor to be shipped to patient by [employer].  >Pending Baseline.  >Pending EOS.

## 2021-08-06 ENCOUNTER — TELEPHONE (OUTPATIENT)
Dept: CARDIOLOGY | Facility: MEDICAL CENTER | Age: 41
End: 2021-08-06

## 2021-08-06 ENCOUNTER — APPOINTMENT (OUTPATIENT)
Dept: RADIOLOGY | Facility: MEDICAL CENTER | Age: 41
DRG: 871 | End: 2021-08-06
Attending: EMERGENCY MEDICINE
Payer: MEDICAID

## 2021-08-06 ENCOUNTER — HOSPITAL ENCOUNTER (INPATIENT)
Facility: MEDICAL CENTER | Age: 41
LOS: 24 days | DRG: 871 | End: 2021-08-30
Attending: EMERGENCY MEDICINE | Admitting: STUDENT IN AN ORGANIZED HEALTH CARE EDUCATION/TRAINING PROGRAM
Payer: MEDICAID

## 2021-08-06 DIAGNOSIS — J96.01 SEPSIS WITH ACUTE HYPOXIC RESPIRATORY FAILURE AND SEPTIC SHOCK, DUE TO UNSPECIFIED ORGANISM (HCC): ICD-10-CM

## 2021-08-06 DIAGNOSIS — U07.1 PNEUMONIA DUE TO COVID-19 VIRUS: ICD-10-CM

## 2021-08-06 DIAGNOSIS — A41.9 SEPSIS WITH ACUTE HYPOXIC RESPIRATORY FAILURE AND SEPTIC SHOCK, DUE TO UNSPECIFIED ORGANISM (HCC): ICD-10-CM

## 2021-08-06 DIAGNOSIS — T80.818A EXTRAVASATION OF OTHER VESICANT AGENT, INITIAL ENCOUNTER: ICD-10-CM

## 2021-08-06 DIAGNOSIS — J96.01 ACUTE RESPIRATORY FAILURE WITH HYPOXIA (HCC): ICD-10-CM

## 2021-08-06 DIAGNOSIS — J12.82 PNEUMONIA DUE TO COVID-19 VIRUS: ICD-10-CM

## 2021-08-06 DIAGNOSIS — R65.21 SEPSIS WITH ACUTE HYPOXIC RESPIRATORY FAILURE AND SEPTIC SHOCK, DUE TO UNSPECIFIED ORGANISM (HCC): ICD-10-CM

## 2021-08-06 DIAGNOSIS — R09.02 HYPOXIA: ICD-10-CM

## 2021-08-06 PROBLEM — E87.20 LACTIC ACIDOSIS: Status: ACTIVE | Noted: 2021-08-06

## 2021-08-06 PROBLEM — E87.1 HYPONATREMIA: Status: ACTIVE | Noted: 2021-08-06

## 2021-08-06 PROBLEM — E66.01 MORBID OBESITY WITH BMI OF 40.0-44.9, ADULT (HCC): Status: ACTIVE | Noted: 2021-08-06

## 2021-08-06 PROBLEM — E11.9 TYPE 2 DIABETES MELLITUS WITHOUT COMPLICATION, WITHOUT LONG-TERM CURRENT USE OF INSULIN (HCC): Status: ACTIVE | Noted: 2021-08-06

## 2021-08-06 PROBLEM — D72.829 LEUKOCYTOSIS: Status: ACTIVE | Noted: 2021-08-06

## 2021-08-06 LAB
ALBUMIN SERPL BCP-MCNC: 3.4 G/DL (ref 3.2–4.9)
ALBUMIN/GLOB SERPL: 0.8 G/DL
ALP SERPL-CCNC: 79 U/L (ref 30–99)
ALT SERPL-CCNC: 64 U/L (ref 2–50)
AMPHET UR QL SCN: NEGATIVE
ANION GAP SERPL CALC-SCNC: 15 MMOL/L (ref 7–16)
AST SERPL-CCNC: 112 U/L (ref 12–45)
BARBITURATES UR QL SCN: NEGATIVE
BASOPHILS # BLD AUTO: 0.4 % (ref 0–1.8)
BASOPHILS # BLD: 0.02 K/UL (ref 0–0.12)
BENZODIAZ UR QL SCN: NEGATIVE
BILIRUB SERPL-MCNC: 0.2 MG/DL (ref 0.1–1.5)
BUN SERPL-MCNC: 12 MG/DL (ref 8–22)
BZE UR QL SCN: NEGATIVE
CALCIUM SERPL-MCNC: 8.3 MG/DL (ref 8.5–10.5)
CANNABINOIDS UR QL SCN: NEGATIVE
CHLORIDE SERPL-SCNC: 92 MMOL/L (ref 96–112)
CO2 SERPL-SCNC: 18 MMOL/L (ref 20–33)
CREAT SERPL-MCNC: 0.86 MG/DL (ref 0.5–1.4)
CRP SERPL HS-MCNC: 6.68 MG/DL (ref 0–0.75)
D DIMER PPP IA.FEU-MCNC: 1.72 UG/ML (FEU) (ref 0–0.5)
EKG IMPRESSION: NORMAL
EOSINOPHIL # BLD AUTO: 0 K/UL (ref 0–0.51)
EOSINOPHIL NFR BLD: 0 % (ref 0–6.9)
ERYTHROCYTE [DISTWIDTH] IN BLOOD BY AUTOMATED COUNT: 43.2 FL (ref 35.9–50)
EST. AVERAGE GLUCOSE BLD GHB EST-MCNC: 278 MG/DL
GLOBULIN SER CALC-MCNC: 4.2 G/DL (ref 1.9–3.5)
GLUCOSE BLD-MCNC: 331 MG/DL (ref 65–99)
GLUCOSE BLD-MCNC: 337 MG/DL (ref 65–99)
GLUCOSE SERPL-MCNC: 309 MG/DL (ref 65–99)
HBA1C MFR BLD: 11.3 % (ref 4–5.6)
HCT VFR BLD AUTO: 44 % (ref 42–52)
HGB BLD-MCNC: 14.9 G/DL (ref 14–18)
IMM GRANULOCYTES # BLD AUTO: 0.02 K/UL (ref 0–0.11)
IMM GRANULOCYTES NFR BLD AUTO: 0.4 % (ref 0–0.9)
LACTATE BLD-SCNC: 1.6 MMOL/L (ref 0.5–2)
LACTATE BLD-SCNC: 2.2 MMOL/L (ref 0.5–2)
LYMPHOCYTES # BLD AUTO: 0.82 K/UL (ref 1–4.8)
LYMPHOCYTES NFR BLD: 15.2 % (ref 22–41)
MAGNESIUM SERPL-MCNC: 2 MG/DL (ref 1.5–2.5)
MCH RBC QN AUTO: 29.7 PG (ref 27–33)
MCHC RBC AUTO-ENTMCNC: 33.9 G/DL (ref 33.7–35.3)
MCV RBC AUTO: 87.8 FL (ref 81.4–97.8)
METHADONE UR QL SCN: NEGATIVE
MONOCYTES # BLD AUTO: 0.24 K/UL (ref 0–0.85)
MONOCYTES NFR BLD AUTO: 4.5 % (ref 0–13.4)
NEUTROPHILS # BLD AUTO: 4.29 K/UL (ref 1.82–7.42)
NEUTROPHILS NFR BLD: 79.5 % (ref 44–72)
NRBC # BLD AUTO: 0 K/UL
NRBC BLD-RTO: 0 /100 WBC
NT-PROBNP SERPL IA-MCNC: 65 PG/ML (ref 0–125)
OPIATES UR QL SCN: NEGATIVE
OXYCODONE UR QL SCN: NEGATIVE
PCP UR QL SCN: NEGATIVE
PLATELET # BLD AUTO: 197 K/UL (ref 164–446)
PMV BLD AUTO: 9.9 FL (ref 9–12.9)
POTASSIUM SERPL-SCNC: 3.8 MMOL/L (ref 3.6–5.5)
PROCALCITONIN SERPL-MCNC: 0.87 NG/ML
PROPOXYPH UR QL SCN: NEGATIVE
PROT SERPL-MCNC: 7.6 G/DL (ref 6–8.2)
RBC # BLD AUTO: 5.01 M/UL (ref 4.7–6.1)
SODIUM SERPL-SCNC: 125 MMOL/L (ref 135–145)
TROPONIN T SERPL-MCNC: 18 NG/L (ref 6–19)
WBC # BLD AUTO: 5.4 K/UL (ref 4.8–10.8)

## 2021-08-06 PROCEDURE — 83880 ASSAY OF NATRIURETIC PEPTIDE: CPT

## 2021-08-06 PROCEDURE — 82962 GLUCOSE BLOOD TEST: CPT

## 2021-08-06 PROCEDURE — 700105 HCHG RX REV CODE 258: Performed by: STUDENT IN AN ORGANIZED HEALTH CARE EDUCATION/TRAINING PROGRAM

## 2021-08-06 PROCEDURE — 99285 EMERGENCY DEPT VISIT HI MDM: CPT

## 2021-08-06 PROCEDURE — 80307 DRUG TEST PRSMV CHEM ANLYZR: CPT

## 2021-08-06 PROCEDURE — 96372 THER/PROPH/DIAG INJ SC/IM: CPT

## 2021-08-06 PROCEDURE — 83735 ASSAY OF MAGNESIUM: CPT

## 2021-08-06 PROCEDURE — 71045 X-RAY EXAM CHEST 1 VIEW: CPT

## 2021-08-06 PROCEDURE — 700102 HCHG RX REV CODE 250 W/ 637 OVERRIDE(OP): Performed by: EMERGENCY MEDICINE

## 2021-08-06 PROCEDURE — 87040 BLOOD CULTURE FOR BACTERIA: CPT

## 2021-08-06 PROCEDURE — 84484 ASSAY OF TROPONIN QUANT: CPT

## 2021-08-06 PROCEDURE — A9270 NON-COVERED ITEM OR SERVICE: HCPCS | Performed by: STUDENT IN AN ORGANIZED HEALTH CARE EDUCATION/TRAINING PROGRAM

## 2021-08-06 PROCEDURE — 80053 COMPREHEN METABOLIC PANEL: CPT

## 2021-08-06 PROCEDURE — 99223 1ST HOSP IP/OBS HIGH 75: CPT | Performed by: STUDENT IN AN ORGANIZED HEALTH CARE EDUCATION/TRAINING PROGRAM

## 2021-08-06 PROCEDURE — 86140 C-REACTIVE PROTEIN: CPT

## 2021-08-06 PROCEDURE — 83605 ASSAY OF LACTIC ACID: CPT

## 2021-08-06 PROCEDURE — 83036 HEMOGLOBIN GLYCOSYLATED A1C: CPT

## 2021-08-06 PROCEDURE — A9270 NON-COVERED ITEM OR SERVICE: HCPCS | Performed by: EMERGENCY MEDICINE

## 2021-08-06 PROCEDURE — 770020 HCHG ROOM/CARE - TELE (206)

## 2021-08-06 PROCEDURE — 700102 HCHG RX REV CODE 250 W/ 637 OVERRIDE(OP): Performed by: STUDENT IN AN ORGANIZED HEALTH CARE EDUCATION/TRAINING PROGRAM

## 2021-08-06 PROCEDURE — 85379 FIBRIN DEGRADATION QUANT: CPT

## 2021-08-06 PROCEDURE — 93005 ELECTROCARDIOGRAM TRACING: CPT | Performed by: EMERGENCY MEDICINE

## 2021-08-06 PROCEDURE — 700105 HCHG RX REV CODE 258: Performed by: EMERGENCY MEDICINE

## 2021-08-06 PROCEDURE — 84145 PROCALCITONIN (PCT): CPT

## 2021-08-06 PROCEDURE — 85025 COMPLETE CBC W/AUTO DIFF WBC: CPT

## 2021-08-06 RX ORDER — CLONIDINE HYDROCHLORIDE 0.1 MG/1
0.1 TABLET ORAL EVERY 6 HOURS PRN
Status: DISCONTINUED | OUTPATIENT
Start: 2021-08-06 | End: 2021-08-17

## 2021-08-06 RX ORDER — METOPROLOL SUCCINATE 25 MG/1
25 TABLET, EXTENDED RELEASE ORAL EVERY EVENING
Status: DISCONTINUED | OUTPATIENT
Start: 2021-08-06 | End: 2021-08-13

## 2021-08-06 RX ORDER — AMOXICILLIN 250 MG
2 CAPSULE ORAL 2 TIMES DAILY
Status: DISCONTINUED | OUTPATIENT
Start: 2021-08-06 | End: 2021-08-30 | Stop reason: HOSPADM

## 2021-08-06 RX ORDER — INSULIN LISPRO 100 [IU]/ML
1-6 INJECTION, SOLUTION INTRAVENOUS; SUBCUTANEOUS EVERY 6 HOURS
Status: DISCONTINUED | OUTPATIENT
Start: 2021-08-06 | End: 2021-08-08

## 2021-08-06 RX ORDER — PROMETHAZINE HYDROCHLORIDE 25 MG/1
12.5-25 SUPPOSITORY RECTAL EVERY 4 HOURS PRN
Status: DISCONTINUED | OUTPATIENT
Start: 2021-08-06 | End: 2021-08-17

## 2021-08-06 RX ORDER — ONDANSETRON 4 MG/1
4 TABLET, ORALLY DISINTEGRATING ORAL EVERY 4 HOURS PRN
Status: DISCONTINUED | OUTPATIENT
Start: 2021-08-06 | End: 2021-08-30 | Stop reason: HOSPADM

## 2021-08-06 RX ORDER — POLYETHYLENE GLYCOL 3350 17 G/17G
1 POWDER, FOR SOLUTION ORAL
Status: DISCONTINUED | OUTPATIENT
Start: 2021-08-06 | End: 2021-08-30 | Stop reason: HOSPADM

## 2021-08-06 RX ORDER — LOSARTAN POTASSIUM 50 MG/1
50 TABLET ORAL DAILY
Status: DISCONTINUED | OUTPATIENT
Start: 2021-08-06 | End: 2021-08-13

## 2021-08-06 RX ORDER — EZETIMIBE 10 MG/1
10 TABLET ORAL DAILY
Status: DISCONTINUED | OUTPATIENT
Start: 2021-08-06 | End: 2021-08-30 | Stop reason: HOSPADM

## 2021-08-06 RX ORDER — ENALAPRILAT 1.25 MG/ML
1.25 INJECTION INTRAVENOUS EVERY 6 HOURS PRN
Status: DISCONTINUED | OUTPATIENT
Start: 2021-08-06 | End: 2021-08-17

## 2021-08-06 RX ORDER — SODIUM CHLORIDE, SODIUM LACTATE, POTASSIUM CHLORIDE, CALCIUM CHLORIDE 600; 310; 30; 20 MG/100ML; MG/100ML; MG/100ML; MG/100ML
INJECTION, SOLUTION INTRAVENOUS CONTINUOUS
Status: DISCONTINUED | OUTPATIENT
Start: 2021-08-06 | End: 2021-08-07

## 2021-08-06 RX ORDER — ACETAMINOPHEN 325 MG/1
650 TABLET ORAL ONCE
Status: COMPLETED | OUTPATIENT
Start: 2021-08-06 | End: 2021-08-06

## 2021-08-06 RX ORDER — FENOFIBRATE 48 MG/1
48 TABLET, COATED ORAL EVERY MORNING
COMMUNITY
End: 2022-01-13

## 2021-08-06 RX ORDER — METOPROLOL SUCCINATE 25 MG/1
25 TABLET, EXTENDED RELEASE ORAL EVERY EVENING
Status: ON HOLD | COMMUNITY
End: 2021-08-29

## 2021-08-06 RX ORDER — GUAIFENESIN 600 MG/1
600 TABLET, EXTENDED RELEASE ORAL EVERY 12 HOURS PRN
Status: ON HOLD | COMMUNITY
End: 2021-12-07

## 2021-08-06 RX ORDER — PROMETHAZINE HYDROCHLORIDE 25 MG/1
12.5-25 TABLET ORAL EVERY 4 HOURS PRN
Status: DISCONTINUED | OUTPATIENT
Start: 2021-08-06 | End: 2021-08-17

## 2021-08-06 RX ORDER — DEXTROSE MONOHYDRATE 25 G/50ML
50 INJECTION, SOLUTION INTRAVENOUS
Status: DISCONTINUED | OUTPATIENT
Start: 2021-08-06 | End: 2021-08-08

## 2021-08-06 RX ORDER — ACETAMINOPHEN 500 MG
1000 TABLET ORAL 3 TIMES DAILY
Status: DISCONTINUED | OUTPATIENT
Start: 2021-08-06 | End: 2021-08-29

## 2021-08-06 RX ORDER — BISACODYL 10 MG
10 SUPPOSITORY, RECTAL RECTAL
Status: DISCONTINUED | OUTPATIENT
Start: 2021-08-06 | End: 2021-08-30 | Stop reason: HOSPADM

## 2021-08-06 RX ORDER — ONDANSETRON 2 MG/ML
4 INJECTION INTRAMUSCULAR; INTRAVENOUS EVERY 4 HOURS PRN
Status: DISCONTINUED | OUTPATIENT
Start: 2021-08-06 | End: 2021-08-30 | Stop reason: HOSPADM

## 2021-08-06 RX ORDER — ACETAMINOPHEN 500 MG
1000 TABLET ORAL EVERY 6 HOURS PRN
Status: ON HOLD | COMMUNITY
End: 2021-12-07

## 2021-08-06 RX ORDER — DEXAMETHASONE SODIUM PHOSPHATE 4 MG/ML
6 INJECTION, SOLUTION INTRA-ARTICULAR; INTRALESIONAL; INTRAMUSCULAR; INTRAVENOUS; SOFT TISSUE DAILY
Status: COMPLETED | OUTPATIENT
Start: 2021-08-07 | End: 2021-08-15

## 2021-08-06 RX ORDER — CLOPIDOGREL BISULFATE 75 MG/1
75 TABLET ORAL DAILY
Status: DISCONTINUED | OUTPATIENT
Start: 2021-08-06 | End: 2021-08-30 | Stop reason: HOSPADM

## 2021-08-06 RX ORDER — DEXAMETHASONE 4 MG/1
6 TABLET ORAL ONCE
Status: COMPLETED | OUTPATIENT
Start: 2021-08-06 | End: 2021-08-06

## 2021-08-06 RX ORDER — GUAIFENESIN/DEXTROMETHORPHAN 100-10MG/5
10 SYRUP ORAL EVERY 6 HOURS PRN
Status: DISCONTINUED | OUTPATIENT
Start: 2021-08-06 | End: 2021-08-30 | Stop reason: HOSPADM

## 2021-08-06 RX ORDER — SODIUM CHLORIDE 9 MG/ML
500 INJECTION, SOLUTION INTRAVENOUS ONCE
Status: COMPLETED | OUTPATIENT
Start: 2021-08-06 | End: 2021-08-06

## 2021-08-06 RX ORDER — LABETALOL HYDROCHLORIDE 5 MG/ML
10 INJECTION, SOLUTION INTRAVENOUS EVERY 4 HOURS PRN
Status: DISCONTINUED | OUTPATIENT
Start: 2021-08-06 | End: 2021-08-30 | Stop reason: HOSPADM

## 2021-08-06 RX ORDER — SPIRONOLACTONE 25 MG/1
25 TABLET ORAL DAILY
Status: DISCONTINUED | OUTPATIENT
Start: 2021-08-06 | End: 2021-08-13

## 2021-08-06 RX ORDER — ROSUVASTATIN CALCIUM 20 MG/1
40 TABLET, COATED ORAL EVERY EVENING
Status: DISCONTINUED | OUTPATIENT
Start: 2021-08-06 | End: 2021-08-30 | Stop reason: HOSPADM

## 2021-08-06 RX ORDER — PROCHLORPERAZINE EDISYLATE 5 MG/ML
5-10 INJECTION INTRAMUSCULAR; INTRAVENOUS EVERY 4 HOURS PRN
Status: DISCONTINUED | OUTPATIENT
Start: 2021-08-06 | End: 2021-08-17

## 2021-08-06 RX ADMIN — SODIUM CHLORIDE 500 ML: 9 INJECTION, SOLUTION INTRAVENOUS at 13:45

## 2021-08-06 RX ADMIN — ACETAMINOPHEN 1000 MG: 500 TABLET, FILM COATED ORAL at 20:42

## 2021-08-06 RX ADMIN — ACETAMINOPHEN 650 MG: 325 TABLET, FILM COATED ORAL at 11:00

## 2021-08-06 RX ADMIN — INSULIN LISPRO 4 UNITS: 100 INJECTION, SOLUTION INTRAVENOUS; SUBCUTANEOUS at 14:00

## 2021-08-06 RX ADMIN — SODIUM CHLORIDE, POTASSIUM CHLORIDE, SODIUM LACTATE AND CALCIUM CHLORIDE: 600; 310; 30; 20 INJECTION, SOLUTION INTRAVENOUS at 20:43

## 2021-08-06 RX ADMIN — INSULIN LISPRO 5 UNITS: 100 INJECTION, SOLUTION INTRAVENOUS; SUBCUTANEOUS at 22:49

## 2021-08-06 RX ADMIN — DEXAMETHASONE 6 MG: 4 TABLET ORAL at 11:15

## 2021-08-06 ASSESSMENT — ENCOUNTER SYMPTOMS
SPUTUM PRODUCTION: 1
FEVER: 1
WEAKNESS: 1
COUGH: 1
DIARRHEA: 1
SHORTNESS OF BREATH: 1
PSYCHIATRIC NEGATIVE: 1
PALPITATIONS: 1
CONSTIPATION: 0
EYES NEGATIVE: 1
HEADACHES: 1
DIAPHORESIS: 1
BLOOD IN STOOL: 0
CHILLS: 1
BACK PAIN: 1

## 2021-08-06 ASSESSMENT — LIFESTYLE VARIABLES
HAVE YOU EVER FELT YOU SHOULD CUT DOWN ON YOUR DRINKING: NO
ON A TYPICAL DAY WHEN YOU DRINK ALCOHOL HOW MANY DRINKS DO YOU HAVE: 2
HOW MANY TIMES IN THE PAST YEAR HAVE YOU HAD 5 OR MORE DRINKS IN A DAY: 0
HAVE PEOPLE ANNOYED YOU BY CRITICIZING YOUR DRINKING: NO
DOES PATIENT WANT TO STOP DRINKING: CANNOT ASSESS
TOTAL SCORE: 0
HAVE YOU EVER FELT YOU SHOULD CUT DOWN ON YOUR DRINKING: NO
TOTAL SCORE: 0
TOTAL SCORE: 0
EVER FELT BAD OR GUILTY ABOUT YOUR DRINKING: NO
TOTAL SCORE: 0
CONSUMPTION TOTAL: NEGATIVE
DO YOU DRINK ALCOHOL: NO
EVER HAD A DRINK FIRST THING IN THE MORNING TO STEADY YOUR NERVES TO GET RID OF A HANGOVER: NO
TOTAL SCORE: 0
EVER HAD A DRINK FIRST THING IN THE MORNING TO STEADY YOUR NERVES TO GET RID OF A HANGOVER: NO
AVERAGE NUMBER OF DAYS PER WEEK YOU HAVE A DRINK CONTAINING ALCOHOL: 3
DOES PATIENT WANT TO STOP DRINKING: NO
ALCOHOL_USE: NO
TOTAL SCORE: 0
EVER FELT BAD OR GUILTY ABOUT YOUR DRINKING: NO
CONSUMPTION TOTAL: INCOMPLETE
HAVE PEOPLE ANNOYED YOU BY CRITICIZING YOUR DRINKING: NO

## 2021-08-06 ASSESSMENT — COGNITIVE AND FUNCTIONAL STATUS - GENERAL
DAILY ACTIVITIY SCORE: 24
SUGGESTED CMS G CODE MODIFIER DAILY ACTIVITY: CH
MOBILITY SCORE: 24
SUGGESTED CMS G CODE MODIFIER MOBILITY: CH

## 2021-08-06 ASSESSMENT — PATIENT HEALTH QUESTIONNAIRE - PHQ9
1. LITTLE INTEREST OR PLEASURE IN DOING THINGS: NOT AT ALL
9. THOUGHTS THAT YOU WOULD BE BETTER OFF DEAD, OR OF HURTING YOURSELF: NOT AT ALL
2. FEELING DOWN, DEPRESSED, IRRITABLE, OR HOPELESS: SEVERAL DAYS
7. TROUBLE CONCENTRATING ON THINGS, SUCH AS READING THE NEWSPAPER OR WATCHING TELEVISION: NOT AT ALL
5. POOR APPETITE OR OVEREATING: SEVERAL DAYS
SUM OF ALL RESPONSES TO PHQ9 QUESTIONS 1 AND 2: 1
8. MOVING OR SPEAKING SO SLOWLY THAT OTHER PEOPLE COULD HAVE NOTICED. OR THE OPPOSITE, BEING SO FIGETY OR RESTLESS THAT YOU HAVE BEEN MOVING AROUND A LOT MORE THAN USUAL: NOT AT ALL
4. FEELING TIRED OR HAVING LITTLE ENERGY: SEVERAL DAYS

## 2021-08-06 ASSESSMENT — FIBROSIS 4 INDEX
FIB4 SCORE: 2.84
FIB4 SCORE: 2.84

## 2021-08-06 ASSESSMENT — PAIN DESCRIPTION - PAIN TYPE
TYPE: ACUTE PAIN
TYPE: ACUTE PAIN

## 2021-08-06 NOTE — ED PROVIDER NOTES
ED Provider Note    CHIEF COMPLAINT  No chief complaint on file.      HPI  Kyle Gonzalez is a 41 y.o. male with a history of coronary artery disease, status post MI, status post multiple stent placement, hypertension, hyperlipidemia, type 2 diabetes mellitus, medical noncompliance, who presents with complaints of fever, chills, muscle aches and pains, cough, and shortness of breath.  The patient says he first became ill with a fever and chills about a week ago.  He had a positive test for Covid in [facility] 3 days ago.  He noticed since yesterday he has been feeling short of breath worse with exertional activity.  The shortness of breath came worse today so he presents emergency department.  He is found to be hypoxic with oxygen saturation of 82% on room air and was placed on supplemental oxygen.  The patient continues to have fever, chills, and body aches and pains.  He has had a cough productive of a clear to whitish sputum.  He did have an episode of vomiting after a coughing episode, but denies any significant diarrhea.    REVIEW OF SYSTEMS  See HPI for further details. All other systems are negative.     PAST MEDICAL HISTORY  Past Medical History:   Diagnosis Date   • Diabetes (HCC)    • Heart attack (HCC)    • Hyperlipidemia    • Hypertension    • Medical non-compliance 9/13/2015       FAMILY HISTORY  Family History   Problem Relation Age of Onset   • Diabetes Mother    • Heart Attack Mother    • Heart Attack Father        SOCIAL HISTORY  Social History     Tobacco Use   • Smoking status: Current Some Day Smoker     Packs/day: 1.00     Years: 27.00     Pack years: 27.00     Types: Cigarettes     Start date: 3/17/1994   • Smokeless tobacco: Never Used   Vaping Use   • Vaping Use: Never used   Substance Use Topics   • Alcohol use: Yes   • Drug use: Yes     Comment: occasional marijuana       Social History     Substance and Sexual Activity   Drug Use Yes    Comment: occasional marijuana        SURGICAL  HISTORY  Past Surgical History:   Procedure Laterality Date   • MA TRANSCATH STENT INIT VESSEL,OPEN      x4       CURRENT MEDICATIONS  Home Medications     Reviewed by Josey Landers (Pharmacy Tech) on 08/06/21 at 1105  Med List Status: Complete    Medication Last Dose Status    acetaminophen (TYLENOL) 500 MG Tab 8/4/2021 Active    aspirin EC (ECOTRIN) 81 MG Tablet Delayed Response 8/5/2021 Active    clopidogrel (PLAVIX) 75 MG Tab 8/5/2021 Active    DM-Doxylamine-Acetaminophen (NYQUIL HBP COLD & FLU) 15-6. MG/15ML Liquid 8/6/2021 Active    ezetimibe (ZETIA) 10 MG Tab 8/5/2021 Active    fenofibrate (TRICOR) 48 MG Tab 8/5/2021 Active    guaiFENesin ER (MUCINEX) 600 MG TABLET SR 12 HR 8/5/2021 Active    losartan (COZAAR) 50 MG Tab 8/5/2021 Active    metoprolol SR (TOPROL XL) 25 MG TABLET SR 24 HR 8/5/2021 Active    nitroglycerin (NITROSTAT) 0.4 MG SL Tab >2 months Active    rosuvastatin (CRESTOR) 40 MG tablet 8/5/2021 Active    spironolactone (ALDACTONE) 25 MG Tab 8/5/2021 Active                ALLERGIES  No Known Allergies    PHYSICAL EXAM0  VITAL SIGNS: Blood Pressure 103/58   Pulse (Abnormal) 109   Temperature (Abnormal) 39.2 °C (102.5 °F) (Temporal)   Respiration (Abnormal) 25   Oxygen Saturation 89%   Constitutional: Awake, alert, in no acute distress, Non-toxic appearance.   HENT: Atraumatic. Bilateral external ears normal, mucous membranes highly dry, throat nonerythematous without exudates, nose is normal.  Eyes: PERRL, EOMI, conjunctiva moist, noninjected.  Neck: Nontender, Normal range of motion, No nuchal rigidity, No stridor.   Lymphatic: No lymphadenopathy noted.   Cardiovascular: Regular rhythm, tachycardic, rate in the 120s, no murmurs, rubs, gallops.  Thorax & Lungs:  Good breath sounds bilaterally, bibasilar crackles, no wheezes or retractions.  No chest tenderness.  Abdomen: Bowel sounds normal, Soft, nontender, nondistended, no rebound, guarding, masses.  Back: No CVA or spinal  "tenderness.  Extremities: Intact distal pulses, No edema, No tenderness.   Skin: Warm, Dry, No rashes.   Musculoskeletal: No joint swelling or tenderness.  Neurologic: Alert & oriented x 3, sensory and motor function normal. No focal deficits.   Psychiatric: Affect normal, Judgment normal, Mood normal.     EKG  EKG Interpretation:  Interpreted by me    Rhythm: Sinus tachycardia  Rate: 116  Intervals: Normal  Axis: normal  Ectopy: none  Conduction: normal, left ventricular hypertrophy  ST Segments: no evidence of elevation or depression  T Waves: Inverted T wave in lead III  Q Waves: 3, aVF  Clinical Impression: No acute injury or ischemic pattern.   Comparison to previous EKG from October 2020 shows no acute changes.      LABS  Labs Reviewed   LACTIC ACID - Abnormal; Notable for the following components:       Result Value    Lactic Acid 2.2 (*)     All other components within normal limits   CBC WITH DIFFERENTIAL - Abnormal; Notable for the following components:    Neutrophils-Polys 79.50 (*)     Lymphocytes 15.20 (*)     Lymphs (Absolute) 0.82 (*)     All other components within normal limits   COMP METABOLIC PANEL - Abnormal; Notable for the following components:    Sodium 125 (*)     Chloride 92 (*)     Co2 18 (*)     Glucose 309 (*)     Calcium 8.3 (*)     AST(SGOT) 112 (*)     ALT(SGPT) 64 (*)     Globulin 4.2 (*)     All other components within normal limits   BLOOD CULTURE    Narrative:     Per Hospital Policy: Only change Specimen Src: to \"Line\" if  specified by physician order.   BLOOD CULTURE    Narrative:     Per Hospital Policy: Only change Specimen Src: to \"Line\" if  specified by physician order.   PROBRAIN NATRIURETIC PEPTIDE, NT   TROPONIN   ESTIMATED GFR   LACTIC ACID   LACTIC ACID       All labs reviewed by me.      RADIOLOGY/PROCEDURES  DX-CHEST-PORTABLE (1 VIEW)   Final Result      Bilateral peripheral pulmonary airspace process consistent with Covid pneumonia          The radiologist's " interpretations of all radiological studies have been reviewed by me.        COURSE & MEDICAL DECISION MAKING  Pertinent Labs & Imaging studies reviewed. (See chart for details)  The patient presents with the above complaints.  He was noted be hypoxic in triage and was placed on supplemental oxygen.  He was febrile and was given a dose of Tylenol.  Sepsis protocol was initiated.  The patient was not given any IV fluid bolus given that he has had a positive test for Covid.      X-ray shows bilateral peripheral pulmonary airspace process consistent with Covid pneumonia per radiology.  CBC shows a normal white count of 5400, 80% polys, chemistry shows a sodium 125, CO2 18, glucose 309, SGOT 112, SGPT 64, total bilirubin 0.2, lactic acid 2.2.  Opponent 18.  The patient was started on normal saline because of his low sodium and elevated blood sugar.  He was given a dose of insulin 5 units IV.  He was given a dose of Decadron for his Covid pneumonia.  Given his symptoms and hypoxia, patient will be admitted.  Case was discussed with the hospitalist Dr. Cm.  At his request, Dr. Cornell of infectious disease was consulted and will see the patient.      FINAL IMPRESSION  1. Pneumonia due to COVID-19 virus    2. Hypoxia          Electronically signed by: Dangelo Hawkins M.D., 8/6/2021 10:44 AM

## 2021-08-06 NOTE — TELEPHONE ENCOUNTER
Pt's. Ex-wife called. Pt. Tested positive for Covid 2 days. Today he has increased chest pressure and dyspnea.   Sheree was advised that pt. Should go to ER now.

## 2021-08-06 NOTE — ASSESSMENT & PLAN NOTE
This is Sepsis Present on admission  SIRS criteria identified on my evaluation include: Fever, with temperature greater than 101 deg F, Tachycardia, with heart rate greater than 90 BPM, Tachypnea, with respirations greater than 20 per minute and Leukocytosis, with WBC greater than 12,000  Source is Covid pneumonia  Sepsis protocol initiated  Fluid resuscitation ordered per protocol  IV antibiotics as appropriate for source of sepsis  While organ dysfunction may be noted elsewhere in this problem list or in the chart, degree of organ dysfunction does not meet CMS criteria for severe sepsis  Treated successfully

## 2021-08-06 NOTE — ASSESSMENT & PLAN NOTE
Secondary to Covid pneumonia  Continue oxygen weaning, the patient received full dose and appropriate COVID-19 treatment  Patient is on therapeutic dose of Lovenox for DVT.  Will defer  CTA of pulmonary artery, as it would not change the management  Self proning as tolerated  Lasix with holding parameters  8/25 on 15 L oxygen mask  8/26 10 L oxygen mask  8/27 on 9 L oxygen mask  8/28 on 8 L oxygen mask, SPO2 93%.  8/29 on 6 L oxygen mask SPO2 93%

## 2021-08-06 NOTE — ASSESSMENT & PLAN NOTE
Patient with overall improving status now after appropriate treatment, steroid completed  Ongoing pulmonary care, oxygen weaning  Proning  Repeat procalcitonin is low

## 2021-08-06 NOTE — ASSESSMENT & PLAN NOTE
Contributing to severity of Covid pneumonia hypoxic respiratory failure  Counseling on weight reduction

## 2021-08-06 NOTE — ED NOTES
Med rec complete per Pt and Pt's significant other at bedside, with medication bottles provided by Pt. Medication bottles reviewed and returned.  Allergies reviewed with Pt. No known drug allergies.  No oral antibiotics in last 30 days.  Pt takes ASPIRIN and PLAVIX.  Pt's [facility]: [facility] in [place], [place].

## 2021-08-06 NOTE — H&P
Hospital Medicine History & Physical Note    Date of Service  8/6/2021    Primary Care Physician  Pcp Pt States None    Consultants  Infectious disease (Dr. Cornell)    Code Status  Full Code    Chief Complaint  Dyspnea    History of Presenting Illness  41-year-old morbidly obese male with past medical history of hypertension, dyslipidemia, multiple STEMIs and NSTEMIs (4 times 2013,2014,2015,2017, 2020) s/p 4 stents, ischemic cardiomyopathy, HFrEF last echocardiogram on 10/2020 with ejection fraction at 20%, grade 1 diastolic dysfunction and Covid positive test 3 days ago presented emergency department on 8/6/2021 with a 1 week history of worsening fever, chills, myalgias, back pain, dry cough and dyspnea at rest and exertion.  Patient reporting that he was in [place] last Friday when he started noticing fevers and chills.  Patient admitting to not being vaccinated.  Tested positive for Covid 3 days ago.  Dyspnea, coughing and watery diarrhea started 4 days ago and has persisted.  No melena, hematochezia, orthostatic changes or no loss of consciousness    At the emergency department, vital signs with tachycardia in the 120s, tachypnea in the 20s, 102.5 fever.  Patient requiring 6 L nasal cannula to maintain saturations.  No leukocytosis or anemia on CBC.  Chemistry with hyponatremia, hypochlorhydria, non-anion gap metabolic acidosis, hyperglycemia in the 300s and elevated LFTs.  Troponin, proBNP normal.  Lactic acid 2.2.  Chest x-ray consistent with Covid pneumonia.  Patient was admitted to telemetry for sepsis secondary to Covid complicated by acute hypoxic respiratory failure requiring oxygen supplementation and IV steroids.    I discussed the plan of care with patient.    Review of Systems  Review of Systems   Constitutional: Positive for chills, diaphoresis, fever and malaise/fatigue.   HENT: Negative.    Eyes: Negative.    Respiratory: Positive for cough, sputum production and shortness of breath.     Cardiovascular: Positive for chest pain and palpitations. Negative for leg swelling.   Gastrointestinal: Positive for diarrhea. Negative for blood in stool, constipation and melena.   Genitourinary: Negative.    Musculoskeletal: Positive for back pain.   Skin: Negative.    Neurological: Positive for weakness and headaches.   Endo/Heme/Allergies: Negative.    Psychiatric/Behavioral: Negative.        Past Medical History   has a past medical history of Diabetes (HCC), Heart attack (HCC), Hyperlipidemia, Hypertension, and Medical non-compliance (9/13/2015).    Surgical History   has a past surgical history that includes pr transcath stent init vessel,open.     Family History  family history includes Diabetes in his mother; Heart Attack in his father and mother.   Family history reviewed with patient. There is no family history that is pertinent to the chief complaint.     Social History   reports that he has been smoking cigarettes. He started smoking about 27 years ago. He has a 27.00 pack-year smoking history. He has never used smokeless tobacco. He reports current alcohol use. He reports current drug use.    Allergies  No Known Allergies    Medications  Prior to Admission Medications   Prescriptions Last Dose Informant Patient Reported? Taking?   DM-Doxylamine-Acetaminophen (NYQUIL HBP COLD & FLU) 15-6. MG/15ML Liquid 8/6/2021 at 0300 Patient Yes Yes   Sig: Take 30 mL by mouth every 6 hours as needed (Sleep, Mild Pain, Fever, Cough).   acetaminophen (TYLENOL) 500 MG Tab 8/4/2021 at PM Patient Yes Yes   Sig: Take 1,000 mg by mouth every 6 hours as needed for Mild Pain or Fever. 2 tablets = 1,000 mg.   aspirin EC (ECOTRIN) 81 MG Tablet Delayed Response 8/5/2021 at 0900 Patient No No   Sig: Take 1 Tab by mouth every day.   clopidogrel (PLAVIX) 75 MG Tab 8/5/2021 at 0900 Rx Bottle (For Med Information) No No   Sig: Take 1 tablet by mouth every day.   ezetimibe (ZETIA) 10 MG Tab 8/5/2021 at 0900 Rx Bottle (For  Med Information) No No   Sig: Take 1 tablet by mouth every day.   fenofibrate (TRICOR) 48 MG Tab 8/5/2021 at 0900 Rx Bottle (For Med Information) Yes Yes   Sig: Take 48 mg by mouth every day.   guaiFENesin ER (MUCINEX) 600 MG TABLET SR 12 HR 8/5/2021 at 1200 Patient Yes Yes   Sig: Take 600 mg by mouth every 12 hours as needed for Cough.   losartan (COZAAR) 50 MG Tab 8/5/2021 at 0900 Rx Bottle (For Med Information) No No   Sig: Take 1 Tab by mouth every day.   metoprolol SR (TOPROL XL) 25 MG TABLET SR 24 HR 8/5/2021 at 1900 Rx Bottle (For Med Information) Yes Yes   Sig: Take 25 mg by mouth every evening.   nitroglycerin (NITROSTAT) 0.4 MG SL Tab >2 months at PRN Rx Bottle (For Med Information) No No   Sig: Place 1 Tab under tongue as needed for Chest Pain (up to 3 doses) if systolic BP >90 and no not on any viagra). call MD /hospital if not resolved after   rosuvastatin (CRESTOR) 40 MG tablet 8/5/2021 at 1900 Rx Bottle (For Med Information) No No   Sig: Take 1 tablet by mouth every evening.   spironolactone (ALDACTONE) 25 MG Tab 8/5/2021 at 0900 Rx Bottle (For Med Information) No No   Sig: Take 1 tablet by mouth every day.      Facility-Administered Medications: None       Physical Exam  Temp:  [39.2 °C (102.5 °F)] 39.2 °C (102.5 °F)  Pulse:  [109-127] 109  Resp:  [25] 25  BP: (103-108)/(57-64) 103/58  SpO2:  [86 %-90 %] 89 %    Physical Exam  Constitutional:       General: He is not in acute distress.     Appearance: Normal appearance. He is obese. He is ill-appearing. He is not toxic-appearing or diaphoretic.   HENT:      Head: Normocephalic and atraumatic.      Nose: Nose normal. No congestion.      Mouth/Throat:      Mouth: Mucous membranes are dry.   Eyes:      Extraocular Movements: Extraocular movements intact.      Pupils: Pupils are equal, round, and reactive to light.   Cardiovascular:      Rate and Rhythm: Regular rhythm. Tachycardia present.      Pulses: Normal pulses.      Heart sounds: Normal heart  sounds.   Pulmonary:      Effort: Pulmonary effort is normal.      Breath sounds: Rales present.   Abdominal:      General: Bowel sounds are normal. There is distension.      Palpations: Abdomen is soft.      Tenderness: There is no abdominal tenderness. There is no guarding or rebound.   Musculoskeletal:         General: No swelling. Normal range of motion.      Cervical back: Normal range of motion and neck supple.      Right lower leg: No edema.      Left lower leg: No edema.   Skin:     General: Skin is warm.      Coloration: Skin is not jaundiced.   Neurological:      General: No focal deficit present.      Mental Status: He is alert and oriented to person, place, and time. Mental status is at baseline.      Cranial Nerves: No cranial nerve deficit.   Psychiatric:         Mood and Affect: Mood normal.         Behavior: Behavior normal.         Thought Content: Thought content normal.         Judgment: Judgment normal.         Laboratory:  Recent Labs     08/06/21  1055   WBC 5.4   RBC 5.01   HEMOGLOBIN 14.9   HEMATOCRIT 44.0   MCV 87.8   MCH 29.7   MCHC 33.9   RDW 43.2   PLATELETCT 197   MPV 9.9     Recent Labs     08/06/21  1055   SODIUM 125*   POTASSIUM 3.8   CHLORIDE 92*   CO2 18*   GLUCOSE 309*   BUN 12   CREATININE 0.86   CALCIUM 8.3*     Recent Labs     08/06/21  1055   ALTSGPT 64*   ASTSGOT 112*   ALKPHOSPHAT 79   TBILIRUBIN 0.2   GLUCOSE 309*         Recent Labs     08/06/21  1055   NTPROBNP 65         Recent Labs     08/06/21  1055   TROPONINT 18       Imaging:  DX-CHEST-PORTABLE (1 VIEW)   Final Result      Bilateral peripheral pulmonary airspace process consistent with Covid pneumonia        Assessment/Plan:  I anticipate this patient will require at least two midnights for appropriate medical management, necessitating inpatient admission.    * Sepsis (HCC)- (present on admission)  Assessment & Plan  This is Sepsis Present on admission  SIRS criteria identified on my evaluation include: Fever, with  temperature greater than 101 deg F, Tachycardia, with heart rate greater than 90 BPM, Tachypnea, with respirations greater than 20 per minute and Leukocytosis, with WBC greater than 12,000  Source is Covid pneumonia  Sepsis protocol initiated  Fluid resuscitation ordered per protocol  IV antibiotics as appropriate for source of sepsis  While organ dysfunction may be noted elsewhere in this problem list or in the chart, degree of organ dysfunction does not meet CMS criteria for severe sepsis    Covid on nasopharyngeal swab and chest x-ray  Complicated by acute hypoxic respiratory failure for which patient is requiring 6 L nasal cannula  Judicious IV hydration in the setting of hypotension  Pending procalcitonin, if elevated will start antibiotics  Continue steroid regimen  Follow blood cultures  Labs in a.m.    Acute respiratory failure with hypoxia (HCC)- (present on admission)  Assessment & Plan  Secondary to Covid pneumonia  Requiring 6 L to maintain saturation  Treatment for Covid pneumonia  Frequent pronation and incentive spirometer  Up to chair for all meals  Titrate oxygen as tolerable    Pneumonia due to COVID-19 virus- (present on admission)  Assessment & Plan  Noted on nasopharyngeal swab 3 days ago and chest x-ray  Morbid obesity and cardiac history contributing to complication  Requiring 6 L nasal cannula to maintain saturation  Admit to Covid floor  Diabetic diet  Judicious IV hydration in the setting of diarrhea and dehydration and labs  Continue Decadron  EDP discussed case with ID for remdesivir indication, ID recommending to hold off for now and continue to monitor  Pending procalcitonin, CRP, and D-dimer  Labs on a.m.    Morbid obesity with BMI of 40.0-44.9, adult (HCC)- (present on admission)  Assessment & Plan  Contributing to severity of Covid pneumonia hypoxic respiratory failure  Counseling on weight reduction    Hyponatremia- (present on admission)  Assessment & Plan  Secondary to dehydration  and diarrhea from Covid  Judicious IV hydration in the setting of Covid pneumonia and history of heart failure  Labs in a.m.    Type 2 diabetes mellitus without complication, without long-term current use of insulin ([facility])  Assessment & Plan  Reports chronic history of diabetes and admits to not taking Metformin  Diabetic diet  Insulin sliding scale  Lantus 5  Frequent Accu-Cheks  Repeat A1c    Lactic acidosis- (present on admission)  Assessment & Plan  Lactic acid 2.2  Secondary to Covid pneumonia/sepsis  IV hydration  Repeat lactic acid    Tobacco use- (present on admission)  Assessment & Plan  Counseling on tobacco cessation    Essential hypertension- (present on admission)  Assessment & Plan  Hold BP meds in the setting of sepsis and hypotension  As needed antihypertensives    Hyperlipemia- (present on admission)  Assessment & Plan  Continue home statin      VTE prophylaxis: enoxaparin ppx

## 2021-08-06 NOTE — ASSESSMENT & PLAN NOTE
Reports chronic history of diabetes and admits to not taking Metformin  Diabetic diet  Insulin sliding scale  Increase glargine to 23 units and lispro 7 units TID-AC, c/w [facility] insulin  A1c 11.3  Will likely need insulin on discharge  Diabetes education was done

## 2021-08-07 LAB
ALBUMIN SERPL BCP-MCNC: 3.5 G/DL (ref 3.2–4.9)
ALBUMIN/GLOB SERPL: 0.9 G/DL
ALP SERPL-CCNC: 75 U/L (ref 30–99)
ALT SERPL-CCNC: 70 U/L (ref 2–50)
ANION GAP SERPL CALC-SCNC: 16 MMOL/L (ref 7–16)
AST SERPL-CCNC: 114 U/L (ref 12–45)
BASOPHILS # BLD AUTO: 0 % (ref 0–1.8)
BASOPHILS # BLD: 0 K/UL (ref 0–0.12)
BILIRUB SERPL-MCNC: 0.2 MG/DL (ref 0.1–1.5)
BUN SERPL-MCNC: 13 MG/DL (ref 8–22)
CALCIUM SERPL-MCNC: 8.9 MG/DL (ref 8.5–10.5)
CHLORIDE SERPL-SCNC: 98 MMOL/L (ref 96–112)
CO2 SERPL-SCNC: 19 MMOL/L (ref 20–33)
CREAT SERPL-MCNC: 0.74 MG/DL (ref 0.5–1.4)
EOSINOPHIL # BLD AUTO: 0 K/UL (ref 0–0.51)
EOSINOPHIL NFR BLD: 0 % (ref 0–6.9)
ERYTHROCYTE [DISTWIDTH] IN BLOOD BY AUTOMATED COUNT: 46.9 FL (ref 35.9–50)
GLOBULIN SER CALC-MCNC: 4.1 G/DL (ref 1.9–3.5)
GLUCOSE BLD-MCNC: 289 MG/DL (ref 65–99)
GLUCOSE BLD-MCNC: 299 MG/DL (ref 65–99)
GLUCOSE BLD-MCNC: 353 MG/DL (ref 65–99)
GLUCOSE SERPL-MCNC: 307 MG/DL (ref 65–99)
GRAM STN SPEC: NORMAL
HCT VFR BLD AUTO: 45.2 % (ref 42–52)
HGB BLD-MCNC: 14.9 G/DL (ref 14–18)
LYMPHOCYTES # BLD AUTO: 0.62 K/UL (ref 1–4.8)
LYMPHOCYTES NFR BLD: 13 % (ref 22–41)
MANUAL DIFF BLD: NORMAL
MCH RBC QN AUTO: 29.9 PG (ref 27–33)
MCHC RBC AUTO-ENTMCNC: 33 G/DL (ref 33.7–35.3)
MCV RBC AUTO: 90.8 FL (ref 81.4–97.8)
MONOCYTES # BLD AUTO: 0.29 K/UL (ref 0–0.85)
MONOCYTES NFR BLD AUTO: 6.1 % (ref 0–13.4)
MORPHOLOGY BLD-IMP: NORMAL
NEUTROPHILS # BLD AUTO: 3.88 K/UL (ref 1.82–7.42)
NEUTROPHILS NFR BLD: 74.8 % (ref 44–72)
NEUTS BAND NFR BLD MANUAL: 6.1 % (ref 0–10)
NRBC # BLD AUTO: 0 K/UL
NRBC BLD-RTO: 0 /100 WBC
PLATELET # BLD AUTO: 237 K/UL (ref 164–446)
PLATELET BLD QL SMEAR: NORMAL
PMV BLD AUTO: 10.1 FL (ref 9–12.9)
POTASSIUM SERPL-SCNC: 4.5 MMOL/L (ref 3.6–5.5)
PROT SERPL-MCNC: 7.6 G/DL (ref 6–8.2)
RBC # BLD AUTO: 4.98 M/UL (ref 4.7–6.1)
RBC BLD AUTO: NORMAL
SIGNIFICANT IND 70042: NORMAL
SITE SITE: NORMAL
SODIUM SERPL-SCNC: 133 MMOL/L (ref 135–145)
SOURCE SOURCE: NORMAL
WBC # BLD AUTO: 4.8 K/UL (ref 4.8–10.8)

## 2021-08-07 PROCEDURE — A9270 NON-COVERED ITEM OR SERVICE: HCPCS | Performed by: STUDENT IN AN ORGANIZED HEALTH CARE EDUCATION/TRAINING PROGRAM

## 2021-08-07 PROCEDURE — 85007 BL SMEAR W/DIFF WBC COUNT: CPT

## 2021-08-07 PROCEDURE — 87070 CULTURE OTHR SPECIMN AEROBIC: CPT

## 2021-08-07 PROCEDURE — 700111 HCHG RX REV CODE 636 W/ 250 OVERRIDE (IP): Performed by: STUDENT IN AN ORGANIZED HEALTH CARE EDUCATION/TRAINING PROGRAM

## 2021-08-07 PROCEDURE — XW033E5 INTRODUCTION OF REMDESIVIR ANTI-INFECTIVE INTO PERIPHERAL VEIN, PERCUTANEOUS APPROACH, NEW TECHNOLOGY GROUP 5: ICD-10-PCS | Performed by: INTERNAL MEDICINE

## 2021-08-07 PROCEDURE — 700105 HCHG RX REV CODE 258: Performed by: INTERNAL MEDICINE

## 2021-08-07 PROCEDURE — 87205 SMEAR GRAM STAIN: CPT

## 2021-08-07 PROCEDURE — 87077 CULTURE AEROBIC IDENTIFY: CPT

## 2021-08-07 PROCEDURE — 700102 HCHG RX REV CODE 250 W/ 637 OVERRIDE(OP): Performed by: STUDENT IN AN ORGANIZED HEALTH CARE EDUCATION/TRAINING PROGRAM

## 2021-08-07 PROCEDURE — 80053 COMPREHEN METABOLIC PANEL: CPT

## 2021-08-07 PROCEDURE — 99232 SBSQ HOSP IP/OBS MODERATE 35: CPT | Performed by: STUDENT IN AN ORGANIZED HEALTH CARE EDUCATION/TRAINING PROGRAM

## 2021-08-07 PROCEDURE — 85027 COMPLETE CBC AUTOMATED: CPT

## 2021-08-07 PROCEDURE — 82962 GLUCOSE BLOOD TEST: CPT

## 2021-08-07 PROCEDURE — 36415 COLL VENOUS BLD VENIPUNCTURE: CPT

## 2021-08-07 PROCEDURE — 700101 HCHG RX REV CODE 250: Performed by: INTERNAL MEDICINE

## 2021-08-07 PROCEDURE — 770020 HCHG ROOM/CARE - TELE (206)

## 2021-08-07 RX ORDER — AZITHROMYCIN 250 MG/1
500 TABLET, FILM COATED ORAL DAILY
Status: COMPLETED | OUTPATIENT
Start: 2021-08-07 | End: 2021-08-09

## 2021-08-07 RX ADMIN — ASPIRIN 81 MG: 81 TABLET, COATED ORAL at 05:34

## 2021-08-07 RX ADMIN — REMDESIVIR 200 MG: 100 INJECTION, POWDER, LYOPHILIZED, FOR SOLUTION INTRAVENOUS at 08:41

## 2021-08-07 RX ADMIN — ENOXAPARIN SODIUM 40 MG: 40 INJECTION SUBCUTANEOUS at 05:34

## 2021-08-07 RX ADMIN — INSULIN LISPRO 3 UNITS: 100 INJECTION, SOLUTION INTRAVENOUS; SUBCUTANEOUS at 05:29

## 2021-08-07 RX ADMIN — AZITHROMYCIN MONOHYDRATE 500 MG: 250 TABLET ORAL at 09:56

## 2021-08-07 RX ADMIN — CLOPIDOGREL BISULFATE 75 MG: 75 TABLET ORAL at 05:34

## 2021-08-07 RX ADMIN — INSULIN GLARGINE 5 UNITS: 100 INJECTION, SOLUTION SUBCUTANEOUS at 13:00

## 2021-08-07 RX ADMIN — CEFTRIAXONE SODIUM 2 G: 10 INJECTION, POWDER, FOR SOLUTION INTRAVENOUS at 09:56

## 2021-08-07 RX ADMIN — EZETIMIBE 10 MG: 10 TABLET ORAL at 05:34

## 2021-08-07 RX ADMIN — DEXAMETHASONE SODIUM PHOSPHATE 6 MG: 4 INJECTION, SOLUTION INTRA-ARTICULAR; INTRALESIONAL; INTRAMUSCULAR; INTRAVENOUS; SOFT TISSUE at 05:33

## 2021-08-07 RX ADMIN — GUAIFENESIN SYRUP AND DEXTROMETHORPHAN 10 ML: 100; 10 SYRUP ORAL at 18:05

## 2021-08-07 RX ADMIN — ROSUVASTATIN CALCIUM 40 MG: 20 TABLET, FILM COATED ORAL at 17:20

## 2021-08-07 RX ADMIN — INSULIN LISPRO 3 UNITS: 100 INJECTION, SOLUTION INTRAVENOUS; SUBCUTANEOUS at 12:07

## 2021-08-07 RX ADMIN — INSULIN LISPRO 5 UNITS: 100 INJECTION, SOLUTION INTRAVENOUS; SUBCUTANEOUS at 17:18

## 2021-08-07 RX ADMIN — ACETAMINOPHEN 1000 MG: 500 TABLET, FILM COATED ORAL at 09:55

## 2021-08-07 RX ADMIN — ACETAMINOPHEN 1000 MG: 500 TABLET, FILM COATED ORAL at 20:21

## 2021-08-07 NOTE — PROGRESS NOTES
[facility] Medicine Daily Progress Note    Date of Service  8/7/2021    Chief Complaint  Kyle Gonzalez is a 41 y.o. male admitted 8/6/2021 with dyspnea.    Hospital Course  41-year-old morbidly obese male with past medical history of hypertension, dyslipidemia, multiple STEMIs and NSTEMIs (4 times 2013,2014,2015,2017, 2020) s/p 4 stents, ischemic cardiomyopathy, HFrEF last echocardiogram on 10/2020 with ejection fraction at 20%, grade 1 diastolic dysfunction and Covid positive test 3 days ago presented emergency department on 8/6/2021 with a 1 week history of worsening fever, chills, myalgias, back pain, dry cough and dyspnea at rest and exertion.  Patient reporting that he was in [place] last Friday when he started noticing fevers and chills.  Patient admitting to not being vaccinated.  Tested positive for Covid 3 days ago.  Dyspnea, coughing and watery diarrhea started 4 days ago and has persisted.  No melena, hematochezia, orthostatic changes or no loss of consciousness     At the emergency department, vital signs with tachycardia in the 120s, tachypnea in the 20s, 102.5 fever.  Patient requiring 6 L nasal cannula to maintain saturations.  No leukocytosis or anemia on CBC.  Chemistry with hyponatremia, hypochlorhydria, non-anion gap metabolic acidosis, hyperglycemia in the 300s and elevated LFTs.  Troponin, proBNP normal.  Lactic acid 2.2.  Chest x-ray consistent with Covid pneumonia.  Patient was admitted to telemetry for sepsis secondary to Covid complicated by acute hypoxic respiratory failure requiring oxygen supplementation and IV steroids.    Interval Problem Update  Admitted yesterday for acute hypoxic respiratory failure due to COVID pneumonia.  On 13L oxymask, continue to wean.  On decadron, approved for remdesivir by ID today, start remdesivir.  Monitor CMP.  Proning, incentive spirometry.  Stop IV fluids, goal euvolemia. Encouraged PO intake.  Procalcitonin elevated, start antibiotics for possible  superimposed bacterial pneumonia. Sputum culture ordered.  Does not appear to be in acute CHF exacerbation.    I have personally seen and examined the patient at bedside. I discussed the plan of care with patient.    Consultants/Specialty  none    Code Status  Full Code    Disposition  Patient is not medically cleared.   Anticipate discharge to to home with close outpatient follow-up.  I have placed the appropriate orders for post-discharge needs.    Review of Systems  ROS     Physical Exam  Temp:  [36.1 °C (97 °F)-37.8 °C (100 °F)] 36.6 °C (97.8 °F)  Pulse:  [66-89] 80  Resp:  [18-24] 20  BP: (100-108)/(52-64) 103/63  SpO2:  [89 %-94 %] 90 %    Physical Exam    Fluids    Intake/Output Summary (Last 24 hours) at 8/7/2021 1138  Last data filed at 8/7/2021 1000  Gross per 24 hour   Intake 740 ml   Output 850 ml   Net -110 ml       Laboratory  Recent Labs     08/06/21  1055 08/07/21  0153   WBC 5.4 4.8   RBC 5.01 4.98   HEMOGLOBIN 14.9 14.9   HEMATOCRIT 44.0 45.2   MCV 87.8 90.8   MCH 29.7 29.9   MCHC 33.9 33.0*   RDW 43.2 46.9   PLATELETCT 197 237   MPV 9.9 10.1     Recent Labs     08/06/21  1055 08/07/21  0153   SODIUM 125* 133*   POTASSIUM 3.8 4.5   CHLORIDE 92* 98   CO2 18* 19*   GLUCOSE 309* 307*   BUN 12 13   CREATININE 0.86 0.74   CALCIUM 8.3* 8.9                   Imaging  DX-CHEST-PORTABLE (1 VIEW)   Final Result      Bilateral peripheral pulmonary airspace process consistent with Covid pneumonia           Assessment/Plan  * Sepsis (HCC)- (present on admission)  Assessment & Plan  This is Sepsis Present on admission  SIRS criteria identified on my evaluation include: Fever, with temperature greater than 101 deg F, Tachycardia, with heart rate greater than 90 BPM, Tachypnea, with respirations greater than 20 per minute and Leukocytosis, with WBC greater than 12,000  Source is Covid pneumonia  Sepsis protocol initiated  Fluid resuscitation ordered per protocol  IV antibiotics as appropriate for source of  sepsis  While organ dysfunction may be noted elsewhere in this problem list or in the chart, degree of organ dysfunction does not meet CMS criteria for severe sepsis  On steroids and remdesivir, as well as antibiotics for possible superimposed bacterial pneumonia    Type 2 diabetes mellitus without complication, without long-term current use of insulin (HCC)  Assessment & Plan  Reports chronic history of diabetes and admits to not taking Metformin  Diabetic diet  Insulin sliding scale  Lantus 5  Frequent Accu-Cheks  Repeat A1c    Acute respiratory failure with hypoxia (HCC)- (present on admission)  Assessment & Plan  Secondary to Covid pneumonia  See covid problem    Morbid obesity with BMI of 40.0-44.9, adult (HCC)- (present on admission)  Assessment & Plan  Contributing to severity of Covid pneumonia hypoxic respiratory failure  Counseling on weight reduction    Lactic acidosis- (present on admission)  Assessment & Plan  Lactic acid 2.2  Secondary to Covid pneumonia/sepsis  IV hydration  Repeat lactic acid    Hyponatremia- (present on admission)  Assessment & Plan  Secondary to dehydration and diarrhea from Covid  monitor    Pneumonia due to COVID-19 virus- (present on admission)  Assessment & Plan  Noted on nasopharyngeal swab 3 days ago and chest x-ray  Morbid obesity and cardiac history contributing to complication  Requiring 13 L oxymask, continue to wean  Continue Decadron, remdesivir started by ID team  procalcitonin elevated, started on empiric antibiotics for possible superimposed bacterial pneumonia  Monitor CMP    Tobacco use- (present on admission)  Assessment & Plan  Counseling on tobacco cessation    Essential hypertension- (present on admission)  Assessment & Plan  Hold BP meds in the setting of sepsis and hypotension  As needed antihypertensives    Hyperlipemia- (present on admission)  Assessment & Plan  Continue home statin       VTE prophylaxis: enoxaparin ppx    I have performed a physical exam  and reviewed and updated ROS and Plan today (8/7/2021). In review of yesterday's note (8/6/2021), there are no changes except as documented above.

## 2021-08-07 NOTE — CARE PLAN
Problem: Respiratory  Goal: Patient will achieve/maintain optimum respiratory ventilation and gas exchange  Outcome: Not Progressing     Problem: Psychosocial  Goal: Patient's level of anxiety will decrease  Outcome: Progressing     Problem: Mobility  Goal: Patient's capacity to carry out activities will improve  Outcome: Progressing   The patient is Watcher - Medium risk of patient condition declining or worsening    Shift Goals  Clinical Goals: Oxygen above 90%  Patient Goals: Rest    Progress made toward(s) clinical / shift goals:  Patient requiring increase in supplemental oxygen, 13L oxmask at this time. Changes made to medication regimen this AM and patient educated on all changes. He verbalizes understanding and is agreeable to treatment plans put into place a this time. Patient remains a standby assist and is able to ambulate to the bathroom with a steady gait however he reports tiring quickly.

## 2021-08-07 NOTE — DIETARY
"Nutrition services: Day 1 of admit.  Kyle Gonzalez is a 41 y.o. male with admitting DX of sepsis - COVID-19 pneumonia.   Consult received per nutrition admit screen; reported weight loss and poor appetite PTA (MST score = 3). BMI > 40 as well.     Per current department guidelines dietary staff not permitted to enter COVID isolation rooms. Called cell phone listed in chart and verified pt name and .     Pt reports sudden 10 lb weight loss with onset of illness. States he weighed himself last week and was 265 lb, then had dropped to 255 lb by  (2 days into illness per pt report). Pt states appetite has been decreased since 8/3 when he first had symptoms of COVID (x 4 days).     Noted hx of diabetes and cardiovascular disease. Offered additional information for therapeutic diet modification for chronic disease(s), however pt had no questions / concerns regarding long term diet.     Assessment:  Height: 165.1 cm (5' 5\")  Weight: 116 kg (255 lb)  Body mass index is 42.43 kg/m²., BMI classification: obese, class III  Diet/Intake: consistent CHO; % x 2 meals     Evaluation:   1. COVID-19 pneumonia per H&P - on 13 L O2 via oxymask at this time  2. Hx includes: diabetes, HTN, dyslipidemia, multiple STEMIs and NSTEMIs, s/p 4 stents, ischemic cardiomyopathy, and HF  3. Pt reported 10 lb (4%) weight loss in < 1 week - severe   4. MAR: azithromycin, rocephin, decadron, zetia, insulin glargine, SSI, remdesivir, crestor, pericolace, spironolactone  5. Labs: Na 133, glucose 307, A1C = 11.3%     Malnutrition Risk: Pt reported severe weight loss of 4% in < 1 week, however does not meet additional criteria at this time.     Recommendations/Plan:  1. No diet education consult at this time - however RD offered to provide d/t hx of DM and CVD - pt declined   2. BMI > 40 with morbid obesity; would benefit from referral to outpatient nutrition services if interested in lifestyle modification    3. Encourage intake of " meals  4. Document intake of all meals as % taken in ADL's to provide interdisciplinary communication across all shifts.   5. Monitor weight.  6. Nutrition rep will continue to see patient for ongoing meal and snack preferences.   7. RD to monitor PO intake, wt trends, and nutrition labs/meds    RD to follow

## 2021-08-07 NOTE — CARE PLAN
Problem: Nutritional:  Goal: Achieve adequate nutritional intake  Description: Patient will consume >50% of meals  Outcome: Progressing   % consumed of both meals recorded at this time. RD following.

## 2021-08-07 NOTE — CARE PLAN
The patient is Stable - Low risk of patient condition declining or worsening    Shift Goals  Clinical Goals: Oxygen above 90%  Patient Goals: Rest    Progress made toward(s) clinical / shift goals:  on 12L oxymask, satting 91-94%    Patient is not progressing towards the following goals:n/a      Problem: Knowledge Deficit - Standard  Goal: Patient and family/care givers will demonstrate understanding of plan of care, disease process/condition, diagnostic tests and medications  Outcome: Progressing     Problem: Psychosocial  Goal: Patient's level of anxiety will decrease  Outcome: Progressing  Goal: Patient's ability to verbalize feelings about condition will improve  Outcome: Progressing  Goal: Patient's ability to re-evaluate and adapt role responsibilities will improve  Outcome: Progressing  Goal: Patient and family will demonstrate ability to cope with life altering diagnosis and/or procedure  Outcome: Progressing  Goal: Spiritual and cultural needs incorporated into hospitalization  Outcome: Progressing     Problem: Communication  Goal: The ability to communicate needs accurately and effectively will improve  Outcome: Progressing     Problem: Discharge Barriers/Planning  Goal: Patient's continuum of care needs are met  Outcome: Progressing     Problem: Hemodynamics  Goal: Patient's hemodynamics, fluid balance and neurologic status will be stable or improve  Outcome: Progressing     Problem: Respiratory  Goal: Patient will achieve/maintain optimum respiratory ventilation and gas exchange  Outcome: Progressing     Problem: Fluid Volume  Goal: Fluid volume balance will be maintained  Outcome: Progressing     Problem: Dysphagia  Goal: Dysphagia will improve  Outcome: Progressing     Problem: Risk for Aspiration  Goal: Patient's risk for aspiration will be absent or decrease  Outcome: Progressing     Problem: Nutrition  Goal: Patient's nutritional and fluid intake will be adequate or improve  Outcome:  Progressing  Goal: Enteral nutrition will be maintained or improve  Outcome: Progressing  Goal: Enteral nutrition will be maintained or improve  Outcome: Progressing     Problem: Urinary Elimination  Goal: Establish and maintain regular urinary output  Outcome: Progressing     Problem: Bowel Elimination  Goal: Establish and maintain regular bowel function  Outcome: Progressing     Problem: Gastrointestinal Irritability  Goal: Nausea and vomiting will be absent or improve  Outcome: Progressing  Goal: Diarrhea will be absent or improved  Outcome: Progressing     Problem: Mobility  Goal: Patient's capacity to carry out activities will improve  Outcome: Progressing     Problem: Self Care  Goal: Patient will have the ability to perform ADLs independently or with assistance (bathe, groom, dress, toilet and feed)  Outcome: Progressing     Problem: Infection - Standard  Goal: Patient will remain free from infection  Outcome: Progressing     Problem: Wound/ / Incision Healing  Goal: Patient's wound/surgical incision will decrease in size and heals properly  Outcome: Progressing

## 2021-08-07 NOTE — PROGRESS NOTES
4 Eyes Skin Assessment Completed by Tasia Thrasher, RN and Kaylin Charlton, SARIKA.    Head WDL  Ears WDL  Nose WDL  Mouth WDL  Neck WDL  Breast/Chest WDL  Shoulder Blades WDL  Spine WDL  (R) Arm/Elbow/Hand WDL  (L) Arm/Elbow/Hand WDL  Abdomen WDL  Groin WDL  Scrotum/Coccyx/Buttocks WDL  (R) Leg WDL  (L) Leg WDL  (R) Heel/Foot/Toe WDL  (L) Heel/Foot/Toe WDL          Devices In Places Tele Box, Blood Pressure Cuff and Oxy Mask      Interventions In Place Pillows and Pressure Redistribution Mattress    Possible Skin Injury No    Pictures Uploaded Into Epic N/A  Wound Consult Placed N/A  RN Wound Prevention Protocol Ordered No

## 2021-08-07 NOTE — PROGRESS NOTES
Brief ID note:    -Remdesivir approval requested by Dr. Boyce  -Chart reviewed.  Patient admitted with COVID-19 pneumonia with progressive oxygen requirements, currently on 9L  -Patient is already on dexamethasone, prophylactic Lovenox  -Remdesivir approved. Will initiate a 5-day course of IV remdesivir  -Monitor daily CMP. Discontinue remdesivir if ALT >500 or if creatinine clearance <30  -Continue management per hospitalist teams. Please call us back if formal consult requested

## 2021-08-08 LAB
ALBUMIN SERPL BCP-MCNC: 3.2 G/DL (ref 3.2–4.9)
ALBUMIN/GLOB SERPL: 0.8 G/DL
ALP SERPL-CCNC: 81 U/L (ref 30–99)
ALT SERPL-CCNC: 62 U/L (ref 2–50)
ANION GAP SERPL CALC-SCNC: 15 MMOL/L (ref 7–16)
AST SERPL-CCNC: 84 U/L (ref 12–45)
BILIRUB SERPL-MCNC: 0.3 MG/DL (ref 0.1–1.5)
BUN SERPL-MCNC: 16 MG/DL (ref 8–22)
CALCIUM SERPL-MCNC: 8.9 MG/DL (ref 8.5–10.5)
CHLORIDE SERPL-SCNC: 98 MMOL/L (ref 96–112)
CO2 SERPL-SCNC: 21 MMOL/L (ref 20–33)
CREAT SERPL-MCNC: 0.56 MG/DL (ref 0.5–1.4)
GLOBULIN SER CALC-MCNC: 4.2 G/DL (ref 1.9–3.5)
GLUCOSE BLD-MCNC: 308 MG/DL (ref 65–99)
GLUCOSE BLD-MCNC: 328 MG/DL (ref 65–99)
GLUCOSE BLD-MCNC: 356 MG/DL (ref 65–99)
GLUCOSE SERPL-MCNC: 291 MG/DL (ref 65–99)
POTASSIUM SERPL-SCNC: 4.2 MMOL/L (ref 3.6–5.5)
PROT SERPL-MCNC: 7.4 G/DL (ref 6–8.2)
SODIUM SERPL-SCNC: 134 MMOL/L (ref 135–145)

## 2021-08-08 PROCEDURE — 700105 HCHG RX REV CODE 258: Performed by: INTERNAL MEDICINE

## 2021-08-08 PROCEDURE — 700102 HCHG RX REV CODE 250 W/ 637 OVERRIDE(OP): Performed by: STUDENT IN AN ORGANIZED HEALTH CARE EDUCATION/TRAINING PROGRAM

## 2021-08-08 PROCEDURE — 99233 SBSQ HOSP IP/OBS HIGH 50: CPT | Performed by: STUDENT IN AN ORGANIZED HEALTH CARE EDUCATION/TRAINING PROGRAM

## 2021-08-08 PROCEDURE — 36415 COLL VENOUS BLD VENIPUNCTURE: CPT

## 2021-08-08 PROCEDURE — 700111 HCHG RX REV CODE 636 W/ 250 OVERRIDE (IP): Performed by: STUDENT IN AN ORGANIZED HEALTH CARE EDUCATION/TRAINING PROGRAM

## 2021-08-08 PROCEDURE — A9270 NON-COVERED ITEM OR SERVICE: HCPCS | Performed by: STUDENT IN AN ORGANIZED HEALTH CARE EDUCATION/TRAINING PROGRAM

## 2021-08-08 PROCEDURE — 700101 HCHG RX REV CODE 250: Performed by: INTERNAL MEDICINE

## 2021-08-08 PROCEDURE — 82962 GLUCOSE BLOOD TEST: CPT | Mod: 91

## 2021-08-08 PROCEDURE — 770020 HCHG ROOM/CARE - TELE (206)

## 2021-08-08 PROCEDURE — 80053 COMPREHEN METABOLIC PANEL: CPT

## 2021-08-08 RX ORDER — DEXTROSE MONOHYDRATE 25 G/50ML
50 INJECTION, SOLUTION INTRAVENOUS
Status: DISCONTINUED | OUTPATIENT
Start: 2021-08-08 | End: 2021-08-10

## 2021-08-08 RX ADMIN — REMDESIVIR 100 MG: 100 INJECTION, POWDER, LYOPHILIZED, FOR SOLUTION INTRAVENOUS at 17:35

## 2021-08-08 RX ADMIN — AZITHROMYCIN MONOHYDRATE 500 MG: 250 TABLET ORAL at 06:26

## 2021-08-08 RX ADMIN — ACETAMINOPHEN 1000 MG: 500 TABLET, FILM COATED ORAL at 20:31

## 2021-08-08 RX ADMIN — GUAIFENESIN SYRUP AND DEXTROMETHORPHAN 10 ML: 100; 10 SYRUP ORAL at 00:27

## 2021-08-08 RX ADMIN — GUAIFENESIN SYRUP AND DEXTROMETHORPHAN 10 ML: 100; 10 SYRUP ORAL at 17:34

## 2021-08-08 RX ADMIN — ROSUVASTATIN CALCIUM 40 MG: 20 TABLET, FILM COATED ORAL at 19:30

## 2021-08-08 RX ADMIN — EZETIMIBE 10 MG: 10 TABLET ORAL at 06:27

## 2021-08-08 RX ADMIN — INSULIN LISPRO 3 UNITS: 100 INJECTION, SOLUTION INTRAVENOUS; SUBCUTANEOUS at 00:35

## 2021-08-08 RX ADMIN — ACETAMINOPHEN 1000 MG: 500 TABLET, FILM COATED ORAL at 17:34

## 2021-08-08 RX ADMIN — INSULIN LISPRO 4 UNITS: 100 INJECTION, SOLUTION INTRAVENOUS; SUBCUTANEOUS at 12:22

## 2021-08-08 RX ADMIN — ENOXAPARIN SODIUM 40 MG: 40 INJECTION SUBCUTANEOUS at 06:25

## 2021-08-08 RX ADMIN — GUAIFENESIN SYRUP AND DEXTROMETHORPHAN 10 ML: 100; 10 SYRUP ORAL at 10:09

## 2021-08-08 RX ADMIN — ONDANSETRON 4 MG: 2 INJECTION INTRAMUSCULAR; INTRAVENOUS at 08:22

## 2021-08-08 RX ADMIN — GUAIFENESIN SYRUP AND DEXTROMETHORPHAN 10 ML: 100; 10 SYRUP ORAL at 17:40

## 2021-08-08 RX ADMIN — INSULIN HUMAN 5 UNITS: 100 INJECTION, SOLUTION PARENTERAL at 20:36

## 2021-08-08 RX ADMIN — DEXAMETHASONE SODIUM PHOSPHATE 6 MG: 4 INJECTION, SOLUTION INTRA-ARTICULAR; INTRALESIONAL; INTRAMUSCULAR; INTRAVENOUS; SOFT TISSUE at 06:28

## 2021-08-08 RX ADMIN — INSULIN LISPRO 4 UNITS: 100 INJECTION, SOLUTION INTRAVENOUS; SUBCUTANEOUS at 08:22

## 2021-08-08 RX ADMIN — CEFTRIAXONE SODIUM 2 G: 10 INJECTION, POWDER, FOR SOLUTION INTRAVENOUS at 10:09

## 2021-08-08 RX ADMIN — ASPIRIN 81 MG: 81 TABLET, COATED ORAL at 06:26

## 2021-08-08 RX ADMIN — CLOPIDOGREL BISULFATE 75 MG: 75 TABLET ORAL at 06:26

## 2021-08-08 RX ADMIN — INSULIN GLARGINE 8 UNITS: 100 INJECTION, SOLUTION SUBCUTANEOUS at 12:23

## 2021-08-08 RX ADMIN — ACETAMINOPHEN 1000 MG: 500 TABLET, FILM COATED ORAL at 08:22

## 2021-08-08 RX ADMIN — INSULIN LISPRO 5 UNITS: 100 INJECTION, SOLUTION INTRAVENOUS; SUBCUTANEOUS at 17:35

## 2021-08-08 ASSESSMENT — ENCOUNTER SYMPTOMS
PALPITATIONS: 0
COUGH: 1
HEADACHES: 0
INSOMNIA: 0
BLURRED VISION: 0
WHEEZING: 0
ABDOMINAL PAIN: 0
SENSORY CHANGE: 0
SPUTUM PRODUCTION: 1
VOMITING: 0
FOCAL WEAKNESS: 0
NAUSEA: 0
EYE PAIN: 0
CHILLS: 0
SHORTNESS OF BREATH: 1
BACK PAIN: 0
DIZZINESS: 0
FEVER: 0

## 2021-08-08 ASSESSMENT — LIFESTYLE VARIABLES: SUBSTANCE_ABUSE: 0

## 2021-08-08 ASSESSMENT — PAIN DESCRIPTION - PAIN TYPE: TYPE: ACUTE PAIN

## 2021-08-08 NOTE — CARE PLAN
The patient is Watcher - Medium risk of patient condition declining or worsening    Shift Goals  Clinical Goals: decrease 02 needs  Patient Goals: comfort and rest  Family Goals: breathe better    Progress made toward(s) clinical / shift goals:    Problem: Knowledge Deficit - Standard  Goal: Patient and family/care givers will demonstrate understanding of plan of care, disease process/condition, diagnostic tests and medications  Outcome: Progressing       Patient is not progressing towards the following goals:      Problem: Respiratory  Goal: Patient will achieve/maintain optimum respiratory ventilation and gas exchange  Outcome: Not Progressing  Note: Patient to wean to room air

## 2021-08-08 NOTE — PROGRESS NOTES
Assumed care of patient. Chart review done. Patient AAOx4. Medicated for nausea. Pain control regiment reviewed. Bed low and locked.

## 2021-08-08 NOTE — PROGRESS NOTES
[facility] Medicine Daily Progress Note    Date of Service  8/8/2021    Chief Complaint  Kyle Gonzalez is a 41 y.o. male admitted 8/6/2021 with dyspnea.    Hospital Course  41-year-old morbidly obese male with past medical history of hypertension, dyslipidemia, multiple STEMIs and NSTEMIs (4 times 2013,2014,2015,2017, 2020) s/p 4 stents, ischemic cardiomyopathy, HFrEF last echocardiogram on 10/2020 with ejection fraction at 20%, grade 1 diastolic dysfunction and Covid positive test 3 days ago presented emergency department on 8/6/2021 with a 1 week history of worsening fever, chills, myalgias, back pain, dry cough and dyspnea at rest and exertion.  Patient reporting that he was in [place] last Friday when he started noticing fevers and chills.  Patient admitting to not being vaccinated.  Tested positive for Covid 3 days ago.  Dyspnea, coughing and watery diarrhea started 4 days ago and has persisted.  No melena, hematochezia, orthostatic changes or no loss of consciousness     At the emergency department, vital signs with tachycardia in the 120s, tachypnea in the 20s, 102.5 fever.  Patient requiring 6 L nasal cannula to maintain saturations.  No leukocytosis or anemia on CBC.  Chemistry with hyponatremia, hypochlorhydria, non-anion gap metabolic acidosis, hyperglycemia in the 300s and elevated LFTs.  Troponin, proBNP normal.  Lactic acid 2.2.  Chest x-ray consistent with Covid pneumonia.  Patient was admitted to telemetry for sepsis secondary to Covid complicated by acute hypoxic respiratory failure requiring oxygen supplementation and IV steroids.    Interval Problem Update  No acute events overnight.  On 6L oxymask, continue to wean.  On remdesivir and decadron, continue.  Monitor CMP.  On antibiotics for superimposed bacterial pneumonia.  Patient denies focal complaints.    I have personally seen and examined the patient at bedside. I discussed the plan of care with  patient.    Consultants/Specialty  none    Code Status  Full Code    Disposition  Patient is not medically cleared.   Anticipate discharge to to home with close outpatient follow-up.  I have placed the appropriate orders for post-discharge needs.    Review of Systems  Review of Systems   Constitutional: Negative for chills and fever.   Eyes: Negative for blurred vision and pain.   Respiratory: Positive for cough, sputum production and shortness of breath. Negative for wheezing.    Cardiovascular: Negative for chest pain, palpitations and leg swelling.   Gastrointestinal: Negative for abdominal pain, nausea and vomiting.   Genitourinary: Negative for dysuria and urgency.   Musculoskeletal: Negative for back pain.   Skin: Negative for itching and rash.   Neurological: Negative for dizziness, sensory change, focal weakness and headaches.   Psychiatric/Behavioral: Negative for substance abuse. The patient does not have insomnia.         Physical Exam  Temp:  [36 °C (96.8 °F)-36.6 °C (97.8 °F)] 36.5 °C (97.7 °F)  Pulse:  [78-92] 92  Resp:  [18-20] 18  BP: ()/(57-69) 101/61  SpO2:  [86 %-94 %] 90 %    Physical Exam  Constitutional:       General: He is not in acute distress.     Appearance: He is not ill-appearing.   HENT:      Head: Normocephalic and atraumatic.      Right Ear: External ear normal.      Left Ear: External ear normal.      Mouth/Throat:      Pharynx: No oropharyngeal exudate or posterior oropharyngeal erythema.   Eyes:      Extraocular Movements: Extraocular movements intact.      Pupils: Pupils are equal, round, and reactive to light.   Cardiovascular:      Rate and Rhythm: Normal rate and regular rhythm.      Pulses: Normal pulses.      Heart sounds: Normal heart sounds.   Pulmonary:      Effort: Pulmonary effort is normal. No respiratory distress.      Breath sounds: Rhonchi present. No wheezing or rales.   Abdominal:      General: Bowel sounds are normal. There is no distension.      Palpations:  Abdomen is soft.      Tenderness: There is no abdominal tenderness. There is no guarding.   Musculoskeletal:         General: No swelling or tenderness.      Cervical back: Normal range of motion and neck supple.   Skin:     General: Skin is warm and dry.   Neurological:      General: No focal deficit present.      Mental Status: He is oriented to person, place, and time.      Sensory: No sensory deficit.      Motor: No weakness.   Psychiatric:         Mood and Affect: Mood normal.         Behavior: Behavior normal.         Fluids    Intake/Output Summary (Last 24 hours) at 8/8/2021 1221  Last data filed at 8/8/2021 0800  Gross per 24 hour   Intake 2600 ml   Output --   Net 2600 ml       Laboratory  Recent Labs     08/06/21  1055 08/07/21  0153   WBC 5.4 4.8   RBC 5.01 4.98   HEMOGLOBIN 14.9 14.9   HEMATOCRIT 44.0 45.2   MCV 87.8 90.8   MCH 29.7 29.9   MCHC 33.9 33.0*   RDW 43.2 46.9   PLATELETCT 197 237   MPV 9.9 10.1     Recent Labs     08/06/21  1055 08/07/21  0153 08/08/21  0846   SODIUM 125* 133* 134*   POTASSIUM 3.8 4.5 4.2   CHLORIDE 92* 98 98   CO2 18* 19* 21   GLUCOSE 309* 307* 291*   BUN 12 13 16   CREATININE 0.86 0.74 0.56   CALCIUM 8.3* 8.9 8.9                   Imaging  DX-CHEST-PORTABLE (1 VIEW)   Final Result      Bilateral peripheral pulmonary airspace process consistent with Covid pneumonia           Assessment/Plan  * Sepsis (HCC)- (present on admission)  Assessment & Plan  This is Sepsis Present on admission  SIRS criteria identified on my evaluation include: Fever, with temperature greater than 101 deg F, Tachycardia, with heart rate greater than 90 BPM, Tachypnea, with respirations greater than 20 per minute and Leukocytosis, with WBC greater than 12,000  Source is Covid pneumonia  Sepsis protocol initiated  Fluid resuscitation ordered per protocol  IV antibiotics as appropriate for source of sepsis  While organ dysfunction may be noted elsewhere in this problem list or in the chart, degree of  organ dysfunction does not meet CMS criteria for severe sepsis  On steroids and remdesivir, as well as antibiotics for possible superimposed bacterial pneumonia    Type 2 diabetes mellitus without complication, without long-term current use of insulin ([facility])  Assessment & Plan  Reports chronic history of diabetes and admits to not taking Metformin  Diabetic diet  Insulin sliding scale  Increase long acting insulin to 8 U daily due to hyperglycemia  A1c 11.3  Will likely need insulin on discharge    Acute respiratory failure with hypoxia (HCC)- (present on admission)  Assessment & Plan  Secondary to Covid pneumonia  See covid problem    Morbid obesity with BMI of 40.0-44.9, adult ([facility])- (present on admission)  Assessment & Plan  Contributing to severity of Covid pneumonia hypoxic respiratory failure  Counseling on weight reduction    Lactic acidosis- (present on admission)  Assessment & Plan  Lactic acid 2.2  Secondary to Covid pneumonia/sepsis  IV hydration  Repeat lactic acid    Hyponatremia- (present on admission)  Assessment & Plan  Secondary to dehydration and diarrhea from Covid  monitor    Pneumonia due to COVID-19 virus- (present on admission)  Assessment & Plan  Noted on nasopharyngeal swab 3 days ago and chest x-ray  Morbid obesity and cardiac history contributing to complication  Requiring 13 L oxymask, continue to wean  Continue Decadron, remdesivir started by ID team  procalcitonin elevated, started on empiric antibiotics for possible superimposed bacterial pneumonia  Monitor CMP    Tobacco use- (present on admission)  Assessment & Plan  Counseling on tobacco cessation    Essential hypertension- (present on admission)  Assessment & Plan  Hold BP meds in the setting of sepsis and hypotension  As needed antihypertensives    Hyperlipemia- (present on admission)  Assessment & Plan  Continue home statin       VTE prophylaxis: enoxaparin ppx    I have performed a physical exam and reviewed and updated ROS and  Plan today (8/8/2021). In review of yesterday's note (8/7/2021), there are no changes except as documented above.

## 2021-08-08 NOTE — CARE PLAN
Problem: Knowledge Deficit - Standard  Goal: Patient and family/care givers will demonstrate understanding of plan of care, disease process/condition, diagnostic tests and medications  Outcome: Progressing     Problem: Psychosocial  Goal: Patient's level of anxiety will decrease  Outcome: Progressing  Goal: Patient's ability to verbalize feelings about condition will improve  Outcome: Progressing  Goal: Patient's ability to re-evaluate and adapt role responsibilities will improve  Outcome: Progressing  Goal: Patient and family will demonstrate ability to cope with life altering diagnosis and/or procedure  Outcome: Progressing  Goal: Spiritual and cultural needs incorporated into hospitalization  Outcome: Progressing     Problem: Communication  Goal: The ability to communicate needs accurately and effectively will improve  Outcome: Progressing     Problem: Discharge Barriers/Planning  Goal: Patient's continuum of care needs are met  Outcome: Progressing     Problem: Hemodynamics  Goal: Patient's hemodynamics, fluid balance and neurologic status will be stable or improve  Outcome: Progressing     Problem: Respiratory  Goal: Patient will achieve/maintain optimum respiratory ventilation and gas exchange  Outcome: Progressing     Problem: Fluid Volume  Goal: Fluid volume balance will be maintained  Outcome: Progressing     Problem: Dysphagia  Goal: Dysphagia will improve  Outcome: Progressing     Problem: Risk for Aspiration  Goal: Patient's risk for aspiration will be absent or decrease  Outcome: Progressing     Problem: Nutrition  Goal: Patient's nutritional and fluid intake will be adequate or improve  Outcome: Progressing  Goal: Enteral nutrition will be maintained or improve  Outcome: Progressing  Goal: Enteral nutrition will be maintained or improve  Outcome: Progressing     Problem: Urinary Elimination  Goal: Establish and maintain regular urinary output  Outcome: Progressing     Problem: Bowel  Elimination  Goal: Establish and maintain regular bowel function  Outcome: Progressing     Problem: Gastrointestinal Irritability  Goal: Nausea and vomiting will be absent or improve  Outcome: Progressing  Goal: Diarrhea will be absent or improved  Outcome: Progressing     Problem: Mobility  Goal: Patient's capacity to carry out activities will improve  Outcome: Progressing     Problem: Self Care  Goal: Patient will have the ability to perform ADLs independently or with assistance (bathe, groom, dress, toilet and feed)  Outcome: Progressing     Problem: Infection - Standard  Goal: Patient will remain free from infection  Outcome: Progressing     Problem: Wound/ / Incision Healing  Goal: Patient's wound/surgical incision will decrease in size and heals properly  Outcome: Progressing   The patient is Stable - Low risk of patient condition declining or worsening    Shift Goals  Clinical Goals: decrease Oxygen demand  Patient Goals: stop coughing  Family Goals: breathe better    Progress made toward(s) clinical / shift goals: patient remains on 6 L oxymask with sats between 90-96% while proning. However, he de-sats very quickly to the low 80's and has slow recovery. BG remains elevated covered per ISS.Up to bathroom without falls or injuries. Patient slept last night.    Patient is not progressing towards the following goals:

## 2021-08-09 LAB
ALBUMIN SERPL BCP-MCNC: 3.2 G/DL (ref 3.2–4.9)
ALBUMIN/GLOB SERPL: 0.7 G/DL
ALP SERPL-CCNC: 87 U/L (ref 30–99)
ALT SERPL-CCNC: 64 U/L (ref 2–50)
ANION GAP SERPL CALC-SCNC: 14 MMOL/L (ref 7–16)
AST SERPL-CCNC: 82 U/L (ref 12–45)
BACTERIA SPEC RESP CULT: NORMAL
BILIRUB SERPL-MCNC: 0.3 MG/DL (ref 0.1–1.5)
BUN SERPL-MCNC: 16 MG/DL (ref 8–22)
CALCIUM SERPL-MCNC: 8.8 MG/DL (ref 8.5–10.5)
CHLORIDE SERPL-SCNC: 94 MMOL/L (ref 96–112)
CO2 SERPL-SCNC: 24 MMOL/L (ref 20–33)
CREAT SERPL-MCNC: 0.59 MG/DL (ref 0.5–1.4)
GLOBULIN SER CALC-MCNC: 4.4 G/DL (ref 1.9–3.5)
GLUCOSE BLD-MCNC: 256 MG/DL (ref 65–99)
GLUCOSE BLD-MCNC: 319 MG/DL (ref 65–99)
GLUCOSE BLD-MCNC: 350 MG/DL (ref 65–99)
GLUCOSE SERPL-MCNC: 321 MG/DL (ref 65–99)
GRAM STN SPEC: NORMAL
POTASSIUM SERPL-SCNC: 4.1 MMOL/L (ref 3.6–5.5)
PROT SERPL-MCNC: 7.6 G/DL (ref 6–8.2)
SIGNIFICANT IND 70042: NORMAL
SITE SITE: NORMAL
SODIUM SERPL-SCNC: 132 MMOL/L (ref 135–145)
SOURCE SOURCE: NORMAL

## 2021-08-09 PROCEDURE — 700111 HCHG RX REV CODE 636 W/ 250 OVERRIDE (IP): Performed by: STUDENT IN AN ORGANIZED HEALTH CARE EDUCATION/TRAINING PROGRAM

## 2021-08-09 PROCEDURE — 700101 HCHG RX REV CODE 250: Performed by: INTERNAL MEDICINE

## 2021-08-09 PROCEDURE — 700102 HCHG RX REV CODE 250 W/ 637 OVERRIDE(OP): Performed by: STUDENT IN AN ORGANIZED HEALTH CARE EDUCATION/TRAINING PROGRAM

## 2021-08-09 PROCEDURE — A9270 NON-COVERED ITEM OR SERVICE: HCPCS | Performed by: STUDENT IN AN ORGANIZED HEALTH CARE EDUCATION/TRAINING PROGRAM

## 2021-08-09 PROCEDURE — 82962 GLUCOSE BLOOD TEST: CPT

## 2021-08-09 PROCEDURE — 80053 COMPREHEN METABOLIC PANEL: CPT

## 2021-08-09 PROCEDURE — 770020 HCHG ROOM/CARE - TELE (206)

## 2021-08-09 PROCEDURE — 99232 SBSQ HOSP IP/OBS MODERATE 35: CPT | Performed by: STUDENT IN AN ORGANIZED HEALTH CARE EDUCATION/TRAINING PROGRAM

## 2021-08-09 PROCEDURE — 36415 COLL VENOUS BLD VENIPUNCTURE: CPT

## 2021-08-09 PROCEDURE — 700105 HCHG RX REV CODE 258: Performed by: INTERNAL MEDICINE

## 2021-08-09 RX ADMIN — ACETAMINOPHEN 1000 MG: 500 TABLET, FILM COATED ORAL at 08:04

## 2021-08-09 RX ADMIN — INSULIN HUMAN 6 UNITS: 100 INJECTION, SOLUTION PARENTERAL at 20:56

## 2021-08-09 RX ADMIN — ACETAMINOPHEN 1000 MG: 500 TABLET, FILM COATED ORAL at 20:49

## 2021-08-09 RX ADMIN — INSULIN HUMAN 6 UNITS: 100 INJECTION, SOLUTION PARENTERAL at 12:35

## 2021-08-09 RX ADMIN — ROSUVASTATIN CALCIUM 40 MG: 20 TABLET, FILM COATED ORAL at 18:26

## 2021-08-09 RX ADMIN — INSULIN HUMAN 5 UNITS: 100 INJECTION, SOLUTION PARENTERAL at 08:04

## 2021-08-09 RX ADMIN — ONDANSETRON 4 MG: 4 TABLET, ORALLY DISINTEGRATING ORAL at 08:04

## 2021-08-09 RX ADMIN — REMDESIVIR 100 MG: 100 INJECTION, POWDER, LYOPHILIZED, FOR SOLUTION INTRAVENOUS at 18:26

## 2021-08-09 RX ADMIN — ASPIRIN 81 MG: 81 TABLET, COATED ORAL at 05:26

## 2021-08-09 RX ADMIN — CLOPIDOGREL BISULFATE 75 MG: 75 TABLET ORAL at 05:26

## 2021-08-09 RX ADMIN — INSULIN HUMAN 6 UNITS: 100 INJECTION, SOLUTION PARENTERAL at 18:27

## 2021-08-09 RX ADMIN — EZETIMIBE 10 MG: 10 TABLET ORAL at 05:26

## 2021-08-09 RX ADMIN — ENOXAPARIN SODIUM 40 MG: 40 INJECTION SUBCUTANEOUS at 05:25

## 2021-08-09 RX ADMIN — DEXAMETHASONE SODIUM PHOSPHATE 6 MG: 4 INJECTION, SOLUTION INTRA-ARTICULAR; INTRALESIONAL; INTRAMUSCULAR; INTRAVENOUS; SOFT TISSUE at 05:27

## 2021-08-09 RX ADMIN — AZITHROMYCIN MONOHYDRATE 500 MG: 250 TABLET ORAL at 05:26

## 2021-08-09 RX ADMIN — ACETAMINOPHEN 1000 MG: 500 TABLET, FILM COATED ORAL at 16:26

## 2021-08-09 RX ADMIN — GUAIFENESIN SYRUP AND DEXTROMETHORPHAN 10 ML: 100; 10 SYRUP ORAL at 08:04

## 2021-08-09 RX ADMIN — CEFTRIAXONE SODIUM 2 G: 10 INJECTION, POWDER, FOR SOLUTION INTRAVENOUS at 05:25

## 2021-08-09 ASSESSMENT — ENCOUNTER SYMPTOMS
SHORTNESS OF BREATH: 1
EYE PAIN: 0
SPUTUM PRODUCTION: 1
HEADACHES: 0
INSOMNIA: 0
FOCAL WEAKNESS: 0
PALPITATIONS: 0
BACK PAIN: 0
NAUSEA: 0
COUGH: 1
DIZZINESS: 0
VOMITING: 0
SENSORY CHANGE: 0
FEVER: 0
ABDOMINAL PAIN: 0
CHILLS: 0
BLURRED VISION: 0
WHEEZING: 0

## 2021-08-09 ASSESSMENT — PAIN DESCRIPTION - PAIN TYPE: TYPE: ACUTE PAIN

## 2021-08-09 ASSESSMENT — LIFESTYLE VARIABLES: SUBSTANCE_ABUSE: 0

## 2021-08-09 NOTE — CARE PLAN
The patient is Watcher - Medium risk of patient condition declining or worsening    Shift Goals  Clinical Goals: wean 02  Patient Goals: get better and go home  Family Goals: Get better    Progress made toward(s) clinical / shift goals:    Problem: Knowledge Deficit - Standard  Goal: Patient and family/care givers will demonstrate understanding of plan of care, disease process/condition, diagnostic tests and medications  Outcome: Progressing   Patient able to verbalize the understanding of pulmonary toilet    Patient is not progressing towards the following goals:      Problem: Discharge Barriers/Planning  Goal: Patient's continuum of care needs are met  Outcome: Not Progressing  Note: Patient to discharge home when medically cleared

## 2021-08-09 NOTE — PROGRESS NOTES
Assumed care of patient. Chart review done. Patient AAOx4. On 6L NC with desaturations with movement. Pain control regiment reviewed. Bed low and locked.

## 2021-08-09 NOTE — PROGRESS NOTES
[facility] Medicine Daily Progress Note    Date of Service  8/9/2021    Chief Complaint  Kyle Gonzalez is a 41 y.o. male admitted 8/6/2021 with dyspnea.    Hospital Course  41-year-old morbidly obese male with past medical history of hypertension, dyslipidemia, multiple STEMIs and NSTEMIs (4 times 2013,2014,2015,2017, 2020) s/p 4 stents, ischemic cardiomyopathy, HFrEF last echocardiogram on 10/2020 with ejection fraction at 20%, grade 1 diastolic dysfunction and Covid positive test 3 days ago presented emergency department on 8/6/2021 with a 1 week history of worsening fever, chills, myalgias, back pain, dry cough and dyspnea at rest and exertion.  Patient reporting that he was in [place] last Friday when he started noticing fevers and chills.  Patient admitting to not being vaccinated.  Tested positive for Covid 3 days ago.  Dyspnea, coughing and watery diarrhea started 4 days ago and has persisted.  No melena, hematochezia, orthostatic changes or no loss of consciousness     At the emergency department, vital signs with tachycardia in the 120s, tachypnea in the 20s, 102.5 fever.  Patient requiring 6 L nasal cannula to maintain saturations.  No leukocytosis or anemia on CBC.  Chemistry with hyponatremia, hypochlorhydria, non-anion gap metabolic acidosis, hyperglycemia in the 300s and elevated LFTs.  Troponin, proBNP normal.  Lactic acid 2.2.  Chest x-ray consistent with Covid pneumonia.  Patient was admitted to telemetry for sepsis secondary to Covid complicated by acute hypoxic respiratory failure requiring oxygen supplementation and IV steroids.    Interval Problem Update  No acute events overnight.  On 6L oxymask, continue to wean.  On remdesivir and decadron, continue.  Monitor CMP.  Increase glargine dose from 8 to 12 units daily, titrate as needed. Patient will need insulin on discharge home.  Home monitoring also ordered.  On antibiotics for superimposed bacterial pneumonia.  Patient denies focal  complaints.    I have personally seen and examined the patient at bedside. I discussed the plan of care with patient.    Consultants/Specialty  none    Code Status  Full Code    Disposition  Patient is not medically cleared.   Anticipate discharge to to home with close outpatient follow-up.  I have placed the appropriate orders for post-discharge needs.    Review of Systems  Review of Systems   Constitutional: Negative for chills and fever.   Eyes: Negative for blurred vision and pain.   Respiratory: Positive for cough, sputum production and shortness of breath. Negative for wheezing.    Cardiovascular: Negative for chest pain, palpitations and leg swelling.   Gastrointestinal: Negative for abdominal pain, nausea and vomiting.   Genitourinary: Negative for dysuria and urgency.   Musculoskeletal: Negative for back pain.   Skin: Negative for itching and rash.   Neurological: Negative for dizziness, sensory change, focal weakness and headaches.   Psychiatric/Behavioral: Negative for substance abuse. The patient does not have insomnia.         Physical Exam  Temp:  [36.1 °C (97 °F)-36.4 °C (97.6 °F)] 36.1 °C (97 °F)  Pulse:  [69-84] 84  Resp:  [18-20] 20  BP: ()/(58-70) 103/65  SpO2:  [89 %-95 %] 93 %    Physical Exam  Constitutional:       General: He is not in acute distress.     Appearance: He is not ill-appearing.   HENT:      Head: Normocephalic and atraumatic.      Right Ear: External ear normal.      Left Ear: External ear normal.      Mouth/Throat:      Pharynx: No oropharyngeal exudate or posterior oropharyngeal erythema.   Eyes:      Extraocular Movements: Extraocular movements intact.      Pupils: Pupils are equal, round, and reactive to light.   Cardiovascular:      Rate and Rhythm: Normal rate and regular rhythm.      Pulses: Normal pulses.      Heart sounds: Normal heart sounds.   Pulmonary:      Effort: Pulmonary effort is normal. No respiratory distress.      Breath sounds: Rhonchi present. No  wheezing or rales.   Abdominal:      General: Bowel sounds are normal. There is no distension.      Palpations: Abdomen is soft.      Tenderness: There is no abdominal tenderness. There is no guarding.   Musculoskeletal:         General: No swelling or tenderness.      Cervical back: Normal range of motion and neck supple.   Skin:     General: Skin is warm and dry.   Neurological:      General: No focal deficit present.      Mental Status: He is oriented to person, place, and time.      Sensory: No sensory deficit.      Motor: No weakness.   Psychiatric:         Mood and Affect: Mood normal.         Behavior: Behavior normal.         Fluids    Intake/Output Summary (Last 24 hours) at 8/9/2021 1220  Last data filed at 8/9/2021 0900  Gross per 24 hour   Intake 1460 ml   Output --   Net 1460 ml       Laboratory  Recent Labs     08/07/21  0153   WBC 4.8   RBC 4.98   HEMOGLOBIN 14.9   HEMATOCRIT 45.2   MCV 90.8   MCH 29.9   MCHC 33.0*   RDW 46.9   PLATELETCT 237   MPV 10.1     Recent Labs     08/07/21  0153 08/08/21  0846 08/09/21  0850   SODIUM 133* 134* 132*   POTASSIUM 4.5 4.2 4.1   CHLORIDE 98 98 94*   CO2 19* 21 24   GLUCOSE 307* 291* 321*   BUN 13 16 16   CREATININE 0.74 0.56 0.59   CALCIUM 8.9 8.9 8.8                   Imaging  DX-CHEST-PORTABLE (1 VIEW)   Final Result      Bilateral peripheral pulmonary airspace process consistent with Covid pneumonia           Assessment/Plan  * Sepsis (HCC)- (present on admission)  Assessment & Plan  This is Sepsis Present on admission  SIRS criteria identified on my evaluation include: Fever, with temperature greater than 101 deg F, Tachycardia, with heart rate greater than 90 BPM, Tachypnea, with respirations greater than 20 per minute and Leukocytosis, with WBC greater than 12,000  Source is Covid pneumonia  Sepsis protocol initiated  Fluid resuscitation ordered per protocol  IV antibiotics as appropriate for source of sepsis  While organ dysfunction may be noted elsewhere  in this problem list or in the chart, degree of organ dysfunction does not meet CMS criteria for severe sepsis  On steroids and remdesivir, as well as antibiotics for possible superimposed bacterial pneumonia    Type 2 diabetes mellitus without complication, without long-term current use of insulin (HCC)  Assessment & Plan  Reports chronic history of diabetes and admits to not taking Metformin  Diabetic diet  Insulin sliding scale  Increase long acting insulin to 12 U daily due to hyperglycemia  A1c 11.3  Will likely need insulin on discharge    Acute respiratory failure with hypoxia (HCC)- (present on admission)  Assessment & Plan  Secondary to Covid pneumonia  See covid problem    Morbid obesity with BMI of 40.0-44.9, adult (HCC)- (present on admission)  Assessment & Plan  Contributing to severity of Covid pneumonia hypoxic respiratory failure  Counseling on weight reduction    Lactic acidosis- (present on admission)  Assessment & Plan  Lactic acid 2.2  Secondary to Covid pneumonia/sepsis  IV hydration  Repeat lactic acid    Hyponatremia- (present on admission)  Assessment & Plan  Secondary to dehydration and diarrhea from Covid  monitor    Pneumonia due to COVID-19 virus- (present on admission)  Assessment & Plan  Noted on nasopharyngeal swab 3 days ago and chest x-ray  Morbid obesity and cardiac history contributing to complication  Requiring 13 L oxymask, continue to wean  Continue Decadron, remdesivir started by ID team  procalcitonin elevated, started on empiric antibiotics for possible superimposed bacterial pneumonia  Monitor CMP    Tobacco use- (present on admission)  Assessment & Plan  Counseling on tobacco cessation    Essential hypertension- (present on admission)  Assessment & Plan  Hold BP meds in the setting of sepsis and hypotension  As needed antihypertensives    Hyperlipemia- (present on admission)  Assessment & Plan  Continue home statin       VTE prophylaxis: enoxaparin ppx    I have performed a  physical exam and reviewed and updated ROS and Plan today (8/9/2021). In review of yesterday's note (8/8/2021), there are no changes except as documented above.

## 2021-08-09 NOTE — CARE PLAN
Problem: Knowledge Deficit - Standard  Goal: Patient and family/care givers will demonstrate understanding of plan of care, disease process/condition, diagnostic tests and medications  Outcome: Not Progressing     Problem: Psychosocial  Goal: Patient's level of anxiety will decrease  Outcome: Not Progressing  Goal: Patient's ability to verbalize feelings about condition will improve  Outcome: Not Progressing  Goal: Patient's ability to re-evaluate and adapt role responsibilities will improve  Outcome: Not Progressing  Goal: Patient and family will demonstrate ability to cope with life altering diagnosis and/or procedure  Outcome: Not Progressing  Goal: Spiritual and cultural needs incorporated into hospitalization  Outcome: Not Progressing     Problem: Communication  Goal: The ability to communicate needs accurately and effectively will improve  Outcome: Not Progressing     Problem: Discharge Barriers/Planning  Goal: Patient's continuum of care needs are met  Outcome: Not Progressing     Problem: Hemodynamics  Goal: Patient's hemodynamics, fluid balance and neurologic status will be stable or improve  Outcome: Not Progressing     Problem: Respiratory  Goal: Patient will achieve/maintain optimum respiratory ventilation and gas exchange  Outcome: Not Progressing     Problem: Fluid Volume  Goal: Fluid volume balance will be maintained  Outcome: Not Progressing     Problem: Dysphagia  Goal: Dysphagia will improve  Outcome: Not Progressing     Problem: Risk for Aspiration  Goal: Patient's risk for aspiration will be absent or decrease  Outcome: Not Progressing     Problem: Nutrition  Goal: Patient's nutritional and fluid intake will be adequate or improve  Outcome: Not Progressing  Goal: Enteral nutrition will be maintained or improve  Outcome: Not Progressing  Goal: Enteral nutrition will be maintained or improve  Outcome: Not Progressing     Problem: Urinary Elimination  Goal: Establish and maintain regular urinary  output  Outcome: Not Progressing     Problem: Bowel Elimination  Goal: Establish and maintain regular bowel function  Outcome: Not Progressing     Problem: Gastrointestinal Irritability  Goal: Nausea and vomiting will be absent or improve  Outcome: Not Progressing  Goal: Diarrhea will be absent or improved  Outcome: Not Progressing     Problem: Mobility  Goal: Patient's capacity to carry out activities will improve  Outcome: Not Progressing     Problem: Self Care  Goal: Patient will have the ability to perform ADLs independently or with assistance (bathe, groom, dress, toilet and feed)  Outcome: Not Progressing     Problem: Infection - Standard  Goal: Patient will remain free from infection  Outcome: Not Progressing     Problem: Wound/ / Incision Healing  Goal: Patient's wound/surgical incision will decrease in size and heals properly  Outcome: Not Progressing   The patient is Watcher - Medium risk of patient condition declining or worsening    Shift Goals  Clinical Goals: Maintain stable O2 saturations  Patient Goals: breathe easier  Family Goals: Get better    Progress made toward(s) clinical / shift goals:      Patient is not progressing towards the following goals:patient remains on 6 liters  of O2 unable to be weaned down.      Problem: Knowledge Deficit - Standard  Goal: Patient and family/care givers will demonstrate understanding of plan of care, disease process/condition, diagnostic tests and medications  Outcome: Not Progressing     Problem: Psychosocial  Goal: Patient's level of anxiety will decrease  Outcome: Not Progressing  Goal: Patient's ability to verbalize feelings about condition will improve  Outcome: Not Progressing  Goal: Patient's ability to re-evaluate and adapt role responsibilities will improve  Outcome: Not Progressing  Goal: Patient and family will demonstrate ability to cope with life altering diagnosis and/or procedure  Outcome: Not Progressing  Goal: Spiritual and cultural needs  incorporated into hospitalization  Outcome: Not Progressing     Problem: Communication  Goal: The ability to communicate needs accurately and effectively will improve  Outcome: Not Progressing     Problem: Discharge Barriers/Planning  Goal: Patient's continuum of care needs are met  Outcome: Not Progressing     Problem: Hemodynamics  Goal: Patient's hemodynamics, fluid balance and neurologic status will be stable or improve  Outcome: Not Progressing     Problem: Respiratory  Goal: Patient will achieve/maintain optimum respiratory ventilation and gas exchange  Outcome: Not Progressing     Problem: Fluid Volume  Goal: Fluid volume balance will be maintained  Outcome: Not Progressing     Problem: Dysphagia  Goal: Dysphagia will improve  Outcome: Not Progressing     Problem: Risk for Aspiration  Goal: Patient's risk for aspiration will be absent or decrease  Outcome: Not Progressing     Problem: Nutrition  Goal: Patient's nutritional and fluid intake will be adequate or improve  Outcome: Not Progressing  Goal: Enteral nutrition will be maintained or improve  Outcome: Not Progressing  Goal: Enteral nutrition will be maintained or improve  Outcome: Not Progressing     Problem: Urinary Elimination  Goal: Establish and maintain regular urinary output  Outcome: Not Progressing     Problem: Bowel Elimination  Goal: Establish and maintain regular bowel function  Outcome: Not Progressing     Problem: Gastrointestinal Irritability  Goal: Nausea and vomiting will be absent or improve  Outcome: Not Progressing  Goal: Diarrhea will be absent or improved  Outcome: Not Progressing     Problem: Mobility  Goal: Patient's capacity to carry out activities will improve  Outcome: Not Progressing     Problem: Self Care  Goal: Patient will have the ability to perform ADLs independently or with assistance (bathe, groom, dress, toilet and feed)  Outcome: Not Progressing     Problem: Infection - Standard  Goal: Patient will remain free from  infection  Outcome: Not Progressing     Problem: Wound/ / Incision Healing  Goal: Patient's wound/surgical incision will decrease in size and heals properly  Outcome: Not Progressing

## 2021-08-10 LAB
ALBUMIN SERPL BCP-MCNC: 3.1 G/DL (ref 3.2–4.9)
ALBUMIN/GLOB SERPL: 0.7 G/DL
ALP SERPL-CCNC: 88 U/L (ref 30–99)
ALT SERPL-CCNC: 59 U/L (ref 2–50)
ANION GAP SERPL CALC-SCNC: 14 MMOL/L (ref 7–16)
AST SERPL-CCNC: 70 U/L (ref 12–45)
BILIRUB SERPL-MCNC: 0.3 MG/DL (ref 0.1–1.5)
BUN SERPL-MCNC: 14 MG/DL (ref 8–22)
CALCIUM SERPL-MCNC: 8.6 MG/DL (ref 8.5–10.5)
CHLORIDE SERPL-SCNC: 97 MMOL/L (ref 96–112)
CO2 SERPL-SCNC: 22 MMOL/L (ref 20–33)
CREAT SERPL-MCNC: 0.51 MG/DL (ref 0.5–1.4)
GLOBULIN SER CALC-MCNC: 4.2 G/DL (ref 1.9–3.5)
GLUCOSE BLD-MCNC: 311 MG/DL (ref 65–99)
GLUCOSE BLD-MCNC: 324 MG/DL (ref 65–99)
GLUCOSE BLD-MCNC: 357 MG/DL (ref 65–99)
GLUCOSE SERPL-MCNC: 321 MG/DL (ref 65–99)
POTASSIUM SERPL-SCNC: 4.1 MMOL/L (ref 3.6–5.5)
PROT SERPL-MCNC: 7.3 G/DL (ref 6–8.2)
SODIUM SERPL-SCNC: 133 MMOL/L (ref 135–145)

## 2021-08-10 PROCEDURE — 700102 HCHG RX REV CODE 250 W/ 637 OVERRIDE(OP): Performed by: STUDENT IN AN ORGANIZED HEALTH CARE EDUCATION/TRAINING PROGRAM

## 2021-08-10 PROCEDURE — 99233 SBSQ HOSP IP/OBS HIGH 50: CPT | Performed by: STUDENT IN AN ORGANIZED HEALTH CARE EDUCATION/TRAINING PROGRAM

## 2021-08-10 PROCEDURE — 700111 HCHG RX REV CODE 636 W/ 250 OVERRIDE (IP): Performed by: STUDENT IN AN ORGANIZED HEALTH CARE EDUCATION/TRAINING PROGRAM

## 2021-08-10 PROCEDURE — 700105 HCHG RX REV CODE 258: Performed by: INTERNAL MEDICINE

## 2021-08-10 PROCEDURE — 80053 COMPREHEN METABOLIC PANEL: CPT

## 2021-08-10 PROCEDURE — 700101 HCHG RX REV CODE 250: Performed by: INTERNAL MEDICINE

## 2021-08-10 PROCEDURE — 36415 COLL VENOUS BLD VENIPUNCTURE: CPT

## 2021-08-10 PROCEDURE — 770020 HCHG ROOM/CARE - TELE (206)

## 2021-08-10 PROCEDURE — A9270 NON-COVERED ITEM OR SERVICE: HCPCS | Performed by: STUDENT IN AN ORGANIZED HEALTH CARE EDUCATION/TRAINING PROGRAM

## 2021-08-10 PROCEDURE — 82962 GLUCOSE BLOOD TEST: CPT | Mod: 91

## 2021-08-10 RX ORDER — INSULIN LISPRO 100 [IU]/ML
2-9 INJECTION, SOLUTION INTRAVENOUS; SUBCUTANEOUS
Status: DISCONTINUED | OUTPATIENT
Start: 2021-08-11 | End: 2021-08-11

## 2021-08-10 RX ORDER — FUROSEMIDE 10 MG/ML
10 INJECTION INTRAMUSCULAR; INTRAVENOUS
Status: DISCONTINUED | OUTPATIENT
Start: 2021-08-11 | End: 2021-08-11

## 2021-08-10 RX ORDER — DEXTROSE MONOHYDRATE 25 G/50ML
50 INJECTION, SOLUTION INTRAVENOUS
Status: DISCONTINUED | OUTPATIENT
Start: 2021-08-10 | End: 2021-08-11

## 2021-08-10 RX ORDER — INSULIN LISPRO 100 [IU]/ML
5 INJECTION, SOLUTION INTRAVENOUS; SUBCUTANEOUS
Status: DISCONTINUED | OUTPATIENT
Start: 2021-08-11 | End: 2021-08-11

## 2021-08-10 RX ADMIN — ASPIRIN 81 MG: 81 TABLET, COATED ORAL at 04:52

## 2021-08-10 RX ADMIN — INSULIN HUMAN 6 UNITS: 100 INJECTION, SOLUTION PARENTERAL at 08:40

## 2021-08-10 RX ADMIN — DEXAMETHASONE SODIUM PHOSPHATE 6 MG: 4 INJECTION, SOLUTION INTRA-ARTICULAR; INTRALESIONAL; INTRAMUSCULAR; INTRAVENOUS; SOFT TISSUE at 04:53

## 2021-08-10 RX ADMIN — GUAIFENESIN SYRUP AND DEXTROMETHORPHAN 10 ML: 100; 10 SYRUP ORAL at 08:40

## 2021-08-10 RX ADMIN — CLOPIDOGREL BISULFATE 75 MG: 75 TABLET ORAL at 04:58

## 2021-08-10 RX ADMIN — ACETAMINOPHEN 1000 MG: 500 TABLET, FILM COATED ORAL at 08:40

## 2021-08-10 RX ADMIN — ACETAMINOPHEN 1000 MG: 500 TABLET, FILM COATED ORAL at 20:23

## 2021-08-10 RX ADMIN — INSULIN HUMAN 5 UNITS: 100 INJECTION, SOLUTION PARENTERAL at 20:27

## 2021-08-10 RX ADMIN — ACETAMINOPHEN 1000 MG: 500 TABLET, FILM COATED ORAL at 15:00

## 2021-08-10 RX ADMIN — CEFTRIAXONE SODIUM 2 G: 10 INJECTION, POWDER, FOR SOLUTION INTRAVENOUS at 04:53

## 2021-08-10 RX ADMIN — ROSUVASTATIN CALCIUM 40 MG: 20 TABLET, FILM COATED ORAL at 17:36

## 2021-08-10 RX ADMIN — INSULIN HUMAN 6 UNITS: 100 INJECTION, SOLUTION PARENTERAL at 17:36

## 2021-08-10 RX ADMIN — REMDESIVIR 100 MG: 100 INJECTION, POWDER, LYOPHILIZED, FOR SOLUTION INTRAVENOUS at 17:35

## 2021-08-10 RX ADMIN — ENOXAPARIN SODIUM 40 MG: 40 INJECTION SUBCUTANEOUS at 04:53

## 2021-08-10 RX ADMIN — EZETIMIBE 10 MG: 10 TABLET ORAL at 04:52

## 2021-08-10 RX ADMIN — DOCUSATE SODIUM 50 MG AND SENNOSIDES 8.6 MG 2 TABLET: 8.6; 5 TABLET, FILM COATED ORAL at 17:36

## 2021-08-10 RX ADMIN — INSULIN HUMAN 8 UNITS: 100 INJECTION, SOLUTION PARENTERAL at 12:50

## 2021-08-10 RX ADMIN — GUAIFENESIN SYRUP AND DEXTROMETHORPHAN 10 ML: 100; 10 SYRUP ORAL at 17:41

## 2021-08-10 ASSESSMENT — ENCOUNTER SYMPTOMS
COUGH: 1
DIZZINESS: 0
INSOMNIA: 0
SHORTNESS OF BREATH: 1
WHEEZING: 0
EYE PAIN: 0
CHILLS: 0
VOMITING: 0
ABDOMINAL PAIN: 0
SENSORY CHANGE: 0
SPUTUM PRODUCTION: 1
FEVER: 0
FOCAL WEAKNESS: 0
PALPITATIONS: 0
BACK PAIN: 0
NAUSEA: 0
BLURRED VISION: 0
HEADACHES: 0

## 2021-08-10 ASSESSMENT — LIFESTYLE VARIABLES: SUBSTANCE_ABUSE: 0

## 2021-08-10 ASSESSMENT — PAIN DESCRIPTION - PAIN TYPE: TYPE: ACUTE PAIN

## 2021-08-10 NOTE — PROGRESS NOTES
Assumed care of patient. Chart review done. Patient AAOx4.On 6L NC.  Pain control regiment reviewed. Bed low and locked.

## 2021-08-10 NOTE — CARE PLAN
Problem: Knowledge Deficit - Standard  Goal: Patient and family/care givers will demonstrate understanding of plan of care, disease process/condition, diagnostic tests and medications  Outcome: Not Progressing     Problem: Psychosocial  Goal: Patient's level of anxiety will decrease  Outcome: Not Progressing  Goal: Patient's ability to verbalize feelings about condition will improve  Outcome: Not Progressing  Goal: Patient's ability to re-evaluate and adapt role responsibilities will improve  Outcome: Not Progressing  Goal: Patient and family will demonstrate ability to cope with life altering diagnosis and/or procedure  Outcome: Not Progressing  Goal: Spiritual and cultural needs incorporated into hospitalization  Outcome: Not Progressing     Problem: Communication  Goal: The ability to communicate needs accurately and effectively will improve  Outcome: Not Progressing     Problem: Discharge Barriers/Planning  Goal: Patient's continuum of care needs are met  Outcome: Not Progressing     Problem: Hemodynamics  Goal: Patient's hemodynamics, fluid balance and neurologic status will be stable or improve  Outcome: Not Progressing     Problem: Respiratory  Goal: Patient will achieve/maintain optimum respiratory ventilation and gas exchange  Outcome: Not Progressing     Problem: Fluid Volume  Goal: Fluid volume balance will be maintained  Outcome: Not Progressing     Problem: Dysphagia  Goal: Dysphagia will improve  Outcome: Not Progressing     Problem: Risk for Aspiration  Goal: Patient's risk for aspiration will be absent or decrease  Outcome: Not Progressing     Problem: Nutrition  Goal: Patient's nutritional and fluid intake will be adequate or improve  Outcome: Not Progressing  Goal: Enteral nutrition will be maintained or improve  Outcome: Not Progressing  Goal: Enteral nutrition will be maintained or improve  Outcome: Not Progressing     Problem: Urinary Elimination  Goal: Establish and maintain regular urinary  output  Outcome: Not Progressing     Problem: Bowel Elimination  Goal: Establish and maintain regular bowel function  Outcome: Not Progressing     Problem: Gastrointestinal Irritability  Goal: Nausea and vomiting will be absent or improve  Outcome: Not Progressing  Goal: Diarrhea will be absent or improved  Outcome: Not Progressing     Problem: Mobility  Goal: Patient's capacity to carry out activities will improve  Outcome: Not Progressing     Problem: Self Care  Goal: Patient will have the ability to perform ADLs independently or with assistance (bathe, groom, dress, toilet and feed)  Outcome: Not Progressing     Problem: Infection - Standard  Goal: Patient will remain free from infection  Outcome: Not Progressing     Problem: Wound/ / Incision Healing  Goal: Patient's wound/surgical incision will decrease in size and heals properly  Outcome: Not Progressing   The patient is Stable - Low risk of patient condition declining or worsening    Shift Goals  Clinical Goals: decrease O2  Patient Goals: lower oxgyen use and control BG  Family Goals: Get better    Progress made toward(s) clinical / shift goals:     Patient is not progressing towards the following goals: patient still requiring 6 liters of O2, unable to wean down this shift      Problem: Knowledge Deficit - Standard  Goal: Patient and family/care givers will demonstrate understanding of plan of care, disease process/condition, diagnostic tests and medications  Outcome: Not Progressing     Problem: Psychosocial  Goal: Patient's level of anxiety will decrease  Outcome: Not Progressing  Goal: Patient's ability to verbalize feelings about condition will improve  Outcome: Not Progressing  Goal: Patient's ability to re-evaluate and adapt role responsibilities will improve  Outcome: Not Progressing  Goal: Patient and family will demonstrate ability to cope with life altering diagnosis and/or procedure  Outcome: Not Progressing  Goal: Spiritual and cultural needs  incorporated into hospitalization  Outcome: Not Progressing     Problem: Communication  Goal: The ability to communicate needs accurately and effectively will improve  Outcome: Not Progressing     Problem: Discharge Barriers/Planning  Goal: Patient's continuum of care needs are met  Outcome: Not Progressing     Problem: Hemodynamics  Goal: Patient's hemodynamics, fluid balance and neurologic status will be stable or improve  Outcome: Not Progressing     Problem: Respiratory  Goal: Patient will achieve/maintain optimum respiratory ventilation and gas exchange  Outcome: Not Progressing     Problem: Fluid Volume  Goal: Fluid volume balance will be maintained  Outcome: Not Progressing     Problem: Dysphagia  Goal: Dysphagia will improve  Outcome: Not Progressing     Problem: Risk for Aspiration  Goal: Patient's risk for aspiration will be absent or decrease  Outcome: Not Progressing     Problem: Nutrition  Goal: Patient's nutritional and fluid intake will be adequate or improve  Outcome: Not Progressing  Goal: Enteral nutrition will be maintained or improve  Outcome: Not Progressing  Goal: Enteral nutrition will be maintained or improve  Outcome: Not Progressing     Problem: Urinary Elimination  Goal: Establish and maintain regular urinary output  Outcome: Not Progressing     Problem: Bowel Elimination  Goal: Establish and maintain regular bowel function  Outcome: Not Progressing     Problem: Gastrointestinal Irritability  Goal: Nausea and vomiting will be absent or improve  Outcome: Not Progressing  Goal: Diarrhea will be absent or improved  Outcome: Not Progressing     Problem: Mobility  Goal: Patient's capacity to carry out activities will improve  Outcome: Not Progressing     Problem: Self Care  Goal: Patient will have the ability to perform ADLs independently or with assistance (bathe, groom, dress, toilet and feed)  Outcome: Not Progressing     Problem: Infection - Standard  Goal: Patient will remain free from  infection  Outcome: Not Progressing     Problem: Wound/ / Incision Healing  Goal: Patient's wound/surgical incision will decrease in size and heals properly  Outcome: Not Progressing

## 2021-08-11 LAB
ANION GAP SERPL CALC-SCNC: 12 MMOL/L (ref 7–16)
BACTERIA BLD CULT: NORMAL
BACTERIA BLD CULT: NORMAL
BUN SERPL-MCNC: 13 MG/DL (ref 8–22)
CALCIUM SERPL-MCNC: 8.4 MG/DL (ref 8.5–10.5)
CHLORIDE SERPL-SCNC: 101 MMOL/L (ref 96–112)
CO2 SERPL-SCNC: 25 MMOL/L (ref 20–33)
CREAT SERPL-MCNC: 0.48 MG/DL (ref 0.5–1.4)
ERYTHROCYTE [DISTWIDTH] IN BLOOD BY AUTOMATED COUNT: 44.3 FL (ref 35.9–50)
GLUCOSE BLD-MCNC: 249 MG/DL (ref 65–99)
GLUCOSE BLD-MCNC: 315 MG/DL (ref 65–99)
GLUCOSE BLD-MCNC: 388 MG/DL (ref 65–99)
GLUCOSE SERPL-MCNC: 163 MG/DL (ref 65–99)
HCT VFR BLD AUTO: 44.2 % (ref 42–52)
HGB BLD-MCNC: 14.9 G/DL (ref 14–18)
MCH RBC QN AUTO: 30.2 PG (ref 27–33)
MCHC RBC AUTO-ENTMCNC: 33.7 G/DL (ref 33.7–35.3)
MCV RBC AUTO: 89.7 FL (ref 81.4–97.8)
PLATELET # BLD AUTO: 450 K/UL (ref 164–446)
PMV BLD AUTO: 9.7 FL (ref 9–12.9)
POTASSIUM SERPL-SCNC: 3.6 MMOL/L (ref 3.6–5.5)
RBC # BLD AUTO: 4.93 M/UL (ref 4.7–6.1)
SIGNIFICANT IND 70042: NORMAL
SIGNIFICANT IND 70042: NORMAL
SITE SITE: NORMAL
SITE SITE: NORMAL
SODIUM SERPL-SCNC: 138 MMOL/L (ref 135–145)
SOURCE SOURCE: NORMAL
SOURCE SOURCE: NORMAL
WBC # BLD AUTO: 10.7 K/UL (ref 4.8–10.8)

## 2021-08-11 PROCEDURE — 700111 HCHG RX REV CODE 636 W/ 250 OVERRIDE (IP): Performed by: STUDENT IN AN ORGANIZED HEALTH CARE EDUCATION/TRAINING PROGRAM

## 2021-08-11 PROCEDURE — 80053 COMPREHEN METABOLIC PANEL: CPT

## 2021-08-11 PROCEDURE — 99233 SBSQ HOSP IP/OBS HIGH 50: CPT | Performed by: STUDENT IN AN ORGANIZED HEALTH CARE EDUCATION/TRAINING PROGRAM

## 2021-08-11 PROCEDURE — 770020 HCHG ROOM/CARE - TELE (206)

## 2021-08-11 PROCEDURE — 85027 COMPLETE CBC AUTOMATED: CPT

## 2021-08-11 PROCEDURE — 700102 HCHG RX REV CODE 250 W/ 637 OVERRIDE(OP): Performed by: STUDENT IN AN ORGANIZED HEALTH CARE EDUCATION/TRAINING PROGRAM

## 2021-08-11 PROCEDURE — 82962 GLUCOSE BLOOD TEST: CPT | Mod: 91

## 2021-08-11 PROCEDURE — 36415 COLL VENOUS BLD VENIPUNCTURE: CPT

## 2021-08-11 PROCEDURE — 700105 HCHG RX REV CODE 258: Performed by: INTERNAL MEDICINE

## 2021-08-11 PROCEDURE — A9270 NON-COVERED ITEM OR SERVICE: HCPCS | Performed by: STUDENT IN AN ORGANIZED HEALTH CARE EDUCATION/TRAINING PROGRAM

## 2021-08-11 PROCEDURE — 700101 HCHG RX REV CODE 250: Performed by: INTERNAL MEDICINE

## 2021-08-11 RX ORDER — FUROSEMIDE 10 MG/ML
20 INJECTION INTRAMUSCULAR; INTRAVENOUS
Status: DISCONTINUED | OUTPATIENT
Start: 2021-08-12 | End: 2021-08-12

## 2021-08-11 RX ORDER — INSULIN LISPRO 100 [IU]/ML
2-9 INJECTION, SOLUTION INTRAVENOUS; SUBCUTANEOUS
Status: DISCONTINUED | OUTPATIENT
Start: 2021-08-11 | End: 2021-08-14

## 2021-08-11 RX ORDER — DEXTROSE MONOHYDRATE 25 G/50ML
50 INJECTION, SOLUTION INTRAVENOUS
Status: DISCONTINUED | OUTPATIENT
Start: 2021-08-11 | End: 2021-08-14

## 2021-08-11 RX ORDER — INSULIN LISPRO 100 [IU]/ML
7 INJECTION, SOLUTION INTRAVENOUS; SUBCUTANEOUS
Status: DISCONTINUED | OUTPATIENT
Start: 2021-08-11 | End: 2021-08-14

## 2021-08-11 RX ADMIN — INSULIN LISPRO 5 UNITS: 100 INJECTION, SOLUTION INTRAVENOUS; SUBCUTANEOUS at 09:11

## 2021-08-11 RX ADMIN — ASPIRIN 81 MG: 81 TABLET, COATED ORAL at 06:00

## 2021-08-11 RX ADMIN — REMDESIVIR 100 MG: 100 INJECTION, POWDER, LYOPHILIZED, FOR SOLUTION INTRAVENOUS at 20:06

## 2021-08-11 RX ADMIN — CLOPIDOGREL BISULFATE 75 MG: 75 TABLET ORAL at 06:00

## 2021-08-11 RX ADMIN — FUROSEMIDE 10 MG: 20 INJECTION, SOLUTION INTRAMUSCULAR; INTRAVENOUS at 06:02

## 2021-08-11 RX ADMIN — INSULIN GLARGINE 23 UNITS: 100 INJECTION, SOLUTION SUBCUTANEOUS at 17:46

## 2021-08-11 RX ADMIN — DEXAMETHASONE SODIUM PHOSPHATE 6 MG: 4 INJECTION, SOLUTION INTRA-ARTICULAR; INTRALESIONAL; INTRAMUSCULAR; INTRAVENOUS; SOFT TISSUE at 05:59

## 2021-08-11 RX ADMIN — INSULIN LISPRO 5 UNITS: 100 INJECTION, SOLUTION INTRAVENOUS; SUBCUTANEOUS at 20:12

## 2021-08-11 RX ADMIN — INSULIN LISPRO 8 UNITS: 100 INJECTION, SOLUTION INTRAVENOUS; SUBCUTANEOUS at 12:41

## 2021-08-11 RX ADMIN — INSULIN LISPRO 5 UNITS: 100 INJECTION, SOLUTION INTRAVENOUS; SUBCUTANEOUS at 12:40

## 2021-08-11 RX ADMIN — INSULIN LISPRO 3 UNITS: 100 INJECTION, SOLUTION INTRAVENOUS; SUBCUTANEOUS at 09:11

## 2021-08-11 RX ADMIN — ENOXAPARIN SODIUM 40 MG: 40 INJECTION SUBCUTANEOUS at 06:00

## 2021-08-11 RX ADMIN — DOCUSATE SODIUM 50 MG AND SENNOSIDES 8.6 MG 2 TABLET: 8.6; 5 TABLET, FILM COATED ORAL at 17:46

## 2021-08-11 RX ADMIN — DOCUSATE SODIUM 50 MG AND SENNOSIDES 8.6 MG 2 TABLET: 8.6; 5 TABLET, FILM COATED ORAL at 05:59

## 2021-08-11 RX ADMIN — INSULIN LISPRO 6 UNITS: 100 INJECTION, SOLUTION INTRAVENOUS; SUBCUTANEOUS at 17:48

## 2021-08-11 RX ADMIN — ACETAMINOPHEN 1000 MG: 500 TABLET, FILM COATED ORAL at 20:07

## 2021-08-11 RX ADMIN — ACETAMINOPHEN 1000 MG: 500 TABLET, FILM COATED ORAL at 16:16

## 2021-08-11 RX ADMIN — EZETIMIBE 10 MG: 10 TABLET ORAL at 06:00

## 2021-08-11 RX ADMIN — ROSUVASTATIN CALCIUM 40 MG: 20 TABLET, FILM COATED ORAL at 17:48

## 2021-08-11 RX ADMIN — ACETAMINOPHEN 1000 MG: 500 TABLET, FILM COATED ORAL at 08:22

## 2021-08-11 RX ADMIN — CEFTRIAXONE SODIUM 2 G: 10 INJECTION, POWDER, FOR SOLUTION INTRAVENOUS at 06:00

## 2021-08-11 RX ADMIN — INSULIN LISPRO 7 UNITS: 100 INJECTION, SOLUTION INTRAVENOUS; SUBCUTANEOUS at 17:48

## 2021-08-11 ASSESSMENT — ENCOUNTER SYMPTOMS
WHEEZING: 0
BLURRED VISION: 0
COUGH: 1
NAUSEA: 0
EYE PAIN: 0
FEVER: 0
FOCAL WEAKNESS: 0
SENSORY CHANGE: 0
HEADACHES: 0
INSOMNIA: 0
ABDOMINAL PAIN: 0
SHORTNESS OF BREATH: 1
BACK PAIN: 0
CHILLS: 0
PALPITATIONS: 0
VOMITING: 0
DIZZINESS: 0
SPUTUM PRODUCTION: 1

## 2021-08-11 ASSESSMENT — PAIN DESCRIPTION - PAIN TYPE
TYPE: ACUTE PAIN
TYPE: ACUTE PAIN

## 2021-08-11 ASSESSMENT — LIFESTYLE VARIABLES: SUBSTANCE_ABUSE: 0

## 2021-08-11 NOTE — PROGRESS NOTES
[facility] Medicine Daily Progress Note    Date of Service  8/10/2021    Chief Complaint  Kyle Gonzalez is a 41 y.o. male admitted 8/6/2021 with dyspnea.    Hospital Course  41-year-old morbidly obese male with past medical history of hypertension, dyslipidemia, multiple STEMIs and NSTEMIs (4 times 2013,2014,2015,2017, 2020) s/p 4 stents, ischemic cardiomyopathy, HFrEF last echocardiogram on 10/2020 with ejection fraction at 20%, grade 1 diastolic dysfunction and Covid positive test 3 days ago presented emergency department on 8/6/2021 with a 1 week history of worsening fever, chills, myalgias, back pain, dry cough and dyspnea at rest and exertion.  Patient reporting that he was in [place] last Friday when he started noticing fevers and chills.  Patient admitting to not being vaccinated.  Tested positive for Covid 3 days ago.  Dyspnea, coughing and watery diarrhea started 4 days ago and has persisted.  No melena, hematochezia, orthostatic changes or no loss of consciousness     At the emergency department, vital signs with tachycardia in the 120s, tachypnea in the 20s, 102.5 fever.  Patient requiring 6 L nasal cannula to maintain saturations.  No leukocytosis or anemia on CBC.  Chemistry with hyponatremia, hypochlorhydria, non-anion gap metabolic acidosis, hyperglycemia in the 300s and elevated LFTs.  Troponin, proBNP normal.  Lactic acid 2.2.  Chest x-ray consistent with Covid pneumonia.  Patient was admitted to telemetry for sepsis secondary to Covid complicated by acute hypoxic respiratory failure requiring oxygen supplementation and IV steroids.    Interval Problem Update  No acute events overnight.  On 6L oxymask, continue to wean.  On remdesivir and decadron, continue.  Monitor CMP.  Increase glargine dose from 8 to 12 to 16 units daily, titrate as needed. Patient will need insulin on discharge home.  Home monitoring also ordered.  On antibiotics for superimposed bacterial pneumonia.  Patient denies focal  complaints.    I have personally seen and examined the patient at bedside. I discussed the plan of care with patient.    Consultants/Specialty  none    Code Status  Full Code    Disposition  Patient is not medically cleared.   Anticipate discharge to to home with close outpatient follow-up.  I have placed the appropriate orders for post-discharge needs.    Review of Systems  Review of Systems   Constitutional: Negative for chills and fever.   Eyes: Negative for blurred vision and pain.   Respiratory: Positive for cough, sputum production and shortness of breath. Negative for wheezing.    Cardiovascular: Negative for chest pain, palpitations and leg swelling.   Gastrointestinal: Negative for abdominal pain, nausea and vomiting.   Genitourinary: Negative for dysuria and urgency.   Musculoskeletal: Negative for back pain.   Skin: Negative for itching and rash.   Neurological: Negative for dizziness, sensory change, focal weakness and headaches.   Psychiatric/Behavioral: Negative for substance abuse. The patient does not have insomnia.         Physical Exam  Temp:  [35.9 °C (96.7 °F)-36.6 °C (97.9 °F)] 36.3 °C (97.3 °F)  Pulse:  [66-89] 70  Resp:  [17-20] 18  BP: ()/(56-79) 112/73  SpO2:  [89 %-96 %] 91 %    Physical Exam  Constitutional:       General: He is not in acute distress.     Appearance: He is not ill-appearing.   HENT:      Head: Normocephalic and atraumatic.      Right Ear: External ear normal.      Left Ear: External ear normal.      Mouth/Throat:      Pharynx: No oropharyngeal exudate or posterior oropharyngeal erythema.   Eyes:      Extraocular Movements: Extraocular movements intact.      Pupils: Pupils are equal, round, and reactive to light.   Cardiovascular:      Rate and Rhythm: Normal rate and regular rhythm.      Pulses: Normal pulses.      Heart sounds: Normal heart sounds.   Pulmonary:      Effort: Pulmonary effort is normal. No respiratory distress.      Breath sounds: Rhonchi and rales  present. No wheezing.   Abdominal:      General: Bowel sounds are normal. There is no distension.      Palpations: Abdomen is soft.      Tenderness: There is no abdominal tenderness. There is no guarding.   Musculoskeletal:         General: No swelling or tenderness.      Cervical back: Normal range of motion and neck supple.   Skin:     General: Skin is warm and dry.   Neurological:      General: No focal deficit present.      Mental Status: He is oriented to person, place, and time.      Sensory: No sensory deficit.      Motor: No weakness.   Psychiatric:         Mood and Affect: Mood normal.         Behavior: Behavior normal.         Fluids    Intake/Output Summary (Last 24 hours) at 8/10/2021 1752  Last data filed at 8/10/2021 0900  Gross per 24 hour   Intake 240 ml   Output --   Net 240 ml       Laboratory      Recent Labs     08/08/21  0846 08/09/21  0850 08/10/21  0931   SODIUM 134* 132* 133*   POTASSIUM 4.2 4.1 4.1   CHLORIDE 98 94* 97   CO2 21 24 22   GLUCOSE 291* 321* 321*   BUN 16 16 14   CREATININE 0.56 0.59 0.51   CALCIUM 8.9 8.8 8.6                   Imaging  DX-CHEST-PORTABLE (1 VIEW)   Final Result      Bilateral peripheral pulmonary airspace process consistent with Covid pneumonia           Assessment/Plan  * Sepsis (HCC)- (present on admission)  Assessment & Plan  This is Sepsis Present on admission  SIRS criteria identified on my evaluation include: Fever, with temperature greater than 101 deg F, Tachycardia, with heart rate greater than 90 BPM, Tachypnea, with respirations greater than 20 per minute and Leukocytosis, with WBC greater than 12,000  Source is Covid pneumonia  Sepsis protocol initiated  Fluid resuscitation ordered per protocol  IV antibiotics as appropriate for source of sepsis  While organ dysfunction may be noted elsewhere in this problem list or in the chart, degree of organ dysfunction does not meet CMS criteria for severe sepsis  On steroids and remdesivir, as well as  antibiotics for possible superimposed bacterial pneumonia    Type 2 diabetes mellitus without complication, without long-term current use of insulin (HCC)  Assessment & Plan  Reports chronic history of diabetes and admits to not taking Metformin  Diabetic diet  Insulin sliding scale  Increase long acting insulin to 12 U daily due to hyperglycemia  A1c 11.3  Will likely need insulin on discharge    Acute respiratory failure with hypoxia (HCC)- (present on admission)  Assessment & Plan  Secondary to Covid pneumonia  See covid problem    Morbid obesity with BMI of 40.0-44.9, adult (HCC)- (present on admission)  Assessment & Plan  Contributing to severity of Covid pneumonia hypoxic respiratory failure  Counseling on weight reduction    Lactic acidosis- (present on admission)  Assessment & Plan  Lactic acid 2.2  Secondary to Covid pneumonia/sepsis  IV hydration  Repeat lactic acid    Hyponatremia- (present on admission)  Assessment & Plan  Secondary to dehydration and diarrhea from Covid  monitor    Pneumonia due to COVID-19 virus- (present on admission)  Assessment & Plan  Noted on nasopharyngeal swab 3 days ago and chest x-ray  Morbid obesity and cardiac history contributing to complication  Requiring 13 L oxymask, continue to wean  Continue Decadron, remdesivir started by ID team  procalcitonin elevated, started on empiric antibiotics for possible superimposed bacterial pneumonia  Monitor CMP    Tobacco use- (present on admission)  Assessment & Plan  Counseling on tobacco cessation    Essential hypertension- (present on admission)  Assessment & Plan  Hold BP meds in the setting of sepsis and hypotension  As needed antihypertensives    Hyperlipemia- (present on admission)  Assessment & Plan  Continue home statin       VTE prophylaxis: enoxaparin ppx    I have performed a physical exam and reviewed and updated ROS and Plan today (8/10/2021). In review of yesterday's note (8/9/2021), there are no changes except as  documented above.

## 2021-08-11 NOTE — PROGRESS NOTES
[facility] Medicine Daily Progress Note    Date of Service  8/11/2021    Chief Complaint  Kyle Gonzalez is a 41 y.o. male admitted 8/6/2021 with dyspnea.    Hospital Course  41-year-old morbidly obese male with past medical history of hypertension, dyslipidemia, multiple STEMIs and NSTEMIs (4 times 2013,2014,2015,2017, 2020) s/p 4 stents, ischemic cardiomyopathy, HFrEF last echocardiogram on 10/2020 with ejection fraction at 20%, grade 1 diastolic dysfunction and Covid positive test 3 days ago presented emergency department on 8/6/2021 with a 1 week history of worsening fever, chills, myalgias, back pain, dry cough and dyspnea at rest and exertion.  Patient reporting that he was in [place] last Friday when he started noticing fevers and chills.  Patient admitting to not being vaccinated.  Tested positive for Covid 3 days ago.  Dyspnea, coughing and watery diarrhea started 4 days ago and has persisted.  No melena, hematochezia, orthostatic changes or no loss of consciousness     At the emergency department, vital signs with tachycardia in the 120s, tachypnea in the 20s, 102.5 fever.  Patient requiring 6 L nasal cannula to maintain saturations.  No leukocytosis or anemia on CBC.  Chemistry with hyponatremia, hypochlorhydria, non-anion gap metabolic acidosis, hyperglycemia in the 300s and elevated LFTs.  Troponin, proBNP normal.  Lactic acid 2.2.  Chest x-ray consistent with Covid pneumonia.  Patient was admitted to telemetry for sepsis secondary to Covid complicated by acute hypoxic respiratory failure requiring oxygen supplementation and IV steroids.    Interval Problem Update  No acute events overnight.  On 6L oxymask, continue to wean.  Started on lasix 20 mg daily   S/p remdesivir and decadron, continue.  Monitor CMP.  Increase glargine dose to 23, started on lispro 7 units tid, and titrate as needed. Patient will need insulin on discharge home.  Home monitoring also ordered.  Completed Rocephin for superimposed  bacterial pneumonia.  Patient denies focal complaints.    I have personally seen and examined the patient at bedside. I discussed the plan of care with patient.    Consultants/Specialty  none    Code Status  Full Code    Disposition  Patient is not medically cleared.   Anticipate discharge to to home with close outpatient follow-up.  I have placed the appropriate orders for post-discharge needs.    Review of Systems  Review of Systems   Constitutional: Negative for chills and fever.   Eyes: Negative for blurred vision and pain.   Respiratory: Positive for cough, sputum production and shortness of breath. Negative for wheezing.    Cardiovascular: Negative for chest pain, palpitations and leg swelling.   Gastrointestinal: Negative for abdominal pain, nausea and vomiting.   Genitourinary: Negative for dysuria and urgency.   Musculoskeletal: Negative for back pain.   Skin: Negative for itching and rash.   Neurological: Negative for dizziness, sensory change, focal weakness and headaches.   Psychiatric/Behavioral: Negative for substance abuse. The patient does not have insomnia.         Physical Exam  Temp:  [35.9 °C (96.7 °F)-36.7 °C (98 °F)] 35.9 °C (96.7 °F)  Pulse:  [70-88] 79  Resp:  [17-19] 18  BP: ()/(54-89) 111/67  SpO2:  [89 %-95 %] 90 %    Physical Exam  Constitutional:       General: He is not in acute distress.     Appearance: He is not ill-appearing.   HENT:      Head: Normocephalic and atraumatic.      Right Ear: External ear normal.      Left Ear: External ear normal.      Mouth/Throat:      Pharynx: No oropharyngeal exudate or posterior oropharyngeal erythema.   Eyes:      Extraocular Movements: Extraocular movements intact.      Pupils: Pupils are equal, round, and reactive to light.   Cardiovascular:      Rate and Rhythm: Normal rate and regular rhythm.      Pulses: Normal pulses.      Heart sounds: Normal heart sounds.   Pulmonary:      Effort: Pulmonary effort is normal. No respiratory distress.       Breath sounds: Rhonchi and rales present. No wheezing.   Abdominal:      General: Bowel sounds are normal. There is no distension.      Palpations: Abdomen is soft.      Tenderness: There is no abdominal tenderness. There is no guarding.   Musculoskeletal:         General: No swelling or tenderness.      Cervical back: Normal range of motion and neck supple.   Skin:     General: Skin is warm and dry.   Neurological:      General: No focal deficit present.      Mental Status: He is oriented to person, place, and time.      Sensory: No sensory deficit.      Motor: No weakness.   Psychiatric:         Mood and Affect: Mood normal.         Behavior: Behavior normal.         Fluids    Intake/Output Summary (Last 24 hours) at 8/11/2021 1415  Last data filed at 8/11/2021 0613  Gross per 24 hour   Intake --   Output 200 ml   Net -200 ml       Laboratory  Recent Labs     08/11/21  0440   WBC 10.7   RBC 4.93   HEMOGLOBIN 14.9   HEMATOCRIT 44.2   MCV 89.7   MCH 30.2   MCHC 33.7   RDW 44.3   PLATELETCT 450*   MPV 9.7     Recent Labs     08/09/21  0850 08/10/21  0931 08/11/21  0440   SODIUM 132* 133* 138   POTASSIUM 4.1 4.1 3.6   CHLORIDE 94* 97 101   CO2 24 22 25   GLUCOSE 321* 321* 163*   BUN 16 14 13   CREATININE 0.59 0.51 0.48*   CALCIUM 8.8 8.6 8.4*                   Imaging  DX-CHEST-PORTABLE (1 VIEW)   Final Result      Bilateral peripheral pulmonary airspace process consistent with Covid pneumonia           Assessment/Plan  * Sepsis (HCC)- (present on admission)  Assessment & Plan  This is Sepsis Present on admission  SIRS criteria identified on my evaluation include: Fever, with temperature greater than 101 deg F, Tachycardia, with heart rate greater than 90 BPM, Tachypnea, with respirations greater than 20 per minute and Leukocytosis, with WBC greater than 12,000  Source is Covid pneumonia  Sepsis protocol initiated  Fluid resuscitation ordered per protocol  IV antibiotics as appropriate for source of sepsis  While  organ dysfunction may be noted elsewhere in this problem list or in the chart, degree of organ dysfunction does not meet CMS criteria for severe sepsis  On steroids and remdesivir, as well as antibiotics for possible superimposed bacterial pneumonia    Type 2 diabetes mellitus without complication, without long-term current use of insulin ([facility])  Assessment & Plan  Reports chronic history of diabetes and admits to not taking Metformin  Diabetic diet  Insulin sliding scale  Increase glargine to 23 units and lispro 7 units TID-, c/w [facility] insulin  A1c 11.3  Will likely need insulin on discharge    Acute respiratory failure with hypoxia (HCC)- (present on admission)  Assessment & Plan  Secondary to Covid pneumonia  See covid problem    Morbid obesity with BMI of 40.0-44.9, adult ([facility])- (present on admission)  Assessment & Plan  Contributing to severity of Covid pneumonia hypoxic respiratory failure  Counseling on weight reduction    Lactic acidosis- (present on admission)  Assessment & Plan  Lactic acid 2.2  Secondary to Covid pneumonia/sepsis  IV hydration  Repeat lactic acid    Hyponatremia- (present on admission)  Assessment & Plan  Secondary to dehydration and diarrhea from Covid  monitor    Pneumonia due to COVID-19 virus- (present on admission)  Assessment & Plan  Noted on nasopharyngeal swab 3 days ago and chest x-ray  Morbid obesity and cardiac history contributing to complication  Requiring 13 L oxymask, continue to wean now on 6-8 L  Continue Decadron, S/P remdesivir started by ID team  procalcitonin elevated, completed empiric ceftriaxone (8/11/21) for possible superimposed bacterial pneumonia  Monitor CMP    Heart failure with reduced ejection fraction (HCC)- (present on admission)  Assessment & Plan  ACEi and BB, Aldactone held due to hypotension  C/w lasix 20 mg daily       Coronary artery disease involving native coronary artery of native heart without angina pectoris- (present on admission)  Assessment  & Plan  Home ACEi, BB aldactone held due to low BP  Resume as tolerated by BP  C/w dapt, statin    Tobacco use- (present on admission)  Assessment & Plan  Counseling on tobacco cessation    Essential hypertension- (present on admission)  Assessment & Plan  Hold BP meds in the setting of sepsis and hypotension  As needed antihypertensives    Stented coronary artery,  bare metal stent X 2- (present on admission)  Assessment & Plan  C/w DAPT    Hyperlipemia- (present on admission)  Assessment & Plan  Continue home statin       VTE prophylaxis: enoxaparin ppx    I have performed a physical exam and reviewed and updated ROS and Plan today (8/11/2021). In review of yesterday's note (8/10/2021), there are no changes except as documented above.

## 2021-08-11 NOTE — CARE PLAN
The patient is Watcher - Medium risk of patient condition declining or worsening    Shift Goals  Clinical Goals:  (decrease 02)  Patient Goals: decrease 02 needs and control blood sugars   Family Goals: Get better    Progress made toward(s) clinical / shift goals:    Problem: Self Care  Goal: Patient will have the ability to perform ADLs independently or with assistance (bathe, groom, dress, toilet and feed)  Outcome: Progressing       Patient is not progressing towards the following goals:      Problem: Respiratory  Goal: Patient will achieve/maintain optimum respiratory ventilation and gas exchange  Outcome: Not Progressing  Note: Patient will wean to room air and be free of shortness of breath      Shift summery- No acute events this shift. 02 sats ~90% on 6l NC. Medicated for cough.

## 2021-08-12 ENCOUNTER — APPOINTMENT (OUTPATIENT)
Dept: RADIOLOGY | Facility: MEDICAL CENTER | Age: 41
DRG: 871 | End: 2021-08-12
Attending: STUDENT IN AN ORGANIZED HEALTH CARE EDUCATION/TRAINING PROGRAM
Payer: MEDICAID

## 2021-08-12 PROBLEM — I82.622 ACUTE DEEP VEIN THROMBOSIS (DVT) OF BRACHIAL VEIN OF LEFT UPPER EXTREMITY (HCC): Status: ACTIVE | Noted: 2021-08-12

## 2021-08-12 PROBLEM — E87.6 HYPOKALEMIA: Status: ACTIVE | Noted: 2021-08-12

## 2021-08-12 PROBLEM — J81.0 ACUTE PULMONARY EDEMA (HCC): Status: ACTIVE | Noted: 2021-08-12

## 2021-08-12 LAB
ALBUMIN SERPL BCP-MCNC: 2.9 G/DL (ref 3.2–4.9)
ALBUMIN/GLOB SERPL: 0.7 G/DL
ALP SERPL-CCNC: 83 U/L (ref 30–99)
ALT SERPL-CCNC: 51 U/L (ref 2–50)
ANION GAP SERPL CALC-SCNC: 12 MMOL/L (ref 7–16)
ANION GAP SERPL CALC-SCNC: 14 MMOL/L (ref 7–16)
AST SERPL-CCNC: 57 U/L (ref 12–45)
BASE EXCESS BLDA CALC-SCNC: 1 MMOL/L (ref -4–3)
BILIRUB SERPL-MCNC: 0.3 MG/DL (ref 0.1–1.5)
BODY TEMPERATURE: ABNORMAL CENTIGRADE
BUN SERPL-MCNC: 12 MG/DL (ref 8–22)
BUN SERPL-MCNC: 13 MG/DL (ref 8–22)
CALCIUM SERPL-MCNC: 8.4 MG/DL (ref 8.5–10.5)
CALCIUM SERPL-MCNC: 8.4 MG/DL (ref 8.5–10.5)
CHLORIDE SERPL-SCNC: 101 MMOL/L (ref 96–112)
CHLORIDE SERPL-SCNC: 99 MMOL/L (ref 96–112)
CO2 SERPL-SCNC: 25 MMOL/L (ref 20–33)
CO2 SERPL-SCNC: 25 MMOL/L (ref 20–33)
CREAT SERPL-MCNC: 0.48 MG/DL (ref 0.5–1.4)
CREAT SERPL-MCNC: 0.54 MG/DL (ref 0.5–1.4)
CRP SERPL HS-MCNC: 7.92 MG/DL (ref 0–0.75)
D DIMER PPP IA.FEU-MCNC: 17.22 UG/ML (FEU) (ref 0–0.5)
EKG IMPRESSION: NORMAL
EKG IMPRESSION: NORMAL
ERYTHROCYTE [DISTWIDTH] IN BLOOD BY AUTOMATED COUNT: 45 FL (ref 35.9–50)
FERRITIN SERPL-MCNC: 2297 NG/ML (ref 22–322)
GLOBULIN SER CALC-MCNC: 4 G/DL (ref 1.9–3.5)
GLUCOSE BLD-MCNC: 140 MG/DL (ref 65–99)
GLUCOSE BLD-MCNC: 215 MG/DL (ref 65–99)
GLUCOSE BLD-MCNC: 218 MG/DL (ref 65–99)
GLUCOSE BLD-MCNC: 251 MG/DL (ref 65–99)
GLUCOSE BLD-MCNC: 263 MG/DL (ref 65–99)
GLUCOSE SERPL-MCNC: 134 MG/DL (ref 65–99)
GLUCOSE SERPL-MCNC: 163 MG/DL (ref 65–99)
HCO3 BLDA-SCNC: 22 MMOL/L (ref 17–25)
HCT VFR BLD AUTO: 44.7 % (ref 42–52)
HGB BLD-MCNC: 14.8 G/DL (ref 14–18)
MCH RBC QN AUTO: 29.9 PG (ref 27–33)
MCHC RBC AUTO-ENTMCNC: 33.1 G/DL (ref 33.7–35.3)
MCV RBC AUTO: 90.3 FL (ref 81.4–97.8)
NT-PROBNP SERPL IA-MCNC: 406 PG/ML (ref 0–125)
NT-PROBNP SERPL IA-MCNC: 467 PG/ML (ref 0–125)
PCO2 BLDA: 25 MMHG (ref 26–37)
PH BLDA: 7.56 [PH] (ref 7.4–7.5)
PLATELET # BLD AUTO: 467 K/UL (ref 164–446)
PMV BLD AUTO: 9.5 FL (ref 9–12.9)
PO2 BLDA: 55.3 MMHG (ref 64–87)
POTASSIUM SERPL-SCNC: 3.1 MMOL/L (ref 3.6–5.5)
POTASSIUM SERPL-SCNC: 3.6 MMOL/L (ref 3.6–5.5)
PROCALCITONIN SERPL-MCNC: 0.12 NG/ML
PROT SERPL-MCNC: 6.9 G/DL (ref 6–8.2)
RBC # BLD AUTO: 4.95 M/UL (ref 4.7–6.1)
SAO2 % BLDA: 90 % (ref 93–99)
SODIUM SERPL-SCNC: 138 MMOL/L (ref 135–145)
SODIUM SERPL-SCNC: 138 MMOL/L (ref 135–145)
TROPONIN T SERPL-MCNC: 18 NG/L (ref 6–19)
TROPONIN T SERPL-MCNC: 18 NG/L (ref 6–19)
TROPONIN T SERPL-MCNC: 40 NG/L (ref 6–19)
WBC # BLD AUTO: 10.4 K/UL (ref 4.8–10.8)

## 2021-08-12 PROCEDURE — 93010 ELECTROCARDIOGRAM REPORT: CPT | Performed by: INTERNAL MEDICINE

## 2021-08-12 PROCEDURE — 770022 HCHG ROOM/CARE - ICU (200)

## 2021-08-12 PROCEDURE — 99255 IP/OBS CONSLTJ NEW/EST HI 80: CPT | Mod: 25,GC | Performed by: INTERNAL MEDICINE

## 2021-08-12 PROCEDURE — 86140 C-REACTIVE PROTEIN: CPT

## 2021-08-12 PROCEDURE — A9270 NON-COVERED ITEM OR SERVICE: HCPCS | Performed by: STUDENT IN AN ORGANIZED HEALTH CARE EDUCATION/TRAINING PROGRAM

## 2021-08-12 PROCEDURE — 93970 EXTREMITY STUDY: CPT

## 2021-08-12 PROCEDURE — 82962 GLUCOSE BLOOD TEST: CPT | Mod: 91

## 2021-08-12 PROCEDURE — 71045 X-RAY EXAM CHEST 1 VIEW: CPT

## 2021-08-12 PROCEDURE — 85027 COMPLETE CBC AUTOMATED: CPT

## 2021-08-12 PROCEDURE — 99233 SBSQ HOSP IP/OBS HIGH 50: CPT | Performed by: STUDENT IN AN ORGANIZED HEALTH CARE EDUCATION/TRAINING PROGRAM

## 2021-08-12 PROCEDURE — 93005 ELECTROCARDIOGRAM TRACING: CPT | Performed by: STUDENT IN AN ORGANIZED HEALTH CARE EDUCATION/TRAINING PROGRAM

## 2021-08-12 PROCEDURE — 700111 HCHG RX REV CODE 636 W/ 250 OVERRIDE (IP): Performed by: STUDENT IN AN ORGANIZED HEALTH CARE EDUCATION/TRAINING PROGRAM

## 2021-08-12 PROCEDURE — 99255 IP/OBS CONSLTJ NEW/EST HI 80: CPT | Mod: GC | Performed by: INTERNAL MEDICINE

## 2021-08-12 PROCEDURE — 700102 HCHG RX REV CODE 250 W/ 637 OVERRIDE(OP): Performed by: STUDENT IN AN ORGANIZED HEALTH CARE EDUCATION/TRAINING PROGRAM

## 2021-08-12 PROCEDURE — 99291 CRITICAL CARE FIRST HOUR: CPT | Performed by: INTERNAL MEDICINE

## 2021-08-12 PROCEDURE — 83880 ASSAY OF NATRIURETIC PEPTIDE: CPT

## 2021-08-12 PROCEDURE — 82803 BLOOD GASES ANY COMBINATION: CPT

## 2021-08-12 PROCEDURE — 82728 ASSAY OF FERRITIN: CPT

## 2021-08-12 PROCEDURE — 94640 AIRWAY INHALATION TREATMENT: CPT

## 2021-08-12 PROCEDURE — 84145 PROCALCITONIN (PCT): CPT

## 2021-08-12 PROCEDURE — 80048 BASIC METABOLIC PNL TOTAL CA: CPT

## 2021-08-12 PROCEDURE — 84484 ASSAY OF TROPONIN QUANT: CPT

## 2021-08-12 PROCEDURE — 93010 ELECTROCARDIOGRAM REPORT: CPT | Mod: 77 | Performed by: INTERNAL MEDICINE

## 2021-08-12 PROCEDURE — 85379 FIBRIN DEGRADATION QUANT: CPT

## 2021-08-12 PROCEDURE — 94669 MECHANICAL CHEST WALL OSCILL: CPT

## 2021-08-12 RX ORDER — POTASSIUM CHLORIDE 20 MEQ/1
40 TABLET, EXTENDED RELEASE ORAL ONCE
Status: COMPLETED | OUTPATIENT
Start: 2021-08-12 | End: 2021-08-12

## 2021-08-12 RX ORDER — POTASSIUM CHLORIDE 20 MEQ/1
40 TABLET, EXTENDED RELEASE ORAL DAILY
Status: DISCONTINUED | OUTPATIENT
Start: 2021-08-12 | End: 2021-08-12

## 2021-08-12 RX ORDER — FUROSEMIDE 40 MG/1
40 TABLET ORAL ONCE
Status: COMPLETED | OUTPATIENT
Start: 2021-08-12 | End: 2021-08-12

## 2021-08-12 RX ORDER — FUROSEMIDE 10 MG/ML
40 INJECTION INTRAMUSCULAR; INTRAVENOUS
Status: DISCONTINUED | OUTPATIENT
Start: 2021-08-12 | End: 2021-08-14

## 2021-08-12 RX ADMIN — FUROSEMIDE 40 MG: 20 INJECTION, SOLUTION INTRAMUSCULAR; INTRAVENOUS at 16:58

## 2021-08-12 RX ADMIN — INSULIN LISPRO 5 UNITS: 100 INJECTION, SOLUTION INTRAVENOUS; SUBCUTANEOUS at 17:42

## 2021-08-12 RX ADMIN — FUROSEMIDE 20 MG: 10 INJECTION, SOLUTION INTRAMUSCULAR; INTRAVENOUS at 06:00

## 2021-08-12 RX ADMIN — INSULIN LISPRO 7 UNITS: 100 INJECTION, SOLUTION INTRAVENOUS; SUBCUTANEOUS at 07:43

## 2021-08-12 RX ADMIN — INSULIN LISPRO 7 UNITS: 100 INJECTION, SOLUTION INTRAVENOUS; SUBCUTANEOUS at 12:33

## 2021-08-12 RX ADMIN — POTASSIUM CHLORIDE 40 MEQ: 20 TABLET, EXTENDED RELEASE ORAL at 13:21

## 2021-08-12 RX ADMIN — ACETAMINOPHEN 1000 MG: 500 TABLET, FILM COATED ORAL at 20:33

## 2021-08-12 RX ADMIN — EZETIMIBE 10 MG: 10 TABLET ORAL at 04:01

## 2021-08-12 RX ADMIN — ASPIRIN 81 MG: 81 TABLET, COATED ORAL at 04:01

## 2021-08-12 RX ADMIN — POTASSIUM CHLORIDE 40 MEQ: 20 TABLET, EXTENDED RELEASE ORAL at 07:43

## 2021-08-12 RX ADMIN — ENOXAPARIN SODIUM 120 MG: 120 INJECTION SUBCUTANEOUS at 20:33

## 2021-08-12 RX ADMIN — ACETAMINOPHEN 1000 MG: 500 TABLET, FILM COATED ORAL at 12:33

## 2021-08-12 RX ADMIN — ROSUVASTATIN CALCIUM 40 MG: 20 TABLET, FILM COATED ORAL at 17:40

## 2021-08-12 RX ADMIN — INSULIN LISPRO 7 UNITS: 100 INJECTION, SOLUTION INTRAVENOUS; SUBCUTANEOUS at 17:42

## 2021-08-12 RX ADMIN — CLOPIDOGREL BISULFATE 75 MG: 75 TABLET ORAL at 04:01

## 2021-08-12 RX ADMIN — ENOXAPARIN SODIUM 40 MG: 40 INJECTION SUBCUTANEOUS at 04:01

## 2021-08-12 RX ADMIN — DOCUSATE SODIUM 50 MG AND SENNOSIDES 8.6 MG 2 TABLET: 8.6; 5 TABLET, FILM COATED ORAL at 17:41

## 2021-08-12 RX ADMIN — INSULIN LISPRO 3 UNITS: 100 INJECTION, SOLUTION INTRAVENOUS; SUBCUTANEOUS at 12:34

## 2021-08-12 RX ADMIN — DEXAMETHASONE SODIUM PHOSPHATE 6 MG: 4 INJECTION, SOLUTION INTRA-ARTICULAR; INTRALESIONAL; INTRAMUSCULAR; INTRAVENOUS; SOFT TISSUE at 04:01

## 2021-08-12 RX ADMIN — INSULIN LISPRO 3 UNITS: 100 INJECTION, SOLUTION INTRAVENOUS; SUBCUTANEOUS at 07:43

## 2021-08-12 RX ADMIN — FUROSEMIDE 40 MG: 40 TABLET ORAL at 20:33

## 2021-08-12 RX ADMIN — INSULIN GLARGINE 23 UNITS: 100 INJECTION, SOLUTION SUBCUTANEOUS at 17:42

## 2021-08-12 ASSESSMENT — COPD QUESTIONNAIRES
COPD SCREENING SCORE: 4
HAVE YOU SMOKED AT LEAST 100 CIGARETTES IN YOUR ENTIRE LIFE: YES
DO YOU EVER COUGH UP ANY MUCUS OR PHLEGM?: YES, A FEW DAYS A WEEK OR MONTH
DURING THE PAST 4 WEEKS HOW MUCH DID YOU FEEL SHORT OF BREATH: SOME OF THE TIME

## 2021-08-12 ASSESSMENT — ENCOUNTER SYMPTOMS
VOMITING: 0
FEVER: 1
EYE PAIN: 0
BLURRED VISION: 0
NAUSEA: 0
FEVER: 0
COUGH: 1
SENSORY CHANGE: 0
HEADACHES: 0
SHORTNESS OF BREATH: 1
DIAPHORESIS: 0
FOCAL WEAKNESS: 0
CHILLS: 0
HALLUCINATIONS: 0
PALPITATIONS: 0
DEPRESSION: 0
SPUTUM PRODUCTION: 0
IRREGULAR HEARTBEAT: 1
ABDOMINAL PAIN: 0
WHEEZING: 0
BACK PAIN: 0
INSOMNIA: 0
DIZZINESS: 0
FALLS: 0
SPUTUM PRODUCTION: 1

## 2021-08-12 ASSESSMENT — PAIN DESCRIPTION - PAIN TYPE
TYPE: ACUTE PAIN
TYPE: ACUTE PAIN

## 2021-08-12 ASSESSMENT — LIFESTYLE VARIABLES: SUBSTANCE_ABUSE: 0

## 2021-08-12 ASSESSMENT — FIBROSIS 4 INDEX: FIB4 SCORE: 0.68

## 2021-08-12 NOTE — CARE PLAN
The patient is Watcher - Medium risk of patient condition declining or worsening    Shift Goals  Clinical Goals: Maintain stable O2 level  Patient Goals: decrease O2 demand  Family Goals: go home      Problem: Respiratory  Goal: Patient will achieve/maintain optimum respiratory ventilation and gas exchange       Pt continues to require oxygen, breathing 10 Lpm via nasal cannula.

## 2021-08-12 NOTE — CONSULTS
Cardiology Initial Consult Note    Date of note:    8/12/2021      Consulting Physician: Mendoza Livingston M.D.    Patient ID:    Name:   Kyle Gonzalez     YOB: 1980  Age:   41 y.o.  male   MRN:   [id number]      Reason for Consultation: Evaluation for decompensated heart failure    HPI:    Kyle Gonzalez is a 41 y.o.-year-old male with history of hypercholesterolemia ( evaluation and treatment limited by financial constraints), Hx of CAD s/p PCI ( LAD, RCA, OM) and most recently LAD 10/2020 on ASA and Plavix, Hx of ischemic cardiomyopathy ( last echo 10/2020 LVEF 20%), hypertension, T2DM ( A1C 11.3%), former 20+pack year smoker ( quit around 12/2020) who admitted on 08/06/2021 for COVID pneumonia.     Patient had recent travel to [place] and tested positive for COVID 3 days prior to admission. At time of admission he required 6L supplemental oxygen, noted to be tachycardic, tachypneic and febrile. He has been stable on 6L O2 since admission but had sudden increase in oxygen requirement, desaturation in to 68%, requiring 60L HFNC at 100% FiO2. CXR shows worsening bibasilar effusion, elevated D-dimer, ferritin, CRP. Cardiology was consulted for evaluation for decompensated heart failure.     Patient report prior to admission, he did not have chest pain, significant palpitation, dyspnea on exertion, or lower extremity edema. His last echocardiogram was in 10/2020 and has been unable to repeat echocardiogram because of financial constraints. He is on rosuvastatin, fenofibrate, zetia for hypercholesterolemia.      Review of Systems   Constitutional: Positive for fever. Negative for chills and diaphoresis.   HENT: Negative for congestion and hearing loss.    Cardiovascular: Positive for irregular heartbeat. Negative for chest pain and leg swelling.        Report sporadic palpitation. Denies current palpitation   Respiratory: Positive for cough and shortness of breath.    Endocrine: Negative for cold  intolerance and heat intolerance.   Skin: Negative for dry skin and rash.   Musculoskeletal: Negative for falls and gout.   Gastrointestinal: Negative for abdominal pain, nausea and vomiting.   Genitourinary: Negative for bladder incontinence and dysuria.   Neurological: Negative for focal weakness and headaches.   Psychiatric/Behavioral: Negative for depression and hallucinations.        All others reviewed and negative.      Past Medical History:   Diagnosis Date   • Diabetes (HCC)    • Heart attack (HCC)    • Hyperlipidemia    • Hypertension    • Medical non-compliance 9/13/2015       Past Surgical History:   Procedure Laterality Date   • LA TRANSCATH STENT INIT VESSEL,OPEN      x4         Medications Prior to Admission   Medication Sig Dispense Refill Last Dose   • fenofibrate (TRICOR) 48 MG Tab Take 48 mg by mouth every day.   8/5/2021 at 0900   • metoprolol SR (TOPROL XL) 25 MG TABLET SR 24 HR Take 25 mg by mouth every evening.   8/5/2021 at 1900   • acetaminophen (TYLENOL) 500 MG Tab Take 1,000 mg by mouth every 6 hours as needed for Mild Pain or Fever. 2 tablets = 1,000 mg.   8/4/2021 at PM   • DM-Doxylamine-Acetaminophen (NYQUIL HBP COLD & FLU) 15-6. MG/15ML Liquid Take 30 mL by mouth every 6 hours as needed (Sleep, Mild Pain, Fever, Cough).   8/6/2021 at 0300   • guaiFENesin ER (MUCINEX) 600 MG TABLET SR 12 HR Take 600 mg by mouth every 12 hours as needed for Cough.   8/5/2021 at 1200   • nitroglycerin (NITROSTAT) 0.4 MG SL Tab Place 1 Tab under tongue as needed for Chest Pain (up to 3 doses) if systolic BP >90 and no not on any viagra). call MD /hospital if not resolved after 25 tablet 1 >2 months at PRN   • losartan (COZAAR) 50 MG Tab Take 1 Tab by mouth every day. 30 Tab 11 8/5/2021 at 0900   • aspirin EC (ECOTRIN) 81 MG Tablet Delayed Response Take 1 Tab by mouth every day. 100 Tab 3 8/5/2021 at 0900   • clopidogrel (PLAVIX) 75 MG Tab Take 1 tablet by mouth every day. 30 Tab 11 8/5/2021 at 0900    • ezetimibe (ZETIA) 10 MG Tab Take 1 tablet by mouth every day. 30 Tab 11 8/5/2021 at 0900   • rosuvastatin (CRESTOR) 40 MG tablet Take 1 tablet by mouth every evening. 30 Tab 11 8/5/2021 at 1900   • spironolactone (ALDACTONE) 25 MG Tab Take 1 tablet by mouth every day. 30 Tab 11 8/5/2021 at 0900     Current Facility-Administered Medications   Medication Dose Route Frequency Provider Last Rate Last Admin   • furosemide (LASIX) injection 40 mg  40 mg Intravenous BID DIURETIC Brittaney Mclaughlin M.D.       • insulin glargine (Semglee) injection  23 Units Subcutaneous Q EVENING Mendoza Livingston M.D.   23 Units at 08/11/21 1746    And   • insulin lispro (AdmeLOG) injection  7 Units Subcutaneous TID AC Mendoza Livingston M.D.   7 Units at 08/12/21 1233    And   • insulin lispro (AdmeLOG) injection  2-9 Units Subcutaneous 4X/DAY ACHS Mendoza Livingston M.D.   3 Units at 08/12/21 1234    And   • glucose 4 g chewable tablet 16 g  16 g Oral Q15 MIN PRN Mendoza Livingston M.D.        And   • dextrose 50% (D50W) injection 50 mL  50 mL Intravenous Q15 MIN PRN Mendoza Livingston M.D.       • senna-docusate (PERICOLACE or SENOKOT S) 8.6-50 MG per tablet 2 tablet  2 Tablet Oral BID Ayaan Chapa M.D.   2 Tablet at 08/11/21 1746    And   • polyethylene glycol/lytes (MIRALAX) PACKET 1 Packet  1 Packet Oral QDAY PRN Ayaan Chapa M.D.        And   • magnesium hydroxide (MILK OF MAGNESIA) suspension 30 mL  30 mL Oral QDAY PRN Ayaan Chapa M.D.        And   • bisacodyl (DULCOLAX) suppository 10 mg  10 mg Rectal QDAY PRN Ayaan Chapa M.D.       • Respiratory Therapy Consult   Nebulization Continuous RT Ayaan Chapa M.D.       • enoxaparin (LOVENOX) inj 40 mg  40 mg Subcutaneous DAILY Ayaan D Cm Sammi, M.D.   40 mg at 08/12/21 0401   • acetaminophen (TYLENOL) tablet 1,000 mg  1,000 mg Oral TID Ayaan Chapa M.D.   1,000 mg at 08/12/21 1233   • cloNIDine (CATAPRES) tablet 0.1  mg  0.1 mg Oral Q6HRS PRN Ayaan Chapa M.D.       • enalaprilat (VASOTEC) injection 1.25 mg  1.25 mg Intravenous Q6HRS PRN Ayaan Chapa M.D.       • labetalol (NORMODYNE/TRANDATE) injection 10 mg  10 mg Intravenous Q4HRS PRN Ayaan Chapa M.D.       • ondansetron (ZOFRAN) syringe/vial injection 4 mg  4 mg Intravenous Q4HRS PRN Ayaan Chapa M.D.   4 mg at 08/08/21 0822   • ondansetron (ZOFRAN ODT) dispertab 4 mg  4 mg Oral Q4HRS PRN Ayaan Chapa M.D.   4 mg at 08/09/21 0804   • promethazine (PHENERGAN) tablet 12.5-25 mg  12.5-25 mg Oral Q4HRS PRN Ayaan Chapa M.D.       • promethazine (PHENERGAN) suppository 12.5-25 mg  12.5-25 mg Rectal Q4HRS PRN Ayaan Chapa M.D.       • prochlorperazine (COMPAZINE) injection 5-10 mg  5-10 mg Intravenous Q4HRS PRN Ayaan Chapa M.D.       • guaiFENesin dextromethorphan (ROBITUSSIN DM) 100-10 MG/5ML syrup 10 mL  10 mL Oral Q6HRS PRN Ayaan Chapa M.D.   10 mL at 08/10/21 1741   • aspirin EC (ECOTRIN) tablet 81 mg  81 mg Oral DAILY Ayaan Chapa M.D.   81 mg at 08/12/21 0401   • clopidogrel (PLAVIX) tablet 75 mg  75 mg Oral DAILY Ayaan Chapa M.D.   75 mg at 08/12/21 0401   • [Held by provider] losartan (COZAAR) tablet 50 mg  50 mg Oral DAILY Ayaan Chapa M.D.       • [Held by provider] metoprolol SR (TOPROL XL) tablet 25 mg  25 mg Oral Q EVENING Ayaan Chapa M.D.       • rosuvastatin (CRESTOR) tablet 40 mg  40 mg Oral Q EVENING Ayaan Chapa M.D.   40 mg at 08/11/21 1748   • [Held by provider] spironolactone (ALDACTONE) tablet 25 mg  25 mg Oral DAILY Ayaan Chapa M.D.       • ezetimibe (ZETIA) tablet 10 mg  10 mg Oral DAILY Ayaan Chapa M.D.   10 mg at 08/12/21 0401   • dexamethasone (DECADRON) injection 6 mg  6 mg Intravenous DAILY Ayaan Chapa M.D.   6 mg at 08/12/21  "0401         No Known Allergies      Family History   Problem Relation Age of Onset   • Diabetes Mother    • Heart Attack Mother    • Heart Attack Father          Social History     Socioeconomic History   • Marital status: Single     Spouse name: Not on file   • Number of children: Not on file   • Years of education: Not on file   • Highest education level: Not on file   Occupational History   • Not on file   Tobacco Use   • Smoking status: Current Some Day Smoker     Packs/day: 1.00     Years: 27.00     Pack years: 27.00     Types: Cigarettes     Start date: 3/17/1994   • Smokeless tobacco: Never Used   Vaping Use   • Vaping Use: Never used   Substance and Sexual Activity   • Alcohol use: Yes   • Drug use: Yes     Comment: occasional marijuana    • Sexual activity: Not on file   Other Topics Concern   • Not on file   Social History Narrative   • Not on file     Social Determinants of Health     Financial Resource Strain:    • Difficulty of Paying Living Expenses:    Food Insecurity:    • Worried About Running Out of Food in the Last Year:    • Ran Out of Food in the Last Year:    Transportation Needs:    • Lack of Transportation (Medical):    • Lack of Transportation (Non-Medical):    Physical Activity:    • Days of Exercise per Week:    • Minutes of Exercise per Session:    Stress:    • Feeling of Stress :    Social Connections:    • Frequency of Communication with Friends and Family:    • Frequency of Social Gatherings with Friends and Family:    • Attends [employer] Services:    • Active Member of Clubs or Organizations:    • Attends Club or Organization Meetings:    • Marital Status:    Intimate Partner Violence:    • Fear of Current or Ex-Partner:    • Emotionally Abused:    • Physically Abused:    • Sexually Abused:          Physical Exam  Body mass index is 42.43 kg/m².  BP (!) 96/81   Pulse 95   Temp 36.4 °C (97.6 °F) (Temporal)   Resp 20   Ht 1.651 m (5' 5\")   Wt 116 kg (255 lb)   SpO2 91%   Vitals: "    08/12/21 0400 08/12/21 0816 08/12/21 1208 08/12/21 1229   BP: 125/89 107/55  (!) 96/81   Pulse: 100 87 84 95   Resp: 18 18 20 20   Temp: 36.9 °C (98.4 °F) 36 °C (96.8 °F)  36.4 °C (97.6 °F)   TempSrc: Temporal Temporal  Temporal   SpO2: 89% 94% 89% 91%   Weight:       Height:         Oxygen Therapy:  Pulse Oximetry: 91 %, O2 (LPM): 60, O2 Delivery Device: Heated High Flow Nasal Cannula    General: not in acute distressed on HFNC 60 L, laying prone, able to answer question in short sentences  Eyes: nl conjunctiva  ENT: OP clear  Neck: limited by prone position   Lungs: prone, crackles in bibasilar, R> L   Heart: distant due to body habitus, RRR, no murmurs, no rubs or gallops, NO edema bilateral lower extremities. No LV/RV heave on cardiac palpatation. 2+ bilateral radial pulses.  Abdomen: soft, non tender, non distended, no masses, normal bowel sounds.  No HSM.  Extremities/MSK: no clubbing, no cyanosis  Neurological: face symmetric, speech fluent  Psychiatric: Appropriate affect, A/O x 3  Skin: Warm extremities        Labs (personally reviewed and notable for):   Trop 18    K 3.1  Cr 0.54  Lipid panel 479/819/46/cannot be calculated    Cardiac Imaging and Procedures Review:    EKG dated 08/12/2021 : My personal interpretation is SR , rate 100, left axis, QT prolonged, LVH ( AVL > 13), no St changes, inferior q III, AVF    Echo dated 10/25/2020: My personal interpretation is LVEF 20%, global hypokinesis    Telemetry (last 24 hours): sinus rhythm    [facility] (10/26/2020)  Post-operative Diagnosis:   1.  Severely reduced left ventricle systolic function again fraction 25%  2.  70-80% ostial left anterior descending artery stenosis, IFR less than 0.6  3.  Chronic total occlusion of mid to distal right coronary artery with faint visualization of distal right coronary artery from left to right collateral  4.  Status post stenting of the ostial left anterior descending artery with 3.5 x 8 mm Synergy drug eluting  stent with no significant residual stenosis ABBY-3 flow    Radiology test Review:  CXR: bibasilar opacity    Impression and Medical Decision Making:  Principal Problem:    Sepsis (HCC) POA: Yes  Active Problems:    Hyperlipemia POA: Yes    Stented coronary artery,  bare metal stent X 2 POA: Yes      Overview: To LCX 10/2014    Essential hypertension POA: Yes    Tobacco use POA: Yes    Coronary artery disease involving native coronary artery of native heart without angina pectoris POA: Yes    Heart failure with reduced ejection fraction (HCC) POA: Yes    Pneumonia due to COVID-19 virus POA: Yes    Hyponatremia POA: Yes    Lactic acidosis POA: Yes    Morbid obesity with BMI of 40.0-44.9, adult (HCC) POA: Yes    Acute respiratory failure with hypoxia (HCC) POA: Yes    Type 2 diabetes mellitus without complication, without long-term current use of insulin (HCC) POA: Unknown  Resolved Problems:    * No resolved hospital problems. *        Recommendations:    # COVID PNA on dexamethasone, completed remdesivir 08/11  # Pulmonary Edema  # Acute hypoxic respiratory failure  # Elevated Dimer 17  # Elevated CRP 7.9  - pending work up for PE DVT ultrasound of UE/UL, per pulmonology recommend full dose AC for presumed PE given hypercoagulable state  - If starting full dose AC for probable PE, please stop ASA, to avoid triple therapy for risk of bleed   - continue dexamethasone  - patient started on lasix therapy      # CAD s/p PCI ( LAD, RCA, OM) and most recently LAD 10/2020  # Hx of ischemic cardiomyopathy  # Acute on Chronic HFrEF, last echo showed LVEF 20-25%  # Hypotension  # Stable troponin  - Per history gather and clinical exam patient not significantly intravascularly fluid overloaded. Symptoms likely significantly contributed by fluid accumulation in lung interstitium due to current infection.  - losartan, metoprolol and spironolactone held for hypotension  Plan  - Recommend daily reassessment of fluid status/ lung  status with IV Lasix  - Monitor renal function  - restart losartan when appropriate  - pending repeat echocardiogram    # hypercholesterolemia  - continue rosuvastatin zetia, and fenofibrate  - outpatient consideration for workup/ treatment of hypercholesterolemia limited by financial constraint    # electrolyte abnormality  # hypokalemia 3.1, replaced      Solitario Marsh M.D.

## 2021-08-12 NOTE — CONSULTS
Pulmonary Consultation Note    Patient ID:   Name:             Kyle Gonzalez     YOB: 1980  Age:                 41 y.o.  male   MRN:               [id number]  Referring Provider: Dr. Mendoza Livingston MD                                                  History of Present Illness:       Mr. Gonzalez is a 41 YOM with PMHx of HTN, multiple STEMIs & NSTEMIs (4 total episodes, s/p 4 stents), HFrEF (EF 20%, 10/2020), morbid obesity, presenting to the ED on 8/6 with fevers, chills, cough, SOB at rest and on exertion, found to be positive for COVID-19. Patient was not vaccinated for COVID and admitted ot recent travel to [place]. ROS was positive for SOB, non-productive cough, and diarrhea.        In the ED, patient initially required 6L with HR > 100, RR > 20, and temperature of 102.5. CMP significant for hyponatremia, NAGMA, and elevated LFTs.        Patient had been stable on 6L since admission - however, overnight, patient's oxygen saturations dropped as low 68%, requiring 15L initially. Since then, his hypoxia has worsened - now on 60L HFNC at 100% FiO2. Repeat CXR showed worsening BL infiltrates, consistent with worsening pulmonary edema. CRP, D-dimer, and ferritin were elevated at 7.92, 17.22, and 2297, respectively.  Pulmonology consulted due to worsening acute hypoxic respiratory failure.     Review of Systems: Review of Systems   Constitutional: Negative for chills and fever.   Eyes: Negative for blurred vision and pain.   Respiratory: Positive for cough (non-productive) and shortness of breath. Negative for sputum production and wheezing.    Cardiovascular: Negative for chest pain, palpitations and leg swelling.   Gastrointestinal: Negative for abdominal pain, nausea and vomiting.   Genitourinary: Negative for dysuria and urgency.   Musculoskeletal: Negative for back pain.   Skin: Negative for itching and rash.   Neurological: Negative for dizziness, sensory change, focal weakness and headaches.  "  Psychiatric/Behavioral: Negative for substance abuse. The patient does not have insomnia.        Past Medical History:   Past Medical History:   Diagnosis Date   • Diabetes (HCC)    • Heart attack (HCC)    • Hyperlipidemia    • Hypertension    • Medical non-compliance 9/13/2015       Past Surgical History:   Past Surgical History:   Procedure Laterality Date   • TX TRANSCATH STENT INIT VESSEL,OPEN      x4       Family History: family history includes Diabetes in his mother; Heart Attack in his father and mother.    Social History:  reports that he has been smoking cigarettes. He started smoking about 27 years ago. He has a 27.00 pack-year smoking history. He has never used smokeless tobacco. He reports current alcohol use. He reports current drug use.    Objective:   Vitals/ General Appearance:   Weight/BMI: Body mass index is 42.43 kg/m².  /55   Pulse 87   Temp 36 °C (96.8 °F) (Temporal)   Resp 18   Ht 1.651 m (5' 5\")   Wt 116 kg (255 lb)   SpO2 94%   Vitals:    08/12/21 0000 08/12/21 0224 08/12/21 0400 08/12/21 0816   BP: 111/57  125/89 107/55   Pulse: 69 (!) 101 100 87   Resp: 16 20 18 18   Temp: 37.4 °C (99.3 °F)  36.9 °C (98.4 °F) 36 °C (96.8 °F)   TempSrc: Temporal  Temporal Temporal   SpO2: 95% 89% 89% 94%   Weight:       Height:         Oxygen Therapy:  Pulse Oximetry: 94 %, O2 (LPM): 60, O2 Delivery Device: Heated High Flow Nasal Cannula    Physical Exam  Constitutional:       General: He is not in acute distress.     Appearance: He is not ill-appearing.   HENT:      Head: Normocephalic and atraumatic.      Right Ear: External ear normal.      Left Ear: External ear normal.      Mouth/Throat:      Pharynx: No oropharyngeal exudate or posterior oropharyngeal erythema.   Eyes:      Extraocular Movements: Extraocular movements intact.      Pupils: Pupils are equal, round, and reactive to light.   Cardiovascular:      Rate and Rhythm: Normal rate and regular rhythm.      Pulses: Normal pulses.     "  Heart sounds: Normal heart sounds.   Pulmonary:      Effort: Respiratory distress (moderate) present.      Breath sounds: Rales (BL) present. No wheezing or rhonchi.      Comments: On 60L HFNC, 100% FiO2  Abdominal:      General: Bowel sounds are normal. There is no distension.      Palpations: Abdomen is soft.      Tenderness: There is no abdominal tenderness. There is no guarding.   Musculoskeletal:         General: No swelling or tenderness.      Cervical back: Normal range of motion and neck supple.   Skin:     General: Skin is warm and dry.   Neurological:      General: No focal deficit present.      Mental Status: He is oriented to person, place, and time.      Sensory: No sensory deficit.      Motor: No weakness.   Psychiatric:         Mood and Affect: Mood normal.         Behavior: Behavior normal.         Labs:  Recent Labs     08/11/21  0440 08/12/21  0131   WBC 10.7 10.4   RBC 4.93 4.95   HEMOGLOBIN 14.9 14.8   HEMATOCRIT 44.2 44.7   MCV 89.7 90.3   MCH 30.2 29.9   MCHC 33.7 33.1*   RDW 44.3 45.0   PLATELETCT 450* 467*   MPV 9.7 9.5                 Recent Labs     08/10/21  0931 08/11/21  0440 08/12/21  0131   SODIUM 133* 138  138 138   POTASSIUM 4.1 3.6  3.6 3.1*   CHLORIDE 97 101  101 99   CO2 22 25  25 25   GLUCOSE 321* 163*  163* 134*   BUN 14 13  13 12     Recent Labs     08/10/21  0931 08/11/21  0440 08/12/21  0131   SODIUM 133* 138  138 138   POTASSIUM 4.1 3.6  3.6 3.1*   CHLORIDE 97 101  101 99   CO2 22 25  25 25   BUN 14 13  13 12   CREATININE 0.51 0.48*  0.48* 0.54   CALCIUM 8.6 8.4*  8.4* 8.4*     Results for orders placed or performed during the hospital encounter of 08/04/17   Echocardiogram Comp W/O Cont   Result Value Ref Range    Eject.Frac. MOD BP 52.29     Eject.Frac. MOD 4C 47.46     Eject.Frac. MOD 2C 57.68     Left Ventrical Ejection Fraction 50          Imaging:   DX-CHEST-PORTABLE (1 VIEW)   Final Result         1.  Pulmonary edema and/or infiltrates are identified,  which appear somewhat increased since the prior exam.      DX-CHEST-PORTABLE (1 VIEW)   Final Result      Bilateral peripheral pulmonary airspace process consistent with Covid pneumonia      US-EXTREMITY VENOUS LOWER BILAT    (Results Pending)   US-EXTREMITY VENOUS UPPER BILAT    (Results Pending)       Hospital Medications:    Current Facility-Administered Medications:   •  furosemide (LASIX) injection 20 mg, 20 mg, Intravenous, Q DAY, Mendoza Livingston M.D., 20 mg at 08/12/21 0600  •  insulin glargine (Semglee) injection, 23 Units, Subcutaneous, Q EVENING, 23 Units at 08/11/21 1746 **AND** insulin lispro (AdmeLOG) injection, 7 Units, Subcutaneous, TID AC, 7 Units at 08/12/21 0743 **AND** insulin lispro (AdmeLOG) injection, 2-9 Units, Subcutaneous, 4X/DAY ACHS, 3 Units at 08/12/21 0743 **AND** POC blood glucose manual result, , , Q AC AND BEDTIME(S) **AND** NOTIFY MD and PharmD, , , Once **AND** glucose 4 g chewable tablet 16 g, 16 g, Oral, Q15 MIN PRN **AND** dextrose 50% (D50W) injection 50 mL, 50 mL, Intravenous, Q15 MIN PRN, Mendoza Livingston M.D.  •  senna-docusate (PERICOLACE or SENOKOT S) 8.6-50 MG per tablet 2 tablet, 2 Tablet, Oral, BID, 2 Tablet at 08/11/21 1746 **AND** polyethylene glycol/lytes (MIRALAX) PACKET 1 Packet, 1 Packet, Oral, QDAY PRN **AND** magnesium hydroxide (MILK OF MAGNESIA) suspension 30 mL, 30 mL, Oral, QDAY PRN **AND** bisacodyl (DULCOLAX) suppository 10 mg, 10 mg, Rectal, QDAY PRN, Ayaan Chapa M.D.  •  Respiratory Therapy Consult, , Nebulization, Continuous RT, Ayaan Chapa M.D.  •  enoxaparin (LOVENOX) inj 40 mg, 40 mg, Subcutaneous, DAILY, Ayaan Chapa M.D., 40 mg at 08/12/21 0401  •  acetaminophen (TYLENOL) tablet 1,000 mg, 1,000 mg, Oral, TID, Ayaan Chapa M.D., 1,000 mg at 08/11/21 2007  •  cloNIDine (CATAPRES) tablet 0.1 mg, 0.1 mg, Oral, Q6HRS PRN, Ayaan Chapa M.D.  •  enalaprilat (VASOTEC) injection 1.25 mg, 1.25 mg,  Intravenous, Q6HRS PRN, Ayaan Chapa M.D.  •  labetalol (NORMODYNE/TRANDATE) injection 10 mg, 10 mg, Intravenous, Q4HRS PRN, Ayaan Chapa M.D.  •  ondansetron (ZOFRAN) syringe/vial injection 4 mg, 4 mg, Intravenous, Q4HRS PRN, Ayaan Chapa M.D., 4 mg at 08/08/21 0822  •  ondansetron (ZOFRAN ODT) dispertab 4 mg, 4 mg, Oral, Q4HRS PRN, Ayaan Chapa M.D., 4 mg at 08/09/21 0804  •  promethazine (PHENERGAN) tablet 12.5-25 mg, 12.5-25 mg, Oral, Q4HRS PRN, Ayaan Chapa M.D.  •  promethazine (PHENERGAN) suppository 12.5-25 mg, 12.5-25 mg, Rectal, Q4HRS PRN, Ayaan Chapa M.D.  •  prochlorperazine (COMPAZINE) injection 5-10 mg, 5-10 mg, Intravenous, Q4HRS PRN, Ayaan Chapa M.D.  •  guaiFENesin dextromethorphan (ROBITUSSIN DM) 100-10 MG/5ML syrup 10 mL, 10 mL, Oral, Q6HRS PRN, Ayaan Chapa M.D., 10 mL at 08/10/21 1741  •  aspirin EC (ECOTRIN) tablet 81 mg, 81 mg, Oral, DAILY, Ayaan Chapa M.D., 81 mg at 08/12/21 0401  •  clopidogrel (PLAVIX) tablet 75 mg, 75 mg, Oral, DAILY, Ayaan Chapa M.D., 75 mg at 08/12/21 0401  •  [Held by provider] losartan (COZAAR) tablet 50 mg, 50 mg, Oral, DAILY, Ayaan Chapa M.D.  •  [Held by provider] metoprolol SR (TOPROL XL) tablet 25 mg, 25 mg, Oral, Q EVENING, Ayaan Chapa M.D.  •  rosuvastatin (CRESTOR) tablet 40 mg, 40 mg, Oral, Q EVENING, Ayaan Chapa M.D., 40 mg at 08/11/21 1748  •  [Held by provider] spironolactone (ALDACTONE) tablet 25 mg, 25 mg, Oral, DAILY, Ayaan Chapa M.D.  •  ezetimibe (ZETIA) tablet 10 mg, 10 mg, Oral, DAILY, Ayaan Chapa M.D., 10 mg at 08/12/21 0401  •  dexamethasone (DECADRON) injection 6 mg, 6 mg, Intravenous, DAILY, Ayaan Chapa M.D., 6 mg at 08/12/21 0401    Current Outpatient Medications:  Medications Prior to Admission   Medication Sig Dispense Refill  Last Dose   • fenofibrate (TRICOR) 48 MG Tab Take 48 mg by mouth every day.   8/5/2021 at 0900   • metoprolol SR (TOPROL XL) 25 MG TABLET SR 24 HR Take 25 mg by mouth every evening.   8/5/2021 at 1900   • acetaminophen (TYLENOL) 500 MG Tab Take 1,000 mg by mouth every 6 hours as needed for Mild Pain or Fever. 2 tablets = 1,000 mg.   8/4/2021 at PM   • DM-Doxylamine-Acetaminophen (NYQUIL HBP COLD & FLU) 15-6. MG/15ML Liquid Take 30 mL by mouth every 6 hours as needed (Sleep, Mild Pain, Fever, Cough).   8/6/2021 at 0300   • guaiFENesin ER (MUCINEX) 600 MG TABLET SR 12 HR Take 600 mg by mouth every 12 hours as needed for Cough.   8/5/2021 at 1200   • nitroglycerin (NITROSTAT) 0.4 MG SL Tab Place 1 Tab under tongue as needed for Chest Pain (up to 3 doses) if systolic BP >90 and no not on any viagra). call MD /hospital if not resolved after 25 tablet 1 >2 months at PRN   • losartan (COZAAR) 50 MG Tab Take 1 Tab by mouth every day. 30 Tab 11 8/5/2021 at 0900   • aspirin EC (ECOTRIN) 81 MG Tablet Delayed Response Take 1 Tab by mouth every day. 100 Tab 3 8/5/2021 at 0900   • clopidogrel (PLAVIX) 75 MG Tab Take 1 tablet by mouth every day. 30 Tab 11 8/5/2021 at 0900   • ezetimibe (ZETIA) 10 MG Tab Take 1 tablet by mouth every day. 30 Tab 11 8/5/2021 at 0900   • rosuvastatin (CRESTOR) 40 MG tablet Take 1 tablet by mouth every evening. 30 Tab 11 8/5/2021 at 1900   • spironolactone (ALDACTONE) 25 MG Tab Take 1 tablet by mouth every day. 30 Tab 11 8/5/2021 at 0900       Medication Allergy:  No Known Allergies    Assessment and Plan:  #Acute hypoxic respiratory failure  #COVID-19 pneumonia  #Pulmonary edema  - Initially required 6L but now on 60L HFNC at 100% FiO2  - Currently on Dexamethasone, has finished Remdesivir on 8/11  - Continue Dexamethasone  - Currently on IV Lasix (20 mg, QD) - recommend IV Lasix (40 mg, BID)     - Monitor with strict I&Os     - K low at 3.1, recommend potassium supplements  - High risk for  thrombus/clot but would not tolerate CTA due to high oxygen demand - ordered venous US of BUE and BLE     - If signs of clot, recommend full dose AC  - Hold BP meds    Thank you for this consult, Pulmonology will continue to follow.

## 2021-08-12 NOTE — CARE PLAN
The patient is Watcher - Medium risk of patient condition declining or worsening    Shift Goals  Clinical Goals: O2 above 90%  Patient Goals: decrease O2 demands  Family Goals: go home    Progress made toward(s) clinical / shift goals:  at rest pt increase to 95%    Patient is not progressing towards the following goals:      Problem: Respiratory  Goal: Patient will achieve/maintain optimum respiratory ventilation and gas exchange  Outcome: Progressing  Flowsheets (Taken 8/12/2021 0000 by Saba Baez, C.N.A.)  O2 Delivery Device: Oxymask  Note: When pt is at rest O2 increase to 95%

## 2021-08-12 NOTE — PROGRESS NOTES
Pt stated he got up and voided in the urinal, at which time the O2 decreased to 68%. Oxymask was turned from 6L to 15L. Over the duration of 5-10 minutes pt recovered to about 83-86%. RT called. NRB at 15L place on pt, pt laid on their side. O2 came up to 98%. Lab came into the room, pt sat up and O2 decreased back to 75%, recovery to 85% took several minutes. RT called again.

## 2021-08-13 ENCOUNTER — APPOINTMENT (OUTPATIENT)
Dept: CARDIOLOGY | Facility: MEDICAL CENTER | Age: 41
DRG: 871 | End: 2021-08-13
Attending: STUDENT IN AN ORGANIZED HEALTH CARE EDUCATION/TRAINING PROGRAM
Payer: MEDICAID

## 2021-08-13 LAB
ALBUMIN SERPL BCP-MCNC: 3 G/DL (ref 3.2–4.9)
ALP SERPL-CCNC: 84 U/L (ref 30–99)
ALT SERPL-CCNC: 27 U/L (ref 2–50)
ANION GAP SERPL CALC-SCNC: 14 MMOL/L (ref 7–16)
AST SERPL-CCNC: 37 U/L (ref 12–45)
BASOPHILS # BLD AUTO: 0.4 % (ref 0–1.8)
BASOPHILS # BLD: 0.04 K/UL (ref 0–0.12)
BILIRUB CONJ SERPL-MCNC: <0.2 MG/DL (ref 0.1–0.5)
BILIRUB INDIRECT SERPL-MCNC: ABNORMAL MG/DL (ref 0–1)
BILIRUB SERPL-MCNC: 0.4 MG/DL (ref 0.1–1.5)
BUN SERPL-MCNC: 13 MG/DL (ref 8–22)
CALCIUM SERPL-MCNC: 8.3 MG/DL (ref 8.5–10.5)
CHLORIDE SERPL-SCNC: 100 MMOL/L (ref 96–112)
CO2 SERPL-SCNC: 21 MMOL/L (ref 20–33)
CREAT SERPL-MCNC: 0.6 MG/DL (ref 0.5–1.4)
CRP SERPL HS-MCNC: 10.78 MG/DL (ref 0–0.75)
D DIMER PPP IA.FEU-MCNC: 8.82 UG/ML (FEU) (ref 0–0.5)
EOSINOPHIL # BLD AUTO: 0.13 K/UL (ref 0–0.51)
EOSINOPHIL NFR BLD: 1.2 % (ref 0–6.9)
ERYTHROCYTE [DISTWIDTH] IN BLOOD BY AUTOMATED COUNT: 42.7 FL (ref 35.9–50)
FERRITIN SERPL-MCNC: 2095 NG/ML (ref 22–322)
GLUCOSE BLD-MCNC: 162 MG/DL (ref 65–99)
GLUCOSE BLD-MCNC: 255 MG/DL (ref 65–99)
GLUCOSE BLD-MCNC: 299 MG/DL (ref 65–99)
GLUCOSE BLD-MCNC: 301 MG/DL (ref 65–99)
GLUCOSE SERPL-MCNC: 133 MG/DL (ref 65–99)
HAV IGM SERPL QL IA: NORMAL
HBV CORE IGM SER QL: NORMAL
HBV SURFACE AG SER QL: NORMAL
HCT VFR BLD AUTO: 41.7 % (ref 42–52)
HCV AB SER QL: NORMAL
HGB BLD-MCNC: 14.6 G/DL (ref 14–18)
IMM GRANULOCYTES # BLD AUTO: 0.24 K/UL (ref 0–0.11)
IMM GRANULOCYTES NFR BLD AUTO: 2.2 % (ref 0–0.9)
LV EJECT FRACT  99904: 55
LV EJECT FRACT MOD 4C 99902: 57.56
LYMPHOCYTES # BLD AUTO: 1.17 K/UL (ref 1–4.8)
LYMPHOCYTES NFR BLD: 10.9 % (ref 22–41)
MAGNESIUM SERPL-MCNC: 2 MG/DL (ref 1.5–2.5)
MCH RBC QN AUTO: 30.4 PG (ref 27–33)
MCHC RBC AUTO-ENTMCNC: 35 G/DL (ref 33.7–35.3)
MCV RBC AUTO: 86.9 FL (ref 81.4–97.8)
MONOCYTES # BLD AUTO: 0.31 K/UL (ref 0–0.85)
MONOCYTES NFR BLD AUTO: 2.9 % (ref 0–13.4)
NEUTROPHILS # BLD AUTO: 8.85 K/UL (ref 1.82–7.42)
NEUTROPHILS NFR BLD: 82.4 % (ref 44–72)
NRBC # BLD AUTO: 0.02 K/UL
NRBC BLD-RTO: 0.2 /100 WBC
PHOSPHATE SERPL-MCNC: 2.7 MG/DL (ref 2.5–4.5)
PLATELET # BLD AUTO: 488 K/UL (ref 164–446)
PMV BLD AUTO: 9.3 FL (ref 9–12.9)
POTASSIUM SERPL-SCNC: 3.5 MMOL/L (ref 3.6–5.5)
PROT SERPL-MCNC: 7.4 G/DL (ref 6–8.2)
RBC # BLD AUTO: 4.8 M/UL (ref 4.7–6.1)
SODIUM SERPL-SCNC: 135 MMOL/L (ref 135–145)
WBC # BLD AUTO: 10.7 K/UL (ref 4.8–10.8)

## 2021-08-13 PROCEDURE — 80076 HEPATIC FUNCTION PANEL: CPT

## 2021-08-13 PROCEDURE — 700102 HCHG RX REV CODE 250 W/ 637 OVERRIDE(OP): Performed by: STUDENT IN AN ORGANIZED HEALTH CARE EDUCATION/TRAINING PROGRAM

## 2021-08-13 PROCEDURE — 94640 AIRWAY INHALATION TREATMENT: CPT

## 2021-08-13 PROCEDURE — 86480 TB TEST CELL IMMUN MEASURE: CPT

## 2021-08-13 PROCEDURE — 93306 TTE W/DOPPLER COMPLETE: CPT

## 2021-08-13 PROCEDURE — 700111 HCHG RX REV CODE 636 W/ 250 OVERRIDE (IP): Performed by: INTERNAL MEDICINE

## 2021-08-13 PROCEDURE — 770022 HCHG ROOM/CARE - ICU (200)

## 2021-08-13 PROCEDURE — 82962 GLUCOSE BLOOD TEST: CPT | Mod: 91

## 2021-08-13 PROCEDURE — 700111 HCHG RX REV CODE 636 W/ 250 OVERRIDE (IP): Performed by: STUDENT IN AN ORGANIZED HEALTH CARE EDUCATION/TRAINING PROGRAM

## 2021-08-13 PROCEDURE — 99291 CRITICAL CARE FIRST HOUR: CPT | Performed by: INTERNAL MEDICINE

## 2021-08-13 PROCEDURE — 94660 CPAP INITIATION&MGMT: CPT

## 2021-08-13 PROCEDURE — 700102 HCHG RX REV CODE 250 W/ 637 OVERRIDE(OP): Performed by: INTERNAL MEDICINE

## 2021-08-13 PROCEDURE — 85025 COMPLETE CBC W/AUTO DIFF WBC: CPT

## 2021-08-13 PROCEDURE — A9270 NON-COVERED ITEM OR SERVICE: HCPCS | Performed by: STUDENT IN AN ORGANIZED HEALTH CARE EDUCATION/TRAINING PROGRAM

## 2021-08-13 PROCEDURE — 80048 BASIC METABOLIC PNL TOTAL CA: CPT

## 2021-08-13 PROCEDURE — 84100 ASSAY OF PHOSPHORUS: CPT

## 2021-08-13 PROCEDURE — 700105 HCHG RX REV CODE 258: Performed by: INTERNAL MEDICINE

## 2021-08-13 PROCEDURE — 700117 HCHG RX CONTRAST REV CODE 255: Performed by: STUDENT IN AN ORGANIZED HEALTH CARE EDUCATION/TRAINING PROGRAM

## 2021-08-13 PROCEDURE — 80074 ACUTE HEPATITIS PANEL: CPT

## 2021-08-13 PROCEDURE — A9270 NON-COVERED ITEM OR SERVICE: HCPCS | Performed by: INTERNAL MEDICINE

## 2021-08-13 PROCEDURE — 93306 TTE W/DOPPLER COMPLETE: CPT | Mod: 26 | Performed by: INTERNAL MEDICINE

## 2021-08-13 PROCEDURE — 85379 FIBRIN DEGRADATION QUANT: CPT

## 2021-08-13 PROCEDURE — 86140 C-REACTIVE PROTEIN: CPT

## 2021-08-13 PROCEDURE — 5A09357 ASSISTANCE WITH RESPIRATORY VENTILATION, LESS THAN 24 CONSECUTIVE HOURS, CONTINUOUS POSITIVE AIRWAY PRESSURE: ICD-10-PCS | Performed by: INTERNAL MEDICINE

## 2021-08-13 PROCEDURE — 83735 ASSAY OF MAGNESIUM: CPT

## 2021-08-13 PROCEDURE — 82728 ASSAY OF FERRITIN: CPT

## 2021-08-13 RX ORDER — DEXMEDETOMIDINE HYDROCHLORIDE 4 UG/ML
.1-1.5 INJECTION, SOLUTION INTRAVENOUS CONTINUOUS
Status: DISCONTINUED | OUTPATIENT
Start: 2021-08-13 | End: 2021-08-21

## 2021-08-13 RX ADMIN — ACETAMINOPHEN 1000 MG: 500 TABLET, FILM COATED ORAL at 20:56

## 2021-08-13 RX ADMIN — CLOPIDOGREL BISULFATE 75 MG: 75 TABLET ORAL at 05:22

## 2021-08-13 RX ADMIN — INSULIN GLARGINE 23 UNITS: 100 INJECTION, SOLUTION SUBCUTANEOUS at 18:38

## 2021-08-13 RX ADMIN — ACETAMINOPHEN 1000 MG: 500 TABLET, FILM COATED ORAL at 09:41

## 2021-08-13 RX ADMIN — ACETAMINOPHEN 1000 MG: 500 TABLET, FILM COATED ORAL at 15:07

## 2021-08-13 RX ADMIN — ENOXAPARIN SODIUM 120 MG: 120 INJECTION SUBCUTANEOUS at 07:40

## 2021-08-13 RX ADMIN — TOCILIZUMAB 400 MG: 20 INJECTION, SOLUTION, CONCENTRATE INTRAVENOUS at 11:33

## 2021-08-13 RX ADMIN — HUMAN ALBUMIN MICROSPHERES AND PERFLUTREN 3 ML: 10; .22 INJECTION, SOLUTION INTRAVENOUS at 11:27

## 2021-08-13 RX ADMIN — DEXAMETHASONE SODIUM PHOSPHATE 6 MG: 4 INJECTION, SOLUTION INTRA-ARTICULAR; INTRALESIONAL; INTRAMUSCULAR; INTRAVENOUS; SOFT TISSUE at 05:22

## 2021-08-13 RX ADMIN — POTASSIUM BICARBONATE 50 MEQ: 978 TABLET, EFFERVESCENT ORAL at 09:41

## 2021-08-13 RX ADMIN — INSULIN LISPRO 7 UNITS: 100 INJECTION, SOLUTION INTRAVENOUS; SUBCUTANEOUS at 07:00

## 2021-08-13 RX ADMIN — EZETIMIBE 10 MG: 10 TABLET ORAL at 05:23

## 2021-08-13 RX ADMIN — INSULIN LISPRO 5 UNITS: 100 INJECTION, SOLUTION INTRAVENOUS; SUBCUTANEOUS at 11:00

## 2021-08-13 RX ADMIN — INSULIN LISPRO 5 UNITS: 100 INJECTION, SOLUTION INTRAVENOUS; SUBCUTANEOUS at 18:38

## 2021-08-13 RX ADMIN — DOCUSATE SODIUM 50 MG AND SENNOSIDES 8.6 MG 2 TABLET: 8.6; 5 TABLET, FILM COATED ORAL at 05:23

## 2021-08-13 RX ADMIN — ROSUVASTATIN CALCIUM 40 MG: 20 TABLET, FILM COATED ORAL at 18:34

## 2021-08-13 RX ADMIN — INSULIN LISPRO 7 UNITS: 100 INJECTION, SOLUTION INTRAVENOUS; SUBCUTANEOUS at 11:00

## 2021-08-13 RX ADMIN — FUROSEMIDE 40 MG: 20 INJECTION, SOLUTION INTRAMUSCULAR; INTRAVENOUS at 15:07

## 2021-08-13 RX ADMIN — INSULIN LISPRO 2 UNITS: 100 INJECTION, SOLUTION INTRAVENOUS; SUBCUTANEOUS at 07:00

## 2021-08-13 RX ADMIN — INSULIN LISPRO 7 UNITS: 100 INJECTION, SOLUTION INTRAVENOUS; SUBCUTANEOUS at 18:37

## 2021-08-13 RX ADMIN — INSULIN LISPRO 6 UNITS: 100 INJECTION, SOLUTION INTRAVENOUS; SUBCUTANEOUS at 20:57

## 2021-08-13 RX ADMIN — ENOXAPARIN SODIUM 120 MG: 120 INJECTION SUBCUTANEOUS at 18:39

## 2021-08-13 RX ADMIN — FUROSEMIDE 40 MG: 20 INJECTION, SOLUTION INTRAMUSCULAR; INTRAVENOUS at 05:23

## 2021-08-13 ASSESSMENT — ENCOUNTER SYMPTOMS
SHORTNESS OF BREATH: 1
HEADACHES: 0
DOUBLE VISION: 0
SEIZURES: 0
DIARRHEA: 0
HALLUCINATIONS: 0
SINUS PAIN: 0
BRUISES/BLEEDS EASILY: 0
STRIDOR: 0
WEAKNESS: 0
SPUTUM PRODUCTION: 0
ABDOMINAL PAIN: 0
EYE DISCHARGE: 0
CLAUDICATION: 0
HEMOPTYSIS: 0
SORE THROAT: 0
NAUSEA: 0
COUGH: 1
DEPRESSION: 0
MYALGIAS: 0
BACK PAIN: 0
FEVER: 0
VOMITING: 0
CHILLS: 0

## 2021-08-13 ASSESSMENT — PAIN DESCRIPTION - PAIN TYPE
TYPE: ACUTE PAIN

## 2021-08-13 ASSESSMENT — FIBROSIS 4 INDEX: FIB4 SCORE: 0.65

## 2021-08-13 ASSESSMENT — PULMONARY FUNCTION TESTS
EPAP_CMH2O: 8
EPAP_CMH2O: 8

## 2021-08-13 ASSESSMENT — LIFESTYLE VARIABLES: SUBSTANCE_ABUSE: 0

## 2021-08-13 NOTE — PROGRESS NOTES
Upon receiving pt, RN observed pt in high 80s with high flow oxygen at 60L 100%. Pt began coughing after RN attempted to have pt in prone position. {t stated chest was tight and began stated he wasn't sure what was going on and that he could not breathe. RN put non rebreather on pt @ 15L on top of the high flow. RN requested RT come to evaluate pt ASAP. During coughing spells pt desaturated down to high 60s. RN paged on call MD in regards to concerns of increased oxygen demands. MD put in STAT orders for an ABG, EKG, troponin, oral Lasix, and PEP therapy. Orders activated. After no urine output. RN bladder scanned pt and found 105ml of urine. Pt voided 100ml of urine after scan. MD updated as results came in. Pt remains in mid 80s with high flow oxygen at 60L at 100% with non rebreather at 15L. RRT present and monitoring the pt while waiting for transfer to [facility] [id number]. Report called in to SARIKA Salinas

## 2021-08-13 NOTE — ASSESSMENT & PLAN NOTE
Acute hypoxic respiratory failure due to COVID-19 pneumonia  Intermittent humidified high-flow nasal cannula 25L/60% and NIPPV w/ BiPAP for FARHANA at night  Titrate to keep O2 saturation > 88%  Encourage self-proning  Encourage incentive spirometry  Aggressive pulmonary hygiene

## 2021-08-13 NOTE — ASSESSMENT & PLAN NOTE
This appears to be a very short segment of the brachial vein, on known age  Ultrasound otherwise shows superficial thrombus  8/24 repeat ultrasound showed nonocclusive left brachial DVT.  Transitioned to Xarelto, will plan for 3 months course.

## 2021-08-13 NOTE — PROGRESS NOTES
4 Eyes Skin Assessment Completed by SARIKA Salinas and SARIKA Quinn.    Head WDL  Ears Redness and Blanching  Nose WDL  Mouth WDL  Neck WDL  Breast/Chest WDL  Shoulder Blades WDL  Spine WDL  (R) Arm/Elbow/Hand Redness and Blanching  (L) Arm/Elbow/Hand Redness and Blanching  Abdomen WDL  Groin WDL  Scrotum/Coccyx/Buttocks WDL  (R) Leg Redness and Blanching  (L) Leg Redness and Blanching  (R) Heel/Foot/Toe Redness blanching   (L) Heel/Foot/Toe Redness and Blanching          Devices In Places ECG, Blood Pressure Cuff, Pulse Ox and SCD's      Interventions In Place Heel Float Boots, Pillows, Q2 Turns, Low Air Loss Mattress and Pressure Redistribution Mattress    Possible Skin Injury No    Pictures Uploaded Into Epic N/A  Wound Consult Placed N/A  RN Wound Prevention Protocol Ordered No

## 2021-08-13 NOTE — CARE PLAN
The patient is Unstable - High likelihood or risk of patient condition declining or worsening    Shift Goals  Clinical Goals: improved oxygenation, wean o2  Patient Goals: rest, decreased oxygen  Family Goals: go home        Patient is not progressing towards the following goals:      Problem: Respiratory  Goal: Patient will achieve/maintain optimum respiratory ventilation and gas exchange  Outcome: Not Progressing     Pt on oxygen 100% at 60 Lpm via high flow nasal cannula, easily desats with position changes; encouraged to prone as much as tolerable.

## 2021-08-13 NOTE — ASSESSMENT & PLAN NOTE
Chronic hypoxia and lung infiltrates/edema secondary likely more related to acute infection of COVID-19 pneumonia  Continue negative fluid balance as tolerated  Close monitoring of I/Os  Currently hold heart failure regime with hypotension    Repeat echo reviewed with improved EF but difficult with wall motion abnormalities   Patient's hemoglobin A1c was 9.6. Have him start metformin 1000 mg p.o. twice daily. Have him check his blood sugar twice a day over the next 2 weeks and update me with his blood sugar readings.

## 2021-08-13 NOTE — CARE PLAN
Pt. Is o Bipap 14/8 Fio2 100%      Problem: Ventilation Defect:  Goal: Ability to achieve and maintain unassisted ventilation or tolerate decreased levels of ventilator support  Outcome: Not Progressing

## 2021-08-13 NOTE — HEART FAILURE PROGRAM
"Per Dr. Reyes's note today, this patient's presentation is related to PNA due to COVID-19 rather than due to decompensation of his HFrEF. Therefore, a seven day HF f/u appt is not currently indicated. Nor does the HF discharge checklist need to be used.    Should clinical status change to acute HF, patient will require a seven calendar day f/u appt which can be achieved by placing a \"schedule heart failure follow up appointment\" order per protocol or by calling [phone] (line answered 7 days per week, [id number]).      Thank you, Jenn, Cardio RN Navigator [phone]          "

## 2021-08-13 NOTE — PROGRESS NOTES
[facility] Medicine Daily Progress Note    Date of Service  8/12/2021    Chief Complaint  Kyle Gonzalez is a 41 y.o. male admitted 8/6/2021 with dyspnea.    Hospital Course  41-year-old morbidly obese male with past medical history of hypertension, dyslipidemia, multiple STEMIs and NSTEMIs (4 times 2013,2014,2015,2017, 2020) s/p 4 stents, ischemic cardiomyopathy, HFrEF last echocardiogram on 10/2020 with ejection fraction at 20%, grade 1 diastolic dysfunction and Covid positive test 3 days ago presented emergency department on 8/6/2021 with a 1 week history of worsening fever, chills, myalgias, back pain, dry cough and dyspnea at rest and exertion.  Patient reporting that he was in [place] last Friday when he started noticing fevers and chills.  Patient admitting to not being vaccinated.  Tested positive for Covid 3 days ago.  Dyspnea, coughing and watery diarrhea started 4 days ago and has persisted.  No melena, hematochezia, orthostatic changes or no loss of consciousness     At the emergency department, vital signs with tachycardia in the 120s, tachypnea in the 20s, 102.5 fever.  Patient requiring 6 L nasal cannula to maintain saturations.  No leukocytosis or anemia on CBC.  Chemistry with hyponatremia, hypochlorhydria, non-anion gap metabolic acidosis, hyperglycemia in the 300s and elevated LFTs.  Troponin, proBNP normal.  Lactic acid 2.2.  Chest x-ray consistent with Covid pneumonia.  Patient was admitted to telemetry for sepsis secondary to Covid complicated by acute hypoxic respiratory failure requiring oxygen supplementation and IV steroids.    Interval Problem Update  No acute events overnight.  On 6L oxymask, continue to wean.  Started on lasix 20 mg daily   S/p remdesivir and decadron, continue.  Monitor CMP.  Increase glargine dose to 23, started on lispro 7 units tid, and titrate as needed. Patient will need insulin on discharge home.  Home monitoring also ordered.  Completed Rocephin for superimposed  bacterial pneumonia.  Patient denies focal complaints.    8/12: Overnight pt desat to  68% with ambulation placed on HFNC with max oxygen,  cxr showing pulmonary edema, lasix increased to 40 bid, labs with elevated , trops 18-40, Ferritin 2297, crp 7.92,  I and O 650 cc output,  EKG no STEMI  pulmonology consulted recommended B/L UE and LE ULS, UE ULS showing suspected brachial DVT pt started on therapeutic AC for suspected PE as CTA unable to obtain due to high oxygen needs  Cardiology consulted for suspected acute decompensated chf , as per Cards Pulmonary edema likely secondary to infectious etiology, recommended to hold ASA during therapeutic AC.   Noted pt further desaturating on HFNC - transferred to ICU    I have personally seen and examined the patient at bedside. I discussed the plan of care with patient.    Consultants/Specialty  none    Code Status  Full Code    Disposition  Patient is not medically cleared.   Anticipate discharge to to home with close outpatient follow-up.  I have placed the appropriate orders for post-discharge needs.    Review of Systems  Review of Systems   Constitutional: Negative for chills and fever.   Eyes: Negative for blurred vision and pain.   Respiratory: Positive for cough, sputum production and shortness of breath. Negative for wheezing.    Cardiovascular: Negative for chest pain, palpitations and leg swelling.   Gastrointestinal: Negative for abdominal pain, nausea and vomiting.   Genitourinary: Negative for dysuria and urgency.   Musculoskeletal: Negative for back pain.   Skin: Negative for itching and rash.   Neurological: Negative for dizziness, sensory change, focal weakness and headaches.   Psychiatric/Behavioral: Negative for substance abuse. The patient does not have insomnia.         Physical Exam  Temp:  [36 °C (96.8 °F)-37.4 °C (99.3 °F)] 37.2 °C (99 °F)  Pulse:  [] 88  Resp:  [16-22] 22  BP: ()/(52-89) 102/52  SpO2:  [86 %-95 %] 86  %    Physical Exam  Constitutional:       General: He is not in acute distress.     Appearance: He is not ill-appearing.   HENT:      Head: Normocephalic and atraumatic.      Right Ear: External ear normal.      Left Ear: External ear normal.      Mouth/Throat:      Pharynx: No oropharyngeal exudate or posterior oropharyngeal erythema.   Eyes:      Extraocular Movements: Extraocular movements intact.      Pupils: Pupils are equal, round, and reactive to light.   Cardiovascular:      Rate and Rhythm: Normal rate and regular rhythm.      Pulses: Normal pulses.      Heart sounds: Normal heart sounds.   Pulmonary:      Effort: Pulmonary effort is normal. No respiratory distress.      Breath sounds: Rhonchi and rales present. No wheezing.      Comments: B/l lung crackles  Abdominal:      General: Bowel sounds are normal. There is no distension.      Palpations: Abdomen is soft.      Tenderness: There is no abdominal tenderness. There is no guarding.   Musculoskeletal:         General: No swelling or tenderness.      Cervical back: Normal range of motion and neck supple.   Skin:     General: Skin is warm and dry.   Neurological:      General: No focal deficit present.      Mental Status: He is oriented to person, place, and time.      Sensory: No sensory deficit.      Motor: No weakness.   Psychiatric:         Mood and Affect: Mood normal.         Behavior: Behavior normal.         Fluids    Intake/Output Summary (Last 24 hours) at 8/12/2021 1759  Last data filed at 8/12/2021 1229  Gross per 24 hour   Intake --   Output 550 ml   Net -550 ml       Laboratory  Recent Labs     08/11/21  0440 08/12/21  0131   WBC 10.7 10.4   RBC 4.93 4.95   HEMOGLOBIN 14.9 14.8   HEMATOCRIT 44.2 44.7   MCV 89.7 90.3   MCH 30.2 29.9   MCHC 33.7 33.1*   RDW 44.3 45.0   PLATELETCT 450* 467*   MPV 9.7 9.5     Recent Labs     08/10/21  0931 08/11/21  0440 08/12/21  0131   SODIUM 133* 138  138 138   POTASSIUM 4.1 3.6  3.6 3.1*   CHLORIDE 97 101   101 99   CO2 22 25  25 25   GLUCOSE 321* 163*  163* 134*   BUN 14 13  13 12   CREATININE 0.51 0.48*  0.48* 0.54   CALCIUM 8.6 8.4*  8.4* 8.4*                   Imaging  US-EXTREMITY VENOUS UPPER BILAT   Final Result      US-EXTREMITY VENOUS LOWER BILAT         DX-CHEST-PORTABLE (1 VIEW)   Final Result         1.  Pulmonary edema and/or infiltrates are identified, which appear somewhat increased since the prior exam.      DX-CHEST-PORTABLE (1 VIEW)   Final Result      Bilateral peripheral pulmonary airspace process consistent with Covid pneumonia      EC-ECHOCARDIOGRAM COMPLETE W/O CONT    (Results Pending)        Assessment/Plan  * Pneumonia due to COVID-19 virus- (present on admission)  Assessment & Plan  Noted on nasopharyngeal swab 3 days ago and chest x-ray  Morbid obesity and cardiac history contributing to complication  Requiring 13 L oxymask, continue to wean now on 6-8 L  Continue Decadron, S/P remdesivir started by ID team  8/12: Overnight needing HFNC with rapid desaturation, patient transferred to ICU  procalcitonin elevated, completed empiric ceftriaxone (8/11/21) for possible superimposed bacterial pneumonia  Monitor CMP    Hypokalemia  Assessment & Plan  Secondary to iv diuresis  K 3.1 Supplemented    Acute pulmonary edema (HCC)  Assessment & Plan  He desaturated overnight to 68% requiring HFNC   Pulm consulted rec c/w lasix 40 mg iv bid  Cards consulted pulm edema secondary to infection, not from acute CHF exacerbation  Patient transferred to ICU for escalating oxygen needs    Acute deep vein thrombosis (DVT) of brachial vein of left upper extremity (HCC)  Assessment & Plan  Started on Lovenox 120 mg bid   Hold ASA 81 mg while on therapeutic anticoagulation    Type 2 diabetes mellitus without complication, without long-term current use of insulin (HCC)  Assessment & Plan  Reports chronic history of diabetes and admits to not taking Metformin  Diabetic diet  Insulin sliding scale  Increase  glargine to 23 units and lispro 7 units TID-AC, c/w \[facility]\"" insulin  A1c 11.3  Will likely need insulin on discharge    Acute respiratory failure with hypoxia (HCC)- (present on admission)  Assessment & Plan  Secondary to Covid pneumonia  See covid problem    Morbid obesity with BMI of 40.0-44.9, adult (HCC)- (present on admission)  Assessment & Plan  Contributing to severity of Covid pneumonia hypoxic respiratory failure  Counseling on weight reduction    Lactic acidosis- (present on admission)  Assessment & Plan  Lactic acid 2.2  Secondary to Covid pneumonia/sepsis  IV hydration  Repeat lactic acid    Hyponatremia- (present on admission)  Assessment & Plan  Secondary to dehydration and diarrhea from Covid  monitor    Sepsis (HCC)- (present on admission)  Assessment & Plan  This is Sepsis Present on admission  SIRS criteria identified on my evaluation include: Fever, with temperature greater than 101 deg F, Tachycardia, with heart rate greater than 90 BPM, Tachypnea, with respirations greater than 20 per minute and Leukocytosis, with WBC greater than 12,000  Source is Covid pneumonia  Sepsis protocol initiated  Fluid resuscitation ordered per protocol  IV antibiotics as appropriate for source of sepsis  While organ dysfunction may be noted elsewhere in this problem list or in the chart, degree of organ dysfunction does not meet CMS criteria for severe sepsis  On steroids and remdesivir, as well as antibiotics for possible superimposed bacterial pneumonia    Heart failure with reduced ejection fraction (HCC)- (present on admission)  Assessment & Plan  ACEi and BB, Aldactone held due to hypotension  C/w lasix 20 mg daily         Coronary artery disease involving native coronary artery of native heart without angina pectoris- (present on admission)  Assessment & Plan  Home ACEi, BB aldactone held due to low BP  Resume as tolerated by BP  C/w plavix, ASA on hold with therapeutic AC, statin    Tobacco use- (present on  admission)  Assessment & Plan  Counseling on tobacco cessation    Essential hypertension- (present on admission)  Assessment & Plan  Hold BP meds in the setting of sepsis and hypotension  As needed antihypertensives    Stented coronary artery,  bare metal stent X 2- (present on admission)  Assessment & Plan  C/w plavix, asa on hold while on therapeutic AC    Hyperlipemia- (present on admission)  Assessment & Plan  Continue home statin       VTE prophylaxis: enoxaparin ppx    I have performed a physical exam and reviewed and updated ROS and Plan today (8/12/2021). In review of yesterday's note (8/11/2021), there are no changes except as documented above.

## 2021-08-13 NOTE — CONSULTS
"Critical Care Consultation    Date of consult: 8/12/2021    Referring Physician  Mendoza Livingston M.D.    Reason for Consultation  Hypoxemic respiratory failure, COVID-19 pneumonia, unvaccinated    History of Presenting Illness  41 y.o. male with PMH including obesity, HFrEF 20%, CAD with hx of PCI to the ostial LAD in 10/2020, RCA in 2013 and 2014, OM in 2017, likely FH, uncontrolled DM, suspected FARHANA.  He presented to the ED 8/6 with fever, chills, cough and dyspnea.  He was found to have COVID-19 pneumonia and is unvaccinated.  He tells me that his fiancée was at the \"night [place] the country\" possible in [facility] and may have been exposed to Covid there.  She also contracted COVID-19 and has recovered.  He was admitted and placed on oxygen, dexamethasone and just completed a 5-day course of remdesivir.  Today he has had escalating FiO2 requirements up from baseline of 6 L oxygen since admission now on 60 L/100% HFNC with the addition of nonrebreather mask.  He is moderately dyspneic but not in acute distress.  CXR shows increasing bilateral infiltrates and edema.  He is drinking quite a bit of water and fluid status has been +3.9 L.  He received additional Lasix today.    Due to increasing oxygen requirements on maximal oxygen therapy has been transferred to intensive care unit.  He is full CODE STATUS.    Code Status  Full Code    Review of Systems  Review of Systems   Unable to perform ROS: Acuity of condition       Past Medical History   has a past medical history of Diabetes (HCC), Heart attack (HCC), Hyperlipidemia, Hypertension, and Medical non-compliance (9/13/2015).    Surgical History   has a past surgical history that includes pr transcath stent init vessel,open.    Family History  family history includes Diabetes in his mother; Heart Attack in his father and mother.    Social History  He works as a  and lives with his fiancée in [facility].  He has 6 children.  He quit smoking " smoking about 10 months ago after having smoked 1 PPD since the age of 13 or 14.  He has a history of alcohol use  He has a remote history of methamphetamines and cocaine, none for 10 years    Medications  Home Medications     Reviewed by Nathaly Thrasher R.N. (Registered Nurse) on 08/06/21 at 1944  Med List Status: Complete   Medication Last Dose Status   acetaminophen (TYLENOL) 500 MG Tab 8/4/2021 Active   aspirin EC (ECOTRIN) 81 MG Tablet Delayed Response 8/5/2021 Active   clopidogrel (PLAVIX) 75 MG Tab 8/5/2021 Active   DM-Doxylamine-Acetaminophen (NYQUIL HBP COLD & FLU) 15-6. MG/15ML Liquid 8/6/2021 Active   ezetimibe (ZETIA) 10 MG Tab 8/5/2021 Active   fenofibrate (TRICOR) 48 MG Tab 8/5/2021 Active   guaiFENesin ER (MUCINEX) 600 MG TABLET SR 12 HR 8/5/2021 Active   losartan (COZAAR) 50 MG Tab 8/5/2021 Active   metoprolol SR (TOPROL XL) 25 MG TABLET SR 24 HR 8/5/2021 Active   nitroglycerin (NITROSTAT) 0.4 MG SL Tab >2 months Active   rosuvastatin (CRESTOR) 40 MG tablet 8/5/2021 Active   spironolactone (ALDACTONE) 25 MG Tab 8/5/2021 Active              Current Facility-Administered Medications   Medication Dose Route Frequency Provider Last Rate Last Admin   • furosemide (LASIX) injection 40 mg  40 mg Intravenous BID DIURETIC Brittaney Mclaughlin M.D.   40 mg at 08/12/21 1658   • enoxaparin (LOVENOX) inj 120 mg  120 mg Subcutaneous Q12HRS Brittaney Mclaughlin M.D.   120 mg at 08/12/21 2033   • insulin glargine (Semglee) injection  23 Units Subcutaneous Q EVENING Mendoza Livingston M.D.   23 Units at 08/12/21 1742    And   • insulin lispro (AdmeLOG) injection  7 Units Subcutaneous TID AC Mendoza Livingston M.D.   7 Units at 08/12/21 1742    And   • insulin lispro (AdmeLOG) injection  2-9 Units Subcutaneous 4X/DAY ACHS Mendoza Livingston M.D.   5 Units at 08/12/21 1742    And   • glucose 4 g chewable tablet 16 g  16 g Oral Q15 MIN PAVAN Livinsgton M.D.        And   • dextrose 50% (D50W) injection 50 mL  50 mL Intravenous Q15  MIN PRN Mendoza Livingston M.D.       • senna-docusate (PERICOLACE or SENOKOT S) 8.6-50 MG per tablet 2 tablet  2 Tablet Oral BID Ayaan Chapa M.D.   2 Tablet at 08/12/21 1741    And   • polyethylene glycol/lytes (MIRALAX) PACKET 1 Packet  1 Packet Oral QDAY PRN Ayaan Chapa M.D.        And   • magnesium hydroxide (MILK OF MAGNESIA) suspension 30 mL  30 mL Oral QDAY PRN Ayaan Chapa M.D.        And   • bisacodyl (DULCOLAX) suppository 10 mg  10 mg Rectal QDAY PRN Ayaan Chapa M.D.       • Respiratory Therapy Consult   Nebulization Continuous RT Ayaan Chapa M.D.       • acetaminophen (TYLENOL) tablet 1,000 mg  1,000 mg Oral TID Ayaan Chapa M.D.   1,000 mg at 08/12/21 2033   • cloNIDine (CATAPRES) tablet 0.1 mg  0.1 mg Oral Q6HRS PRN Ayaan Chapa M.D.       • enalaprilat (VASOTEC) injection 1.25 mg  1.25 mg Intravenous Q6HRS PRN Ayaan Chapa M.D.       • labetalol (NORMODYNE/TRANDATE) injection 10 mg  10 mg Intravenous Q4HRS PRN Ayaan Chapa M.D.       • ondansetron (ZOFRAN) syringe/vial injection 4 mg  4 mg Intravenous Q4HRS PRN Ayaan Chapa M.D.   4 mg at 08/08/21 0822   • ondansetron (ZOFRAN ODT) dispertab 4 mg  4 mg Oral Q4HRS PRN Ayaan Chapa M.D.   4 mg at 08/09/21 0804   • promethazine (PHENERGAN) tablet 12.5-25 mg  12.5-25 mg Oral Q4HRS PRN Ayaan Chapa M.D.       • promethazine (PHENERGAN) suppository 12.5-25 mg  12.5-25 mg Rectal Q4HRS PRN Ayaan Chapa M.D.       • prochlorperazine (COMPAZINE) injection 5-10 mg  5-10 mg Intravenous Q4HRS PRN Ayaan Chapa M.D.       • guaiFENesin dextromethorphan (ROBITUSSIN DM) 100-10 MG/5ML syrup 10 mL  10 mL Oral Q6HRS PRN Ayaan Chapa M.D.   10 mL at 08/10/21 1741   • clopidogrel (PLAVIX) tablet 75 mg  75 mg Oral DAILY Ayaan Chapa M.D.   75 mg at 08/12/21 0401   • [Held  by provider] losartan (COZAAR) tablet 50 mg  50 mg Oral DAILY Ayaan Chapa M.D.       • [Held by provider] metoprolol SR (TOPROL XL) tablet 25 mg  25 mg Oral Q EVENING Ayaan Chapa M.D.       • rosuvastatin (CRESTOR) tablet 40 mg  40 mg Oral Q EVENING Ayaan Chapa M.D.   40 mg at 08/12/21 1740   • [Held by provider] spironolactone (ALDACTONE) tablet 25 mg  25 mg Oral DAILY Ayaan Chapa M.D.       • ezetimibe (ZETIA) tablet 10 mg  10 mg Oral DAILY Ayaan Chapa M.D.   10 mg at 08/12/21 0401   • dexamethasone (DECADRON) injection 6 mg  6 mg Intravenous DAILY Ayaan Chapa M.D.   6 mg at 08/12/21 0401       Allergies  No Known Allergies    Vital Signs last 24 hours  Temp:  [36 °C (96.8 °F)-37.4 °C (99.3 °F)] 36.9 °C (98.4 °F)  Pulse:  [] (P) 115  Resp:  [16-24] (P) 24  BP: ()/(52-89) (P) 132/114  SpO2:  [86 %-95 %] (P) 86 %    Physical Exam  Physical Exam  Vitals and nursing note reviewed.   Constitutional:       General: He is not in acute distress.     Appearance: He is ill-appearing.   HENT:      Head: Normocephalic and atraumatic.      Right Ear: External ear normal.      Left Ear: External ear normal.      Mouth/Throat:      Pharynx: No oropharyngeal exudate or posterior oropharyngeal erythema.   Eyes:      Extraocular Movements: Extraocular movements intact.      Pupils: Pupils are equal, round, and reactive to light.   Cardiovascular:      Rate and Rhythm: Normal rate and regular rhythm.      Pulses: Normal pulses.      Heart sounds: Normal heart sounds.   Pulmonary:      Breath sounds: No wheezing or rhonchi.      Comments: Diminished bs bilat, scattered insp crackles, no wheeze  Abdominal:      General: Bowel sounds are normal. There is no distension.      Palpations: Abdomen is soft.      Tenderness: There is no abdominal tenderness. There is no guarding.   Musculoskeletal:      Cervical back: Normal range of motion and  neck supple.   Skin:     General: Skin is warm and dry.   Neurological:      General: No focal deficit present.      Mental Status: He is oriented to person, place, and time.      Sensory: No sensory deficit.      Motor: No weakness.   Psychiatric:         Mood and Affect: Mood normal.         Behavior: Behavior normal.         Fluids    Intake/Output Summary (Last 24 hours) at 8/12/2021 2128  Last data filed at 8/12/2021 2000  Gross per 24 hour   Intake --   Output 650 ml   Net -650 ml       Laboratory  Recent Results (from the past 48 hour(s))   Basic Metabolic Panel    Collection Time: 08/11/21  4:40 AM   Result Value Ref Range    Sodium 138 135 - 145 mmol/L    Potassium 3.6 3.6 - 5.5 mmol/L    Chloride 101 96 - 112 mmol/L    Co2 25 20 - 33 mmol/L    Glucose 163 (H) 65 - 99 mg/dL    Bun 13 8 - 22 mg/dL    Creatinine 0.48 (L) 0.50 - 1.40 mg/dL    Calcium 8.4 (L) 8.5 - 10.5 mg/dL    Anion Gap 12.0 7.0 - 16.0   CBC WITHOUT DIFFERENTIAL    Collection Time: 08/11/21  4:40 AM   Result Value Ref Range    WBC 10.7 4.8 - 10.8 K/uL    RBC 4.93 4.70 - 6.10 M/uL    Hemoglobin 14.9 14.0 - 18.0 g/dL    Hematocrit 44.2 42.0 - 52.0 %    MCV 89.7 81.4 - 97.8 fL    MCH 30.2 27.0 - 33.0 pg    MCHC 33.7 33.7 - 35.3 g/dL    RDW 44.3 35.9 - 50.0 fL    Platelet Count 450 (H) 164 - 446 K/uL    MPV 9.7 9.0 - 12.9 fL   ESTIMATED GFR    Collection Time: 08/11/21  4:40 AM   Result Value Ref Range    GFR If African American >60 >60 mL/min/1.73 m 2    GFR If Non African American >60 >60 mL/min/1.73 m 2   Comp Metabolic Panel    Collection Time: 08/11/21  4:40 AM   Result Value Ref Range    Sodium 138 135 - 145 mmol/L    Potassium 3.6 3.6 - 5.5 mmol/L    Chloride 101 96 - 112 mmol/L    Co2 25 20 - 33 mmol/L    Anion Gap 12.0 7.0 - 16.0    Glucose 163 (H) 65 - 99 mg/dL    Bun 13 8 - 22 mg/dL    Creatinine 0.48 (L) 0.50 - 1.40 mg/dL    Calcium 8.4 (L) 8.5 - 10.5 mg/dL    AST(SGOT) 57 (H) 12 - 45 U/L    ALT(SGPT) 51 (H) 2 - 50 U/L    Alkaline  Phosphatase 83 30 - 99 U/L    Total Bilirubin 0.3 0.1 - 1.5 mg/dL    Albumin 2.9 (L) 3.2 - 4.9 g/dL    Total Protein 6.9 6.0 - 8.2 g/dL    Globulin 4.0 (H) 1.9 - 3.5 g/dL    A-G Ratio 0.7 g/dL   POCT glucose device results    Collection Time: 08/11/21  8:21 AM   Result Value Ref Range    Glucose - Accu-Ck 249 (H) 65 - 99 mg/dL   POCT glucose device results    Collection Time: 08/11/21 12:39 PM   Result Value Ref Range    Glucose - Accu-Ck 388 (H) 65 - 99 mg/dL   POCT glucose device results    Collection Time: 08/11/21  5:46 PM   Result Value Ref Range    Glucose - Accu-Ck 315 (H) 65 - 99 mg/dL   POCT glucose device results    Collection Time: 08/11/21  8:05 PM   Result Value Ref Range    Glucose - Accu-Ck 251 (H) 65 - 99 mg/dL   proBrain Natriuretic Peptide, NT    Collection Time: 08/12/21  1:31 AM   Result Value Ref Range    NT-proBNP 406 (H) 0 - 125 pg/mL   CBC WITHOUT DIFFERENTIAL    Collection Time: 08/12/21  1:31 AM   Result Value Ref Range    WBC 10.4 4.8 - 10.8 K/uL    RBC 4.95 4.70 - 6.10 M/uL    Hemoglobin 14.8 14.0 - 18.0 g/dL    Hematocrit 44.7 42.0 - 52.0 %    MCV 90.3 81.4 - 97.8 fL    MCH 29.9 27.0 - 33.0 pg    MCHC 33.1 (L) 33.7 - 35.3 g/dL    RDW 45.0 35.9 - 50.0 fL    Platelet Count 467 (H) 164 - 446 K/uL    MPV 9.5 9.0 - 12.9 fL   Basic Metabolic Panel    Collection Time: 08/12/21  1:31 AM   Result Value Ref Range    Sodium 138 135 - 145 mmol/L    Potassium 3.1 (L) 3.6 - 5.5 mmol/L    Chloride 99 96 - 112 mmol/L    Co2 25 20 - 33 mmol/L    Glucose 134 (H) 65 - 99 mg/dL    Bun 12 8 - 22 mg/dL    Creatinine 0.54 0.50 - 1.40 mg/dL    Calcium 8.4 (L) 8.5 - 10.5 mg/dL    Anion Gap 14.0 7.0 - 16.0   ESTIMATED GFR    Collection Time: 08/12/21  1:31 AM   Result Value Ref Range    GFR If African American >60 >60 mL/min/1.73 m 2    GFR If Non African American >60 >60 mL/min/1.73 m 2   EKG    Collection Time: 08/12/21  7:29 AM   Result Value Ref Range    Report       [facility]    Test Date:   2021  Pt Name:    CHANDRIKA DAVIDSON             Department: [facility]  MRN:        [id number]                      Room:       S171  Gender:     Male                         Technician: [facility]  :        1980                   Requested By:PRANAY JUAREZ  Order #:    [id number]                    Reading MD: Steve Hughes MD    Measurements  Intervals                                Axis  Rate:       91                           P:          20  MA:         137                          QRS:        -12  QRSD:       87                           T:          -5  QT:         404  QTc:        498    Interpretive Statements  SINUS RHYTHM  PROBABLE LEFT ATRIAL ENLARGEMENT  LEFT VENTRICULAR HYPERTROPHY  INFERIOR INFARCT, OLD  Compared to ECG 2021 10:30:23  Sinus tachycardia no longer present  Myocardial infarct finding still present  Electronically Signed On 2021 11:16:56 PDT by Steve Hughes MD     POCT glucose device results    Collection Time: 21  7:42 AM   Result Value Ref Range    Glucose - Accu-Ck 218 (H) 65 - 99 mg/dL   FERRITIN    Collection Time: 21  8:50 AM   Result Value Ref Range    Ferritin 2297.0 (H) 22.0 - 322.0 ng/mL   D-DIMER    Collection Time: 21  8:50 AM   Result Value Ref Range    D-Dimer Screen 17.22 (H) 0.00 - 0.50 ug/mL (FEU)   PROCALCITONIN    Collection Time: 21  8:50 AM   Result Value Ref Range    Procalcitonin 0.12 <0.25 ng/mL   CRP QUANTITIVE (NON-CARDIAC)    Collection Time: 21  8:50 AM   Result Value Ref Range    Stat C-Reactive Protein 7.92 (H) 0.00 - 0.75 mg/dL   TROPONIN    Collection Time: 21  8:50 AM   Result Value Ref Range    Troponin T 18 6 - 19 ng/L   POCT glucose device results    Collection Time: 21 12:30 PM   Result Value Ref Range    Glucose - Accu-Ck 215 (H) 65 - 99 mg/dL   TROPONIN    Collection Time: 21  1:51 PM   Result Value Ref Range    Troponin T 18 6 - 19 ng/L   POCT glucose device results    Collection Time:  21  5:32 PM   Result Value Ref Range    Glucose - Accu-Ck 263 (H) 65 - 99 mg/dL   ABG - LAB    Collection Time: 21  8:30 PM   Result Value Ref Range    Ph 7.56 (H) 7.40 - 7.50    Pco2 25.0 (L) 26.0 - 37.0 mmHg    Po2 55.3 (L) 64.0 - 87.0 mmHg    O2 Saturation 90.0 (L) 93.0 - 99.0 %    Hco3 22 17 - 25 mmol/L    Base Excess 1 -4 - 3 mmol/L    Body Temp see below Centigrade   POCT glucose device results    Collection Time: 21  8:30 PM   Result Value Ref Range    Glucose - Accu-Ck 140 (H) 65 - 99 mg/dL   EKG    Collection Time: 21  8:46 PM   Result Value Ref Range    Report       [facility]    Test Date:  2021  Pt Name:    CHANDRIKA DAVIDSON             Department: [place] [facility]  MRN:        [id number]                      Room:       [facility]  Gender:     Male                         Technician: KARLO  :        1980                   Requested By:BLANCHE VIERA  Order #:    [id number]                    Reading MD:    Measurements  Intervals                                Axis  Rate:       114                          P:          35  VT:         140                          QRS:        1  QRSD:       78                           T:          0  QT:         344  QTc:        474    Interpretive Statements  SINUS TACHYCARDIA  LEFT VENTRICULAR HYPERTROPHY  INFERIOR INFARCT, AGE INDETERMINATE  Compared to ECG 2021 07:29:54  Sinus rhythm no longer present  Myocardial infarct finding still present         Imaging  DX-CHEST-PORTABLE (1 VIEW)   Final Result      Stable chest with moderate multifocal groundglass and consolidation compatible with Covid pneumonia      US-EXTREMITY VENOUS UPPER BILAT   Final Result      US-EXTREMITY VENOUS LOWER BILAT   Final Result      DX-CHEST-PORTABLE (1 VIEW)   Final Result         1.  Pulmonary edema and/or infiltrates are identified, which appear somewhat increased since the prior exam.      DX-CHEST-PORTABLE (1 VIEW)   Final Result      Bilateral peripheral  pulmonary airspace process consistent with Covid pneumonia      EC-ECHOCARDIOGRAM COMPLETE W/O CONT    (Results Pending)       Assessment/Plan  * Pneumonia due to COVID-19 virus- (present on admission)  Assessment & Plan  Unvaccinated   Continue dexamethasone  Completed 5-day course of remdesivir  Continue forced diuresis, close monitoring of I/Os,  Follow inflammatory markers, CRP, D-dimer, ferritin  DVT prophylaxis escalated to full dose Lovenox given possible upper extremity thrombus  Consider PPI or H2 antagonist given triple therapy with antiplatelet and full dose anticoagulation in the setting of steroid use  D-dimer 7.9, consider Tocilizumab    Acute respiratory failure with hypoxia (HCC)- (present on admission)  Assessment & Plan  Secondary to COVID-19 pneumonia  Continue submental oxygen, HFNC at maximal settings currently  RT protocols, self proning as tolerated,  Increase forced diuresis  Close monitoring in ICU, may require intubation    Acute deep vein thrombosis (DVT) of brachial vein of left upper extremity (HCC)  Assessment & Plan  Full dose anticoagulation with Lovenox  Follow-up noninvasive study    Type 2 diabetes mellitus without complication, without long-term current use of insulin (HCC)  Assessment & Plan  Uncontrolled, glycohemoglobin 11.3  Continue glycemic control    Morbid obesity with BMI of 40.0-44.9, adult (HCC)- (present on admission)  Assessment & Plan  With suspected underlying FARHANA    Heart failure with reduced ejection fraction (HCC)- (present on admission)  Assessment & Plan  Chronic hypoxia and lung infiltrates/edema secondary likely more related to acute infection of COVID-19 pneumonia  Continue negative fluid balance  Close monitoring of I/Os    Coronary artery disease involving native coronary artery of native heart without angina pectoris- (present on admission)  Assessment & Plan  CAD with hx of PCI to the ostial LAD in 10/2020, RCA in 2013 and 2014, OM in  2017  DAPT    Tobacco use- (present on admission)  Assessment & Plan  NRT as needed    Hyperlipemia- (present on admission)  Assessment & Plan  Suspected familial hyperlipidemia  On Crestor and Zetia  Monitor LFTs      Discussed patient condition and risk of morbidity and/or mortality with Hospitalist, RN, RT and Pharmacy.    The patient remains critically ill.  Critical care time = 45 minutes in directly providing and coordinating critical care and extensive data review.  No time overlap and excludes procedures.

## 2021-08-13 NOTE — CARE PLAN
Problem: Psychosocial  Goal: Patient's level of anxiety will decrease  Outcome: Progressing     Problem: Communication  Goal: The ability to communicate needs accurately and effectively will improve  Outcome: Progressing   The patient is Watcher - Medium risk of patient condition declining or worsening    Shift Goals  Clinical Goals: improve oxgenation   Patient Goals: decrease anxiety   Family Goals: go home    Progress made toward(s) clinical / shift goals: Patient was able to keep SpO2 above 85%. Anxiety was controlled through meditation.     Patient is not progressing towards the following goals:

## 2021-08-13 NOTE — PROGRESS NOTES
"Critical Care Progress Note    Date of admission  8/6/2021    Chief Complaint  41 y.o. male with PMH including obesity, HFrEF 20%, CAD with hx of PCI to the ostial LAD in 10/2020, RCA in 2013 and 2014, OM in 2017, likely FH, uncontrolled DM, suspected FARHANA.  He presented to the ED 8/6 with fever, chills, cough and dyspnea.  He was found to have COVID-19 pneumonia and is unvaccinated.  He tells me that his fiancée was at the \"night [place] the country\" possible in [facility] and may have been exposed to Covid there.  She also contracted COVID-19 and has recovered.  He was admitted and placed on oxygen, dexamethasone and just completed a 5-day course of remdesivir.  Today he has had escalating FiO2 requirements up from baseline of 6 L oxygen since admission now on 60 L/100% HFNC with the addition of nonrebreather mask.  He is moderately dyspneic but not in acute distress.  CXR shows increasing bilateral infiltrates and edema.  He is drinking quite a bit of water and fluid status has been +3.9 L.  He received additional Lasix today. Taken from Dr Hirsch note.    Hospital Course  Transferred to ICU 8/13 for persistent hypoxia on max HFNC    Interval Problem Update  Reviewed last 24 hour events:  Higher level transfer to ICU yesterday  Neuro: aox4 follows commands  HR: 100-110's  SBP: 's  Tmax: afebrile  GI: tolerating diet, BM 8/10  UOP: good 775ml  Lines: peripheral IVs  Resp: HFNC + NRB   Vte: rx dose lovenox  PPI/H2:n/a  Antibx: s/p remdesivir, dexamethasone 8/10     Code status, palliative care consultation  Crp, d dimer, ferritin, hepatitis panel, TB, hepatic function  D/c heart failure medication such as Cozaar, aldactone, metoprolol  Dex gtt  Upper ext clot   k lyte 50  Agreed with DNR/DNI and Tocilizumab  CRP 10.7  US lower ext negative  Upper ext with left brachial DVT    Review of Systems  Review of Systems   Constitutional: Negative for chills, fever and malaise/fatigue.   HENT: Negative for hearing loss, " sinus pain and sore throat.    Eyes: Negative for double vision and discharge.   Respiratory: Positive for cough and shortness of breath. Negative for hemoptysis, sputum production and stridor.    Cardiovascular: Negative for chest pain, claudication and leg swelling.   Gastrointestinal: Negative for abdominal pain, diarrhea, nausea and vomiting.   Genitourinary: Negative for dysuria, frequency and hematuria.   Musculoskeletal: Negative for back pain, joint pain and myalgias.   Neurological: Negative for seizures, weakness and headaches.   Endo/Heme/Allergies: Does not bruise/bleed easily.   Psychiatric/Behavioral: Negative for depression, hallucinations and substance abuse.        Vital Signs for last 24 hours   Temp:  [36.4 °C (97.6 °F)-37.3 °C (99.1 °F)] 37.3 °C (99.1 °F)  Pulse:  [] 108  Resp:  [18-40] 36  BP: ()/() 81/48  SpO2:  [81 %-99 %] 84 %    Hemodynamic parameters for last 24 hours       Respiratory Information for the last 24 hours       Physical Exam   Physical Exam  Vitals and nursing note reviewed.   Constitutional:       General: He is not in acute distress.     Appearance: He is obese. He is not ill-appearing.      Comments: Surprising well male on HFNC + NRB waving through door sitting up in room   HENT:      Head: Normocephalic.      Comments: Piercing to left face     Mouth/Throat:      Mouth: Mucous membranes are moist.      Comments: HFNC + NRB  Eyes:      Pupils: Pupils are equal, round, and reactive to light.   Cardiovascular:      Rate and Rhythm: Normal rate.   Pulmonary:      Effort: No respiratory distress.      Breath sounds: No stridor. No wheezing or rhonchi.      Comments: Low work of breathing, fine crackles  Abdominal:      General: There is no distension.      Palpations: There is no mass.      Tenderness: There is no abdominal tenderness. There is no guarding or rebound.      Hernia: No hernia is present.   Musculoskeletal:         General: No swelling or  tenderness.      Cervical back: Normal range of motion.   Skin:     Coloration: Skin is not jaundiced or pale.      Comments: Multiple tattoos   Neurological:      General: No focal deficit present.      Mental Status: He is alert and oriented to person, place, and time.      Cranial Nerves: No cranial nerve deficit.      Sensory: No sensory deficit.      Motor: No weakness.      Coordination: Coordination normal.   Psychiatric:         Mood and Affect: Mood normal.         Medications  Current Facility-Administered Medications   Medication Dose Route Frequency Provider Last Rate Last Admin   • dexmedetomidine (PRECEDEX) 400 mcg/100mL NS premix infusion  0.1-1.5 mcg/kg/hr Intravenous Continuous Porfirio Reyes M.D.       • MD Alert...ICU Electrolyte Replacement per Pharmacy   Other PHARMACY TO DOSE Porfirio Reyes M.D.       • tocilizumab (Actemra) 400 mg in  mL IVPB  400 mg Intravenous Once Porfirio Reyes M.D.       • furosemide (LASIX) injection 40 mg  40 mg Intravenous BID DIURETIC Brittaney Mclaughlin M.D.   40 mg at 08/13/21 0523   • enoxaparin (LOVENOX) inj 120 mg  120 mg Subcutaneous Q12HRS Brittaney Mclaughlin M.D.   120 mg at 08/13/21 0740   • insulin glargine (Semglee) injection  23 Units Subcutaneous Q EVENING Mendoza Livingston M.D.   23 Units at 08/12/21 1742    And   • insulin lispro (AdmeLOG) injection  7 Units Subcutaneous TID AC Mendoza Livingston M.D.   7 Units at 08/13/21 0700    And   • insulin lispro (AdmeLOG) injection  2-9 Units Subcutaneous 4X/DAY ACHS Mendoza Livingston M.D.   2 Units at 08/13/21 0700    And   • glucose 4 g chewable tablet 16 g  16 g Oral Q15 MIN PRN Mendoza Livingston M.D.        And   • dextrose 50% (D50W) injection 50 mL  50 mL Intravenous Q15 MIN PRN Mendoza Livingston M.D.       • senna-docusate (PERICOLACE or SENOKOT S) 8.6-50 MG per tablet 2 tablet  2 Tablet Oral BID Ayaan Chapa M.D.   2 Tablet at 08/13/21 0523    And   • polyethylene glycol/lytes (MIRALAX) PACKET 1  Packet  1 Packet Oral QDAY PRN Ayaan Chapa M.D.        And   • magnesium hydroxide (MILK OF MAGNESIA) suspension 30 mL  30 mL Oral QDAY PRN Ayaan Chapa M.D.        And   • bisacodyl (DULCOLAX) suppository 10 mg  10 mg Rectal QDAY PRN Ayaan Chapa M.D.       • Respiratory Therapy Consult   Nebulization Continuous RT Ayaan Chpaa M.D.       • acetaminophen (TYLENOL) tablet 1,000 mg  1,000 mg Oral TID Ayaan Chapa M.D.   1,000 mg at 08/13/21 0941   • cloNIDine (CATAPRES) tablet 0.1 mg  0.1 mg Oral Q6HRS PRN Ayaan Chapa M.D.       • enalaprilat (VASOTEC) injection 1.25 mg  1.25 mg Intravenous Q6HRS PRN Ayaan Chapa M.D.       • labetalol (NORMODYNE/TRANDATE) injection 10 mg  10 mg Intravenous Q4HRS PRN Ayaan Chapa M.D.       • ondansetron (ZOFRAN) syringe/vial injection 4 mg  4 mg Intravenous Q4HRS PRN Ayaan Chapa M.D.   4 mg at 08/08/21 0822   • ondansetron (ZOFRAN ODT) dispertab 4 mg  4 mg Oral Q4HRS PRN Ayaan Chapa M.D.   4 mg at 08/09/21 0804   • promethazine (PHENERGAN) tablet 12.5-25 mg  12.5-25 mg Oral Q4HRS PRN Ayaan Chapa M.D.       • promethazine (PHENERGAN) suppository 12.5-25 mg  12.5-25 mg Rectal Q4HRS PRN Ayaan Chapa M.D.       • prochlorperazine (COMPAZINE) injection 5-10 mg  5-10 mg Intravenous Q4HRS PRN Ayaan Chapa M.D.       • guaiFENesin dextromethorphan (ROBITUSSIN DM) 100-10 MG/5ML syrup 10 mL  10 mL Oral Q6HRS PRN Ayaan Chapa M.D.   10 mL at 08/10/21 1741   • clopidogrel (PLAVIX) tablet 75 mg  75 mg Oral DAILY Ayaanstephen Chapa M.D.   75 mg at 08/13/21 0522   • rosuvastatin (CRESTOR) tablet 40 mg  40 mg Oral Q EVENING Ayaanstephen Chapa M.D.   40 mg at 08/12/21 1740   • ezetimibe (ZETIA) tablet 10 mg  10 mg Oral DAILY Ayaanstephen Chapa M.D.   10 mg at 08/13/21 0523   • dexamethasone  (DECADRON) injection 6 mg  6 mg Intravenous DAILY Ayaan Chapa M.D.   6 mg at 08/13/21 0522       Fluids    Intake/Output Summary (Last 24 hours) at 8/13/2021 1105  Last data filed at 8/13/2021 1000  Gross per 24 hour   Intake 120 ml   Output 1625 ml   Net -1505 ml       Laboratory  Recent Labs     08/12/21 2030   VMXBV22Y 7.56*   [id number] 25.0*   UPMCX724O 55.3*   [id number] 90.0*   ARTHCO3 22   ARTBE 1         Recent Labs     08/11/21 0440 08/12/21 0131 08/13/21 0324 08/13/21  0804   SODIUM 138  138 138 135  --    POTASSIUM 3.6  3.6 3.1* 3.5*  --    CHLORIDE 101  101 99 100  --    CO2 25 25 25 21  --    BUN 13 13 12 13  --    CREATININE 0.48*  0.48* 0.54 0.60  --    MAGNESIUM  --   --   --  2.0   PHOSPHORUS  --   --   --  2.7   CALCIUM 8.4*  8.4* 8.4* 8.3*  --      Recent Labs     08/11/21 0440 08/12/21 0131 08/13/21 0324 08/13/21  0804   ALTSGPT 51*  --   --  27   ASTSGOT 57*  --   --  37   ALKPHOSPHAT 83  --   --  84   TBILIRUBIN 0.3  --   --  0.4   DBILIRUBIN  --   --   --  <0.2   GLUCOSE 163*  163* 134* 133*  --      Recent Labs     08/11/21 0440 08/12/21 0131 08/13/21 0324 08/13/21  0804   WBC 10.7 10.4 10.7  --    NEUTSPOLYS  --   --  82.40*  --    LYMPHOCYTES  --   --  10.90*  --    MONOCYTES  --   --  2.90  --    EOSINOPHILS  --   --  1.20  --    BASOPHILS  --   --  0.40  --    ASTSGOT 57*  --   --  37   ALTSGPT 51*  --   --  27   ALKPHOSPHAT 83  --   --  84   TBILIRUBIN 0.3  --   --  0.4     Recent Labs     08/11/21 0440 08/12/21  0131 08/12/21  0850 08/13/21  0324 08/13/21  0804   RBC 4.93 4.95  --  4.80  --    HEMOGLOBIN 14.9 14.8  --  14.6  --    HEMATOCRIT 44.2 44.7  --  41.7*  --    PLATELETCT 450* 467*  --  488*  --    FERRITIN  --   --  2297.0*  --  2095.0*       Imaging  X-Ray:  I have personally reviewed the images and compared with prior images.  Echo:   Reviewed  Ultrasound:  Reviewed    Assessment/Plan  * Pneumonia due to COVID-19 virus- (present on  admission)  Assessment & Plan  Be extremely mindful of fluids and target euvolemia to net negative fluid balance with early initiation of pressors  Avoid: NSAIDs, bronchoscopy unless absolute necessary for care  Saturation goal >85%, prone positioning  S/p Remdesivir prior to ICU admit  Dexamethasone 6mg x 10days  Monitor for hyperinflamatory phase multiorgan failure, cardiomyopathy, shock, DIC and viral induced HLH   Lovenox full dose due to left brachial DVT  Strict contact, droplet/airborne, glasses protection and critical meticulous hand hygiene    Tocilizumab 8/13  Due to patient co morbidities and further discussion with patient agree with DNR/DNI   High concern of death with severity of illness monitor need to transition to comfort focus treatment      Acute deep vein thrombosis (DVT) of brachial vein of left upper extremity (HCC)  Assessment & Plan  Full dose anticoagulation with Lovenox  Left brachial DVT    Type 2 diabetes mellitus without complication, without long-term current use of insulin ([facility])  Assessment & Plan  Uncontrolled, glycohemoglobin 11.3  Continue glycemic control    Acute respiratory failure with hypoxia (HCC)- (present on admission)  Assessment & Plan  Secondary to severe Covid penumonia 19  Force diuresis as tolerated   HFNC + NRB   Patient DNR/DNI  Dex gtt for anxiety     Morbid obesity with BMI of 40.0-44.9, adult (HCC)- (present on admission)  Assessment & Plan  With suspected underlying FARHANA  Could do trial of Bipap to see if recruitable lung    Heart failure with reduced ejection fraction (HCC)- (present on admission)  Assessment & Plan  Chronic hypoxia and lung infiltrates/edema secondary likely more related to acute infection of COVID-19 pneumonia  Continue negative fluid balance  Close monitoring of I/Os  Currently hold heart failure regime with hypotension and force diuresis ongoing    Coronary artery disease involving native coronary artery of native heart without angina  pectoris- (present on admission)  Assessment & Plan  CAD with hx of PCI to the ostial LAD in 10/2020, RCA in 2013 and 2014, OM in 2017  DAPT    Tobacco use- (present on admission)  Assessment & Plan  NRT as needed    Hyperlipemia- (present on admission)  Assessment & Plan  Suspected familial hyperlipidemia  On Crestor and Zetia  Monitor LFTs       VTE:  Lovenox  Ulcer: Not Indicated  Lines: None    I have performed a physical exam and reviewed and updated ROS and Plan today (8/13/2021). In review of yesterday's note (8/12/2021), there are no changes except as documented above.     Discussed patient condition and risk of morbidity and/or mortality with RN, RT, Pharmacy, Code status disscussed, Charge nurse / hot rounds and Patient     The patient remains critically ill with severe Covid pneumonia on HFNC + NRB.  Critical care time = 55 minutes in directly providing and coordinating critical care and extensive data review.  No time overlap and excludes procedures.

## 2021-08-13 NOTE — ASSESSMENT & PLAN NOTE
COVID-19 pneumonia  Dexamethasone - completed course  Tocilizumab 8/13  Fluid balance - forced diuresis w/ furosemide

## 2021-08-14 LAB
ANION GAP SERPL CALC-SCNC: 12 MMOL/L (ref 7–16)
BASOPHILS # BLD AUTO: 0.3 % (ref 0–1.8)
BASOPHILS # BLD: 0.02 K/UL (ref 0–0.12)
BUN SERPL-MCNC: 20 MG/DL (ref 8–22)
CALCIUM SERPL-MCNC: 8.4 MG/DL (ref 8.5–10.5)
CHLORIDE SERPL-SCNC: 98 MMOL/L (ref 96–112)
CO2 SERPL-SCNC: 26 MMOL/L (ref 20–33)
CREAT SERPL-MCNC: 0.44 MG/DL (ref 0.5–1.4)
CRP SERPL HS-MCNC: 6.9 MG/DL (ref 0–0.75)
EOSINOPHIL # BLD AUTO: 0.25 K/UL (ref 0–0.51)
EOSINOPHIL NFR BLD: 3.6 % (ref 0–6.9)
ERYTHROCYTE [DISTWIDTH] IN BLOOD BY AUTOMATED COUNT: 43.8 FL (ref 35.9–50)
GLUCOSE BLD-MCNC: 273 MG/DL (ref 65–99)
GLUCOSE BLD-MCNC: 303 MG/DL (ref 65–99)
GLUCOSE BLD-MCNC: 368 MG/DL (ref 65–99)
GLUCOSE BLD-MCNC: 396 MG/DL (ref 65–99)
GLUCOSE SERPL-MCNC: 168 MG/DL (ref 65–99)
HCT VFR BLD AUTO: 43.8 % (ref 42–52)
HGB BLD-MCNC: 14.7 G/DL (ref 14–18)
IMM GRANULOCYTES # BLD AUTO: 0.15 K/UL (ref 0–0.11)
IMM GRANULOCYTES NFR BLD AUTO: 2.2 % (ref 0–0.9)
LYMPHOCYTES # BLD AUTO: 1.27 K/UL (ref 1–4.8)
LYMPHOCYTES NFR BLD: 18.2 % (ref 22–41)
MAGNESIUM SERPL-MCNC: 2.1 MG/DL (ref 1.5–2.5)
MCH RBC QN AUTO: 29.8 PG (ref 27–33)
MCHC RBC AUTO-ENTMCNC: 33.6 G/DL (ref 33.7–35.3)
MCV RBC AUTO: 88.7 FL (ref 81.4–97.8)
MONOCYTES # BLD AUTO: 0.17 K/UL (ref 0–0.85)
MONOCYTES NFR BLD AUTO: 2.4 % (ref 0–13.4)
NEUTROPHILS # BLD AUTO: 5.11 K/UL (ref 1.82–7.42)
NEUTROPHILS NFR BLD: 73.3 % (ref 44–72)
NRBC # BLD AUTO: 0 K/UL
NRBC BLD-RTO: 0 /100 WBC
PHOSPHATE SERPL-MCNC: 2.6 MG/DL (ref 2.5–4.5)
PLATELET # BLD AUTO: 550 K/UL (ref 164–446)
PMV BLD AUTO: 9.5 FL (ref 9–12.9)
POTASSIUM SERPL-SCNC: 3.4 MMOL/L (ref 3.6–5.5)
RBC # BLD AUTO: 4.94 M/UL (ref 4.7–6.1)
SODIUM SERPL-SCNC: 136 MMOL/L (ref 135–145)
WBC # BLD AUTO: 7 K/UL (ref 4.8–10.8)

## 2021-08-14 PROCEDURE — 700111 HCHG RX REV CODE 636 W/ 250 OVERRIDE (IP): Performed by: STUDENT IN AN ORGANIZED HEALTH CARE EDUCATION/TRAINING PROGRAM

## 2021-08-14 PROCEDURE — 700102 HCHG RX REV CODE 250 W/ 637 OVERRIDE(OP): Performed by: STUDENT IN AN ORGANIZED HEALTH CARE EDUCATION/TRAINING PROGRAM

## 2021-08-14 PROCEDURE — 770022 HCHG ROOM/CARE - ICU (200)

## 2021-08-14 PROCEDURE — 80048 BASIC METABOLIC PNL TOTAL CA: CPT

## 2021-08-14 PROCEDURE — 83735 ASSAY OF MAGNESIUM: CPT

## 2021-08-14 PROCEDURE — A9270 NON-COVERED ITEM OR SERVICE: HCPCS | Performed by: STUDENT IN AN ORGANIZED HEALTH CARE EDUCATION/TRAINING PROGRAM

## 2021-08-14 PROCEDURE — 86140 C-REACTIVE PROTEIN: CPT

## 2021-08-14 PROCEDURE — 99291 CRITICAL CARE FIRST HOUR: CPT | Performed by: INTERNAL MEDICINE

## 2021-08-14 PROCEDURE — 700105 HCHG RX REV CODE 258: Performed by: INTERNAL MEDICINE

## 2021-08-14 PROCEDURE — 85025 COMPLETE CBC W/AUTO DIFF WBC: CPT

## 2021-08-14 PROCEDURE — 82962 GLUCOSE BLOOD TEST: CPT

## 2021-08-14 PROCEDURE — 84100 ASSAY OF PHOSPHORUS: CPT

## 2021-08-14 PROCEDURE — 700102 HCHG RX REV CODE 250 W/ 637 OVERRIDE(OP): Performed by: INTERNAL MEDICINE

## 2021-08-14 PROCEDURE — A9270 NON-COVERED ITEM OR SERVICE: HCPCS | Performed by: INTERNAL MEDICINE

## 2021-08-14 PROCEDURE — 94640 AIRWAY INHALATION TREATMENT: CPT

## 2021-08-14 RX ORDER — INSULIN LISPRO 100 [IU]/ML
3-14 INJECTION, SOLUTION INTRAVENOUS; SUBCUTANEOUS
Status: DISCONTINUED | OUTPATIENT
Start: 2021-08-14 | End: 2021-08-16

## 2021-08-14 RX ORDER — DEXTROSE MONOHYDRATE 25 G/50ML
50 INJECTION, SOLUTION INTRAVENOUS
Status: DISCONTINUED | OUTPATIENT
Start: 2021-08-14 | End: 2021-08-16

## 2021-08-14 RX ORDER — SODIUM CHLORIDE, SODIUM LACTATE, POTASSIUM CHLORIDE, CALCIUM CHLORIDE 600; 310; 30; 20 MG/100ML; MG/100ML; MG/100ML; MG/100ML
500 INJECTION, SOLUTION INTRAVENOUS ONCE
Status: COMPLETED | OUTPATIENT
Start: 2021-08-14 | End: 2021-08-14

## 2021-08-14 RX ORDER — INSULIN LISPRO 100 [IU]/ML
0.2 INJECTION, SOLUTION INTRAVENOUS; SUBCUTANEOUS
Status: DISCONTINUED | OUTPATIENT
Start: 2021-08-14 | End: 2021-08-16

## 2021-08-14 RX ORDER — POTASSIUM CHLORIDE 20 MEQ/1
60 TABLET, EXTENDED RELEASE ORAL ONCE
Status: COMPLETED | OUTPATIENT
Start: 2021-08-14 | End: 2021-08-14

## 2021-08-14 RX ADMIN — INSULIN LISPRO 7 UNITS: 100 INJECTION, SOLUTION INTRAVENOUS; SUBCUTANEOUS at 17:58

## 2021-08-14 RX ADMIN — SODIUM CHLORIDE, POTASSIUM CHLORIDE, SODIUM LACTATE AND CALCIUM CHLORIDE 500 ML: 600; 310; 30; 20 INJECTION, SOLUTION INTRAVENOUS at 03:26

## 2021-08-14 RX ADMIN — INSULIN LISPRO 12 UNITS: 100 INJECTION, SOLUTION INTRAVENOUS; SUBCUTANEOUS at 11:00

## 2021-08-14 RX ADMIN — ACETAMINOPHEN 1000 MG: 500 TABLET, FILM COATED ORAL at 15:19

## 2021-08-14 RX ADMIN — ACETAMINOPHEN 1000 MG: 500 TABLET, FILM COATED ORAL at 21:08

## 2021-08-14 RX ADMIN — INSULIN LISPRO 10 UNITS: 100 INJECTION, SOLUTION INTRAVENOUS; SUBCUTANEOUS at 21:08

## 2021-08-14 RX ADMIN — INSULIN LISPRO 8 UNITS: 100 INJECTION, SOLUTION INTRAVENOUS; SUBCUTANEOUS at 08:28

## 2021-08-14 RX ADMIN — ROSUVASTATIN CALCIUM 40 MG: 20 TABLET, FILM COATED ORAL at 17:54

## 2021-08-14 RX ADMIN — INSULIN LISPRO 7 UNITS: 100 INJECTION, SOLUTION INTRAVENOUS; SUBCUTANEOUS at 08:28

## 2021-08-14 RX ADMIN — ACETAMINOPHEN 1000 MG: 500 TABLET, FILM COATED ORAL at 08:49

## 2021-08-14 RX ADMIN — CLOPIDOGREL BISULFATE 75 MG: 75 TABLET ORAL at 05:13

## 2021-08-14 RX ADMIN — EZETIMIBE 10 MG: 10 TABLET ORAL at 05:13

## 2021-08-14 RX ADMIN — POTASSIUM CHLORIDE 60 MEQ: 20 TABLET, EXTENDED RELEASE ORAL at 09:47

## 2021-08-14 RX ADMIN — ENOXAPARIN SODIUM 120 MG: 120 INJECTION SUBCUTANEOUS at 17:54

## 2021-08-14 RX ADMIN — ENOXAPARIN SODIUM 120 MG: 120 INJECTION SUBCUTANEOUS at 05:13

## 2021-08-14 RX ADMIN — DEXAMETHASONE SODIUM PHOSPHATE 6 MG: 4 INJECTION, SOLUTION INTRA-ARTICULAR; INTRALESIONAL; INTRAMUSCULAR; INTRAVENOUS; SOFT TISSUE at 05:14

## 2021-08-14 RX ADMIN — INSULIN LISPRO 7 UNITS: 100 INJECTION, SOLUTION INTRAVENOUS; SUBCUTANEOUS at 11:00

## 2021-08-14 ASSESSMENT — ENCOUNTER SYMPTOMS
SPUTUM PRODUCTION: 0
DIARRHEA: 0
SORE THROAT: 0
BRUISES/BLEEDS EASILY: 0
ABDOMINAL PAIN: 0
HALLUCINATIONS: 0
HEMOPTYSIS: 0
CLAUDICATION: 0
CHILLS: 0
SINUS PAIN: 0
SHORTNESS OF BREATH: 1
DEPRESSION: 0
HEADACHES: 0
MYALGIAS: 0
BACK PAIN: 0
WEAKNESS: 0
EYE DISCHARGE: 0
SEIZURES: 0
VOMITING: 0
DOUBLE VISION: 0
NAUSEA: 0
STRIDOR: 0
COUGH: 1
FEVER: 0

## 2021-08-14 ASSESSMENT — LIFESTYLE VARIABLES: SUBSTANCE_ABUSE: 0

## 2021-08-14 ASSESSMENT — PAIN DESCRIPTION - PAIN TYPE: TYPE: CHRONIC PAIN

## 2021-08-14 ASSESSMENT — FIBROSIS 4 INDEX: FIB4 SCORE: 0.52

## 2021-08-14 NOTE — PROGRESS NOTES
"Critical Care Progress Note    Date of admission  8/6/2021    Chief Complaint  41 y.o. male with PMH including obesity, HFrEF 20%, CAD with hx of PCI to the ostial LAD in 10/2020, RCA in 2013 and 2014, OM in 2017, likely FH, uncontrolled DM, suspected FARHANA.  He presented to the ED 8/6 with fever, chills, cough and dyspnea.  He was found to have COVID-19 pneumonia and is unvaccinated.  He tells me that his fiancée was at the \"night [place] the country[place] possible in [facility] and may have been exposed to Covid there.  She also contracted COVID-19 and has recovered.  He was admitted and placed on oxygen, dexamethasone and just completed a 5-day course of remdesivir.  Today he has had escalating FiO2 requirements up from baseline of 6 L oxygen since admission now on 60 L/100% HFNC with the addition of nonrebreather mask.  He is moderately dyspneic but not in acute distress.  CXR shows increasing bilateral infiltrates and edema.  He is drinking quite a bit of water and fluid status has been +3.9 L.  He received additional Lasix today. Taken from Dr Hirsch note.    Hospital Course  Transferred to ICU 8/13 for persistent hypoxia on max HFNC    Interval Problem Update  Reviewed last 24 hour events:  Neuro: neuro intact  HR: 60-80's  SBP: 82-90  Tmax: afebrile  GI: tolerate diet, BM 8/13  UOP: good urinal  Lines: peripheral IV  Resp: HFNC + NRB as needed didn't use bipap   Vte: Rx dose lovenox  PPI/H2:n/a  Antibx: s/p remdesivir, dexamethasone 9/10    Crp, mag, phos   Consider midodrine  Echo reviewed  Glucose coverage 24 units of sliding -> 30 with high sliding  Feels good today up sitting in chair  Trial of nocturnal bipap    Review of Systems  Review of Systems   Constitutional: Negative for chills, fever and malaise/fatigue.   HENT: Negative for hearing loss, sinus pain and sore throat.    Eyes: Negative for double vision and discharge.   Respiratory: Positive for cough and shortness of breath. Negative for hemoptysis, sputum " production and stridor.    Cardiovascular: Negative for chest pain, claudication and leg swelling.   Gastrointestinal: Negative for abdominal pain, diarrhea, nausea and vomiting.   Genitourinary: Negative for dysuria, frequency and hematuria.   Musculoskeletal: Negative for back pain, joint pain and myalgias.   Neurological: Negative for seizures, weakness and headaches.   Endo/Heme/Allergies: Does not bruise/bleed easily.   Psychiatric/Behavioral: Negative for depression, hallucinations and substance abuse.        Vital Signs for last 24 hours   Temp:  [36.1 °C (97 °F)-37.2 °C (99 °F)] 36.1 °C (97 °F)  Pulse:  [] 93  Resp:  [15-40] 28  BP: ()/(44-78) 112/76  SpO2:  [78 %-94 %] 84 %    Hemodynamic parameters for last 24 hours       Respiratory Information for the last 24 hours       Physical Exam   Physical Exam  Vitals and nursing note reviewed.   Constitutional:       General: He is not in acute distress.     Appearance: He is obese. He is not ill-appearing.      Comments: Surprising well appearing male sitting up in chair on NC   HENT:      Head: Normocephalic.      Comments: Piercing to left face     Mouth/Throat:      Mouth: Mucous membranes are moist.      Comments: [facility]   Eyes:      Pupils: Pupils are equal, round, and reactive to light.   Cardiovascular:      Rate and Rhythm: Normal rate.   Pulmonary:      Effort: No respiratory distress.      Breath sounds: No stridor. No wheezing or rhonchi.      Comments: Low work of breathing, fine crackles at bilateral bases  Abdominal:      General: There is no distension.      Palpations: There is no mass.      Tenderness: There is no abdominal tenderness. There is no guarding or rebound.      Hernia: No hernia is present.   Musculoskeletal:         General: No swelling or tenderness.      Cervical back: Normal range of motion.   Skin:     Coloration: Skin is not jaundiced or pale.      Comments: Multiple tattoos   Neurological:      General: No focal  deficit present.      Mental Status: He is alert and oriented to person, place, and time.      Cranial Nerves: No cranial nerve deficit.      Sensory: No sensory deficit.      Motor: No weakness.      Coordination: Coordination normal.   Psychiatric:         Mood and Affect: Mood normal.         Medications  Current Facility-Administered Medications   Medication Dose Route Frequency Provider Last Rate Last Admin   • insulin glargine (Semglee) injection  30 Units Subcutaneous Q EVENING Porfirio Reyes M.D.        And   • insulin lispro (AdmeLOG) injection  0.2 Units/kg/day Subcutaneous TID AC Porfirio Reyes M.D.   7 Units at 08/14/21 1100    And   • insulin lispro (AdmeLOG) injection  3-14 Units Subcutaneous 4X/DAY ACHS Porfirio Reyes M.D.   12 Units at 08/14/21 1100    And   • glucose 4 g chewable tablet 16 g  16 g Oral Q15 MIN PRN Porfirio Reyes M.D.        And   • dextrose 50% (D50W) injection 50 mL  50 mL Intravenous Q15 MIN PRN Porfirio Reyes M.D.       • dexmedetomidine (PRECEDEX) 400 mcg/100mL NS premix infusion  0.1-1.5 mcg/kg/hr Intravenous Continuous Porfirio Reyes M.D.       • MD Alert...ICU Electrolyte Replacement per Pharmacy   Other PHARMACY TO DOSE Porfirio Reyes M.D.       • enoxaparin (LOVENOX) inj 120 mg  120 mg Subcutaneous Q12HRS Brittaney Mclaughlin M.D.   120 mg at 08/14/21 0513   • senna-docusate (PERICOLACE or SENOKOT S) 8.6-50 MG per tablet 2 tablet  2 Tablet Oral BID Ayaan Chapa M.D.   2 Tablet at 08/13/21 0523    And   • polyethylene glycol/lytes (MIRALAX) PACKET 1 Packet  1 Packet Oral QDAY PRN Ayaan Chapa M.D.        And   • magnesium hydroxide (MILK OF MAGNESIA) suspension 30 mL  30 mL Oral QDAY PRN Ayaan Chapa M.D.        And   • bisacodyl (DULCOLAX) suppository 10 mg  10 mg Rectal QDAY PRN Ayaan Chapa M.D.       • Respiratory Therapy Consult   Nebulization Continuous RT Ayaan Chapa M.D.       •  acetaminophen (TYLENOL) tablet 1,000 mg  1,000 mg Oral TID Ayaan Chapa M.D.   1,000 mg at 08/14/21 0849   • cloNIDine (CATAPRES) tablet 0.1 mg  0.1 mg Oral Q6HRS PRN Ayaan Chapa M.D.       • enalaprilat (VASOTEC) injection 1.25 mg  1.25 mg Intravenous Q6HRS PRN Ayaan Chapa M.D.       • labetalol (NORMODYNE/TRANDATE) injection 10 mg  10 mg Intravenous Q4HRS PRN Ayaan Chapa M.D.       • ondansetron (ZOFRAN) syringe/vial injection 4 mg  4 mg Intravenous Q4HRS PRN Ayaan Chapa M.D.   4 mg at 08/08/21 0822   • ondansetron (ZOFRAN ODT) dispertab 4 mg  4 mg Oral Q4HRS PRN Ayaan Chapa M.D.   4 mg at 08/09/21 0804   • promethazine (PHENERGAN) tablet 12.5-25 mg  12.5-25 mg Oral Q4HRS PRN Ayaan Chapa M.D.       • promethazine (PHENERGAN) suppository 12.5-25 mg  12.5-25 mg Rectal Q4HRS PRN Ayaan Chapa M.D.       • prochlorperazine (COMPAZINE) injection 5-10 mg  5-10 mg Intravenous Q4HRS PRN Ayaan Chapa M.D.       • guaiFENesin dextromethorphan (ROBITUSSIN DM) 100-10 MG/5ML syrup 10 mL  10 mL Oral Q6HRS PRN Ayaan Chapa M.D.   10 mL at 08/10/21 1741   • clopidogrel (PLAVIX) tablet 75 mg  75 mg Oral DAILY Ayaanstephen Chapa M.D.   75 mg at 08/14/21 0513   • rosuvastatin (CRESTOR) tablet 40 mg  40 mg Oral Q EVENING Ayaanstephen Chapa, M.D.   40 mg at 08/13/21 1834   • ezetimibe (ZETIA) tablet 10 mg  10 mg Oral DAILY Ayaan Chapa M.D.   10 mg at 08/14/21 0513   • dexamethasone (DECADRON) injection 6 mg  6 mg Intravenous DAILY Ayaan Chapa M.D.   6 mg at 08/14/21 0514       Fluids    Intake/Output Summary (Last 24 hours) at 8/14/2021 1322  Last data filed at 8/14/2021 1200  Gross per 24 hour   Intake 1220 ml   Output 1525 ml   Net -305 ml       Laboratory  Recent Labs     08/12/21 2030   ESHBK28B 7.56*   SIYDPV320U 25.0*   XNLZL170I 55.3*   UYGU5GGS 90.0*    ARTHCO3 22   ARTBE 1         Recent Labs     08/12/21 0131 08/13/21 0324 08/13/21 0804 08/14/21  0330 08/14/21  0736   SODIUM 138 135  --  136  --    POTASSIUM 3.1* 3.5*  --  3.4*  --    CHLORIDE 99 100  --  98  --    CO2 25 21  --  26  --    BUN 12 13  --  20  --    CREATININE 0.54 0.60  --  0.44*  --    MAGNESIUM  --   --  2.0  --  2.1   PHOSPHORUS  --   --  2.7  --  2.6   CALCIUM 8.4* 8.3*  --  8.4*  --      Recent Labs     08/12/21 0131 08/13/21 0324 08/13/21 0804 08/14/21 0330   ALTSGPT  --   --  27  --    ASTSGOT  --   --  37  --    ALKPHOSPHAT  --   --  84  --    TBILIRUBIN  --   --  0.4  --    DBILIRUBIN  --   --  <0.2  --    GLUCOSE 134* 133*  --  168*     Recent Labs     08/12/21 0131 08/13/21 0324 08/13/21 0804 08/14/21  0330   WBC 10.4 10.7  --  7.0   NEUTSPOLYS  --  82.40*  --  73.30*   LYMPHOCYTES  --  10.90*  --  18.20*   MONOCYTES  --  2.90  --  2.40   EOSINOPHILS  --  1.20  --  3.60   BASOPHILS  --  0.40  --  0.30   ASTSGOT  --   --  37  --    ALTSGPT  --   --  27  --    ALKPHOSPHAT  --   --  84  --    TBILIRUBIN  --   --  0.4  --      Recent Labs     08/12/21 0131 08/12/21 0850 08/13/21 0324 08/13/21 0804 08/14/21  0330   RBC 4.95  --  4.80  --  4.94   HEMOGLOBIN 14.8  --  14.6  --  14.7   HEMATOCRIT 44.7  --  41.7*  --  43.8   PLATELETCT 467*  --  488*  --  550*   FERRITIN  --  2297.0*  --  2095.0*  --        Imaging  X-Ray:  I have personally reviewed the images and compared with prior images.  Echo:   Reviewed  Ultrasound:  Reviewed    Assessment/Plan  * Pneumonia due to COVID-19 virus- (present on admission)  Assessment & Plan  Be extremely mindful of fluids and target euvolemia to net negative fluid balance with early initiation of pressors  Avoid: NSAIDs, bronchoscopy unless absolute necessary for care  Saturation goal >85%, prone positioning  S/p Remdesivir prior to ICU admit  Dexamethasone 6mg x 10days  Monitor for hyperinflamatory phase multiorgan failure, cardiomyopathy,  shock, DIC and viral induced HLH   Lovenox full dose due to left brachial DVT  Strict contact, droplet/airborne, glasses protection and critical meticulous hand hygiene    Tocilizumab 8/13  Due to patient co morbidities and further discussion with patient agree with DNR/DNI   High concern of death with severity of illness monitor need to transition to comfort focus treatment      Acute deep vein thrombosis (DVT) of brachial vein of left upper extremity (HCC)  Assessment & Plan  Full dose anticoagulation with Lovenox  Left brachial DVT    Type 2 diabetes mellitus without complication, without long-term current use of insulin ([facility])  Assessment & Plan  Uncontrolled, glycohemoglobin 11.3  Continue glycemic control    Acute respiratory failure with hypoxia (HCC)- (present on admission)  Assessment & Plan  Secondary to severe Covid penumonia 19  Maintain euvolemia  HFNC + NRB   Patient DNR/DNI  Dex gtt for anxiety prn     Morbid obesity with BMI of 40.0-44.9, adult (HCC)- (present on admission)  Assessment & Plan  With suspected underlying FARHANA  Will do trial of Bipap to see if recruitable lung    Heart failure with reduced ejection fraction (HCC)- (present on admission)  Assessment & Plan  Chronic hypoxia and lung infiltrates/edema secondary likely more related to acute infection of COVID-19 pneumonia  Continue negative fluid balance as tolerated  Close monitoring of I/Os  Currently hold heart failure regime with hypotension    Repeat echo reviewed with improved EF but difficult with wall motion abnormalities    Coronary artery disease involving native coronary artery of native heart without angina pectoris- (present on admission)  Assessment & Plan  CAD with hx of PCI to the ostial LAD in 10/2020, RCA in 2013 and 2014, OM in 2017  DAPT    Tobacco use- (present on admission)  Assessment & Plan  NRT as needed    Hyperlipemia- (present on admission)  Assessment & Plan  Suspected familial hyperlipidemia  On Crestor and  Zetia  Monitor LFTs       VTE:  Lovenox  Ulcer: Not Indicated  Lines: None    I have performed a physical exam and reviewed and updated ROS and Plan today (8/14/2021). In review of yesterday's note (8/13/2021), there are no changes except as documented above.     Discussed patient condition and risk of morbidity and/or mortality with RN, RT, Pharmacy, Code status disscussed, Charge nurse / hot rounds and Patient     The patient remains critically ill with severe Covid pneumonia on HFNC + NRB.  Critical care time = 41 minutes in directly providing and coordinating critical care and extensive data review.  No time overlap and excludes procedures.

## 2021-08-14 NOTE — PROGRESS NOTES
Pulmonary and Critical Care Medicine Progress Note    Called by RN for BP of 78/47.  Reviewed I&O.  He is -1205 mL on 8/12 and -740 mL so far on 8/13.  The total I&O since admit on 8/11 documents that this gentleman was +3,990 mL.  This is NOT ACCURATE as there is only a documented output of -200 mL from 8/7 to 8/11 (5 days) which I simply do not believe is true.    Hence, I will stop his Lasix and give him a 500 mL fluid bolus of LR and observe.    Jose Edmonds MD  Pulmonary and Critical Care Medicine

## 2021-08-14 NOTE — CARE PLAN
Problem: Knowledge Deficit - Standard  Goal: Patient and family/care givers will demonstrate understanding of plan of care, disease process/condition, diagnostic tests and medications  Outcome: Progressing     Problem: Psychosocial  Goal: Patient's level of anxiety will decrease  Outcome: Progressing  Goal: Patient's ability to verbalize feelings about condition will improve  Outcome: Progressing  Goal: Patient's ability to re-evaluate and adapt role responsibilities will improve  Outcome: Progressing  Goal: Patient and family will demonstrate ability to cope with life altering diagnosis and/or procedure  Outcome: Progressing  Goal: Spiritual and cultural needs incorporated into hospitalization  Outcome: Progressing     Problem: Communication  Goal: The ability to communicate needs accurately and effectively will improve  Outcome: Progressing     Problem: Discharge Barriers/Planning  Goal: Patient's continuum of care needs are met  Outcome: Progressing     Problem: Hemodynamics  Goal: Patient's hemodynamics, fluid balance and neurologic status will be stable or improve  Outcome: Progressing     Problem: Respiratory  Goal: Patient will achieve/maintain optimum respiratory ventilation and gas exchange  Outcome: Progressing     Problem: Fluid Volume  Goal: Fluid volume balance will be maintained  Outcome: Progressing     Problem: Risk for Aspiration  Goal: Patient's risk for aspiration will be absent or decrease  Outcome: Progressing     Problem: Nutrition  Goal: Patient's nutritional and fluid intake will be adequate or improve  Outcome: Progressing  Goal: Enteral nutrition will be maintained or improve  Outcome: Progressing  Goal: Enteral nutrition will be maintained or improve  Outcome: Progressing     Problem: Urinary Elimination  Goal: Establish and maintain regular urinary output  Outcome: Progressing     Problem: Bowel Elimination  Goal: Establish and maintain regular bowel function  Outcome: Progressing      Problem: Mobility  Goal: Patient's capacity to carry out activities will improve  Outcome: Progressing     Problem: Self Care  Goal: Patient will have the ability to perform ADLs independently or with assistance (bathe, groom, dress, toilet and feed)  Outcome: Progressing     Problem: Infection - Standard  Goal: Patient will remain free from infection  Outcome: Progressing     Problem: Pain - Standard  Goal: Alleviation of pain or a reduction in pain to the patient’s comfort goal  Outcome: Progressing

## 2021-08-14 NOTE — CARE PLAN
Problem: Respiratory  Goal: Patient will achieve/maintain optimum respiratory ventilation and gas exchange  Description: Document on Assessment flowsheet     1.  Assess and monitor rate, rhythm, depth and effort of respiration   2.  Breath sounds assessed qshift and/or as needed   3.  Assess O2 saturation, administer/titrate oxygen as ordered   4.  Position patient for maximum ventilatory efficiency   5.  Turn, cough, and deep breath with splinting to improve effectiveness   6.  Collaborate with RT to administer medication/treatments per order   7.  Encourage use of incentive spirometer and encourage patient to cough after use and utilize splinting techniques if applicable   8.  Airway suctioning   9.  Monitor sputum production for changes in color, consistency and frequency  10. Perform frequent oral hygiene  11. Alternate physical activity with rest periods  Outcome: Progressing    Pt has been on HFNC 60L 100%  Previously on BiPAP for increased WOB, stable and has not been on since day time 8/13

## 2021-08-14 NOTE — CARE PLAN
The patient is Watcher - Medium risk of patient condition declining or worsening    Shift Goals: Rest, oxygenation to improve  Clinical Goals: improve oxgenation   Patient Goals: decrease anxiety   Family Goals: go home    Progress made toward(s) clinical / shift goals:       Problem: Knowledge Deficit - Standard  Goal: Patient and family/care givers will demonstrate understanding of plan of care, disease process/condition, diagnostic tests and medications  Outcome: Progressing     Problem: Psychosocial  Goal: Patient's level of anxiety will decrease  Outcome: Progressing  Goal: Patient's ability to verbalize feelings about condition will improve  Outcome: Progressing  Goal: Patient's ability to re-evaluate and adapt role responsibilities will improve  Outcome: Progressing  Goal: Patient and family will demonstrate ability to cope with life altering diagnosis and/or procedure  Outcome: Progressing  Goal: Spiritual and cultural needs incorporated into hospitalization  Outcome: Progressing     Problem: Communication  Goal: The ability to communicate needs accurately and effectively will improve  Outcome: Progressing     Problem: Discharge Barriers/Planning  Goal: Patient's continuum of care needs are met  Outcome: Progressing     Problem: Hemodynamics  Goal: Patient's hemodynamics, fluid balance and neurologic status will be stable or improve  Outcome: Progressing     Problem: Respiratory  Goal: Patient will achieve/maintain optimum respiratory ventilation and gas exchange  Outcome: Progressing     Problem: Fluid Volume  Goal: Fluid volume balance will be maintained  Outcome: Progressing     Problem: Risk for Aspiration  Goal: Patient's risk for aspiration will be absent or decrease  Outcome: Progressing     Problem: Nutrition  Goal: Patient's nutritional and fluid intake will be adequate or improve  Outcome: Progressing  Goal: Enteral nutrition will be maintained or improve  Outcome: Progressing  Goal: Enteral  nutrition will be maintained or improve  Outcome: Progressing     Problem: Urinary Elimination  Goal: Establish and maintain regular urinary output  Outcome: Progressing     Problem: Bowel Elimination  Goal: Establish and maintain regular bowel function  Outcome: Progressing     Problem: Mobility  Goal: Patient's capacity to carry out activities will improve  Outcome: Progressing     Problem: Self Care  Goal: Patient will have the ability to perform ADLs independently or with assistance (bathe, groom, dress, toilet and feed)  Outcome: Progressing     Problem: Infection - Standard  Goal: Patient will remain free from infection  Outcome: Progressing     Problem: Pain - Standard  Goal: Alleviation of pain or a reduction in pain to the patient’s comfort goal  Outcome: Progressing       Patient is not progressing towards the following goals:

## 2021-08-14 NOTE — CARE PLAN
Pt on 60l/100% with NRB    Problem: Humidified High Flow Nasal Cannula  Goal: Maintain adequate oxygenation dependent on patient condition  Description: 1.  Implement humidified high flow oxygen therapy  2.  Titrate high flow oxygen to maintain appropriate SpO2  Outcome: Progressing

## 2021-08-15 LAB
ANION GAP SERPL CALC-SCNC: 9 MMOL/L (ref 7–16)
BASOPHILS # BLD AUTO: 0.4 % (ref 0–1.8)
BASOPHILS # BLD: 0.03 K/UL (ref 0–0.12)
BUN SERPL-MCNC: 16 MG/DL (ref 8–22)
CALCIUM SERPL-MCNC: 8.1 MG/DL (ref 8.5–10.5)
CHLORIDE SERPL-SCNC: 102 MMOL/L (ref 96–112)
CO2 SERPL-SCNC: 25 MMOL/L (ref 20–33)
CREAT SERPL-MCNC: 0.42 MG/DL (ref 0.5–1.4)
EOSINOPHIL # BLD AUTO: 0.21 K/UL (ref 0–0.51)
EOSINOPHIL NFR BLD: 3 % (ref 0–6.9)
ERYTHROCYTE [DISTWIDTH] IN BLOOD BY AUTOMATED COUNT: 43.8 FL (ref 35.9–50)
GLUCOSE BLD-MCNC: 198 MG/DL (ref 65–99)
GLUCOSE BLD-MCNC: 220 MG/DL (ref 65–99)
GLUCOSE BLD-MCNC: 251 MG/DL (ref 65–99)
GLUCOSE BLD-MCNC: 326 MG/DL (ref 65–99)
GLUCOSE SERPL-MCNC: 157 MG/DL (ref 65–99)
HCT VFR BLD AUTO: 41.8 % (ref 42–52)
HGB BLD-MCNC: 14 G/DL (ref 14–18)
IMM GRANULOCYTES # BLD AUTO: 0.08 K/UL (ref 0–0.11)
IMM GRANULOCYTES NFR BLD AUTO: 1.1 % (ref 0–0.9)
LYMPHOCYTES # BLD AUTO: 1.44 K/UL (ref 1–4.8)
LYMPHOCYTES NFR BLD: 20.6 % (ref 22–41)
MCH RBC QN AUTO: 30 PG (ref 27–33)
MCHC RBC AUTO-ENTMCNC: 33.5 G/DL (ref 33.7–35.3)
MCV RBC AUTO: 89.7 FL (ref 81.4–97.8)
MONOCYTES # BLD AUTO: 0.14 K/UL (ref 0–0.85)
MONOCYTES NFR BLD AUTO: 2 % (ref 0–13.4)
NEUTROPHILS # BLD AUTO: 5.09 K/UL (ref 1.82–7.42)
NEUTROPHILS NFR BLD: 72.9 % (ref 44–72)
NRBC # BLD AUTO: 0 K/UL
NRBC BLD-RTO: 0 /100 WBC
PLATELET # BLD AUTO: 572 K/UL (ref 164–446)
PMV BLD AUTO: 9.7 FL (ref 9–12.9)
POTASSIUM SERPL-SCNC: 3.5 MMOL/L (ref 3.6–5.5)
RBC # BLD AUTO: 4.66 M/UL (ref 4.7–6.1)
SODIUM SERPL-SCNC: 136 MMOL/L (ref 135–145)
WBC # BLD AUTO: 7 K/UL (ref 4.8–10.8)

## 2021-08-15 PROCEDURE — 80048 BASIC METABOLIC PNL TOTAL CA: CPT

## 2021-08-15 PROCEDURE — 94640 AIRWAY INHALATION TREATMENT: CPT

## 2021-08-15 PROCEDURE — A9270 NON-COVERED ITEM OR SERVICE: HCPCS | Performed by: INTERNAL MEDICINE

## 2021-08-15 PROCEDURE — 85025 COMPLETE CBC W/AUTO DIFF WBC: CPT

## 2021-08-15 PROCEDURE — 770022 HCHG ROOM/CARE - ICU (200)

## 2021-08-15 PROCEDURE — 700102 HCHG RX REV CODE 250 W/ 637 OVERRIDE(OP): Performed by: INTERNAL MEDICINE

## 2021-08-15 PROCEDURE — 82962 GLUCOSE BLOOD TEST: CPT | Mod: 91

## 2021-08-15 PROCEDURE — 700111 HCHG RX REV CODE 636 W/ 250 OVERRIDE (IP): Performed by: STUDENT IN AN ORGANIZED HEALTH CARE EDUCATION/TRAINING PROGRAM

## 2021-08-15 PROCEDURE — 700102 HCHG RX REV CODE 250 W/ 637 OVERRIDE(OP): Performed by: STUDENT IN AN ORGANIZED HEALTH CARE EDUCATION/TRAINING PROGRAM

## 2021-08-15 PROCEDURE — 99291 CRITICAL CARE FIRST HOUR: CPT | Performed by: INTERNAL MEDICINE

## 2021-08-15 PROCEDURE — A9270 NON-COVERED ITEM OR SERVICE: HCPCS | Performed by: STUDENT IN AN ORGANIZED HEALTH CARE EDUCATION/TRAINING PROGRAM

## 2021-08-15 PROCEDURE — 94660 CPAP INITIATION&MGMT: CPT

## 2021-08-15 RX ORDER — POTASSIUM CHLORIDE 20 MEQ/1
60 TABLET, EXTENDED RELEASE ORAL ONCE
Status: COMPLETED | OUTPATIENT
Start: 2021-08-15 | End: 2021-08-15

## 2021-08-15 RX ADMIN — INSULIN LISPRO 10 UNITS: 100 INJECTION, SOLUTION INTRAVENOUS; SUBCUTANEOUS at 11:00

## 2021-08-15 RX ADMIN — DEXAMETHASONE SODIUM PHOSPHATE 6 MG: 4 INJECTION, SOLUTION INTRA-ARTICULAR; INTRALESIONAL; INTRAMUSCULAR; INTRAVENOUS; SOFT TISSUE at 05:06

## 2021-08-15 RX ADMIN — INSULIN LISPRO 7 UNITS: 100 INJECTION, SOLUTION INTRAVENOUS; SUBCUTANEOUS at 20:59

## 2021-08-15 RX ADMIN — ACETAMINOPHEN 1000 MG: 500 TABLET, FILM COATED ORAL at 15:00

## 2021-08-15 RX ADMIN — CLOPIDOGREL BISULFATE 75 MG: 75 TABLET ORAL at 05:06

## 2021-08-15 RX ADMIN — GUAIFENESIN SYRUP AND DEXTROMETHORPHAN 10 ML: 100; 10 SYRUP ORAL at 20:51

## 2021-08-15 RX ADMIN — INSULIN LISPRO 7 UNITS: 100 INJECTION, SOLUTION INTRAVENOUS; SUBCUTANEOUS at 17:00

## 2021-08-15 RX ADMIN — ENOXAPARIN SODIUM 120 MG: 120 INJECTION SUBCUTANEOUS at 05:06

## 2021-08-15 RX ADMIN — GUAIFENESIN SYRUP AND DEXTROMETHORPHAN 10 ML: 100; 10 SYRUP ORAL at 05:19

## 2021-08-15 RX ADMIN — INSULIN LISPRO 3 UNITS: 100 INJECTION, SOLUTION INTRAVENOUS; SUBCUTANEOUS at 08:39

## 2021-08-15 RX ADMIN — ENOXAPARIN SODIUM 120 MG: 120 INJECTION SUBCUTANEOUS at 17:08

## 2021-08-15 RX ADMIN — INSULIN LISPRO 7 UNITS: 100 INJECTION, SOLUTION INTRAVENOUS; SUBCUTANEOUS at 12:00

## 2021-08-15 RX ADMIN — INSULIN LISPRO 7 UNITS: 100 INJECTION, SOLUTION INTRAVENOUS; SUBCUTANEOUS at 08:39

## 2021-08-15 RX ADMIN — INSULIN LISPRO 4 UNITS: 100 INJECTION, SOLUTION INTRAVENOUS; SUBCUTANEOUS at 17:00

## 2021-08-15 RX ADMIN — ACETAMINOPHEN 1000 MG: 500 TABLET, FILM COATED ORAL at 20:51

## 2021-08-15 RX ADMIN — ROSUVASTATIN CALCIUM 40 MG: 20 TABLET, FILM COATED ORAL at 17:08

## 2021-08-15 RX ADMIN — POTASSIUM CHLORIDE 60 MEQ: 20 TABLET, EXTENDED RELEASE ORAL at 08:45

## 2021-08-15 RX ADMIN — ACETAMINOPHEN 1000 MG: 500 TABLET, FILM COATED ORAL at 08:42

## 2021-08-15 RX ADMIN — EZETIMIBE 10 MG: 10 TABLET ORAL at 05:06

## 2021-08-15 ASSESSMENT — ENCOUNTER SYMPTOMS
EYE DISCHARGE: 0
HALLUCINATIONS: 0
SORE THROAT: 0
DEPRESSION: 0
SPUTUM PRODUCTION: 0
STRIDOR: 0
SHORTNESS OF BREATH: 0
NAUSEA: 0
CLAUDICATION: 0
DIARRHEA: 0
SINUS PAIN: 0
WEAKNESS: 0
HEADACHES: 0
CHILLS: 0
SEIZURES: 0
BRUISES/BLEEDS EASILY: 0
HEMOPTYSIS: 0
ABDOMINAL PAIN: 0
COUGH: 1
FEVER: 0
DOUBLE VISION: 0
VOMITING: 0
BACK PAIN: 0
MYALGIAS: 0

## 2021-08-15 ASSESSMENT — PULMONARY FUNCTION TESTS
EPAP_CMH2O: 8
EPAP_CMH2O: 8

## 2021-08-15 ASSESSMENT — LIFESTYLE VARIABLES: SUBSTANCE_ABUSE: 0

## 2021-08-15 ASSESSMENT — FIBROSIS 4 INDEX: FIB4 SCORE: 0.52

## 2021-08-15 NOTE — CARE PLAN
Patient doing well today. SATS in the mid 80's without the NRB over HFNC. Minimal complaints of SOB with ambulation. He continues to self prone and use the IS effectively. Still unable to titrate down the HFNC. No complaints of pain today. Patient states he feels more energetic today.

## 2021-08-15 NOTE — PROGRESS NOTES
Palliative care with discussion with patient patient desires to be full code. I will change his code status to reflex. Will need to discuss further with patient and explain reasoning and educate with advance Covid disease.    Porfirio Reyes MD  Critical Care Medicine

## 2021-08-15 NOTE — PROGRESS NOTES
"Critical Care Progress Note    Date of admission  8/6/2021    Chief Complaint  41 y.o. male with PMH including obesity, HFrEF 20%, CAD with hx of PCI to the ostial LAD in 10/2020, RCA in 2013 and 2014, OM in 2017, likely FH, uncontrolled DM, suspected FARHANA.  He presented to the ED 8/6 with fever, chills, cough and dyspnea.  He was found to have COVID-19 pneumonia and is unvaccinated.  He tells me that his fiancée was at the \"night in the country\" possible in [facility] and may have been exposed to Covid there.  She also contracted COVID-19 and has recovered.  He was admitted and placed on oxygen, dexamethasone and just completed a 5-day course of remdesivir.  Today he has had escalating FiO2 requirements up from baseline of 6 L oxygen since admission now on 60 L/100% HFNC with the addition of nonrebreather mask.  He is moderately dyspneic but not in acute distress.  CXR shows increasing bilateral infiltrates and edema.  He is drinking quite a bit of water and fluid status has been +3.9 L.  He received additional Lasix today. Taken from Dr Hirsch note.    Hospital Course  Transferred to ICU 8/13 for persistent hypoxia on max HFNC    Interval Problem Update  Reviewed last 24 hour events:  Neuro: AOx4 moves all  HR: 60-70's  SBP:   Tmax: afebrile  GI: regular diet  UOP: adequate  Lines: peripheral IV  Resp: HFNC +/- NRB night Bipap 12/8   Vte: rx dose lovenox  PPI/H2:n/a  Antibx: dexamethasone 10/10 s/p toci and remdesivir    Tolerated Bipap 12/8 for his FARHANA at night  lantus 15 units today only with steroids falling off today then sliding scale  Feels well with cough more post Bipap Last night will check procal and CXR tomorrow    Review of Systems  Review of Systems   Constitutional: Negative for chills, fever and malaise/fatigue.   HENT: Negative for hearing loss, sinus pain and sore throat.    Eyes: Negative for double vision and discharge.   Respiratory: Positive for cough. Negative for hemoptysis, sputum " production, shortness of breath and stridor.    Cardiovascular: Negative for chest pain, claudication and leg swelling.   Gastrointestinal: Negative for abdominal pain, diarrhea, nausea and vomiting.   Genitourinary: Negative for dysuria, frequency and hematuria.   Musculoskeletal: Negative for back pain, joint pain and myalgias.   Neurological: Negative for seizures, weakness and headaches.   Endo/Heme/Allergies: Does not bruise/bleed easily.   Psychiatric/Behavioral: Negative for depression, hallucinations and substance abuse.        Vital Signs for last 24 hours   Temp:  [36 °C (96.8 °F)-36.7 °C (98.1 °F)] 36.1 °C (96.9 °F)  Pulse:  [57-90] 85  Resp:  [17-39] 17  BP: ()/(39-68) 96/68  SpO2:  [81 %-94 %] 85 %    Hemodynamic parameters for last 24 hours       Respiratory Information for the last 24 hours       Physical Exam   Physical Exam  Vitals and nursing note reviewed.   Constitutional:       General: He is not in acute distress.     Appearance: He is obese. He is not ill-appearing.      Comments: Surprising well male on HFNC + NRB waving through door sitting up in room   HENT:      Head: Normocephalic.      Comments: Piercing to left face     Mouth/Throat:      Mouth: Mucous membranes are moist.      Comments: HFNC + NRB  Eyes:      Pupils: Pupils are equal, round, and reactive to light.   Cardiovascular:      Rate and Rhythm: Normal rate.   Pulmonary:      Effort: No respiratory distress.      Breath sounds: No stridor. No wheezing or rhonchi.      Comments: Low work of breathing, fine crackles  Abdominal:      General: There is no distension.      Palpations: There is no mass.      Tenderness: There is no abdominal tenderness. There is no guarding or rebound.      Hernia: No hernia is present.   Musculoskeletal:         General: No swelling or tenderness.      Cervical back: Normal range of motion.   Skin:     Coloration: Skin is not jaundiced or pale.      Comments: Multiple tattoos   Neurological:       General: No focal deficit present.      Mental Status: He is alert and oriented to person, place, and time.      Cranial Nerves: No cranial nerve deficit.      Sensory: No sensory deficit.      Motor: No weakness.      Coordination: Coordination normal.   Psychiatric:         Mood and Affect: Mood normal.         Medications  Current Facility-Administered Medications   Medication Dose Route Frequency Provider Last Rate Last Admin   • insulin glargine (Semglee) injection  30 Units Subcutaneous Q EVENING Porfirio Reyes M.D.   30 Units at 08/14/21 2109    And   • insulin lispro (AdmeLOG) injection  0.2 Units/kg/day Subcutaneous TID AC Porfirio Reyes M.D.   7 Units at 08/15/21 1200    And   • insulin lispro (AdmeLOG) injection  3-14 Units Subcutaneous 4X/DAY ACHS Porfirio Reyes M.D.   10 Units at 08/15/21 1100    And   • glucose 4 g chewable tablet 16 g  16 g Oral Q15 MIN PRN Porfirio Reyes M.D.        And   • dextrose 50% (D50W) injection 50 mL  50 mL Intravenous Q15 MIN PRN Porfirio Reyes M.D.       • dexmedetomidine (PRECEDEX) 400 mcg/100mL NS premix infusion  0.1-1.5 mcg/kg/hr Intravenous Continuous Porfirio Reyes M.D.       • MD Alert...ICU Electrolyte Replacement per Pharmacy   Other PHARMACY TO DOSE Porfirio Reyes M.D.       • enoxaparin (LOVENOX) inj 120 mg  120 mg Subcutaneous Q12HRS Brittaney Mclaughlin M.D.   120 mg at 08/15/21 0506   • senna-docusate (PERICOLACE or SENOKOT S) 8.6-50 MG per tablet 2 tablet  2 Tablet Oral BID Ayaan Chapa M.D.   2 Tablet at 08/13/21 0523    And   • polyethylene glycol/lytes (MIRALAX) PACKET 1 Packet  1 Packet Oral QDAY PRN Ayaan Chapa M.D.        And   • magnesium hydroxide (MILK OF MAGNESIA) suspension 30 mL  30 mL Oral QDAY PRN Ayaan Chapa M.D.        And   • bisacodyl (DULCOLAX) suppository 10 mg  10 mg Rectal QDAY PRN Ayaan Chapa M.D.       • Respiratory Therapy Consult   Nebulization  Continuous RT Ayaan Chapa M.D.       • acetaminophen (TYLENOL) tablet 1,000 mg  1,000 mg Oral TID Ayaan Chapa M.D.   1,000 mg at 08/15/21 0842   • cloNIDine (CATAPRES) tablet 0.1 mg  0.1 mg Oral Q6HRS PRN Ayaan Chapa M.D.       • enalaprilat (VASOTEC) injection 1.25 mg  1.25 mg Intravenous Q6HRS PRN Ayaan Chapa M.D.       • labetalol (NORMODYNE/TRANDATE) injection 10 mg  10 mg Intravenous Q4HRS PRN Ayaan Chapa M.D.       • ondansetron (ZOFRAN) syringe/vial injection 4 mg  4 mg Intravenous Q4HRS PRN Ayaan Chapa M.D.   4 mg at 08/08/21 0822   • ondansetron (ZOFRAN ODT) dispertab 4 mg  4 mg Oral Q4HRS PRN Ayaan Chapa M.D.   4 mg at 08/09/21 0804   • promethazine (PHENERGAN) tablet 12.5-25 mg  12.5-25 mg Oral Q4HRS PRN Ayaan Chapa M.D.       • promethazine (PHENERGAN) suppository 12.5-25 mg  12.5-25 mg Rectal Q4HRS PRN Ayaan Chapa M.D.       • prochlorperazine (COMPAZINE) injection 5-10 mg  5-10 mg Intravenous Q4HRS PRN Ayaan Chapa M.D.       • guaiFENesin dextromethorphan (ROBITUSSIN DM) 100-10 MG/5ML syrup 10 mL  10 mL Oral Q6HRS PRN Ayaan Chapa M.D.   10 mL at 08/15/21 0519   • clopidogrel (PLAVIX) tablet 75 mg  75 mg Oral DAILY Ayaanloreto Chapa M.D.   75 mg at 08/15/21 0506   • rosuvastatin (CRESTOR) tablet 40 mg  40 mg Oral Q EVENING Ayaanloreto Chapa M.D.   40 mg at 08/14/21 1754   • ezetimibe (ZETIA) tablet 10 mg  10 mg Oral DAILY Ayaan Chapa M.D.   10 mg at 08/15/21 0506       Fluids    Intake/Output Summary (Last 24 hours) at 8/15/2021 1423  Last data filed at 8/15/2021 1000  Gross per 24 hour   Intake 900 ml   Output 1150 ml   Net -250 ml       Laboratory  Recent Labs     08/12/21 2030   [id number] 7.56*   [id number] 25.0*   [id number] 55.3*   [id number] 90.0*   ARTHCO3 22   ARTBE 1         Recent Labs     08/13/21  0324  08/13/21  0804 08/14/21  0330 08/14/21  0736 08/15/21  0348   SODIUM 135  --  136  --  136   POTASSIUM 3.5*  --  3.4*  --  3.5*   CHLORIDE 100  --  98  --  102   CO2 21  --  26  --  25   BUN 13  --  20  --  16   CREATININE 0.60  --  0.44*  --  0.42*   MAGNESIUM  --  2.0  --  2.1  --    PHOSPHORUS  --  2.7  --  2.6  --    CALCIUM 8.3*  --  8.4*  --  8.1*     Recent Labs     08/13/21  0324 08/13/21  0804 08/14/21  0330 08/15/21  0348   ALTSGPT  --  27  --   --    ASTSGOT  --  37  --   --    ALKPHOSPHAT  --  84  --   --    TBILIRUBIN  --  0.4  --   --    DBILIRUBIN  --  <0.2  --   --    GLUCOSE 133*  --  168* 157*     Recent Labs     08/13/21  0324 08/13/21  0804 08/14/21  0330 08/15/21  0348   WBC 10.7  --  7.0 7.0   NEUTSPOLYS 82.40*  --  73.30* 72.90*   LYMPHOCYTES 10.90*  --  18.20* 20.60*   MONOCYTES 2.90  --  2.40 2.00   EOSINOPHILS 1.20  --  3.60 3.00   BASOPHILS 0.40  --  0.30 0.40   ASTSGOT  --  37  --   --    ALTSGPT  --  27  --   --    ALKPHOSPHAT  --  84  --   --    TBILIRUBIN  --  0.4  --   --      Recent Labs     08/13/21  0324 08/13/21  0804 08/14/21  0330 08/15/21  0348   RBC 4.80  --  4.94 4.66*   HEMOGLOBIN 14.6  --  14.7 14.0   HEMATOCRIT 41.7*  --  43.8 41.8*   PLATELETCT 488*  --  550* 572*   FERRITIN  --  2095.0*  --   --        Imaging  X-Ray:  I have personally reviewed the images and compared with prior images.  Echo:   Reviewed  Ultrasound:  Reviewed    Assessment/Plan  * Pneumonia due to COVID-19 virus- (present on admission)  Assessment & Plan  Be extremely mindful of fluids and target euvolemia to net negative fluid balance with early initiation of pressors  Avoid: NSAIDs, bronchoscopy unless absolute necessary for care  Saturation goal >85%, prone positioning  S/p Remdesivir prior to ICU admit  Dexamethasone 6mg x 10days  Monitor for hyperinflamatory phase multiorgan failure, cardiomyopathy, shock, DIC and viral induced HLH   Lovenox full dose due to left brachial DVT  Strict contact,  droplet/airborne, glasses protection and critical meticulous hand hygiene    Tocilizumab 8/13  Due to patient co morbidities and further discussion with patient agree with DNR/DNI   Bipap at night for FARHANA Rx and lung recruitment  Follow up CXR and procal 8/15    Acute deep vein thrombosis (DVT) of brachial vein of left upper extremity (HCC)  Assessment & Plan  Full dose anticoagulation with Lovenox  Left brachial DVT    Type 2 diabetes mellitus without complication, without long-term current use of insulin ([facility])  Assessment & Plan  Uncontrolled, glycohemoglobin 11.3  Continue glycemic control    Acute respiratory failure with hypoxia (HCC)- (present on admission)  Assessment & Plan  Secondary to severe Covid penumonia 19  Maintain euvolemia  HFNC + NRB + Bipap at night for recruitment and FARHANA rx  Patient DNR/DNI  Continue to mobilize and IS therapy    Morbid obesity with BMI of 40.0-44.9, adult ([facility])- (present on admission)  Assessment & Plan  With suspected underlying FARHANA  Will do trial of Bipap to see if recruitable lung    Heart failure with reduced ejection fraction (HCC)- (present on admission)  Assessment & Plan  Chronic hypoxia and lung infiltrates/edema secondary likely more related to acute infection of COVID-19 pneumonia  Continue negative fluid balance as tolerated  Close monitoring of I/Os  Currently hold heart failure regime with hypotension    Repeat echo reviewed with improved EF but difficult with wall motion abnormalities    Coronary artery disease involving native coronary artery of native heart without angina pectoris- (present on admission)  Assessment & Plan  CAD with hx of PCI to the ostial LAD in 10/2020, RCA in 2013 and 2014, OM in 2017  DAPT    Tobacco use- (present on admission)  Assessment & Plan  NRT as needed    Hyperlipemia- (present on admission)  Assessment & Plan  Suspected familial hyperlipidemia  On Crestor and Zetia  Monitor LFTs       VTE:  Lovenox  Ulcer: Not Indicated  Lines:  None    I have performed a physical exam and reviewed and updated ROS and Plan today (8/15/2021). In review of yesterday's note (8/14/2021), there are no changes except as documented above.     Discussed patient condition and risk of morbidity and/or mortality with RN, RT, Pharmacy, Charge nurse / hot rounds and Patient     The patient remains critically ill with severe Covid pneumonia on HFNC Bipap with active titration.  Critical care time = 37 minutes in directly providing and coordinating critical care and extensive data review.  No time overlap and excludes procedures.

## 2021-08-15 NOTE — CARE PLAN
The patient is Watcher - Medium risk of patient condition declining or worsening    Shift Goals: Rest and wear bipap   Clinical Goals: improve oxgenation   Patient Goals: decrease anxiety   Family Goals: go home    Progress made toward(s) clinical / shift goals:        Problem: Knowledge Deficit - Standard  Goal: Patient and family/care givers will demonstrate understanding of plan of care, disease process/condition, diagnostic tests and medications  Outcome: Progressing     Problem: Psychosocial  Goal: Patient's level of anxiety will decrease  Outcome: Progressing  Goal: Patient's ability to verbalize feelings about condition will improve  Outcome: Progressing  Goal: Patient's ability to re-evaluate and adapt role responsibilities will improve  Outcome: Progressing  Goal: Patient and family will demonstrate ability to cope with life altering diagnosis and/or procedure  Outcome: Progressing     Problem: Communication  Goal: The ability to communicate needs accurately and effectively will improve  Outcome: Progressing     Problem: Discharge Barriers/Planning  Goal: Patient's continuum of care needs are met  Outcome: Progressing     Problem: Hemodynamics  Goal: Patient's hemodynamics, fluid balance and neurologic status will be stable or improve  Outcome: Progressing     Problem: Respiratory  Goal: Patient will achieve/maintain optimum respiratory ventilation and gas exchange  Outcome: Progressing     Problem: Fluid Volume  Goal: Fluid volume balance will be maintained  Outcome: Progressing     Problem: Risk for Aspiration  Goal: Patient's risk for aspiration will be absent or decrease  Outcome: Progressing     Problem: Nutrition  Goal: Patient's nutritional and fluid intake will be adequate or improve  Outcome: Progressing  Goal: Enteral nutrition will be maintained or improve  Outcome: Progressing  Goal: Enteral nutrition will be maintained or improve  Outcome: Progressing     Problem: Urinary Elimination  Goal:  Establish and maintain regular urinary output  Outcome: Progressing     Problem: Bowel Elimination  Goal: Establish and maintain regular bowel function  Outcome: Progressing     Problem: Mobility  Goal: Patient's capacity to carry out activities will improve  Outcome: Progressing     Problem: Self Care  Goal: Patient will have the ability to perform ADLs independently or with assistance (bathe, groom, dress, toilet and feed)  Outcome: Progressing     Problem: Infection - Standard  Goal: Patient will remain free from infection  Outcome: Progressing     Problem: Pain - Standard  Goal: Alleviation of pain or a reduction in pain to the patient’s comfort goal  Outcome: Progressing       Patient is not progressing towards the following goals:

## 2021-08-15 NOTE — CONSULTS
Reason for PC Consult: Advance Care Planning    Consulted by: Dr. Reyes    Assessment:  General: Pt is a 40 yo male admitted with Covid PNA. Admitted 8/6 with fever chills and cough as well as dyspnea. He tested pos for Covid and has not been vaccinated. Pt has been on the treatment dexamethasone and remdesivir. His baseline for oxygen was at 6 L and now on 60 L/100% HFNC with addition of nonrebreathing  mask. Pt is alert and talkative with out note to be in any distress during our conversation. Bipap at night, dry cough but is bringing up sputum. PMX including obesity, HFrEF 20%, CAD with hx of PCI to the ostial LAD in 10/2020, RCA in 2013 and 2014, OM in 2017 and DM uncontrolled.     Social: Pt has 6 children 21 to 5 yrs of age. His father is still alive but his mother passes of heart and kidney complications when he was 15 yo. His father is Ham Gonzalez. His youngest son's mother is Sheree Davies and has named her has his DPOA. His oldest daughter is 21 and has 3 children her self. Her name is Nidhi Gonzalez [phone]    Consults: Pulmonary and Infectious Dieseases    Dyspnea: Yes- HF NC  Last BM: 08/15/21-    Pain: No-    Depression: No-    Dementia: Unable to Determine;       Spiritual:  Is Hinduism or spirituality important for coping with this illness? Yes-    Has a  or spiritual provider visit been requested? No    Palliative Performance Scale: 40%    Advance Directive: Advance Directive-    DPOA: No-    POLST: No-      Code Status: Full-      Outcome:  Met with pt at bedside and introduced self and role of Palliative care. Explained role of Palliative care is to help with help with discussions about the current medical situation and goals moving forward. Pt has good insight as to why he is in the hospital and a good understanding of his medical situation. Pt explained that his mother had passes when he was young from DM and kidney and heart failure. He states his health issues are  "hereditary. Kyle discussed that he has not been vaccinated and all the mix messages he has on the covid vacine from the news and his personal friends. He stated, \"It is hard to know what is true.\"  He expressed that he has tried to be careful around the same group of friends but thinks that his girlfriend got it from going to the night in the country event and brought it home. He had a recent heart surgery in 2020 and he was not wanting to get vaccinated too close to his surgery thinking it would have negative effects.     PC RN explored pt's values, beliefs and preferences in order to identify his GOC. He was open to going over the Advance directive. Stated that, \"I think it is important.\" He listed his girlfriend that is the mother of his youngest as his POA Sheree Davies and choose #2, #3 and #5 after a long conversation about each. He was confused about his AD vs his code status. PC RN helped clarify. He does recall the conversation with Dr Reyes about DNR and DNI. Kyle understands that the process his aggressive and that during CPR they would most likely break ribs. He understands that due to his cardiac condition as well with his covid disease if his heart were to stop he would not have a good outcome and that CPR may not be beneficial and could be futile. He verbalized his understanding, but conveyed that he would still like the opportunity to try and if he ended up on life support and if he is not improving he would not want to be kept going on machines. He expressed he definitely does not want a tracheostomy if it ever came down to that. He stated when he was agreeing to the Doctors conversation he thought it was a conversation but did not understand that the doctor would change his code status. PC RN stated that she would express his wishes with Dr. Reyes.     Pt is hopefull and feels like he is improving and has good appetite and is using his IS. He shares that he wants to have some more good " years to watch his son grow, he feels that this has been a wake up call. PC RN and pt talked about lifestyle changes in diet and activity and he agreed that there are several things that he needs to do once he gets out of the hosptial to better take care of his health.     Acitive listening, reflection, reminiscing, validation and normalization as well as empathic support provided with PC RN contact information. He has been in contact with his family through messenger on FB.     Recommendations: I do not recommend an ethics or hospice consult at this time because continue with treatment for Covid infection .    Updated: Dr. Reyes and RN    Plan: Continue to help support pt during his stay with C conversation.     Thank you for allowing Palliative Care to participate in this patient's care. Please feel free to call [phone] with any questions or concerns.

## 2021-08-15 NOTE — CARE PLAN
Respiratory Update    Treatment modality: HHFNC 60LPM / 100%  Frequency:Q4     Pt also on and off BIPAP as tolerated. 14/8 100%    Pt tolerating current treatments well with no adverse reactions.

## 2021-08-16 ENCOUNTER — APPOINTMENT (OUTPATIENT)
Dept: RADIOLOGY | Facility: MEDICAL CENTER | Age: 41
DRG: 871 | End: 2021-08-16
Attending: INTERNAL MEDICINE
Payer: MEDICAID

## 2021-08-16 LAB
ANION GAP SERPL CALC-SCNC: 11 MMOL/L (ref 7–16)
BASOPHILS # BLD AUTO: 0.7 % (ref 0–1.8)
BASOPHILS # BLD: 0.05 K/UL (ref 0–0.12)
BUN SERPL-MCNC: 15 MG/DL (ref 8–22)
CALCIUM SERPL-MCNC: 8.3 MG/DL (ref 8.5–10.5)
CHLORIDE SERPL-SCNC: 105 MMOL/L (ref 96–112)
CO2 SERPL-SCNC: 24 MMOL/L (ref 20–33)
CREAT SERPL-MCNC: 0.45 MG/DL (ref 0.5–1.4)
EOSINOPHIL # BLD AUTO: 0.2 K/UL (ref 0–0.51)
EOSINOPHIL NFR BLD: 2.9 % (ref 0–6.9)
ERYTHROCYTE [DISTWIDTH] IN BLOOD BY AUTOMATED COUNT: 43.7 FL (ref 35.9–50)
GAMMA INTERFERON BACKGROUND BLD IA-ACNC: 0.1 IU/ML
GLUCOSE BLD-MCNC: 135 MG/DL (ref 65–99)
GLUCOSE BLD-MCNC: 229 MG/DL (ref 65–99)
GLUCOSE BLD-MCNC: 237 MG/DL (ref 65–99)
GLUCOSE BLD-MCNC: 265 MG/DL (ref 65–99)
GLUCOSE SERPL-MCNC: 104 MG/DL (ref 65–99)
HCT VFR BLD AUTO: 42.2 % (ref 42–52)
HGB BLD-MCNC: 14 G/DL (ref 14–18)
IMM GRANULOCYTES # BLD AUTO: 0.1 K/UL (ref 0–0.11)
IMM GRANULOCYTES NFR BLD AUTO: 1.5 % (ref 0–0.9)
LYMPHOCYTES # BLD AUTO: 1.9 K/UL (ref 1–4.8)
LYMPHOCYTES NFR BLD: 27.7 % (ref 22–41)
M TB IFN-G BLD-IMP: NEGATIVE
M TB IFN-G CD4+ BCKGRND COR BLD-ACNC: -0.02 IU/ML
MCH RBC QN AUTO: 29.9 PG (ref 27–33)
MCHC RBC AUTO-ENTMCNC: 33.2 G/DL (ref 33.7–35.3)
MCV RBC AUTO: 90 FL (ref 81.4–97.8)
MITOGEN IGNF BCKGRD COR BLD-ACNC: 3.29 IU/ML
MONOCYTES # BLD AUTO: 0.21 K/UL (ref 0–0.85)
MONOCYTES NFR BLD AUTO: 3.1 % (ref 0–13.4)
NEUTROPHILS # BLD AUTO: 4.39 K/UL (ref 1.82–7.42)
NEUTROPHILS NFR BLD: 64.1 % (ref 44–72)
NRBC # BLD AUTO: 0 K/UL
NRBC BLD-RTO: 0 /100 WBC
PLATELET # BLD AUTO: 586 K/UL (ref 164–446)
PMV BLD AUTO: 9.7 FL (ref 9–12.9)
POTASSIUM SERPL-SCNC: 3.5 MMOL/L (ref 3.6–5.5)
PROCALCITONIN SERPL-MCNC: 0.17 NG/ML
QFT TB2 - NIL TBQ2: 0 IU/ML
RBC # BLD AUTO: 4.69 M/UL (ref 4.7–6.1)
SODIUM SERPL-SCNC: 140 MMOL/L (ref 135–145)
WBC # BLD AUTO: 6.9 K/UL (ref 4.8–10.8)

## 2021-08-16 PROCEDURE — A9270 NON-COVERED ITEM OR SERVICE: HCPCS | Performed by: STUDENT IN AN ORGANIZED HEALTH CARE EDUCATION/TRAINING PROGRAM

## 2021-08-16 PROCEDURE — 700102 HCHG RX REV CODE 250 W/ 637 OVERRIDE(OP): Performed by: STUDENT IN AN ORGANIZED HEALTH CARE EDUCATION/TRAINING PROGRAM

## 2021-08-16 PROCEDURE — A9270 NON-COVERED ITEM OR SERVICE: HCPCS | Performed by: INTERNAL MEDICINE

## 2021-08-16 PROCEDURE — 36556 INSERT NON-TUNNEL CV CATH: CPT

## 2021-08-16 PROCEDURE — 84145 PROCALCITONIN (PCT): CPT

## 2021-08-16 PROCEDURE — 700101 HCHG RX REV CODE 250: Performed by: INTERNAL MEDICINE

## 2021-08-16 PROCEDURE — 700111 HCHG RX REV CODE 636 W/ 250 OVERRIDE (IP): Performed by: INTERNAL MEDICINE

## 2021-08-16 PROCEDURE — C1751 CATH, INF, PER/CENT/MIDLINE: HCPCS

## 2021-08-16 PROCEDURE — 770022 HCHG ROOM/CARE - ICU (200)

## 2021-08-16 PROCEDURE — 71045 X-RAY EXAM CHEST 1 VIEW: CPT

## 2021-08-16 PROCEDURE — 82962 GLUCOSE BLOOD TEST: CPT

## 2021-08-16 PROCEDURE — 85025 COMPLETE CBC W/AUTO DIFF WBC: CPT

## 2021-08-16 PROCEDURE — 80048 BASIC METABOLIC PNL TOTAL CA: CPT

## 2021-08-16 PROCEDURE — 99291 CRITICAL CARE FIRST HOUR: CPT | Performed by: INTERNAL MEDICINE

## 2021-08-16 PROCEDURE — 94640 AIRWAY INHALATION TREATMENT: CPT

## 2021-08-16 PROCEDURE — 700105 HCHG RX REV CODE 258: Performed by: INTERNAL MEDICINE

## 2021-08-16 PROCEDURE — 94660 CPAP INITIATION&MGMT: CPT

## 2021-08-16 PROCEDURE — 700111 HCHG RX REV CODE 636 W/ 250 OVERRIDE (IP): Performed by: STUDENT IN AN ORGANIZED HEALTH CARE EDUCATION/TRAINING PROGRAM

## 2021-08-16 PROCEDURE — 700102 HCHG RX REV CODE 250 W/ 637 OVERRIDE(OP): Performed by: INTERNAL MEDICINE

## 2021-08-16 RX ORDER — POTASSIUM CHLORIDE 20 MEQ/1
60 TABLET, EXTENDED RELEASE ORAL ONCE
Status: COMPLETED | OUTPATIENT
Start: 2021-08-16 | End: 2021-08-16

## 2021-08-16 RX ORDER — INSULIN LISPRO 100 [IU]/ML
3-14 INJECTION, SOLUTION INTRAVENOUS; SUBCUTANEOUS
Status: DISCONTINUED | OUTPATIENT
Start: 2021-08-16 | End: 2021-08-18

## 2021-08-16 RX ORDER — SODIUM CHLORIDE, SODIUM LACTATE, POTASSIUM CHLORIDE, AND CALCIUM CHLORIDE .6; .31; .03; .02 G/100ML; G/100ML; G/100ML; G/100ML
500 INJECTION, SOLUTION INTRAVENOUS ONCE
Status: COMPLETED | OUTPATIENT
Start: 2021-08-16 | End: 2021-08-16

## 2021-08-16 RX ORDER — DEXTROSE MONOHYDRATE 25 G/50ML
50 INJECTION, SOLUTION INTRAVENOUS
Status: DISCONTINUED | OUTPATIENT
Start: 2021-08-16 | End: 2021-08-18

## 2021-08-16 RX ORDER — INSULIN LISPRO 100 [IU]/ML
0.2 INJECTION, SOLUTION INTRAVENOUS; SUBCUTANEOUS
Status: DISCONTINUED | OUTPATIENT
Start: 2021-08-16 | End: 2021-08-18

## 2021-08-16 RX ORDER — NOREPINEPHRINE BITARTRATE 0.03 MG/ML
0-30 INJECTION, SOLUTION INTRAVENOUS CONTINUOUS
Status: DISCONTINUED | OUTPATIENT
Start: 2021-08-16 | End: 2021-08-17

## 2021-08-16 RX ADMIN — INSULIN LISPRO 7 UNITS: 100 INJECTION, SOLUTION INTRAVENOUS; SUBCUTANEOUS at 11:26

## 2021-08-16 RX ADMIN — NOREPINEPHRINE BITARTRATE 2 MCG/MIN: 1 INJECTION INTRAVENOUS at 18:26

## 2021-08-16 RX ADMIN — SODIUM CHLORIDE, POTASSIUM CHLORIDE, SODIUM LACTATE AND CALCIUM CHLORIDE 500 ML: 600; 310; 30; 20 INJECTION, SOLUTION INTRAVENOUS at 16:00

## 2021-08-16 RX ADMIN — INSULIN LISPRO 4 UNITS: 100 INJECTION, SOLUTION INTRAVENOUS; SUBCUTANEOUS at 21:17

## 2021-08-16 RX ADMIN — INSULIN LISPRO 7 UNITS: 100 INJECTION, SOLUTION INTRAVENOUS; SUBCUTANEOUS at 17:07

## 2021-08-16 RX ADMIN — GUAIFENESIN SYRUP AND DEXTROMETHORPHAN 10 ML: 100; 10 SYRUP ORAL at 21:16

## 2021-08-16 RX ADMIN — HYDROCORTISONE SODIUM SUCCINATE 50 MG: 100 INJECTION, POWDER, FOR SOLUTION INTRAMUSCULAR; INTRAVENOUS at 21:16

## 2021-08-16 RX ADMIN — ENOXAPARIN SODIUM 120 MG: 120 INJECTION SUBCUTANEOUS at 16:59

## 2021-08-16 RX ADMIN — DEXMEDETOMIDINE HYDROCHLORIDE 0.2 MCG/KG/HR: 100 INJECTION, SOLUTION INTRAVENOUS at 19:27

## 2021-08-16 RX ADMIN — ACETAMINOPHEN 1000 MG: 500 TABLET, FILM COATED ORAL at 08:31

## 2021-08-16 RX ADMIN — ACETAMINOPHEN 1000 MG: 500 TABLET, FILM COATED ORAL at 15:05

## 2021-08-16 RX ADMIN — INSULIN LISPRO 7 UNITS: 100 INJECTION, SOLUTION INTRAVENOUS; SUBCUTANEOUS at 08:28

## 2021-08-16 RX ADMIN — ACETAMINOPHEN 1000 MG: 500 TABLET, FILM COATED ORAL at 21:16

## 2021-08-16 RX ADMIN — POTASSIUM CHLORIDE 60 MEQ: 20 TABLET, EXTENDED RELEASE ORAL at 08:31

## 2021-08-16 RX ADMIN — INSULIN LISPRO 4 UNITS: 100 INJECTION, SOLUTION INTRAVENOUS; SUBCUTANEOUS at 17:07

## 2021-08-16 RX ADMIN — ROSUVASTATIN CALCIUM 40 MG: 20 TABLET, FILM COATED ORAL at 16:59

## 2021-08-16 RX ADMIN — DOCUSATE SODIUM 50 MG AND SENNOSIDES 8.6 MG 2 TABLET: 8.6; 5 TABLET, FILM COATED ORAL at 16:59

## 2021-08-16 RX ADMIN — HYDROCORTISONE SODIUM SUCCINATE 50 MG: 100 INJECTION, POWDER, FOR SOLUTION INTRAMUSCULAR; INTRAVENOUS at 12:34

## 2021-08-16 RX ADMIN — CLOPIDOGREL BISULFATE 75 MG: 75 TABLET ORAL at 05:34

## 2021-08-16 RX ADMIN — EZETIMIBE 10 MG: 10 TABLET ORAL at 05:34

## 2021-08-16 RX ADMIN — ENOXAPARIN SODIUM 120 MG: 120 INJECTION SUBCUTANEOUS at 05:34

## 2021-08-16 ASSESSMENT — PAIN DESCRIPTION - PAIN TYPE
TYPE: CHRONIC PAIN

## 2021-08-16 ASSESSMENT — FIBROSIS 4 INDEX: FIB4 SCORE: 0.5

## 2021-08-16 NOTE — PROGRESS NOTES
Pt's BP was 84/52. Pt is asymptomatic. MD Hirsch was updated and ordered hydrocortisone sodium succinate IV medication.

## 2021-08-16 NOTE — PROGRESS NOTES
"Critical Care Progress Note    Date of admission  8/6/2021    Chief Complaint  41 y.o. male with PMH including obesity, HFrEF 20%, CAD with hx of PCI to the ostial LAD in 10/2020, RCA in 2013 and 2014, OM in 2017, likely FH, uncontrolled DM, suspected FARHANA.  He presented to the ED 8/6 with fever, chills, cough and dyspnea.  He was found to have COVID-19 pneumonia and is unvaccinated.  He tells me that his fiancée was at the \"[place] the country[place] possible in [facility] and may have been exposed to Covid there.  She also contracted COVID-19 and has recovered.  He was admitted and placed on oxygen, dexamethasone and just completed a 5-day course of remdesivir.  Today he has had escalating FiO2 requirements up from baseline of 6 L oxygen since admission now on 60 L/100% HFNC with the addition of nonrebreather mask.  He is moderately dyspneic but not in acute distress.  CXR shows increasing bilateral infiltrates and edema.  He is drinking quite a bit of water and fluid status has been +3.9 L.  He received additional Lasix today. Taken from Dr Hirsch note.    Hospital Course  Admitted 8/6 with COVID-19 pneumonia, transferred to ICU 8/13 for persistent hypoxia on max HFNC  8/19 HFNC 60 L/100%, no distress, completed Decadron, transition to taper hydrocortisone, full dose anticoagulation for you UE DVT    Interval Problem Update  Reviewed last 24 hour events:  A/O x 4  Normotensive  SR  yves PO dm diet  T-max 97.5  HFNC 60 L/100%  Self prones  F/U CXR P  BiPAP at noc - refused last noc  I/O = 600/1.1  PIVs  Full lovenox for DVT  Completed steroids yesterday  SSI/Lantus - increase 30-> 40    Update: BP is soft today but asymptomatic.  Will schedule lower dose stress corticosteroid 2-3 days    Review of Systems  Review of Systems   Unable to perform ROS: Acuity of condition        Vital Signs for last 24 hours   Temp:  [36 °C (96.8 °F)-36.4 °C (97.5 °F)] 36.2 °C (97.2 °F)  Pulse:  [] 86  Resp:  [15-34] 28  BP: " ()/(44-76) 111/57  SpO2:  [84 %-97 %] 84 %    Hemodynamic parameters for last 24 hours       Respiratory Information for the last 24 hours       Physical Exam   Physical Exam  Vitals and nursing note reviewed.   Constitutional:       General: He is not in acute distress.     Appearance: He is obese. He is not ill-appearing.      Comments: Maximal HFNC but not in distress, full sentences   HENT:      Head: Normocephalic.      Comments: Piercing to left face     Mouth/Throat:      Mouth: Mucous membranes are moist.      Comments: HFNC + NRB  Eyes:      Pupils: Pupils are equal, round, and reactive to light.   Cardiovascular:      Rate and Rhythm: Normal rate.   Pulmonary:      Effort: No respiratory distress.      Breath sounds: No stridor. No wheezing or rhonchi.      Comments: Low work of breathing, fine crackles  Abdominal:      General: There is no distension.      Palpations: There is no mass.      Tenderness: There is no abdominal tenderness. There is no guarding or rebound.      Hernia: No hernia is present.   Musculoskeletal:         General: No swelling or tenderness.      Cervical back: Normal range of motion.   Skin:     Coloration: Skin is not jaundiced or pale.      Comments: Multiple tattoos   Neurological:      General: No focal deficit present.      Mental Status: He is alert and oriented to person, place, and time.      Cranial Nerves: No cranial nerve deficit.      Sensory: No sensory deficit.      Motor: No weakness.      Coordination: Coordination normal.   Psychiatric:         Mood and Affect: Mood normal.         Medications  Current Facility-Administered Medications   Medication Dose Route Frequency Provider Last Rate Last Admin   • potassium chloride SA (Kdur) tablet 60 mEq  60 mEq Oral Once Alen Hirsch M.D.       • insulin glargine (Semglee) injection  30 Units Subcutaneous Q EVENING Porfirio Reyes M.D.   30 Units at 08/15/21 1800    And   • insulin lispro (AdmeLOG) injection  0.2  Units/kg/day Subcutaneous TID AC Porfirio Reyes M.D.   7 Units at 08/16/21 0828    And   • insulin lispro (AdmeLOG) injection  3-14 Units Subcutaneous 4X/DAY SHAW Reyes M.D.   7 Units at 08/15/21 2059    And   • glucose 4 g chewable tablet 16 g  16 g Oral Q15 MIN PRN Porfirio Reyes M.D.        And   • dextrose 50% (D50W) injection 50 mL  50 mL Intravenous Q15 MIN PRN Porfirio Reyes M.D.       • dexmedetomidine (PRECEDEX) 400 mcg/100mL NS premix infusion  0.1-1.5 mcg/kg/hr Intravenous Continuous Porfirio Reyes M.D.       • MD Alert...ICU Electrolyte Replacement per Pharmacy   Other PHARMACY TO DOSE Porfirio Reyes M.D.       • enoxaparin (LOVENOX) inj 120 mg  120 mg Subcutaneous Q12HRS Brittaney Mclaughlin M.D.   120 mg at 08/16/21 0534   • senna-docusate (PERICOLACE or SENOKOT S) 8.6-50 MG per tablet 2 tablet  2 Tablet Oral BID Ayaan Chapa M.D.   2 Tablet at 08/13/21 0523    And   • polyethylene glycol/lytes (MIRALAX) PACKET 1 Packet  1 Packet Oral QDAY PRN Ayaan Chapa M.D.        And   • magnesium hydroxide (MILK OF MAGNESIA) suspension 30 mL  30 mL Oral QDAY PRN Ayaan Chapa M.D.        And   • bisacodyl (DULCOLAX) suppository 10 mg  10 mg Rectal QDAY PRN Ayaan Chapa M.D.       • Respiratory Therapy Consult   Nebulization Continuous RT Ayaan Chapa M.D.       • acetaminophen (TYLENOL) tablet 1,000 mg  1,000 mg Oral TID Ayaan Chapa M.D.   1,000 mg at 08/15/21 2051   • cloNIDine (CATAPRES) tablet 0.1 mg  0.1 mg Oral Q6HRS PRN Ayaan Chapa M.D.       • enalaprilat (VASOTEC) injection 1.25 mg  1.25 mg Intravenous Q6HRS PRN Ayaan Chapa M.D.       • labetalol (NORMODYNE/TRANDATE) injection 10 mg  10 mg Intravenous Q4HRS PRN Ayaan Chapa M.D.       • ondansetron (ZOFRAN) syringe/vial injection 4 mg  4 mg Intravenous Q4HRS PRN Ayaan Chapa M.D.   4 mg at 08/08/21  0822   • ondansetron (ZOFRAN ODT) dispertab 4 mg  4 mg Oral Q4HRS PRN Ayaan Chapa M.D.   4 mg at 08/09/21 0804   • promethazine (PHENERGAN) tablet 12.5-25 mg  12.5-25 mg Oral Q4HRS PRN Ayaan Chapa M.D.       • promethazine (PHENERGAN) suppository 12.5-25 mg  12.5-25 mg Rectal Q4HRS PRN Ayaan Chapa M.D.       • prochlorperazine (COMPAZINE) injection 5-10 mg  5-10 mg Intravenous Q4HRS PRN Ayaan Chapa M.D.       • guaiFENesin dextromethorphan (ROBITUSSIN DM) 100-10 MG/5ML syrup 10 mL  10 mL Oral Q6HRS PRN Ayaan Chapa M.D.   10 mL at 08/15/21 2051   • clopidogrel (PLAVIX) tablet 75 mg  75 mg Oral DAILY Ayaanstephen Chapa M.D.   75 mg at 08/16/21 0534   • rosuvastatin (CRESTOR) tablet 40 mg  40 mg Oral Q EVENING Ayaanstephen Chapa M.D.   40 mg at 08/15/21 1708   • ezetimibe (ZETIA) tablet 10 mg  10 mg Oral DAILY Ayaanstephen Chapa M.D.   10 mg at 08/16/21 0534       Fluids    Intake/Output Summary (Last 24 hours) at 8/16/2021 0830  Last data filed at 8/16/2021 0600  Gross per 24 hour   Intake 480 ml   Output 1150 ml   Net -670 ml       Laboratory          Recent Labs     08/14/21  0330 08/14/21  0736 08/15/21  0348 08/16/21  0402   SODIUM 136  --  136 140   POTASSIUM 3.4*  --  3.5* 3.5*   CHLORIDE 98  --  102 105   CO2 26  --  25 24   BUN 20  --  16 15   CREATININE 0.44*  --  0.42* 0.45*   MAGNESIUM  --  2.1  --   --    PHOSPHORUS  --  2.6  --   --    CALCIUM 8.4*  --  8.1* 8.3*     Recent Labs     08/14/21  0330 08/15/21  0348 08/16/21  0402   GLUCOSE 168* 157* 104*     Recent Labs     08/14/21  0330 08/15/21  0348 08/16/21  0402   WBC 7.0 7.0 6.9   NEUTSPOLYS 73.30* 72.90* 64.10   LYMPHOCYTES 18.20* 20.60* 27.70   MONOCYTES 2.40 2.00 3.10   EOSINOPHILS 3.60 3.00 2.90   BASOPHILS 0.30 0.40 0.70     Recent Labs     08/14/21  0330 08/15/21  0348 08/16/21  0402   RBC 4.94 4.66* 4.69*   HEMOGLOBIN 14.7 14.0 14.0   HEMATOCRIT 43.8  41.8* 42.2   PLATELETCT 550* 572* 586*       Imaging  X-Ray:  I have personally reviewed the images and compared with prior images.  Echo:   Reviewed  Ultrasound:  Reviewed    Assessment/Plan  * Pneumonia due to COVID-19 virus- (present on admission)  Assessment & Plan  Be extremely mindful of fluids and target euvolemia to net negative fluid balance with early initiation of pressors  Avoid: NSAIDs, bronchoscopy unless absolute necessary for care  Saturation goal >85%, prone positioning  S/p Remdesivir prior to ICU admit  Dexamethasone 6mg x 10days  Monitor for hyperinflamatory phase multiorgan failure, cardiomyopathy, shock, DIC and viral induced HLH   Lovenox full dose due to left brachial DVT  Strict contact, droplet/airborne, glasses protection and critical meticulous hand hygiene    Tocilizumab 8/13  Due to patient co morbidities and further discussion with patient agree with DNR/DNI   Bipap at night for FARHANA Rx and lung recruitment  Follow up CXR and procal 8/15    Acute respiratory failure with hypoxia (HCC)- (present on admission)  Assessment & Plan  Secondary to severe Covid penumonia 19  Maintain euvolemia  HFNC + NRB + Bipap at night for recruitment and FARHANA rx  Patient DNR/DNI  Continue to mobilize and IS therapy    Acute deep vein thrombosis (DVT) of brachial vein of left upper extremity (HCC)  Assessment & Plan  Full dose anticoagulation with Lovenox  Left brachial DVT    Type 2 diabetes mellitus without complication, without long-term current use of insulin (HCC)  Assessment & Plan  Uncontrolled, glycohemoglobin 11.3  Continue glycemic control    Morbid obesity with BMI of 40.0-44.9, adult (HCC)- (present on admission)  Assessment & Plan  With suspected underlying FARHANA  Will do trial of Bipap to see if recruitable lung    Heart failure with reduced ejection fraction (HCC)- (present on admission)  Assessment & Plan  Chronic hypoxia and lung infiltrates/edema secondary likely more related to acute  infection of COVID-19 pneumonia  Continue negative fluid balance as tolerated  Close monitoring of I/Os  Currently hold heart failure regime with hypotension    Repeat echo reviewed with improved EF but difficult with wall motion abnormalities    Coronary artery disease involving native coronary artery of native heart without angina pectoris- (present on admission)  Assessment & Plan  CAD with hx of PCI to the ostial LAD in 10/2020, RCA in 2013 and 2014, OM in 2017  DAPT    Tobacco use- (present on admission)  Assessment & Plan  NRT as needed    Hyperlipemia- (present on admission)  Assessment & Plan  Suspected familial hyperlipidemia  On Crestor and Zetia  Monitor LFTs     Updated plan:  Completed Decadron, add low-dose stress hydrocortisone with short taper with soft BP  Continue titrated HFNC, negative fluid balance,  BiPAP at night if tolerated for likely underlying FARHANA  Self proning, deep breathing, I-S as tolerated  Ongoing full dose anticoagulation for LUE DVT, follow-up vascular imaging at some point  Ongoing treatment as above    VTE:  Lovenox  Ulcer: Not Indicated  Lines: None    I have performed a physical exam and reviewed and updated ROS and Plan today (8/16/2021). In review of yesterday's note (8/15/2021), there are no changes except as documented above.     Discussed patient condition and risk of morbidity and/or mortality with RN, RT, Pharmacy, Charge nurse / hot rounds and Patient     The patient remains critically ill with severe Covid pneumonia on HFNC Bipap with active titration.  Critical care time = 35 minutes in directly providing and coordinating critical care and extensive data review.  No time overlap and excludes procedures.

## 2021-08-16 NOTE — DOCUMENTATION QUERY
[facility]                                                                       Query Response Note      PATIENT:               CHANDRIKA DAVIDSON  ACCT #:                  [id number]  MRN:                     [id number]  :                      1980  ADMIT DATE:       2021 10:28 AM  DISCH DATE:          RESPONDING  PROVIDER #:        [id number]           QUERY TEXT:    Pulmonary edema is documented in the Medical Record. Please further specify the chronicity and type:    NOTE:  If an appropriate response is not listed below, please respond with a new note.          The patient's Clinical Indicators include:  Per 21 Cardiology MD consult: I do not believe he is having an acute CHF exacerbation, rather I believe this is mostly infectious pulmonary edema in the setting of chronic systolic heart failure  Per 21 Pulmonary MD consult: Pulmonary edema    Treatment: CXR, Cardiology consult, Pulmonary consult, RT protocol, Lasix IV & PO, Strict I&O  Risk Factors: Pneumonia due to Covid-19, Acute respiratory failure, Chronic systolic heart failure    Thank you,  Yakov Durant RN, BSN  Clinical   Connect via [employer]  .  Options provided:   -- Acute pulmonary edema - cardiac related, (please specify type and acuity of CHF, if applicable, or other cardiac condition)   -- Acute Pulmonary Edema, Non-Cardiogenic   -- Chronic pulmonary edema - cardiac related, (please specify type and acuity of CHF, if applicable, or other cardiac condition)   -- Chronic Pulmonary Edema, Non-Cardiogenic   -- Unable to determine      Query created by: Yakov Durant on 2021 2:26 PM    RESPONSE TEXT:    Acute Pulmonary Edema, Non-Cardiogenic          Electronically signed by:  AMANDA SINGH MD 2021 3:03 PM

## 2021-08-16 NOTE — CARE PLAN
The patient is Watcher - Medium risk of patient condition declining or worsening    Shift Goals: Rest and prone overnight   Clinical Goals: Increase IS usage to improve oxygenation  Patient Goals: Increase IS volume  Family Goals: N/A    Progress made toward(s) clinical / shift goals:        Problem: Knowledge Deficit - Standard  Goal: Patient and family/care givers will demonstrate understanding of plan of care, disease process/condition, diagnostic tests and medications  Outcome: Progressing     Problem: Psychosocial  Goal: Patient's level of anxiety will decrease  Outcome: Progressing  Goal: Patient's ability to verbalize feelings about condition will improve  Outcome: Progressing  Goal: Patient's ability to re-evaluate and adapt role responsibilities will improve  Outcome: Progressing  Goal: Patient and family will demonstrate ability to cope with life altering diagnosis and/or procedure  Outcome: Progressing  Goal: Spiritual and cultural needs incorporated into hospitalization  Outcome: Progressing     Problem: Communication  Goal: The ability to communicate needs accurately and effectively will improve  Outcome: Progressing     Problem: Discharge Barriers/Planning  Goal: Patient's continuum of care needs are met  Outcome: Progressing     Problem: Hemodynamics  Goal: Patient's hemodynamics, fluid balance and neurologic status will be stable or improve  Outcome: Progressing     Problem: Respiratory  Goal: Patient will achieve/maintain optimum respiratory ventilation and gas exchange  Outcome: Progressing     Problem: Fluid Volume  Goal: Fluid volume balance will be maintained  Outcome: Progressing     Problem: Risk for Aspiration  Goal: Patient's risk for aspiration will be absent or decrease  Outcome: Progressing     Problem: Nutrition  Goal: Patient's nutritional and fluid intake will be adequate or improve  Outcome: Progressing  Goal: Enteral nutrition will be maintained or improve  Outcome:  Progressing  Goal: Enteral nutrition will be maintained or improve  Outcome: Progressing     Problem: Urinary Elimination  Goal: Establish and maintain regular urinary output  Outcome: Progressing     Problem: Bowel Elimination  Goal: Establish and maintain regular bowel function  Outcome: Progressing     Problem: Mobility  Goal: Patient's capacity to carry out activities will improve  Outcome: Progressing     Problem: Self Care  Goal: Patient will have the ability to perform ADLs independently or with assistance (bathe, groom, dress, toilet and feed)  Outcome: Progressing     Problem: Infection - Standard  Goal: Patient will remain free from infection  Outcome: Progressing     Problem: Pain - Standard  Goal: Alleviation of pain or a reduction in pain to the patient’s comfort goal  Outcome: Progressing       Patient is not progressing towards the following goals:

## 2021-08-16 NOTE — DISCHARGE PLANNING
Care Transition Team Discharge Planning     Anticipated Discharge Disposition: TBD     Action: LSw attended AM rounds, pt has tube feeds, using urinal to void, is up self, and on bipap at night.     Pt has applied for Medicaid, and is pending acceptance.       Barriers to Discharge: not medically clear/stable     Plan: Lsw will continue to follow, and assist w/ d/c planning.

## 2021-08-16 NOTE — CARE PLAN
The patient is Stable - Low risk of patient condition declining or worsening    Shift Goals  Clinical Goals: Keep SPO2% above 90%  Patient Goals: Increase IS volume  Family Goals: N/A    Progress made toward(s) clinical / shift goals: Pt's SPO2% is above 90%    Patient is not progressing towards the following goals:

## 2021-08-16 NOTE — CARE PLAN
Problem: Humidified High Flow Nasal Cannula  Goal: Maintain adequate oxygenation dependent on patient condition  Description: 1.  Implement humidified high flow oxygen therapy  2.  Titrate high flow oxygen to maintain appropriate SpO2  Outcome: Progressing  Flowsheets (Taken 8/15/2021 1753)  Indication: All other patients: SpO2 less than 90% despite conventional supplemental oxygen devices  Outcome: Normoxia       Respiratory Update    Pt on HFNC 60LPM / 100%, will continue to titrate to tolerated.    Pt tolerating current treatments well with no adverse reactions.

## 2021-08-17 ENCOUNTER — APPOINTMENT (OUTPATIENT)
Dept: RADIOLOGY | Facility: MEDICAL CENTER | Age: 41
DRG: 871 | End: 2021-08-17
Attending: INTERNAL MEDICINE
Payer: MEDICAID

## 2021-08-17 ENCOUNTER — ANCILLARY PROCEDURE (OUTPATIENT)
Dept: CARDIAC CATH/INVASIVE PROCEDURES | Facility: MEDICAL CENTER | Age: 41
DRG: 871 | End: 2021-08-17
Attending: INTERNAL MEDICINE
Payer: MEDICAID

## 2021-08-17 LAB
ANION GAP SERPL CALC-SCNC: 11 MMOL/L (ref 7–16)
BASOPHILS # BLD AUTO: 0.5 % (ref 0–1.8)
BASOPHILS # BLD: 0.04 K/UL (ref 0–0.12)
BUN SERPL-MCNC: 11 MG/DL (ref 8–22)
CALCIUM SERPL-MCNC: 8.7 MG/DL (ref 8.5–10.5)
CHLORIDE SERPL-SCNC: 101 MMOL/L (ref 96–112)
CO2 SERPL-SCNC: 24 MMOL/L (ref 20–33)
CREAT SERPL-MCNC: 0.43 MG/DL (ref 0.5–1.4)
EOSINOPHIL # BLD AUTO: 0.08 K/UL (ref 0–0.51)
EOSINOPHIL NFR BLD: 0.9 % (ref 0–6.9)
ERYTHROCYTE [DISTWIDTH] IN BLOOD BY AUTOMATED COUNT: 42.9 FL (ref 35.9–50)
GLUCOSE BLD-MCNC: 263 MG/DL (ref 65–99)
GLUCOSE BLD-MCNC: 281 MG/DL (ref 65–99)
GLUCOSE BLD-MCNC: 293 MG/DL (ref 65–99)
GLUCOSE BLD-MCNC: 390 MG/DL (ref 65–99)
GLUCOSE SERPL-MCNC: 239 MG/DL (ref 65–99)
HCT VFR BLD AUTO: 44.8 % (ref 42–52)
HGB BLD-MCNC: 15.1 G/DL (ref 14–18)
IMM GRANULOCYTES # BLD AUTO: 0.15 K/UL (ref 0–0.11)
IMM GRANULOCYTES NFR BLD AUTO: 1.8 % (ref 0–0.9)
LYMPHOCYTES # BLD AUTO: 1.26 K/UL (ref 1–4.8)
LYMPHOCYTES NFR BLD: 14.9 % (ref 22–41)
MCH RBC QN AUTO: 30.2 PG (ref 27–33)
MCHC RBC AUTO-ENTMCNC: 33.7 G/DL (ref 33.7–35.3)
MCV RBC AUTO: 89.6 FL (ref 81.4–97.8)
MONOCYTES # BLD AUTO: 0.19 K/UL (ref 0–0.85)
MONOCYTES NFR BLD AUTO: 2.3 % (ref 0–13.4)
NEUTROPHILS # BLD AUTO: 6.71 K/UL (ref 1.82–7.42)
NEUTROPHILS NFR BLD: 79.6 % (ref 44–72)
NRBC # BLD AUTO: 0 K/UL
NRBC BLD-RTO: 0 /100 WBC
PLATELET # BLD AUTO: 602 K/UL (ref 164–446)
PMV BLD AUTO: 9.6 FL (ref 9–12.9)
POTASSIUM SERPL-SCNC: 4.4 MMOL/L (ref 3.6–5.5)
RBC # BLD AUTO: 5 M/UL (ref 4.7–6.1)
SODIUM SERPL-SCNC: 136 MMOL/L (ref 135–145)
WBC # BLD AUTO: 8.4 K/UL (ref 4.8–10.8)

## 2021-08-17 PROCEDURE — 700105 HCHG RX REV CODE 258: Performed by: INTERNAL MEDICINE

## 2021-08-17 PROCEDURE — 76937 US GUIDE VASCULAR ACCESS: CPT | Mod: 26 | Performed by: INTERNAL MEDICINE

## 2021-08-17 PROCEDURE — 36556 INSERT NON-TUNNEL CV CATH: CPT

## 2021-08-17 PROCEDURE — 770022 HCHG ROOM/CARE - ICU (200)

## 2021-08-17 PROCEDURE — 85025 COMPLETE CBC W/AUTO DIFF WBC: CPT

## 2021-08-17 PROCEDURE — 71045 X-RAY EXAM CHEST 1 VIEW: CPT

## 2021-08-17 PROCEDURE — 36556 INSERT NON-TUNNEL CV CATH: CPT | Mod: RT | Performed by: INTERNAL MEDICINE

## 2021-08-17 PROCEDURE — 82962 GLUCOSE BLOOD TEST: CPT | Mod: 91

## 2021-08-17 PROCEDURE — 700101 HCHG RX REV CODE 250: Performed by: EMERGENCY MEDICINE

## 2021-08-17 PROCEDURE — B548ZZA ULTRASONOGRAPHY OF SUPERIOR VENA CAVA, GUIDANCE: ICD-10-PCS | Performed by: INTERNAL MEDICINE

## 2021-08-17 PROCEDURE — 700102 HCHG RX REV CODE 250 W/ 637 OVERRIDE(OP): Performed by: STUDENT IN AN ORGANIZED HEALTH CARE EDUCATION/TRAINING PROGRAM

## 2021-08-17 PROCEDURE — A9270 NON-COVERED ITEM OR SERVICE: HCPCS | Performed by: STUDENT IN AN ORGANIZED HEALTH CARE EDUCATION/TRAINING PROGRAM

## 2021-08-17 PROCEDURE — 700111 HCHG RX REV CODE 636 W/ 250 OVERRIDE (IP): Performed by: STUDENT IN AN ORGANIZED HEALTH CARE EDUCATION/TRAINING PROGRAM

## 2021-08-17 PROCEDURE — 700111 HCHG RX REV CODE 636 W/ 250 OVERRIDE (IP): Performed by: INTERNAL MEDICINE

## 2021-08-17 PROCEDURE — 80048 BASIC METABOLIC PNL TOTAL CA: CPT

## 2021-08-17 PROCEDURE — 94640 AIRWAY INHALATION TREATMENT: CPT

## 2021-08-17 PROCEDURE — 02HV33Z INSERTION OF INFUSION DEVICE INTO SUPERIOR VENA CAVA, PERCUTANEOUS APPROACH: ICD-10-PCS | Performed by: INTERNAL MEDICINE

## 2021-08-17 PROCEDURE — 700101 HCHG RX REV CODE 250: Performed by: INTERNAL MEDICINE

## 2021-08-17 PROCEDURE — 99291 CRITICAL CARE FIRST HOUR: CPT | Mod: 25 | Performed by: EMERGENCY MEDICINE

## 2021-08-17 RX ORDER — NOREPINEPHRINE BITARTRATE 0.03 MG/ML
0-30 INJECTION, SOLUTION INTRAVENOUS CONTINUOUS
Status: DISCONTINUED | OUTPATIENT
Start: 2021-08-17 | End: 2021-08-21

## 2021-08-17 RX ADMIN — INSULIN LISPRO 7 UNITS: 100 INJECTION, SOLUTION INTRAVENOUS; SUBCUTANEOUS at 11:31

## 2021-08-17 RX ADMIN — INSULIN LISPRO 7 UNITS: 100 INJECTION, SOLUTION INTRAVENOUS; SUBCUTANEOUS at 08:00

## 2021-08-17 RX ADMIN — FENTANYL CITRATE 50 MCG: 50 INJECTION, SOLUTION INTRAMUSCULAR; INTRAVENOUS at 20:26

## 2021-08-17 RX ADMIN — INSULIN LISPRO 7 UNITS: 100 INJECTION, SOLUTION INTRAVENOUS; SUBCUTANEOUS at 18:00

## 2021-08-17 RX ADMIN — ACETAMINOPHEN 1000 MG: 500 TABLET, FILM COATED ORAL at 15:02

## 2021-08-17 RX ADMIN — DEXMEDETOMIDINE HYDROCHLORIDE 0.4 MCG/KG/HR: 100 INJECTION, SOLUTION INTRAVENOUS at 03:46

## 2021-08-17 RX ADMIN — NOREPINEPHRINE BITARTRATE 5 MCG/MIN: 1 INJECTION INTRAVENOUS at 13:58

## 2021-08-17 RX ADMIN — INSULIN LISPRO 7 UNITS: 100 INJECTION, SOLUTION INTRAVENOUS; SUBCUTANEOUS at 22:05

## 2021-08-17 RX ADMIN — EZETIMIBE 10 MG: 10 TABLET ORAL at 05:10

## 2021-08-17 RX ADMIN — ROSUVASTATIN CALCIUM 40 MG: 20 TABLET, FILM COATED ORAL at 17:54

## 2021-08-17 RX ADMIN — ACETAMINOPHEN 1000 MG: 500 TABLET, FILM COATED ORAL at 09:05

## 2021-08-17 RX ADMIN — ENOXAPARIN SODIUM 120 MG: 120 INJECTION SUBCUTANEOUS at 05:10

## 2021-08-17 RX ADMIN — INSULIN LISPRO 7 UNITS: 100 INJECTION, SOLUTION INTRAVENOUS; SUBCUTANEOUS at 18:04

## 2021-08-17 RX ADMIN — DOCUSATE SODIUM 50 MG AND SENNOSIDES 8.6 MG 2 TABLET: 8.6; 5 TABLET, FILM COATED ORAL at 17:54

## 2021-08-17 RX ADMIN — CLOPIDOGREL BISULFATE 75 MG: 75 TABLET ORAL at 05:10

## 2021-08-17 RX ADMIN — HYDROCORTISONE SODIUM SUCCINATE 50 MG: 100 INJECTION, POWDER, FOR SOLUTION INTRAMUSCULAR; INTRAVENOUS at 05:09

## 2021-08-17 RX ADMIN — ACETAMINOPHEN 1000 MG: 500 TABLET, FILM COATED ORAL at 21:21

## 2021-08-17 RX ADMIN — INSULIN LISPRO 7 UNITS: 100 INJECTION, SOLUTION INTRAVENOUS; SUBCUTANEOUS at 07:59

## 2021-08-17 RX ADMIN — ENOXAPARIN SODIUM 120 MG: 120 INJECTION SUBCUTANEOUS at 17:55

## 2021-08-17 RX ADMIN — SODIUM CHLORIDE 5 MG: 9 INJECTION, SOLUTION INTRAVENOUS at 20:18

## 2021-08-17 RX ADMIN — INSULIN LISPRO 12 UNITS: 100 INJECTION, SOLUTION INTRAVENOUS; SUBCUTANEOUS at 11:29

## 2021-08-17 ASSESSMENT — PATIENT HEALTH QUESTIONNAIRE - PHQ9
1. LITTLE INTEREST OR PLEASURE IN DOING THINGS: NOT AT ALL
6. FEELING BAD ABOUT YOURSELF - OR THAT YOU ARE A FAILURE OR HAVE LET YOURSELF OR YOUR FAMILY DOWN: NOT AL ALL
5. POOR APPETITE OR OVEREATING: NOT AT ALL
9. THOUGHTS THAT YOU WOULD BE BETTER OFF DEAD, OR OF HURTING YOURSELF: NOT AT ALL
SUM OF ALL RESPONSES TO PHQ QUESTIONS 1-9: 1
SUM OF ALL RESPONSES TO PHQ9 QUESTIONS 1 AND 2: 1
2. FEELING DOWN, DEPRESSED, IRRITABLE, OR HOPELESS: SEVERAL DAYS
4. FEELING TIRED OR HAVING LITTLE ENERGY: NOT AT ALL
7. TROUBLE CONCENTRATING ON THINGS, SUCH AS READING THE NEWSPAPER OR WATCHING TELEVISION: NOT AT ALL
3. TROUBLE FALLING OR STAYING ASLEEP OR SLEEPING TOO MUCH: NOT AT ALL
8. MOVING OR SPEAKING SO SLOWLY THAT OTHER PEOPLE COULD HAVE NOTICED. OR THE OPPOSITE, BEING SO FIGETY OR RESTLESS THAT YOU HAVE BEEN MOVING AROUND A LOT MORE THAN USUAL: NOT AT ALL

## 2021-08-17 ASSESSMENT — PAIN DESCRIPTION - PAIN TYPE
TYPE: ACUTE PAIN
TYPE: ACUTE PAIN

## 2021-08-17 ASSESSMENT — FIBROSIS 4 INDEX: FIB4 SCORE: 0.47

## 2021-08-17 NOTE — PROGRESS NOTES
Patient taken off bipap this morning and placed back on HFNC. Patient started desaturating into the 60's and felt like he couldn't catch his breath. Patient given 15 L NRB over the HFNC. RT to bedside after patient was having difficulty coming back up. RT coached patient back up to the 90's and was able to remove the NRB mask. Patient performing IS and states he feels a lot better, just struggled when he first came off the bipap.

## 2021-08-17 NOTE — CARE PLAN
The patient is Stable - Low risk of patient condition declining or worsening    Shift Goals  Clinical Goals: Keep SPO2% above 90%  Patient Goals: Increase IS volume  Family Goals: N/A    Progress made toward(s) clinical / shift goals:  Pt is maintaining SPO2% above 90%. IS in use.     Patient is not progressing towards the following goals:

## 2021-08-17 NOTE — CARE PLAN
The patient is Watcher - Medium risk of patient condition declining or worsening    Shift Goals: Rest   Clinical Goals: Keep SPO2% above 90%  Patient Goals: Increase IS volume  Family Goals: N/A    Progress made toward(s) clinical / shift goals:        Problem: Knowledge Deficit - Standard  Goal: Patient and family/care givers will demonstrate understanding of plan of care, disease process/condition, diagnostic tests and medications  Outcome: Progressing     Problem: Psychosocial  Goal: Patient's level of anxiety will decrease  Outcome: Progressing  Goal: Patient's ability to verbalize feelings about condition will improve  Outcome: Progressing  Goal: Patient's ability to re-evaluate and adapt role responsibilities will improve  Outcome: Progressing  Goal: Patient and family will demonstrate ability to cope with life altering diagnosis and/or procedure  Outcome: Progressing  Goal: Spiritual and cultural needs incorporated into hospitalization  Outcome: Progressing     Problem: Communication  Goal: The ability to communicate needs accurately and effectively will improve  Outcome: Progressing     Problem: Discharge Barriers/Planning  Goal: Patient's continuum of care needs are met  Outcome: Progressing     Problem: Hemodynamics  Goal: Patient's hemodynamics, fluid balance and neurologic status will be stable or improve  Outcome: Progressing     Problem: Respiratory  Goal: Patient will achieve/maintain optimum respiratory ventilation and gas exchange  Outcome: Progressing     Problem: Fluid Volume  Goal: Fluid volume balance will be maintained  Outcome: Progressing     Problem: Risk for Aspiration  Goal: Patient's risk for aspiration will be absent or decrease  Outcome: Progressing     Problem: Nutrition  Goal: Patient's nutritional and fluid intake will be adequate or improve  Outcome: Progressing  Goal: Enteral nutrition will be maintained or improve  Outcome: Progressing  Goal: Enteral nutrition will be maintained  or improve  Outcome: Progressing     Problem: Urinary Elimination  Goal: Establish and maintain regular urinary output  Outcome: Progressing     Problem: Bowel Elimination  Goal: Establish and maintain regular bowel function  Outcome: Progressing     Problem: Mobility  Goal: Patient's capacity to carry out activities will improve  Outcome: Progressing     Problem: Self Care  Goal: Patient will have the ability to perform ADLs independently or with assistance (bathe, groom, dress, toilet and feed)  Outcome: Progressing     Problem: Infection - Standard  Goal: Patient will remain free from infection  Outcome: Progressing     Problem: Pain - Standard  Goal: Alleviation of pain or a reduction in pain to the patient’s comfort goal  Outcome: Progressing       Patient is not progressing towards the following goals:

## 2021-08-17 NOTE — PROGRESS NOTES
"Critical Care Progress Note    Date of admission  8/6/2021    Chief Complaint  \"41 y.o. male with PMH including obesity, HFrEF 20%, CAD with hx of PCI to the ostial LAD in 10/2020, RCA in 2013 and 2014, OM in 2017, likely FH, uncontrolled DM, suspected FARHANA.  He presented to the ED 8/6 with fever, chills, cough and dyspnea.  He was found to have COVID-19 pneumonia and is unvaccinated.  He tells me that his fiancée was at the \"night [place] the country\" possible in [facility] and may have been exposed to Covid there.  She also contracted COVID-19 and has recovered.  He was admitted and placed on oxygen, dexamethasone and just completed a 5-day course of remdesivir.  Today he has had escalating FiO2 requirements up from baseline of 6 L oxygen since admission now on 60 L/100% HFNC with the addition of nonrebreather mask.  He is moderately dyspneic but not in acute distress.  CXR shows increasing bilateral infiltrates and edema.  He is drinking quite a bit of water and fluid status has been +3.9 L.  He received additional Lasix today.\"     Hospital Course  Admitted 8/6 with COVID-19 pneumonia, transferred to ICU 8/13 for persistent hypoxia on max HFNC  8/16 HFNC 60 L/100%, no distress, completed Decadron, transition to taper hydrocortisone, full dose anticoagulation for you UE DVT  8/17 intermittent BiPAP w/ desaturations when switching to HFNC, D/C hydrocortisone, D/C dexmedetomidine, restart norepinephrine for MAP > 65 or SBP > 90    Review of Systems  Review of Systems   All other systems reviewed and are negative.       Vital Signs for last 24 hours   Temp:  [36.3 °C (97.3 °F)-36.5 °C (97.7 °F)] 36.3 °C (97.3 °F)  Pulse:  [] 91  Resp:  [13-42] 30  BP: ()/(42-82) 115/74  SpO2:  [75 %-94 %] 90 %    Hemodynamic parameters for last 24 hours       Respiratory Information for the last 24 hours       Physical Exam   Physical Exam  Vitals and nursing note reviewed.   HENT:      Head: Normocephalic and atraumatic.      " Right Ear: External ear normal.      Left Ear: External ear normal.      Nose: Nose normal.      Mouth/Throat:      Mouth: Mucous membranes are moist.      Pharynx: Oropharynx is clear.   Eyes:      Pupils: Pupils are equal, round, and reactive to light.   Cardiovascular:      Rate and Rhythm: Regular rhythm. Tachycardia present.      Pulses: Normal pulses.      Heart sounds: Normal heart sounds.   Pulmonary:      Effort: Respiratory distress present.      Comments: Coarse bilateral breath sounds  Abdominal:      Palpations: Abdomen is soft.   Musculoskeletal:         General: Normal range of motion.      Cervical back: Normal range of motion and neck supple.   Skin:     General: Skin is warm and dry.      Capillary Refill: Capillary refill takes less than 2 seconds.   Neurological:      General: No focal deficit present.      Mental Status: He is alert.         Medications  Current Facility-Administered Medications   Medication Dose Route Frequency Provider Last Rate Last Admin   • norepinephrine (Levophed) 8 mg in 250 mL NS infusion (premix)  0-30 mcg/min Intravenous Continuous Valentino Niño M.D. 5.6 mL/hr at 08/17/21 1809 3 mcg/min at 08/17/21 1809   • insulin glargine (Semglee) injection  40 Units Subcutaneous Q EVENING Alen Hirsch M.D.   40 Units at 08/17/21 1801    And   • insulin lispro (AdmeLOG) injection  0.2 Units/kg/day Subcutaneous TID AC Alen Hirsch M.D.   7 Units at 08/17/21 1800    And   • insulin lispro (AdmeLOG) injection  3-14 Units Subcutaneous 4X/DAY ACHS Alen Hirsch M.D.   7 Units at 08/17/21 1804    And   • glucose 4 g chewable tablet 16 g  16 g Oral Q15 MIN PRN Alen Hirsch M.D.        And   • dextrose 50% (D50W) injection 50 mL  50 mL Intravenous Q15 MIN PRN Alen Hirsch M.D.       • dexmedetomidine (PRECEDEX) 400 mcg/100mL NS premix infusion  0.1-1.5 mcg/kg/hr Intravenous Continuous Porfirio Reyes M.D.   Stopped at 08/17/21 1000   • MD Alert...ICU Electrolyte  Replacement per Pharmacy   Other PHARMACY TO DOSE Porfirio Reyes M.D.       • enoxaparin (LOVENOX) inj 120 mg  120 mg Subcutaneous Q12HRS Brittaney Mclaughlin M.D.   120 mg at 08/17/21 1755   • senna-docusate (PERICOLACE or SENOKOT S) 8.6-50 MG per tablet 2 tablet  2 Tablet Oral BID Ayaan Chapa M.D.   2 Tablet at 08/17/21 1754    And   • polyethylene glycol/lytes (MIRALAX) PACKET 1 Packet  1 Packet Oral QDAY PRN Ayaan Chapa M.D.        And   • magnesium hydroxide (MILK OF MAGNESIA) suspension 30 mL  30 mL Oral QDAY PRN Ayaan Chapa M.D.        And   • bisacodyl (DULCOLAX) suppository 10 mg  10 mg Rectal QDAY PRN Ayaan Chapa M.D.       • Respiratory Therapy Consult   Nebulization Continuous RT Ayaan Chapa M.D.       • acetaminophen (TYLENOL) tablet 1,000 mg  1,000 mg Oral TID Ayaan Chapa M.D.   1,000 mg at 08/17/21 1502   • labetalol (NORMODYNE/TRANDATE) injection 10 mg  10 mg Intravenous Q4HRS PRN Ayaan Chapa M.D.       • ondansetron (ZOFRAN) syringe/vial injection 4 mg  4 mg Intravenous Q4HRS PRN Ayaan Chapa M.D.   4 mg at 08/08/21 0822   • ondansetron (ZOFRAN ODT) dispertab 4 mg  4 mg Oral Q4HRS PRN Ayaan Chapa M.D.   4 mg at 08/09/21 0804   • guaiFENesin dextromethorphan (ROBITUSSIN DM) 100-10 MG/5ML syrup 10 mL  10 mL Oral Q6HRS PRN Ayaan Chapa M.D.   10 mL at 08/16/21 2116   • clopidogrel (PLAVIX) tablet 75 mg  75 mg Oral DAILY Ayaanstephen Chapa, M.D.   75 mg at 08/17/21 0510   • rosuvastatin (CRESTOR) tablet 40 mg  40 mg Oral Q EVENING Ayaan Chapa M.D.   40 mg at 08/17/21 1754   • ezetimibe (ZETIA) tablet 10 mg  10 mg Oral DAILY Ayaan Chapa M.D.   10 mg at 08/17/21 0510       Fluids    Intake/Output Summary (Last 24 hours) at 8/17/2021 1907  Last data filed at 8/17/2021 1800  Gross per 24 hour   Intake 1277.99 ml   Output 1550 ml   Net  -272.01 ml       Laboratory          Recent Labs     08/15/21  0348 08/16/21  0402 08/17/21  0352   SODIUM 136 140 136   POTASSIUM 3.5* 3.5* 4.4   CHLORIDE 102 105 101   CO2 25 24 24   BUN 16 15 11   CREATININE 0.42* 0.45* 0.43*   CALCIUM 8.1* 8.3* 8.7     Recent Labs     08/15/21  0348 08/16/21  0402 08/17/21  0352   GLUCOSE 157* 104* 239*     Recent Labs     08/15/21  0348 08/16/21  0402 08/17/21  0352   WBC 7.0 6.9 8.4   NEUTSPOLYS 72.90* 64.10 79.60*   LYMPHOCYTES 20.60* 27.70 14.90*   MONOCYTES 2.00 3.10 2.30   EOSINOPHILS 3.00 2.90 0.90   BASOPHILS 0.40 0.70 0.50     Recent Labs     08/15/21  0348 08/16/21  0402 08/17/21  0352   RBC 4.66* 4.69* 5.00   HEMOGLOBIN 14.0 14.0 15.1   HEMATOCRIT 41.8* 42.2 44.8   PLATELETCT 572* 586* 602*       Imaging  X-Ray:  I have personally reviewed the images and compared with prior images.    Assessment/Plan  * Pneumonia due to COVID-19 virus- (present on admission)  Assessment & Plan  COVID-19 pneumonia  Dexamethasone - completed course  Tocilizumab 8/13  Fluid balance - keep euvolemic    Acute deep vein thrombosis (DVT) of brachial vein of left upper extremity ([facility])- (present on admission)  Assessment & Plan  Left brachial DVT  Therapeutic anticoagulation with enoxaparin    Type 2 diabetes mellitus without complication, without long-term current use of insulin ([facility])- (present on admission)  Assessment & Plan  Uncontrolled, glycohemoglobin 11.3  Continue glycemic control    Acute respiratory failure with hypoxia ([facility])- (present on admission)  Assessment & Plan  Acute hypoxic respiratory failure due to COVID-19 pneumonia  Intermittent humidified high-flow nasal cannula and NIPPV w/ BiPAP  Titrate to keep O2 saturation > 88%  Encourage self-proning  Encourage incentive spirometry  Aggressive pulmonary hygiene    Morbid obesity with BMI of 40.0-44.9, adult ([facility])- (present on admission)  Assessment & Plan  With suspected underlying FARHANA  Will do trial of Bipap to see if  recruitable lung    Sepsis (HCC)- (present on admission)  Assessment & Plan  Septic shock 2/2 COVID-19 pneumonia  Titrate norepinephrine to maintain MAP > 65    Heart failure with reduced ejection fraction (HCC)- (present on admission)  Assessment & Plan  Chronic hypoxia and lung infiltrates/edema secondary likely more related to acute infection of COVID-19 pneumonia  Continue negative fluid balance as tolerated  Close monitoring of I/Os  Currently hold heart failure regime with hypotension    Repeat echo reviewed with improved EF but difficult with wall motion abnormalities    Coronary artery disease involving native coronary artery of native heart without angina pectoris- (present on admission)  Assessment & Plan  CAD with hx of PCI to the ostial LAD in 10/2020, RCA in 2013 and 2014, OM in 2017  Clopidogrel    Tobacco use- (present on admission)  Assessment & Plan  Cessation counseling when appropriate    Hyperlipemia- (present on admission)  Assessment & Plan  Suspected familial hyperlipidemia  On Crestor and Zetia  Monitor LFTs     DVT prophylaxis: Enoxaparin  PUD prophylaxis: Not indicated  Glycemic control: Sliding scale insulin  Nutrition: Diet  Lines: None  Ballard: Need for strict I/O monitoring, remove when able  Mobility: Early mobility as tolerated  Goals of care: Full code  Disposition: ICU    I have performed a physical exam and reviewed and updated ROS and Plan today (8/17/2021). In review of yesterday's note (8/16/2021), there are no changes except as documented above.     Discussed patient condition and risk of morbidity and/or mortality with RN, RT, Pharmacy, Charge nurse / hot rounds and Patient     The patient remains critically ill.  Critical care time = 40 minutes in directly providing and coordinating critical care and extensive data review.  No time overlap and excludes procedures.

## 2021-08-17 NOTE — PALLIATIVE CARE
To locate the AD/POLST, please hover the cursor over the patient's code status to find all linked ACP documents. Pt completed his AD and original given back to pt.

## 2021-08-17 NOTE — PROGRESS NOTES
500cc bolus of LR completed. SBP initially increased to 100's systolic with MAP in the 70's. Since bolus finished SBP began to trend back down. SBP now is the 80's with MAP 50-60's. Updated Dr. Gibson received orders to start low dose Levo peripherally and prep for central line.

## 2021-08-17 NOTE — HOSPITAL COURSE
"Chief Complaint  \"41 y.o. male with PMH including obesity, HFrEF 20%, CAD with hx of PCI to the ostial LAD in 10/2020, RCA in 2013 and 2014, OM in 2017, likely FH, uncontrolled DM, suspected FARHANA.  He presented to the ED 8/6 with fever, chills, cough and dyspnea.  He was found to have COVID-19 pneumonia and is unvaccinated.  He tells me that his fiancée was at the \"night [place] the country\" possible in [facility] and may have been exposed to Covid there.  She also contracted COVID-19 and has recovered.  He was admitted and placed on oxygen, dexamethasone and just completed a 5-day course of remdesivir.  Today he has had escalating FiO2 requirements up from baseline of 6 L oxygen since admission now on 60 L/100% HFNC with the addition of nonrebreather mask.  He is moderately dyspneic but not in acute distress.  CXR shows increasing bilateral infiltrates and edema.  He is drinking quite a bit of water and fluid status has been +3.9 L.  He received additional Lasix today.\"     Hospital Course  Admitted 8/6 with COVID-19 pneumonia, transferred to ICU 8/13 for persistent hypoxia on max HFNC  8/16 HFNC 60 L/100%, no distress, completed Decadron, transition to taper hydrocortisone, full dose anticoagulation for you UE DVT  8/17 intermittent BiPAP w/ desaturations when switching to HFNC, D/C hydrocortisone, D/C dexmedetomidine, restart norepinephrine for MAP > 65 or SBP > 90  8/18 titrate down HFNC, increase insulin, replete K, replete magnesium  8/19 continue norepinephrine, furosemide, increase insulin glargine by 10  8/20 melatonin for sleep, norepinephrine requirement remains, potassium repletion  8/21 increase insulin, start midodrine, replete K, place a-line  8/22 titrate HFNC, replete K  "

## 2021-08-18 ENCOUNTER — APPOINTMENT (OUTPATIENT)
Dept: RADIOLOGY | Facility: MEDICAL CENTER | Age: 41
DRG: 871 | End: 2021-08-18
Attending: EMERGENCY MEDICINE
Payer: MEDICAID

## 2021-08-18 PROBLEM — E83.42 HYPOMAGNESEMIA: Status: ACTIVE | Noted: 2021-08-18

## 2021-08-18 LAB
ANION GAP SERPL CALC-SCNC: 8 MMOL/L (ref 7–16)
BUN SERPL-MCNC: 15 MG/DL (ref 8–22)
CALCIUM SERPL-MCNC: 7.9 MG/DL (ref 8.5–10.5)
CHLORIDE SERPL-SCNC: 104 MMOL/L (ref 96–112)
CO2 SERPL-SCNC: 25 MMOL/L (ref 20–33)
CREAT SERPL-MCNC: 0.57 MG/DL (ref 0.5–1.4)
ERYTHROCYTE [DISTWIDTH] IN BLOOD BY AUTOMATED COUNT: 43.4 FL (ref 35.9–50)
GLUCOSE BLD-MCNC: 236 MG/DL (ref 65–99)
GLUCOSE BLD-MCNC: 243 MG/DL (ref 65–99)
GLUCOSE BLD-MCNC: 261 MG/DL (ref 65–99)
GLUCOSE SERPL-MCNC: 228 MG/DL (ref 65–99)
HCT VFR BLD AUTO: 41 % (ref 42–52)
HGB BLD-MCNC: 13.7 G/DL (ref 14–18)
MAGNESIUM SERPL-MCNC: 1.8 MG/DL (ref 1.5–2.5)
MCH RBC QN AUTO: 29.8 PG (ref 27–33)
MCHC RBC AUTO-ENTMCNC: 33.4 G/DL (ref 33.7–35.3)
MCV RBC AUTO: 89.3 FL (ref 81.4–97.8)
PHOSPHATE SERPL-MCNC: 4.1 MG/DL (ref 2.5–4.5)
PLATELET # BLD AUTO: 624 K/UL (ref 164–446)
PMV BLD AUTO: 9.9 FL (ref 9–12.9)
POTASSIUM SERPL-SCNC: 3.5 MMOL/L (ref 3.6–5.5)
RBC # BLD AUTO: 4.59 M/UL (ref 4.7–6.1)
SODIUM SERPL-SCNC: 137 MMOL/L (ref 135–145)
WBC # BLD AUTO: 12.5 K/UL (ref 4.8–10.8)

## 2021-08-18 PROCEDURE — 85027 COMPLETE CBC AUTOMATED: CPT

## 2021-08-18 PROCEDURE — 83735 ASSAY OF MAGNESIUM: CPT

## 2021-08-18 PROCEDURE — 770022 HCHG ROOM/CARE - ICU (200)

## 2021-08-18 PROCEDURE — A9270 NON-COVERED ITEM OR SERVICE: HCPCS | Performed by: STUDENT IN AN ORGANIZED HEALTH CARE EDUCATION/TRAINING PROGRAM

## 2021-08-18 PROCEDURE — 71045 X-RAY EXAM CHEST 1 VIEW: CPT

## 2021-08-18 PROCEDURE — 700102 HCHG RX REV CODE 250 W/ 637 OVERRIDE(OP): Performed by: STUDENT IN AN ORGANIZED HEALTH CARE EDUCATION/TRAINING PROGRAM

## 2021-08-18 PROCEDURE — 700102 HCHG RX REV CODE 250 W/ 637 OVERRIDE(OP): Performed by: EMERGENCY MEDICINE

## 2021-08-18 PROCEDURE — 84100 ASSAY OF PHOSPHORUS: CPT

## 2021-08-18 PROCEDURE — 700111 HCHG RX REV CODE 636 W/ 250 OVERRIDE (IP): Performed by: EMERGENCY MEDICINE

## 2021-08-18 PROCEDURE — 80048 BASIC METABOLIC PNL TOTAL CA: CPT

## 2021-08-18 PROCEDURE — A9270 NON-COVERED ITEM OR SERVICE: HCPCS | Performed by: EMERGENCY MEDICINE

## 2021-08-18 PROCEDURE — 36556 INSERT NON-TUNNEL CV CATH: CPT

## 2021-08-18 PROCEDURE — 82962 GLUCOSE BLOOD TEST: CPT | Mod: 91

## 2021-08-18 PROCEDURE — 99291 CRITICAL CARE FIRST HOUR: CPT | Performed by: EMERGENCY MEDICINE

## 2021-08-18 PROCEDURE — 94640 AIRWAY INHALATION TREATMENT: CPT

## 2021-08-18 PROCEDURE — 700111 HCHG RX REV CODE 636 W/ 250 OVERRIDE (IP): Performed by: STUDENT IN AN ORGANIZED HEALTH CARE EDUCATION/TRAINING PROGRAM

## 2021-08-18 RX ORDER — DEXTROSE MONOHYDRATE 25 G/50ML
50 INJECTION, SOLUTION INTRAVENOUS
Status: DISCONTINUED | OUTPATIENT
Start: 2021-08-18 | End: 2021-08-19

## 2021-08-18 RX ORDER — POTASSIUM CHLORIDE 20 MEQ/1
60 TABLET, EXTENDED RELEASE ORAL ONCE
Status: COMPLETED | OUTPATIENT
Start: 2021-08-18 | End: 2021-08-18

## 2021-08-18 RX ORDER — MAGNESIUM SULFATE HEPTAHYDRATE 40 MG/ML
2 INJECTION, SOLUTION INTRAVENOUS ONCE
Status: COMPLETED | OUTPATIENT
Start: 2021-08-18 | End: 2021-08-18

## 2021-08-18 RX ORDER — INSULIN LISPRO 100 [IU]/ML
0.2 INJECTION, SOLUTION INTRAVENOUS; SUBCUTANEOUS
Status: DISCONTINUED | OUTPATIENT
Start: 2021-08-18 | End: 2021-08-19

## 2021-08-18 RX ORDER — INSULIN LISPRO 100 [IU]/ML
3-14 INJECTION, SOLUTION INTRAVENOUS; SUBCUTANEOUS
Status: DISCONTINUED | OUTPATIENT
Start: 2021-08-18 | End: 2021-08-19

## 2021-08-18 RX ADMIN — INSULIN LISPRO 4 UNITS: 100 INJECTION, SOLUTION INTRAVENOUS; SUBCUTANEOUS at 18:05

## 2021-08-18 RX ADMIN — ENOXAPARIN SODIUM 120 MG: 120 INJECTION SUBCUTANEOUS at 17:54

## 2021-08-18 RX ADMIN — INSULIN LISPRO 7 UNITS: 100 INJECTION, SOLUTION INTRAVENOUS; SUBCUTANEOUS at 13:04

## 2021-08-18 RX ADMIN — ROSUVASTATIN CALCIUM 40 MG: 20 TABLET, FILM COATED ORAL at 17:54

## 2021-08-18 RX ADMIN — EZETIMIBE 10 MG: 10 TABLET ORAL at 06:30

## 2021-08-18 RX ADMIN — INSULIN LISPRO 7 UNITS: 100 INJECTION, SOLUTION INTRAVENOUS; SUBCUTANEOUS at 18:04

## 2021-08-18 RX ADMIN — MAGNESIUM SULFATE 2 G: 2 INJECTION INTRAVENOUS at 09:30

## 2021-08-18 RX ADMIN — ENOXAPARIN SODIUM 120 MG: 120 INJECTION SUBCUTANEOUS at 06:30

## 2021-08-18 RX ADMIN — CLOPIDOGREL BISULFATE 75 MG: 75 TABLET ORAL at 06:30

## 2021-08-18 RX ADMIN — ACETAMINOPHEN 1000 MG: 500 TABLET, FILM COATED ORAL at 15:58

## 2021-08-18 RX ADMIN — INSULIN LISPRO 7 UNITS: 100 INJECTION, SOLUTION INTRAVENOUS; SUBCUTANEOUS at 20:15

## 2021-08-18 RX ADMIN — POTASSIUM CHLORIDE 60 MEQ: 20 TABLET, EXTENDED RELEASE ORAL at 09:30

## 2021-08-18 RX ADMIN — ACETAMINOPHEN 1000 MG: 500 TABLET, FILM COATED ORAL at 08:10

## 2021-08-18 RX ADMIN — INSULIN LISPRO 4 UNITS: 100 INJECTION, SOLUTION INTRAVENOUS; SUBCUTANEOUS at 06:32

## 2021-08-18 RX ADMIN — ACETAMINOPHEN 1000 MG: 500 TABLET, FILM COATED ORAL at 20:14

## 2021-08-18 RX ADMIN — INSULIN LISPRO 7 UNITS: 100 INJECTION, SOLUTION INTRAVENOUS; SUBCUTANEOUS at 06:31

## 2021-08-18 ASSESSMENT — PAIN DESCRIPTION - PAIN TYPE
TYPE: ACUTE PAIN

## 2021-08-18 NOTE — PROGRESS NOTES
"Critical Care Progress Note    Date of admission  8/6/2021    Chief Complaint  \"41 y.o. male with PMH including obesity, HFrEF 20%, CAD with hx of PCI to the ostial LAD in 10/2020, RCA in 2013 and 2014, OM in 2017, likely FH, uncontrolled DM, suspected FARHANA.  He presented to the ED 8/6 with fever, chills, cough and dyspnea.  He was found to have COVID-19 pneumonia and is unvaccinated.  He tells me that his fiancée was at the \"night [place] the country\" possible in [facility] and may have been exposed to Covid there.  She also contracted COVID-19 and has recovered.  He was admitted and placed on oxygen, dexamethasone and just completed a 5-day course of remdesivir.  Today he has had escalating FiO2 requirements up from baseline of 6 L oxygen since admission now on 60 L/100% HFNC with the addition of nonrebreather mask.  He is moderately dyspneic but not in acute distress.  CXR shows increasing bilateral infiltrates and edema.  He is drinking quite a bit of water and fluid status has been +3.9 L.  He received additional Lasix today.\"     Hospital Course  Admitted 8/6 with COVID-19 pneumonia, transferred to ICU 8/13 for persistent hypoxia on max HFNC  8/16 HFNC 60 L/100%, no distress, completed Decadron, transition to taper hydrocortisone, full dose anticoagulation for you UE DVT  8/17 intermittent BiPAP w/ desaturations when switching to HFNC, D/C hydrocortisone, D/C dexmedetomidine, restart norepinephrine for MAP > 65 or SBP > 90  8/18 titrate down HFNC, increase insulin, replete K, replete magnesium    Review of Systems  Review of Systems   All other systems reviewed and are negative.       Vital Signs for last 24 hours   Temp:  [2.7 °C (36.9 °F)-36.8 °C (98.2 °F)] 2.7 °C (36.9 °F)  Pulse:  [] 89  Resp:  [12-40] 22  BP: ()/() 129/67  SpO2:  [79 %-96 %] 86 %    Hemodynamic parameters for last 24 hours       Respiratory Information for the last 24 hours       Physical Exam   Physical Exam  Vitals and " nursing note reviewed.   HENT:      Head: Normocephalic and atraumatic.      Right Ear: External ear normal.      Left Ear: External ear normal.      Nose: Nose normal.      Mouth/Throat:      Mouth: Mucous membranes are moist.      Pharynx: Oropharynx is clear.   Eyes:      Pupils: Pupils are equal, round, and reactive to light.   Cardiovascular:      Rate and Rhythm: Regular rhythm. Tachycardia present.      Pulses: Normal pulses.      Heart sounds: Normal heart sounds.   Pulmonary:      Effort: Respiratory distress present.      Comments: Coarse bilateral breath sounds  Abdominal:      Palpations: Abdomen is soft.   Musculoskeletal:         General: Normal range of motion.      Cervical back: Normal range of motion and neck supple.   Skin:     General: Skin is warm and dry.      Capillary Refill: Capillary refill takes less than 2 seconds.   Neurological:      General: No focal deficit present.      Mental Status: He is alert.         Medications  Current Facility-Administered Medications   Medication Dose Route Frequency Provider Last Rate Last Admin   • insulin glargine (Semglee) injection  50 Units Subcutaneous Q EVENING Valentino Niño M.D.        And   • insulin lispro (AdmeLOG) injection  0.2 Units/kg/day Subcutaneous TID AC Valentino Niño M.D.   7 Units at 08/18/21 1304    And   • insulin lispro (AdmeLOG) injection  3-14 Units Subcutaneous 4X/DAY ACHS Valentino Niño M.D.   7 Units at 08/18/21 1304    And   • glucose 4 g chewable tablet 16 g  16 g Oral Q15 MIN PRN Valentino Niño M.D.        And   • dextrose 50% (D50W) injection 50 mL  50 mL Intravenous Q15 MIN PRN Valentino Niño M.D.       • norepinephrine (Levophed) 8 mg in 250 mL NS infusion (premix)  0-30 mcg/min Intravenous Continuous Valentino Niño M.D. 7.5 mL/hr at 08/18/21 1029 4 mcg/min at 08/18/21 1029   • dexmedetomidine (PRECEDEX) 400 mcg/100mL NS premix infusion  0.1-1.5 mcg/kg/hr Intravenous Continuous Porfirio Reyes  M.D.   Stopped at 08/17/21 1000   • MD Alert...ICU Electrolyte Replacement per Pharmacy   Other PHARMACY TO DOSE Porfirio Reyes M.D.       • enoxaparin (LOVENOX) inj 120 mg  120 mg Subcutaneous Q12HRS Brittaney Mclaughlin M.D.   120 mg at 08/18/21 0630   • senna-docusate (PERICOLACE or SENOKOT S) 8.6-50 MG per tablet 2 tablet  2 Tablet Oral BID Ayaan Chapa M.D.   2 Tablet at 08/17/21 1754    And   • polyethylene glycol/lytes (MIRALAX) PACKET 1 Packet  1 Packet Oral QDAY PRN Ayaan Chapa M.D.        And   • magnesium hydroxide (MILK OF MAGNESIA) suspension 30 mL  30 mL Oral QDAY PRN Ayaan Chapa M.D.        And   • bisacodyl (DULCOLAX) suppository 10 mg  10 mg Rectal QDAY PRN Ayaan Chapa M.D.       • Respiratory Therapy Consult   Nebulization Continuous RT Ayaan Chapa M.D.       • acetaminophen (TYLENOL) tablet 1,000 mg  1,000 mg Oral TID Ayaan Chapa M.D.   1,000 mg at 08/18/21 1558   • labetalol (NORMODYNE/TRANDATE) injection 10 mg  10 mg Intravenous Q4HRS PRN Ayaan Chapa M.D.       • ondansetron (ZOFRAN) syringe/vial injection 4 mg  4 mg Intravenous Q4HRS PRN Ayaan Chapa M.D.   4 mg at 08/08/21 0822   • ondansetron (ZOFRAN ODT) dispertab 4 mg  4 mg Oral Q4HRS PRN Ayaan Chapa M.D.   4 mg at 08/09/21 0804   • guaiFENesin dextromethorphan (ROBITUSSIN DM) 100-10 MG/5ML syrup 10 mL  10 mL Oral Q6HRS PRN Ayaan Chapa M.D.   10 mL at 08/16/21 2116   • clopidogrel (PLAVIX) tablet 75 mg  75 mg Oral DAILY Ayaan Chapa M.D.   75 mg at 08/18/21 0630   • rosuvastatin (CRESTOR) tablet 40 mg  40 mg Oral Q EVENING Ayaan Chapa M.D.   40 mg at 08/17/21 1754   • ezetimibe (ZETIA) tablet 10 mg  10 mg Oral DAILY Ayaan Chapa M.D.   10 mg at 08/18/21 0630       Fluids    Intake/Output Summary (Last 24 hours) at 8/18/2021 1612  Last data filed at 8/18/2021  1200  Gross per 24 hour   Intake 2172.86 ml   Output 370 ml   Net 1802.86 ml       Laboratory          Recent Labs     08/16/21  0402 08/17/21  0352 08/18/21  0405   SODIUM 140 136 137   POTASSIUM 3.5* 4.4 3.5*   CHLORIDE 105 101 104   CO2 24 24 25   BUN 15 11 15   CREATININE 0.45* 0.43* 0.57   MAGNESIUM  --   --  1.8   PHOSPHORUS  --   --  4.1   CALCIUM 8.3* 8.7 7.9*     Recent Labs     08/16/21  0402 08/17/21 0352 08/18/21  0405   GLUCOSE 104* 239* 228*     Recent Labs     08/16/21 0402 08/17/21 0352 08/18/21  0405   WBC 6.9 8.4 12.5*   NEUTSPOLYS 64.10 79.60*  --    LYMPHOCYTES 27.70 14.90*  --    MONOCYTES 3.10 2.30  --    EOSINOPHILS 2.90 0.90  --    BASOPHILS 0.70 0.50  --      Recent Labs     08/16/21 0402 08/17/21 0352 08/18/21  0405   RBC 4.69* 5.00 4.59*   HEMOGLOBIN 14.0 15.1 13.7*   HEMATOCRIT 42.2 44.8 41.0*   PLATELETCT 586* 602* 624*       Imaging  X-Ray:  I have personally reviewed the images and compared with prior images.    Assessment/Plan  * Pneumonia due to COVID-19 virus- (present on admission)  Assessment & Plan  COVID-19 pneumonia  Dexamethasone - completed course  Tocilizumab 8/13  Fluid balance - keep euvolemic    Hypomagnesemia  Assessment & Plan  Mag 1.8  Replete magnesium    Hypokalemia  Assessment & Plan  K 3.5  Replete K to be > 4.0    Acute deep vein thrombosis (DVT) of brachial vein of left upper extremity (HCC)- (present on admission)  Assessment & Plan  Left brachial DVT  Therapeutic anticoagulation with enoxaparin    Type 2 diabetes mellitus without complication, without long-term current use of insulin (HCC)- (present on admission)  Assessment & Plan  Uncontrolled, glycohemoglobin 11.3  Continue glycemic control    Acute respiratory failure with hypoxia ([facility])- (present on admission)  Assessment & Plan  Acute hypoxic respiratory failure due to COVID-19 pneumonia  Intermittent humidified high-flow nasal cannula and NIPPV w/ BiPAP  Titrate to keep O2 saturation > 88%  Encourage  self-proning  Encourage incentive spirometry  Aggressive pulmonary hygiene    Morbid obesity with BMI of 40.0-44.9, adult (HCC)- (present on admission)  Assessment & Plan  With suspected underlying FARHANA  Will do trial of Bipap to see if recruitable lung    Sepsis (HCC)- (present on admission)  Assessment & Plan  Septic shock 2/2 COVID-19 pneumonia  Titrate norepinephrine to maintain MAP > 65    Heart failure with reduced ejection fraction (HCC)- (present on admission)  Assessment & Plan  Chronic hypoxia and lung infiltrates/edema secondary likely more related to acute infection of COVID-19 pneumonia  Continue negative fluid balance as tolerated  Close monitoring of I/Os  Currently hold heart failure regime with hypotension    Repeat echo reviewed with improved EF but difficult with wall motion abnormalities    Coronary artery disease involving native coronary artery of native heart without angina pectoris- (present on admission)  Assessment & Plan  CAD with hx of PCI to the ostial LAD in 10/2020, RCA in 2013 and 2014, OM in 2017  Clopidogrel    Tobacco use- (present on admission)  Assessment & Plan  Cessation counseling when appropriate    Hyperlipemia- (present on admission)  Assessment & Plan  Suspected familial hyperlipidemia  On Crestor and Zetia  Monitor LFTs     DVT prophylaxis: Enoxaparin  PUD prophylaxis: Not indicated  Glycemic control: Sliding scale insulin  Nutrition: Diet  Lines: None  [place]: Need for strict I/O monitoring, remove when able  Mobility: Early mobility as tolerated  Goals of care: Full code  Disposition: ICU    I have performed a physical exam and reviewed and updated ROS and Plan today (8/18/2021). In review of yesterday's note (8/17/2021), there are no changes except as documented above.     Discussed patient condition and risk of morbidity and/or mortality with RN, RT, Pharmacy, Charge nurse / hot rounds and Patient     The patient remains critically ill.  Critical care time = 31 minutes  in directly providing and coordinating critical care and extensive data review.  No time overlap and excludes procedures.

## 2021-08-18 NOTE — PROGRESS NOTES
Low dose norepinephrine gtt running peripherally. Pt started complaining of soreness with IV. Gtt stopped immediately. RN notified day shift RN to speak with pharmacy regarding infiltrated IV with vasopressors. IV left in place and new 18g ultrasound IV placed.

## 2021-08-18 NOTE — PROGRESS NOTES
I was called because patient's levophed extravasated in his PIV.  Rate was at 3mcg/min when this occurred.  Infusion was stopped. I spoke to pharmacy and they will order the correct dose of phentolamine. The area is on the R bicep and there was mild blanching and tenderness. Central line placed. Phentolamine will be administered by RN.

## 2021-08-18 NOTE — CARE PLAN
Problem: Knowledge Deficit - Standard  Goal: Patient and family/care givers will demonstrate understanding of plan of care, disease process/condition, diagnostic tests and medications  Outcome: Progressing   The patient is Watcher - Medium risk of patient condition declining or worsening    Shift Goals  Clinical Goals: wean oxygen  Patient Goals: get off hi-rafita  Family Goals: N/A    Progress made toward(s) clinical / shift goals:  y,hi-rafita settings decreased    Patient is not progressing towards the following goals:

## 2021-08-18 NOTE — CARE PLAN
Overnight patient PIV running Levophed infiltrated, Pharmacist and Intensivist notified. RIJ CVC placed. MD notified of increasing tachycardia post CVC placement, CXR ordered. Patient serially assessed by MD. Please see correlating notes. No further acute events.    Shift Goals  Clinical Goals: Maintain MAP > 65 over shfit weaning Levophed.  Patient Goals: Increase IS volume  Family Goals: N/A      Patient is not progressing towards the following goals:

## 2021-08-18 NOTE — PROCEDURES
Central Line Insertion    Date/Time: 8/17/2021 9:02 PM  Performed by: Amber Gibson M.D.  Authorized by: Amber Gibson M.D.     Consent:     Consent obtained:  Written    Consent given by:  Patient    Risks discussed:  Arterial puncture, infection, incorrect placement, pneumothorax, nerve damage and bleeding  Pre-procedure details:     Hand hygiene: Hand hygiene performed prior to insertion      Sterile barrier technique: All elements of maximal sterile technique followed      Skin preparation:  ChloraPrep    Skin preparation agent: Skin preparation agent completely dried prior to procedure    Sedation:     Sedation type:  None  Anesthesia:     Anesthesia method:  Local infiltration    Local anesthetic:  Lidocaine 1% w/o epi  Procedure details:     Location:  R internal jugular    Patient position:  Trendelenburg    Procedural supplies:  Triple lumen    Catheter size:  7 Fr    Landmarks identified: yes      Ultrasound guidance: yes      Sterile ultrasound techniques: Sterile gel and sterile probe covers were used      Number of attempts:  1    Successful placement: yes    Post-procedure details:     Post-procedure:  Dressing applied and line sutured    Assessment:  Blood return through all ports, no pneumothorax on x-ray, placement verified by x-ray and free fluid flow    Patient tolerance of procedure:  Tolerated well, no immediate complications  Comments:      Very small IJ.  Patient required steep trendelenburg to open up the vessel.

## 2021-08-19 LAB
ANION GAP SERPL CALC-SCNC: 6 MMOL/L (ref 7–16)
BUN SERPL-MCNC: 9 MG/DL (ref 8–22)
CALCIUM SERPL-MCNC: 7.9 MG/DL (ref 8.5–10.5)
CHLORIDE SERPL-SCNC: 106 MMOL/L (ref 96–112)
CO2 SERPL-SCNC: 25 MMOL/L (ref 20–33)
CREAT SERPL-MCNC: 0.48 MG/DL (ref 0.5–1.4)
ERYTHROCYTE [DISTWIDTH] IN BLOOD BY AUTOMATED COUNT: 45.8 FL (ref 35.9–50)
GLUCOSE BLD-MCNC: 251 MG/DL (ref 65–99)
GLUCOSE BLD-MCNC: 255 MG/DL (ref 65–99)
GLUCOSE BLD-MCNC: 258 MG/DL (ref 65–99)
GLUCOSE SERPL-MCNC: 213 MG/DL (ref 65–99)
HCT VFR BLD AUTO: 40.9 % (ref 42–52)
HGB BLD-MCNC: 13.6 G/DL (ref 14–18)
MAGNESIUM SERPL-MCNC: 2 MG/DL (ref 1.5–2.5)
MCH RBC QN AUTO: 30.6 PG (ref 27–33)
MCHC RBC AUTO-ENTMCNC: 33.3 G/DL (ref 33.7–35.3)
MCV RBC AUTO: 91.9 FL (ref 81.4–97.8)
PHOSPHATE SERPL-MCNC: 3.3 MG/DL (ref 2.5–4.5)
PLATELET # BLD AUTO: 548 K/UL (ref 164–446)
PMV BLD AUTO: 9.6 FL (ref 9–12.9)
POTASSIUM SERPL-SCNC: 3.7 MMOL/L (ref 3.6–5.5)
RBC # BLD AUTO: 4.45 M/UL (ref 4.7–6.1)
SODIUM SERPL-SCNC: 137 MMOL/L (ref 135–145)
WBC # BLD AUTO: 10.8 K/UL (ref 4.8–10.8)

## 2021-08-19 PROCEDURE — 770022 HCHG ROOM/CARE - ICU (200)

## 2021-08-19 PROCEDURE — A9270 NON-COVERED ITEM OR SERVICE: HCPCS | Performed by: STUDENT IN AN ORGANIZED HEALTH CARE EDUCATION/TRAINING PROGRAM

## 2021-08-19 PROCEDURE — 83735 ASSAY OF MAGNESIUM: CPT

## 2021-08-19 PROCEDURE — A9270 NON-COVERED ITEM OR SERVICE: HCPCS | Performed by: EMERGENCY MEDICINE

## 2021-08-19 PROCEDURE — 80048 BASIC METABOLIC PNL TOTAL CA: CPT

## 2021-08-19 PROCEDURE — 700111 HCHG RX REV CODE 636 W/ 250 OVERRIDE (IP): Performed by: STUDENT IN AN ORGANIZED HEALTH CARE EDUCATION/TRAINING PROGRAM

## 2021-08-19 PROCEDURE — 700101 HCHG RX REV CODE 250: Performed by: EMERGENCY MEDICINE

## 2021-08-19 PROCEDURE — 99291 CRITICAL CARE FIRST HOUR: CPT | Performed by: EMERGENCY MEDICINE

## 2021-08-19 PROCEDURE — 700102 HCHG RX REV CODE 250 W/ 637 OVERRIDE(OP): Performed by: EMERGENCY MEDICINE

## 2021-08-19 PROCEDURE — 85027 COMPLETE CBC AUTOMATED: CPT

## 2021-08-19 PROCEDURE — 82962 GLUCOSE BLOOD TEST: CPT

## 2021-08-19 PROCEDURE — 84100 ASSAY OF PHOSPHORUS: CPT

## 2021-08-19 PROCEDURE — 700102 HCHG RX REV CODE 250 W/ 637 OVERRIDE(OP): Performed by: STUDENT IN AN ORGANIZED HEALTH CARE EDUCATION/TRAINING PROGRAM

## 2021-08-19 PROCEDURE — 700111 HCHG RX REV CODE 636 W/ 250 OVERRIDE (IP): Performed by: EMERGENCY MEDICINE

## 2021-08-19 PROCEDURE — 94640 AIRWAY INHALATION TREATMENT: CPT

## 2021-08-19 RX ORDER — FUROSEMIDE 10 MG/ML
20 INJECTION INTRAMUSCULAR; INTRAVENOUS
Status: DISCONTINUED | OUTPATIENT
Start: 2021-08-19 | End: 2021-08-26

## 2021-08-19 RX ORDER — DEXTROSE MONOHYDRATE 25 G/50ML
50 INJECTION, SOLUTION INTRAVENOUS
Status: DISCONTINUED | OUTPATIENT
Start: 2021-08-19 | End: 2021-08-21

## 2021-08-19 RX ORDER — INSULIN LISPRO 100 [IU]/ML
0.2 INJECTION, SOLUTION INTRAVENOUS; SUBCUTANEOUS
Status: DISCONTINUED | OUTPATIENT
Start: 2021-08-19 | End: 2021-08-21

## 2021-08-19 RX ORDER — INSULIN LISPRO 100 [IU]/ML
3-14 INJECTION, SOLUTION INTRAVENOUS; SUBCUTANEOUS
Status: DISCONTINUED | OUTPATIENT
Start: 2021-08-19 | End: 2021-08-21

## 2021-08-19 RX ORDER — POTASSIUM CHLORIDE 20 MEQ/1
40 TABLET, EXTENDED RELEASE ORAL ONCE
Status: COMPLETED | OUTPATIENT
Start: 2021-08-19 | End: 2021-08-19

## 2021-08-19 RX ADMIN — ENOXAPARIN SODIUM 120 MG: 120 INJECTION SUBCUTANEOUS at 17:51

## 2021-08-19 RX ADMIN — INSULIN LISPRO 7 UNITS: 100 INJECTION, SOLUTION INTRAVENOUS; SUBCUTANEOUS at 17:53

## 2021-08-19 RX ADMIN — NOREPINEPHRINE BITARTRATE 2.5 MCG/MIN: 1 INJECTION INTRAVENOUS at 08:25

## 2021-08-19 RX ADMIN — INSULIN LISPRO 7 UNITS: 100 INJECTION, SOLUTION INTRAVENOUS; SUBCUTANEOUS at 11:34

## 2021-08-19 RX ADMIN — ENOXAPARIN SODIUM 120 MG: 120 INJECTION SUBCUTANEOUS at 05:13

## 2021-08-19 RX ADMIN — INSULIN LISPRO 7 UNITS: 100 INJECTION, SOLUTION INTRAVENOUS; SUBCUTANEOUS at 17:56

## 2021-08-19 RX ADMIN — ACETAMINOPHEN 1000 MG: 500 TABLET, FILM COATED ORAL at 08:27

## 2021-08-19 RX ADMIN — GUAIFENESIN SYRUP AND DEXTROMETHORPHAN 10 ML: 100; 10 SYRUP ORAL at 20:50

## 2021-08-19 RX ADMIN — INSULIN LISPRO 7 UNITS: 100 INJECTION, SOLUTION INTRAVENOUS; SUBCUTANEOUS at 07:38

## 2021-08-19 RX ADMIN — GUAIFENESIN SYRUP AND DEXTROMETHORPHAN 10 ML: 100; 10 SYRUP ORAL at 01:19

## 2021-08-19 RX ADMIN — FUROSEMIDE 20 MG: 20 INJECTION, SOLUTION INTRAMUSCULAR; INTRAVENOUS at 15:22

## 2021-08-19 RX ADMIN — INSULIN LISPRO 3 UNITS: 100 INJECTION, SOLUTION INTRAVENOUS; SUBCUTANEOUS at 07:38

## 2021-08-19 RX ADMIN — ACETAMINOPHEN 1000 MG: 500 TABLET, FILM COATED ORAL at 20:50

## 2021-08-19 RX ADMIN — INSULIN LISPRO 7 UNITS: 100 INJECTION, SOLUTION INTRAVENOUS; SUBCUTANEOUS at 11:33

## 2021-08-19 RX ADMIN — ROSUVASTATIN CALCIUM 40 MG: 20 TABLET, FILM COATED ORAL at 17:57

## 2021-08-19 RX ADMIN — DOCUSATE SODIUM 50 MG AND SENNOSIDES 8.6 MG 2 TABLET: 8.6; 5 TABLET, FILM COATED ORAL at 17:57

## 2021-08-19 RX ADMIN — INSULIN LISPRO 7 UNITS: 100 INJECTION, SOLUTION INTRAVENOUS; SUBCUTANEOUS at 20:50

## 2021-08-19 RX ADMIN — FUROSEMIDE 20 MG: 20 INJECTION, SOLUTION INTRAMUSCULAR; INTRAVENOUS at 07:39

## 2021-08-19 RX ADMIN — POTASSIUM CHLORIDE 40 MEQ: 20 TABLET, EXTENDED RELEASE ORAL at 09:01

## 2021-08-19 RX ADMIN — EZETIMIBE 10 MG: 10 TABLET ORAL at 05:13

## 2021-08-19 RX ADMIN — CLOPIDOGREL BISULFATE 75 MG: 75 TABLET ORAL at 05:13

## 2021-08-19 RX ADMIN — ACETAMINOPHEN 1000 MG: 500 TABLET, FILM COATED ORAL at 15:22

## 2021-08-19 ASSESSMENT — FIBROSIS 4 INDEX: FIB4 SCORE: 0.52

## 2021-08-19 NOTE — CARE PLAN
The patient is {Patient Stability:[id number]}    Shift Goals  Clinical Goals: wean oxygen  Patient Goals: get off hi-rafita  Family Goals: N/A    Progress made toward(s) clinical / shift goals:  progressing    Patient is not progressing towards the following goals:

## 2021-08-19 NOTE — CARE PLAN
Problem: Knowledge Deficit - Standard  Goal: Patient and family/care givers will demonstrate understanding of plan of care, disease process/condition, diagnostic tests and medications  Outcome: Progressing

## 2021-08-19 NOTE — PROGRESS NOTES
"Critical Care Progress Note    Date of admission  8/6/2021    Chief Complaint  \"41 y.o. male with PMH including obesity, HFrEF 20%, CAD with hx of PCI to the ostial LAD in 10/2020, RCA in 2013 and 2014, OM in 2017, likely FH, uncontrolled DM, suspected FARHANA.  He presented to the ED 8/6 with fever, chills, cough and dyspnea.  He was found to have COVID-19 pneumonia and is unvaccinated.  He tells me that his fiancée was at the \"night [place] the country[place] possible in [facility] and may have been exposed to Covid there.  She also contracted COVID-19 and has recovered.  He was admitted and placed on oxygen, dexamethasone and just completed a 5-day course of remdesivir.  Today he has had escalating FiO2 requirements up from baseline of 6 L oxygen since admission now on 60 L/100% HFNC with the addition of nonrebreather mask.  He is moderately dyspneic but not in acute distress.  CXR shows increasing bilateral infiltrates and edema.  He is drinking quite a bit of water and fluid status has been +3.9 L.  He received additional Lasix today.\"     Hospital Course  Admitted 8/6 with COVID-19 pneumonia, transferred to ICU 8/13 for persistent hypoxia on max HFNC  8/16 HFNC 60 L/100%, no distress, completed Decadron, transition to taper hydrocortisone, full dose anticoagulation for you UE DVT  8/17 intermittent BiPAP w/ desaturations when switching to HFNC, D/C hydrocortisone, D/C dexmedetomidine, restart norepinephrine for MAP > 65 or SBP > 90  8/18 titrate down HFNC, increase insulin, replete K, replete magnesium  8/19 continue norepinephrine, furosemide, increase insulin glargine by 10    Review of Systems  Review of Systems   All other systems reviewed and are negative.       Vital Signs for last 24 hours   Temp:  [36.1 °C (97 °F)-37 °C (98.6 °F)] 37 °C (98.6 °F)  Pulse:  [] 134  Resp:  [17-47] 30  BP: ()/(36-83) 98/58  SpO2:  [86 %-94 %] 86 %    Hemodynamic parameters for last 24 hours       Respiratory Information for " the last 24 hours       Physical Exam   Physical Exam  Vitals and nursing note reviewed.   HENT:      Head: Normocephalic and atraumatic.      Right Ear: External ear normal.      Left Ear: External ear normal.      Nose: Nose normal.      Mouth/Throat:      Mouth: Mucous membranes are moist.      Pharynx: Oropharynx is clear.   Eyes:      Pupils: Pupils are equal, round, and reactive to light.   Cardiovascular:      Rate and Rhythm: Regular rhythm. Tachycardia present.      Pulses: Normal pulses.      Heart sounds: Normal heart sounds.   Pulmonary:      Effort: Respiratory distress present.      Comments: Coarse bilateral breath sounds  Abdominal:      Palpations: Abdomen is soft.   Musculoskeletal:         General: Normal range of motion.      Cervical back: Normal range of motion and neck supple.   Skin:     General: Skin is warm and dry.      Capillary Refill: Capillary refill takes less than 2 seconds.   Neurological:      General: No focal deficit present.      Mental Status: He is alert.         Medications  Current Facility-Administered Medications   Medication Dose Route Frequency Provider Last Rate Last Admin   • furosemide (LASIX) injection 20 mg  20 mg Intravenous BID DIURETIC Valentino Niño M.D.   20 mg at 08/19/21 1522   • insulin glargine (Semglee) injection  60 Units Subcutaneous Q EVENING Valentino Niño M.D.        And   • insulin lispro (AdmeLOG) injection  0.2 Units/kg/day Subcutaneous TID AC Valentino Niño M.D.   7 Units at 08/19/21 1134    And   • insulin lispro (AdmeLOG) injection  3-14 Units Subcutaneous 4X/DAY ACHS Valentino Niño M.D.   7 Units at 08/19/21 1133    And   • glucose 4 g chewable tablet 16 g  16 g Oral Q15 MIN PRN Valentino Niño M.D.        And   • dextrose 50% (D50W) injection 50 mL  50 mL Intravenous Q15 MIN PRN Valentino Niño M.D.       • norepinephrine (Levophed) 8 mg in 250 mL NS infusion (premix)  0-30 mcg/min Intravenous Continuous Valentino ENGLISH  ALISON Niño 4.7 mL/hr at 08/19/21 0825 2.5 mcg/min at 08/19/21 0825   • dexmedetomidine (PRECEDEX) 400 mcg/100mL NS premix infusion  0.1-1.5 mcg/kg/hr Intravenous Continuous Porfirio Reyes M.D.   Stopped at 08/17/21 1000   • MD Alert...ICU Electrolyte Replacement per Pharmacy   Other PHARMACY TO DOSE Porfirio Reyes M.D.       • enoxaparin (LOVENOX) inj 120 mg  120 mg Subcutaneous Q12HRS Brittaney Mclaughlin M.D.   120 mg at 08/19/21 0513   • senna-docusate (PERICOLACE or SENOKOT S) 8.6-50 MG per tablet 2 tablet  2 Tablet Oral BID Ayaan Chapa M.D.   2 Tablet at 08/17/21 1754    And   • polyethylene glycol/lytes (MIRALAX) PACKET 1 Packet  1 Packet Oral QDAY PRN Ayaan Chapa M.D.        And   • magnesium hydroxide (MILK OF MAGNESIA) suspension 30 mL  30 mL Oral QDAY PRN Ayaan Chapa M.D.        And   • bisacodyl (DULCOLAX) suppository 10 mg  10 mg Rectal QDAY PRN Ayaan Chapa M.D.       • Respiratory Therapy Consult   Nebulization Continuous RT Ayaan Chapa M.D.       • acetaminophen (TYLENOL) tablet 1,000 mg  1,000 mg Oral TID Ayaan Chapa M.D.   1,000 mg at 08/19/21 1522   • labetalol (NORMODYNE/TRANDATE) injection 10 mg  10 mg Intravenous Q4HRS PRN Ayaan Chapa M.D.       • ondansetron (ZOFRAN) syringe/vial injection 4 mg  4 mg Intravenous Q4HRS PRN Ayaan Chapa M.D.   4 mg at 08/08/21 0822   • ondansetron (ZOFRAN ODT) dispertab 4 mg  4 mg Oral Q4HRS PRN Ayaan Chapa M.D.   4 mg at 08/09/21 0804   • guaiFENesin dextromethorphan (ROBITUSSIN DM) 100-10 MG/5ML syrup 10 mL  10 mL Oral Q6HRS PRN Ayaan D Toi Chapa M.D.   10 mL at 08/19/21 0119   • clopidogrel (PLAVIX) tablet 75 mg  75 mg Oral DAILY Ayaan D Toi Chapa M.D.   75 mg at 08/19/21 0513   • rosuvastatin (CRESTOR) tablet 40 mg  40 mg Oral Q EVENING Ayaan D Toi Chapa M.D.   40 mg at 08/18/21 1754   • ezetimibe  (ZETIA) tablet 10 mg  10 mg Oral DAILY Ayaan Chapa M.D.   10 mg at 08/19/21 0513       Fluids    Intake/Output Summary (Last 24 hours) at 8/19/2021 1702  Last data filed at 8/19/2021 1600  Gross per 24 hour   Intake 1176.81 ml   Output 3000 ml   Net -1823.19 ml       Laboratory          Recent Labs     08/17/21 0352 08/18/21  0405 08/19/21  0450   SODIUM 136 137 137   POTASSIUM 4.4 3.5* 3.7   CHLORIDE 101 104 106   CO2 24 25 25   BUN 11 15 9   CREATININE 0.43* 0.57 0.48*   MAGNESIUM  --  1.8 2.0   PHOSPHORUS  --  4.1 3.3   CALCIUM 8.7 7.9* 7.9*     Recent Labs     08/17/21 0352 08/18/21 0405 08/19/21  0450   GLUCOSE 239* 228* 213*     Recent Labs     08/17/21 0352 08/18/21 0405 08/19/21  0450   WBC 8.4 12.5* 10.8   NEUTSPOLYS 79.60*  --   --    LYMPHOCYTES 14.90*  --   --    MONOCYTES 2.30  --   --    EOSINOPHILS 0.90  --   --    BASOPHILS 0.50  --   --      Recent Labs     08/17/21 0352 08/18/21 0405 08/19/21  0450   RBC 5.00 4.59* 4.45*   HEMOGLOBIN 15.1 13.7* 13.6*   HEMATOCRIT 44.8 41.0* 40.9*   PLATELETCT 602* 624* 548*       Imaging  X-Ray:  I have personally reviewed the images and compared with prior images.    Assessment/Plan  * Pneumonia due to COVID-19 virus- (present on admission)  Assessment & Plan  COVID-19 pneumonia  Dexamethasone - completed course  Tocilizumab 8/13  Fluid balance - forced diuresis w/ furosemide    Hypomagnesemia  Assessment & Plan  Mag 2.0    Hypokalemia  Assessment & Plan  K 3.7  Replete K to be > 4.0    Acute deep vein thrombosis (DVT) of brachial vein of left upper extremity (HCC)- (present on admission)  Assessment & Plan  Left brachial DVT  Therapeutic anticoagulation with enoxaparin    Type 2 diabetes mellitus without complication, without long-term current use of insulin (HCC)- (present on admission)  Assessment & Plan  Uncontrolled, glycohemoglobin 11.3  Increase glargine insulin    Acute respiratory failure with hypoxia (HCC)- (present on  admission)  Assessment & Plan  Acute hypoxic respiratory failure due to COVID-19 pneumonia  Intermittent humidified high-flow nasal cannula and NIPPV w/ BiPAP  Titrate to keep O2 saturation > 88%  Encourage self-proning  Encourage incentive spirometry  Aggressive pulmonary hygiene    Morbid obesity with BMI of 40.0-44.9, adult (HCC)- (present on admission)  Assessment & Plan  With suspected underlying FARHANA  Will do trial of Bipap to see if recruitable lung    Sepsis (HCC)- (present on admission)  Assessment & Plan  Septic shock 2/2 COVID-19 pneumonia  Titrate norepinephrine to maintain MAP > 65    Heart failure with reduced ejection fraction (HCC)- (present on admission)  Assessment & Plan  Chronic hypoxia and lung infiltrates/edema secondary likely more related to acute infection of COVID-19 pneumonia  Continue negative fluid balance as tolerated  Close monitoring of I/Os  Currently hold heart failure regime with hypotension    Repeat echo reviewed with improved EF but difficult with wall motion abnormalities    Coronary artery disease involving native coronary artery of native heart without angina pectoris- (present on admission)  Assessment & Plan  CAD with hx of PCI to the ostial LAD in 10/2020, RCA in 2013 and 2014, OM in 2017  Clopidogrel    Tobacco use- (present on admission)  Assessment & Plan  Cessation counseling when appropriate    Hyperlipemia- (present on admission)  Assessment & Plan  Suspected familial hyperlipidemia  On Crestor and Zetia  Monitor LFTs     DVT prophylaxis: Enoxaparin  PUD prophylaxis: Not indicated  Glycemic control: Sliding scale insulin  Nutrition: Diet  Lines: None  Ballard: Need for strict I/O monitoring, remove when able  Mobility: Early mobility as tolerated  Goals of care: Full code  Disposition: ICU    I have performed a physical exam and reviewed and updated ROS and Plan today (8/19/2021). In review of yesterday's note (8/18/2021), there are no changes except as documented  above.     Discussed patient condition and risk of morbidity and/or mortality with RN, RT, Pharmacy, Charge nurse / hot rounds and Patient     The patient remains critically ill.  Critical care time = 41 minutes in directly providing and coordinating critical care and extensive data review.  No time overlap and excludes procedures.

## 2021-08-19 NOTE — CARE PLAN
Problem: Knowledge Deficit - Standard  Goal: Patient and family/care givers will demonstrate understanding of plan of care, disease process/condition, diagnostic tests and medications  8/19/2021 1111 by Kalyani Tran R.N.  Outcome: Progressing  8/19/2021 0807 by Kalyani Tran R.N.  Outcome: Progressing  8/19/2021 0806 by Kalyani Tran R.N.  Outcome: Progressing   The patient is Watcher - Medium risk of patient condition declining or worsening    Shift Goals  Clinical Goals: wean oxygen  Patient Goals: get off hi-rafita  Family Goals: N/A    Progress made toward(s) clinical / shift goals:  Pt is able to eat by himself and do small tasks.    Patient is not progressing towards the following goals:pt still needs norepinephrine to maintain bp.

## 2021-08-20 ENCOUNTER — APPOINTMENT (OUTPATIENT)
Dept: RADIOLOGY | Facility: MEDICAL CENTER | Age: 41
DRG: 871 | End: 2021-08-20
Attending: EMERGENCY MEDICINE
Payer: MEDICAID

## 2021-08-20 LAB
ANION GAP SERPL CALC-SCNC: 9 MMOL/L (ref 7–16)
BUN SERPL-MCNC: 9 MG/DL (ref 8–22)
CALCIUM SERPL-MCNC: 8.2 MG/DL (ref 8.5–10.5)
CHLORIDE SERPL-SCNC: 105 MMOL/L (ref 96–112)
CO2 SERPL-SCNC: 27 MMOL/L (ref 20–33)
CREAT SERPL-MCNC: 0.51 MG/DL (ref 0.5–1.4)
ERYTHROCYTE [DISTWIDTH] IN BLOOD BY AUTOMATED COUNT: 45.7 FL (ref 35.9–50)
GLUCOSE BLD-MCNC: 243 MG/DL (ref 65–99)
GLUCOSE BLD-MCNC: 262 MG/DL (ref 65–99)
GLUCOSE BLD-MCNC: 281 MG/DL (ref 65–99)
GLUCOSE BLD-MCNC: 379 MG/DL (ref 65–99)
GLUCOSE SERPL-MCNC: 188 MG/DL (ref 65–99)
HCT VFR BLD AUTO: 43.4 % (ref 42–52)
HGB BLD-MCNC: 14.1 G/DL (ref 14–18)
MAGNESIUM SERPL-MCNC: 2 MG/DL (ref 1.5–2.5)
MCH RBC QN AUTO: 29.9 PG (ref 27–33)
MCHC RBC AUTO-ENTMCNC: 32.5 G/DL (ref 33.7–35.3)
MCV RBC AUTO: 91.9 FL (ref 81.4–97.8)
PHOSPHATE SERPL-MCNC: 3.2 MG/DL (ref 2.5–4.5)
PLATELET # BLD AUTO: 536 K/UL (ref 164–446)
PMV BLD AUTO: 9.7 FL (ref 9–12.9)
POTASSIUM SERPL-SCNC: 3.5 MMOL/L (ref 3.6–5.5)
RBC # BLD AUTO: 4.72 M/UL (ref 4.7–6.1)
SODIUM SERPL-SCNC: 141 MMOL/L (ref 135–145)
WBC # BLD AUTO: 9.7 K/UL (ref 4.8–10.8)

## 2021-08-20 PROCEDURE — 85027 COMPLETE CBC AUTOMATED: CPT

## 2021-08-20 PROCEDURE — 770022 HCHG ROOM/CARE - ICU (200)

## 2021-08-20 PROCEDURE — 82962 GLUCOSE BLOOD TEST: CPT

## 2021-08-20 PROCEDURE — 83735 ASSAY OF MAGNESIUM: CPT

## 2021-08-20 PROCEDURE — 94640 AIRWAY INHALATION TREATMENT: CPT

## 2021-08-20 PROCEDURE — 700111 HCHG RX REV CODE 636 W/ 250 OVERRIDE (IP): Performed by: STUDENT IN AN ORGANIZED HEALTH CARE EDUCATION/TRAINING PROGRAM

## 2021-08-20 PROCEDURE — 700111 HCHG RX REV CODE 636 W/ 250 OVERRIDE (IP): Performed by: EMERGENCY MEDICINE

## 2021-08-20 PROCEDURE — 80048 BASIC METABOLIC PNL TOTAL CA: CPT

## 2021-08-20 PROCEDURE — 700102 HCHG RX REV CODE 250 W/ 637 OVERRIDE(OP): Performed by: STUDENT IN AN ORGANIZED HEALTH CARE EDUCATION/TRAINING PROGRAM

## 2021-08-20 PROCEDURE — 84100 ASSAY OF PHOSPHORUS: CPT

## 2021-08-20 PROCEDURE — A9270 NON-COVERED ITEM OR SERVICE: HCPCS | Performed by: STUDENT IN AN ORGANIZED HEALTH CARE EDUCATION/TRAINING PROGRAM

## 2021-08-20 PROCEDURE — 700102 HCHG RX REV CODE 250 W/ 637 OVERRIDE(OP): Performed by: EMERGENCY MEDICINE

## 2021-08-20 PROCEDURE — 99291 CRITICAL CARE FIRST HOUR: CPT | Performed by: EMERGENCY MEDICINE

## 2021-08-20 PROCEDURE — 71045 X-RAY EXAM CHEST 1 VIEW: CPT

## 2021-08-20 RX ORDER — POTASSIUM CHLORIDE 14.9 MG/ML
20 INJECTION INTRAVENOUS ONCE
Status: COMPLETED | OUTPATIENT
Start: 2021-08-20 | End: 2021-08-20

## 2021-08-20 RX ADMIN — ACETAMINOPHEN 1000 MG: 500 TABLET, FILM COATED ORAL at 20:55

## 2021-08-20 RX ADMIN — ACETAMINOPHEN 1000 MG: 500 TABLET, FILM COATED ORAL at 08:09

## 2021-08-20 RX ADMIN — ACETAMINOPHEN 1000 MG: 500 TABLET, FILM COATED ORAL at 15:25

## 2021-08-20 RX ADMIN — INSULIN LISPRO 7 UNITS: 100 INJECTION, SOLUTION INTRAVENOUS; SUBCUTANEOUS at 08:10

## 2021-08-20 RX ADMIN — INSULIN LISPRO 4 UNITS: 100 INJECTION, SOLUTION INTRAVENOUS; SUBCUTANEOUS at 08:09

## 2021-08-20 RX ADMIN — GUAIFENESIN SYRUP AND DEXTROMETHORPHAN 10 ML: 100; 10 SYRUP ORAL at 20:55

## 2021-08-20 RX ADMIN — ENOXAPARIN SODIUM 120 MG: 120 INJECTION SUBCUTANEOUS at 17:41

## 2021-08-20 RX ADMIN — FUROSEMIDE 20 MG: 20 INJECTION, SOLUTION INTRAMUSCULAR; INTRAVENOUS at 05:10

## 2021-08-20 RX ADMIN — INSULIN LISPRO 7 UNITS: 100 INJECTION, SOLUTION INTRAVENOUS; SUBCUTANEOUS at 17:43

## 2021-08-20 RX ADMIN — INSULIN LISPRO 7 UNITS: 100 INJECTION, SOLUTION INTRAVENOUS; SUBCUTANEOUS at 11:02

## 2021-08-20 RX ADMIN — INSULIN LISPRO 12 UNITS: 100 INJECTION, SOLUTION INTRAVENOUS; SUBCUTANEOUS at 20:56

## 2021-08-20 RX ADMIN — INSULIN LISPRO 7 UNITS: 100 INJECTION, SOLUTION INTRAVENOUS; SUBCUTANEOUS at 11:01

## 2021-08-20 RX ADMIN — POTASSIUM CHLORIDE 20 MEQ: 14.9 INJECTION, SOLUTION INTRAVENOUS at 08:09

## 2021-08-20 RX ADMIN — ENOXAPARIN SODIUM 120 MG: 120 INJECTION SUBCUTANEOUS at 05:10

## 2021-08-20 RX ADMIN — CLOPIDOGREL BISULFATE 75 MG: 75 TABLET ORAL at 05:10

## 2021-08-20 RX ADMIN — INSULIN LISPRO 7 UNITS: 100 INJECTION, SOLUTION INTRAVENOUS; SUBCUTANEOUS at 17:44

## 2021-08-20 RX ADMIN — EZETIMIBE 10 MG: 10 TABLET ORAL at 05:10

## 2021-08-20 RX ADMIN — DOCUSATE SODIUM 50 MG AND SENNOSIDES 8.6 MG 2 TABLET: 8.6; 5 TABLET, FILM COATED ORAL at 17:39

## 2021-08-20 RX ADMIN — ROSUVASTATIN CALCIUM 40 MG: 20 TABLET, FILM COATED ORAL at 17:41

## 2021-08-20 RX ADMIN — FUROSEMIDE 20 MG: 20 INJECTION, SOLUTION INTRAMUSCULAR; INTRAVENOUS at 15:25

## 2021-08-20 ASSESSMENT — FIBROSIS 4 INDEX: FIB4 SCORE: 0.53

## 2021-08-20 ASSESSMENT — PAIN DESCRIPTION - PAIN TYPE
TYPE: ACUTE PAIN
TYPE: ACUTE PAIN

## 2021-08-20 NOTE — CARE PLAN
Problem: Humidified High Flow Nasal Cannula  Goal: Maintain adequate oxygenation dependent on patient condition  Description: 1.  Implement humidified high flow oxygen therapy  2.  Titrate high flow oxygen to maintain appropriate SpO2  Outcome: Progressing   Patient receiving 40L, 80% FIO2 via high flow nasal cannula.

## 2021-08-20 NOTE — CARE PLAN
The patient is Watcher - Medium risk of patient condition declining or worsening    Shift Goals  Clinical Goals: wean oxygen  Patient Goals: get off hi-rafita  Family Goals: N/A    Progress made toward(s) clinical / shift goals:  pt to be able to wean off hi=rafita    Patient is not progressing towards the following goals:

## 2021-08-20 NOTE — CARE PLAN
The patient is Watcher - Medium risk of patient condition declining or worsening    Shift Goals: rest  Clinical Goals: wean oxygen  Patient Goals: get off hi-rafita  Family Goals: N/A    Progress made toward(s) clinical / shift goals:        Problem: Knowledge Deficit - Standard  Goal: Patient and family/care givers will demonstrate understanding of plan of care, disease process/condition, diagnostic tests and medications  Outcome: Progressing       Patient is not progressing towards the following goals:

## 2021-08-20 NOTE — PROGRESS NOTES
"Critical Care Progress Note    Date of admission  8/6/2021    Chief Complaint  \"41 y.o. male with PMH including obesity, HFrEF 20%, CAD with hx of PCI to the ostial LAD in 10/2020, RCA in 2013 and 2014, OM in 2017, likely FH, uncontrolled DM, suspected FARHANA.  He presented to the ED 8/6 with fever, chills, cough and dyspnea.  He was found to have COVID-19 pneumonia and is unvaccinated.  He tells me that his fiancée was at the \"night in the country\" possible in [facility] and may have been exposed to Covid there.  She also contracted COVID-19 and has recovered.  He was admitted and placed on oxygen, dexamethasone and just completed a 5-day course of remdesivir.  Today he has had escalating FiO2 requirements up from baseline of 6 L oxygen since admission now on 60 L/100% HFNC with the addition of nonrebreather mask.  He is moderately dyspneic but not in acute distress.  CXR shows increasing bilateral infiltrates and edema.  He is drinking quite a bit of water and fluid status has been +3.9 L.  He received additional Lasix today.\"     Hospital Course  Admitted 8/6 with COVID-19 pneumonia, transferred to ICU 8/13 for persistent hypoxia on max HFNC  8/16 HFNC 60 L/100%, no distress, completed Decadron, transition to taper hydrocortisone, full dose anticoagulation for you UE DVT  8/17 intermittent BiPAP w/ desaturations when switching to HFNC, D/C hydrocortisone, D/C dexmedetomidine, restart norepinephrine for MAP > 65 or SBP > 90  8/18 titrate down HFNC, increase insulin, replete K, replete magnesium  8/19 continue norepinephrine, furosemide, increase insulin glargine by 10  8/20 melatonin for sleep, norepinephrine requirement remains, potassium repletion    Review of Systems  Review of Systems   All other systems reviewed and are negative.       Vital Signs for last 24 hours   Temp:  [35.9 °C (96.6 °F)-37 °C (98.6 °F)] 36.1 °C (97 °F)  Pulse:  [] 119  Resp:  [18-46] 33  BP: ()/(38-76) 114/68  SpO2:  [81 %-92 " %] 87 %    Hemodynamic parameters for last 24 hours       Respiratory Information for the last 24 hours       Physical Exam   Physical Exam  Vitals and nursing note reviewed.   HENT:      Head: Normocephalic and atraumatic.      Right Ear: External ear normal.      Left Ear: External ear normal.      Nose: Nose normal.      Mouth/Throat:      Mouth: Mucous membranes are moist.      Pharynx: Oropharynx is clear.   Eyes:      Pupils: Pupils are equal, round, and reactive to light.   Cardiovascular:      Rate and Rhythm: Regular rhythm. Tachycardia present.      Pulses: Normal pulses.      Heart sounds: Normal heart sounds.   Pulmonary:      Effort: Respiratory distress present.      Comments: Coarse bilateral breath sounds  Abdominal:      Palpations: Abdomen is soft.   Musculoskeletal:         General: Normal range of motion.      Cervical back: Normal range of motion and neck supple.   Skin:     General: Skin is warm and dry.      Capillary Refill: Capillary refill takes less than 2 seconds.   Neurological:      General: No focal deficit present.      Mental Status: He is alert.         Medications  Current Facility-Administered Medications   Medication Dose Route Frequency Provider Last Rate Last Admin   • furosemide (LASIX) injection 20 mg  20 mg Intravenous BID DIURETIC Valentino Niño M.D.   20 mg at 08/20/21 1525   • insulin glargine (Semglee) injection  60 Units Subcutaneous Q EVENING Valentino Niño M.D.   60 Units at 08/19/21 2050    And   • insulin lispro (AdmeLOG) injection  0.2 Units/kg/day Subcutaneous TID AC Valentino Niño M.D.   7 Units at 08/20/21 1102    And   • insulin lispro (AdmeLOG) injection  3-14 Units Subcutaneous 4X/DAY ACHS Valentino Niño M.D.   7 Units at 08/20/21 1101    And   • glucose 4 g chewable tablet 16 g  16 g Oral Q15 MIN PRN Valentino Niño M.D.        And   • dextrose 50% (D50W) injection 50 mL  50 mL Intravenous Q15 MIN PRN Valentino Niño M.D.       •  norepinephrine (Levophed) 8 mg in 250 mL NS infusion (premix)  0-30 mcg/min Intravenous Continuous Valentino Niño M.D. 7.5 mL/hr at 08/20/21 0100 4 mcg/min at 08/20/21 0100   • dexmedetomidine (PRECEDEX) 400 mcg/100mL NS premix infusion  0.1-1.5 mcg/kg/hr Intravenous Continuous Porfirio Reyes M.D.   Stopped at 08/17/21 1000   • MD Alert...ICU Electrolyte Replacement per Pharmacy   Other PHARMACY TO DOSE Porfirio Reyes M.D.       • enoxaparin (LOVENOX) inj 120 mg  120 mg Subcutaneous Q12HRS Brittaney Mclaughlin M.D.   120 mg at 08/20/21 0510   • senna-docusate (PERICOLACE or SENOKOT S) 8.6-50 MG per tablet 2 tablet  2 Tablet Oral BID Ayaan Chapa M.D.   2 Tablet at 08/19/21 1757    And   • polyethylene glycol/lytes (MIRALAX) PACKET 1 Packet  1 Packet Oral QDAY PRN Ayaan Chapa M.D.        And   • magnesium hydroxide (MILK OF MAGNESIA) suspension 30 mL  30 mL Oral QDAY PRN Ayaan Chapa M.D.        And   • bisacodyl (DULCOLAX) suppository 10 mg  10 mg Rectal QDAY PRN Ayaan Chapa M.D.       • Respiratory Therapy Consult   Nebulization Continuous RT Ayaan Chapa M.D.       • acetaminophen (TYLENOL) tablet 1,000 mg  1,000 mg Oral TID Ayaan Chapa M.D.   1,000 mg at 08/20/21 1525   • labetalol (NORMODYNE/TRANDATE) injection 10 mg  10 mg Intravenous Q4HRS PRN Ayaan Chapa M.D.       • ondansetron (ZOFRAN) syringe/vial injection 4 mg  4 mg Intravenous Q4HRS PRN Ayaan Chapa M.D.   4 mg at 08/08/21 0822   • ondansetron (ZOFRAN ODT) dispertab 4 mg  4 mg Oral Q4HRS PRN Ayaan Chapa M.D.   4 mg at 08/09/21 0804   • guaiFENesin dextromethorphan (ROBITUSSIN DM) 100-10 MG/5ML syrup 10 mL  10 mL Oral Q6HRS PRN Ayaan Chapa M.D.   10 mL at 08/19/21 2050   • clopidogrel (PLAVIX) tablet 75 mg  75 mg Oral DAILY Ayaan Chapa M.D.   75 mg at 08/20/21 0510   • rosuvastatin (CRESTOR) tablet  40 mg  40 mg Oral Q EVENING Ayaan Chapa M.D.   40 mg at 08/19/21 1757   • ezetimibe (ZETIA) tablet 10 mg  10 mg Oral DAILY Ayaan Chapa M.D.   10 mg at 08/20/21 0510       Fluids    Intake/Output Summary (Last 24 hours) at 8/20/2021 1539  Last data filed at 8/20/2021 1400  Gross per 24 hour   Intake 2802.11 ml   Output 3450 ml   Net -647.89 ml       Laboratory          Recent Labs     08/18/21  0405 08/19/21  0450 08/20/21  0332   SODIUM 137 137 141   POTASSIUM 3.5* 3.7 3.5*   CHLORIDE 104 106 105   CO2 25 25 27   BUN 15 9 9   CREATININE 0.57 0.48* 0.51   MAGNESIUM 1.8 2.0 2.0   PHOSPHORUS 4.1 3.3 3.2   CALCIUM 7.9* 7.9* 8.2*     Recent Labs     08/18/21  0405 08/19/21  0450 08/20/21  0332   GLUCOSE 228* 213* 188*     Recent Labs     08/18/21  0405 08/19/21  0450 08/20/21  0332   WBC 12.5* 10.8 9.7     Recent Labs     08/18/21  0405 08/19/21  0450 08/20/21  0332   RBC 4.59* 4.45* 4.72   HEMOGLOBIN 13.7* 13.6* 14.1   HEMATOCRIT 41.0* 40.9* 43.4   PLATELETCT 624* 548* 536*       Imaging  X-Ray:  I have personally reviewed the images and compared with prior images.    Assessment/Plan  * Pneumonia due to COVID-19 virus- (present on admission)  Assessment & Plan  COVID-19 pneumonia  Dexamethasone - completed course  Tocilizumab 8/13  Fluid balance - forced diuresis w/ furosemide    Hypomagnesemia  Assessment & Plan  Mag 2.0    Hypokalemia  Assessment & Plan  K 3.7  Replete K to be > 4.0    Acute deep vein thrombosis (DVT) of brachial vein of left upper extremity (HCC)- (present on admission)  Assessment & Plan  Left brachial DVT  Therapeutic anticoagulation with enoxaparin    Type 2 diabetes mellitus without complication, without long-term current use of insulin (HCC)- (present on admission)  Assessment & Plan  Uncontrolled, glycohemoglobin 11.3  Increase glargine insulin    Acute respiratory failure with hypoxia (HCC)- (present on admission)  Assessment & Plan  Acute hypoxic respiratory  failure due to COVID-19 pneumonia  Intermittent humidified high-flow nasal cannula and NIPPV w/ BiPAP  Titrate to keep O2 saturation > 88%  Encourage self-proning  Encourage incentive spirometry  Aggressive pulmonary hygiene    Morbid obesity with BMI of 40.0-44.9, adult (HCC)- (present on admission)  Assessment & Plan  With suspected underlying FARHANA  Will do trial of Bipap to see if recruitable lung    Sepsis (HCC)- (present on admission)  Assessment & Plan  Septic shock 2/2 COVID-19 pneumonia  Titrate norepinephrine to maintain MAP > 65    Heart failure with reduced ejection fraction (HCC)- (present on admission)  Assessment & Plan  Chronic hypoxia and lung infiltrates/edema secondary likely more related to acute infection of COVID-19 pneumonia  Continue negative fluid balance as tolerated  Close monitoring of I/Os  Currently hold heart failure regime with hypotension    Repeat echo reviewed with improved EF but difficult with wall motion abnormalities    Coronary artery disease involving native coronary artery of native heart without angina pectoris- (present on admission)  Assessment & Plan  CAD with hx of PCI to the ostial LAD in 10/2020, RCA in 2013 and 2014, OM in 2017  Clopidogrel    Tobacco use- (present on admission)  Assessment & Plan  Cessation counseling when appropriate    Hyperlipemia- (present on admission)  Assessment & Plan  Suspected familial hyperlipidemia  On Crestor and Zetia  Monitor LFTs     DVT prophylaxis: Enoxaparin  PUD prophylaxis: Not indicated  Glycemic control: Sliding scale insulin  Nutrition: Diet  Lines: None  Ball[place]: Need for strict I/O monitoring, remove when able  Mobility: Early mobility as tolerated  Goals of care: Full code  Disposition: ICU    I have performed a physical exam and reviewed and updated ROS and Plan today (8/20/2021). In review of yesterday's note (8/19/2021), there are no changes except as documented above.     Discussed patient condition and risk of morbidity  and/or mortality with RN, RT, Pharmacy, Charge nurse / hot rounds and Patient     The patient remains critically ill.  Critical care time = 33 minutes in directly providing and coordinating critical care and extensive data review.  No time overlap and excludes procedures.

## 2021-08-21 LAB
ANION GAP SERPL CALC-SCNC: 12 MMOL/L (ref 7–16)
BUN SERPL-MCNC: 9 MG/DL (ref 8–22)
CALCIUM SERPL-MCNC: 8.7 MG/DL (ref 8.5–10.5)
CHLORIDE SERPL-SCNC: 103 MMOL/L (ref 96–112)
CO2 SERPL-SCNC: 25 MMOL/L (ref 20–33)
CREAT SERPL-MCNC: 0.46 MG/DL (ref 0.5–1.4)
EKG IMPRESSION: NORMAL
ERYTHROCYTE [DISTWIDTH] IN BLOOD BY AUTOMATED COUNT: 46.8 FL (ref 35.9–50)
GLUCOSE BLD-MCNC: 104 MG/DL (ref 65–99)
GLUCOSE BLD-MCNC: 122 MG/DL (ref 65–99)
GLUCOSE BLD-MCNC: 156 MG/DL (ref 65–99)
GLUCOSE BLD-MCNC: 230 MG/DL (ref 65–99)
GLUCOSE BLD-MCNC: 264 MG/DL (ref 65–99)
GLUCOSE SERPL-MCNC: 120 MG/DL (ref 65–99)
HCT VFR BLD AUTO: 45.7 % (ref 42–52)
HGB BLD-MCNC: 14.9 G/DL (ref 14–18)
MAGNESIUM SERPL-MCNC: 2 MG/DL (ref 1.5–2.5)
MCH RBC QN AUTO: 30 PG (ref 27–33)
MCHC RBC AUTO-ENTMCNC: 32.6 G/DL (ref 33.7–35.3)
MCV RBC AUTO: 92 FL (ref 81.4–97.8)
PHOSPHATE SERPL-MCNC: 4.2 MG/DL (ref 2.5–4.5)
PLATELET # BLD AUTO: 522 K/UL (ref 164–446)
PMV BLD AUTO: 9.8 FL (ref 9–12.9)
POTASSIUM SERPL-SCNC: 3.5 MMOL/L (ref 3.6–5.5)
RBC # BLD AUTO: 4.97 M/UL (ref 4.7–6.1)
SODIUM SERPL-SCNC: 140 MMOL/L (ref 135–145)
WBC # BLD AUTO: 9.4 K/UL (ref 4.8–10.8)

## 2021-08-21 PROCEDURE — 84100 ASSAY OF PHOSPHORUS: CPT

## 2021-08-21 PROCEDURE — 700102 HCHG RX REV CODE 250 W/ 637 OVERRIDE(OP): Performed by: STUDENT IN AN ORGANIZED HEALTH CARE EDUCATION/TRAINING PROGRAM

## 2021-08-21 PROCEDURE — 700102 HCHG RX REV CODE 250 W/ 637 OVERRIDE(OP): Performed by: EMERGENCY MEDICINE

## 2021-08-21 PROCEDURE — 80048 BASIC METABOLIC PNL TOTAL CA: CPT

## 2021-08-21 PROCEDURE — A9270 NON-COVERED ITEM OR SERVICE: HCPCS | Performed by: EMERGENCY MEDICINE

## 2021-08-21 PROCEDURE — 36620 INSERTION CATHETER ARTERY: CPT | Performed by: EMERGENCY MEDICINE

## 2021-08-21 PROCEDURE — 83735 ASSAY OF MAGNESIUM: CPT

## 2021-08-21 PROCEDURE — 700111 HCHG RX REV CODE 636 W/ 250 OVERRIDE (IP): Performed by: STUDENT IN AN ORGANIZED HEALTH CARE EDUCATION/TRAINING PROGRAM

## 2021-08-21 PROCEDURE — 94640 AIRWAY INHALATION TREATMENT: CPT

## 2021-08-21 PROCEDURE — 03HY32Z INSERTION OF MONITORING DEVICE INTO UPPER ARTERY, PERCUTANEOUS APPROACH: ICD-10-PCS | Performed by: EMERGENCY MEDICINE

## 2021-08-21 PROCEDURE — 82962 GLUCOSE BLOOD TEST: CPT | Mod: 91

## 2021-08-21 PROCEDURE — 85027 COMPLETE CBC AUTOMATED: CPT

## 2021-08-21 PROCEDURE — 770022 HCHG ROOM/CARE - ICU (200)

## 2021-08-21 PROCEDURE — 99291 CRITICAL CARE FIRST HOUR: CPT | Mod: 25 | Performed by: EMERGENCY MEDICINE

## 2021-08-21 PROCEDURE — 93005 ELECTROCARDIOGRAM TRACING: CPT | Performed by: NURSE PRACTITIONER

## 2021-08-21 PROCEDURE — 93010 ELECTROCARDIOGRAM REPORT: CPT | Performed by: INTERNAL MEDICINE

## 2021-08-21 PROCEDURE — A9270 NON-COVERED ITEM OR SERVICE: HCPCS | Performed by: STUDENT IN AN ORGANIZED HEALTH CARE EDUCATION/TRAINING PROGRAM

## 2021-08-21 PROCEDURE — 700111 HCHG RX REV CODE 636 W/ 250 OVERRIDE (IP): Performed by: EMERGENCY MEDICINE

## 2021-08-21 RX ORDER — MIDODRINE HYDROCHLORIDE 5 MG/1
5 TABLET ORAL
Status: DISCONTINUED | OUTPATIENT
Start: 2021-08-21 | End: 2021-08-25

## 2021-08-21 RX ORDER — DEXTROSE MONOHYDRATE 25 G/50ML
50 INJECTION, SOLUTION INTRAVENOUS
Status: DISCONTINUED | OUTPATIENT
Start: 2021-08-21 | End: 2021-08-30 | Stop reason: HOSPADM

## 2021-08-21 RX ORDER — POTASSIUM CHLORIDE 20 MEQ/1
60 TABLET, EXTENDED RELEASE ORAL ONCE
Status: COMPLETED | OUTPATIENT
Start: 2021-08-21 | End: 2021-08-21

## 2021-08-21 RX ORDER — INSULIN LISPRO 100 [IU]/ML
3-14 INJECTION, SOLUTION INTRAVENOUS; SUBCUTANEOUS
Status: DISCONTINUED | OUTPATIENT
Start: 2021-08-21 | End: 2021-08-30 | Stop reason: HOSPADM

## 2021-08-21 RX ORDER — INSULIN LISPRO 100 [IU]/ML
0.3 INJECTION, SOLUTION INTRAVENOUS; SUBCUTANEOUS
Status: DISCONTINUED | OUTPATIENT
Start: 2021-08-21 | End: 2021-08-30 | Stop reason: HOSPADM

## 2021-08-21 RX ADMIN — EZETIMIBE 10 MG: 10 TABLET ORAL at 05:34

## 2021-08-21 RX ADMIN — FUROSEMIDE 20 MG: 20 INJECTION, SOLUTION INTRAMUSCULAR; INTRAVENOUS at 05:35

## 2021-08-21 RX ADMIN — GUAIFENESIN SYRUP AND DEXTROMETHORPHAN 10 ML: 100; 10 SYRUP ORAL at 22:11

## 2021-08-21 RX ADMIN — INSULIN LISPRO 7 UNITS: 100 INJECTION, SOLUTION INTRAVENOUS; SUBCUTANEOUS at 11:00

## 2021-08-21 RX ADMIN — ROSUVASTATIN CALCIUM 40 MG: 20 TABLET, FILM COATED ORAL at 17:27

## 2021-08-21 RX ADMIN — INSULIN LISPRO 7 UNITS: 100 INJECTION, SOLUTION INTRAVENOUS; SUBCUTANEOUS at 08:02

## 2021-08-21 RX ADMIN — MIDODRINE HYDROCHLORIDE 5 MG: 5 TABLET ORAL at 17:27

## 2021-08-21 RX ADMIN — ACETAMINOPHEN 1000 MG: 500 TABLET, FILM COATED ORAL at 08:14

## 2021-08-21 RX ADMIN — INSULIN LISPRO 11 UNITS: 100 INJECTION, SOLUTION INTRAVENOUS; SUBCUTANEOUS at 17:22

## 2021-08-21 RX ADMIN — ACETAMINOPHEN 1000 MG: 500 TABLET, FILM COATED ORAL at 22:11

## 2021-08-21 RX ADMIN — MIDODRINE HYDROCHLORIDE 5 MG: 5 TABLET ORAL at 08:14

## 2021-08-21 RX ADMIN — CLOPIDOGREL BISULFATE 75 MG: 75 TABLET ORAL at 05:34

## 2021-08-21 RX ADMIN — INSULIN LISPRO 11 UNITS: 100 INJECTION, SOLUTION INTRAVENOUS; SUBCUTANEOUS at 11:20

## 2021-08-21 RX ADMIN — INSULIN GLARGINE 60 UNITS: 100 INJECTION, SOLUTION SUBCUTANEOUS at 17:22

## 2021-08-21 RX ADMIN — INSULIN LISPRO 3 UNITS: 100 INJECTION, SOLUTION INTRAVENOUS; SUBCUTANEOUS at 22:14

## 2021-08-21 RX ADMIN — POTASSIUM CHLORIDE 60 MEQ: 20 TABLET, EXTENDED RELEASE ORAL at 09:30

## 2021-08-21 RX ADMIN — ACETAMINOPHEN 1000 MG: 500 TABLET, FILM COATED ORAL at 15:02

## 2021-08-21 RX ADMIN — ENOXAPARIN SODIUM 120 MG: 120 INJECTION SUBCUTANEOUS at 05:35

## 2021-08-21 RX ADMIN — INSULIN LISPRO 4 UNITS: 100 INJECTION, SOLUTION INTRAVENOUS; SUBCUTANEOUS at 08:02

## 2021-08-21 RX ADMIN — ENOXAPARIN SODIUM 120 MG: 120 INJECTION SUBCUTANEOUS at 17:23

## 2021-08-21 RX ADMIN — MIDODRINE HYDROCHLORIDE 5 MG: 5 TABLET ORAL at 11:13

## 2021-08-21 RX ADMIN — FUROSEMIDE 20 MG: 20 INJECTION, SOLUTION INTRAMUSCULAR; INTRAVENOUS at 15:02

## 2021-08-21 RX ADMIN — GUAIFENESIN SYRUP AND DEXTROMETHORPHAN 10 ML: 100; 10 SYRUP ORAL at 05:35

## 2021-08-21 ASSESSMENT — PAIN DESCRIPTION - PAIN TYPE
TYPE: ACUTE PAIN

## 2021-08-21 ASSESSMENT — FIBROSIS 4 INDEX: FIB4 SCORE: 0.55

## 2021-08-21 NOTE — PROCEDURES
"Arterial Line Insertion    Date/Time: 8/21/2021 8:14 AM  Performed by: Valentino Niño M.D.  Authorized by: Valentino Niño M.D.   Consent: Verbal consent obtained.  Risks and benefits: risks, benefits and alternatives were discussed  Consent given by: patient  Patient understanding: patient states understanding of the procedure being performed  Patient consent: the patient's understanding of the procedure matches consent given  Procedure consent: procedure consent matches procedure scheduled  Relevant documents: relevant documents present and verified  Test results: test results available and properly labeled  Required items: required blood products, implants, devices, and special equipment available  Patient identity confirmed: verbally with patient and hospital-assigned identification number  Time out: Immediately prior to procedure a \"time out\" was called to verify the correct patient, procedure, equipment, support staff and site/side marked as required.  Preparation: Patient was prepped and draped in the usual sterile fashion.  Indications: hemodynamic monitoring  Location: left radial  Anesthesia: local infiltration    Anesthesia:  Local Anesthetic: lidocaine 1% without epinephrine  Anesthetic total: 1.5 mL  Alec's test normal: yes  Needle gauge: 20  Seldinger technique: Seldinger technique used  Number of attempts: 1  Post-procedure: line sutured and dressing applied  Post-procedure CMS: unchanged  Patient tolerance: patient tolerated the procedure well with no immediate complications              "

## 2021-08-21 NOTE — PROGRESS NOTES
NSR on monitor. Patient had period of what looked like CHB, but did not sustain. LIAM Haynes notified.

## 2021-08-21 NOTE — CARE PLAN
Problem: Knowledge Deficit - Standard  Goal: Patient and family/care givers will demonstrate understanding of plan of care, disease process/condition, diagnostic tests and medications  Outcome: Progressing     Problem: Skin Integrity  Goal: Skin integrity is maintained or improved  Outcome: Progressing     Problem: Fall Risk  Goal: Patient will remain free from falls  Outcome: Progressing   The patient is Watcher - Medium risk of patient condition declining or worsening    Shift Goals  Clinical Goals: wean oxygen  Patient Goals: get off hi-rafita  Family Goals: N/A    Progress made toward(s) clinical / shift goals:  decrease oxygen demands    Patient is not progressing towards the following goals:

## 2021-08-21 NOTE — PROGRESS NOTES
Cross Call Critical Care Note    Called by RN for non-sustaining episode of 2/3 AVB and patient was symptomatic with chest pain, hypotension and hypoxia. Here for hypoxia respiratory  Failure 2/2 COVID-19 pneumonia. HR converted back to SR at 80 to 90s, with  /57 map 75. Currently on HFNC at 60L/70 % Fi02, RR >30.  Levophed at 5mcg. 12 Lead EKG shows SR, Left ventricular hypertrophy of inferior infarct. I've seen patient and examined at the bedside. He is awake, alert and oriented x3. Mildly anxious, breathing without use of accessory muscles. Discussed and encourage patient to do self prone, and re-assess patient's understanding on ET intubation in the future for severe hypoxia or respiratory distress. Obtain CBC/BMP and electrolytes this am. Will continue to monitor hemodynamic status. Discussed with nursing.     ..

## 2021-08-21 NOTE — PROGRESS NOTES
"Critical Care Progress Note    Date of admission  8/6/2021    Chief Complaint  \"41 y.o. male with PMH including obesity, HFrEF 20%, CAD with hx of PCI to the ostial LAD in 10/2020, RCA in 2013 and 2014, OM in 2017, likely FH, uncontrolled DM, suspected FARHANA.  He presented to the ED 8/6 with fever, chills, cough and dyspnea.  He was found to have COVID-19 pneumonia and is unvaccinated.  He tells me that his fiancée was at the \"night [place] the country\" possible in [facility] and may have been exposed to Covid there.  She also contracted COVID-19 and has recovered.  He was admitted and placed on oxygen, dexamethasone and just completed a 5-day course of remdesivir.  Today he has had escalating FiO2 requirements up from baseline of 6 L oxygen since admission now on 60 L/100% HFNC with the addition of nonrebreather mask.  He is moderately dyspneic but not in acute distress.  CXR shows increasing bilateral infiltrates and edema.  He is drinking quite a bit of water and fluid status has been +3.9 L.  He received additional Lasix today.\"     Hospital Course  Admitted 8/6 with COVID-19 pneumonia, transferred to ICU 8/13 for persistent hypoxia on max HFNC  8/16 HFNC 60 L/100%, no distress, completed Decadron, transition to taper hydrocortisone, full dose anticoagulation for you UE DVT  8/17 intermittent BiPAP w/ desaturations when switching to HFNC, D/C hydrocortisone, D/C dexmedetomidine, restart norepinephrine for MAP > 65 or SBP > 90  8/18 titrate down HFNC, increase insulin, replete K, replete magnesium  8/19 continue norepinephrine, furosemide, increase insulin glargine by 10  8/20 melatonin for sleep, norepinephrine requirement remains, potassium repletion  8/21 increase insulin, start midodrine, replete K, place a-line    Review of Systems  Review of Systems   All other systems reviewed and are negative.       Vital Signs for last 24 hours   Temp:  [35.6 °C (96.1 °F)-37 °C (98.6 °F)] 36.9 °C (98.4 °F)  Pulse:  [] " 102  Resp:  [22-51] 51  BP: ()/(38-80) 94/69  SpO2:  [71 %-92 %] 88 %    Hemodynamic parameters for last 24 hours       Respiratory Information for the last 24 hours       Physical Exam   Physical Exam  Vitals and nursing note reviewed.   HENT:      Head: Normocephalic and atraumatic.      Right Ear: External ear normal.      Left Ear: External ear normal.      Nose: Nose normal.      Mouth/Throat:      Mouth: Mucous membranes are moist.      Pharynx: Oropharynx is clear.   Eyes:      Pupils: Pupils are equal, round, and reactive to light.   Cardiovascular:      Rate and Rhythm: Regular rhythm. Tachycardia present.      Pulses: Normal pulses.      Heart sounds: Normal heart sounds.   Pulmonary:      Effort: Respiratory distress present.      Comments: Coarse bilateral breath sounds  Abdominal:      Palpations: Abdomen is soft.   Musculoskeletal:         General: Normal range of motion.      Cervical back: Normal range of motion and neck supple.   Skin:     General: Skin is warm and dry.      Capillary Refill: Capillary refill takes less than 2 seconds.   Neurological:      General: No focal deficit present.      Mental Status: He is alert.         Medications  Current Facility-Administered Medications   Medication Dose Route Frequency Provider Last Rate Last Admin   • midodrine (PROAMATINE) tablet 5 mg  5 mg Oral TID WITH MEALS Valentino Niño M.D.   5 mg at 08/21/21 1113   • insulin glargine (Semglee) injection  60 Units Subcutaneous Q EVENING Valentino Niño M.D.        And   • insulin lispro (AdmeLOG) injection  0.3 Units/kg/day Subcutaneous TID AC Valentino Niño M.D.   11 Units at 08/21/21 1120    And   • insulin lispro (AdmeLOG) injection  3-14 Units Subcutaneous 4X/DAY ACHS Valentino Niño M.D.   7 Units at 08/21/21 1100    And   • glucose 4 g chewable tablet 16 g  16 g Oral Q15 MIN PRN Valentino Niño M.D.        And   • dextrose 50% (D50W) injection 50 mL  50 mL Intravenous Q15 MIN PRN  Valentino Niño M.D.       • furosemide (LASIX) injection 20 mg  20 mg Intravenous BID DIURETIC Valentino Niño M.D.   20 mg at 08/21/21 1502   • norepinephrine (Levophed) 8 mg in 250 mL NS infusion (premix)  0-30 mcg/min Intravenous Continuous Valentino Niño M.D.   Paused at 08/21/21 1159   • dexmedetomidine (PRECEDEX) 400 mcg/100mL NS premix infusion  0.1-1.5 mcg/kg/hr Intravenous Continuous Porfirio Reyes M.D.   Stopped at 08/17/21 1000   • MD Alert...ICU Electrolyte Replacement per Pharmacy   Other PHARMACY TO DOSE Porfirio Reyes M.D.       • enoxaparin (LOVENOX) inj 120 mg  120 mg Subcutaneous Q12HRS Brittaney Mclaughlin M.D.   120 mg at 08/21/21 0535   • senna-docusate (PERICOLACE or SENOKOT S) 8.6-50 MG per tablet 2 tablet  2 Tablet Oral BID Ayaan Chapa M.D.   2 Tablet at 08/20/21 1739    And   • polyethylene glycol/lytes (MIRALAX) PACKET 1 Packet  1 Packet Oral QDAY PRN Ayaan Chapa M.D.        And   • magnesium hydroxide (MILK OF MAGNESIA) suspension 30 mL  30 mL Oral QDAY PRN Ayaan Chapa M.D.        And   • bisacodyl (DULCOLAX) suppository 10 mg  10 mg Rectal QDAY PRN Ayaan Chapa M.D.       • Respiratory Therapy Consult   Nebulization Continuous RT Ayaan Chapa M.D.       • acetaminophen (TYLENOL) tablet 1,000 mg  1,000 mg Oral TID Ayaan Chapa M.D.   1,000 mg at 08/21/21 1502   • labetalol (NORMODYNE/TRANDATE) injection 10 mg  10 mg Intravenous Q4HRS PRN Ayaan Chapa M.D.       • ondansetron (ZOFRAN) syringe/vial injection 4 mg  4 mg Intravenous Q4HRS PRN RYLEE SanchezD.   4 mg at 08/08/21 0822   • ondansetron (ZOFRAN ODT) dispertab 4 mg  4 mg Oral Q4HRS PRN Ayaan Chapa M.D.   4 mg at 08/09/21 0804   • guaiFENesin dextromethorphan (ROBITUSSIN DM) 100-10 MG/5ML syrup 10 mL  10 mL Oral Q6HRS PRN Ayaan Chapa M.D.   10 mL at 08/21/21 0535   • clopidogrel  (PLAVIX) tablet 75 mg  75 mg Oral DAILY Ayaanstephen Chapa M.D.   75 mg at 08/21/21 0534   • rosuvastatin (CRESTOR) tablet 40 mg  40 mg Oral Q EVENING Ayaanstephen Chapa M.D.   40 mg at 08/20/21 1741   • ezetimibe (ZETIA) tablet 10 mg  10 mg Oral DAILY Ayaanstephen Chapa M.D.   10 mg at 08/21/21 0534       Fluids    Intake/Output Summary (Last 24 hours) at 8/21/2021 1652  Last data filed at 8/21/2021 1200  Gross per 24 hour   Intake 1149.33 ml   Output 3350 ml   Net -2200.67 ml       Laboratory          Recent Labs     08/19/21  0450 08/20/21  0332 08/21/21  0310   SODIUM 137 141 140   POTASSIUM 3.7 3.5* 3.5*   CHLORIDE 106 105 103   CO2 25 27 25   BUN 9 9 9   CREATININE 0.48* 0.51 0.46*   MAGNESIUM 2.0 2.0 2.0   PHOSPHORUS 3.3 3.2 4.2   CALCIUM 7.9* 8.2* 8.7     Recent Labs     08/19/21  0450 08/20/21  0332 08/21/21  0310   GLUCOSE 213* 188* 120*     Recent Labs     08/19/21  0450 08/20/21  0332 08/21/21  0310   WBC 10.8 9.7 9.4     Recent Labs     08/19/21  0450 08/20/21  0332 08/21/21  0310   RBC 4.45* 4.72 4.97   HEMOGLOBIN 13.6* 14.1 14.9   HEMATOCRIT 40.9* 43.4 45.7   PLATELETCT 548* 536* 522*       Imaging  X-Ray:  I have personally reviewed the images and compared with prior images.    Assessment/Plan  * Pneumonia due to COVID-19 virus- (present on admission)  Assessment & Plan  COVID-19 pneumonia  Dexamethasone - completed course  Tocilizumab 8/13  Fluid balance - forced diuresis w/ furosemide    Hypomagnesemia  Assessment & Plan  Mag 2.0    Hypokalemia  Assessment & Plan  K 3.7  Replete K to be > 4.0    Acute deep vein thrombosis (DVT) of brachial vein of left upper extremity (HCC)- (present on admission)  Assessment & Plan  Left brachial DVT  Therapeutic anticoagulation with enoxaparin    Type 2 diabetes mellitus without complication, without long-term current use of insulin (HCC)- (present on admission)  Assessment & Plan  Uncontrolled, glycohemoglobin 11.3  Increase glargine  insulin    Acute respiratory failure with hypoxia (HCC)- (present on admission)  Assessment & Plan  Acute hypoxic respiratory failure due to COVID-19 pneumonia  Intermittent humidified high-flow nasal cannula and NIPPV w/ BiPAP  Titrate to keep O2 saturation > 88%  Encourage self-proning  Encourage incentive spirometry  Aggressive pulmonary hygiene    Morbid obesity with BMI of 40.0-44.9, adult (HCC)- (present on admission)  Assessment & Plan  With suspected underlying FARHANA  Will do trial of Bipap to see if recruitable lung    Sepsis (HCC)- (present on admission)  Assessment & Plan  Septic shock 2/2 COVID-19 pneumonia  Titrate norepinephrine to maintain MAP > 65    Heart failure with reduced ejection fraction (HCC)- (present on admission)  Assessment & Plan  Chronic hypoxia and lung infiltrates/edema secondary likely more related to acute infection of COVID-19 pneumonia  Continue negative fluid balance as tolerated  Close monitoring of I/Os  Currently hold heart failure regime with hypotension    Repeat echo reviewed with improved EF but difficult with wall motion abnormalities    Coronary artery disease involving native coronary artery of native heart without angina pectoris- (present on admission)  Assessment & Plan  CAD with hx of PCI to the ostial LAD in 10/2020, RCA in 2013 and 2014, OM in 2017  Clopidogrel    Tobacco use- (present on admission)  Assessment & Plan  Cessation counseling when appropriate    Hyperlipemia- (present on admission)  Assessment & Plan  Suspected familial hyperlipidemia  On Crestor and Zetia  Monitor LFTs     DVT prophylaxis: Enoxaparin  PUD prophylaxis: Not indicated  Glycemic control: Sliding scale insulin  Nutrition: Diet  Lines: None  Ballard: Need for strict I/O monitoring, remove when able  Mobility: Early mobility as tolerated  Goals of care: Full code  Disposition: ICU    I have performed a physical exam and reviewed and updated ROS and Plan today (8/21/2021). In review of  yesterday's note (8/20/2021), there are no changes except as documented above.     Discussed patient condition and risk of morbidity and/or mortality with RN, RT, Pharmacy, Charge nurse / hot rounds and Patient     The patient remains critically ill.  Critical care time = 35 minutes in directly providing and coordinating critical care and extensive data review.  No time overlap and excludes procedures.

## 2021-08-22 ENCOUNTER — APPOINTMENT (OUTPATIENT)
Dept: RADIOLOGY | Facility: MEDICAL CENTER | Age: 41
DRG: 871 | End: 2021-08-22
Attending: EMERGENCY MEDICINE
Payer: MEDICAID

## 2021-08-22 PROBLEM — A41.9 SEPSIS (HCC): Status: RESOLVED | Noted: 2021-08-06 | Resolved: 2021-08-22

## 2021-08-22 LAB
ANION GAP SERPL CALC-SCNC: 10 MMOL/L (ref 7–16)
BUN SERPL-MCNC: 12 MG/DL (ref 8–22)
CALCIUM SERPL-MCNC: 8.7 MG/DL (ref 8.5–10.5)
CHLORIDE SERPL-SCNC: 103 MMOL/L (ref 96–112)
CO2 SERPL-SCNC: 26 MMOL/L (ref 20–33)
CREAT SERPL-MCNC: 0.36 MG/DL (ref 0.5–1.4)
ERYTHROCYTE [DISTWIDTH] IN BLOOD BY AUTOMATED COUNT: 49.4 FL (ref 35.9–50)
GLUCOSE BLD-MCNC: 144 MG/DL (ref 65–99)
GLUCOSE BLD-MCNC: 158 MG/DL (ref 65–99)
GLUCOSE BLD-MCNC: 165 MG/DL (ref 65–99)
GLUCOSE BLD-MCNC: 190 MG/DL (ref 65–99)
GLUCOSE SERPL-MCNC: 96 MG/DL (ref 65–99)
HCT VFR BLD AUTO: 43 % (ref 42–52)
HGB BLD-MCNC: 13.9 G/DL (ref 14–18)
MAGNESIUM SERPL-MCNC: 2 MG/DL (ref 1.5–2.5)
MCH RBC QN AUTO: 30.2 PG (ref 27–33)
MCHC RBC AUTO-ENTMCNC: 32.3 G/DL (ref 33.7–35.3)
MCV RBC AUTO: 93.5 FL (ref 81.4–97.8)
PHOSPHATE SERPL-MCNC: 4.8 MG/DL (ref 2.5–4.5)
PLATELET # BLD AUTO: 414 K/UL (ref 164–446)
PMV BLD AUTO: 9.6 FL (ref 9–12.9)
POTASSIUM SERPL-SCNC: 3.6 MMOL/L (ref 3.6–5.5)
RBC # BLD AUTO: 4.6 M/UL (ref 4.7–6.1)
SODIUM SERPL-SCNC: 139 MMOL/L (ref 135–145)
WBC # BLD AUTO: 7 K/UL (ref 4.8–10.8)

## 2021-08-22 PROCEDURE — A9270 NON-COVERED ITEM OR SERVICE: HCPCS | Performed by: STUDENT IN AN ORGANIZED HEALTH CARE EDUCATION/TRAINING PROGRAM

## 2021-08-22 PROCEDURE — 700102 HCHG RX REV CODE 250 W/ 637 OVERRIDE(OP): Performed by: STUDENT IN AN ORGANIZED HEALTH CARE EDUCATION/TRAINING PROGRAM

## 2021-08-22 PROCEDURE — 84100 ASSAY OF PHOSPHORUS: CPT

## 2021-08-22 PROCEDURE — 700111 HCHG RX REV CODE 636 W/ 250 OVERRIDE (IP): Performed by: EMERGENCY MEDICINE

## 2021-08-22 PROCEDURE — 94640 AIRWAY INHALATION TREATMENT: CPT

## 2021-08-22 PROCEDURE — 770022 HCHG ROOM/CARE - ICU (200)

## 2021-08-22 PROCEDURE — 83735 ASSAY OF MAGNESIUM: CPT

## 2021-08-22 PROCEDURE — A9270 NON-COVERED ITEM OR SERVICE: HCPCS | Performed by: EMERGENCY MEDICINE

## 2021-08-22 PROCEDURE — 85027 COMPLETE CBC AUTOMATED: CPT

## 2021-08-22 PROCEDURE — 82962 GLUCOSE BLOOD TEST: CPT | Mod: 91

## 2021-08-22 PROCEDURE — 71045 X-RAY EXAM CHEST 1 VIEW: CPT

## 2021-08-22 PROCEDURE — 99291 CRITICAL CARE FIRST HOUR: CPT | Performed by: EMERGENCY MEDICINE

## 2021-08-22 PROCEDURE — 700111 HCHG RX REV CODE 636 W/ 250 OVERRIDE (IP): Performed by: STUDENT IN AN ORGANIZED HEALTH CARE EDUCATION/TRAINING PROGRAM

## 2021-08-22 PROCEDURE — 80048 BASIC METABOLIC PNL TOTAL CA: CPT

## 2021-08-22 PROCEDURE — 700102 HCHG RX REV CODE 250 W/ 637 OVERRIDE(OP): Performed by: EMERGENCY MEDICINE

## 2021-08-22 RX ORDER — POTASSIUM CHLORIDE 20 MEQ/1
60 TABLET, EXTENDED RELEASE ORAL ONCE
Status: DISCONTINUED | OUTPATIENT
Start: 2021-08-22 | End: 2021-08-22

## 2021-08-22 RX ADMIN — MIDODRINE HYDROCHLORIDE 5 MG: 5 TABLET ORAL at 11:43

## 2021-08-22 RX ADMIN — INSULIN LISPRO 11 UNITS: 100 INJECTION, SOLUTION INTRAVENOUS; SUBCUTANEOUS at 06:08

## 2021-08-22 RX ADMIN — INSULIN LISPRO 11 UNITS: 100 INJECTION, SOLUTION INTRAVENOUS; SUBCUTANEOUS at 11:40

## 2021-08-22 RX ADMIN — INSULIN LISPRO 3 UNITS: 100 INJECTION, SOLUTION INTRAVENOUS; SUBCUTANEOUS at 06:09

## 2021-08-22 RX ADMIN — EZETIMIBE 10 MG: 10 TABLET ORAL at 06:03

## 2021-08-22 RX ADMIN — ENOXAPARIN SODIUM 120 MG: 120 INJECTION SUBCUTANEOUS at 06:03

## 2021-08-22 RX ADMIN — INSULIN GLARGINE 60 UNITS: 100 INJECTION, SOLUTION SUBCUTANEOUS at 17:09

## 2021-08-22 RX ADMIN — ACETAMINOPHEN 1000 MG: 500 TABLET, FILM COATED ORAL at 15:06

## 2021-08-22 RX ADMIN — POTASSIUM BICARBONATE 50 MEQ: 978 TABLET, EFFERVESCENT ORAL at 08:24

## 2021-08-22 RX ADMIN — DOCUSATE SODIUM 50 MG AND SENNOSIDES 8.6 MG 2 TABLET: 8.6; 5 TABLET, FILM COATED ORAL at 06:03

## 2021-08-22 RX ADMIN — ACETAMINOPHEN 1000 MG: 500 TABLET, FILM COATED ORAL at 21:48

## 2021-08-22 RX ADMIN — FUROSEMIDE 20 MG: 20 INJECTION, SOLUTION INTRAMUSCULAR; INTRAVENOUS at 06:03

## 2021-08-22 RX ADMIN — ENOXAPARIN SODIUM 120 MG: 120 INJECTION SUBCUTANEOUS at 17:11

## 2021-08-22 RX ADMIN — INSULIN LISPRO 3 UNITS: 100 INJECTION, SOLUTION INTRAVENOUS; SUBCUTANEOUS at 11:00

## 2021-08-22 RX ADMIN — MIDODRINE HYDROCHLORIDE 5 MG: 5 TABLET ORAL at 17:16

## 2021-08-22 RX ADMIN — ACETAMINOPHEN 1000 MG: 500 TABLET, FILM COATED ORAL at 08:24

## 2021-08-22 RX ADMIN — MIDODRINE HYDROCHLORIDE 5 MG: 5 TABLET ORAL at 08:25

## 2021-08-22 RX ADMIN — FUROSEMIDE 20 MG: 20 INJECTION, SOLUTION INTRAMUSCULAR; INTRAVENOUS at 15:07

## 2021-08-22 RX ADMIN — INSULIN LISPRO 3 UNITS: 100 INJECTION, SOLUTION INTRAVENOUS; SUBCUTANEOUS at 21:48

## 2021-08-22 RX ADMIN — ROSUVASTATIN CALCIUM 40 MG: 20 TABLET, FILM COATED ORAL at 17:16

## 2021-08-22 RX ADMIN — POTASSIUM BICARBONATE 25 MEQ: 978 TABLET, EFFERVESCENT ORAL at 17:16

## 2021-08-22 RX ADMIN — INSULIN LISPRO 11 UNITS: 100 INJECTION, SOLUTION INTRAVENOUS; SUBCUTANEOUS at 17:10

## 2021-08-22 RX ADMIN — GUAIFENESIN SYRUP AND DEXTROMETHORPHAN 10 ML: 100; 10 SYRUP ORAL at 21:53

## 2021-08-22 RX ADMIN — CLOPIDOGREL BISULFATE 75 MG: 75 TABLET ORAL at 06:03

## 2021-08-22 ASSESSMENT — PATIENT HEALTH QUESTIONNAIRE - PHQ9
3. TROUBLE FALLING OR STAYING ASLEEP OR SLEEPING TOO MUCH: NOT AT ALL
9. THOUGHTS THAT YOU WOULD BE BETTER OFF DEAD, OR OF HURTING YOURSELF: NOT AT ALL
4. FEELING TIRED OR HAVING LITTLE ENERGY: NOT AT ALL
1. LITTLE INTEREST OR PLEASURE IN DOING THINGS: NOT AT ALL
2. FEELING DOWN, DEPRESSED, IRRITABLE, OR HOPELESS: NOT AT ALL
7. TROUBLE CONCENTRATING ON THINGS, SUCH AS READING THE NEWSPAPER OR WATCHING TELEVISION: NOT AT ALL
6. FEELING BAD ABOUT YOURSELF - OR THAT YOU ARE A FAILURE OR HAVE LET YOURSELF OR YOUR FAMILY DOWN: NOT AL ALL
SUM OF ALL RESPONSES TO PHQ9 QUESTIONS 1 AND 2: 0
5. POOR APPETITE OR OVEREATING: NOT AT ALL
8. MOVING OR SPEAKING SO SLOWLY THAT OTHER PEOPLE COULD HAVE NOTICED. OR THE OPPOSITE, BEING SO FIGETY OR RESTLESS THAT YOU HAVE BEEN MOVING AROUND A LOT MORE THAN USUAL: NOT AT ALL
SUM OF ALL RESPONSES TO PHQ QUESTIONS 1-9: 0

## 2021-08-22 ASSESSMENT — PAIN DESCRIPTION - PAIN TYPE
TYPE: ACUTE PAIN

## 2021-08-22 NOTE — PROGRESS NOTES
"Critical Care Progress Note    Date of admission  8/6/2021    Chief Complaint  \"41 y.o. male with PMH including obesity, HFrEF 20%, CAD with hx of PCI to the ostial LAD in 10/2020, RCA in 2013 and 2014, OM in 2017, likely FH, uncontrolled DM, suspected FARHANA.  He presented to the ED 8/6 with fever, chills, cough and dyspnea.  He was found to have COVID-19 pneumonia and is unvaccinated.  He tells me that his fiancée was at the \"night [place] the country\" possible in [facility] and may have been exposed to Covid there.  She also contracted COVID-19 and has recovered.  He was admitted and placed on oxygen, dexamethasone and just completed a 5-day course of remdesivir.  Today he has had escalating FiO2 requirements up from baseline of 6 L oxygen since admission now on 60 L/100% HFNC with the addition of nonrebreather mask.  He is moderately dyspneic but not in acute distress.  CXR shows increasing bilateral infiltrates and edema.  He is drinking quite a bit of water and fluid status has been +3.9 L.  He received additional Lasix today.\"     Hospital Course  Admitted 8/6 with COVID-19 pneumonia, transferred to ICU 8/13 for persistent hypoxia on max HFNC  8/16 HFNC 60 L/100%, no distress, completed Decadron, transition to taper hydrocortisone, full dose anticoagulation for you UE DVT  8/17 intermittent BiPAP w/ desaturations when switching to HFNC, D/C hydrocortisone, D/C dexmedetomidine, restart norepinephrine for MAP > 65 or SBP > 90  8/18 titrate down HFNC, increase insulin, replete K, replete magnesium  8/19 continue norepinephrine, furosemide, increase insulin glargine by 10  8/20 melatonin for sleep, norepinephrine requirement remains, potassium repletion  8/21 increase insulin, start midodrine, replete K, place a-line  8/22 titrate HFNC, replete K    Review of Systems  Review of Systems   All other systems reviewed and are negative.       Vital Signs for last 24 hours   Temp:  [36 °C (96.8 °F)-36.9 °C (98.4 °F)] 36.9 °C " (98.4 °F)  Pulse:  [] 102  Resp:  [16-51] 24  SpO2:  [81 %-96 %] 96 %    Hemodynamic parameters for last 24 hours       Respiratory Information for the last 24 hours       Physical Exam   Physical Exam  Vitals and nursing note reviewed.   HENT:      Head: Normocephalic and atraumatic.      Right Ear: External ear normal.      Left Ear: External ear normal.      Nose: Nose normal.      Mouth/Throat:      Mouth: Mucous membranes are moist.      Pharynx: Oropharynx is clear.   Eyes:      Pupils: Pupils are equal, round, and reactive to light.   Cardiovascular:      Rate and Rhythm: Regular rhythm. Tachycardia present.      Pulses: Normal pulses.      Heart sounds: Normal heart sounds.   Pulmonary:      Effort: Respiratory distress present.      Comments: Coarse bilateral breath sounds  Abdominal:      Palpations: Abdomen is soft.   Musculoskeletal:         General: Normal range of motion.      Cervical back: Normal range of motion and neck supple.   Skin:     General: Skin is warm and dry.      Capillary Refill: Capillary refill takes less than 2 seconds.   Neurological:      General: No focal deficit present.      Mental Status: He is alert.         Medications  Current Facility-Administered Medications   Medication Dose Route Frequency Provider Last Rate Last Admin   • potassium bicarbonate (KLYTE) effervescent tablet 25 mEq  25 mEq Oral BID Valentino Niño M.D.       • midodrine (PROAMATINE) tablet 5 mg  5 mg Oral TID WITH MEALS Valentino Niño M.D.   5 mg at 08/22/21 1143   • insulin glargine (Semglee) injection  60 Units Subcutaneous Q EVENING Valentino Niño M.D.   60 Units at 08/21/21 1722    And   • insulin lispro (AdmeLOG) injection  0.3 Units/kg/day Subcutaneous TID AC Valentino Niño M.D.   11 Units at 08/22/21 1140    And   • insulin lispro (AdmeLOG) injection  3-14 Units Subcutaneous 4X/DAY ACHS Valentino Niño M.D.   3 Units at 08/22/21 1100    And   • glucose 4 g chewable tablet 16 g   16 g Oral Q15 MIN PRN Valentino Niño M.D.        And   • dextrose 50% (D50W) injection 50 mL  50 mL Intravenous Q15 MIN PRN Valentino Niño M.D.       • furosemide (LASIX) injection 20 mg  20 mg Intravenous BID DIURETIC Valentino Niño M.D.   20 mg at 08/22/21 0603   • MD Alert...ICU Electrolyte Replacement per Pharmacy   Other PHARMACY TO DOSE Porfirio Reyes M.D.       • enoxaparin (LOVENOX) inj 120 mg  120 mg Subcutaneous Q12HRS Brittaney Mclaughlin M.D.   120 mg at 08/22/21 0603   • senna-docusate (PERICOLACE or SENOKOT S) 8.6-50 MG per tablet 2 tablet  2 Tablet Oral BID Ayaan Chapa M.D.   2 Tablet at 08/22/21 0603    And   • polyethylene glycol/lytes (MIRALAX) PACKET 1 Packet  1 Packet Oral QDAY PRN Ayaan Chapa M.D.        And   • magnesium hydroxide (MILK OF MAGNESIA) suspension 30 mL  30 mL Oral QDAY PRN Ayaan Chapa M.D.        And   • bisacodyl (DULCOLAX) suppository 10 mg  10 mg Rectal QDAY PRN Ayaan Chapa M.D.       • Respiratory Therapy Consult   Nebulization Continuous RT Ayaan Chapa M.D.       • acetaminophen (TYLENOL) tablet 1,000 mg  1,000 mg Oral TID Ayaan Chapa M.D.   1,000 mg at 08/22/21 0824   • labetalol (NORMODYNE/TRANDATE) injection 10 mg  10 mg Intravenous Q4HRS PRN Ayaan Chapa M.D.       • ondansetron (ZOFRAN) syringe/vial injection 4 mg  4 mg Intravenous Q4HRS PRN Ayaan Chapa M.D.   4 mg at 08/08/21 0822   • ondansetron (ZOFRAN ODT) dispertab 4 mg  4 mg Oral Q4HRS PRN Ayaan Chapa M.D.   4 mg at 08/09/21 0804   • guaiFENesin dextromethorphan (ROBITUSSIN DM) 100-10 MG/5ML syrup 10 mL  10 mL Oral Q6HRS PRN Ayaan Chapa M.D.   10 mL at 08/21/21 2211   • clopidogrel (PLAVIX) tablet 75 mg  75 mg Oral DAILY Ayaanstephen Chapa M.D.   75 mg at 08/22/21 0603   • rosuvastatin (CRESTOR) tablet 40 mg  40 mg Oral Q EVENING Ayaanstephen Chapa  M.D.   40 mg at 08/21/21 1727   • ezetimibe (ZETIA) tablet 10 mg  10 mg Oral DAILY Ayaan Chapa M.D.   10 mg at 08/22/21 0603       Fluids    Intake/Output Summary (Last 24 hours) at 8/22/2021 1316  Last data filed at 8/22/2021 1000  Gross per 24 hour   Intake 1540 ml   Output 1375 ml   Net 165 ml       Laboratory          Recent Labs     08/20/21  0332 08/21/21  0310 08/22/21  0527   SODIUM 141 140 139   POTASSIUM 3.5* 3.5* 3.6   CHLORIDE 105 103 103   CO2 27 25 26   BUN 9 9 12   CREATININE 0.51 0.46* 0.36*   MAGNESIUM 2.0 2.0 2.0   PHOSPHORUS 3.2 4.2 4.8*   CALCIUM 8.2* 8.7 8.7     Recent Labs     08/20/21  0332 08/21/21  0310 08/22/21  0527   GLUCOSE 188* 120* 96     Recent Labs     08/20/21  0332 08/21/21  0310 08/22/21  0527   WBC 9.7 9.4 7.0     Recent Labs     08/20/21  0332 08/21/21  0310 08/22/21  0527   RBC 4.72 4.97 4.60*   HEMOGLOBIN 14.1 14.9 13.9*   HEMATOCRIT 43.4 45.7 43.0   PLATELETCT 536* 522* 414       Imaging  X-Ray:  I have personally reviewed the images and compared with prior images.    Assessment/Plan  * Pneumonia due to COVID-19 virus- (present on admission)  Assessment & Plan  COVID-19 pneumonia  Dexamethasone - completed course  Tocilizumab 8/13  Fluid balance - forced diuresis w/ furosemide    Hypomagnesemia  Assessment & Plan  Mag 2.0    Hypokalemia  Assessment & Plan  K 3.6  Replete K to be > 4.0    Acute deep vein thrombosis (DVT) of brachial vein of left upper extremity (HCC)- (present on admission)  Assessment & Plan  Left brachial DVT  Therapeutic anticoagulation with enoxaparin    Type 2 diabetes mellitus without complication, without long-term current use of insulin (HCC)- (present on admission)  Assessment & Plan  Uncontrolled, glycohemoglobin 11.3  Increase glargine insulin    Acute respiratory failure with hypoxia (HCC)- (present on admission)  Assessment & Plan  Acute hypoxic respiratory failure due to COVID-19 pneumonia  Intermittent humidified high-flow nasal  cannula and NIPPV w/ BiPAP  Titrate to keep O2 saturation > 88%  Encourage self-proning  Encourage incentive spirometry  Aggressive pulmonary hygiene    Morbid obesity with BMI of 40.0-44.9, adult (HCC)- (present on admission)  Assessment & Plan  With suspected underlying FARHANA  Will do trial of Bipap to see if recruitable lung    Heart failure with reduced ejection fraction (HCC)- (present on admission)  Assessment & Plan  Chronic hypoxia and lung infiltrates/edema secondary likely more related to acute infection of COVID-19 pneumonia  Continue negative fluid balance as tolerated  Close monitoring of I/Os  Currently hold heart failure regime with hypotension    Repeat echo reviewed with improved EF but difficult with wall motion abnormalities    Coronary artery disease involving native coronary artery of native heart without angina pectoris- (present on admission)  Assessment & Plan  CAD with hx of PCI to the ostial LAD in 10/2020, RCA in 2013 and 2014, OM in 2017  Clopidogrel    Tobacco use- (present on admission)  Assessment & Plan  Cessation counseling when appropriate    Hyperlipemia- (present on admission)  Assessment & Plan  Suspected familial hyperlipidemia  On Crestor and Zetia  Monitor LFTs     DVT prophylaxis: Enoxaparin  PUD prophylaxis: Not indicated  Glycemic control: Sliding scale insulin  Nutrition: Diet  Lines: None  Ballard: Need for strict I/O monitoring, remove when able  Mobility: Early mobility as tolerated  Goals of care: Full code  Disposition: ICU    I have performed a physical exam and reviewed and updated ROS and Plan today (8/22/2021). In review of yesterday's note (8/21/2021), there are no changes except as documented above.     Discussed patient condition and risk of morbidity and/or mortality with RN, RT, Pharmacy, Charge nurse / hot rounds and Patient     The patient remains critically ill.  Critical care time = 32 minutes in directly providing and coordinating critical care and extensive  data review.  No time overlap and excludes procedures.

## 2021-08-22 NOTE — CARE PLAN
The patient is Watcher - Medium risk of patient condition declining or worsening    Shift Goals  Clinical Goals: wean oxygen  Patient Goals: get off hi-rafita  Family Goals: N  Progress made toward(s) clinical / shift goals:    Problem: Knowledge Deficit - Standard  Goal: Patient and family/care givers will demonstrate understanding of plan of care, disease process/condition, diagnostic tests and medications  Outcome: Progressing     Problem: Skin Integrity  Goal: Skin integrity is maintained or improved  Outcome: Progressing     Problem: Fall Risk  Goal: Patient will remain free from falls  Outcome: Progressing       Patient is not progressing towards the following goals:

## 2021-08-22 NOTE — CARE PLAN
Problem: Knowledge Deficit - Standard  Goal: Patient and family/care givers will demonstrate understanding of plan of care, disease process/condition, diagnostic tests and medications  Outcome: Progressing     Problem: Skin Integrity  Goal: Skin integrity is maintained or improved  Outcome: Progressing     Problem: Fall Risk  Goal: Patient will remain free from falls  Outcome: Progressing   The patient is Watcher - Medium risk of patient condition declining or worsening    Shift Goals  Clinical Goals: wean oxygen  Patient Goals: get off hi-rafita  Family Goals: N/A    Progress made toward(s) clinical / shift goals:  wean 02    Patient is not progressing towards the following goals:

## 2021-08-23 LAB
ANION GAP SERPL CALC-SCNC: 8 MMOL/L (ref 7–16)
BUN SERPL-MCNC: 13 MG/DL (ref 8–22)
CALCIUM SERPL-MCNC: 8.4 MG/DL (ref 8.5–10.5)
CHLORIDE SERPL-SCNC: 103 MMOL/L (ref 96–112)
CO2 SERPL-SCNC: 26 MMOL/L (ref 20–33)
CREAT SERPL-MCNC: 0.45 MG/DL (ref 0.5–1.4)
ERYTHROCYTE [DISTWIDTH] IN BLOOD BY AUTOMATED COUNT: 49.3 FL (ref 35.9–50)
GLUCOSE BLD-MCNC: 130 MG/DL (ref 65–99)
GLUCOSE BLD-MCNC: 142 MG/DL (ref 65–99)
GLUCOSE BLD-MCNC: 89 MG/DL (ref 65–99)
GLUCOSE BLD-MCNC: 95 MG/DL (ref 65–99)
GLUCOSE SERPL-MCNC: 87 MG/DL (ref 65–99)
HCT VFR BLD AUTO: 41.8 % (ref 42–52)
HGB BLD-MCNC: 13.5 G/DL (ref 14–18)
MAGNESIUM SERPL-MCNC: 2.1 MG/DL (ref 1.5–2.5)
MCH RBC QN AUTO: 30 PG (ref 27–33)
MCHC RBC AUTO-ENTMCNC: 32.3 G/DL (ref 33.7–35.3)
MCV RBC AUTO: 92.9 FL (ref 81.4–97.8)
PHOSPHATE SERPL-MCNC: 4.2 MG/DL (ref 2.5–4.5)
PLATELET # BLD AUTO: 388 K/UL (ref 164–446)
PMV BLD AUTO: 9.6 FL (ref 9–12.9)
POTASSIUM SERPL-SCNC: 3.5 MMOL/L (ref 3.6–5.5)
RBC # BLD AUTO: 4.5 M/UL (ref 4.7–6.1)
SODIUM SERPL-SCNC: 137 MMOL/L (ref 135–145)
WBC # BLD AUTO: 7.4 K/UL (ref 4.8–10.8)

## 2021-08-23 PROCEDURE — 99291 CRITICAL CARE FIRST HOUR: CPT | Performed by: INTERNAL MEDICINE

## 2021-08-23 PROCEDURE — 770006 HCHG ROOM/CARE - MED/SURG/GYN SEMI*

## 2021-08-23 PROCEDURE — 99233 SBSQ HOSP IP/OBS HIGH 50: CPT | Performed by: HOSPITALIST

## 2021-08-23 PROCEDURE — A9270 NON-COVERED ITEM OR SERVICE: HCPCS | Performed by: EMERGENCY MEDICINE

## 2021-08-23 PROCEDURE — 82962 GLUCOSE BLOOD TEST: CPT | Mod: 91

## 2021-08-23 PROCEDURE — 700102 HCHG RX REV CODE 250 W/ 637 OVERRIDE(OP): Performed by: EMERGENCY MEDICINE

## 2021-08-23 PROCEDURE — 85027 COMPLETE CBC AUTOMATED: CPT

## 2021-08-23 PROCEDURE — A9270 NON-COVERED ITEM OR SERVICE: HCPCS | Performed by: INTERNAL MEDICINE

## 2021-08-23 PROCEDURE — 700102 HCHG RX REV CODE 250 W/ 637 OVERRIDE(OP): Performed by: STUDENT IN AN ORGANIZED HEALTH CARE EDUCATION/TRAINING PROGRAM

## 2021-08-23 PROCEDURE — A9270 NON-COVERED ITEM OR SERVICE: HCPCS | Performed by: STUDENT IN AN ORGANIZED HEALTH CARE EDUCATION/TRAINING PROGRAM

## 2021-08-23 PROCEDURE — 700102 HCHG RX REV CODE 250 W/ 637 OVERRIDE(OP): Performed by: INTERNAL MEDICINE

## 2021-08-23 PROCEDURE — 94640 AIRWAY INHALATION TREATMENT: CPT

## 2021-08-23 PROCEDURE — 83735 ASSAY OF MAGNESIUM: CPT

## 2021-08-23 PROCEDURE — 84100 ASSAY OF PHOSPHORUS: CPT

## 2021-08-23 PROCEDURE — 700111 HCHG RX REV CODE 636 W/ 250 OVERRIDE (IP): Performed by: EMERGENCY MEDICINE

## 2021-08-23 PROCEDURE — 700111 HCHG RX REV CODE 636 W/ 250 OVERRIDE (IP): Performed by: STUDENT IN AN ORGANIZED HEALTH CARE EDUCATION/TRAINING PROGRAM

## 2021-08-23 PROCEDURE — 80048 BASIC METABOLIC PNL TOTAL CA: CPT

## 2021-08-23 PROCEDURE — 94760 N-INVAS EAR/PLS OXIMETRY 1: CPT

## 2021-08-23 RX ADMIN — MIDODRINE HYDROCHLORIDE 5 MG: 5 TABLET ORAL at 08:52

## 2021-08-23 RX ADMIN — ROSUVASTATIN CALCIUM 40 MG: 20 TABLET, FILM COATED ORAL at 17:02

## 2021-08-23 RX ADMIN — FUROSEMIDE 20 MG: 20 INJECTION, SOLUTION INTRAMUSCULAR; INTRAVENOUS at 17:02

## 2021-08-23 RX ADMIN — POTASSIUM BICARBONATE 25 MEQ: 978 TABLET, EFFERVESCENT ORAL at 06:09

## 2021-08-23 RX ADMIN — ACETAMINOPHEN 1000 MG: 500 TABLET, FILM COATED ORAL at 21:07

## 2021-08-23 RX ADMIN — INSULIN LISPRO 11 UNITS: 100 INJECTION, SOLUTION INTRAVENOUS; SUBCUTANEOUS at 08:53

## 2021-08-23 RX ADMIN — INSULIN LISPRO 11 UNITS: 100 INJECTION, SOLUTION INTRAVENOUS; SUBCUTANEOUS at 11:00

## 2021-08-23 RX ADMIN — POTASSIUM BICARBONATE 25 MEQ: 978 TABLET, EFFERVESCENT ORAL at 17:02

## 2021-08-23 RX ADMIN — EZETIMIBE 10 MG: 10 TABLET ORAL at 06:09

## 2021-08-23 RX ADMIN — ENOXAPARIN SODIUM 120 MG: 120 INJECTION SUBCUTANEOUS at 17:03

## 2021-08-23 RX ADMIN — MIDODRINE HYDROCHLORIDE 5 MG: 5 TABLET ORAL at 17:02

## 2021-08-23 RX ADMIN — MIDODRINE HYDROCHLORIDE 5 MG: 5 TABLET ORAL at 12:24

## 2021-08-23 RX ADMIN — FUROSEMIDE 20 MG: 20 INJECTION, SOLUTION INTRAMUSCULAR; INTRAVENOUS at 06:09

## 2021-08-23 RX ADMIN — ENOXAPARIN SODIUM 120 MG: 120 INJECTION SUBCUTANEOUS at 06:09

## 2021-08-23 RX ADMIN — INSULIN LISPRO 11 UNITS: 100 INJECTION, SOLUTION INTRAVENOUS; SUBCUTANEOUS at 17:19

## 2021-08-23 RX ADMIN — ACETAMINOPHEN 1000 MG: 500 TABLET, FILM COATED ORAL at 17:02

## 2021-08-23 RX ADMIN — POTASSIUM BICARBONATE 50 MEQ: 978 TABLET, EFFERVESCENT ORAL at 12:24

## 2021-08-23 RX ADMIN — ACETAMINOPHEN 1000 MG: 500 TABLET, FILM COATED ORAL at 08:52

## 2021-08-23 RX ADMIN — CLOPIDOGREL BISULFATE 75 MG: 75 TABLET ORAL at 06:09

## 2021-08-23 ASSESSMENT — PAIN DESCRIPTION - PAIN TYPE
TYPE: ACUTE PAIN
TYPE: ACUTE PAIN

## 2021-08-23 ASSESSMENT — ENCOUNTER SYMPTOMS
DEPRESSION: 0
STRIDOR: 0
SHORTNESS OF BREATH: 1
WEAKNESS: 0
NAUSEA: 0
EYE DISCHARGE: 0
FOCAL WEAKNESS: 0
INSOMNIA: 0
SINUS PAIN: 0
SENSORY CHANGE: 0
CLAUDICATION: 0
CHILLS: 0
DIARRHEA: 0
MYALGIAS: 0
SEIZURES: 0
HALLUCINATIONS: 0
VOMITING: 0
DOUBLE VISION: 0
EYE PAIN: 0
WHEEZING: 0
SORE THROAT: 0
BLURRED VISION: 0
FEVER: 0
HEADACHES: 0
ABDOMINAL PAIN: 0
SPUTUM PRODUCTION: 0
COUGH: 0
BACK PAIN: 0
SPUTUM PRODUCTION: 1
COUGH: 1
BRUISES/BLEEDS EASILY: 0
HEMOPTYSIS: 0
DIZZINESS: 0
PALPITATIONS: 0

## 2021-08-23 ASSESSMENT — FIBROSIS 4 INDEX: FIB4 SCORE: 0.73

## 2021-08-23 ASSESSMENT — PATIENT HEALTH QUESTIONNAIRE - PHQ9
7. TROUBLE CONCENTRATING ON THINGS, SUCH AS READING THE NEWSPAPER OR WATCHING TELEVISION: NOT AT ALL
3. TROUBLE FALLING OR STAYING ASLEEP OR SLEEPING TOO MUCH: NOT AT ALL
9. THOUGHTS THAT YOU WOULD BE BETTER OFF DEAD, OR OF HURTING YOURSELF: NOT AT ALL
1. LITTLE INTEREST OR PLEASURE IN DOING THINGS: NOT AT ALL
4. FEELING TIRED OR HAVING LITTLE ENERGY: NOT AT ALL
2. FEELING DOWN, DEPRESSED, IRRITABLE, OR HOPELESS: NOT AT ALL
6. FEELING BAD ABOUT YOURSELF - OR THAT YOU ARE A FAILURE OR HAVE LET YOURSELF OR YOUR FAMILY DOWN: NOT AL ALL
5. POOR APPETITE OR OVEREATING: NOT AT ALL
SUM OF ALL RESPONSES TO PHQ QUESTIONS 1-9: 0
SUM OF ALL RESPONSES TO PHQ9 QUESTIONS 1 AND 2: 0
8. MOVING OR SPEAKING SO SLOWLY THAT OTHER PEOPLE COULD HAVE NOTICED. OR THE OPPOSITE, BEING SO FIGETY OR RESTLESS THAT YOU HAVE BEEN MOVING AROUND A LOT MORE THAN USUAL: NOT AT ALL

## 2021-08-23 ASSESSMENT — LIFESTYLE VARIABLES: SUBSTANCE_ABUSE: 0

## 2021-08-23 NOTE — PROGRESS NOTES
"Critical Care Progress Note    Date of admission  8/6/2021    Chief Complaint  41 y.o. male with PMH including obesity, HFrEF 20%, CAD with hx of PCI to the ostial LAD in 10/2020, RCA in 2013 and 2014, OM in 2017, likely FH, uncontrolled DM, suspected FARHANA.  He presented to the ED 8/6 with fever, chills, cough and dyspnea.  He was found to have COVID-19 pneumonia and is unvaccinated.  He tells me that his fiancée was at the \"night [place] the country\" possible in [facility] and may have been exposed to Covid there.  She also contracted COVID-19 and has recovered.  He was admitted and placed on oxygen, dexamethasone and just completed a 5-day course of remdesivir.  Today he has had escalating FiO2 requirements up from baseline of 6 L oxygen since admission now on 60 L/100% HFNC with the addition of nonrebreather mask.  He is moderately dyspneic but not in acute distress.  CXR shows increasing bilateral infiltrates and edema.  He is drinking quite a bit of water and fluid status has been +3.9 L.  He received additional Lasix today. Taken from Dr Hirsch note.    Hospital Course  Transferred to ICU 8/13 for persistent hypoxia on max HFNC  8/16 HFNC 60 L/100%, no distress, completed Decadron, transition to taper hydrocortisone, full dose anticoagulation for you UE DVT  8/17 intermittent BiPAP w/ desaturations when switching to HFNC, D/C hydrocortisone, D/C dexmedetomidine, restart norepinephrine for MAP > 65 or SBP > 90  8/18 titrate down HFNC, increase insulin, replete K, replete magnesium  8/19 continue norepinephrine, furosemide, increase insulin glargine by 10  8/20 melatonin for sleep, norepinephrine requirement remains, potassium repletion  8/21 increase insulin, start midodrine, replete K, place a-line  8/22 titrate HFNC, replete K    Interval Problem Update  Reviewed last 24 hour events:  Neuro: aox4 no pain  HR: 80-90's  SBP:   Tmax: afebrile  GI: tolerating diet, BM 8/22  UOP: good  Lines: central line, pierre" peripheral IV  Resp: 25l/60%   Vte: lovenox full   PPI/H2:n/a  Antibx: none  K replace  Remove central line and pierre  Transfer to medicine    Review of Systems  Review of Systems   Constitutional: Negative for chills, fever and malaise/fatigue.   HENT: Negative for hearing loss, sinus pain and sore throat.    Eyes: Negative for double vision and discharge.   Respiratory: Positive for shortness of breath. Negative for cough, hemoptysis, sputum production and stridor.         With ambulation or movement   Cardiovascular: Negative for chest pain, claudication and leg swelling.   Gastrointestinal: Negative for abdominal pain, diarrhea, nausea and vomiting.   Genitourinary: Negative for dysuria, frequency and hematuria.   Musculoskeletal: Negative for back pain, joint pain and myalgias.   Neurological: Negative for seizures, weakness and headaches.   Endo/Heme/Allergies: Does not bruise/bleed easily.   Psychiatric/Behavioral: Negative for depression, hallucinations and substance abuse.        Vital Signs for last 24 hours   Temp:  [36.2 °C (97.2 °F)-37 °C (98.6 °F)] 36.7 °C (98.1 °F)  Pulse:  [] 80  Resp:  [8-43] 30  BP: (91-95)/(51-53) 91/53  SpO2:  [86 %-96 %] 96 %    Hemodynamic parameters for last 24 hours       Respiratory Information for the last 24 hours       Physical Exam   Physical Exam  Vitals and nursing note reviewed.   Constitutional:       General: He is not in acute distress.     Appearance: He is obese. He is not ill-appearing.      Comments: well appearing male on HFNC   HENT:      Head: Normocephalic.      Comments: Piercing to left face     Mouth/Throat:      Mouth: Mucous membranes are moist.      Comments: HFNC  Eyes:      Pupils: Pupils are equal, round, and reactive to light.   Neck:      Comments: Right IJ central line  Cardiovascular:      Rate and Rhythm: Normal rate.   Pulmonary:      Effort: No respiratory distress.      Breath sounds: No stridor. No wheezing or rhonchi.      Comments:  Low work of breathing, fine crackles  Abdominal:      General: There is no distension.      Palpations: There is no mass.      Tenderness: There is no abdominal tenderness. There is no guarding or rebound.      Hernia: No hernia is present.   Musculoskeletal:         General: No swelling or tenderness.      Cervical back: Normal range of motion.      Comments: Left radial pierre   Skin:     Coloration: Skin is not jaundiced or pale.      Comments: Multiple tattoos   Neurological:      General: No focal deficit present.      Mental Status: He is alert and oriented to person, place, and time.      Cranial Nerves: No cranial nerve deficit.      Sensory: No sensory deficit.      Motor: No weakness.      Coordination: Coordination normal.   Psychiatric:         Mood and Affect: Mood normal.         Medications  Current Facility-Administered Medications   Medication Dose Route Frequency Provider Last Rate Last Admin   • potassium bicarbonate (KLYTE) effervescent tablet 50 mEq  50 mEq Enteral Tube Once Porfirio Reyes M.D.       • potassium bicarbonate (KLYTE) effervescent tablet 25 mEq  25 mEq Oral BID Valentino Niño M.D.   25 mEq at 08/23/21 0609   • midodrine (PROAMATINE) tablet 5 mg  5 mg Oral TID WITH MEALS Valentino Niño M.D.   5 mg at 08/23/21 0852   • insulin glargine (Semglee) injection  60 Units Subcutaneous Q EVENING Valentino Niño M.D.   60 Units at 08/22/21 1709    And   • insulin lispro (AdmeLOG) injection  0.3 Units/kg/day Subcutaneous TID AC Valentino Niño M.D.   11 Units at 08/23/21 0853    And   • insulin lispro (AdmeLOG) injection  3-14 Units Subcutaneous 4X/DAY ACHS Valentino Niño M.D.   3 Units at 08/22/21 2148    And   • glucose 4 g chewable tablet 16 g  16 g Oral Q15 MIN PRN Valentino Niño M.D.        And   • dextrose 50% (D50W) injection 50 mL  50 mL Intravenous Q15 MIN PRN Valentino Niño M.D.       • furosemide (LASIX) injection 20 mg  20 mg Intravenous BID DIURETIC  Valentino Niño M.D.   20 mg at 08/23/21 0609   • MD Alert...ICU Electrolyte Replacement per Pharmacy   Other PHARMACY TO DOSE Porfirio Reyes M.D.       • enoxaparin (LOVENOX) inj 120 mg  120 mg Subcutaneous Q12HRS Brittaney Mclaughlin M.D.   120 mg at 08/23/21 0609   • senna-docusate (PERICOLACE or SENOKOT S) 8.6-50 MG per tablet 2 tablet  2 Tablet Oral BID Ayaan Chapa M.D.   2 Tablet at 08/22/21 0603    And   • polyethylene glycol/lytes (MIRALAX) PACKET 1 Packet  1 Packet Oral QDAY PRN Ayaan Chapa M.D.        And   • magnesium hydroxide (MILK OF MAGNESIA) suspension 30 mL  30 mL Oral QDAY PRN Ayaan Chapa M.D.        And   • bisacodyl (DULCOLAX) suppository 10 mg  10 mg Rectal QDAY PRN Ayaan Chapa M.D.       • Respiratory Therapy Consult   Nebulization Continuous RT Ayaan Chapa M.D.       • acetaminophen (TYLENOL) tablet 1,000 mg  1,000 mg Oral TID Ayaan Chapa M.D.   1,000 mg at 08/23/21 0852   • labetalol (NORMODYNE/TRANDATE) injection 10 mg  10 mg Intravenous Q4HRS PRN Ayaan Chapa M.D.       • ondansetron (ZOFRAN) syringe/vial injection 4 mg  4 mg Intravenous Q4HRS PRN Ayaan Chapa M.D.   4 mg at 08/08/21 0822   • ondansetron (ZOFRAN ODT) dispertab 4 mg  4 mg Oral Q4HRS PRN Ayaan Chapa M.D.   4 mg at 08/09/21 0804   • guaiFENesin dextromethorphan (ROBITUSSIN DM) 100-10 MG/5ML syrup 10 mL  10 mL Oral Q6HRS PRN Ayaan Chapa M.D.   10 mL at 08/22/21 2153   • clopidogrel (PLAVIX) tablet 75 mg  75 mg Oral DAILY Ayaan Chapa M.D.   75 mg at 08/23/21 0609   • rosuvastatin (CRESTOR) tablet 40 mg  40 mg Oral Q EVENING Ayaan Chapa M.D.   40 mg at 08/22/21 1716   • ezetimibe (ZETIA) tablet 10 mg  10 mg Oral DAILY Ayaan Chapa M.D.   10 mg at 08/23/21 0609       Fluids    Intake/Output Summary (Last 24 hours) at 8/23/2021 0949  Last data filed  at 8/23/2021 0600  Gross per 24 hour   Intake 1000 ml   Output 625 ml   Net 375 ml       Laboratory          Recent Labs     08/21/21 0310 08/22/21  0527 08/23/21  0357   SODIUM 140 139 137   POTASSIUM 3.5* 3.6 3.5*   CHLORIDE 103 103 103   CO2 25 26 26   BUN 9 12 13   CREATININE 0.46* 0.36* 0.45*   MAGNESIUM 2.0 2.0 2.1   PHOSPHORUS 4.2 4.8* 4.2   CALCIUM 8.7 8.7 8.4*     Recent Labs     08/21/21 0310 08/22/21  0527 08/23/21  0357   GLUCOSE 120* 96 87     Recent Labs     08/21/21 0310 08/22/21  0527 08/23/21  0357   WBC 9.4 7.0 7.4     Recent Labs     08/21/21 0310 08/22/21 0527 08/23/21  0357   RBC 4.97 4.60* 4.50*   HEMOGLOBIN 14.9 13.9* 13.5*   HEMATOCRIT 45.7 43.0 41.8*   PLATELETCT 522* 414 388       Imaging  X-Ray:  I have personally reviewed the images and compared with prior images.  Echo:   Reviewed  Ultrasound:  Reviewed    Assessment/Plan  * Pneumonia due to COVID-19 virus- (present on admission)  Assessment & Plan  COVID-19 pneumonia  Dexamethasone - completed course  Tocilizumab 8/13  Fluid balance - forced diuresis w/ furosemide    Hypomagnesemia  Assessment & Plan  Mag 2.0    Hypokalemia  Assessment & Plan  K 3.6  Replete K to be > 4.0    Acute deep vein thrombosis (DVT) of brachial vein of left upper extremity (HCC)- (present on admission)  Assessment & Plan  Left brachial DVT  Therapeutic anticoagulation with enoxaparin    Type 2 diabetes mellitus without complication, without long-term current use of insulin (HCC)- (present on admission)  Assessment & Plan  Uncontrolled, glycohemoglobin 11.3  Increase glargine insulin    Acute respiratory failure with hypoxia (HCC)- (present on admission)  Assessment & Plan  Acute hypoxic respiratory failure due to COVID-19 pneumonia  Intermittent humidified high-flow nasal cannula 25L/60% and NIPPV w/ BiPAP for FARHANA at night  Titrate to keep O2 saturation > 88%  Encourage self-proning  Encourage incentive spirometry  Aggressive pulmonary hygiene    Morbid  obesity with BMI of 40.0-44.9, adult (HCC)- (present on admission)  Assessment & Plan  With suspected underlying FARHANA  Will do trial of Bipap to see if recruitable lung    Heart failure with reduced ejection fraction (HCC)- (present on admission)  Assessment & Plan  Chronic hypoxia and lung infiltrates/edema secondary likely more related to acute infection of COVID-19 pneumonia  Continue negative fluid balance as tolerated  Close monitoring of I/Os  Currently hold heart failure regime with hypotension    Repeat echo reviewed with improved EF but difficult with wall motion abnormalities    Coronary artery disease involving native coronary artery of native heart without angina pectoris- (present on admission)  Assessment & Plan  CAD with hx of PCI to the ostial LAD in 10/2020, RCA in 2013 and 2014, OM in 2017  Clopidogrel    Tobacco use- (present on admission)  Assessment & Plan  Cessation counseling when appropriate    Hyperlipemia- (present on admission)  Assessment & Plan  Suspected familial hyperlipidemia  On Crestor and Zetia  Monitor LFTs       VTE:  Lovenox  Ulcer: Not Indicated  Lines: None    I have performed a physical exam and reviewed and updated ROS and Plan today (8/23/2021). In review of yesterday's note (8/22/2021), there are no changes except as documented above.     Discussed patient condition and risk of morbidity and/or mortality with RN, RT, Pharmacy, Charge nurse / hot rounds and Patient     The patient remains critically ill with severe Covid pneumonia on HFNC with active titration.  Critical care time = 35 minutes in directly providing and coordinating critical care and extensive data review.  No time overlap and excludes procedures.    Transfer to medicine

## 2021-08-23 NOTE — CARE PLAN
The patient is Watcher - Medium risk of patient condition declining or worsening    Shift Goals  Clinical Goals: wean oxygen  Patient Goals: get off hi-rafita  Family Goals: N/A    Progress made toward(s) clinical / shift goals: Did not require increase in oxygen.  Problem: Knowledge Deficit - Standard  Goal: Patient and family/care givers will demonstrate understanding of plan of care, disease process/condition, diagnostic tests and medications  Outcome: Progressing       Patient is not progressing towards the following goals:

## 2021-08-23 NOTE — PROGRESS NOTES
[facility] Medicine Daily Progress Note    Date of Service  8/23/2021    Chief Complaint  Kyle Gonzalez is a 41 y.o. male admitted 8/6/2021 with dyspnea.    Hospital Course  41-year-old morbidly obese male with past medical history of hypertension, dyslipidemia, multiple STEMIs and NSTEMIs (4 times 2013,2014,2015,2017, 2020) s/p 4 stents, ischemic cardiomyopathy, HFrEF last echocardiogram on 10/2020 with ejection fraction at 20%, grade 1 diastolic dysfunction and Covid positive test 3 days ago presented emergency department on 8/6/2021 with a 1 week history of worsening fever, chills, myalgias, back pain, dry cough and dyspnea at rest and exertion.  Patient reporting that he was in [place] last Friday when he started noticing fevers and chills.  Patient admitting to not being vaccinated.  Tested positive for Covid,  Dyspnea, coughing and watery diarrhea started around the same time the patient then presented to the hospital.  The patient's fiancé also was tested positive for Covid, she recovered.  The patient initially required to be transferred to ICU on 8/13 for persistent hypoxemia and max high flow nasal cannula.  The patient was treated with Decadron, was found to have a upper extremity DVT which was anticoagulated.  On 817 the patient required intermittent BiPAP treatment, had some intermittent difficulty with maintaining his blood pressure and was started on pressors, norepinephrine.  Forced diuresis was continued as tolerated.  On 8/23 the patient appeared overall improved, he is currently felt stabilized to leave the ICU.  Currently the patient is on 25 L, 6% FiO2 high flow nasal cannula, the patient has no specific other complaints other than becoming more dyspneic and diaphoretic with ambulation.    Interval Problem Update  Patient seen and examined today.    Patient tolerating treatment and therapies.  All Data, Medication data reviewed.  Case discussed with nursing as available.  Plan of Care reviewed  with patient and notified of changes.  8/23 the patient is alert and x4, he is currently no pain, he is resting without difficulty, he does engage in self proning, his heart rate is in the 80s to 90s, blood pressure between 90 and 110 systolic, afebrile, he is eating well, continues on full dose Lovenox with a history of extremity DVT, oxygenation sufficient at 25 L and 60%, replacing electrolytes,  Later in the morning the patient down titrating to 20 L and 40% FiO2.    I have personally seen and examined the patient at bedside. I discussed the plan of care with patient.  Critical care physician    Consultants/Specialty  Critical care physician    Code Status  Full Code    Disposition  Patient is not medically cleared.   Anticipate discharge to to home with close outpatient follow-up.  I have placed the appropriate orders for post-discharge needs.    Review of Systems  Review of Systems   Constitutional: Positive for malaise/fatigue. Negative for chills and fever.   Eyes: Negative for blurred vision and pain.   Respiratory: Positive for cough, sputum production and shortness of breath. Negative for wheezing.    Cardiovascular: Negative for chest pain, palpitations and leg swelling.   Gastrointestinal: Negative for abdominal pain, nausea and vomiting.   Genitourinary: Negative for dysuria and urgency.   Musculoskeletal: Negative for back pain.   Skin: Negative for itching and rash.   Neurological: Negative for dizziness, sensory change, focal weakness and headaches.   Psychiatric/Behavioral: Negative for substance abuse. The patient does not have insomnia.         Physical Exam  Temp:  [36.2 °C (97.2 °F)-37 °C (98.6 °F)] 36.7 °C (98.1 °F)  Pulse:  [] 80  Resp:  [8-43] 30  BP: (91-95)/(51-53) 91/53  SpO2:  [86 %-96 %] 96 %    Physical Exam  Constitutional:       General: He is not in acute distress.     Appearance: He is not ill-appearing.   HENT:      Head: Normocephalic and atraumatic.      Right Ear:  External ear normal.      Left Ear: External ear normal.      Mouth/Throat:      Pharynx: No oropharyngeal exudate or posterior oropharyngeal erythema.   Eyes:      Extraocular Movements: Extraocular movements intact.      Pupils: Pupils are equal, round, and reactive to light.   Cardiovascular:      Rate and Rhythm: Normal rate and regular rhythm.      Pulses: Normal pulses.      Heart sounds: Normal heart sounds.   Pulmonary:      Effort: Pulmonary effort is normal. No respiratory distress.      Breath sounds: Rhonchi and rales present. No wheezing.      Comments: B/l lung crackles  Abdominal:      General: Bowel sounds are normal. There is no distension.      Palpations: Abdomen is soft.      Tenderness: There is no abdominal tenderness. There is no guarding.   Musculoskeletal:         General: No swelling or tenderness.      Cervical back: Normal range of motion and neck supple.   Skin:     General: Skin is warm and dry.   Neurological:      General: No focal deficit present.      Mental Status: He is oriented to person, place, and time.      Sensory: No sensory deficit.      Motor: No weakness.   Psychiatric:         Mood and Affect: Mood normal.         Behavior: Behavior normal.         Fluids    Intake/Output Summary (Last 24 hours) at 8/23/2021 1012  Last data filed at 8/23/2021 0600  Gross per 24 hour   Intake 750 ml   Output 350 ml   Net 400 ml       Laboratory  Recent Labs     08/21/21  0310 08/22/21  0527 08/23/21  0357   WBC 9.4 7.0 7.4   RBC 4.97 4.60* 4.50*   HEMOGLOBIN 14.9 13.9* 13.5*   HEMATOCRIT 45.7 43.0 41.8*   MCV 92.0 93.5 92.9   MCH 30.0 30.2 30.0   MCHC 32.6* 32.3* 32.3*   RDW 46.8 49.4 49.3   PLATELETCT 522* 414 388   MPV 9.8 9.6 9.6     Recent Labs     08/21/21  0310 08/22/21  0527 08/23/21  0357   SODIUM 140 139 137   POTASSIUM 3.5* 3.6 3.5*   CHLORIDE 103 103 103   CO2 25 26 26   GLUCOSE 120* 96 87   BUN 9 12 13   CREATININE 0.46* 0.36* 0.45*   CALCIUM 8.7 8.7 8.4*                    Imaging  DX-CHEST-PORTABLE (1 VIEW)   Final Result      Possible mild increase in severe bilateral Covid pneumonia.      DX-CHEST-PORTABLE (1 VIEW)   Final Result      Moderate to severe multifocal consolidation is stable and compatible with Covid pneumonia      DX-CHEST-PORTABLE (1 VIEW)   Final Result         No significant interval change.      DX-CHEST-PORTABLE (1 VIEW)   Final Result         Right central venous catheter with tip projecting over the expected area of the lower SVC.      POCT BUTTERFLY-GUIDANCE VASCULAR ACCESS   Final Result      DX-CHEST-PORTABLE (1 VIEW)   Final Result      Slight interval progression of BILATERAL airspace disease      EC-ECHOCARDIOGRAM COMPLETE W/ CONT   Final Result      DX-CHEST-PORTABLE (1 VIEW)   Final Result      Stable chest with moderate multifocal groundglass and consolidation compatible with Covid pneumonia      US-EXTREMITY VENOUS UPPER BILAT   Final Result      US-EXTREMITY VENOUS LOWER BILAT   Final Result      DX-CHEST-PORTABLE (1 VIEW)   Final Result         1.  Pulmonary edema and/or infiltrates are identified, which appear somewhat increased since the prior exam.      DX-CHEST-PORTABLE (1 VIEW)   Final Result      Bilateral peripheral pulmonary airspace process consistent with Covid pneumonia           Assessment/Plan  * Pneumonia due to COVID-19 virus- (present on admission)  Assessment & Plan  Patient with overall improving status now after appropriate treatment, steroid completed  Ongoing pulmonary care, oxygen weaning  Proning      Acute pulmonary edema (HCC)- (present on admission)  Assessment & Plan  Improved, continue forced diuresis    Hypomagnesemia  Assessment & Plan  Monitor and replace    Hypokalemia  Assessment & Plan  Monitor and replace    Acute deep vein thrombosis (DVT) of brachial vein of left upper extremity (HCC)- (present on admission)  Assessment & Plan  This appears to be a very short segment of the brachial vein, on known  age  Ultrasound otherwise shows superficial thrombus  Would reevaluate by ultrasound and consider reducing anticoagulation to DVT prophylaxis dose only      Type 2 diabetes mellitus without complication, without long-term current use of insulin (HCC)- (present on admission)  Assessment & Plan  Reports chronic history of diabetes and admits to not taking Metformin  Diabetic diet  Insulin sliding scale  Increase glargine to 23 units and lispro 7 units TID-AC, c/w [facility] insulin  A1c 11.3  Will likely need insulin on discharge    Acute respiratory failure with hypoxia (HCC)- (present on admission)  Assessment & Plan  Secondary to Covid pneumonia  Continue oxygen weaning, the patient received full dose and appropriate COVID-19 treatment    Morbid obesity with BMI of 40.0-44.9, adult (HCC)- (present on admission)  Assessment & Plan  Contributing to severity of Covid pneumonia hypoxic respiratory failure  Counseling on weight reduction    Lactic acidosis- (present on admission)  Assessment & Plan  Resolved    Hyponatremia- (present on admission)  Assessment & Plan  Resolved, frequent metabolic checks    Heart failure with reduced ejection fraction (HCC)- (present on admission)  Assessment & Plan  Repeat echocardiogram shows ejection fraction of 55%        Coronary artery disease involving native coronary artery of native heart without angina pectoris- (present on admission)  Assessment & Plan  GDMT as tolerated    Tobacco use- (present on admission)  Assessment & Plan  Counseling on tobacco cessation    Essential hypertension- (present on admission)  Assessment & Plan  Titrate medication as tolerated for goal pressures    Stented coronary artery,  bare metal stent X 2- (present on admission)  Assessment & Plan  We will restart Plavix and aspirin, reduce Lovenox to DVT PPx dose     Hyperlipemia- (present on admission)  Assessment & Plan  Continue home statin  Plan  Reevaluate left upper extremity for DVT, the patient  currently on full anticoagulation as well as Plavix  Further full anticoagulation might not be indicated  Continue with forced diuresis  Oxygen weaning,  Proning  Wean midodrine as appropriate  Electrolyte balance  The patient is appropriate to leave ICU to non-ICU unit with Covid precautions  See orders  Medically complex high risk patient      VTE prophylaxis: enoxaparin ppx    I have performed a physical exam and reviewed and updated ROS and Plan today (8/23/2021). In review of yesterday's note (8/22/2021), there are no changes except as documented above.       Please note that this dictation was created using voice recognition software. I have made every reasonable attempt to correct obvious errors, but I expect that there are errors of grammar and possibly context that I did not discover before finalizing the note.

## 2021-08-23 NOTE — DISCHARGE PLANNING
Care Transition Team Discharge Planning    Anticipated Discharge Disposition: TBD     Action: Per chart review, pt no longer needs ICU level of care, and will transfer from [facility] to hospitalist then to a lower level of care unit.      Barriers to Discharge: no longer critical, but still acute level care needs    Plan: [facility] d/c team will continue to follow, and assist with d/c planning.

## 2021-08-24 ENCOUNTER — APPOINTMENT (OUTPATIENT)
Dept: RADIOLOGY | Facility: MEDICAL CENTER | Age: 41
DRG: 871 | End: 2021-08-24
Attending: HOSPITALIST
Payer: MEDICAID

## 2021-08-24 PROBLEM — I95.9 HYPOTENSION: Status: ACTIVE | Noted: 2021-08-24

## 2021-08-24 LAB
ANION GAP SERPL CALC-SCNC: 10 MMOL/L (ref 7–16)
BUN SERPL-MCNC: 11 MG/DL (ref 8–22)
CALCIUM SERPL-MCNC: 9.1 MG/DL (ref 8.5–10.5)
CHLORIDE SERPL-SCNC: 103 MMOL/L (ref 96–112)
CO2 SERPL-SCNC: 27 MMOL/L (ref 20–33)
CORTIS SERPL-MCNC: 3.9 UG/DL (ref 0–23)
CREAT SERPL-MCNC: 0.49 MG/DL (ref 0.5–1.4)
CRP SERPL HS-MCNC: <0.3 MG/DL (ref 0–0.75)
D DIMER PPP IA.FEU-MCNC: 2.41 UG/ML (FEU) (ref 0–0.5)
ERYTHROCYTE [DISTWIDTH] IN BLOOD BY AUTOMATED COUNT: 50 FL (ref 35.9–50)
GLUCOSE BLD-MCNC: 102 MG/DL (ref 65–99)
GLUCOSE BLD-MCNC: 139 MG/DL (ref 65–99)
GLUCOSE BLD-MCNC: 189 MG/DL (ref 65–99)
GLUCOSE BLD-MCNC: 90 MG/DL (ref 65–99)
GLUCOSE SERPL-MCNC: 76 MG/DL (ref 65–99)
HCT VFR BLD AUTO: 42.3 % (ref 42–52)
HGB BLD-MCNC: 13.6 G/DL (ref 14–18)
MAGNESIUM SERPL-MCNC: 2.1 MG/DL (ref 1.5–2.5)
MCH RBC QN AUTO: 30.2 PG (ref 27–33)
MCHC RBC AUTO-ENTMCNC: 32.2 G/DL (ref 33.7–35.3)
MCV RBC AUTO: 94 FL (ref 81.4–97.8)
PHOSPHATE SERPL-MCNC: 5.1 MG/DL (ref 2.5–4.5)
PHOSPHATE SERPL-MCNC: 5.2 MG/DL (ref 2.5–4.5)
PLATELET # BLD AUTO: 389 K/UL (ref 164–446)
PMV BLD AUTO: 10 FL (ref 9–12.9)
POTASSIUM SERPL-SCNC: 4.1 MMOL/L (ref 3.6–5.5)
RBC # BLD AUTO: 4.5 M/UL (ref 4.7–6.1)
SODIUM SERPL-SCNC: 140 MMOL/L (ref 135–145)
TSH SERPL DL<=0.005 MIU/L-ACNC: 1.57 UIU/ML (ref 0.38–5.33)
WBC # BLD AUTO: 7.5 K/UL (ref 4.8–10.8)

## 2021-08-24 PROCEDURE — 84443 ASSAY THYROID STIM HORMONE: CPT

## 2021-08-24 PROCEDURE — 85379 FIBRIN DEGRADATION QUANT: CPT

## 2021-08-24 PROCEDURE — A9270 NON-COVERED ITEM OR SERVICE: HCPCS | Performed by: STUDENT IN AN ORGANIZED HEALTH CARE EDUCATION/TRAINING PROGRAM

## 2021-08-24 PROCEDURE — 770006 HCHG ROOM/CARE - MED/SURG/GYN SEMI*

## 2021-08-24 PROCEDURE — 700102 HCHG RX REV CODE 250 W/ 637 OVERRIDE(OP): Performed by: NURSE PRACTITIONER

## 2021-08-24 PROCEDURE — 82533 TOTAL CORTISOL: CPT

## 2021-08-24 PROCEDURE — 36415 COLL VENOUS BLD VENIPUNCTURE: CPT

## 2021-08-24 PROCEDURE — 700105 HCHG RX REV CODE 258: Performed by: NURSE PRACTITIONER

## 2021-08-24 PROCEDURE — 82962 GLUCOSE BLOOD TEST: CPT | Mod: 91

## 2021-08-24 PROCEDURE — 80048 BASIC METABOLIC PNL TOTAL CA: CPT

## 2021-08-24 PROCEDURE — 700111 HCHG RX REV CODE 636 W/ 250 OVERRIDE (IP): Performed by: STUDENT IN AN ORGANIZED HEALTH CARE EDUCATION/TRAINING PROGRAM

## 2021-08-24 PROCEDURE — 94640 AIRWAY INHALATION TREATMENT: CPT

## 2021-08-24 PROCEDURE — 99232 SBSQ HOSP IP/OBS MODERATE 35: CPT | Performed by: INTERNAL MEDICINE

## 2021-08-24 PROCEDURE — 85027 COMPLETE CBC AUTOMATED: CPT

## 2021-08-24 PROCEDURE — 94760 N-INVAS EAR/PLS OXIMETRY 1: CPT

## 2021-08-24 PROCEDURE — 700102 HCHG RX REV CODE 250 W/ 637 OVERRIDE(OP): Performed by: EMERGENCY MEDICINE

## 2021-08-24 PROCEDURE — A9270 NON-COVERED ITEM OR SERVICE: HCPCS | Performed by: EMERGENCY MEDICINE

## 2021-08-24 PROCEDURE — 83735 ASSAY OF MAGNESIUM: CPT

## 2021-08-24 PROCEDURE — 700102 HCHG RX REV CODE 250 W/ 637 OVERRIDE(OP): Performed by: STUDENT IN AN ORGANIZED HEALTH CARE EDUCATION/TRAINING PROGRAM

## 2021-08-24 PROCEDURE — 86140 C-REACTIVE PROTEIN: CPT

## 2021-08-24 PROCEDURE — 93971 EXTREMITY STUDY: CPT | Mod: LT

## 2021-08-24 PROCEDURE — 84100 ASSAY OF PHOSPHORUS: CPT

## 2021-08-24 PROCEDURE — A9270 NON-COVERED ITEM OR SERVICE: HCPCS | Performed by: NURSE PRACTITIONER

## 2021-08-24 RX ORDER — MIDODRINE HYDROCHLORIDE 5 MG/1
5 TABLET ORAL ONCE
Status: COMPLETED | OUTPATIENT
Start: 2021-08-24 | End: 2021-08-24

## 2021-08-24 RX ORDER — SODIUM CHLORIDE, SODIUM LACTATE, POTASSIUM CHLORIDE, AND CALCIUM CHLORIDE .6; .31; .03; .02 G/100ML; G/100ML; G/100ML; G/100ML
250 INJECTION, SOLUTION INTRAVENOUS ONCE
Status: COMPLETED | OUTPATIENT
Start: 2021-08-24 | End: 2021-08-24

## 2021-08-24 RX ADMIN — EZETIMIBE 10 MG: 10 TABLET ORAL at 04:41

## 2021-08-24 RX ADMIN — INSULIN GLARGINE 60 UNITS: 100 INJECTION, SOLUTION SUBCUTANEOUS at 18:25

## 2021-08-24 RX ADMIN — ACETAMINOPHEN 1000 MG: 500 TABLET, FILM COATED ORAL at 20:44

## 2021-08-24 RX ADMIN — MIDODRINE HYDROCHLORIDE 5 MG: 5 TABLET ORAL at 04:41

## 2021-08-24 RX ADMIN — SODIUM CHLORIDE, POTASSIUM CHLORIDE, SODIUM LACTATE AND CALCIUM CHLORIDE 250 ML: 600; 310; 30; 20 INJECTION, SOLUTION INTRAVENOUS at 05:47

## 2021-08-24 RX ADMIN — POTASSIUM BICARBONATE 25 MEQ: 978 TABLET, EFFERVESCENT ORAL at 16:54

## 2021-08-24 RX ADMIN — MIDODRINE HYDROCHLORIDE 5 MG: 5 TABLET ORAL at 09:13

## 2021-08-24 RX ADMIN — ACETAMINOPHEN 1000 MG: 500 TABLET, FILM COATED ORAL at 09:13

## 2021-08-24 RX ADMIN — ROSUVASTATIN CALCIUM 40 MG: 20 TABLET, FILM COATED ORAL at 16:52

## 2021-08-24 RX ADMIN — CLOPIDOGREL BISULFATE 75 MG: 75 TABLET ORAL at 04:41

## 2021-08-24 RX ADMIN — INSULIN LISPRO 3 UNITS: 100 INJECTION, SOLUTION INTRAVENOUS; SUBCUTANEOUS at 20:46

## 2021-08-24 RX ADMIN — ENOXAPARIN SODIUM 120 MG: 120 INJECTION SUBCUTANEOUS at 05:47

## 2021-08-24 RX ADMIN — INSULIN LISPRO 11 UNITS: 100 INJECTION, SOLUTION INTRAVENOUS; SUBCUTANEOUS at 13:08

## 2021-08-24 RX ADMIN — POTASSIUM BICARBONATE 25 MEQ: 978 TABLET, EFFERVESCENT ORAL at 04:41

## 2021-08-24 RX ADMIN — ENOXAPARIN SODIUM 120 MG: 120 INJECTION SUBCUTANEOUS at 16:52

## 2021-08-24 RX ADMIN — MIDODRINE HYDROCHLORIDE 5 MG: 5 TABLET ORAL at 16:52

## 2021-08-24 RX ADMIN — ACETAMINOPHEN 1000 MG: 500 TABLET, FILM COATED ORAL at 16:01

## 2021-08-24 RX ADMIN — INSULIN LISPRO 11 UNITS: 100 INJECTION, SOLUTION INTRAVENOUS; SUBCUTANEOUS at 18:26

## 2021-08-24 RX ADMIN — MIDODRINE HYDROCHLORIDE 5 MG: 5 TABLET ORAL at 11:59

## 2021-08-24 ASSESSMENT — ENCOUNTER SYMPTOMS
BLURRED VISION: 0
NAUSEA: 0
FEVER: 0
INSOMNIA: 0
FOCAL WEAKNESS: 0
SPUTUM PRODUCTION: 1
VOMITING: 0
WHEEZING: 0
PALPITATIONS: 0
CHILLS: 0
EYE PAIN: 0
BACK PAIN: 0
COUGH: 1
HEADACHES: 0
ABDOMINAL PAIN: 0
DIZZINESS: 0
SENSORY CHANGE: 0
SHORTNESS OF BREATH: 1

## 2021-08-24 ASSESSMENT — LIFESTYLE VARIABLES: SUBSTANCE_ABUSE: 0

## 2021-08-24 ASSESSMENT — PAIN DESCRIPTION - PAIN TYPE: TYPE: ACUTE PAIN

## 2021-08-24 NOTE — CARE PLAN
The patient is Watcher - Medium risk of patient condition declining or worsening    Shift Goals  Clinical Goals: to maintain adequate oxygenation; wean O2 down as tolerated  Patient Goals: to get better  Family Goals: n/a    Progress made toward(s) clinical / shift goals:  Kyle is able to get up to bedside commode independently.  Remains afebrile, and continues to be on droplet contact isolation.      Patient is not progressing towards the following goals: Still requiring high flow oxygenation, 40L, 60% FiO2, to maintain pulse ox sats @90%or higher.  Lungs with crackles to bases, and somewhat dim.  Is using his IS at bedside, and assumes prone position when encouraged.      Problem: Respiratory  Goal: Patient will achieve/maintain optimum respiratory ventilation and gas exchange  Outcome: Not Progressing         Problem: Mobility  Goal: Patient's capacity to carry out activities will improve  Outcome: Progressing     Problem: Self Care  Goal: Patient will have the ability to perform ADLs independently or with assistance (bathe, groom, dress, toilet and feed)  Outcome: Progressing     Problem: Infection - Standard  Goal: Patient will remain free from infection  Outcome: Progressing

## 2021-08-24 NOTE — CARE PLAN
The patient is Stable - Low risk of patient condition declining or worsening    Shift Goals  Clinical Goals: Decrease oxygen demand  Patient Goals: Get better  Family Goals: n/a    Progress made toward(s) clinical / shift goals:      Patient is not progressing towards the following goals:      Problem: Knowledge Deficit - Standard  Goal: Patient and family/care givers will demonstrate understanding of plan of care, disease process/condition, diagnostic tests and medications  8/24/2021 0343 by Nathaly Thrasher R.N.  Outcome: Progressing  8/24/2021 0342 by AME DuqueN.  Outcome: Progressing     Problem: Skin Integrity  Goal: Skin integrity is maintained or improved  8/24/2021 0343 by AME DuqueN.  Outcome: Progressing  8/24/2021 0342 by AME DuqueN.  Outcome: Progressing     Problem: Fall Risk  Goal: Patient will remain free from falls  8/24/2021 0343 by AME DuqueN.  Outcome: Progressing  8/24/2021 0342 by AME DuqueN.  Outcome: Progressing     Problem: Psychosocial  Goal: Patient's level of anxiety will decrease  Outcome: Progressing  Goal: Patient's ability to verbalize feelings about condition will improve  Outcome: Progressing  Goal: Patient's ability to re-evaluate and adapt role responsibilities will improve  Outcome: Progressing  Goal: Patient and family will demonstrate ability to cope with life altering diagnosis and/or procedure  Outcome: Progressing  Goal: Spiritual and cultural needs incorporated into hospitalization  Outcome: Progressing     Problem: Communication  Goal: The ability to communicate needs accurately and effectively will improve  Outcome: Progressing     Problem: Discharge Barriers/Planning  Goal: Patient's continuum of care needs are met  Outcome: Progressing     Problem: Hemodynamics  Goal: Patient's hemodynamics, fluid balance and neurologic status will be stable or improve  Outcome: Progressing     Problem: Respiratory  Goal: Patient will  achieve/maintain optimum respiratory ventilation and gas exchange  Outcome: Progressing     Problem: Fluid Volume  Goal: Fluid volume balance will be maintained  Outcome: Progressing     Problem: Mechanical Ventilation  Goal: Safe management of artificial airway and ventilation  Outcome: Progressing  Goal: Successful weaning off mechanical ventilator, spontaneously maintains adequate gas exchange  Outcome: Progressing  Goal: Patient will be able to express needs and understand communication  Outcome: Progressing     Problem: Dysphagia  Goal: Dysphagia will improve  Outcome: Progressing     Problem: Risk for Aspiration  Goal: Patient's risk for aspiration will be absent or decrease  Outcome: Progressing     Problem: Nutrition  Goal: Patient's nutritional and fluid intake will be adequate or improve  Outcome: Progressing  Goal: Enteral nutrition will be maintained or improve  Outcome: Progressing  Goal: Enteral nutrition will be maintained or improve  Outcome: Progressing     Problem: Urinary Elimination  Goal: Establish and maintain regular urinary output  Outcome: Progressing     Problem: Bowel Elimination  Goal: Establish and maintain regular bowel function  Outcome: Progressing     Problem: Gastrointestinal Irritability  Goal: Nausea and vomiting will be absent or improve  Outcome: Progressing  Goal: Diarrhea will be absent or improved  Outcome: Progressing     Problem: Mobility  Goal: Patient's capacity to carry out activities will improve  Outcome: Progressing     Problem: Self Care  Goal: Patient will have the ability to perform ADLs independently or with assistance (bathe, groom, dress, toilet and feed)  Outcome: Progressing     Problem: Infection - Standard  Goal: Patient will remain free from infection  Outcome: Progressing

## 2021-08-24 NOTE — PROGRESS NOTES
[facility] Medicine Daily Progress Note    Date of Service  8/24/2021    Chief Complaint  Kyle Gonzalez is a 41 y.o. male admitted 8/6/2021 with dyspnea.    Hospital Course  41-year-old morbidly obese male with past medical history of hypertension, dyslipidemia, multiple STEMIs and NSTEMIs (4 times 2013,2014,2015,2017, 2020) s/p 4 stents, ischemic cardiomyopathy, HFrEF last echocardiogram on 10/2020 with ejection fraction at 20%, grade 1 diastolic dysfunction and Covid positive test 3 days ago presented emergency department on 8/6/2021 with a 1 week history of worsening fever, chills, myalgias, back pain, dry cough and dyspnea at rest and exertion.  Patient reporting that he was in [place] last Friday when he started noticing fevers and chills.  Patient admitting to not being vaccinated.  Tested positive for Covid,  Dyspnea, coughing and watery diarrhea started around the same time the patient then presented to the hospital.  The patient's fiancé also was tested positive for Covid, she recovered.  The patient initially required to be transferred to ICU on 8/13 for persistent hypoxemia and max high flow nasal cannula.  The patient was treated with Decadron, was found to have a upper extremity DVT which was anticoagulated.  On 817 the patient required intermittent BiPAP treatment, had some intermittent difficulty with maintaining his blood pressure and was started on pressors, norepinephrine.  Forced diuresis was continued as tolerated.  On 8/23 the patient appeared overall improved, he is currently felt stabilized to leave the ICU.  Currently the patient is on 25 L, 6% FiO2 high flow nasal cannula, the patient has no specific other complaints other than becoming more dyspneic and diaphoretic with ambulation.    Interval Problem Update  Patient seen and examined today.    Patient tolerating treatment and therapies.  All Data, Medication data reviewed.  Case discussed with nursing as available.  Plan of Care reviewed  with patient and notified of changes.  8/23 the patient is alert and x4, he is currently no pain, he is resting without difficulty, he does engage in self proning, his heart rate is in the 80s to 90s, blood pressure between 90 and 110 systolic, afebrile, he is eating well, continues on full dose Lovenox with a history of extremity DVT, oxygenation sufficient at 25 L and 60%, replacing electrolytes,  Later in the morning the patient down titrating to 20 L and 40% FiO2.    8/24  Patient states that he feels okay.  He is on 40 L high flow nasal cannula, SPO2 92%.  Self proning.  Blood pressure has been on the lower side, 89/54.  He was given 250 cc of saline overnight.  We will hold Lasix.  Left upper extremity DVT study showed nonocclusive left brachial DVT.    I have personally seen and examined the patient at bedside. I discussed the plan of care with patient.  Critical care physician    Consultants/Specialty  Critical care physician    Code Status  Full Code    Disposition  Patient is not medically cleared.   Anticipate discharge to to home with close outpatient follow-up.  I have placed the appropriate orders for post-discharge needs.    Review of Systems  Review of Systems   Constitutional: Positive for malaise/fatigue. Negative for chills and fever.   Eyes: Negative for blurred vision and pain.   Respiratory: Positive for cough, sputum production and shortness of breath. Negative for wheezing.    Cardiovascular: Negative for chest pain, palpitations and leg swelling.   Gastrointestinal: Negative for abdominal pain, nausea and vomiting.   Genitourinary: Negative for dysuria and urgency.   Musculoskeletal: Negative for back pain.   Skin: Negative for itching and rash.   Neurological: Negative for dizziness, sensory change, focal weakness and headaches.   Psychiatric/Behavioral: Negative for substance abuse. The patient does not have insomnia.         Physical Exam  Temp:  [36.1 °C (96.9 °F)-36.4 °C (97.5 °F)] 36.2  °C (97.1 °F)  Pulse:  [71-94] 84  Resp:  [17-34] 32  BP: ()/(54-73) 99/55  SpO2:  [84 %-94 %] 92 %    Physical Exam  Constitutional:       General: He is not in acute distress.     Appearance: He is not ill-appearing.   HENT:      Head: Normocephalic and atraumatic.      Right Ear: External ear normal.      Left Ear: External ear normal.      Mouth/Throat:      Pharynx: No oropharyngeal exudate or posterior oropharyngeal erythema.   Eyes:      Extraocular Movements: Extraocular movements intact.      Pupils: Pupils are equal, round, and reactive to light.   Cardiovascular:      Rate and Rhythm: Normal rate and regular rhythm.      Pulses: Normal pulses.      Heart sounds: Normal heart sounds.   Pulmonary:      Effort: Pulmonary effort is normal. No respiratory distress.      Breath sounds: Rhonchi and rales present. No wheezing.      Comments: B/l lung crackles  Abdominal:      General: Bowel sounds are normal. There is no distension.      Palpations: Abdomen is soft.      Tenderness: There is no abdominal tenderness. There is no guarding.   Musculoskeletal:         General: No swelling or tenderness.      Cervical back: Normal range of motion and neck supple.   Skin:     General: Skin is warm and dry.   Neurological:      General: No focal deficit present.      Mental Status: He is oriented to person, place, and time.      Sensory: No sensory deficit.      Motor: No weakness.   Psychiatric:         Mood and Affect: Mood normal.         Behavior: Behavior normal.         Fluids    Intake/Output Summary (Last 24 hours) at 8/24/2021 1335  Last data filed at 8/24/2021 0900  Gross per 24 hour   Intake 800 ml   Output 1850 ml   Net -1050 ml       Laboratory  Recent Labs     08/22/21  0527 08/23/21  0357 08/24/21  0221   WBC 7.0 7.4 7.5   RBC 4.60* 4.50* 4.50*   HEMOGLOBIN 13.9* 13.5* 13.6*   HEMATOCRIT 43.0 41.8* 42.3   MCV 93.5 92.9 94.0   MCH 30.2 30.0 30.2   MCHC 32.3* 32.3* 32.2*   RDW 49.4 49.3 50.0    PLATELETCT 414 388 389   MPV 9.6 9.6 10.0     Recent Labs     08/22/21  0527 08/23/21  0357 08/24/21  0221   SODIUM 139 137 140   POTASSIUM 3.6 3.5* 4.1   CHLORIDE 103 103 103   CO2 26 26 27   GLUCOSE 96 87 76   BUN 12 13 11   CREATININE 0.36* 0.45* 0.49*   CALCIUM 8.7 8.4* 9.1                   Imaging  US-EXTREMITY VENOUS UPPER UNILAT LEFT   Final Result      DX-CHEST-PORTABLE (1 VIEW)   Final Result      Possible mild increase in severe bilateral Covid pneumonia.      DX-CHEST-PORTABLE (1 VIEW)   Final Result      Moderate to severe multifocal consolidation is stable and compatible with Covid pneumonia      DX-CHEST-PORTABLE (1 VIEW)   Final Result         No significant interval change.      DX-CHEST-PORTABLE (1 VIEW)   Final Result         Right central venous catheter with tip projecting over the expected area of the lower SVC.      POCT BUTTERFLY-GUIDANCE VASCULAR ACCESS   Final Result      DX-CHEST-PORTABLE (1 VIEW)   Final Result      Slight interval progression of BILATERAL airspace disease      EC-ECHOCARDIOGRAM COMPLETE W/ CONT   Final Result      DX-CHEST-PORTABLE (1 VIEW)   Final Result      Stable chest with moderate multifocal groundglass and consolidation compatible with Covid pneumonia      US-EXTREMITY VENOUS UPPER BILAT   Final Result      US-EXTREMITY VENOUS LOWER BILAT   Final Result      DX-CHEST-PORTABLE (1 VIEW)   Final Result         1.  Pulmonary edema and/or infiltrates are identified, which appear somewhat increased since the prior exam.      DX-CHEST-PORTABLE (1 VIEW)   Final Result      Bilateral peripheral pulmonary airspace process consistent with Covid pneumonia           Assessment/Plan  * Pneumonia due to COVID-19 virus- (present on admission)  Assessment & Plan  Patient with overall improving status now after appropriate treatment, steroid completed  Ongoing pulmonary care, oxygen weaning  Proning      Hypotension  Assessment & Plan  Hold Lasix for now  Monitor blood  pressure  .  Continue midodrine    Hypomagnesemia  Assessment & Plan  Monitor and replace    Hypokalemia  Assessment & Plan  Monitor and replace    Acute pulmonary edema (HCC)- (present on admission)  Assessment & Plan  Improved, Lasix on hold due to hypotension    Acute deep vein thrombosis (DVT) of brachial vein of left upper extremity (HCC)- (present on admission)  Assessment & Plan  This appears to be a very short segment of the brachial vein, on known age  Ultrasound otherwise shows superficial thrombus  8/24 repeat ultrasound showed nonocclusive left brachial DVT.  Continue Lovenox therapeutic dose      Type 2 diabetes mellitus without complication, without long-term current use of insulin (HCC)- (present on admission)  Assessment & Plan  Reports chronic history of diabetes and admits to not taking Metformin  Diabetic diet  Insulin sliding scale  Increase glargine to 23 units and lispro 7 units TID-, c/w [facility] insulin  A1c 11.3  Will likely need insulin on discharge    Acute respiratory failure with hypoxia (HCC)- (present on admission)  Assessment & Plan  Secondary to Covid pneumonia  Continue oxygen weaning, the patient received full dose and appropriate COVID-19 treatment  Patient is on therapeutic dose of Lovenox for DVT.  Will defer  CTA of pulmonary artery, as it would not change the management  Self proning as tolerated  Lasix on hold due to hypotension    Morbid obesity with BMI of 40.0-44.9, adult (HCC)- (present on admission)  Assessment & Plan  Contributing to severity of Covid pneumonia hypoxic respiratory failure  Counseling on weight reduction    Lactic acidosis- (present on admission)  Assessment & Plan  Resolved    Hyponatremia- (present on admission)  Assessment & Plan  Resolved, frequent metabolic checks    Heart failure with reduced ejection fraction (HCC)- (present on admission)  Assessment & Plan  Repeat echocardiogram shows ejection fraction of 55%        Coronary artery disease  involving native coronary artery of native heart without angina pectoris- (present on admission)  Assessment & Plan  GDMT as tolerated    Tobacco use- (present on admission)  Assessment & Plan  Counseling on tobacco cessation    Essential hypertension- (present on admission)  Assessment & Plan  Titrate medication as tolerated for goal pressures    Stented coronary artery,  bare metal stent X 2- (present on admission)  Assessment & Plan  We will restart Plavix and aspirin, reduce Lovenox to DVT PPx dose     Hyperlipemia- (present on admission)  Assessment & Plan  Continue home statin        VTE prophylaxis: enoxaparin ppx    I have performed a physical exam and reviewed and updated ROS and Plan today (8/24/2021). In review of yesterday's note (8/23/2021), there are no changes except as documented above.       Please note that this dictation was created using voice recognition software. I have made every reasonable attempt to correct obvious errors, but I expect that there are errors of grammar and possibly context that I did not discover before finalizing the note.

## 2021-08-24 NOTE — ASSESSMENT & PLAN NOTE
Increase midodrine to 10 mg 3 times daily  Borderline low cortisol level noted  We will repeat cortisol level tomorrow, will consider ACTH stimulation test  Hold antihypertensive, holding parameters on Lasix  Monitor

## 2021-08-24 NOTE — PROGRESS NOTES
4 Eyes Skin Assessment Completed by Umberto CANO RN, RN and Josue VELAZQUEZ RN.    Head WDL  Ears WDL  Nose WDL  Mouth WDL  Neck Incision: Rt side, IJ just removed.  Breast/Chest WDL  Shoulder Blades WDL  Spine WDL  (R) Arm/Elbow/Hand WDL  (L) Arm/Elbow/Hand WDL  Abdomen Bruising: from lovenox.  Groin WDL  Scrotum/Coccyx/Buttocks Excoriation: chronic dry/scaly rash.  (R) Leg WDL  (L) Leg WDL  (R) Heel/Foot/Toe WDL  (L) Heel/Foot/Toe WDL     Red rash under arms, intact.          Devices In Places HFNC      Interventions In Place NC W/Ear Foams and Barrier Cream    Possible Skin Injury No    Pictures Uploaded Into Epic N/A  Wound Consult Placed N/A  RN Wound Prevention Protocol Ordered No

## 2021-08-24 NOTE — PROGRESS NOTES
Notified by CNA  Patient's bp 80s systolic. Hospitalist called, one time dose midodrine ordered. Will recheck bp 30 min after giving midodrine. Patient asymptomatic.      Bp rechecked, still running 80s systolic. 250ml LR bolus ordered & given. BP improved 97/73, hospitalist notified.

## 2021-08-25 PROBLEM — E55.9 VITAMIN D DEFICIENCY: Status: ACTIVE | Noted: 2021-08-25

## 2021-08-25 LAB
25(OH)D3 SERPL-MCNC: 10 NG/ML (ref 30–100)
ALBUMIN SERPL BCP-MCNC: 3.2 G/DL (ref 3.2–4.9)
ALBUMIN/GLOB SERPL: 1 G/DL
ALP SERPL-CCNC: 91 U/L (ref 30–99)
ALT SERPL-CCNC: 27 U/L (ref 2–50)
ANION GAP SERPL CALC-SCNC: 8 MMOL/L (ref 7–16)
AST SERPL-CCNC: 20 U/L (ref 12–45)
BASOPHILS # BLD AUTO: 0.8 % (ref 0–1.8)
BASOPHILS # BLD: 0.06 K/UL (ref 0–0.12)
BILIRUB SERPL-MCNC: 0.2 MG/DL (ref 0.1–1.5)
BUN SERPL-MCNC: 10 MG/DL (ref 8–22)
CALCIUM SERPL-MCNC: 8.3 MG/DL (ref 8.5–10.5)
CHLORIDE SERPL-SCNC: 106 MMOL/L (ref 96–112)
CO2 SERPL-SCNC: 24 MMOL/L (ref 20–33)
CREAT SERPL-MCNC: 0.38 MG/DL (ref 0.5–1.4)
EOSINOPHIL # BLD AUTO: 0.6 K/UL (ref 0–0.51)
EOSINOPHIL NFR BLD: 7.8 % (ref 0–6.9)
ERYTHROCYTE [DISTWIDTH] IN BLOOD BY AUTOMATED COUNT: 49.5 FL (ref 35.9–50)
GLOBULIN SER CALC-MCNC: 3.3 G/DL (ref 1.9–3.5)
GLUCOSE BLD-MCNC: 124 MG/DL (ref 65–99)
GLUCOSE BLD-MCNC: 133 MG/DL (ref 65–99)
GLUCOSE BLD-MCNC: 150 MG/DL (ref 65–99)
GLUCOSE BLD-MCNC: 93 MG/DL (ref 65–99)
GLUCOSE SERPL-MCNC: 89 MG/DL (ref 65–99)
HCT VFR BLD AUTO: 40.4 % (ref 42–52)
HGB BLD-MCNC: 13.1 G/DL (ref 14–18)
IMM GRANULOCYTES # BLD AUTO: 0.07 K/UL (ref 0–0.11)
IMM GRANULOCYTES NFR BLD AUTO: 0.9 % (ref 0–0.9)
LYMPHOCYTES # BLD AUTO: 2.09 K/UL (ref 1–4.8)
LYMPHOCYTES NFR BLD: 27.1 % (ref 22–41)
MAGNESIUM SERPL-MCNC: 2 MG/DL (ref 1.5–2.5)
MCH RBC QN AUTO: 30.3 PG (ref 27–33)
MCHC RBC AUTO-ENTMCNC: 32.4 G/DL (ref 33.7–35.3)
MCV RBC AUTO: 93.5 FL (ref 81.4–97.8)
MONOCYTES # BLD AUTO: 0.88 K/UL (ref 0–0.85)
MONOCYTES NFR BLD AUTO: 11.4 % (ref 0–13.4)
NEUTROPHILS # BLD AUTO: 4.02 K/UL (ref 1.82–7.42)
NEUTROPHILS NFR BLD: 52 % (ref 44–72)
NRBC # BLD AUTO: 0 K/UL
NRBC BLD-RTO: 0 /100 WBC
PLATELET # BLD AUTO: 375 K/UL (ref 164–446)
PMV BLD AUTO: 9.9 FL (ref 9–12.9)
POTASSIUM SERPL-SCNC: 3.9 MMOL/L (ref 3.6–5.5)
PROT SERPL-MCNC: 6.5 G/DL (ref 6–8.2)
RBC # BLD AUTO: 4.32 M/UL (ref 4.7–6.1)
SODIUM SERPL-SCNC: 138 MMOL/L (ref 135–145)
WBC # BLD AUTO: 7.7 K/UL (ref 4.8–10.8)

## 2021-08-25 PROCEDURE — 700102 HCHG RX REV CODE 250 W/ 637 OVERRIDE(OP): Performed by: EMERGENCY MEDICINE

## 2021-08-25 PROCEDURE — 700111 HCHG RX REV CODE 636 W/ 250 OVERRIDE (IP): Performed by: STUDENT IN AN ORGANIZED HEALTH CARE EDUCATION/TRAINING PROGRAM

## 2021-08-25 PROCEDURE — 94640 AIRWAY INHALATION TREATMENT: CPT

## 2021-08-25 PROCEDURE — A9270 NON-COVERED ITEM OR SERVICE: HCPCS | Performed by: STUDENT IN AN ORGANIZED HEALTH CARE EDUCATION/TRAINING PROGRAM

## 2021-08-25 PROCEDURE — 99232 SBSQ HOSP IP/OBS MODERATE 35: CPT | Performed by: INTERNAL MEDICINE

## 2021-08-25 PROCEDURE — 36415 COLL VENOUS BLD VENIPUNCTURE: CPT

## 2021-08-25 PROCEDURE — 700111 HCHG RX REV CODE 636 W/ 250 OVERRIDE (IP): Performed by: EMERGENCY MEDICINE

## 2021-08-25 PROCEDURE — A9270 NON-COVERED ITEM OR SERVICE: HCPCS | Performed by: INTERNAL MEDICINE

## 2021-08-25 PROCEDURE — A9270 NON-COVERED ITEM OR SERVICE: HCPCS | Performed by: EMERGENCY MEDICINE

## 2021-08-25 PROCEDURE — 80053 COMPREHEN METABOLIC PANEL: CPT

## 2021-08-25 PROCEDURE — 82962 GLUCOSE BLOOD TEST: CPT | Mod: 91

## 2021-08-25 PROCEDURE — 85025 COMPLETE CBC W/AUTO DIFF WBC: CPT

## 2021-08-25 PROCEDURE — 83735 ASSAY OF MAGNESIUM: CPT

## 2021-08-25 PROCEDURE — 82306 VITAMIN D 25 HYDROXY: CPT

## 2021-08-25 PROCEDURE — 94760 N-INVAS EAR/PLS OXIMETRY 1: CPT

## 2021-08-25 PROCEDURE — 700102 HCHG RX REV CODE 250 W/ 637 OVERRIDE(OP): Performed by: INTERNAL MEDICINE

## 2021-08-25 PROCEDURE — 700102 HCHG RX REV CODE 250 W/ 637 OVERRIDE(OP): Performed by: STUDENT IN AN ORGANIZED HEALTH CARE EDUCATION/TRAINING PROGRAM

## 2021-08-25 PROCEDURE — 770006 HCHG ROOM/CARE - MED/SURG/GYN SEMI*

## 2021-08-25 RX ORDER — OMEPRAZOLE 20 MG/1
20 CAPSULE, DELAYED RELEASE ORAL DAILY
Status: DISCONTINUED | OUTPATIENT
Start: 2021-08-25 | End: 2021-08-29

## 2021-08-25 RX ORDER — MIDODRINE HYDROCHLORIDE 5 MG/1
10 TABLET ORAL ONCE
Status: COMPLETED | OUTPATIENT
Start: 2021-08-25 | End: 2021-08-25

## 2021-08-25 RX ORDER — MIDODRINE HYDROCHLORIDE 5 MG/1
10 TABLET ORAL
Status: DISCONTINUED | OUTPATIENT
Start: 2021-08-25 | End: 2021-08-30 | Stop reason: HOSPADM

## 2021-08-25 RX ORDER — ERGOCALCIFEROL 1.25 MG/1
50000 CAPSULE ORAL
Status: DISCONTINUED | OUTPATIENT
Start: 2021-08-25 | End: 2021-08-30 | Stop reason: HOSPADM

## 2021-08-25 RX ADMIN — DOCUSATE SODIUM 50 MG AND SENNOSIDES 8.6 MG 2 TABLET: 8.6; 5 TABLET, FILM COATED ORAL at 04:32

## 2021-08-25 RX ADMIN — ACETAMINOPHEN 1000 MG: 500 TABLET, FILM COATED ORAL at 08:18

## 2021-08-25 RX ADMIN — ACETAMINOPHEN 1000 MG: 500 TABLET, FILM COATED ORAL at 21:03

## 2021-08-25 RX ADMIN — POTASSIUM BICARBONATE 25 MEQ: 978 TABLET, EFFERVESCENT ORAL at 17:24

## 2021-08-25 RX ADMIN — CLOPIDOGREL BISULFATE 75 MG: 75 TABLET ORAL at 04:32

## 2021-08-25 RX ADMIN — MIDODRINE HYDROCHLORIDE 10 MG: 5 TABLET ORAL at 17:24

## 2021-08-25 RX ADMIN — MIDODRINE HYDROCHLORIDE 10 MG: 5 TABLET ORAL at 12:45

## 2021-08-25 RX ADMIN — EZETIMIBE 10 MG: 10 TABLET ORAL at 04:32

## 2021-08-25 RX ADMIN — POTASSIUM BICARBONATE 25 MEQ: 978 TABLET, EFFERVESCENT ORAL at 04:32

## 2021-08-25 RX ADMIN — RIVAROXABAN 15 MG: 15 TABLET, FILM COATED ORAL at 17:24

## 2021-08-25 RX ADMIN — INSULIN LISPRO 11 UNITS: 100 INJECTION, SOLUTION INTRAVENOUS; SUBCUTANEOUS at 09:45

## 2021-08-25 RX ADMIN — ROSUVASTATIN CALCIUM 40 MG: 20 TABLET, FILM COATED ORAL at 17:24

## 2021-08-25 RX ADMIN — OMEPRAZOLE 20 MG: 20 CAPSULE, DELAYED RELEASE ORAL at 14:44

## 2021-08-25 RX ADMIN — INSULIN GLARGINE 60 UNITS: 100 INJECTION, SOLUTION SUBCUTANEOUS at 17:30

## 2021-08-25 RX ADMIN — FUROSEMIDE 20 MG: 20 INJECTION, SOLUTION INTRAMUSCULAR; INTRAVENOUS at 14:44

## 2021-08-25 RX ADMIN — ERGOCALCIFEROL 50000 UNITS: 1.25 CAPSULE ORAL at 08:18

## 2021-08-25 RX ADMIN — MIDODRINE HYDROCHLORIDE 10 MG: 5 TABLET ORAL at 09:44

## 2021-08-25 RX ADMIN — INSULIN LISPRO 11 UNITS: 100 INJECTION, SOLUTION INTRAVENOUS; SUBCUTANEOUS at 17:29

## 2021-08-25 RX ADMIN — ACETAMINOPHEN 1000 MG: 500 TABLET, FILM COATED ORAL at 14:44

## 2021-08-25 RX ADMIN — ENOXAPARIN SODIUM 120 MG: 120 INJECTION SUBCUTANEOUS at 04:33

## 2021-08-25 ASSESSMENT — ENCOUNTER SYMPTOMS
CHILLS: 0
INSOMNIA: 0
COUGH: 1
BACK PAIN: 0
FOCAL WEAKNESS: 0
SENSORY CHANGE: 0
WHEEZING: 0
BLURRED VISION: 0
VOMITING: 0
EYE PAIN: 0
DIZZINESS: 0
SHORTNESS OF BREATH: 1
SPUTUM PRODUCTION: 1
HEADACHES: 0
ABDOMINAL PAIN: 0
FEVER: 0
NAUSEA: 0
PALPITATIONS: 0

## 2021-08-25 ASSESSMENT — LIFESTYLE VARIABLES: SUBSTANCE_ABUSE: 0

## 2021-08-25 ASSESSMENT — FIBROSIS 4 INDEX: FIB4 SCORE: 0.41

## 2021-08-25 NOTE — PROGRESS NOTES
[facility] Medicine Daily Progress Note    Date of Service  8/25/2021    Chief Complaint  Kyle Gonzalez is a 41 y.o. male admitted 8/6/2021 with dyspnea.    Hospital Course  41-year-old morbidly obese male with past medical history of hypertension, dyslipidemia, multiple STEMIs and NSTEMIs (4 times 2013,2014,2015,2017, 2020) s/p 4 stents, ischemic cardiomyopathy, HFrEF last echocardiogram on 10/2020 with ejection fraction at 20%, grade 1 diastolic dysfunction and Covid positive test 3 days ago presented emergency department on 8/6/2021 with a 1 week history of worsening fever, chills, myalgias, back pain, dry cough and dyspnea at rest and exertion.  Patient reporting that he was in [place] last Friday when he started noticing fevers and chills.  Patient admitting to not being vaccinated.  Tested positive for Covid,  Dyspnea, coughing and watery diarrhea started around the same time the patient then presented to the hospital.  The patient's fiancé also was tested positive for Covid, she recovered.  The patient initially required to be transferred to ICU on 8/13 for persistent hypoxemia and max high flow nasal cannula.  The patient was treated with Decadron, was found to have a upper extremity DVT which was anticoagulated.  On 817 the patient required intermittent BiPAP treatment, had some intermittent difficulty with maintaining his blood pressure and was started on pressors, norepinephrine.  Forced diuresis was continued as tolerated.  On 8/23 the patient appeared overall improved, he is currently felt stabilized to leave the ICU.  Currently the patient is on 25 L, 6% FiO2 high flow nasal cannula, the patient has no specific other complaints other than becoming more dyspneic and diaphoretic with ambulation.    Interval Problem Update  Patient seen and examined today.    Patient tolerating treatment and therapies.  All Data, Medication data reviewed.  Case discussed with nursing as available.  Plan of Care reviewed  with patient and notified of changes.  8/23 the patient is alert and x4, he is currently no pain, he is resting without difficulty, he does engage in self proning, his heart rate is in the 80s to 90s, blood pressure between 90 and 110 systolic, afebrile, he is eating well, continues on full dose Lovenox with a history of extremity DVT, oxygenation sufficient at 25 L and 60%, replacing electrolytes,  Later in the morning the patient down titrating to 20 L and 40% FiO2.    8/24  Patient states that he feels okay.  He is on 40 L high flow nasal cannula, SPO2 92%.  Self proning.  Blood pressure has been on the lower side, 89/54.  He was given 250 cc of saline overnight.  We will hold Lasix.  Left upper extremity DVT study showed nonocclusive left brachial DVT.    8/25 BP   Is  borderline low, but patient is asymptomatic, denies dizziness.    Will increase dose of midodrine to 10 mg 3 times daily, resume Lasix with holding parameters.    I have personally seen and examined the patient at bedside. I discussed the plan of care with patient.  Critical care physician    Consultants/Specialty  Critical care physician    Code Status  Full Code    Disposition  Patient is not medically cleared.   Anticipate discharge to to home with close outpatient follow-up.  I have placed the appropriate orders for post-discharge needs.    Review of Systems  Review of Systems   Constitutional: Positive for malaise/fatigue. Negative for chills and fever.   Eyes: Negative for blurred vision and pain.   Respiratory: Positive for cough, sputum production and shortness of breath. Negative for wheezing.    Cardiovascular: Negative for chest pain, palpitations and leg swelling.   Gastrointestinal: Negative for abdominal pain, nausea and vomiting.   Genitourinary: Negative for dysuria and urgency.   Musculoskeletal: Negative for back pain.   Skin: Negative for itching and rash.   Neurological: Negative for dizziness, sensory change, focal weakness  and headaches.   Psychiatric/Behavioral: Negative for substance abuse. The patient does not have insomnia.         Physical Exam  Temp:  [36.3 °C (97.3 °F)-36.4 °C (97.5 °F)] 36.4 °C (97.5 °F)  Pulse:  [75-99] 95  Resp:  [18-26] 18  BP: ()/(46-59) 88/46  SpO2:  [88 %-94 %] 89 %    Physical Exam  Constitutional:       General: He is not in acute distress.     Appearance: He is not ill-appearing.   HENT:      Head: Normocephalic and atraumatic.      Right Ear: External ear normal.      Left Ear: External ear normal.      Mouth/Throat:      Pharynx: No oropharyngeal exudate or posterior oropharyngeal erythema.   Eyes:      Extraocular Movements: Extraocular movements intact.      Pupils: Pupils are equal, round, and reactive to light.   Cardiovascular:      Rate and Rhythm: Normal rate and regular rhythm.      Pulses: Normal pulses.      Heart sounds: Normal heart sounds.   Pulmonary:      Effort: Pulmonary effort is normal. No respiratory distress.      Breath sounds: Rhonchi and rales present. No wheezing.      Comments: B/l lung crackles  Abdominal:      General: Bowel sounds are normal. There is no distension.      Palpations: Abdomen is soft.      Tenderness: There is no abdominal tenderness. There is no guarding.   Musculoskeletal:         General: No swelling or tenderness.      Cervical back: Normal range of motion and neck supple.   Skin:     General: Skin is warm and dry.   Neurological:      General: No focal deficit present.      Mental Status: He is oriented to person, place, and time.      Sensory: No sensory deficit.      Motor: No weakness.   Psychiatric:         Mood and Affect: Mood normal.         Behavior: Behavior normal.         Fluids    Intake/Output Summary (Last 24 hours) at 8/25/2021 1356  Last data filed at 8/24/2021 1400  Gross per 24 hour   Intake 240 ml   Output --   Net 240 ml       Laboratory  Recent Labs     08/23/21  0357 08/24/21  0221 08/25/21  0520   WBC 7.4 7.5 7.7   RBC  4.50* 4.50* 4.32*   HEMOGLOBIN 13.5* 13.6* 13.1*   HEMATOCRIT 41.8* 42.3 40.4*   MCV 92.9 94.0 93.5   MCH 30.0 30.2 30.3   MCHC 32.3* 32.2* 32.4*   RDW 49.3 50.0 49.5   PLATELETCT 388 389 375   MPV 9.6 10.0 9.9     Recent Labs     08/23/21  0357 08/24/21  0221 08/25/21  0520   SODIUM 137 140 138   POTASSIUM 3.5* 4.1 3.9   CHLORIDE 103 103 106   CO2 26 27 24   GLUCOSE 87 76 89   BUN 13 11 10   CREATININE 0.45* 0.49* 0.38*   CALCIUM 8.4* 9.1 8.3*                   Imaging  US-EXTREMITY VENOUS UPPER UNILAT LEFT   Final Result      DX-CHEST-PORTABLE (1 VIEW)   Final Result      Possible mild increase in severe bilateral Covid pneumonia.      DX-CHEST-PORTABLE (1 VIEW)   Final Result      Moderate to severe multifocal consolidation is stable and compatible with Covid pneumonia      DX-CHEST-PORTABLE (1 VIEW)   Final Result         No significant interval change.      DX-CHEST-PORTABLE (1 VIEW)   Final Result         Right central venous catheter with tip projecting over the expected area of the lower SVC.      POCT BUTTERFLY-GUIDANCE VASCULAR ACCESS   Final Result      DX-CHEST-PORTABLE (1 VIEW)   Final Result      Slight interval progression of BILATERAL airspace disease      EC-ECHOCARDIOGRAM COMPLETE W/ CONT   Final Result      DX-CHEST-PORTABLE (1 VIEW)   Final Result      Stable chest with moderate multifocal groundglass and consolidation compatible with Covid pneumonia      US-EXTREMITY VENOUS UPPER BILAT   Final Result      US-EXTREMITY VENOUS LOWER BILAT   Final Result      DX-CHEST-PORTABLE (1 VIEW)   Final Result         1.  Pulmonary edema and/or infiltrates are identified, which appear somewhat increased since the prior exam.      DX-CHEST-PORTABLE (1 VIEW)   Final Result      Bilateral peripheral pulmonary airspace process consistent with Covid pneumonia           Assessment/Plan  * Pneumonia due to COVID-19 virus- (present on admission)  Assessment & Plan  Patient with overall improving status now after  appropriate treatment, steroid completed  Ongoing pulmonary care, oxygen weaning  Proning      Vitamin D deficiency  Assessment & Plan  Vitamin D level 10  We will start supplementation with ergocalciferol    Hypotension  Assessment & Plan  Increase midodrine to 10 mg 3 times daily  Borderline low cortisol level noted  We will repeat cortisol level tomorrow, will consider ACTH stimulation test  Hold antihypertensive, holding parameters on Lasix  Monitor    Hypomagnesemia  Assessment & Plan  Monitor and replace    Hypokalemia  Assessment & Plan  Monitor and replace    Acute pulmonary edema (HCC)- (present on admission)  Assessment & Plan  Lasix with holding parameters    Acute deep vein thrombosis (DVT) of brachial vein of left upper extremity (formerly [facility])- (present on admission)  Assessment & Plan  This appears to be a very short segment of the brachial vein, on known age  Ultrasound otherwise shows superficial thrombus  8/24 repeat ultrasound showed nonocclusive left brachial DVT.  We will switch to Xarelto, will plan for 3 months course.    Type 2 diabetes mellitus without complication, without long-term current use of insulin (formerly [facility])- (present on admission)  Assessment & Plan  Reports chronic history of diabetes and admits to not taking Metformin  Diabetic diet  Insulin sliding scale  Increase glargine to 23 units and lispro 7 units TID-, c/w [facility] insulin  A1c 11.3  Will likely need insulin on discharge    Acute respiratory failure with hypoxia (formerly [facility])- (present on admission)  Assessment & Plan  Secondary to Covid pneumonia  Continue oxygen weaning, the patient received full dose and appropriate COVID-19 treatment  Patient is on therapeutic dose of Lovenox for DVT.  Will defer  CTA of pulmonary artery, as it would not change the management  Self proning as tolerated  Lasix with holding parameters  8/25 on 15 L oxygen mask    Morbid obesity with BMI of 40.0-44.9, adult (formerly [facility])- (present on admission)  Assessment &  Plan  Contributing to severity of Covid pneumonia hypoxic respiratory failure  Counseling on weight reduction    Lactic acidosis- (present on admission)  Assessment & Plan  Resolved    Hyponatremia- (present on admission)  Assessment & Plan  Resolved, frequent metabolic checks    Heart failure with reduced ejection fraction (HCC)- (present on admission)  Assessment & Plan  Repeat echocardiogram shows ejection fraction of 55%        Coronary artery disease involving native coronary artery of native heart without angina pectoris- (present on admission)  Assessment & Plan  GDMT as tolerated    Tobacco use- (present on admission)  Assessment & Plan  Counseling on tobacco cessation    Essential hypertension- (present on admission)  Assessment & Plan  Titrate medication as tolerated for goal pressures    Stented coronary artery,  bare metal stent X 2- (present on admission)  Assessment & Plan  We will restart Plavix and aspirin, reduce Lovenox to DVT PPx dose     Hyperlipemia- (present on admission)  Assessment & Plan  Continue home statin        VTE prophylaxis: enoxaparin ppx    I have performed a physical exam and reviewed and updated ROS and Plan today (8/25/2021). In review of yesterday's note (8/24/2021), there are no changes except as documented above.       Please note that this dictation was created using voice recognition software. I have made every reasonable attempt to correct obvious errors, but I expect that there are errors of grammar and possibly context that I did not discover before finalizing the note.

## 2021-08-25 NOTE — CARE PLAN
The patient is Stable - Low risk of patient condition declining or worsening    Shift Goals  Clinical Goals: wean down oxygen  Patient Goals: Rest  Family Goals: n/a    Progress made toward(s) clinical / shift goals:      Patient is not progressing towards the following goals:      Problem: Knowledge Deficit - Standard  Goal: Patient and family/care givers will demonstrate understanding of plan of care, disease process/condition, diagnostic tests and medications  Outcome: Progressing     Problem: Skin Integrity  Goal: Skin integrity is maintained or improved  Outcome: Progressing     Problem: Fall Risk  Goal: Patient will remain free from falls  Outcome: Progressing     Problem: Psychosocial  Goal: Patient's level of anxiety will decrease  Outcome: Progressing  Goal: Patient's ability to verbalize feelings about condition will improve  Outcome: Progressing  Goal: Patient's ability to re-evaluate and adapt role responsibilities will improve  Outcome: Progressing  Goal: Patient and family will demonstrate ability to cope with life altering diagnosis and/or procedure  Outcome: Progressing  Goal: Spiritual and cultural needs incorporated into hospitalization  Outcome: Progressing     Problem: Communication  Goal: The ability to communicate needs accurately and effectively will improve  Outcome: Progressing     Problem: Discharge Barriers/Planning  Goal: Patient's continuum of care needs are met  Outcome: Progressing     Problem: Hemodynamics  Goal: Patient's hemodynamics, fluid balance and neurologic status will be stable or improve  Outcome: Progressing     Problem: Respiratory  Goal: Patient will achieve/maintain optimum respiratory ventilation and gas exchange  Outcome: Progressing     Problem: Fluid Volume  Goal: Fluid volume balance will be maintained  Outcome: Progressing     Problem: Mechanical Ventilation  Goal: Safe management of artificial airway and ventilation  Outcome: Progressing  Goal: Successful weaning  off mechanical ventilator, spontaneously maintains adequate gas exchange  Outcome: Progressing  Goal: Patient will be able to express needs and understand communication  Outcome: Progressing     Problem: Dysphagia  Goal: Dysphagia will improve  Outcome: Progressing     Problem: Risk for Aspiration  Goal: Patient's risk for aspiration will be absent or decrease  Outcome: Progressing     Problem: Nutrition  Goal: Patient's nutritional and fluid intake will be adequate or improve  Outcome: Progressing  Goal: Enteral nutrition will be maintained or improve  Outcome: Progressing  Goal: Enteral nutrition will be maintained or improve  Outcome: Progressing     Problem: Urinary Elimination  Goal: Establish and maintain regular urinary output  Outcome: Progressing     Problem: Bowel Elimination  Goal: Establish and maintain regular bowel function  Outcome: Progressing     Problem: Gastrointestinal Irritability  Goal: Nausea and vomiting will be absent or improve  Outcome: Progressing  Goal: Diarrhea will be absent or improved  Outcome: Progressing     Problem: Mobility  Goal: Patient's capacity to carry out activities will improve  Outcome: Progressing     Problem: Self Care  Goal: Patient will have the ability to perform ADLs independently or with assistance (bathe, groom, dress, toilet and feed)  Outcome: Progressing     Problem: Infection - Standard  Goal: Patient will remain free from infection  Outcome: Progressing

## 2021-08-26 LAB
ALBUMIN SERPL BCP-MCNC: 3.6 G/DL (ref 3.2–4.9)
ALBUMIN/GLOB SERPL: 0.9 G/DL
ALP SERPL-CCNC: 91 U/L (ref 30–99)
ALT SERPL-CCNC: 28 U/L (ref 2–50)
ANION GAP SERPL CALC-SCNC: 10 MMOL/L (ref 7–16)
AST SERPL-CCNC: 24 U/L (ref 12–45)
BASOPHILS # BLD AUTO: 0.9 % (ref 0–1.8)
BASOPHILS # BLD: 0.09 K/UL (ref 0–0.12)
BILIRUB SERPL-MCNC: 0.2 MG/DL (ref 0.1–1.5)
BUN SERPL-MCNC: 10 MG/DL (ref 8–22)
CALCIUM SERPL-MCNC: 9.4 MG/DL (ref 8.5–10.5)
CHLORIDE SERPL-SCNC: 103 MMOL/L (ref 96–112)
CO2 SERPL-SCNC: 26 MMOL/L (ref 20–33)
CORTIS SERPL-MCNC: 9.1 UG/DL (ref 0–23)
CREAT SERPL-MCNC: 0.53 MG/DL (ref 0.5–1.4)
EOSINOPHIL # BLD AUTO: 0.69 K/UL (ref 0–0.51)
EOSINOPHIL NFR BLD: 6.9 % (ref 0–6.9)
ERYTHROCYTE [DISTWIDTH] IN BLOOD BY AUTOMATED COUNT: 49.7 FL (ref 35.9–50)
GLOBULIN SER CALC-MCNC: 3.8 G/DL (ref 1.9–3.5)
GLUCOSE BLD-MCNC: 135 MG/DL (ref 65–99)
GLUCOSE BLD-MCNC: 144 MG/DL (ref 65–99)
GLUCOSE BLD-MCNC: 179 MG/DL (ref 65–99)
GLUCOSE BLD-MCNC: 97 MG/DL (ref 65–99)
GLUCOSE SERPL-MCNC: 97 MG/DL (ref 65–99)
HCT VFR BLD AUTO: 44.6 % (ref 42–52)
HGB BLD-MCNC: 14.2 G/DL (ref 14–18)
IMM GRANULOCYTES # BLD AUTO: 0.06 K/UL (ref 0–0.11)
IMM GRANULOCYTES NFR BLD AUTO: 0.6 % (ref 0–0.9)
LYMPHOCYTES # BLD AUTO: 2.28 K/UL (ref 1–4.8)
LYMPHOCYTES NFR BLD: 22.9 % (ref 22–41)
MAGNESIUM SERPL-MCNC: 2.3 MG/DL (ref 1.5–2.5)
MCH RBC QN AUTO: 30.3 PG (ref 27–33)
MCHC RBC AUTO-ENTMCNC: 31.8 G/DL (ref 33.7–35.3)
MCV RBC AUTO: 95.1 FL (ref 81.4–97.8)
MONOCYTES # BLD AUTO: 1.14 K/UL (ref 0–0.85)
MONOCYTES NFR BLD AUTO: 11.4 % (ref 0–13.4)
NEUTROPHILS # BLD AUTO: 5.7 K/UL (ref 1.82–7.42)
NEUTROPHILS NFR BLD: 57.3 % (ref 44–72)
NRBC # BLD AUTO: 0 K/UL
NRBC BLD-RTO: 0 /100 WBC
PHOSPHATE SERPL-MCNC: 4.4 MG/DL (ref 2.5–4.5)
PLATELET # BLD AUTO: 398 K/UL (ref 164–446)
PMV BLD AUTO: 9.9 FL (ref 9–12.9)
POTASSIUM SERPL-SCNC: 4.7 MMOL/L (ref 3.6–5.5)
PROT SERPL-MCNC: 7.4 G/DL (ref 6–8.2)
RBC # BLD AUTO: 4.69 M/UL (ref 4.7–6.1)
SODIUM SERPL-SCNC: 139 MMOL/L (ref 135–145)
WBC # BLD AUTO: 10 K/UL (ref 4.8–10.8)

## 2021-08-26 PROCEDURE — 85025 COMPLETE CBC W/AUTO DIFF WBC: CPT

## 2021-08-26 PROCEDURE — 700111 HCHG RX REV CODE 636 W/ 250 OVERRIDE (IP): Performed by: EMERGENCY MEDICINE

## 2021-08-26 PROCEDURE — 99232 SBSQ HOSP IP/OBS MODERATE 35: CPT | Performed by: INTERNAL MEDICINE

## 2021-08-26 PROCEDURE — 83735 ASSAY OF MAGNESIUM: CPT

## 2021-08-26 PROCEDURE — 80053 COMPREHEN METABOLIC PANEL: CPT

## 2021-08-26 PROCEDURE — 700102 HCHG RX REV CODE 250 W/ 637 OVERRIDE(OP): Performed by: EMERGENCY MEDICINE

## 2021-08-26 PROCEDURE — 94760 N-INVAS EAR/PLS OXIMETRY 1: CPT

## 2021-08-26 PROCEDURE — 700102 HCHG RX REV CODE 250 W/ 637 OVERRIDE(OP): Performed by: INTERNAL MEDICINE

## 2021-08-26 PROCEDURE — 700102 HCHG RX REV CODE 250 W/ 637 OVERRIDE(OP): Performed by: STUDENT IN AN ORGANIZED HEALTH CARE EDUCATION/TRAINING PROGRAM

## 2021-08-26 PROCEDURE — A9270 NON-COVERED ITEM OR SERVICE: HCPCS | Performed by: INTERNAL MEDICINE

## 2021-08-26 PROCEDURE — 770006 HCHG ROOM/CARE - MED/SURG/GYN SEMI*

## 2021-08-26 PROCEDURE — 82962 GLUCOSE BLOOD TEST: CPT

## 2021-08-26 PROCEDURE — A9270 NON-COVERED ITEM OR SERVICE: HCPCS | Performed by: EMERGENCY MEDICINE

## 2021-08-26 PROCEDURE — 36415 COLL VENOUS BLD VENIPUNCTURE: CPT

## 2021-08-26 PROCEDURE — 84100 ASSAY OF PHOSPHORUS: CPT

## 2021-08-26 PROCEDURE — A9270 NON-COVERED ITEM OR SERVICE: HCPCS | Performed by: STUDENT IN AN ORGANIZED HEALTH CARE EDUCATION/TRAINING PROGRAM

## 2021-08-26 PROCEDURE — 82533 TOTAL CORTISOL: CPT

## 2021-08-26 RX ORDER — FUROSEMIDE 20 MG/1
20 TABLET ORAL
Status: DISCONTINUED | OUTPATIENT
Start: 2021-08-26 | End: 2021-08-28

## 2021-08-26 RX ADMIN — INSULIN GLARGINE 60 UNITS: 100 INJECTION, SOLUTION SUBCUTANEOUS at 17:33

## 2021-08-26 RX ADMIN — ACETAMINOPHEN 1000 MG: 500 TABLET, FILM COATED ORAL at 17:00

## 2021-08-26 RX ADMIN — FUROSEMIDE 20 MG: 20 TABLET ORAL at 17:26

## 2021-08-26 RX ADMIN — MIDODRINE HYDROCHLORIDE 10 MG: 5 TABLET ORAL at 12:40

## 2021-08-26 RX ADMIN — FUROSEMIDE 20 MG: 20 INJECTION, SOLUTION INTRAMUSCULAR; INTRAVENOUS at 05:56

## 2021-08-26 RX ADMIN — INSULIN LISPRO 11 UNITS: 100 INJECTION, SOLUTION INTRAVENOUS; SUBCUTANEOUS at 10:05

## 2021-08-26 RX ADMIN — RIVAROXABAN 15 MG: 15 TABLET, FILM COATED ORAL at 07:39

## 2021-08-26 RX ADMIN — RIVAROXABAN 15 MG: 15 TABLET, FILM COATED ORAL at 17:26

## 2021-08-26 RX ADMIN — ROSUVASTATIN CALCIUM 40 MG: 20 TABLET, FILM COATED ORAL at 17:26

## 2021-08-26 RX ADMIN — INSULIN LISPRO 11 UNITS: 100 INJECTION, SOLUTION INTRAVENOUS; SUBCUTANEOUS at 17:35

## 2021-08-26 RX ADMIN — CLOPIDOGREL BISULFATE 75 MG: 75 TABLET ORAL at 05:56

## 2021-08-26 RX ADMIN — INSULIN LISPRO 11 UNITS: 100 INJECTION, SOLUTION INTRAVENOUS; SUBCUTANEOUS at 12:42

## 2021-08-26 RX ADMIN — MIDODRINE HYDROCHLORIDE 10 MG: 5 TABLET ORAL at 07:38

## 2021-08-26 RX ADMIN — OMEPRAZOLE 20 MG: 20 CAPSULE, DELAYED RELEASE ORAL at 05:56

## 2021-08-26 RX ADMIN — ACETAMINOPHEN 1000 MG: 500 TABLET, FILM COATED ORAL at 07:38

## 2021-08-26 RX ADMIN — ACETAMINOPHEN 1000 MG: 500 TABLET, FILM COATED ORAL at 20:56

## 2021-08-26 RX ADMIN — MIDODRINE HYDROCHLORIDE 10 MG: 5 TABLET ORAL at 17:26

## 2021-08-26 RX ADMIN — INSULIN LISPRO 3 UNITS: 100 INJECTION, SOLUTION INTRAVENOUS; SUBCUTANEOUS at 20:56

## 2021-08-26 RX ADMIN — POTASSIUM BICARBONATE 25 MEQ: 978 TABLET, EFFERVESCENT ORAL at 05:56

## 2021-08-26 RX ADMIN — POTASSIUM BICARBONATE 25 MEQ: 978 TABLET, EFFERVESCENT ORAL at 17:26

## 2021-08-26 RX ADMIN — EZETIMIBE 10 MG: 10 TABLET ORAL at 05:56

## 2021-08-26 ASSESSMENT — ENCOUNTER SYMPTOMS
ABDOMINAL PAIN: 0
HEADACHES: 0
SPUTUM PRODUCTION: 1
NAUSEA: 0
INSOMNIA: 0
FEVER: 0
FOCAL WEAKNESS: 0
SENSORY CHANGE: 0
CHILLS: 0
EYE PAIN: 0
BLURRED VISION: 0
WHEEZING: 0
SHORTNESS OF BREATH: 1
BACK PAIN: 0
VOMITING: 0
COUGH: 1
DIZZINESS: 0
PALPITATIONS: 0

## 2021-08-26 ASSESSMENT — LIFESTYLE VARIABLES: SUBSTANCE_ABUSE: 0

## 2021-08-26 NOTE — CARE PLAN
Problem: Knowledge Deficit - Standard  Goal: Patient and family/care givers will demonstrate understanding of plan of care, disease process/condition, diagnostic tests and medications  Outcome: Progressing     Problem: Respiratory  Goal: Patient will achieve/maintain optimum respiratory ventilation and gas exchange  Outcome: Progressing   The patient is Stable - Low risk of patient condition declining or worsening    Shift Goals  Clinical Goals: maintain adequate oxygenation  Patient Goals: wean O2  Family Goals: n/a    Progress made toward(s) clinical / shift goals:  Pt verbalizes understanding of plan of care and goals.     Patient is not progressing towards the following goals:

## 2021-08-26 NOTE — CARE PLAN
The patient is Watcher - Medium risk of patient condition declining or worsening    Shift Goals  Clinical Goals: to wean O2 down, as tolerated with RT guidance  Patient Goals: to get better and go home  Family Goals: n/a    Progress made toward(s) clinical / shift goals:  Kyle is now on 10L oxymask (from high flow previously), and pulse ox sats maintaining at around 90%.  Lungs dim with some fine crackles.  Reports that his breathing feels better overall.      Patient is not progressing towards the following goals: N/A; will discharge home with family when medically cleared, and oxygen needs are safe to go home.          Problem: Respiratory  Goal: Patient will achieve/maintain optimum respiratory ventilation and gas exchange  Outcome: Progressing     Problem: Self Care  Goal: Patient will have the ability to perform ADLs independently or with assistance (bathe, groom, dress, toilet and feed)  Outcome: Progressing

## 2021-08-26 NOTE — PROGRESS NOTES
[facility] Medicine Daily Progress Note    Date of Service  8/26/2021    Chief Complaint  Kyle Gonzalez is a 41 y.o. male admitted 8/6/2021 with dyspnea.    Hospital Course  41-year-old morbidly obese male with past medical history of hypertension, dyslipidemia, multiple STEMIs and NSTEMIs (4 times 2013,2014,2015,2017, 2020) s/p 4 stents, ischemic cardiomyopathy, HFrEF last echocardiogram on 10/2020 with ejection fraction at 20%, grade 1 diastolic dysfunction and Covid positive test 3 days ago presented emergency department on 8/6/2021 with a 1 week history of worsening fever, chills, myalgias, back pain, dry cough and dyspnea at rest and exertion.  Patient reporting that he was in [place] last Friday when he started noticing fevers and chills.  Patient admitting to not being vaccinated.  Tested positive for Covid,  Dyspnea, coughing and watery diarrhea started around the same time the patient then presented to the hospital.  The patient's fiancé also was tested positive for Covid, she recovered.  The patient initially required to be transferred to ICU on 8/13 for persistent hypoxemia and max high flow nasal cannula.  The patient was treated with Decadron, was found to have a upper extremity DVT which was anticoagulated.  On 817 the patient required intermittent BiPAP treatment, had some intermittent difficulty with maintaining his blood pressure and was started on pressors, norepinephrine.  Forced diuresis was continued as tolerated.  On 8/23 the patient appeared overall improved, he is currently felt stabilized to leave the ICU.  Currently the patient is on 25 L, 6% FiO2 high flow nasal cannula, the patient has no specific other complaints other than becoming more dyspneic and diaphoretic with ambulation.    Interval Problem Update  Patient seen and examined today.    Patient tolerating treatment and therapies.  All Data, Medication data reviewed.  Case discussed with nursing as available.  Plan of Care reviewed  with patient and notified of changes.  8/23 the patient is alert and x4, he is currently no pain, he is resting without difficulty, he does engage in self proning, his heart rate is in the 80s to 90s, blood pressure between 90 and 110 systolic, afebrile, he is eating well, continues on full dose Lovenox with a history of extremity DVT, oxygenation sufficient at 25 L and 60%, replacing electrolytes,  Later in the morning the patient down titrating to 20 L and 40% FiO2.    8/24  Patient states that he feels okay.  He is on 40 L high flow nasal cannula, SPO2 92%.  Self proning.  Blood pressure has been on the lower side, 89/54.  He was given 250 cc of saline overnight.  We will hold Lasix.  Left upper extremity DVT study showed nonocclusive left brachial DVT.    Is  borderline low, but patient is asymptomatic, denies dizziness.    Will increase dose of midodrine to 10 mg 3 times daily, resume Lasix with holding parameters.    8/26  Patient stated that breathing is subjectively better.  His O2 requirement is 10 L oxygen mask SPO2 94%.  Blood pressure remains on the lower side 91/51, but he denies dizziness.  Diuresing with holding parameters.    I have personally seen and examined the patient at bedside. I discussed the plan of care with patient.  Critical care physician    Consultants/Specialty  Critical care physician    Code Status  Full Code    Disposition  Patient is not medically cleared.   Anticipate discharge to to home with close outpatient follow-up.  I have placed the appropriate orders for post-discharge needs.    Review of Systems  Review of Systems   Constitutional: Positive for malaise/fatigue. Negative for chills and fever.   Eyes: Negative for blurred vision and pain.   Respiratory: Positive for cough, sputum production and shortness of breath. Negative for wheezing.    Cardiovascular: Negative for chest pain, palpitations and leg swelling.   Gastrointestinal: Negative for abdominal pain, nausea and  vomiting.   Genitourinary: Negative for dysuria and urgency.   Musculoskeletal: Negative for back pain.   Skin: Negative for itching and rash.   Neurological: Negative for dizziness, sensory change, focal weakness and headaches.   Psychiatric/Behavioral: Negative for substance abuse. The patient does not have insomnia.         Physical Exam  Temp:  [35.9 °C (96.7 °F)-36.4 °C (97.6 °F)] 36.4 °C (97.6 °F)  Pulse:  [] 88  Resp:  [18-32] 28  BP: ()/(46-72) 91/46  SpO2:  [89 %-95 %] 95 %    Physical Exam  Constitutional:       General: He is not in acute distress.     Appearance: He is not ill-appearing.   HENT:      Head: Normocephalic and atraumatic.      Right Ear: External ear normal.      Left Ear: External ear normal.      Mouth/Throat:      Pharynx: No oropharyngeal exudate or posterior oropharyngeal erythema.   Eyes:      Extraocular Movements: Extraocular movements intact.      Pupils: Pupils are equal, round, and reactive to light.   Cardiovascular:      Rate and Rhythm: Normal rate and regular rhythm.      Pulses: Normal pulses.      Heart sounds: Normal heart sounds.   Pulmonary:      Effort: Pulmonary effort is normal. No respiratory distress.      Breath sounds: Rhonchi and rales present. No wheezing.      Comments: B/l lung crackles  Abdominal:      General: Bowel sounds are normal. There is no distension.      Palpations: Abdomen is soft.      Tenderness: There is no abdominal tenderness. There is no guarding.   Musculoskeletal:         General: No swelling or tenderness.      Cervical back: Normal range of motion and neck supple.   Skin:     General: Skin is warm and dry.   Neurological:      General: No focal deficit present.      Mental Status: He is oriented to person, place, and time.      Sensory: No sensory deficit.      Motor: No weakness.   Psychiatric:         Mood and Affect: Mood normal.         Behavior: Behavior normal.         Fluids    Intake/Output Summary (Last 24 hours) at  8/26/2021 1241  Last data filed at 8/26/2021 1000  Gross per 24 hour   Intake 440 ml   Output 2625 ml   Net -2185 ml       Laboratory  Recent Labs     08/24/21 0221 08/25/21 0520 08/26/21  0412   WBC 7.5 7.7 10.0   RBC 4.50* 4.32* 4.69*   HEMOGLOBIN 13.6* 13.1* 14.2   HEMATOCRIT 42.3 40.4* 44.6   MCV 94.0 93.5 95.1   MCH 30.2 30.3 30.3   MCHC 32.2* 32.4* 31.8*   RDW 50.0 49.5 49.7   PLATELETCT 389 375 398   MPV 10.0 9.9 9.9     Recent Labs     08/24/21 0221 08/25/21 0520 08/26/21 0412   SODIUM 140 138 139   POTASSIUM 4.1 3.9 4.7   CHLORIDE 103 106 103   CO2 27 24 26   GLUCOSE 76 89 97   BUN 11 10 10   CREATININE 0.49* 0.38* 0.53   CALCIUM 9.1 8.3* 9.4                   Imaging  US-EXTREMITY VENOUS UPPER UNILAT LEFT   Final Result      DX-CHEST-PORTABLE (1 VIEW)   Final Result      Possible mild increase in severe bilateral Covid pneumonia.      DX-CHEST-PORTABLE (1 VIEW)   Final Result      Moderate to severe multifocal consolidation is stable and compatible with Covid pneumonia      DX-CHEST-PORTABLE (1 VIEW)   Final Result         No significant interval change.      DX-CHEST-PORTABLE (1 VIEW)   Final Result         Right central venous catheter with tip projecting over the expected area of the lower SVC.      POCT BUTTERFLY-GUIDANCE VASCULAR ACCESS   Final Result      DX-CHEST-PORTABLE (1 VIEW)   Final Result      Slight interval progression of BILATERAL airspace disease      EC-ECHOCARDIOGRAM COMPLETE W/ CONT   Final Result      DX-CHEST-PORTABLE (1 VIEW)   Final Result      Stable chest with moderate multifocal groundglass and consolidation compatible with Covid pneumonia      US-EXTREMITY VENOUS UPPER BILAT   Final Result      US-EXTREMITY VENOUS LOWER BILAT   Final Result      DX-CHEST-PORTABLE (1 VIEW)   Final Result         1.  Pulmonary edema and/or infiltrates are identified, which appear somewhat increased since the prior exam.      DX-CHEST-PORTABLE (1 VIEW)   Final Result      Bilateral peripheral  pulmonary airspace process consistent with Covid pneumonia           Assessment/Plan  * Pneumonia due to COVID-19 virus- (present on admission)  Assessment & Plan  Patient with overall improving status now after appropriate treatment, steroid completed  Ongoing pulmonary care, oxygen weaning  Proning      Vitamin D deficiency  Assessment & Plan  Vitamin D level 10  We will start supplementation with ergocalciferol    Hypotension  Assessment & Plan  Increase midodrine to 10 mg 3 times daily  Borderline low cortisol level noted  We will repeat cortisol level tomorrow, will consider ACTH stimulation test  Hold antihypertensive, holding parameters on Lasix  Monitor    Hypomagnesemia  Assessment & Plan  Monitor and replace    Hypokalemia  Assessment & Plan  Monitor and replace    Acute pulmonary edema (HCC)- (present on admission)  Assessment & Plan  Lasix with holding parameters    Acute deep vein thrombosis (DVT) of brachial vein of left upper extremity (HCC)- (present on admission)  Assessment & Plan  This appears to be a very short segment of the brachial vein, on known age  Ultrasound otherwise shows superficial thrombus  8/24 repeat ultrasound showed nonocclusive left brachial DVT.  We will switch to Xarelto, will plan for 3 months course.    Type 2 diabetes mellitus without complication, without long-term current use of insulin (HCC)- (present on admission)  Assessment & Plan  Reports chronic history of diabetes and admits to not taking Metformin  Diabetic diet  Insulin sliding scale  Increase glargine to 23 units and lispro 7 units TID-, c/w [facility] insulin  A1c 11.3  Will likely need insulin on discharge  Diabetes education    Acute respiratory failure with hypoxia (HCC)- (present on admission)  Assessment & Plan  Secondary to Covid pneumonia  Continue oxygen weaning, the patient received full dose and appropriate COVID-19 treatment  Patient is on therapeutic dose of Lovenox for DVT.  Will defer  CTA of pulmonary  artery, as it would not change the management  Self proning as tolerated  Lasix with holding parameters  8/25 on 15 L oxygen mask  8/2610 L oxygen mask    Morbid obesity with BMI of 40.0-44.9, adult (HCC)- (present on admission)  Assessment & Plan  Contributing to severity of Covid pneumonia hypoxic respiratory failure  Counseling on weight reduction    Lactic acidosis- (present on admission)  Assessment & Plan  Resolved    Hyponatremia- (present on admission)  Assessment & Plan  Resolved, frequent metabolic checks    Heart failure with reduced ejection fraction (HCC)- (present on admission)  Assessment & Plan  Repeat echocardiogram shows ejection fraction of 55%        Coronary artery disease involving native coronary artery of native heart without angina pectoris- (present on admission)  Assessment & Plan  GDMT as tolerated    Tobacco use- (present on admission)  Assessment & Plan  Counseling on tobacco cessation    Essential hypertension- (present on admission)  Assessment & Plan  Titrate medication as tolerated for goal pressures    Stented coronary artery,  bare metal stent X 2- (present on admission)  Assessment & Plan  We will restart Plavix and aspirin, reduce Lovenox to DVT PPx dose     Hyperlipemia- (present on admission)  Assessment & Plan  Continue home statin        VTE prophylaxis: Xarelto    I have performed a physical exam and reviewed and updated ROS and Plan today (8/26/2021). In review of yesterday's note (8/25/2021), there are no changes except as documented above.       Please note that this dictation was created using voice recognition software. I have made every reasonable attempt to correct obvious errors, but I expect that there are errors of grammar and possibly context that I did not discover before finalizing the note.

## 2021-08-27 LAB
ALBUMIN SERPL BCP-MCNC: 3.9 G/DL (ref 3.2–4.9)
ALBUMIN/GLOB SERPL: 1.1 G/DL
ALP SERPL-CCNC: 85 U/L (ref 30–99)
ALT SERPL-CCNC: 26 U/L (ref 2–50)
ANION GAP SERPL CALC-SCNC: 9 MMOL/L (ref 7–16)
AST SERPL-CCNC: 27 U/L (ref 12–45)
BASOPHILS # BLD AUTO: 0.9 % (ref 0–1.8)
BASOPHILS # BLD: 0.08 K/UL (ref 0–0.12)
BILIRUB SERPL-MCNC: 0.3 MG/DL (ref 0.1–1.5)
BUN SERPL-MCNC: 11 MG/DL (ref 8–22)
CALCIUM SERPL-MCNC: 9.5 MG/DL (ref 8.5–10.5)
CHLORIDE SERPL-SCNC: 104 MMOL/L (ref 96–112)
CO2 SERPL-SCNC: 29 MMOL/L (ref 20–33)
CREAT SERPL-MCNC: 0.48 MG/DL (ref 0.5–1.4)
EOSINOPHIL # BLD AUTO: 0.82 K/UL (ref 0–0.51)
EOSINOPHIL NFR BLD: 8.7 % (ref 0–6.9)
ERYTHROCYTE [DISTWIDTH] IN BLOOD BY AUTOMATED COUNT: 50.1 FL (ref 35.9–50)
GLOBULIN SER CALC-MCNC: 3.7 G/DL (ref 1.9–3.5)
GLUCOSE BLD-MCNC: 148 MG/DL (ref 65–99)
GLUCOSE BLD-MCNC: 156 MG/DL (ref 65–99)
GLUCOSE BLD-MCNC: 233 MG/DL (ref 65–99)
GLUCOSE BLD-MCNC: 90 MG/DL (ref 65–99)
GLUCOSE SERPL-MCNC: 79 MG/DL (ref 65–99)
HCT VFR BLD AUTO: 43.8 % (ref 42–52)
HGB BLD-MCNC: 13.9 G/DL (ref 14–18)
IMM GRANULOCYTES # BLD AUTO: 0.06 K/UL (ref 0–0.11)
IMM GRANULOCYTES NFR BLD AUTO: 0.6 % (ref 0–0.9)
LYMPHOCYTES # BLD AUTO: 2.6 K/UL (ref 1–4.8)
LYMPHOCYTES NFR BLD: 27.7 % (ref 22–41)
MCH RBC QN AUTO: 30.2 PG (ref 27–33)
MCHC RBC AUTO-ENTMCNC: 31.7 G/DL (ref 33.7–35.3)
MCV RBC AUTO: 95.2 FL (ref 81.4–97.8)
MONOCYTES # BLD AUTO: 0.94 K/UL (ref 0–0.85)
MONOCYTES NFR BLD AUTO: 10 % (ref 0–13.4)
NEUTROPHILS # BLD AUTO: 4.9 K/UL (ref 1.82–7.42)
NEUTROPHILS NFR BLD: 52.1 % (ref 44–72)
NRBC # BLD AUTO: 0 K/UL
NRBC BLD-RTO: 0 /100 WBC
PLATELET # BLD AUTO: 390 K/UL (ref 164–446)
PMV BLD AUTO: 9.8 FL (ref 9–12.9)
POTASSIUM SERPL-SCNC: 4.5 MMOL/L (ref 3.6–5.5)
PROT SERPL-MCNC: 7.6 G/DL (ref 6–8.2)
RBC # BLD AUTO: 4.6 M/UL (ref 4.7–6.1)
SODIUM SERPL-SCNC: 142 MMOL/L (ref 135–145)
WBC # BLD AUTO: 9.4 K/UL (ref 4.8–10.8)

## 2021-08-27 PROCEDURE — 700102 HCHG RX REV CODE 250 W/ 637 OVERRIDE(OP): Performed by: EMERGENCY MEDICINE

## 2021-08-27 PROCEDURE — 82962 GLUCOSE BLOOD TEST: CPT | Mod: 91

## 2021-08-27 PROCEDURE — 770006 HCHG ROOM/CARE - MED/SURG/GYN SEMI*

## 2021-08-27 PROCEDURE — 36415 COLL VENOUS BLD VENIPUNCTURE: CPT

## 2021-08-27 PROCEDURE — 700102 HCHG RX REV CODE 250 W/ 637 OVERRIDE(OP): Performed by: STUDENT IN AN ORGANIZED HEALTH CARE EDUCATION/TRAINING PROGRAM

## 2021-08-27 PROCEDURE — A9270 NON-COVERED ITEM OR SERVICE: HCPCS | Performed by: EMERGENCY MEDICINE

## 2021-08-27 PROCEDURE — 700102 HCHG RX REV CODE 250 W/ 637 OVERRIDE(OP): Performed by: INTERNAL MEDICINE

## 2021-08-27 PROCEDURE — 99232 SBSQ HOSP IP/OBS MODERATE 35: CPT | Performed by: INTERNAL MEDICINE

## 2021-08-27 PROCEDURE — A9270 NON-COVERED ITEM OR SERVICE: HCPCS | Performed by: STUDENT IN AN ORGANIZED HEALTH CARE EDUCATION/TRAINING PROGRAM

## 2021-08-27 PROCEDURE — A9270 NON-COVERED ITEM OR SERVICE: HCPCS | Performed by: INTERNAL MEDICINE

## 2021-08-27 PROCEDURE — 80053 COMPREHEN METABOLIC PANEL: CPT

## 2021-08-27 PROCEDURE — 85025 COMPLETE CBC W/AUTO DIFF WBC: CPT

## 2021-08-27 RX ADMIN — INSULIN LISPRO 11 UNITS: 100 INJECTION, SOLUTION INTRAVENOUS; SUBCUTANEOUS at 11:45

## 2021-08-27 RX ADMIN — EZETIMIBE 10 MG: 10 TABLET ORAL at 04:55

## 2021-08-27 RX ADMIN — MIDODRINE HYDROCHLORIDE 10 MG: 5 TABLET ORAL at 11:42

## 2021-08-27 RX ADMIN — INSULIN LISPRO 3 UNITS: 100 INJECTION, SOLUTION INTRAVENOUS; SUBCUTANEOUS at 21:06

## 2021-08-27 RX ADMIN — MIDODRINE HYDROCHLORIDE 10 MG: 5 TABLET ORAL at 08:00

## 2021-08-27 RX ADMIN — FUROSEMIDE 20 MG: 20 TABLET ORAL at 04:55

## 2021-08-27 RX ADMIN — POTASSIUM BICARBONATE 25 MEQ: 978 TABLET, EFFERVESCENT ORAL at 17:30

## 2021-08-27 RX ADMIN — RIVAROXABAN 15 MG: 15 TABLET, FILM COATED ORAL at 08:00

## 2021-08-27 RX ADMIN — RIVAROXABAN 15 MG: 15 TABLET, FILM COATED ORAL at 17:30

## 2021-08-27 RX ADMIN — MIDODRINE HYDROCHLORIDE 10 MG: 5 TABLET ORAL at 17:30

## 2021-08-27 RX ADMIN — ROSUVASTATIN CALCIUM 40 MG: 20 TABLET, FILM COATED ORAL at 17:31

## 2021-08-27 RX ADMIN — FUROSEMIDE 20 MG: 20 TABLET ORAL at 15:06

## 2021-08-27 RX ADMIN — ACETAMINOPHEN 1000 MG: 500 TABLET, FILM COATED ORAL at 15:05

## 2021-08-27 RX ADMIN — INSULIN LISPRO 4 UNITS: 100 INJECTION, SOLUTION INTRAVENOUS; SUBCUTANEOUS at 17:34

## 2021-08-27 RX ADMIN — OMEPRAZOLE 20 MG: 20 CAPSULE, DELAYED RELEASE ORAL at 04:55

## 2021-08-27 RX ADMIN — ACETAMINOPHEN 1000 MG: 500 TABLET, FILM COATED ORAL at 08:00

## 2021-08-27 RX ADMIN — CLOPIDOGREL BISULFATE 75 MG: 75 TABLET ORAL at 04:56

## 2021-08-27 RX ADMIN — POTASSIUM BICARBONATE 25 MEQ: 978 TABLET, EFFERVESCENT ORAL at 04:56

## 2021-08-27 RX ADMIN — INSULIN GLARGINE 60 UNITS: 100 INJECTION, SOLUTION SUBCUTANEOUS at 17:35

## 2021-08-27 RX ADMIN — INSULIN LISPRO 11 UNITS: 100 INJECTION, SOLUTION INTRAVENOUS; SUBCUTANEOUS at 17:39

## 2021-08-27 RX ADMIN — ACETAMINOPHEN 1000 MG: 500 TABLET, FILM COATED ORAL at 21:02

## 2021-08-27 RX ADMIN — DOCUSATE SODIUM 50 MG AND SENNOSIDES 8.6 MG 2 TABLET: 8.6; 5 TABLET, FILM COATED ORAL at 17:30

## 2021-08-27 ASSESSMENT — ENCOUNTER SYMPTOMS
HEADACHES: 0
FEVER: 0
SPUTUM PRODUCTION: 1
WHEEZING: 0
SENSORY CHANGE: 0
INSOMNIA: 0
COUGH: 1
NAUSEA: 0
DIZZINESS: 0
PALPITATIONS: 0
ABDOMINAL PAIN: 0
SHORTNESS OF BREATH: 1
FOCAL WEAKNESS: 0
EYE PAIN: 0
CHILLS: 0
BACK PAIN: 0
VOMITING: 0
BLURRED VISION: 0

## 2021-08-27 ASSESSMENT — LIFESTYLE VARIABLES: SUBSTANCE_ABUSE: 0

## 2021-08-27 NOTE — CONSULTS
Diabetes education: Pt is newly dx with diabetes ( had previous A1c of 6.0% (2013) and  6.2% (2014), changed his diet and lost weight), admitted with blood sugar of 309 and Hga1c of 11.3%. Pt had higher blood sugars in 10/25/20 -10/27/20 (208, 274, 251, and 206) but no A1c. Pt was COVID +.  Pt is currently on 10L mask, with Semglee 60 units pm, Admelog 11 units tid meals and Admelog sliding scale coverage ac and hs. Pt had been on dexamethasone ( unclear how long), and remdesivir per PC consult note. Blood sugars are 97 ( 11 units), 135 ( 11 units) and 144 ( 11 units).  Spoke with pt via his cell phone. Discussed what diabetes was,   type two, hypoglycemia,  hyperglycemia, and goals for blood sugars.  Discussed need for carbohydrates and proteins, with every meal and snack as well as why.   Discussed need, benefit and precautions with exercise.  Discussed  what effects blood sugars. Discussed need to follow up with a PCP ( he states he has one but does not remember the name).  Insulin was discussed as well, insulin storage, shelf life and site rotation. Pt will need to practice both insulin and finger sticks skills with nursing.   Discussed finger sticks and verbally how to test his blood sugars and how that differs with what is done now.  Plan: Please have pt work with nursing on insulin skills ( drawing up and injecting) as well as finger sticks ( how to stick his finger and apply blood to strip) though CDE verbally reviewed True metrix meter.  Pt will need prescriptions sent to [facility] [place], and may need assistance with approved services to include Lantus vials, Humalog vials, 100 unit syringes, True metrix meter, tests strips and lancets. Please use dm supply order set, F2 for correct meter and supplies.  Pt has a question regarding his Medicaid pending status.  CDE will leave handout on giving insulin with nursing. CDE will follow up with pt on Monday if he remains in hosptial.

## 2021-08-27 NOTE — PROGRESS NOTES
Diabetes education: Spoke with pt this evening. Please see consult note.  Plan: Please have pt work with nursing on insulin skills ( drawing up and injecting) as well as finger sticks ( how to stick his finger and apply blood to strip) though CDE verbally reviewed True metrix meter.  Pt will need prescriptions sent to [facility] [place], and may need assistance with approved services to include Lantus vials, Humalog vials, 100 unit syringes, True metrix meter, tests strips and lancets. Please use dm supply order set, F2 for correct meter and supplies.  Pt has a question regarding his Medicaid pending status.  CDE will leave handout on giving insulin with nursing. CDE will follow up with pt on Monday if he remains in hosptial.  Pt needs a [facility] DM book from RD, as well as RD referral.

## 2021-08-27 NOTE — CARE PLAN
The patient is Stable - Low risk of patient condition declining or worsening    Shift Goals  Clinical Goals: Improve respiratory status  Patient Goals: Walk without SOB  Family Goals: n/a    Progress made toward(s) clinical / shift goals:      Problem: Knowledge Deficit - Standard  Goal: Patient and family/care givers will demonstrate understanding of plan of care, disease process/condition, diagnostic tests and medications  Outcome: Progressing     Problem: Respiratory  Goal: Patient will achieve/maintain optimum respiratory ventilation and gas exchange  Outcome: Progressing     Problem: Mobility  Goal: Patient's capacity to carry out activities will improve  Outcome: Progressing

## 2021-08-27 NOTE — CARE PLAN
Problem: Knowledge Deficit - Standard  Goal: Patient and family/care givers will demonstrate understanding of plan of care, disease process/condition, diagnostic tests and medications  Outcome: Progressing     Problem: Respiratory  Goal: Patient will achieve/maintain optimum respiratory ventilation and gas exchange  Outcome: Progressing   The patient is Watcher - Medium risk of patient condition declining or worsening    Shift Goals  Clinical Goals: maintain adequate oxygenation  Patient Goals: breathe better  Family Goals: n/a    Progress made toward(s) clinical / shift goals:  Pt verbalizes understanding of plan of care and respiratory practices.     Patient is not progressing towards the following goals:

## 2021-08-27 NOTE — PROGRESS NOTES
Received bedside report from SARIKA Bell, pt care assumed, VSS, pt assessment complete. Pt AAOx4, c/o no pain at this time. No signs of acute distress noted at this time. POC discussed with pt and verbalizes no questions. Pt denies any additional needs at this time. Bed in lowest position. Pt educated on fall risk and verbalized understanding, call light within reach, hourly rounding initiated.

## 2021-08-27 NOTE — PROGRESS NOTES
[facility] Medicine Daily Progress Note    Date of Service  8/27/2021    Chief Complaint  Kyle Gonzalez is a 41 y.o. male admitted 8/6/2021 with dyspnea.    Hospital Course  41-year-old morbidly obese male with past medical history of hypertension, dyslipidemia, multiple STEMIs and NSTEMIs (4 times 2013,2014,2015,2017, 2020) s/p 4 stents, ischemic cardiomyopathy, HFrEF last echocardiogram on 10/2020 with ejection fraction at 20%, grade 1 diastolic dysfunction and Covid positive test 3 days ago presented emergency department on 8/6/2021 with a 1 week history of worsening fever, chills, myalgias, back pain, dry cough and dyspnea at rest and exertion.  Patient reporting that he was in [place] last Friday when he started noticing fevers and chills.  Patient admitting to not being vaccinated.  Tested positive for Covid,  Dyspnea, coughing and watery diarrhea started around the same time the patient then presented to the hospital.  The patient's fiancé also was tested positive for Covid, she recovered.  The patient initially required to be transferred to ICU on 8/13 for persistent hypoxemia and max high flow nasal cannula.  The patient was treated with Decadron, was found to have a upper extremity DVT which was anticoagulated.  On 817 the patient required intermittent BiPAP treatment, had some intermittent difficulty with maintaining his blood pressure and was started on pressors, norepinephrine.  Forced diuresis was continued as tolerated.  On 8/23 the patient appeared overall improved, he is currently felt stabilized to leave the ICU.  Currently the patient is on 25 L, 6% FiO2 high flow nasal cannula, the patient has no specific other complaints other than becoming more dyspneic and diaphoretic with ambulation.    Interval Problem Update  Patient seen and examined today.    Patient tolerating treatment and therapies.  All Data, Medication data reviewed.  Case discussed with nursing as available.  Plan of Care reviewed  with patient and notified of changes.  8/23 the patient is alert and x4, he is currently no pain, he is resting without difficulty, he does engage in self proning, his heart rate is in the 80s to 90s, blood pressure between 90 and 110 systolic, afebrile, he is eating well, continues on full dose Lovenox with a history of extremity DVT, oxygenation sufficient at 25 L and 60%, replacing electrolytes,  Later in the morning the patient down titrating to 20 L and 40% FiO2.    8/24  Patient states that he feels okay.  He is on 40 L high flow nasal cannula, SPO2 92%.  Self proning.  Blood pressure has been on the lower side, 89/54.  He was given 250 cc of saline overnight.  We will hold Lasix.  Left upper extremity DVT study showed nonocclusive left brachial DVT.    Is  borderline low, but patient is asymptomatic, denies dizziness.    Will increase dose of midodrine to 10 mg 3 times daily, resume Lasix with holding parameters.    8/26  Patient stated that breathing is subjectively better.  His O2 requirement is 10 L oxygen mask SPO2 94%.  Blood pressure remains on the lower side 91/51, but he denies dizziness.  Diuresing with holding parameters.    8/27  Patient continues feeling better.  He ambulates to the bathroom and back with transient worsening of hypoxia, but without significant dyspnea.  He uses 9 L of oxygen mask, SPO2 91%.  Tolerates diuresis with borderline low blood pressure, asymptomatic.  Diabetic educator input noted.    I have personally seen and examined the patient at bedside. I discussed the plan of care with patient.  Critical care physician    Consultants/Specialty  Critical care physician    Code Status  Full Code    Disposition  Patient is not medically cleared.   Anticipate discharge to to home with close outpatient follow-up.  I have placed the appropriate orders for post-discharge needs.    Review of Systems  Review of Systems   Constitutional: Positive for malaise/fatigue. Negative for chills and  fever.   Eyes: Negative for blurred vision and pain.   Respiratory: Positive for cough, sputum production and shortness of breath. Negative for wheezing.    Cardiovascular: Negative for chest pain, palpitations and leg swelling.   Gastrointestinal: Negative for abdominal pain, nausea and vomiting.   Genitourinary: Negative for dysuria and urgency.   Musculoskeletal: Negative for back pain.   Skin: Negative for itching and rash.   Neurological: Negative for dizziness, sensory change, focal weakness and headaches.   Psychiatric/Behavioral: Negative for substance abuse. The patient does not have insomnia.         Physical Exam  Temp:  [36 °C (96.8 °F)-36.4 °C (97.5 °F)] 36.4 °C (97.5 °F)  Pulse:  [74-99] 79  Resp:  [16-19] 18  BP: ()/(55-64) 88/55  SpO2:  [90 %-94 %] 91 %    Physical Exam  Constitutional:       General: He is not in acute distress.     Appearance: He is not ill-appearing.   HENT:      Head: Normocephalic and atraumatic.      Right Ear: External ear normal.      Left Ear: External ear normal.      Mouth/Throat:      Pharynx: No oropharyngeal exudate or posterior oropharyngeal erythema.   Eyes:      Extraocular Movements: Extraocular movements intact.      Pupils: Pupils are equal, round, and reactive to light.   Cardiovascular:      Rate and Rhythm: Normal rate and regular rhythm.      Pulses: Normal pulses.      Heart sounds: Normal heart sounds.   Pulmonary:      Effort: Pulmonary effort is normal. No respiratory distress.      Breath sounds: Rhonchi and rales present. No wheezing.      Comments: B/l lung crackles  Abdominal:      General: Bowel sounds are normal. There is no distension.      Palpations: Abdomen is soft.      Tenderness: There is no abdominal tenderness. There is no guarding.   Musculoskeletal:         General: No swelling or tenderness.      Cervical back: Normal range of motion and neck supple.   Skin:     General: Skin is warm and dry.   Neurological:      General: No focal  deficit present.      Mental Status: He is oriented to person, place, and time.      Sensory: No sensory deficit.      Motor: No weakness.   Psychiatric:         Mood and Affect: Mood normal.         Behavior: Behavior normal.         Fluids    Intake/Output Summary (Last 24 hours) at 8/27/2021 1235  Last data filed at 8/27/2021 0800  Gross per 24 hour   Intake 120 ml   Output 976 ml   Net -856 ml       Laboratory  Recent Labs     08/25/21 0520 08/26/21 0412 08/27/21  0458   WBC 7.7 10.0 9.4   RBC 4.32* 4.69* 4.60*   HEMOGLOBIN 13.1* 14.2 13.9*   HEMATOCRIT 40.4* 44.6 43.8   MCV 93.5 95.1 95.2   MCH 30.3 30.3 30.2   MCHC 32.4* 31.8* 31.7*   RDW 49.5 49.7 50.1*   PLATELETCT 375 398 390   MPV 9.9 9.9 9.8     Recent Labs     08/25/21 0520 08/26/21 0412 08/27/21 0458   SODIUM 138 139 142   POTASSIUM 3.9 4.7 4.5   CHLORIDE 106 103 104   CO2 24 26 29   GLUCOSE 89 97 79   BUN 10 10 11   CREATININE 0.38* 0.53 0.48*   CALCIUM 8.3* 9.4 9.5                   Imaging  US-EXTREMITY VENOUS UPPER UNILAT LEFT   Final Result      DX-CHEST-PORTABLE (1 VIEW)   Final Result      Possible mild increase in severe bilateral Covid pneumonia.      DX-CHEST-PORTABLE (1 VIEW)   Final Result      Moderate to severe multifocal consolidation is stable and compatible with Covid pneumonia      DX-CHEST-PORTABLE (1 VIEW)   Final Result         No significant interval change.      DX-CHEST-PORTABLE (1 VIEW)   Final Result         Right central venous catheter with tip projecting over the expected area of the lower SVC.      POCT BUTTERFLY-GUIDANCE VASCULAR ACCESS   Final Result      DX-CHEST-PORTABLE (1 VIEW)   Final Result      Slight interval progression of BILATERAL airspace disease      EC-ECHOCARDIOGRAM COMPLETE W/ CONT   Final Result      DX-CHEST-PORTABLE (1 VIEW)   Final Result      Stable chest with moderate multifocal groundglass and consolidation compatible with Covid pneumonia      US-EXTREMITY VENOUS UPPER BILAT   Final Result       US-EXTREMITY VENOUS LOWER BILAT   Final Result      DX-CHEST-PORTABLE (1 VIEW)   Final Result         1.  Pulmonary edema and/or infiltrates are identified, which appear somewhat increased since the prior exam.      DX-CHEST-PORTABLE (1 VIEW)   Final Result      Bilateral peripheral pulmonary airspace process consistent with Covid pneumonia           Assessment/Plan  * Pneumonia due to COVID-19 virus- (present on admission)  Assessment & Plan  Patient with overall improving status now after appropriate treatment, steroid completed  Ongoing pulmonary care, oxygen weaning  Proning      Vitamin D deficiency  Assessment & Plan  Vitamin D level 10  We will start supplementation with ergocalciferol    Hypotension  Assessment & Plan  Increase midodrine to 10 mg 3 times daily  Borderline low cortisol level noted  We will repeat cortisol level tomorrow, will consider ACTH stimulation test  Hold antihypertensive, holding parameters on Lasix  Monitor    Hypomagnesemia  Assessment & Plan  Monitor and replace    Hypokalemia  Assessment & Plan  Monitor and replace    Acute pulmonary edema (HCC)- (present on admission)  Assessment & Plan  Lasix with holding parameters    Acute deep vein thrombosis (DVT) of brachial vein of left upper extremity (HCC)- (present on admission)  Assessment & Plan  This appears to be a very short segment of the brachial vein, on known age  Ultrasound otherwise shows superficial thrombus  8/24 repeat ultrasound showed nonocclusive left brachial DVT.  Transitioned to Xarelto, will plan for 3 months course.    Type 2 diabetes mellitus without complication, without long-term current use of insulin ([facility])- (present on admission)  Assessment & Plan  Reports chronic history of diabetes and admits to not taking Metformin  Diabetic diet  Insulin sliding scale  Increase glargine to 23 units and lispro 7 units TID-, c/w [facility] insulin  A1c 11.3  Will likely need insulin on discharge  Diabetes education was  done    Acute respiratory failure with hypoxia (HCC)- (present on admission)  Assessment & Plan  Secondary to Covid pneumonia  Continue oxygen weaning, the patient received full dose and appropriate COVID-19 treatment  Patient is on therapeutic dose of Lovenox for DVT.  Will defer  CTA of pulmonary artery, as it would not change the management  Self proning as tolerated  Lasix with holding parameters  8/25 on 15 L oxygen mask  8/26 10 L oxygen mask  8/27 on 9 L oxygen mask    Morbid obesity with BMI of 40.0-44.9, adult (HCC)- (present on admission)  Assessment & Plan  Contributing to severity of Covid pneumonia hypoxic respiratory failure  Counseling on weight reduction    Lactic acidosis- (present on admission)  Assessment & Plan  Resolved    Hyponatremia- (present on admission)  Assessment & Plan  Resolved, frequent metabolic checks    Heart failure with reduced ejection fraction (HCC)- (present on admission)  Assessment & Plan  Repeat echocardiogram shows ejection fraction of 55%        Coronary artery disease involving native coronary artery of native heart without angina pectoris- (present on admission)  Assessment & Plan  GDMT as tolerated    Tobacco use- (present on admission)  Assessment & Plan  Counseling on tobacco cessation    Essential hypertension- (present on admission)  Assessment & Plan  Titrate medication as tolerated for goal pressures    Stented coronary artery,  bare metal stent X 2- (present on admission)  Assessment & Plan  We will restart Plavix and aspirin, reduce Lovenox to DVT PPx dose     Hyperlipemia- (present on admission)  Assessment & Plan  Continue home statin        VTE prophylaxis: Xarelto    I have performed a physical exam and reviewed and updated ROS and Plan today (8/27/2021). In review of yesterday's note (8/26/2021), there are no changes except as documented above.       Please note that this dictation was created using voice recognition software. I have made every reasonable  attempt to correct obvious errors, but I expect that there are errors of grammar and possibly context that I did not discover before finalizing the note.

## 2021-08-28 LAB
GLUCOSE BLD-MCNC: 117 MG/DL (ref 65–99)
GLUCOSE BLD-MCNC: 129 MG/DL (ref 65–99)
GLUCOSE BLD-MCNC: 143 MG/DL (ref 65–99)
PROCALCITONIN SERPL-MCNC: <0.05 NG/ML

## 2021-08-28 PROCEDURE — 84145 PROCALCITONIN (PCT): CPT

## 2021-08-28 PROCEDURE — 82962 GLUCOSE BLOOD TEST: CPT | Mod: 91

## 2021-08-28 PROCEDURE — A9270 NON-COVERED ITEM OR SERVICE: HCPCS | Performed by: EMERGENCY MEDICINE

## 2021-08-28 PROCEDURE — 36415 COLL VENOUS BLD VENIPUNCTURE: CPT

## 2021-08-28 PROCEDURE — 770006 HCHG ROOM/CARE - MED/SURG/GYN SEMI*

## 2021-08-28 PROCEDURE — 700102 HCHG RX REV CODE 250 W/ 637 OVERRIDE(OP): Performed by: EMERGENCY MEDICINE

## 2021-08-28 PROCEDURE — A9270 NON-COVERED ITEM OR SERVICE: HCPCS | Performed by: STUDENT IN AN ORGANIZED HEALTH CARE EDUCATION/TRAINING PROGRAM

## 2021-08-28 PROCEDURE — 700102 HCHG RX REV CODE 250 W/ 637 OVERRIDE(OP): Performed by: INTERNAL MEDICINE

## 2021-08-28 PROCEDURE — A9270 NON-COVERED ITEM OR SERVICE: HCPCS | Performed by: INTERNAL MEDICINE

## 2021-08-28 PROCEDURE — 99232 SBSQ HOSP IP/OBS MODERATE 35: CPT | Performed by: INTERNAL MEDICINE

## 2021-08-28 PROCEDURE — 700102 HCHG RX REV CODE 250 W/ 637 OVERRIDE(OP): Performed by: STUDENT IN AN ORGANIZED HEALTH CARE EDUCATION/TRAINING PROGRAM

## 2021-08-28 RX ORDER — FUROSEMIDE 40 MG/1
40 TABLET ORAL
Status: DISCONTINUED | OUTPATIENT
Start: 2021-08-28 | End: 2021-08-30

## 2021-08-28 RX ADMIN — INSULIN LISPRO 11 UNITS: 100 INJECTION, SOLUTION INTRAVENOUS; SUBCUTANEOUS at 18:21

## 2021-08-28 RX ADMIN — MIDODRINE HYDROCHLORIDE 10 MG: 5 TABLET ORAL at 07:30

## 2021-08-28 RX ADMIN — RIVAROXABAN 15 MG: 15 TABLET, FILM COATED ORAL at 07:30

## 2021-08-28 RX ADMIN — FUROSEMIDE 20 MG: 20 TABLET ORAL at 05:02

## 2021-08-28 RX ADMIN — CLOPIDOGREL BISULFATE 75 MG: 75 TABLET ORAL at 05:02

## 2021-08-28 RX ADMIN — ROSUVASTATIN CALCIUM 40 MG: 20 TABLET, FILM COATED ORAL at 18:22

## 2021-08-28 RX ADMIN — INSULIN LISPRO 11 UNITS: 100 INJECTION, SOLUTION INTRAVENOUS; SUBCUTANEOUS at 11:00

## 2021-08-28 RX ADMIN — OMEPRAZOLE 20 MG: 20 CAPSULE, DELAYED RELEASE ORAL at 05:02

## 2021-08-28 RX ADMIN — ACETAMINOPHEN 1000 MG: 500 TABLET, FILM COATED ORAL at 22:10

## 2021-08-28 RX ADMIN — INSULIN LISPRO 11 UNITS: 100 INJECTION, SOLUTION INTRAVENOUS; SUBCUTANEOUS at 08:54

## 2021-08-28 RX ADMIN — FUROSEMIDE 40 MG: 40 TABLET ORAL at 18:24

## 2021-08-28 RX ADMIN — RIVAROXABAN 15 MG: 15 TABLET, FILM COATED ORAL at 18:21

## 2021-08-28 RX ADMIN — ACETAMINOPHEN 1000 MG: 500 TABLET, FILM COATED ORAL at 08:58

## 2021-08-28 RX ADMIN — MIDODRINE HYDROCHLORIDE 10 MG: 5 TABLET ORAL at 12:21

## 2021-08-28 RX ADMIN — DOCUSATE SODIUM 50 MG AND SENNOSIDES 8.6 MG 2 TABLET: 8.6; 5 TABLET, FILM COATED ORAL at 18:38

## 2021-08-28 RX ADMIN — POTASSIUM BICARBONATE 25 MEQ: 978 TABLET, EFFERVESCENT ORAL at 05:02

## 2021-08-28 RX ADMIN — INSULIN LISPRO 3 UNITS: 100 INJECTION, SOLUTION INTRAVENOUS; SUBCUTANEOUS at 22:12

## 2021-08-28 RX ADMIN — MIDODRINE HYDROCHLORIDE 10 MG: 5 TABLET ORAL at 18:21

## 2021-08-28 RX ADMIN — INSULIN GLARGINE 60 UNITS: 100 INJECTION, SOLUTION SUBCUTANEOUS at 18:20

## 2021-08-28 RX ADMIN — POTASSIUM BICARBONATE 25 MEQ: 978 TABLET, EFFERVESCENT ORAL at 18:21

## 2021-08-28 RX ADMIN — EZETIMIBE 10 MG: 10 TABLET ORAL at 05:02

## 2021-08-28 ASSESSMENT — ENCOUNTER SYMPTOMS
VOMITING: 0
INSOMNIA: 0
FEVER: 0
SENSORY CHANGE: 0
BLURRED VISION: 0
PALPITATIONS: 0
BACK PAIN: 0
CHILLS: 0
COUGH: 1
HEADACHES: 0
NAUSEA: 0
FOCAL WEAKNESS: 0
SHORTNESS OF BREATH: 1
EYE PAIN: 0
ABDOMINAL PAIN: 0
DIZZINESS: 0
WHEEZING: 0
SPUTUM PRODUCTION: 1

## 2021-08-28 ASSESSMENT — FIBROSIS 4 INDEX: FIB4 SCORE: .5430908357672788576

## 2021-08-28 ASSESSMENT — LIFESTYLE VARIABLES: SUBSTANCE_ABUSE: 0

## 2021-08-28 NOTE — CARE PLAN
Problem: Knowledge Deficit - Standard  Goal: Patient and family/care givers will demonstrate understanding of plan of care, disease process/condition, diagnostic tests and medications  Outcome: Progressing     Problem: Respiratory  Goal: Patient will achieve/maintain optimum respiratory ventilation and gas exchange  Outcome: Progressing   The patient is Watcher - Medium risk of patient condition declining or worsening    Shift Goals  Clinical Goals: improve respiratory status  Patient Goals: breathe better with exertion  Family Goals: n/a    Progress made toward(s) clinical / shift goals:  Pt verbalizes and demonstrates understanding of plan of care and respiratory practices.     Patient is not progressing towards the following goals:

## 2021-08-28 NOTE — PROGRESS NOTES
[facility] Medicine Daily Progress Note    Date of Service  8/28/2021    Chief Complaint  Kyle Gonzalez is a 41 y.o. male admitted 8/6/2021 with dyspnea.    Hospital Course  41-year-old morbidly obese male with past medical history of hypertension, dyslipidemia, multiple STEMIs and NSTEMIs (4 times 2013,2014,2015,2017, 2020) s/p 4 stents, ischemic cardiomyopathy, HFrEF last echocardiogram on 10/2020 with ejection fraction at 20%, grade 1 diastolic dysfunction and Covid positive test 3 days ago presented emergency department on 8/6/2021 with a 1 week history of worsening fever, chills, myalgias, back pain, dry cough and dyspnea at rest and exertion.  Patient reporting that he was in [place] last Friday when he started noticing fevers and chills.  Patient admitting to not being vaccinated.  Tested positive for Covid,  Dyspnea, coughing and watery diarrhea started around the same time the patient then presented to the hospital.  The patient's fiancé also was tested positive for Covid, she recovered.  The patient initially required to be transferred to ICU on 8/13 for persistent hypoxemia and max high flow nasal cannula.  The patient was treated with Decadron, was found to have a upper extremity DVT which was anticoagulated.  On 817 the patient required intermittent BiPAP treatment, had some intermittent difficulty with maintaining his blood pressure and was started on pressors, norepinephrine.  Forced diuresis was continued as tolerated.  On 8/23 the patient appeared overall improved, he is currently felt stabilized to leave the ICU.  Currently the patient is on 25 L, 6% FiO2 high flow nasal cannula, the patient has no specific other complaints other than becoming more dyspneic and diaphoretic with ambulation.    Interval Problem Update  Patient seen and examined today.    Patient tolerating treatment and therapies.  All Data, Medication data reviewed.  Case discussed with nursing as available.  Plan of Care reviewed  with patient and notified of changes.  8/23 the patient is alert and x4, he is currently no pain, he is resting without difficulty, he does engage in self proning, his heart rate is in the 80s to 90s, blood pressure between 90 and 110 systolic, afebrile, he is eating well, continues on full dose Lovenox with a history of extremity DVT, oxygenation sufficient at 25 L and 60%, replacing electrolytes,  Later in the morning the patient down titrating to 20 L and 40% FiO2.    8/24  Patient states that he feels okay.  He is on 40 L high flow nasal cannula, SPO2 92%.  Self proning.  Blood pressure has been on the lower side, 89/54.  He was given 250 cc of saline overnight.  We will hold Lasix.  Left upper extremity DVT study showed nonocclusive left brachial DVT.    Is  borderline low, but patient is asymptomatic, denies dizziness.    Will increase dose of midodrine to 10 mg 3 times daily, resume Lasix with holding parameters.    8/26  Patient stated that breathing is subjectively better.  His O2 requirement is 10 L oxygen mask SPO2 94%.  Blood pressure remains on the lower side 91/51, but he denies dizziness.  Diuresing with holding parameters.    8/27  Patient continues feeling better.  He ambulates to the bathroom and back with transient worsening of hypoxia, but without significant dyspnea.  He uses 9 L of oxygen mask, SPO2 91%.  Tolerates diuresis with borderline low blood pressure, asymptomatic.  Diabetic educator input noted.    8/28  93% SPO2 on 8 L oxygen mask today.  Patient denies overnight changes.  Will increase dose of Lasix to 40 mg twice daily p.o. with holding parameters.  Repeat procalcitonin is low  I have personally seen and examined the patient at bedside. I discussed the plan of care with patient.  Critical care physician    Consultants/Specialty  Critical care physician    Code Status  Full Code    Disposition  Patient is not medically cleared.   Anticipate discharge to to home with close outpatient  follow-up.  I have placed the appropriate orders for post-discharge needs.    Review of Systems  Review of Systems   Constitutional: Positive for malaise/fatigue. Negative for chills and fever.   Eyes: Negative for blurred vision and pain.   Respiratory: Positive for cough, sputum production and shortness of breath. Negative for wheezing.    Cardiovascular: Negative for chest pain, palpitations and leg swelling.   Gastrointestinal: Negative for abdominal pain, nausea and vomiting.   Genitourinary: Negative for dysuria and urgency.   Musculoskeletal: Negative for back pain.   Skin: Negative for itching and rash.   Neurological: Negative for dizziness, sensory change, focal weakness and headaches.   Psychiatric/Behavioral: Negative for substance abuse. The patient does not have insomnia.         Physical Exam  Temp:  [35.8 °C (96.5 °F)-36.5 °C (97.7 °F)] 36.5 °C (97.7 °F)  Pulse:  [77-89] 84  Resp:  [16-18] 18  BP: ()/(45-67) 91/45  SpO2:  [91 %-96 %] 93 %    Physical Exam  Constitutional:       General: He is not in acute distress.     Appearance: He is not ill-appearing.   HENT:      Head: Normocephalic and atraumatic.      Right Ear: External ear normal.      Left Ear: External ear normal.      Mouth/Throat:      Pharynx: No oropharyngeal exudate or posterior oropharyngeal erythema.   Eyes:      Extraocular Movements: Extraocular movements intact.      Pupils: Pupils are equal, round, and reactive to light.   Cardiovascular:      Rate and Rhythm: Normal rate and regular rhythm.      Pulses: Normal pulses.      Heart sounds: Normal heart sounds.   Pulmonary:      Effort: Pulmonary effort is normal. No respiratory distress.      Breath sounds: Rhonchi and rales present. No wheezing.      Comments: B/l lung crackles  Abdominal:      General: Bowel sounds are normal. There is no distension.      Palpations: Abdomen is soft.      Tenderness: There is no abdominal tenderness. There is no guarding.    Musculoskeletal:         General: No swelling or tenderness.      Cervical back: Normal range of motion and neck supple.   Skin:     General: Skin is warm and dry.   Neurological:      General: No focal deficit present.      Mental Status: He is oriented to person, place, and time.      Sensory: No sensory deficit.      Motor: No weakness.   Psychiatric:         Mood and Affect: Mood normal.         Behavior: Behavior normal.         Fluids    Intake/Output Summary (Last 24 hours) at 8/28/2021 1202  Last data filed at 8/28/2021 0740  Gross per 24 hour   Intake 240 ml   Output 1400 ml   Net -1160 ml       Laboratory  Recent Labs     08/26/21  0412 08/27/21  0458   WBC 10.0 9.4   RBC 4.69* 4.60*   HEMOGLOBIN 14.2 13.9*   HEMATOCRIT 44.6 43.8   MCV 95.1 95.2   MCH 30.3 30.2   MCHC 31.8* 31.7*   RDW 49.7 50.1*   PLATELETCT 398 390   MPV 9.9 9.8     Recent Labs     08/26/21 0412 08/27/21  0458   SODIUM 139 142   POTASSIUM 4.7 4.5   CHLORIDE 103 104   CO2 26 29   GLUCOSE 97 79   BUN 10 11   CREATININE 0.53 0.48*   CALCIUM 9.4 9.5                   Imaging  US-EXTREMITY VENOUS UPPER UNILAT LEFT   Final Result      DX-CHEST-PORTABLE (1 VIEW)   Final Result      Possible mild increase in severe bilateral Covid pneumonia.      DX-CHEST-PORTABLE (1 VIEW)   Final Result      Moderate to severe multifocal consolidation is stable and compatible with Covid pneumonia      DX-CHEST-PORTABLE (1 VIEW)   Final Result         No significant interval change.      DX-CHEST-PORTABLE (1 VIEW)   Final Result         Right central venous catheter with tip projecting over the expected area of the lower SVC.      POCT BUTTERFLY-GUIDANCE VASCULAR ACCESS   Final Result      DX-CHEST-PORTABLE (1 VIEW)   Final Result      Slight interval progression of BILATERAL airspace disease      EC-ECHOCARDIOGRAM COMPLETE W/ CONT   Final Result      DX-CHEST-PORTABLE (1 VIEW)   Final Result      Stable chest with moderate multifocal groundglass and  consolidation compatible with Covid pneumonia      US-EXTREMITY VENOUS UPPER BILAT   Final Result      US-EXTREMITY VENOUS LOWER BILAT   Final Result      DX-CHEST-PORTABLE (1 VIEW)   Final Result         1.  Pulmonary edema and/or infiltrates are identified, which appear somewhat increased since the prior exam.      DX-CHEST-PORTABLE (1 VIEW)   Final Result      Bilateral peripheral pulmonary airspace process consistent with Covid pneumonia           Assessment/Plan  * Pneumonia due to COVID-19 virus- (present on admission)  Assessment & Plan  Patient with overall improving status now after appropriate treatment, steroid completed  Ongoing pulmonary care, oxygen weaning  Proning  Repeat procalcitonin is low    Vitamin D deficiency  Assessment & Plan  Vitamin D level 10  We will start supplementation with ergocalciferol    Hypotension  Assessment & Plan  Increase midodrine to 10 mg 3 times daily  Borderline low cortisol level noted  We will repeat cortisol level tomorrow, will consider ACTH stimulation test  Hold antihypertensive, holding parameters on Lasix  Monitor    Hypomagnesemia  Assessment & Plan  Monitor and replace    Hypokalemia  Assessment & Plan  Monitor and replace    Acute pulmonary edema (HCC)- (present on admission)  Assessment & Plan  Lasix with holding parameters    Acute deep vein thrombosis (DVT) of brachial vein of left upper extremity (HCC)- (present on admission)  Assessment & Plan  This appears to be a very short segment of the brachial vein, on known age  Ultrasound otherwise shows superficial thrombus  8/24 repeat ultrasound showed nonocclusive left brachial DVT.  Transitioned to Xarelto, will plan for 3 months course.    Type 2 diabetes mellitus without complication, without long-term current use of insulin (HCC)- (present on admission)  Assessment & Plan  Reports chronic history of diabetes and admits to not taking Metformin  Diabetic diet  Insulin sliding scale  Increase glargine to 23  units and lispro 7 units TID-AC, c/w [facility] insulin  A1c 11.3  Will likely need insulin on discharge  Diabetes education was done    Acute respiratory failure with hypoxia (HCC)- (present on admission)  Assessment & Plan  Secondary to Covid pneumonia  Continue oxygen weaning, the patient received full dose and appropriate COVID-19 treatment  Patient is on therapeutic dose of Lovenox for DVT.  Will defer  CTA of pulmonary artery, as it would not change the management  Self proning as tolerated  Lasix with holding parameters  8/25 on 15 L oxygen mask  8/26 10 L oxygen mask  8/27 on 9 L oxygen mask  8/28 on 8 L oxygen mask, SPO2 93%.    Morbid obesity with BMI of 40.0-44.9, adult (HCC)- (present on admission)  Assessment & Plan  Contributing to severity of Covid pneumonia hypoxic respiratory failure  Counseling on weight reduction    Lactic acidosis- (present on admission)  Assessment & Plan  Resolved    Hyponatremia- (present on admission)  Assessment & Plan  Resolved, frequent metabolic checks    Heart failure with reduced ejection fraction (HCC)- (present on admission)  Assessment & Plan  Repeat echocardiogram shows ejection fraction of 55%        Coronary artery disease involving native coronary artery of native heart without angina pectoris- (present on admission)  Assessment & Plan  GDMT as tolerated    Tobacco use- (present on admission)  Assessment & Plan  Counseling on tobacco cessation    Essential hypertension- (present on admission)  Assessment & Plan  Titrate medication as tolerated for goal pressures    Stented coronary artery,  bare metal stent X 2- (present on admission)  Assessment & Plan  We will restart Plavix and aspirin, reduce Lovenox to DVT PPx dose     Hyperlipemia- (present on admission)  Assessment & Plan  Continue home statin        VTE prophylaxis: Xarelto    I have performed a physical exam and reviewed and updated ROS and Plan today (8/28/2021). In review of yesterday's note (8/27/2021),  there are no changes except as documented above.       Please note that this dictation was created using voice recognition software. I have made every reasonable attempt to correct obvious errors, but I expect that there are errors of grammar and possibly context that I did not discover before finalizing the note.

## 2021-08-29 LAB
ALBUMIN SERPL BCP-MCNC: 4.3 G/DL (ref 3.2–4.9)
ALBUMIN/GLOB SERPL: 1.1 G/DL
ALP SERPL-CCNC: 87 U/L (ref 30–99)
ALT SERPL-CCNC: 35 U/L (ref 2–50)
ANION GAP SERPL CALC-SCNC: 11 MMOL/L (ref 7–16)
AST SERPL-CCNC: 35 U/L (ref 12–45)
BASOPHILS # BLD AUTO: 0.6 % (ref 0–1.8)
BASOPHILS # BLD: 0.07 K/UL (ref 0–0.12)
BILIRUB SERPL-MCNC: 0.2 MG/DL (ref 0.1–1.5)
BUN SERPL-MCNC: 10 MG/DL (ref 8–22)
CALCIUM SERPL-MCNC: 9.8 MG/DL (ref 8.5–10.5)
CHLORIDE SERPL-SCNC: 99 MMOL/L (ref 96–112)
CO2 SERPL-SCNC: 30 MMOL/L (ref 20–33)
CREAT SERPL-MCNC: 0.61 MG/DL (ref 0.5–1.4)
EOSINOPHIL # BLD AUTO: 1 K/UL (ref 0–0.51)
EOSINOPHIL NFR BLD: 9.3 % (ref 0–6.9)
ERYTHROCYTE [DISTWIDTH] IN BLOOD BY AUTOMATED COUNT: 49.4 FL (ref 35.9–50)
GLOBULIN SER CALC-MCNC: 3.9 G/DL (ref 1.9–3.5)
GLUCOSE BLD-MCNC: 123 MG/DL (ref 65–99)
GLUCOSE BLD-MCNC: 164 MG/DL (ref 65–99)
GLUCOSE BLD-MCNC: 175 MG/DL (ref 65–99)
GLUCOSE BLD-MCNC: 202 MG/DL (ref 65–99)
GLUCOSE BLD-MCNC: 88 MG/DL (ref 65–99)
GLUCOSE SERPL-MCNC: 152 MG/DL (ref 65–99)
HCT VFR BLD AUTO: 44.6 % (ref 42–52)
HGB BLD-MCNC: 13.9 G/DL (ref 14–18)
IMM GRANULOCYTES # BLD AUTO: 0.04 K/UL (ref 0–0.11)
IMM GRANULOCYTES NFR BLD AUTO: 0.4 % (ref 0–0.9)
LYMPHOCYTES # BLD AUTO: 2.02 K/UL (ref 1–4.8)
LYMPHOCYTES NFR BLD: 18.8 % (ref 22–41)
MCH RBC QN AUTO: 29.6 PG (ref 27–33)
MCHC RBC AUTO-ENTMCNC: 31.2 G/DL (ref 33.7–35.3)
MCV RBC AUTO: 95.1 FL (ref 81.4–97.8)
MONOCYTES # BLD AUTO: 0.91 K/UL (ref 0–0.85)
MONOCYTES NFR BLD AUTO: 8.4 % (ref 0–13.4)
NEUTROPHILS # BLD AUTO: 6.73 K/UL (ref 1.82–7.42)
NEUTROPHILS NFR BLD: 62.5 % (ref 44–72)
NRBC # BLD AUTO: 0 K/UL
NRBC BLD-RTO: 0 /100 WBC
PLATELET # BLD AUTO: 429 K/UL (ref 164–446)
PMV BLD AUTO: 9.7 FL (ref 9–12.9)
POTASSIUM SERPL-SCNC: 4 MMOL/L (ref 3.6–5.5)
PROT SERPL-MCNC: 8.2 G/DL (ref 6–8.2)
RBC # BLD AUTO: 4.69 M/UL (ref 4.7–6.1)
SODIUM SERPL-SCNC: 140 MMOL/L (ref 135–145)
WBC # BLD AUTO: 10.8 K/UL (ref 4.8–10.8)

## 2021-08-29 PROCEDURE — 700102 HCHG RX REV CODE 250 W/ 637 OVERRIDE(OP): Performed by: EMERGENCY MEDICINE

## 2021-08-29 PROCEDURE — 82962 GLUCOSE BLOOD TEST: CPT | Mod: 91

## 2021-08-29 PROCEDURE — A9270 NON-COVERED ITEM OR SERVICE: HCPCS | Performed by: INTERNAL MEDICINE

## 2021-08-29 PROCEDURE — A9270 NON-COVERED ITEM OR SERVICE: HCPCS | Performed by: EMERGENCY MEDICINE

## 2021-08-29 PROCEDURE — 99232 SBSQ HOSP IP/OBS MODERATE 35: CPT | Performed by: INTERNAL MEDICINE

## 2021-08-29 PROCEDURE — 36415 COLL VENOUS BLD VENIPUNCTURE: CPT

## 2021-08-29 PROCEDURE — 80053 COMPREHEN METABOLIC PANEL: CPT

## 2021-08-29 PROCEDURE — RXMED WILLOW AMBULATORY MEDICATION CHARGE: Performed by: INTERNAL MEDICINE

## 2021-08-29 PROCEDURE — 700102 HCHG RX REV CODE 250 W/ 637 OVERRIDE(OP): Performed by: INTERNAL MEDICINE

## 2021-08-29 PROCEDURE — A9270 NON-COVERED ITEM OR SERVICE: HCPCS | Performed by: STUDENT IN AN ORGANIZED HEALTH CARE EDUCATION/TRAINING PROGRAM

## 2021-08-29 PROCEDURE — 770006 HCHG ROOM/CARE - MED/SURG/GYN SEMI*

## 2021-08-29 PROCEDURE — 85025 COMPLETE CBC W/AUTO DIFF WBC: CPT

## 2021-08-29 PROCEDURE — 700102 HCHG RX REV CODE 250 W/ 637 OVERRIDE(OP): Performed by: STUDENT IN AN ORGANIZED HEALTH CARE EDUCATION/TRAINING PROGRAM

## 2021-08-29 RX ORDER — INSULIN GLARGINE 100 [IU]/ML
60 INJECTION, SOLUTION SUBCUTANEOUS EVERY EVENING
Qty: 10 ML | Refills: 1 | Status: ON HOLD
Start: 2021-08-29 | End: 2021-12-19

## 2021-08-29 RX ORDER — ACETAMINOPHEN 325 MG/1
650 TABLET ORAL EVERY 4 HOURS PRN
Status: DISCONTINUED | OUTPATIENT
Start: 2021-08-29 | End: 2021-08-30 | Stop reason: HOSPADM

## 2021-08-29 RX ORDER — OMEPRAZOLE 20 MG/1
20 CAPSULE, DELAYED RELEASE ORAL DAILY
Qty: 30 CAPSULE | Refills: 0 | Status: ON HOLD | OUTPATIENT
Start: 2021-08-30 | End: 2021-12-07

## 2021-08-29 RX ORDER — DIPHENHYDRAMINE HYDROCHLORIDE 25 MG/1
CAPSULE, LIQUID FILLED ORAL
Qty: 1 KIT | Refills: 0 | Status: ON HOLD | OUTPATIENT
Start: 2021-08-29 | End: 2021-12-08

## 2021-08-29 RX ORDER — GLUCOSAMINE HCL/CHONDROITIN SU 500-400 MG
CAPSULE ORAL
Qty: 100 EACH | Refills: 0 | Status: ON HOLD | OUTPATIENT
Start: 2021-08-29 | End: 2021-12-08

## 2021-08-29 RX ORDER — ERGOCALCIFEROL 1.25 MG/1
50000 CAPSULE ORAL
Qty: 5 CAPSULE | Refills: 0 | Status: ON HOLD | OUTPATIENT
Start: 2021-09-01 | End: 2021-12-07

## 2021-08-29 RX ORDER — LANCETS 30 GAUGE
EACH MISCELLANEOUS
Qty: 100 EACH | Refills: 0 | Status: ON HOLD | OUTPATIENT
Start: 2021-08-29 | End: 2021-12-08

## 2021-08-29 RX ORDER — FUROSEMIDE 40 MG/1
40 TABLET ORAL DAILY
Qty: 10 TABLET | Refills: 0 | Status: SHIPPED | OUTPATIENT
Start: 2021-08-29 | End: 2021-08-30

## 2021-08-29 RX ORDER — MIDODRINE HYDROCHLORIDE 10 MG/1
10 TABLET ORAL
Qty: 60 TABLET | Refills: 0 | Status: ON HOLD | OUTPATIENT
Start: 2021-08-29 | End: 2021-12-07

## 2021-08-29 RX ORDER — BLOOD SUGAR DIAGNOSTIC
STRIP MISCELLANEOUS
Qty: 100 EACH | Refills: 0 | Status: ON HOLD | OUTPATIENT
Start: 2021-08-29 | End: 2021-12-08

## 2021-08-29 RX ADMIN — OMEPRAZOLE 20 MG: 20 CAPSULE, DELAYED RELEASE ORAL at 04:37

## 2021-08-29 RX ADMIN — MIDODRINE HYDROCHLORIDE 10 MG: 5 TABLET ORAL at 08:18

## 2021-08-29 RX ADMIN — EZETIMIBE 10 MG: 10 TABLET ORAL at 04:36

## 2021-08-29 RX ADMIN — INSULIN LISPRO 11 UNITS: 100 INJECTION, SOLUTION INTRAVENOUS; SUBCUTANEOUS at 12:05

## 2021-08-29 RX ADMIN — INSULIN LISPRO 4 UNITS: 100 INJECTION, SOLUTION INTRAVENOUS; SUBCUTANEOUS at 20:43

## 2021-08-29 RX ADMIN — DOCUSATE SODIUM 50 MG AND SENNOSIDES 8.6 MG 2 TABLET: 8.6; 5 TABLET, FILM COATED ORAL at 18:01

## 2021-08-29 RX ADMIN — MIDODRINE HYDROCHLORIDE 10 MG: 5 TABLET ORAL at 11:30

## 2021-08-29 RX ADMIN — MIDODRINE HYDROCHLORIDE 10 MG: 5 TABLET ORAL at 17:59

## 2021-08-29 RX ADMIN — INSULIN GLARGINE 60 UNITS: 100 INJECTION, SOLUTION SUBCUTANEOUS at 18:02

## 2021-08-29 RX ADMIN — ACETAMINOPHEN 650 MG: 325 TABLET, FILM COATED ORAL at 11:57

## 2021-08-29 RX ADMIN — RIVAROXABAN 15 MG: 15 TABLET, FILM COATED ORAL at 08:18

## 2021-08-29 RX ADMIN — CLOPIDOGREL BISULFATE 75 MG: 75 TABLET ORAL at 04:37

## 2021-08-29 RX ADMIN — RIVAROXABAN 15 MG: 15 TABLET, FILM COATED ORAL at 17:59

## 2021-08-29 RX ADMIN — INSULIN LISPRO 11 UNITS: 100 INJECTION, SOLUTION INTRAVENOUS; SUBCUTANEOUS at 08:05

## 2021-08-29 RX ADMIN — POTASSIUM BICARBONATE 25 MEQ: 978 TABLET, EFFERVESCENT ORAL at 04:37

## 2021-08-29 RX ADMIN — ROSUVASTATIN CALCIUM 40 MG: 20 TABLET, FILM COATED ORAL at 17:59

## 2021-08-29 RX ADMIN — INSULIN LISPRO 3 UNITS: 100 INJECTION, SOLUTION INTRAVENOUS; SUBCUTANEOUS at 11:59

## 2021-08-29 RX ADMIN — FUROSEMIDE 40 MG: 40 TABLET ORAL at 16:26

## 2021-08-29 RX ADMIN — POTASSIUM BICARBONATE 25 MEQ: 978 TABLET, EFFERVESCENT ORAL at 17:59

## 2021-08-29 RX ADMIN — INSULIN LISPRO 11 UNITS: 100 INJECTION, SOLUTION INTRAVENOUS; SUBCUTANEOUS at 18:03

## 2021-08-29 RX ADMIN — FUROSEMIDE 40 MG: 40 TABLET ORAL at 04:37

## 2021-08-29 ASSESSMENT — ENCOUNTER SYMPTOMS
BLURRED VISION: 0
ABDOMINAL PAIN: 0
FEVER: 0
NAUSEA: 0
VOMITING: 0
WHEEZING: 0
HEADACHES: 0
FOCAL WEAKNESS: 0
CHILLS: 0
INSOMNIA: 0
BACK PAIN: 0
DIZZINESS: 0
COUGH: 1
EYE PAIN: 0
SHORTNESS OF BREATH: 1
PALPITATIONS: 0
SENSORY CHANGE: 0
SPUTUM PRODUCTION: 1

## 2021-08-29 ASSESSMENT — LIFESTYLE VARIABLES: SUBSTANCE_ABUSE: 0

## 2021-08-29 NOTE — CARE PLAN
Problem: Knowledge Deficit - Standard  Goal: Patient and family/care givers will demonstrate understanding of plan of care, disease process/condition, diagnostic tests and medications  Outcome: Progressing     Problem: Respiratory  Goal: Patient will achieve/maintain optimum respiratory ventilation and gas exchange  Outcome: Progressing   The patient is Watcher - Medium risk of patient condition declining or worsening    Shift Goals  Clinical Goals: improve respiratory status, wean oxygen   Patient Goals: breathe better  Family Goals: n/a    Progress made toward(s) clinical / shift goals:  Pt verbalizes and demonstrates understanding of plan of care and respiratory practices.     Patient is not progressing towards the following goals:

## 2021-08-29 NOTE — PROGRESS NOTES
[facility] Medicine Daily Progress Note    Date of Service  8/29/2021    Chief Complaint  Kyle Gonzalez is a 41 y.o. male admitted 8/6/2021 with dyspnea.    Hospital Course  41-year-old morbidly obese male with past medical history of hypertension, dyslipidemia, multiple STEMIs and NSTEMIs (4 times 2013,2014,2015,2017, 2020) s/p 4 stents, ischemic cardiomyopathy, HFrEF last echocardiogram on 10/2020 with ejection fraction at 20%, grade 1 diastolic dysfunction and Covid positive test 3 days ago presented emergency department on 8/6/2021 with a 1 week history of worsening fever, chills, myalgias, back pain, dry cough and dyspnea at rest and exertion.  Patient reporting that he was in [place] last Friday when he started noticing fevers and chills.  Patient admitting to not being vaccinated.  Tested positive for Covid,  Dyspnea, coughing and watery diarrhea started around the same time the patient then presented to the hospital.  The patient's fiancé also was tested positive for Covid, she recovered.  The patient initially required to be transferred to ICU on 8/13 for persistent hypoxemia and max high flow nasal cannula.  The patient was treated with Decadron, was found to have a upper extremity DVT which was anticoagulated.  On 817 the patient required intermittent BiPAP treatment, had some intermittent difficulty with maintaining his blood pressure and was started on pressors, norepinephrine.  Forced diuresis was continued as tolerated.  On 8/23 the patient appeared overall improved, he is currently felt stabilized to leave the ICU.  Currently the patient is on 25 L, 6% FiO2 high flow nasal cannula, the patient has no specific other complaints other than becoming more dyspneic and diaphoretic with ambulation.    Interval Problem Update  Patient seen and examined today.    Patient tolerating treatment and therapies.  All Data, Medication data reviewed.  Case discussed with nursing as available.  Plan of Care reviewed  with patient and notified of changes.  8/23 the patient is alert and x4, he is currently no pain, he is resting without difficulty, he does engage in self proning, his heart rate is in the 80s to 90s, blood pressure between 90 and 110 systolic, afebrile, he is eating well, continues on full dose Lovenox with a history of extremity DVT, oxygenation sufficient at 25 L and 60%, replacing electrolytes,  Later in the morning the patient down titrating to 20 L and 40% FiO2.    8/24  Patient states that he feels okay.  He is on 40 L high flow nasal cannula, SPO2 92%.  Self proning.  Blood pressure has been on the lower side, 89/54.  He was given 250 cc of saline overnight.  We will hold Lasix.  Left upper extremity DVT study showed nonocclusive left brachial DVT.    Is  borderline low, but patient is asymptomatic, denies dizziness.    Will increase dose of midodrine to 10 mg 3 times daily, resume Lasix with holding parameters.    8/26  Patient stated that breathing is subjectively better.  His O2 requirement is 10 L oxygen mask SPO2 94%.  Blood pressure remains on the lower side 91/51, but he denies dizziness.  Diuresing with holding parameters.    8/27  Patient continues feeling better.  He ambulates to the bathroom and back with transient worsening of hypoxia, but without significant dyspnea.  He uses 9 L of oxygen mask, SPO2 91%.  Tolerates diuresis with borderline low blood pressure, asymptomatic.  Diabetic educator input noted.    8/28  93% SPO2 on 8 L oxygen mask today.  Patient denies overnight changes.  Will increase dose of Lasix to 40 mg twice daily p.o. with holding parameters.  Repeat procalcitonin is low    8/29 patient denies overnight events.  Attempting to wean down oxygen further, currently on 6 L oxygen mask.    I have personally seen and examined the patient at bedside. I discussed the plan of care with patient.  Critical care physician    Consultants/Specialty  Critical care physician    Code  Status  Full Code    Disposition  Patient is not medically cleared.   Anticipate discharge to to home with close outpatient follow-up.  I have placed the appropriate orders for post-discharge needs.    Review of Systems  Review of Systems   Constitutional: Positive for malaise/fatigue. Negative for chills and fever.   Eyes: Negative for blurred vision and pain.   Respiratory: Positive for cough, sputum production and shortness of breath. Negative for wheezing.    Cardiovascular: Negative for chest pain, palpitations and leg swelling.   Gastrointestinal: Negative for abdominal pain, nausea and vomiting.   Genitourinary: Negative for dysuria and urgency.   Musculoskeletal: Negative for back pain.   Skin: Negative for itching and rash.   Neurological: Negative for dizziness, sensory change, focal weakness and headaches.   Psychiatric/Behavioral: Negative for substance abuse. The patient does not have insomnia.         Physical Exam  Temp:  [36.1 °C (97 °F)-36.8 °C (98.3 °F)] 36.5 °C (97.7 °F)  Pulse:  [78-94] 94  Resp:  [16-19] 18  BP: (100-123)/(51-97) 108/73  SpO2:  [92 %-98 %] 93 %    Physical Exam  Constitutional:       General: He is not in acute distress.     Appearance: He is not ill-appearing.   HENT:      Head: Normocephalic and atraumatic.      Right Ear: External ear normal.      Left Ear: External ear normal.      Mouth/Throat:      Pharynx: No oropharyngeal exudate or posterior oropharyngeal erythema.   Eyes:      Extraocular Movements: Extraocular movements intact.      Pupils: Pupils are equal, round, and reactive to light.   Cardiovascular:      Rate and Rhythm: Normal rate and regular rhythm.      Pulses: Normal pulses.      Heart sounds: Normal heart sounds.   Pulmonary:      Effort: Pulmonary effort is normal. No respiratory distress.      Breath sounds: Rhonchi and rales present. No wheezing.      Comments: B/l lung crackles  Abdominal:      General: Bowel sounds are normal. There is no distension.       Palpations: Abdomen is soft.      Tenderness: There is no abdominal tenderness. There is no guarding.   Musculoskeletal:         General: No swelling or tenderness.      Cervical back: Normal range of motion and neck supple.   Skin:     General: Skin is warm and dry.   Neurological:      General: No focal deficit present.      Mental Status: He is oriented to person, place, and time.      Sensory: No sensory deficit.      Motor: No weakness.   Psychiatric:         Mood and Affect: Mood normal.         Behavior: Behavior normal.         Fluids    Intake/Output Summary (Last 24 hours) at 8/29/2021 1309  Last data filed at 8/29/2021 1126  Gross per 24 hour   Intake 240 ml   Output 1550 ml   Net -1310 ml       Laboratory  Recent Labs     08/27/21  0458 08/29/21  1126   WBC 9.4 10.8   RBC 4.60* 4.69*   HEMOGLOBIN 13.9* 13.9*   HEMATOCRIT 43.8 44.6   MCV 95.2 95.1   MCH 30.2 29.6   MCHC 31.7* 31.2*   RDW 50.1* 49.4   PLATELETCT 390 429   MPV 9.8 9.7     Recent Labs     08/27/21  0458 08/29/21  1126   SODIUM 142 140   POTASSIUM 4.5 4.0   CHLORIDE 104 99   CO2 29 30   GLUCOSE 79 152*   BUN 11 10   CREATININE 0.48* 0.61   CALCIUM 9.5 9.8                   Imaging  US-EXTREMITY VENOUS UPPER UNILAT LEFT   Final Result      DX-CHEST-PORTABLE (1 VIEW)   Final Result      Possible mild increase in severe bilateral Covid pneumonia.      DX-CHEST-PORTABLE (1 VIEW)   Final Result      Moderate to severe multifocal consolidation is stable and compatible with Covid pneumonia      DX-CHEST-PORTABLE (1 VIEW)   Final Result         No significant interval change.      DX-CHEST-PORTABLE (1 VIEW)   Final Result         Right central venous catheter with tip projecting over the expected area of the lower SVC.      POCT BUTTERFLY-GUIDANCE VASCULAR ACCESS   Final Result      DX-CHEST-PORTABLE (1 VIEW)   Final Result      Slight interval progression of BILATERAL airspace disease      EC-ECHOCARDIOGRAM COMPLETE W/ CONT   Final Result       DX-CHEST-PORTABLE (1 VIEW)   Final Result      Stable chest with moderate multifocal groundglass and consolidation compatible with Covid pneumonia      US-EXTREMITY VENOUS UPPER BILAT   Final Result      US-EXTREMITY VENOUS LOWER BILAT   Final Result      DX-CHEST-PORTABLE (1 VIEW)   Final Result         1.  Pulmonary edema and/or infiltrates are identified, which appear somewhat increased since the prior exam.      DX-CHEST-PORTABLE (1 VIEW)   Final Result      Bilateral peripheral pulmonary airspace process consistent with Covid pneumonia           Assessment/Plan  * Pneumonia due to COVID-19 virus- (present on admission)  Assessment & Plan  Patient with overall improving status now after appropriate treatment, steroid completed  Ongoing pulmonary care, oxygen weaning  Proning  Repeat procalcitonin is low    Vitamin D deficiency  Assessment & Plan  Vitamin D level 10  We will start supplementation with ergocalciferol    Hypotension  Assessment & Plan  Increase midodrine to 10 mg 3 times daily  Borderline low cortisol level noted  We will repeat cortisol level tomorrow, will consider ACTH stimulation test  Hold antihypertensive, holding parameters on Lasix  Monitor    Hypomagnesemia  Assessment & Plan  Monitor and replace    Hypokalemia  Assessment & Plan  Monitor and replace    Acute pulmonary edema (HCC)- (present on admission)  Assessment & Plan  Lasix with holding parameters    Acute deep vein thrombosis (DVT) of brachial vein of left upper extremity (HCC)- (present on admission)  Assessment & Plan  This appears to be a very short segment of the brachial vein, on known age  Ultrasound otherwise shows superficial thrombus  8/24 repeat ultrasound showed nonocclusive left brachial DVT.  Transitioned to Xarelto, will plan for 3 months course.    Type 2 diabetes mellitus without complication, without long-term current use of insulin (HCC)- (present on admission)  Assessment & Plan  Reports chronic history of  diabetes and admits to not taking Metformin  Diabetic diet  Insulin sliding scale  Increase glargine to 23 units and lispro 7 units TID-AC, c/w [facility] insulin  A1c 11.3  Will likely need insulin on discharge  Diabetes education was done    Acute respiratory failure with hypoxia (HCC)- (present on admission)  Assessment & Plan  Secondary to Covid pneumonia  Continue oxygen weaning, the patient received full dose and appropriate COVID-19 treatment  Patient is on therapeutic dose of Lovenox for DVT.  Will defer  CTA of pulmonary artery, as it would not change the management  Self proning as tolerated  Lasix with holding parameters  8/25 on 15 L oxygen mask  8/26 10 L oxygen mask  8/27 on 9 L oxygen mask  8/28 on 8 L oxygen mask, SPO2 93%.  8/29 on 6 L oxygen mask SPO2 93%    Morbid obesity with BMI of 40.0-44.9, adult (HCC)- (present on admission)  Assessment & Plan  Contributing to severity of Covid pneumonia hypoxic respiratory failure  Counseling on weight reduction    Lactic acidosis- (present on admission)  Assessment & Plan  Resolved    Hyponatremia- (present on admission)  Assessment & Plan  Resolved, frequent metabolic checks    Heart failure with reduced ejection fraction (HCC)- (present on admission)  Assessment & Plan  Repeat echocardiogram shows ejection fraction of 55%        Coronary artery disease involving native coronary artery of native heart without angina pectoris- (present on admission)  Assessment & Plan  GDMT as tolerated    Tobacco use- (present on admission)  Assessment & Plan  Counseling on tobacco cessation    Essential hypertension- (present on admission)  Assessment & Plan  Titrate medication as tolerated for goal pressures    Stented coronary artery,  bare metal stent X 2- (present on admission)  Assessment & Plan  Plavix and aspirin    Hyperlipemia- (present on admission)  Assessment & Plan  Continue home statin        VTE prophylaxis: Xarelto    I have performed a physical exam and  reviewed and updated ROS and Plan today (8/29/2021). In review of yesterday's note (8/28/2021), there are no changes except as documented above.       Please note that this dictation was created using voice recognition software. I have made every reasonable attempt to correct obvious errors, but I expect that there are errors of grammar and possibly context that I did not discover before finalizing the note.

## 2021-08-30 ENCOUNTER — PATIENT OUTREACH (OUTPATIENT)
Dept: HEALTH INFORMATION MANAGEMENT | Facility: OTHER | Age: 41
End: 2021-08-30

## 2021-08-30 ENCOUNTER — PHARMACY VISIT (OUTPATIENT)
Dept: PHARMACY | Facility: MEDICAL CENTER | Age: 41
End: 2021-08-30
Payer: COMMERCIAL

## 2021-08-30 VITALS
TEMPERATURE: 98.6 F | SYSTOLIC BLOOD PRESSURE: 120 MMHG | DIASTOLIC BLOOD PRESSURE: 77 MMHG | HEIGHT: 65 IN | HEART RATE: 79 BPM | OXYGEN SATURATION: 95 % | RESPIRATION RATE: 18 BRPM | WEIGHT: 241.18 LBS | BODY MASS INDEX: 40.18 KG/M2

## 2021-08-30 LAB
GLUCOSE BLD-MCNC: 101 MG/DL (ref 65–99)
GLUCOSE BLD-MCNC: 120 MG/DL (ref 65–99)
GLUCOSE BLD-MCNC: 80 MG/DL (ref 65–99)

## 2021-08-30 PROCEDURE — 700102 HCHG RX REV CODE 250 W/ 637 OVERRIDE(OP): Performed by: INTERNAL MEDICINE

## 2021-08-30 PROCEDURE — 700105 HCHG RX REV CODE 258: Performed by: STUDENT IN AN ORGANIZED HEALTH CARE EDUCATION/TRAINING PROGRAM

## 2021-08-30 PROCEDURE — A9270 NON-COVERED ITEM OR SERVICE: HCPCS | Performed by: INTERNAL MEDICINE

## 2021-08-30 PROCEDURE — 82962 GLUCOSE BLOOD TEST: CPT | Mod: 91

## 2021-08-30 PROCEDURE — 700102 HCHG RX REV CODE 250 W/ 637 OVERRIDE(OP): Performed by: EMERGENCY MEDICINE

## 2021-08-30 PROCEDURE — 99239 HOSP IP/OBS DSCHRG MGMT >30: CPT | Performed by: INTERNAL MEDICINE

## 2021-08-30 PROCEDURE — 700102 HCHG RX REV CODE 250 W/ 637 OVERRIDE(OP): Performed by: STUDENT IN AN ORGANIZED HEALTH CARE EDUCATION/TRAINING PROGRAM

## 2021-08-30 PROCEDURE — RXMED WILLOW AMBULATORY MEDICATION CHARGE: Performed by: INTERNAL MEDICINE

## 2021-08-30 PROCEDURE — A9270 NON-COVERED ITEM OR SERVICE: HCPCS | Performed by: EMERGENCY MEDICINE

## 2021-08-30 PROCEDURE — A9270 NON-COVERED ITEM OR SERVICE: HCPCS | Performed by: STUDENT IN AN ORGANIZED HEALTH CARE EDUCATION/TRAINING PROGRAM

## 2021-08-30 RX ORDER — SODIUM CHLORIDE 9 MG/ML
250 INJECTION, SOLUTION INTRAVENOUS ONCE
Status: COMPLETED | OUTPATIENT
Start: 2021-08-30 | End: 2021-08-30

## 2021-08-30 RX ORDER — FUROSEMIDE 40 MG/1
40 TABLET ORAL
Status: DISCONTINUED | OUTPATIENT
Start: 2021-08-31 | End: 2021-08-30 | Stop reason: HOSPADM

## 2021-08-30 RX ORDER — FUROSEMIDE 20 MG/1
20 TABLET ORAL DAILY
Qty: 10 TABLET | Refills: 0 | Status: SHIPPED | OUTPATIENT
Start: 2021-08-30 | End: 2021-09-09

## 2021-08-30 RX ADMIN — EZETIMIBE 10 MG: 10 TABLET ORAL at 04:50

## 2021-08-30 RX ADMIN — RIVAROXABAN 15 MG: 15 TABLET, FILM COATED ORAL at 17:46

## 2021-08-30 RX ADMIN — RIVAROXABAN 15 MG: 15 TABLET, FILM COATED ORAL at 08:13

## 2021-08-30 RX ADMIN — SODIUM CHLORIDE 250 ML: 9 INJECTION, SOLUTION INTRAVENOUS at 04:50

## 2021-08-30 RX ADMIN — INSULIN LISPRO 11 UNITS: 100 INJECTION, SOLUTION INTRAVENOUS; SUBCUTANEOUS at 07:38

## 2021-08-30 RX ADMIN — MIDODRINE HYDROCHLORIDE 10 MG: 5 TABLET ORAL at 04:50

## 2021-08-30 RX ADMIN — DOCUSATE SODIUM 50 MG AND SENNOSIDES 8.6 MG 2 TABLET: 8.6; 5 TABLET, FILM COATED ORAL at 04:26

## 2021-08-30 RX ADMIN — CLOPIDOGREL BISULFATE 75 MG: 75 TABLET ORAL at 04:27

## 2021-08-30 RX ADMIN — MIDODRINE HYDROCHLORIDE 10 MG: 5 TABLET ORAL at 12:36

## 2021-08-30 RX ADMIN — INSULIN GLARGINE 60 UNITS: 100 INJECTION, SOLUTION SUBCUTANEOUS at 17:49

## 2021-08-30 RX ADMIN — POTASSIUM BICARBONATE 25 MEQ: 978 TABLET, EFFERVESCENT ORAL at 04:29

## 2021-08-30 RX ADMIN — MIDODRINE HYDROCHLORIDE 10 MG: 5 TABLET ORAL at 17:46

## 2021-08-30 RX ADMIN — INSULIN LISPRO 11 UNITS: 100 INJECTION, SOLUTION INTRAVENOUS; SUBCUTANEOUS at 12:36

## 2021-08-30 RX ADMIN — INSULIN LISPRO 11 UNITS: 100 INJECTION, SOLUTION INTRAVENOUS; SUBCUTANEOUS at 17:49

## 2021-08-30 RX ADMIN — ACETAMINOPHEN 650 MG: 325 TABLET, FILM COATED ORAL at 04:29

## 2021-08-30 RX ADMIN — POTASSIUM BICARBONATE 25 MEQ: 978 TABLET, EFFERVESCENT ORAL at 17:46

## 2021-08-30 RX ADMIN — ROSUVASTATIN CALCIUM 40 MG: 20 TABLET, FILM COATED ORAL at 17:46

## 2021-08-30 NOTE — DISCHARGE PLANNING
Received Choice form at [phone]  Agency/Facility Name: [facility]  Referral sent per Choice form @ [phone]

## 2021-08-30 NOTE — DISCHARGE SUMMARY
Discharge Summary    CHIEF COMPLAINT ON ADMISSION  No chief complaint on file.      Reason for Admission  Covid +, SOB     Admission Date  8/6/2021    CODE STATUS  Full Code    HPI & HOSPITAL COURSE  41-year-old morbidly obese male with past medical history of hypertension, dyslipidemia, multiple STEMIs and NSTEMIs (4 times 2013,2014,2015,2017, 2020) s/p 4 stents, ischemic cardiomyopathy, HFrEF last echocardiogram on 10/2020 with ejection fraction at 20%, grade 1 diastolic dysfunction and Covid positive test 3 days ago presented emergency department on 8/6/2021 with a 1 week history of worsening fever, chills, myalgias, back pain, dry cough and dyspnea at rest and exertion.   On initial presentation patient noted to have sepsis secondary to COVID-19 pneumonia, hypertension, acute respiratory failure, requiring 6 L, lactic acidosis.  Patient received initial fluid resuscitation.  He was treated with a course of Decadron, remdesivir 8/7-8/11, Tocilizumab 8/13, ceftriaxone and azithromycin 8/7-8/11  The patient   required to be transferred to ICU on 8/13 for persistent hypoxemia and max high flow nasal cannula.  The patient was treated with Decadron, was found to have a upper extremity DVT which was anticoagulated.  On 817 the patient required intermittent BiPAP treatment, had some intermittent difficulty with maintaining his blood pressure and was started on pressors, norepinephrine.  Forced diuresis was continued as tolerated.  On 8/23 the patient appeared overall improved, he is currently felt stabilized to leave the ICU.    For DVT patient was treated with Lovenox and transition to Xarelto.  Recommended to finish 3 months course.  Gradually patient's oxygen requirements weaned down to 4 L nasal cannula, when he felt to be stable for discharge.  Home O2 evaluation ordered.  For uncontrolled diabetes patient was treated with short and long-acting insulin.  Insulin and diabetic supply was prescribed.  Diabetic  education was provided to the patient.  Recommended to follow-up his blood pressure and restart blood pressure and cardiac medications when appropriate.    Therefore, he is discharged in good and stable condition to home with close outpatient follow-up.    The patient met 2-midnight criteria for an inpatient stay at the time of discharge.    Discharge Date  8/30/2021    FOLLOW UP ITEMS POST DISCHARGE  PCP      DISCHARGE DIAGNOSES  Principal Problem:    Pneumonia due to COVID-19 virus POA: Yes  Active Problems:    Hyperlipemia POA: Yes    Stented coronary artery,  bare metal stent X 2 POA: Yes      Overview: To [facility] 10/2014    Essential hypertension POA: Yes    Tobacco use POA: Yes    Coronary artery disease involving native coronary artery of native heart without angina pectoris POA: Yes    Heart failure with reduced ejection fraction (HCC) POA: Yes    Hyponatremia POA: Yes    Lactic acidosis POA: Yes    Morbid obesity with BMI of 40.0-44.9, adult (HCC) POA: Yes    Acute respiratory failure with hypoxia (HCC) POA: Yes    Type 2 diabetes mellitus without complication, without long-term current use of insulin (HCC) POA: Yes    Acute deep vein thrombosis (DVT) of brachial vein of left upper extremity (HCC) POA: Yes    Acute pulmonary edema (HCC) POA: Yes    Hypokalemia POA: No    Hypomagnesemia POA: Unknown    Hypotension POA: Unknown    Vitamin D deficiency POA: Unknown  Resolved Problems:    Sepsis (HCC) POA: Yes      FOLLOW UP  Future Appointments   Date Time Provider Department Center   9/3/2021  1:45 PM Patel Dunbar M.D. [facility] None     No follow-up provider specified.    MEDICATIONS ON DISCHARGE     Medication List      START taking these medications      Instructions   Alcohol Swabs   Doctor's comments: Per formulary preference. ICD-10 code: E10.65 - Uncontrolled type 1 Diabetes Mellitus  Wipe site with prep pad prior to injection.     Blood Glucose Monitor System w/Device Kit   Doctor's comments: Or per formulary  "preference. ICD-10 code: E10.65 - Uncontrolled type 1 Diabetes Mellitus  Use to test blood sugar as directed by provider     furosemide 20 MG Tabs  Commonly known as: LASIX   Take 1 Tablet by mouth every day for 10 days.  Dose: 20 mg     glucose blood strip   Doctor's comments: Or per formulary preference. ICD-10 code: E10.65 - Uncontrolled type 1 Diabetes Mellitus  Test 3 times daily before meals.     insulin glargine 100 UNIT/ML Soln  Commonly known as: Lantus   Inject 60 Units under the skin every evening.  Dose: 60 Units     insulin lispro 100 UNIT/ML  Commonly known as: HumaLOG   Doctor's comments: 70   - 150  mg/dL =    0 Units  151 - 200  mg/dL =    3 Units  201 - 250  mg/dL =    4 Units  251 - 300  mg/dL  =   7 Units  301 - 350  mg/dL  =   10 Units  351 - 400 mg/dL   =   12 Units  Over 400 mg/dL   =   14 Units  Inject 3-14 Units under the skin 3 times a day before meals.  70   - 150  mg/dL =    0 Units  151 - 200  mg/dL =    3 Units  201 - 250  mg/dL =    4 Units  251 - 300  mg/dL  =   7 Units  301 - 350  mg/dL  =   10 Units  351 - 400 mg/dL   =   12 Units  Over 400 mg/dL   =   14 Units  Dose: 3-14 Units     Insulin Syringe-Needle U-100 31G X 5/16\" 0.3 ML Misc  Commonly known as: BD Insulin Syringe U/F   Doctor's comments: Per formulary preference. ICD-10 code: E10.65 - Uncontrolled type 1 Diabetes Mellitus  Use to inject insulin 4 times daily.     Lancets   Doctor's comments: Or per formulary preference. ICD-10 code: E10.65 - Uncontrolled type 1 Diabetes Mellitus  Use one True Metrix lancet to test blood sugar three times daily before meals.     midodrine 10 MG tablet  Commonly known as: PROAMATINE   Take 1 Tablet by mouth 3 times a day with meals.  Dose: 10 mg     omeprazole 20 MG delayed-release capsule  Commonly known as: PRILOSEC   Take 1 Capsule by mouth every day.  Dose: 20 mg     potassium bicarbonate 25 MEQ tablet  Commonly known as: KLYTE   Dissolve 1 Tablet in 4oz. of cold water and drink once " gertrude.  Dose: 25 mEq     * rivaroxaban 15 MG Tabs tablet  Commonly known as: XARELTO   Take 1 Tablet by mouth 2 times a day with meals for 17 days.  Dose: 15 mg     * rivaroxaban 20 MG Tabs tablet  Start taking on: September 15, 2021  Commonly known as: XARELTO   Take 1 Tablet by mouth with dinner.  Dose: 20 mg     vitamin D (Ergocalciferol) 1.25 MG (20532 UT) Caps capsule  Start taking on: September 1, 2021  Commonly known as: DRISDOL   Take 1 Capsule by mouth every 7 days.  Dose: 50,000 Units         * This list has 2 medication(s) that are the same as other medications prescribed for you. Read the directions carefully, and ask your doctor or other care provider to review them with you.            CONTINUE taking these medications      Instructions   acetaminophen 500 MG Tabs  Commonly known as: TYLENOL   Take 1,000 mg by mouth every 6 hours as needed for Mild Pain or Fever. 2 tablets = 1,000 mg.  Dose: 1,000 mg     aspirin EC 81 MG Tbec  Commonly known as: ECOTRIN   Take 1 Tab by mouth every day.  Dose: 81 mg     clopidogrel 75 MG Tabs  Commonly known as: PLAVIX   Take 1 tablet by mouth every day.  Dose: 75 mg     ezetimibe 10 MG Tabs  Commonly known as: ZETIA   Take 1 tablet by mouth every day.  Dose: 10 mg     fenofibrate 48 MG Tabs  Commonly known as: TRICOR   Take 48 mg by mouth every day.  Dose: 48 mg     guaiFENesin  MG Tb12  Commonly known as: MUCINEX   Take 600 mg by mouth every 12 hours as needed for Cough.  Dose: 600 mg     NyQuil HBP Cold & Flu 15-6. MG/15ML Liqd  Generic drug: DM-Doxylamine-Acetaminophen   Take 30 mL by mouth every 6 hours as needed (Sleep, Mild Pain, Fever, Cough).  Dose: 30 mL     rosuvastatin 40 MG tablet  Commonly known as: CRESTOR   Take 1 tablet by mouth every evening.  Dose: 40 mg        STOP taking these medications    losartan 50 MG Tabs  Commonly known as: COZAAR     metoprolol SR 25 MG Tb24  Commonly known as: TOPROL XL     nitroglycerin 0.4 MG Subl  Commonly  known as: NITROSTAT     spironolactone 25 MG Tabs  Commonly known as: ALDACTONE            Allergies  No Known Allergies    DIET  Orders Placed This Encounter   Procedures   • Diet Order Diet: Consistent CHO (Diabetic); Fluid modifications: (optional): 1200 ml Fluid Restriction     Standing Status:   Standing     Number of Occurrences:   1     Order Specific Question:   Diet:     Answer:   Consistent CHO (Diabetic) [4]     Order Specific Question:   Fluid modifications: (optional)     Answer:   1200 ml Fluid Restriction [8]       ACTIVITY  As tolerated.  Weight bearing as tolerated    CONSULTATIONS  None    PROCEDURES  None    LABORATORY  Lab Results   Component Value Date    SODIUM 140 08/29/2021    POTASSIUM 4.0 08/29/2021    CHLORIDE 99 08/29/2021    CO2 30 08/29/2021    GLUCOSE 152 (H) 08/29/2021    BUN 10 08/29/2021    CREATININE 0.61 08/29/2021        Lab Results   Component Value Date    WBC 10.8 08/29/2021    HEMOGLOBIN 13.9 (L) 08/29/2021    HEMATOCRIT 44.6 08/29/2021    PLATELETCT 429 08/29/2021      US-EXTREMITY VENOUS UPPER UNILAT LEFT   Final Result      DX-CHEST-PORTABLE (1 VIEW)   Final Result      Possible mild increase in severe bilateral Covid pneumonia.      DX-CHEST-PORTABLE (1 VIEW)   Final Result      Moderate to severe multifocal consolidation is stable and compatible with Covid pneumonia      DX-CHEST-PORTABLE (1 VIEW)   Final Result         No significant interval change.      DX-CHEST-PORTABLE (1 VIEW)   Final Result         Right central venous catheter with tip projecting over the expected area of the lower SVC.      POCT BUTTERFLY-GUIDANCE VASCULAR ACCESS   Final Result      DX-CHEST-PORTABLE (1 VIEW)   Final Result      Slight interval progression of BILATERAL airspace disease      EC-ECHOCARDIOGRAM COMPLETE W/ CONT   Final Result      DX-CHEST-PORTABLE (1 VIEW)   Final Result      Stable chest with moderate multifocal groundglass and consolidation compatible with Covid pneumonia       US-EXTREMITY VENOUS UPPER BILAT   Final Result      US-EXTREMITY VENOUS LOWER BILAT   Final Result      DX-CHEST-PORTABLE (1 VIEW)   Final Result         1.  Pulmonary edema and/or infiltrates are identified, which appear somewhat increased since the prior exam.      DX-CHEST-PORTABLE (1 VIEW)   Final Result      Bilateral peripheral pulmonary airspace process consistent with Covid pneumonia            Total time of the discharge process exceeds 36 minutes.

## 2021-08-30 NOTE — DISCHARGE PLANNING
Anticipated Discharge Disposition:   Home with home oxygen, remote home monitoring, Meds to Beds    Action:   Discussed discharge planning needs during rounds. Per MD, pt is medically cleared for discharge, pending home oxygen and remote home monitoring.    Voalte message sent to RT about remote home monitoring order.     RN ADEOLA spoke to pt via phone. Discussed discharge planning. Pt lives alone in a trailer in [place]. Pt's significant other will pick him up at discharge. Pt has Access to Healthcare, and pending Medicaid. Discussed need for home oxygen set up. Per pt, he cannot afford oxygen right now but maybe next week. [employer] will be sued for the first month, and pt agreed to pay for the following months, $120 per month.     Sent Approved Service form to [facility]. Received signed form, faxed to DPA.    Barriers to Discharge:   DME home oxygen acceptance and equipment delivery.  Remote home monitoring set up.    Plan:   Hospital Care Management will continue to follow and assist with discharge planning needs.    Addendum:  Per DPA, [facility] accepted pt. Pending home oxygen delivery. Orange paper given to bedside RN to give to pt with DME company contact information for equipment delivery, as needed.

## 2021-08-30 NOTE — DISCHARGE INSTRUCTIONS
COVID-19  COVID-19 is a respiratory infection that is caused by a virus called severe acute respiratory syndrome coronavirus 2 (SARS-CoV-2). The disease is also known as coronavirus disease or novel coronavirus. In some people, the virus may not cause any symptoms. In others, it may cause a serious infection. The infection can get worse quickly and can lead to complications, such as:  · Pneumonia, or infection of the lungs.  · Acute respiratory distress syndrome or ARDS. This is fluid build-up in the lungs.  · Acute respiratory failure. This is a condition in which there is not enough oxygen passing from the lungs to the body.  · Sepsis or septic shock. This is a serious bodily reaction to an infection.  · Blood clotting problems.  · Secondary infections due to bacteria or fungus.  The virus that causes COVID-19 is contagious. This means that it can spread from person to person through droplets from coughs and sneezes (respiratory secretions).  What are the causes?  This illness is caused by a virus. You may catch the virus by:  · Breathing in droplets from an infected person's cough or sneeze.  · Touching something, like a table or a doorknob, that was exposed to the virus (contaminated) and then touching your mouth, nose, or eyes.  What increases the risk?  Risk for infection  You are more likely to be infected with this virus if you:  · Live in or travel to an area with a COVID-19 outbreak.  · Come in contact with a sick person who recently traveled to an area with a COVID-19 outbreak.  · Provide care for or live with a person who is infected with COVID-19.  Risk for serious illness  You are more likely to become seriously ill from the virus if you:  · Are 65 years of age or older.  · Have a long-term disease that lowers your body's ability to fight infection (immunocompromised).  · Live in a nursing home or long-term care facility.  · Have a long-term (chronic) disease such as:  ? Chronic lung disease, including  chronic obstructive pulmonary disease or asthma  ? Heart disease.  ? Diabetes.  ? Chronic kidney disease.  ? Liver disease.  · Are obese.  What are the signs or symptoms?  Symptoms of this condition can range from mild to severe. Symptoms may appear any time from 2 to 14 days after being exposed to the virus. They include:  · A fever.  · A cough.  · Difficulty breathing.  · Chills.  · Muscle pains.  · A sore throat.  · Loss of taste or smell.  Some people may also have stomach problems, such as nausea, vomiting, or diarrhea.  Other people may not have any symptoms of COVID-19.  How is this diagnosed?  This condition may be diagnosed based on:  · Your signs and symptoms, especially if:  ? You live in an area with a COVID-19 outbreak.  ? You recently traveled to or from an area where the virus is common.  ? You provide care for or live with a person who was diagnosed with COVID-19.  · A physical exam.  · Lab tests, which may include:  ? A nasal swab to take a sample of fluid from your nose.  ? A throat swab to take a sample of fluid from your throat.  ? A sample of mucus from your lungs (sputum).  ? Blood tests.  · Imaging tests, which may include, X-rays, CT scan, or ultrasound.  How is this treated?  At present, there is no medicine to treat COVID-19. Medicines that treat other diseases are being used on a trial basis to see if they are effective against COVID-19.  Your health care provider will talk with you about ways to treat your symptoms. For most people, the infection is mild and can be managed at home with rest, fluids, and over-the-counter medicines.  Treatment for a serious infection usually takes places in a hospital intensive care unit (ICU). It may include one or more of the following treatments. These treatments are given until your symptoms improve.  · Receiving fluids and medicines through an IV.  · Supplemental oxygen. Extra oxygen is given through a tube in the nose, a face mask, or a  smith.  · Positioning you to lie on your stomach (prone position). This makes it easier for oxygen to get into the lungs.  · Continuous positive airway pressure (CPAP) or bi-level positive airway pressure (BPAP) machine. This treatment uses mild air pressure to keep the airways open. A tube that is connected to a motor delivers oxygen to the body.  · Ventilator. This treatment moves air into and out of the lungs by using a tube that is placed in your windpipe.  · Tracheostomy. This is a procedure to create a hole in the neck so that a breathing tube can be inserted.  · Extracorporeal membrane oxygenation (ECMO). This procedure gives the lungs a chance to recover by taking over the functions of the heart and lungs. It supplies oxygen to the body and removes carbon dioxide.  Follow these instructions at home:  Lifestyle  · If you are sick, stay home except to get medical care. Your health care provider will tell you how long to stay home. Call your health care provider before you go for medical care.  · Rest at home as told by your health care provider.  · Do not use any products that contain nicotine or tobacco, such as cigarettes, e-cigarettes, and chewing tobacco. If you need help quitting, ask your health care provider.  · Return to your normal activities as told by your health care provider. Ask your health care provider what activities are safe for you.  General instructions  · Take over-the-counter and prescription medicines only as told by your health care provider.  · Drink enough fluid to keep your urine pale yellow.  · Keep all follow-up visits as told by your health care provider. This is important.  How is this prevented?    There is no vaccine to help prevent COVID-19 infection. However, there are steps you can take to protect yourself and others from this virus.  To protect yourself:   · Do not travel to areas where COVID-19 is a risk. The areas where COVID-19 is reported change often. To identify  high-risk areas and travel restrictions, check the [facility] travel website: wwwnc.cdc.gov/travel/notices  · If you live in, or must travel to, an area where COVID-19 is a risk, take precautions to avoid infection.  ? Stay away from people who are sick.  ? Wash your hands often with soap and water for 20 seconds. If soap and water are not available, use an alcohol-based hand .  ? Avoid touching your mouth, face, eyes, or nose.  ? Avoid going out in public, follow guidance from your state and local health authorities.  ? If you must go out in public, wear a cloth face covering or face mask.  ? Disinfect objects and surfaces that are frequently touched every day. This may include:  § Counters and tables.  § Doorknobs and light switches.  § Sinks and faucets.  § Electronics, such as phones, remote controls, keyboards, computers, and tablets.  To protect others:  If you have symptoms of COVID-19, take steps to prevent the virus from spreading to others.  · If you think you have a COVID-19 infection, contact your health care provider right away. Tell your health care team that you think you may have a COVID-19 infection.  · Stay home. Leave your house only to seek medical care. Do not use public transport.  · Do not travel while you are sick.  · Wash your hands often with soap and water for 20 seconds. If soap and water are not available, use alcohol-based hand .  · Stay away from other members of your household. Let healthy household members care for children and pets, if possible. If you have to care for children or pets, wash your hands often and wear a mask. If possible, stay in your own room, separate from others. Use a different bathroom.  · Make sure that all people in your household wash their hands well and often.  · Cough or sneeze into a tissue or your sleeve or elbow. Do not cough or sneeze into your hand or into the air.  · Wear a cloth face covering or face mask.  Where to find more  information  · Centers for Disease Control and Prevention: www.cdc.gov/coronavirus/2019-ncov/index.html  · World Health Organization: www.who.int/health-topics/coronavirus  Contact a health care provider if:  · You live in or have traveled to an area where COVID-19 is a risk and you have symptoms of the infection.  · You have had contact with someone who has COVID-19 and you have symptoms of the infection.  Get help right away if:  · You have trouble breathing.  · You have pain or pressure in your chest.  · You have confusion.  · You have bluish lips and fingernails.  · You have difficulty waking from sleep.  · You have symptoms that get worse.  These symptoms may represent a serious problem that is an emergency. Do not wait to see if the symptoms will go away. Get medical help right away. Call your local emergency services (911 in the [place] Do not drive yourself to the hospital. Let the emergency medical personnel know if you think you have COVID-19.  Summary  · COVID-19 is a respiratory infection that is caused by a virus. It is also known as coronavirus disease or novel coronavirus. It can cause serious infections, such as pneumonia, acute respiratory distress syndrome, acute respiratory failure, or sepsis.  · The virus that causes COVID-19 is contagious. This means that it can spread from person to person through droplets from coughs and sneezes.  · You are more likely to develop a serious illness if you are 65 years of age or older, have a weak immunity, live in a nursing home, or have chronic disease.  · There is no medicine to treat COVID-19. Your health care provider will talk with you about ways to treat your symptoms.  · Take steps to protect yourself and others from infection. Wash your hands often and disinfect objects and surfaces that are frequently touched every day. Stay away from people who are sick and wear a mask if you are sick.  This information is not intended to replace advice given to you by  your health care provider. Make sure you discuss any questions you have with your health care provider.  Document Released: 01/23/2020 Document Revised: 05/14/2020 Document Reviewed: 01/23/2020  Elsevier Patient Education © 2020 [employer]    Discharge Instructions    Discharged to home by car with relative. Discharged via wheelchair, hospital escort: Yes.  Special equipment needed: Oxygen    Be sure to schedule a follow-up appointment with your primary care doctor or any specialists as instructed.     Discharge Plan:   Diet Plan: Discussed  Activity Level: Discussed  Confirmed Follow up Appointment: Patient to Call and Schedule Appointment  Confirmed Symptoms Management: Discussed  Medication Reconciliation Updated: Yes    I understand that a diet low in cholesterol, fat, and sodium is recommended for good health. Unless I have been given specific instructions below for another diet, I accept this instruction as my diet prescription.   Other diet: Regular/ Cardiac    Special Instructions: None    · Is patient discharged on Warfarin / Coumadin?   No     Depression / Suicide Risk    As you are discharged from this [facility] facility, it is important to learn how to keep safe from harming yourself.    Recognize the warning signs:  · Abrupt changes in personality, positive or negative- including increase in energy   · Giving away possessions  · Change in eating patterns- significant weight changes-  positive or negative  · Change in sleeping patterns- unable to sleep or sleeping all the time   · Unwillingness or inability to communicate  · Depression  · Unusual sadness, discouragement and loneliness  · Talk of wanting to die  · Neglect of personal appearance   · Rebelliousness- reckless behavior  · Withdrawal from people/activities they love  · Confusion- inability to concentrate     If you or a loved one observes any of these behaviors or has concerns about self-harm, here's what you can do:  · Talk about it-  your feelings and reasons for harming yourself  · Remove any means that you might use to hurt yourself (examples: pills, rope, extension cords, firearm)  · Get professional help from the community (Mental Health, Substance Abuse, psychological counseling)  · Do not be alone:Call your Safe Contact- someone whom you trust who will be there for you.  · Call your local CRISIS HOTLINE 230-1713 or 884-578-0066  · Call your local Children's Mobile Crisis Response Team [place] (516) 327-1682 or www.[employer]  · Call the toll free National Suicide Prevention Hotlines   · National Suicide Prevention Lifeline 075-139-NPAU (1303)  · National Hope Line Network 800-SUICIDE (026-5415)

## 2021-08-30 NOTE — DISCHARGE PLANNING
Agency/Facility Name: [facility]  Spoke To: Sherley  Outcome: Sherley stated that Approved Services was received and o2 should be delivered within an hour    @[id number]  Agency/Facility Name: [facility]  Spoke To: Kathy  Outcome: Kathy stated that she is unable to reach  for ETA. Kathy believes  is on the road at this time and requested this DPA to call back shortly for an exact DPA

## 2021-08-30 NOTE — FACE TO FACE
"Face to Face Note  -  Durable Medical Equipment    Flaquito Juarez M.D. - NPI: [id number]  I certify that this patient is under my care and that they had a durable medical equipment(DME)face to face encounter by myself that meets the physician DME face-to-face encounter requirements with this patient on:    Date of encounter:   Patient:                    MRN:                       YOB: 2020  Kyle Gonzalez  [id number]  1980     The encounter with the patient was in whole, or in part, for the following medical condition, which is the primary reason for durable medical equipment:  Covid-19 Infection    I certify that, based on my findings, the following durable medical equipment is medically necessary:  Oxygen.    HOME O2 Saturation Measurements:(Values must be present for Home Oxygen orders)  Room air sat at rest: 81  Room air sat with amb: 78  With liters of O2: 4, O2 sat at rest with O2: 94  With Liters of O2: 4, O2 sat with amb with O2 : 91  Is the patient mobile?: Yes    My Clinical findings support the need for the above equipment due to:  Hypoxia    Supporting Symptoms: The patient requires supplemental oxygen, as the following interventions have been tried with limited or no improvement: \"Oral and/or IV steroids    If patient feels more short of breath, they can go up to 6 liters per minute and contact healthcare provider.  "

## 2021-08-30 NOTE — RESPIRATORY CARE
REMOTE MONITORING PROGRAM by RESPIRATORY THERAPY  8/30/2021 at 2:57 PM by MALLIKA Huddleston     Patient interviewed by RT. Patient's phone was not compatible with the applications required to run the program. RN Case Manager notified.

## 2021-08-30 NOTE — CARE PLAN
Problem: Knowledge Deficit - Standard  Goal: Patient and family/care givers will demonstrate understanding of plan of care, disease process/condition, diagnostic tests and medications  Outcome: Progressing     Problem: Respiratory  Goal: Patient will achieve/maintain optimum respiratory ventilation and gas exchange  Outcome: Progressing     The patient is Stable - Low risk of patient condition declining or worsening    Shift Goals  Clinical Goals: improve respiratory status, wean oxygen   Patient Goals: breathe better  Family Goals: n/a    Progress made toward(s) clinical / shift goals:  monitor blood sugar     Patient is not progressing towards the following goals:

## 2021-08-31 ENCOUNTER — APPOINTMENT (OUTPATIENT)
Dept: CARDIOLOGY | Facility: MEDICAL CENTER | Age: 41
End: 2021-08-31
Attending: INTERNAL MEDICINE
Payer: MEDICAID

## 2021-08-31 NOTE — PROGRESS NOTES
Discussed discharge instruction with patient and family, including medication details and f/u apt. No questions at the moment. Tolerated IV removal. Meds to Beds delivered and discussed with patient. Oxygen tank and concentrator delivered. Patient escorted out in wheelchair with staff.

## 2021-08-31 NOTE — DISCHARGE PLANNING
"Meds-to-Beds: Discharge prescription orders listed below delivered to patient's bedside. SARIKA Baker notified. Patient counseled via telephone consult. Patient elected to have co-payment billed to patient account.     Reviewed signs and symptoms of bleeding and when to seek medical attention. Reviewed potential drug-drug interactions.    Current Outpatient Medications   Medication Sig Dispense Refill   • furosemide (LASIX) 20 MG Tab Take 1 Tablet by mouth every day for 10 days. 10 Tablet 0   • rivaroxaban (XARELTO) 15 MG Tab tablet Take 1 Tablet by mouth 2 times a day with meals for 17 days. 34 Tablet 0   • [START ON 9/15/2021] rivaroxaban (XARELTO) 20 MG Tab tablet Take 1 Tablet by mouth with dinner. 30 Tablet 2   • [START ON 9/1/2021] vitamin D, Ergocalciferol, (DRISDOL) 1.25 MG (19955 UT) Cap capsule Take 1 Capsule by mouth every 7 days. 5 Capsule 0   • midodrine (PROAMATINE) 10 MG tablet Take 1 Tablet by mouth 3 times a day with meals. 60 Tablet 0   • omeprazole (PRILOSEC) 20 MG delayed-release capsule Take 1 Capsule by mouth every day. 30 Capsule 0   • potassium bicarbonate (KLYTE) 25 MEQ tablet Dissolve 1 Tablet in 4oz. of cold water and drink once daliy. 10 Tablet 0   • Blood Glucose Monitoring Suppl (TRUE METRIX) w/Device Kit Use to test blood sugar as directed by provider 1 Kit 0   • TRUE METRIX glucose blood strip Test 3 times daily before meals. 100 Strip 0   • Lancets Use one True Metrix lancet to test blood sugar three times daily before meals. 100 Each 0   • Alcohol Swabs Wipe site with prep pad prior to injection. 100 Each 0   • Insulin Syringe-Needle U-100 (BD INSULIN SYRINGE U/F) 31G X 5/16\" 0.3 ML Misc Use to inject insulin 4 times daily. 100 Each 0   • insulin glargine (LANTUS) 100 UNIT/ML Solution Inject 60 Units under the skin every evening. 10 mL 1   • insulin lispro (HUMALOG) 100 UNIT/ML Inject 3-14 Units under the skin 3 times a day before meals.  70   - 150  mg/dL =    0 Units  151 - 200  mg/dL " =    3 Units  201 - 250  mg/dL =    4 Units  251 - 300  mg/dL  =   7 Units  301 - 350  mg/dL  =   10 Units  351 - 400 mg/dL   =   12 Units  Over 400 mg/dL   =   14 Units 10 mL 1        Kami Tapia, PharmD

## 2021-08-31 NOTE — PROGRESS NOTES
Diabetes education: Spoke with pt this afternoon. Pt stated he has been giving his insulin and doing finger sticks with nursing. Pt received book and handouts from Thursday. Questions answered.

## 2021-08-31 NOTE — INFECTION CONTROL
This patient is considered COVID RECOVERED.    Patient initially tested positive for COVID on 8/3/2021.  Patient is greater than or equal to 21 days from symptom onset and/or positive test, with symptom improvement.  Per the CDC guidance, this patient no longer requires transmission based precautions.  Patient may be placed on any unit per the bed assignment policy, including placement in a semi-private room with a roommate.

## 2021-12-06 DIAGNOSIS — E78.2 MIXED HYPERLIPIDEMIA: ICD-10-CM

## 2021-12-06 DIAGNOSIS — E78.01 FAMILIAL HYPERCHOLESTEROLEMIA: ICD-10-CM

## 2021-12-07 ENCOUNTER — HOSPITAL ENCOUNTER (INPATIENT)
Facility: MEDICAL CENTER | Age: 41
LOS: 12 days | DRG: 281 | End: 2021-12-19
Attending: HOSPITALIST | Admitting: STUDENT IN AN ORGANIZED HEALTH CARE EDUCATION/TRAINING PROGRAM
Payer: MEDICAID

## 2021-12-07 DIAGNOSIS — I24.9 ACS (ACUTE CORONARY SYNDROME) (HCC): ICD-10-CM

## 2021-12-07 DIAGNOSIS — I21.4 NSTEMI (NON-ST ELEVATED MYOCARDIAL INFARCTION) (HCC): ICD-10-CM

## 2021-12-07 DIAGNOSIS — I25.10 MULTI-VESSEL CORONARY ARTERY STENOSIS: ICD-10-CM

## 2021-12-07 PROBLEM — Z86.718 HISTORY OF DVT IN ADULTHOOD: Status: ACTIVE | Noted: 2021-12-07

## 2021-12-07 LAB
ANION GAP SERPL CALC-SCNC: 13 MMOL/L (ref 7–16)
APTT PPP: 27.3 SEC (ref 24.7–36)
BASOPHILS # BLD AUTO: 0.6 % (ref 0–1.8)
BASOPHILS # BLD: 0.04 K/UL (ref 0–0.12)
BUN SERPL-MCNC: 17 MG/DL (ref 8–22)
CALCIUM SERPL-MCNC: 9.6 MG/DL (ref 8.5–10.5)
CHLORIDE SERPL-SCNC: 103 MMOL/L (ref 96–112)
CHOLEST SERPL-MCNC: 265 MG/DL (ref 100–199)
CO2 SERPL-SCNC: 20 MMOL/L (ref 20–33)
CREAT SERPL-MCNC: 0.6 MG/DL (ref 0.5–1.4)
EKG IMPRESSION: NORMAL
EOSINOPHIL # BLD AUTO: 0.11 K/UL (ref 0–0.51)
EOSINOPHIL NFR BLD: 1.5 % (ref 0–6.9)
ERYTHROCYTE [DISTWIDTH] IN BLOOD BY AUTOMATED COUNT: 44.2 FL (ref 35.9–50)
EST. AVERAGE GLUCOSE BLD GHB EST-MCNC: 212 MG/DL
GLUCOSE SERPL-MCNC: 137 MG/DL (ref 65–99)
HBA1C MFR BLD: 9 % (ref 4–5.6)
HCT VFR BLD AUTO: 45.1 % (ref 42–52)
HDLC SERPL-MCNC: 57 MG/DL
HGB BLD-MCNC: 15.2 G/DL (ref 14–18)
IMM GRANULOCYTES # BLD AUTO: 0.02 K/UL (ref 0–0.11)
IMM GRANULOCYTES NFR BLD AUTO: 0.3 % (ref 0–0.9)
INR PPP: 0.91 (ref 0.87–1.13)
LDLC SERPL CALC-MCNC: 155 MG/DL
LYMPHOCYTES # BLD AUTO: 3.03 K/UL (ref 1–4.8)
LYMPHOCYTES NFR BLD: 41.9 % (ref 22–41)
MAGNESIUM SERPL-MCNC: 2 MG/DL (ref 1.5–2.5)
MCH RBC QN AUTO: 30 PG (ref 27–33)
MCHC RBC AUTO-ENTMCNC: 33.7 G/DL (ref 33.7–35.3)
MCV RBC AUTO: 89.1 FL (ref 81.4–97.8)
MONOCYTES # BLD AUTO: 0.67 K/UL (ref 0–0.85)
MONOCYTES NFR BLD AUTO: 9.3 % (ref 0–13.4)
NEUTROPHILS # BLD AUTO: 3.37 K/UL (ref 1.82–7.42)
NEUTROPHILS NFR BLD: 46.4 % (ref 44–72)
NRBC # BLD AUTO: 0 K/UL
NRBC BLD-RTO: 0 /100 WBC
PLATELET # BLD AUTO: 342 K/UL (ref 164–446)
PMV BLD AUTO: 9.7 FL (ref 9–12.9)
POTASSIUM SERPL-SCNC: 3.9 MMOL/L (ref 3.6–5.5)
PROTHROMBIN TIME: 12 SEC (ref 12–14.6)
RBC # BLD AUTO: 5.06 M/UL (ref 4.7–6.1)
SODIUM SERPL-SCNC: 136 MMOL/L (ref 135–145)
TRIGL SERPL-MCNC: 267 MG/DL (ref 0–149)
TROPONIN T SERPL-MCNC: 96 NG/L (ref 6–19)
UFH PPP CHRO-ACNC: 0.17 IU/ML
WBC # BLD AUTO: 7.2 K/UL (ref 4.8–10.8)

## 2021-12-07 PROCEDURE — 85610 PROTHROMBIN TIME: CPT

## 2021-12-07 PROCEDURE — 700102 HCHG RX REV CODE 250 W/ 637 OVERRIDE(OP): Performed by: STUDENT IN AN ORGANIZED HEALTH CARE EDUCATION/TRAINING PROGRAM

## 2021-12-07 PROCEDURE — 82962 GLUCOSE BLOOD TEST: CPT

## 2021-12-07 PROCEDURE — 85025 COMPLETE CBC W/AUTO DIFF WBC: CPT

## 2021-12-07 PROCEDURE — 80061 LIPID PANEL: CPT

## 2021-12-07 PROCEDURE — 84484 ASSAY OF TROPONIN QUANT: CPT

## 2021-12-07 PROCEDURE — 85730 THROMBOPLASTIN TIME PARTIAL: CPT

## 2021-12-07 PROCEDURE — 93010 ELECTROCARDIOGRAM REPORT: CPT | Performed by: INTERNAL MEDICINE

## 2021-12-07 PROCEDURE — 85520 HEPARIN ASSAY: CPT

## 2021-12-07 PROCEDURE — 83735 ASSAY OF MAGNESIUM: CPT

## 2021-12-07 PROCEDURE — 83036 HEMOGLOBIN GLYCOSYLATED A1C: CPT

## 2021-12-07 PROCEDURE — 93005 ELECTROCARDIOGRAM TRACING: CPT | Performed by: STUDENT IN AN ORGANIZED HEALTH CARE EDUCATION/TRAINING PROGRAM

## 2021-12-07 PROCEDURE — 99255 IP/OBS CONSLTJ NEW/EST HI 80: CPT | Performed by: INTERNAL MEDICINE

## 2021-12-07 PROCEDURE — 99223 1ST HOSP IP/OBS HIGH 75: CPT | Performed by: STUDENT IN AN ORGANIZED HEALTH CARE EDUCATION/TRAINING PROGRAM

## 2021-12-07 PROCEDURE — 80048 BASIC METABOLIC PNL TOTAL CA: CPT

## 2021-12-07 PROCEDURE — 700111 HCHG RX REV CODE 636 W/ 250 OVERRIDE (IP): Performed by: STUDENT IN AN ORGANIZED HEALTH CARE EDUCATION/TRAINING PROGRAM

## 2021-12-07 PROCEDURE — A9270 NON-COVERED ITEM OR SERVICE: HCPCS | Performed by: STUDENT IN AN ORGANIZED HEALTH CARE EDUCATION/TRAINING PROGRAM

## 2021-12-07 PROCEDURE — 36415 COLL VENOUS BLD VENIPUNCTURE: CPT

## 2021-12-07 PROCEDURE — 770020 HCHG ROOM/CARE - TELE (206)

## 2021-12-07 RX ORDER — ROSUVASTATIN CALCIUM 20 MG/1
40 TABLET, COATED ORAL EVERY EVENING
Status: DISCONTINUED | OUTPATIENT
Start: 2021-12-07 | End: 2021-12-19 | Stop reason: HOSPADM

## 2021-12-07 RX ORDER — NITROGLYCERIN 0.4 MG/1
0.4 TABLET SUBLINGUAL
Status: ON HOLD | COMMUNITY
End: 2021-12-19

## 2021-12-07 RX ORDER — HEPARIN SODIUM 1000 [USP'U]/ML
2000 INJECTION, SOLUTION INTRAVENOUS; SUBCUTANEOUS PRN
Status: DISCONTINUED | OUTPATIENT
Start: 2021-12-07 | End: 2021-12-13

## 2021-12-07 RX ORDER — EZETIMIBE 10 MG/1
10 TABLET ORAL DAILY
Status: DISCONTINUED | OUTPATIENT
Start: 2021-12-08 | End: 2021-12-19 | Stop reason: HOSPADM

## 2021-12-07 RX ORDER — FENOFIBRATE 67 MG/1
67 CAPSULE ORAL DAILY
Status: DISCONTINUED | OUTPATIENT
Start: 2021-12-08 | End: 2021-12-10

## 2021-12-07 RX ORDER — HEPARIN SODIUM 5000 [USP'U]/100ML
0-30 INJECTION, SOLUTION INTRAVENOUS CONTINUOUS
Status: DISCONTINUED | OUTPATIENT
Start: 2021-12-07 | End: 2021-12-13

## 2021-12-07 RX ORDER — EZETIMIBE 10 MG/1
10 TABLET ORAL
Qty: 90 TABLET | Refills: 1 | Status: SHIPPED
Start: 2021-12-07 | End: 2022-01-13

## 2021-12-07 RX ORDER — HEPARIN SODIUM 1000 [USP'U]/ML
4000 INJECTION, SOLUTION INTRAVENOUS; SUBCUTANEOUS ONCE
Status: COMPLETED | OUTPATIENT
Start: 2021-12-07 | End: 2021-12-07

## 2021-12-07 RX ORDER — LISINOPRIL 5 MG/1
5 TABLET ORAL EVERY MORNING
COMMUNITY
End: 2022-01-13

## 2021-12-07 RX ORDER — NITROGLYCERIN 0.4 MG/1
0.4 TABLET SUBLINGUAL
Status: DISCONTINUED | OUTPATIENT
Start: 2021-12-07 | End: 2021-12-19 | Stop reason: HOSPADM

## 2021-12-07 RX ORDER — LISINOPRIL 5 MG/1
5 TABLET ORAL
Status: DISCONTINUED | OUTPATIENT
Start: 2021-12-08 | End: 2021-12-19 | Stop reason: HOSPADM

## 2021-12-07 RX ORDER — ASPIRIN 325 MG
325 TABLET ORAL DAILY
Status: DISCONTINUED | OUTPATIENT
Start: 2021-12-08 | End: 2021-12-09

## 2021-12-07 RX ADMIN — HEPARIN SODIUM 12 UNITS/KG/HR: 5000 INJECTION, SOLUTION INTRAVENOUS at 21:38

## 2021-12-07 RX ADMIN — INSULIN HUMAN 6 UNITS: 100 INJECTION, SOLUTION PARENTERAL at 23:55

## 2021-12-07 RX ADMIN — HEPARIN SODIUM 4000 UNITS: 1000 INJECTION, SOLUTION INTRAVENOUS; SUBCUTANEOUS at 21:40

## 2021-12-07 RX ADMIN — ROSUVASTATIN CALCIUM 40 MG: 20 TABLET, FILM COATED ORAL at 21:39

## 2021-12-07 ASSESSMENT — COGNITIVE AND FUNCTIONAL STATUS - GENERAL
SUGGESTED CMS G CODE MODIFIER DAILY ACTIVITY: CH
MOBILITY SCORE: 24
SUGGESTED CMS G CODE MODIFIER MOBILITY: CH
DAILY ACTIVITIY SCORE: 24

## 2021-12-07 ASSESSMENT — LIFESTYLE VARIABLES
AVERAGE NUMBER OF DAYS PER WEEK YOU HAVE A DRINK CONTAINING ALCOHOL: 4
HOW MANY TIMES IN THE PAST YEAR HAVE YOU HAD 5 OR MORE DRINKS IN A DAY: 2
DOES PATIENT WANT TO STOP DRINKING: YES
TOTAL SCORE: 2
TOTAL SCORE: 2
EVER HAD A DRINK FIRST THING IN THE MORNING TO STEADY YOUR NERVES TO GET RID OF A HANGOVER: NO
EVER FELT BAD OR GUILTY ABOUT YOUR DRINKING: NO
TOTAL SCORE: 2
DOES PATIENT WANT TO TALK TO SOMEONE ABOUT QUITTING: NO
ALCOHOL_USE: YES
CONSUMPTION TOTAL: POSITIVE
HAVE YOU EVER FELT YOU SHOULD CUT DOWN ON YOUR DRINKING: YES
HAVE PEOPLE ANNOYED YOU BY CRITICIZING YOUR DRINKING: YES
ON A TYPICAL DAY WHEN YOU DRINK ALCOHOL HOW MANY DRINKS DO YOU HAVE: 2

## 2021-12-07 ASSESSMENT — ENCOUNTER SYMPTOMS
MUSCULOSKELETAL NEGATIVE: 1
GASTROINTESTINAL NEGATIVE: 1
NEUROLOGICAL NEGATIVE: 1
EYES NEGATIVE: 1
PSYCHIATRIC NEGATIVE: 1
RESPIRATORY NEGATIVE: 1

## 2021-12-07 ASSESSMENT — FIBROSIS 4 INDEX: FIB4 SCORE: 0.55

## 2021-12-07 NOTE — PROGRESS NOTES
Hx of severe CAD (see discharge summary from 8/30/21) on Xarelto for UE DVTs that presented to the ER in [place] with chest pain that was relieved with nitroglycerin. Reportedly the EKG is unremarkable. Dr. Hill cardiology, was called and recommended a heparin drip and transfer here once a bed is available.   Please call the on call Hospitalist and the on call Cardiologist when he arrives.

## 2021-12-08 ENCOUNTER — APPOINTMENT (OUTPATIENT)
Dept: CARDIOLOGY | Facility: MEDICAL CENTER | Age: 41
DRG: 281 | End: 2021-12-08
Attending: STUDENT IN AN ORGANIZED HEALTH CARE EDUCATION/TRAINING PROGRAM
Payer: MEDICAID

## 2021-12-08 ENCOUNTER — APPOINTMENT (OUTPATIENT)
Dept: CARDIOLOGY | Facility: MEDICAL CENTER | Age: 41
DRG: 281 | End: 2021-12-08
Attending: INTERNAL MEDICINE
Payer: MEDICAID

## 2021-12-08 PROBLEM — I25.10 MULTI-VESSEL CORONARY ARTERY STENOSIS: Status: ACTIVE | Noted: 2021-12-08

## 2021-12-08 LAB
EKG IMPRESSION: NORMAL
GLUCOSE BLD-MCNC: 164 MG/DL (ref 65–99)
GLUCOSE BLD-MCNC: 197 MG/DL (ref 65–99)
GLUCOSE BLD-MCNC: 215 MG/DL (ref 65–99)
GLUCOSE BLD-MCNC: 238 MG/DL (ref 65–99)
TROPONIN T SERPL-MCNC: 63 NG/L (ref 6–19)
UFH PPP CHRO-ACNC: 0.14 IU/ML
UFH PPP CHRO-ACNC: 0.17 IU/ML
UFH PPP CHRO-ACNC: 0.22 IU/ML

## 2021-12-08 PROCEDURE — 84100 ASSAY OF PHOSPHORUS: CPT

## 2021-12-08 PROCEDURE — 93458 L HRT ARTERY/VENTRICLE ANGIO: CPT | Mod: 26 | Performed by: INTERNAL MEDICINE

## 2021-12-08 PROCEDURE — 700111 HCHG RX REV CODE 636 W/ 250 OVERRIDE (IP)

## 2021-12-08 PROCEDURE — 80048 BASIC METABOLIC PNL TOTAL CA: CPT

## 2021-12-08 PROCEDURE — 93306 TTE W/DOPPLER COMPLETE: CPT

## 2021-12-08 PROCEDURE — 700117 HCHG RX CONTRAST REV CODE 255: Performed by: INTERNAL MEDICINE

## 2021-12-08 PROCEDURE — B2151ZZ FLUOROSCOPY OF LEFT HEART USING LOW OSMOLAR CONTRAST: ICD-10-PCS | Performed by: INTERNAL MEDICINE

## 2021-12-08 PROCEDURE — B2111ZZ FLUOROSCOPY OF MULTIPLE CORONARY ARTERIES USING LOW OSMOLAR CONTRAST: ICD-10-PCS | Performed by: INTERNAL MEDICINE

## 2021-12-08 PROCEDURE — 700111 HCHG RX REV CODE 636 W/ 250 OVERRIDE (IP): Performed by: STUDENT IN AN ORGANIZED HEALTH CARE EDUCATION/TRAINING PROGRAM

## 2021-12-08 PROCEDURE — 85027 COMPLETE CBC AUTOMATED: CPT

## 2021-12-08 PROCEDURE — 700101 HCHG RX REV CODE 250

## 2021-12-08 PROCEDURE — 700117 HCHG RX CONTRAST REV CODE 255: Performed by: STUDENT IN AN ORGANIZED HEALTH CARE EDUCATION/TRAINING PROGRAM

## 2021-12-08 PROCEDURE — 93005 ELECTROCARDIOGRAM TRACING: CPT | Performed by: STUDENT IN AN ORGANIZED HEALTH CARE EDUCATION/TRAINING PROGRAM

## 2021-12-08 PROCEDURE — 700105 HCHG RX REV CODE 258: Performed by: INTERNAL MEDICINE

## 2021-12-08 PROCEDURE — 700102 HCHG RX REV CODE 250 W/ 637 OVERRIDE(OP): Performed by: STUDENT IN AN ORGANIZED HEALTH CARE EDUCATION/TRAINING PROGRAM

## 2021-12-08 PROCEDURE — 93005 ELECTROCARDIOGRAM TRACING: CPT | Performed by: HOSPITALIST

## 2021-12-08 PROCEDURE — 82962 GLUCOSE BLOOD TEST: CPT | Mod: 91

## 2021-12-08 PROCEDURE — 83735 ASSAY OF MAGNESIUM: CPT

## 2021-12-08 PROCEDURE — 84484 ASSAY OF TROPONIN QUANT: CPT

## 2021-12-08 PROCEDURE — 99233 SBSQ HOSP IP/OBS HIGH 50: CPT | Mod: GC | Performed by: HOSPITALIST

## 2021-12-08 PROCEDURE — 36415 COLL VENOUS BLD VENIPUNCTURE: CPT

## 2021-12-08 PROCEDURE — 99233 SBSQ HOSP IP/OBS HIGH 50: CPT | Mod: 25 | Performed by: INTERNAL MEDICINE

## 2021-12-08 PROCEDURE — 99152 MOD SED SAME PHYS/QHP 5/>YRS: CPT | Performed by: INTERNAL MEDICINE

## 2021-12-08 PROCEDURE — A9270 NON-COVERED ITEM OR SERVICE: HCPCS | Performed by: INTERNAL MEDICINE

## 2021-12-08 PROCEDURE — C1894 INTRO/SHEATH, NON-LASER: HCPCS

## 2021-12-08 PROCEDURE — 93010 ELECTROCARDIOGRAM REPORT: CPT | Performed by: INTERNAL MEDICINE

## 2021-12-08 PROCEDURE — 770020 HCHG ROOM/CARE - TELE (206)

## 2021-12-08 PROCEDURE — 700102 HCHG RX REV CODE 250 W/ 637 OVERRIDE(OP): Performed by: INTERNAL MEDICINE

## 2021-12-08 PROCEDURE — 85520 HEPARIN ASSAY: CPT | Mod: 91

## 2021-12-08 PROCEDURE — A9270 NON-COVERED ITEM OR SERVICE: HCPCS | Performed by: STUDENT IN AN ORGANIZED HEALTH CARE EDUCATION/TRAINING PROGRAM

## 2021-12-08 PROCEDURE — 4A023N7 MEASUREMENT OF CARDIAC SAMPLING AND PRESSURE, LEFT HEART, PERCUTANEOUS APPROACH: ICD-10-PCS | Performed by: INTERNAL MEDICINE

## 2021-12-08 RX ORDER — MIDAZOLAM HYDROCHLORIDE 1 MG/ML
INJECTION INTRAMUSCULAR; INTRAVENOUS
Status: COMPLETED
Start: 2021-12-08 | End: 2021-12-08

## 2021-12-08 RX ORDER — IBUPROFEN 200 MG
800 TABLET ORAL EVERY 6 HOURS PRN
Status: ON HOLD | COMMUNITY
End: 2021-12-19

## 2021-12-08 RX ORDER — MORPHINE SULFATE 4 MG/ML
3 INJECTION INTRAVENOUS ONCE
Status: COMPLETED | OUTPATIENT
Start: 2021-12-08 | End: 2021-12-08

## 2021-12-08 RX ORDER — MORPHINE SULFATE 4 MG/ML
INJECTION INTRAVENOUS
Status: ACTIVE
Start: 2021-12-08 | End: 2021-12-09

## 2021-12-08 RX ORDER — HEPARIN SODIUM 200 [USP'U]/100ML
INJECTION, SOLUTION INTRAVENOUS
Status: COMPLETED
Start: 2021-12-08 | End: 2021-12-08

## 2021-12-08 RX ORDER — HYDROXYZINE HYDROCHLORIDE 25 MG/1
25 TABLET, FILM COATED ORAL 3 TIMES DAILY PRN
Status: DISCONTINUED | OUTPATIENT
Start: 2021-12-08 | End: 2021-12-13

## 2021-12-08 RX ORDER — DEXTROSE MONOHYDRATE 25 G/50ML
50 INJECTION, SOLUTION INTRAVENOUS
Status: DISCONTINUED | OUTPATIENT
Start: 2021-12-08 | End: 2021-12-13

## 2021-12-08 RX ORDER — HEPARIN SODIUM 1000 [USP'U]/ML
INJECTION, SOLUTION INTRAVENOUS; SUBCUTANEOUS
Status: COMPLETED
Start: 2021-12-08 | End: 2021-12-08

## 2021-12-08 RX ORDER — MORPHINE SULFATE 4 MG/ML
1 INJECTION INTRAVENOUS ONCE
Status: COMPLETED | OUTPATIENT
Start: 2021-12-08 | End: 2021-12-08

## 2021-12-08 RX ORDER — VERAPAMIL HYDROCHLORIDE 2.5 MG/ML
INJECTION, SOLUTION INTRAVENOUS
Status: COMPLETED
Start: 2021-12-08 | End: 2021-12-08

## 2021-12-08 RX ORDER — SODIUM CHLORIDE 9 MG/ML
INJECTION, SOLUTION INTRAVENOUS CONTINUOUS
Status: DISCONTINUED | OUTPATIENT
Start: 2021-12-08 | End: 2021-12-10

## 2021-12-08 RX ORDER — LIDOCAINE HYDROCHLORIDE 20 MG/ML
INJECTION, SOLUTION INFILTRATION; PERINEURAL
Status: COMPLETED
Start: 2021-12-08 | End: 2021-12-08

## 2021-12-08 RX ADMIN — IOHEXOL 69 ML: 350 INJECTION, SOLUTION INTRAVENOUS at 11:43

## 2021-12-08 RX ADMIN — METOPROLOL TARTRATE 25 MG: 25 TABLET, FILM COATED ORAL at 05:15

## 2021-12-08 RX ADMIN — LIDOCAINE HYDROCHLORIDE: 20 INJECTION, SOLUTION INFILTRATION; PERINEURAL at 11:16

## 2021-12-08 RX ADMIN — VERAPAMIL HYDROCHLORIDE 5 MG: 2.5 INJECTION, SOLUTION INTRAVENOUS at 11:16

## 2021-12-08 RX ADMIN — FENOFIBRATE 67 MG: 67 CAPSULE ORAL at 05:15

## 2021-12-08 RX ADMIN — HEPARIN SODIUM: 1000 INJECTION, SOLUTION INTRAVENOUS; SUBCUTANEOUS at 11:17

## 2021-12-08 RX ADMIN — ASPIRIN 325 MG ORAL TABLET 325 MG: 325 PILL ORAL at 05:15

## 2021-12-08 RX ADMIN — HEPARIN SODIUM 20 UNITS/KG/HR: 5000 INJECTION, SOLUTION INTRAVENOUS at 18:34

## 2021-12-08 RX ADMIN — NITROGLYCERIN 0.4 MG: 0.4 TABLET, ORALLY DISINTEGRATING SUBLINGUAL at 06:20

## 2021-12-08 RX ADMIN — INSULIN HUMAN 3 UNITS: 100 INJECTION, SOLUTION PARENTERAL at 12:34

## 2021-12-08 RX ADMIN — EZETIMIBE 10 MG: 10 TABLET ORAL at 05:15

## 2021-12-08 RX ADMIN — NITROGLYCERIN 0.4 MG: 0.4 TABLET, ORALLY DISINTEGRATING SUBLINGUAL at 22:41

## 2021-12-08 RX ADMIN — METOPROLOL TARTRATE 25 MG: 25 TABLET, FILM COATED ORAL at 16:47

## 2021-12-08 RX ADMIN — MIDAZOLAM HYDROCHLORIDE 2 MG: 1 INJECTION, SOLUTION INTRAMUSCULAR; INTRAVENOUS at 11:20

## 2021-12-08 RX ADMIN — NITROGLYCERIN 10 ML: 20 INJECTION INTRAVENOUS at 11:17

## 2021-12-08 RX ADMIN — HEPARIN SODIUM 2000 UNITS: 1000 INJECTION, SOLUTION INTRAVENOUS; SUBCUTANEOUS at 18:34

## 2021-12-08 RX ADMIN — HUMAN ALBUMIN MICROSPHERES AND PERFLUTREN 3 ML: 10; .22 INJECTION, SOLUTION INTRAVENOUS at 13:02

## 2021-12-08 RX ADMIN — INSULIN HUMAN 3 UNITS: 100 INJECTION, SOLUTION PARENTERAL at 16:48

## 2021-12-08 RX ADMIN — MORPHINE SULFATE 1 MG: 4 INJECTION INTRAVENOUS at 22:45

## 2021-12-08 RX ADMIN — LISINOPRIL 5 MG: 5 TABLET ORAL at 05:15

## 2021-12-08 RX ADMIN — HEPARIN SODIUM 2000 UNITS: 1000 INJECTION, SOLUTION INTRAVENOUS; SUBCUTANEOUS at 04:02

## 2021-12-08 RX ADMIN — FENTANYL CITRATE 50 MCG: 50 INJECTION, SOLUTION INTRAMUSCULAR; INTRAVENOUS at 11:43

## 2021-12-08 RX ADMIN — HEPARIN SODIUM 2000 UNITS: 200 INJECTION, SOLUTION INTRAVENOUS at 11:17

## 2021-12-08 RX ADMIN — NITROGLYCERIN 0.4 MG: 0.4 TABLET, ORALLY DISINTEGRATING SUBLINGUAL at 22:32

## 2021-12-08 RX ADMIN — NITROGLYCERIN 0.4 MG: 0.4 TABLET, ORALLY DISINTEGRATING SUBLINGUAL at 05:04

## 2021-12-08 RX ADMIN — ROSUVASTATIN CALCIUM 40 MG: 20 TABLET, FILM COATED ORAL at 16:47

## 2021-12-08 RX ADMIN — HEPARIN SODIUM 2000 UNITS: 1000 INJECTION, SOLUTION INTRAVENOUS; SUBCUTANEOUS at 12:11

## 2021-12-08 RX ADMIN — MORPHINE SULFATE 3 MG: 4 INJECTION INTRAVENOUS at 06:33

## 2021-12-08 RX ADMIN — SODIUM CHLORIDE: 9 INJECTION, SOLUTION INTRAVENOUS at 12:14

## 2021-12-08 ASSESSMENT — PAIN DESCRIPTION - PAIN TYPE
TYPE: ACUTE PAIN

## 2021-12-08 ASSESSMENT — ENCOUNTER SYMPTOMS
NERVOUS/ANXIOUS: 0
COUGH: 0
ABDOMINAL PAIN: 0
DIAPHORESIS: 0
TROUBLE SWALLOWING: 0
FOCAL WEAKNESS: 0
COLOR CHANGE: 0
CONFUSION: 0
DIARRHEA: 0
ABDOMINAL DISTENTION: 0
DIZZINESS: 0
NAUSEA: 0
WHEEZING: 0
SHORTNESS OF BREATH: 0
CHEST TIGHTNESS: 0
ORTHOPNEA: 0
PALPITATIONS: 0
AGITATION: 0
CHILLS: 0
SORE THROAT: 0
NUMBNESS: 0
FEVER: 0
BLOOD IN STOOL: 0
VOMITING: 0

## 2021-12-08 ASSESSMENT — COGNITIVE AND FUNCTIONAL STATUS - GENERAL
MOBILITY SCORE: 24
SUGGESTED CMS G CODE MODIFIER DAILY ACTIVITY: CH
DAILY ACTIVITIY SCORE: 24
SUGGESTED CMS G CODE MODIFIER MOBILITY: CH

## 2021-12-08 ASSESSMENT — PATIENT HEALTH QUESTIONNAIRE - PHQ9
SUM OF ALL RESPONSES TO PHQ9 QUESTIONS 1 AND 2: 0
2. FEELING DOWN, DEPRESSED, IRRITABLE, OR HOPELESS: NOT AT ALL
1. LITTLE INTEREST OR PLEASURE IN DOING THINGS: NOT AT ALL

## 2021-12-08 ASSESSMENT — FIBROSIS 4 INDEX: FIB4 SCORE: 0.69

## 2021-12-08 NOTE — PROGRESS NOTES
Notified Dr. Kelsi Thurman of pt having chest pain 7/10 that radiates to left arm. Prn nitro SL x1 given with relief. 2L O2 placed on pt. VSS. Will continue to monitor.

## 2021-12-08 NOTE — CONSULTS
Cardiology Consult Note:    Bobbi Kidd M.D.  Date & Time note created:    12/7/2021   11:03 PM     Referring MD:  Dr. Morales    Patient ID:   Name:             Kyle Gonzalez   YOB: 1980  Age:                 41 y.o.  male   MRN:               [id number]                                                             Chief Complaint / Reason for consult:  Chest pain with elevated troponin.    History of Present Illness:    This is a 41 years old man with familial HLP, DM, HTN, established CAD with multiple PCIs and coronary stents in Lx and with most recent stent to the LAD in 10/2020,  of RCA, ischemic cardiomyopathy with LVEF improved to 55% in 08/2021 from 20% in 10/2020, who presented to outside facility with chest pain. Onset of chest pain was last night, intermittent in duration, +radiating to the left arm.    I have personally interpreted EKG today with patient, there is + evidence of non-specific T wave abnormalities. Sinus rhythm.    Of note, unclear why patient was not on BB.    He was one lisinopril, rosuvastatin and fenofibrate along with DM meds.    Troponin T here is 96. Blood pressure has been normal.    Cardiac cath 2020:  Post-operative Diagnosis:   1.  Severely reduced left ventricle systolic function again fraction 25%  2.  70-80% ostial left anterior descending artery stenosis, IFR less than 0.6  3.  Chronic total occlusion of mid to distal right coronary artery with faint visualization of distal right coronary artery from left to right collateral  4.  Status post stenting of the ostial left anterior descending artery with 3.5 x 8 mm Synergy drug eluting stent with no significant residual stenosis ABBY-3 flow.    Two vessel coronary artery disease     This is right dominant system.     Left main is large and without flow limiting disease.  It bifurcated into left anterior descending and left circumflex artery.      Left anterior descending artery is large  caliber vessel and extends to the apex.  It gives rises to a couple diagonal branches.  At the beginning, there was eccentric 70-80% at the ostium of the left anterior descending artery (LAD).  The antegrade flow is normal.     Left circumflex artery is large in caliber.   It gives rise to several obtuse marginal branches.  Previously placed stent was noted in the mid portion of the left circumflex artery.  The stent was patent.  There is no other significant disease in the LCX system.  The antegrade flow is normal.     Right coronary artery (RCA) is medium in caliber. It is occluded in the mid portion after small acute marginal branch. Faint visualization of distal RCA was noted via collaterals from left system.        After intervention, there was no residual stenosis in LAD with ABBY III flow.    Review of Systems:      Constitutional: Denies fevers, Denies weight changes  Eyes: Denies changes in vision, no eye pain  Ears/Nose/Throat/Mouth: Denies nasal congestion or sore throat   Cardiovascular: no chest pain, no palpitations   Respiratory: no shortness of breath , Denies cough  Gastrointestinal/Hepatic: Denies abdominal pain, nausea, vomiting, diarrhea, constipation or GI bleeding   Genitourinary: Denies dysuria or frequency  Musculoskeletal/Rheum: Denies  joint pain and swelling   Skin: Denies rash  Neurological: Denies headache, confusion, memory loss or focal weakness/parasthesias  Psychiatric: denies mood disorder   Endocrine: Niya thyroid problems  Heme/Oncology/Lymph Nodes: Denies enlarged lymph nodes, denies brusing or known bleeding disorder  All other systems were reviewed and are negative (AMA/CMS criteria)                Past Medical History:   Past Medical History:   Diagnosis Date   • Diabetes (HCC)    • Heart attack (HCC)    • Hyperlipidemia    • Hypertension    • Medical non-compliance 9/13/2015     Active Hospital Problems    Diagnosis    • NSTEMI (non-ST elevated myocardial infarction) ([facility])  [I21.4]      Priority: High   • Hypertension [I10]      Priority: Medium   • Hypercholesteremia [E78.00]      Priority: Medium   • ACS (acute coronary syndrome) (HCC) [I24.9]    • History of DVT in adulthood [Z86.718]    • Diabetes (HCC) [E11.9]    • Morbid obesity ([facility]) [E66.01]        Past Surgical History:  Past Surgical History:   Procedure Laterality Date   • AL TRANSCATH STENT INIT VESSEL,OPEN      x4       Hospital Medications:    Current Facility-Administered Medications:   •  heparin infusion 25,000 units in 500 mL 0.45% NACL, 0-30 Units/kg/hr (Adjusted), Intravenous, Continuous, Diaz Morales M.D., Last Rate: 20.2 mL/hr at 12/07/21 2138, 12 Units/kg/hr at 12/07/21 2138  •  heparin injection 2,000 Units, 2,000 Units, Intravenous, PRN, Diaz Morales M.D.  •  rosuvastatin (CRESTOR) tablet 40 mg, 40 mg, Oral, Q EVENING, Diaz Morales M.D., 40 mg at 12/07/21 2139  •  [START ON 12/8/2021] ezetimibe (ZETIA) tablet 10 mg, 10 mg, Oral, DAILY, Diaz Morales M.D.  •  [START ON 12/8/2021] fenofibrate micronized (LOFIBRA) capsule 67 mg, 67 mg, Oral, DAILY, Diaz Morales M.D.  •  [START ON 12/8/2021] lisinopril (PRINIVIL) tablet 5 mg, 5 mg, Oral, Q DAY, Diaz Morales M.D.  •  nitroglycerin (NITROSTAT) tablet 0.4 mg, 0.4 mg, Sublingual, Q5 MIN PRN, Diaz Morales M.D.  •  POC Blood Glucose, , , Q6H **AND** [START ON 12/8/2021] insulin regular (HumuLIN R,NovoLIN R) injection, 3-15 Units, Subcutaneous, Q6HRS, Diaz Morales M.D.    Current Outpatient Medications:  Medications Prior to Admission   Medication Sig Dispense Refill Last Dose   • nitroglycerin (NITROSTAT) 0.4 MG SL Tab Place 0.4 mg under the tongue every 5 minutes as needed for Chest Pain. Indications: Acute Angina Pectoris   12/7/2021 at Unknown time   • lisinopril (PRINIVIL) 5 MG Tab Take 5 mg by mouth every day.   12/7/2021 at Unknown time   • ezetimibe (ZETIA) 10 MG Tab Take 1 Tablet by mouth every day. 90 Tablet 1  "12/7/2021 at Unknown time   • rivaroxaban (XARELTO) 20 MG Tab tablet Take 1 Tablet by mouth with dinner. 30 Tablet 2 12/6/2021 at Unknown time   • Blood Glucose Monitoring Suppl (BLOOD GLUCOSE MONITOR SYSTEM) w/Device Kit Use to test blood sugar as directed by provider 1 Kit 0    • glucose blood strip Test 3 times daily before meals. 100 Strip 0    • Lancets Use one True Metrix lancet to test blood sugar three times daily before meals. 100 Each 0    • Alcohol Swabs Wipe site with prep pad prior to injection. 100 Each 0    • Insulin Syringe-Needle U-100 (BD INSULIN SYRINGE U/F) 31G X 5/16\" 0.3 ML Misc Use to inject insulin 4 times daily. 100 Each 0    • insulin glargine (LANTUS) 100 UNIT/ML Solution Inject 60 Units under the skin every evening. 10 mL 1    • insulin lispro (HUMALOG) 100 UNIT/ML Inject 3-14 Units under the skin 3 times a day before meals.  70   - 150  mg/dL =    0 Units  151 - 200  mg/dL =    3 Units  201 - 250  mg/dL =    4 Units  251 - 300  mg/dL  =   7 Units  301 - 350  mg/dL  =   10 Units  351 - 400 mg/dL   =   12 Units  Over 400 mg/dL   =   14 Units 10 mL 1 12/7/2021 at Unknown time   • fenofibrate (TRICOR) 48 MG Tab Take 48 mg by mouth every day.   12/7/2021 at Unknown time   • aspirin EC (ECOTRIN) 81 MG Tablet Delayed Response Take 1 Tab by mouth every day. 100 Tab 3 12/7/2021 at Unknown time   • rosuvastatin (CRESTOR) 40 MG tablet Take 1 tablet by mouth every evening. 30 Tab 11 12/6/2021 at Unknown time       Medication Allergy:  No Known Allergies    Family History:  Family History   Problem Relation Age of Onset   • Diabetes Mother    • Heart Attack Mother    • Heart Attack Father        Social History:  Social History     Socioeconomic History   • Marital status: Single     Spouse name: Not on file   • Number of children: Not on file   • Years of education: Not on file   • Highest education level: Not on file   Occupational History   • Not on file   Tobacco Use   • Smoking status: Current " "Some Day Smoker     Packs/day: 1.00     Years: 27.00     Pack years: 27.00     Types: Cigarettes     Start date: 3/17/1994   • Smokeless tobacco: Never Used   Vaping Use   • Vaping Use: Never used   Substance and Sexual Activity   • Alcohol use: Yes   • Drug use: Yes     Comment: occasional marijuana    • Sexual activity: Not on file   Other Topics Concern   • Not on file   Social History Narrative   • Not on file     Social Determinants of Health     Financial Resource Strain:    • Difficulty of Paying Living Expenses: Not on file   Food Insecurity:    • Worried About Running Out of Food in the Last Year: Not on file   • Ran Out of Food in the Last Year: Not on file   Transportation Needs:    • Lack of Transportation (Medical): Not on file   • Lack of Transportation (Non-Medical): Not on file   Physical Activity:    • Days of Exercise per Week: Not on file   • Minutes of Exercise per Session: Not on file   Stress:    • Feeling of Stress : Not on file   Social Connections:    • Frequency of Communication with Friends and Family: Not on file   • Frequency of Social Gatherings with Friends and Family: Not on file   • Attends Anabaptism Services: Not on file   • Active Member of Clubs or Organizations: Not on file   • Attends Club or Organization Meetings: Not on file   • Marital Status: Not on file   Intimate Partner Violence:    • Fear of Current or Ex-Partner: Not on file   • Emotionally Abused: Not on file   • Physically Abused: Not on file   • Sexually Abused: Not on file   Housing Stability:    • Unable to Pay for Housing in the Last Year: Not on file   • Number of Places Lived in the Last Year: Not on file   • Unstable Housing in the Last Year: Not on file         Physical Exam:  Vitals/ General Appearance:   Weight/BMI: Body mass index is 43.29 kg/m².  /81   Pulse 85   Temp 36.6 °C (97.9 °F) (Oral)   Resp 18   Ht 1.651 m (5' 5\")   Wt 118 kg (260 lb 2.3 oz)   SpO2 96%   Vitals:    12/07/21 1925   BP: " "127/81   Pulse: 85   Resp: 18   Temp: 36.6 °C (97.9 °F)   TempSrc: Oral   SpO2: 96%   Weight: 118 kg (260 lb 2.3 oz)   Height: 1.651 m (5' 5\")     Oxygen Therapy:  Pulse Oximetry: 96 %, O2 Delivery Device: None - Room Air    Constitutional:   No acute distress  HENMT:  Normocephalic, Atraumatic.  Eyes:  EOMI, No discharge.  Neck:  no JVD.  Cardiovascular:  Normal heart rate, Normal rhythm.  Extremitites with intact distal pulses, no cyanosis, or edema.  Lungs:  No respiratory distress.  Abdomen: Soft, No tenderness, No guarding, No rebound.  Skin: No significant rash.  Neurologic: Alert & oriented x 3, No focal deficits noted, cranial nerves II through X are intact.  Psychiatric: Affect normal, Judgment normal, Mood normal.      MDM (Data Review):     Records reviewed and summarized in current documentation    Lab Data Review:  Recent Results (from the past 24 hour(s))   TROPONIN    Collection Time: 12/07/21  8:32 PM   Result Value Ref Range    Troponin T 96 (H) 6 - 19 ng/L   CBC WITH DIFFERENTIAL    Collection Time: 12/07/21  8:32 PM   Result Value Ref Range    WBC 7.2 4.8 - 10.8 K/uL    RBC 5.06 4.70 - 6.10 M/uL    Hemoglobin 15.2 14.0 - 18.0 g/dL    Hematocrit 45.1 42.0 - 52.0 %    MCV 89.1 81.4 - 97.8 fL    MCH 30.0 27.0 - 33.0 pg    MCHC 33.7 33.7 - 35.3 g/dL    RDW 44.2 35.9 - 50.0 fL    Platelet Count 342 164 - 446 K/uL    MPV 9.7 9.0 - 12.9 fL    Neutrophils-Polys 46.40 44.00 - 72.00 %    Lymphocytes 41.90 (H) 22.00 - 41.00 %    Monocytes 9.30 0.00 - 13.40 %    Eosinophils 1.50 0.00 - 6.90 %    Basophils 0.60 0.00 - 1.80 %    Immature Granulocytes 0.30 0.00 - 0.90 %    Nucleated RBC 0.00 /100 WBC    Neutrophils (Absolute) 3.37 1.82 - 7.42 K/uL    Lymphs (Absolute) 3.03 1.00 - 4.80 K/uL    Monos (Absolute) 0.67 0.00 - 0.85 K/uL    Eos (Absolute) 0.11 0.00 - 0.51 K/uL    Baso (Absolute) 0.04 0.00 - 0.12 K/uL    Immature Granulocytes (abs) 0.02 0.00 - 0.11 K/uL    NRBC (Absolute) 0.00 K/uL   Basic Metabolic " Panel    Collection Time: 21  8:32 PM   Result Value Ref Range    Sodium 136 135 - 145 mmol/L    Potassium 3.9 3.6 - 5.5 mmol/L    Chloride 103 96 - 112 mmol/L    Co2 20 20 - 33 mmol/L    Glucose 137 (H) 65 - 99 mg/dL    Bun 17 8 - 22 mg/dL    Creatinine 0.60 0.50 - 1.40 mg/dL    Calcium 9.6 8.5 - 10.5 mg/dL    Anion Gap 13.0 7.0 - 16.0   Lipid Profile    Collection Time: 21  8:32 PM   Result Value Ref Range    Cholesterol,Tot 265 (H) 100 - 199 mg/dL    Triglycerides 267 (H) 0 - 149 mg/dL    HDL 57 >=40 mg/dL     (H) <100 mg/dL   MAGNESIUM    Collection Time: 21  8:32 PM   Result Value Ref Range    Magnesium 2.0 1.5 - 2.5 mg/dL   ESTIMATED GFR    Collection Time: 21  8:32 PM   Result Value Ref Range    GFR If African American >60 >60 mL/min/1.73 m 2    GFR If Non African American >60 >60 mL/min/1.73 m 2   HEMOGLOBIN A1C    Collection Time: 21  8:38 PM   Result Value Ref Range    Glycohemoglobin 9.0 (H) 4.0 - 5.6 %    Est Avg Glucose 212 mg/dL   aPTT    Collection Time: 21  8:40 PM   Result Value Ref Range    APTT 27.3 24.7 - 36.0 sec   Prothrombin Time    Collection Time: 21  8:40 PM   Result Value Ref Range    PT 12.0 12.0 - 14.6 sec    INR 0.91 0.87 - 1.13   Heparin Xa (Unfractionated)    Collection Time: 21  8:40 PM   Result Value Ref Range    Heparin Xa (UFH) 0.17 IU/mL   EKG    Collection Time: 21  8:55 PM   Result Value Ref Range    Report       [facility]    Test Date:  2021  Pt Name:    CHANDRIKA DAVIDSON             Department: 183  MRN:        [id number]                      Room:       [facility]  Gender:     Male                         Technician: KARLO  :        1980                   Requested By:MICHELE LYNN  Order #:    [id number]                    Reading MD:    Measurements  Intervals                                Axis  Rate:       70                           P:          12  WV:         144                          QRS:         -12  QRSD:       86                           T:          -16  QT:         424  QTc:        458    Interpretive Statements  SINUS RHYTHM  LEFT VENTRICULAR HYPERTROPHY  INFERIOR INFARCT, AGE INDETERMINATE  Compared to ECG 08/21/2021 03:36:24  No significant changes         Imaging/Procedures Review:    Chest Xray:  Reviewed    EKG:   As in HPI.     MDM (Assessment and Plan):     Active Hospital Problems    Diagnosis    • NSTEMI (non-ST elevated myocardial infarction) ([facility]) [I21.4]      Priority: High   • Hypertension [I10]      Priority: Medium   • Hypercholesteremia [E78.00]      Priority: Medium   • ACS (acute coronary syndrome) ([facility]) [I24.9]    • History of DVT in adulthood [Z86.718]    • Diabetes ([facility]) [E11.9]    • Morbid obesity ([facility]) [E66.01]        Will cont heparin gtt with APTT goal from 60-80  Nitro gtt with titration to chest pain free  Metoprolol 25 mg po bid will be added.  ASA.  Rosuvastatin 40 mg daily, fenofibrate.  Will recheck TTE in setting ACS.  Consider taking patient to cath lab if chest pain is not relieved with medicines or patient becomes hemodynamically unstable tonight.  Otherwise, will perform coronary angiogram tomorrow.    Thank you for referring this patient to our cardiology service.  We will follow patient with you.    Bobbi Kidd MD.   Cardiology Inpatient Service.  [facility].  [phone].  Yvonne Paniagua.

## 2021-12-08 NOTE — PROGRESS NOTES
Cardiology Follow Up Progress Note    Date of Service  12/8/2021    Attending Physician  AMY Keenan M.D.    Chief Complaint   Chest pain     HPI  Kyle Gonzalez is a 41 y.o. male admitted 12/7/2021 with chest pain. History of hyperlipidemia, hypertension and diabetes. History of CAD with multiple PCI and coronary stents in LCx and LAD in 10/2020.  of RCA, ischemic cardiomyopathy ef 55%.    Interim Events  Patient had some chest discomfort earlier this morning. Now resolved. On heparin gtt  Plan for coronary angiogram today   Creatinine 0.60     Review of Systems  Review of Systems   Constitutional: Negative for chills, diaphoresis and fever.   HENT: Negative for nosebleeds and trouble swallowing.    Respiratory: Negative for cough, chest tightness and shortness of breath.    Cardiovascular: Positive for chest pain (intermitten, none during examine). Negative for palpitations and leg swelling.   Gastrointestinal: Negative for abdominal distention, abdominal pain and blood in stool.   Genitourinary: Negative for hematuria.   Skin: Negative for color change.   Neurological: Negative for dizziness, syncope and numbness.   Psychiatric/Behavioral: Negative for agitation and confusion. The patient is not nervous/anxious.        Vital signs in last 24 hours  Temp:  [36.6 °C (97.9 °F)-36.8 °C (98.2 °F)] 36.8 °C (98.2 °F)  Pulse:  [72-85] 72  Resp:  [16-18] 18  BP: (105-150)/(59-87) 107/65  SpO2:  [94 %-96 %] 96 %    Physical Exam  Physical Exam  Vitals and nursing note reviewed.   Constitutional:       Appearance: Normal appearance.   HENT:      Head: Normocephalic and atraumatic.   Eyes:      Pupils: Pupils are equal, round, and reactive to light.   Cardiovascular:      Rate and Rhythm: Normal rate and regular rhythm.      Heart sounds: Normal heart sounds. No murmur heard.      Pulmonary:      Effort: Pulmonary effort is normal.      Breath sounds: Normal breath sounds.   Abdominal:       General: Abdomen is flat.   Musculoskeletal:      Cervical back: Normal range of motion.   Skin:     General: Skin is warm and dry.   Neurological:      General: No focal deficit present.      Mental Status: He is alert and oriented to person, place, and time.   Psychiatric:         Mood and Affect: Mood normal.         Behavior: Behavior normal.         Thought Content: Thought content normal.         Judgment: Judgment normal.         Lab Review  Lab Results   Component Value Date/Time    WBC 7.2 12/07/2021 08:32 PM    RBC 5.06 12/07/2021 08:32 PM    HEMOGLOBIN 15.2 12/07/2021 08:32 PM    HEMATOCRIT 45.1 12/07/2021 08:32 PM    MCV 89.1 12/07/2021 08:32 PM    MCH 30.0 12/07/2021 08:32 PM    MCHC 33.7 12/07/2021 08:32 PM    MPV 9.7 12/07/2021 08:32 PM      Lab Results   Component Value Date/Time    SODIUM 136 12/07/2021 08:32 PM    POTASSIUM 3.9 12/07/2021 08:32 PM    CHLORIDE 103 12/07/2021 08:32 PM    CO2 20 12/07/2021 08:32 PM    GLUCOSE 137 (H) 12/07/2021 08:32 PM    BUN 17 12/07/2021 08:32 PM    CREATININE 0.60 12/07/2021 08:32 PM      Lab Results   Component Value Date/Time    ASTSGOT 35 08/29/2021 11:26 AM    ALTSGPT 35 08/29/2021 11:26 AM     Lab Results   Component Value Date/Time    CHOLSTRLTOT 265 (H) 12/07/2021 08:32 PM     (H) 12/07/2021 08:32 PM    HDL 57 12/07/2021 08:32 PM    TRIGLYCERIDE 267 (H) 12/07/2021 08:32 PM    TROPONINT 96 (H) 12/07/2021 08:32 PM       No results for input(s): NTPROBNP in the last 72 hours.    Cardiac Imaging and Procedures Review  EKG:  NSR     Echocardiogram:  Pending     Cardiac Catheterization:   Pending     Cardiac cath 2020:  Post-operative Diagnosis:   1.  Severely reduced left ventricle systolic function again fraction 25%  2.  70-80% ostial left anterior descending artery stenosis, IFR less than 0.6  3.  Chronic total occlusion of mid to distal right coronary artery with faint visualization of distal right coronary artery from left to right collateral  4.   Status post stenting of the ostial left anterior descending artery with 3.5 x 8 mm Synergy drug eluting stent with no significant residual stenosis ABBY-3 flow.     Two vessel coronary artery disease     This is right dominant system.     Left main is large and without flow limiting disease.  It bifurcated into left anterior descending and left circumflex artery.      Left anterior descending artery is large caliber vessel and extends to the apex.  It gives rises to a couple diagonal branches.  At the beginning, there was eccentric 70-80% at the ostium of the left anterior descending artery (LAD).  The antegrade flow is normal.     Left circumflex artery is large in caliber.   It gives rise to several obtuse marginal branches.  Previously placed stent was noted in the mid portion of the left circumflex artery.  The stent was patent.  There is no other significant disease in the LCX system.  The antegrade flow is normal.     Right coronary artery (RCA) is medium in caliber. It is occluded in the mid portion after small acute marginal branch. Faint visualization of distal RCA was noted via collaterals from left system.    Assessment/Plan  No new Assessment & Plan notes have been filed under this hospital service since the last note was generated.  Service: Cardiology    1. Unstable angina;  - trop t peaked at 96  - continue heparin gtt  - known CAD, plan for coronary angiogram today   - continue asa, metoprolol 25mg BID, and rosuvastatin 40mg qd     2. Ischemic cardiomyopathy:  - appears euvolemic upon examination   - ECHO ordered and pending     3. Hypertension:  - stable     Thank you for allowing me to participate in the care of this patient.  I will continue to follow this patient    Please contact me with any questions.    KIN Guillen.

## 2021-12-08 NOTE — ASSESSMENT & PLAN NOTE
Continue rosuvastatin 40 mg every night  Continue fenofibrate 40 mg p.o. daily  Continue ezetimibe  10 mg p.o. daily

## 2021-12-08 NOTE — PROGRESS NOTES
4 Eyes Skin Assessment Completed by SARIKA Martin and SARIKA Younger.    Head WDL  Ears Redness and Blanching  Nose WDL  Mouth WDL  Neck WDL  Breast/Chest Bruising  Shoulder Blades WDL  Spine Redness and Blanching  (R) Arm/Elbow/Hand Bruising  (L) Arm/Elbow/Hand Bruising  Abdomen WDL  Groin Redness and Blanching  Scrotum/Coccyx/Buttocks Redness, Blanching and Excoriation  (R) Leg Redness and Bruising  (L) Leg Redness and Bruising  (R) Heel/Foot/Toe Redness  (L) Heel/Foot/Toe Redness  Generalized bruising throughout skin  Eczema on buttocks    Devices In Places Tele Box and Pulse Ox    Interventions In Place Gray Ear Foams, NC W/Ear Foams, Pillows and Heels Loaded W/Pillows    Possible Skin Injury No    Pictures Uploaded Into Epic N/A  Wound Consult Placed N/A  RN Wound Prevention Protocol Ordered No

## 2021-12-08 NOTE — DIETARY
NUTRITION SERVICES: BMI - Pt with BMI >40 (=Body mass index is 43.29 kg/m².), Class III obesity. Weight loss counseling not appropriate in acute care setting. RECOMMEND - If appropriate at DC please refer to outpatient nutrition services for weight management.

## 2021-12-08 NOTE — PROCEDURES
Cardiac Catheterization report    12/8/2021  11:39 AM    Referring MD: Dr. Kidd    Indication/Preoperative diagnosis:  1. Unstable angina  2. Precocious CAD status post repeat multivessel PCI    Postoperative diagnosis:  1. Early ISR recently placed ostial LAD stent  2. 70% distal left main stenosis  3. Sequential 80% LCx stenoses  4.  RCA  5. LVEF 50 to 55%    Recommendations:  Guideline directed medical therapy   Cardiovascular Risk factor modification  CABG.  If patient is deemed a poor surgical candidate at this institution I would recommend transfer for CABG at another facility which would be the standard of care for him due to early failure of stents left main disease young age and repeat revascularizations over the recent years.      Procedures performed:  · Coronary arteriograms  · Left heart catheterization and Left ventriculogram   · Supervision moderate sedation      FINDINGS:  I.  HEMODYNAMICS   Ao: 81/57 mmHg   LEDP: 16 mmHg   Gradient on LV pullback: No    II. CORONARY ANGIOGRAPHY:  Left main coronary artery: Moderate caliber bifurcating 70% distal eccentric stenosis notable in superior views.    Left anterior descending artery: Moderate caliber vessel wrapping around the apex notable for previously placed stent in its ostium with significant in-stent restenosis of 80%.  There is diffuse surrounding disease which is moderate in caliber.  There is a diagonal with a 70% ostial stenosis.  Left circumflex coronary artery: Moderate caliber vessel with 50% tandem proximal stenoses giving rise to a subsequent OM1 and OM 2.  The OM 2 is diffusely diseased in its ostial proximal portion up to 80%.  Circumflex proper has an 80 to 90% focal lesion.  Right coronary artery: Chronic totally occluded in the proximal to mid vessel.    III.  LEFT VENTRICULOGRAM:  LVEF LUDWIG PROJECTION: 50-55%      Procedure details:  The risks and benefits of cardiac catheterization and possible intervention were explained to the  patient including death, heart attack, stroke, and emergency surgery.  The patient verbalized understanding and wished to proceed.  The patient was brought to the cardiac catheterization laboratory in the fasting state and prepped and draped in the usual sterile fashion.  The right wrist was locally anesthetized with lidocaine and the right radial artery was cannulated with 5/6-[place] equipment and standard radial cocktail was given.  Coronary angiography was performed using standard  diagnostic catheters in the usual fashion and used to cross the aortic valve to perform  left heart catheterization and left ventriculogram. All catheters and guidewires were removed.  A TR-Band was placed without difficulty to achieve patent hemostasis.  Patient tolerated procedure well left the Cath Lab in stable condition.    Complications:  None apparent    Sedation time:  Moderate sedation directly monitored by me during the case while supervising the administration of the sedation medication by an independent trained RN to assist in the monitoring of the patient's level of consciousness and physiological status. I, the supervising physician was present the entire time from beginning of medication administration until the end of the procedure from 11:13 AM until 11:34 AM. For detailed administration records please see the moderate sedation documentation in the media tab.    Following the procedure I discussed the results with the patient who requested no one else be called post procedure.    Steve Hughes MD, FACC, [facility]

## 2021-12-08 NOTE — PROGRESS NOTES
Report received from [facility] RN. Updated on POC.  Assumed care of patient upon arrival to unit. Patient currently A & O x 4; on 2 L O2 silicone nasal cannula; up without complaints of acute pain. Pt placed on monitor, monitor room notified, SB 59. Patient oriented to unit and to call light system. Call light within reach. Pt educated to fall risk. Fall precautions in place. Pt provided with personal grooming items. Bed locked and in lowest position. All questions answered. No other needs indicated at this time. Right radial site assessed. Sign and held orders released. Post op vitals initiated. Verified with Cardiology HANY Pizano and ad to restart Heparin drip at the same rate.

## 2021-12-08 NOTE — CONSULTS
REFERRING PHYSICIAN: Dr Steve Hughes MD.     CONSULTING PHYSICIAN: Brandon Arroyo MD, FACS.    CHIEF COMPLAINT: Chest pain    HISTORY OF PRESENT ILLNESS: The patient is a 41 y.o. male with a past medical history of hyperlipidemia, hypertension, diabetes, and extensive history of CAD with PCI and stents in LAD in 2020,  of RCA, and ischemic cardiomyopathy that presented to the hospital with chest pain.  The pain started on 12/6/21 with radiation down his left arm and is intermittent.  He denies diaphoresis or nausea, dizziness, syncope.  He was found to have elevated troponins and admitted.    PAST MEDICAL HISTORY:   Past Medical History:   Diagnosis Date   • Diabetes (HCC)    • Heart attack (HCC)    • Hyperlipidemia    • Hypertension    • Medical non-compliance 9/13/2015       PAST SURGICAL HISTORY:   Past Surgical History:   Procedure Laterality Date   • ND TRANSCATH STENT INIT VESSEL,OPEN      x4       FAMILY HISTORY:   Family History   Problem Relation Age of Onset   • Diabetes Mother    • Heart Attack Mother    • Heart Attack Father         SOCIAL HISTORY:   Social History     Socioeconomic History   • Marital status: Single     Spouse name: Not on file   • Number of children: Not on file   • Years of education: Not on file   • Highest education level: Not on file   Occupational History   • Not on file   Tobacco Use   • Smoking status: Current Some Day Smoker     Packs/day: 1.00     Years: 27.00     Pack years: 27.00     Types: Cigarettes     Start date: 3/17/1994   • Smokeless tobacco: Never Used   Vaping Use   • Vaping Use: Never used   Substance and Sexual Activity   • Alcohol use: Yes   • Drug use: Yes     Comment: occasional marijuana    • Sexual activity: Not on file   Other Topics Concern   • Not on file   Social History Narrative   • Not on file     Social Determinants of Health     Financial Resource Strain:    • Difficulty of Paying Living Expenses: Not on file   Food Insecurity:    • Worried  About Running Out of Food in the Last Year: Not on file   • Ran Out of Food in the Last Year: Not on file   Transportation Needs:    • Lack of Transportation (Medical): Not on file   • Lack of Transportation (Non-Medical): Not on file   Physical Activity:    • Days of Exercise per Week: Not on file   • Minutes of Exercise per Session: Not on file   Stress:    • Feeling of Stress : Not on file   Social Connections:    • Frequency of Communication with Friends and Family: Not on file   • Frequency of Social Gatherings with Friends and Family: Not on file   • Attends [employer] Services: Not on file   • Active Member of Clubs or Organizations: Not on file   • Attends Club or Organization Meetings: Not on file   • Marital Status: Not on file   Intimate Partner Violence:    • Fear of Current or Ex-Partner: Not on file   • Emotionally Abused: Not on file   • Physically Abused: Not on file   • Sexually Abused: Not on file   Housing Stability:    • Unable to Pay for Housing in the Last Year: Not on file   • Number of Places Lived in the Last Year: Not on file   • Unstable Housing in the Last Year: Not on file       ALLERGIES:   No Known Allergies     CURRENT MEDICATIONS:     Current Facility-Administered Medications:   •  Notify MD and PharmD if FSBG level is less than or equal to 70 mg/dL or patient is showing signs/symptoms of hypoglycemia (tachycardia, palpitations, diaphoresis, clammy, tremulousness, nausea, confused), , , Once **AND** Administer 20 grams of glucose (approximately 8 ounces of fruit juice) every 15 minutes PRN FSBS less than 70 mg/dL, , , PRN **AND** dextrose 50% (D50W) injection 50 mL, 50 mL, Intravenous, Q15 MIN PRN, Diaz Morales M.D.  •  NS infusion, , Intravenous, Continuous, Steve Hughes M.D., Last Rate: 125 mL/hr at 12/08/21 1214, New Bag at 12/08/21 1214  •  Perflutren Protein A Microsph SUSP 3 mL, 3 mL, Intravenous, Once, Diaz Morales M.D.  •  heparin infusion 25,000 units in 500  mL 0.45% NACL, 0-30 Units/kg/hr (Adjusted), Intravenous, Continuous, Diaz Morales M.D., Last Rate: 30.3 mL/hr at 12/08/21 1212, 18 Units/kg/hr at 12/08/21 1212  •  heparin injection 2,000 Units, 2,000 Units, Intravenous, PRN, Diaz Morales M.D., 2,000 Units at 12/08/21 1211  •  rosuvastatin (CRESTOR) tablet 40 mg, 40 mg, Oral, Q EVENING, Diaz Morales M.D., 40 mg at 12/07/21 2139  •  ezetimibe (ZETIA) tablet 10 mg, 10 mg, Oral, DAILY, Diaz Morales M.D., 10 mg at 12/08/21 0515  •  fenofibrate micronized (LOFIBRA) capsule 67 mg, 67 mg, Oral, DAILY, Diaz Morales M.D., 67 mg at 12/08/21 0515  •  lisinopril (PRINIVIL) tablet 5 mg, 5 mg, Oral, Q DAY, Diaz Morales M.D., 5 mg at 12/08/21 0515  •  nitroglycerin (NITROSTAT) tablet 0.4 mg, 0.4 mg, Sublingual, Q5 MIN PRN, Diaz Morales M.D., 0.4 mg at 12/08/21 0620  •  POC Blood Glucose, , , Q6H **AND** insulin regular (HumuLIN R,NovoLIN R) injection, 3-15 Units, Subcutaneous, Q6HRS, Diaz Morales M.D., 3 Units at 12/08/21 1234  •  metoprolol tartrate (LOPRESSOR) tablet 25 mg, 25 mg, Oral, TWICE DAILY, Bobbi Kidd M.D., 25 mg at 12/08/21 0515  •  aspirin (ASA) tablet 325 mg, 325 mg, Oral, DAILY, Bobbi Kidd M.D., 325 mg at 12/08/21 0515     LABS REVIEWED:  Lab Results   Component Value Date/Time    SODIUM 136 12/07/2021 08:32 PM    POTASSIUM 3.9 12/07/2021 08:32 PM    CHLORIDE 103 12/07/2021 08:32 PM    CO2 20 12/07/2021 08:32 PM    GLUCOSE 137 (H) 12/07/2021 08:32 PM    BUN 17 12/07/2021 08:32 PM    CREATININE 0.60 12/07/2021 08:32 PM      Lab Results   Component Value Date/Time    PROTHROMBTM 12.0 12/07/2021 08:40 PM    INR 0.91 12/07/2021 08:40 PM      Lab Results   Component Value Date/Time    WBC 7.2 12/07/2021 08:32 PM    RBC 5.06 12/07/2021 08:32 PM    HEMOGLOBIN 15.2 12/07/2021 08:32 PM    HEMATOCRIT 45.1 12/07/2021 08:32 PM    MCV 89.1 12/07/2021 08:32 PM    MCH 30.0 12/07/2021 08:32 PM    MCHC 33.7 12/07/2021  08:32 PM    MPV 9.7 12/07/2021 08:32 PM    NEUTSPOLYS 46.40 12/07/2021 08:32 PM    LYMPHOCYTES 41.90 (H) 12/07/2021 08:32 PM    MONOCYTES 9.30 12/07/2021 08:32 PM    EOSINOPHILS 1.50 12/07/2021 08:32 PM    BASOPHILS 0.60 12/07/2021 08:32 PM        IMAGING REVIEWED AND INTERPRETED:    ECHOCARDIOGRAM: Pending    ANGIOGRAM:  12/8/21  Left main coronary artery: Moderate caliber bifurcating 70% distal eccentric stenosis notable in superior views.    Left anterior descending artery: Moderate caliber vessel wrapping around the apex notable for previously placed stent in its ostium with significant in-stent restenosis of 80%.  There is diffuse surrounding disease which is moderate in caliber.  There is a diagonal with a 70% ostial stenosis.  Left circumflex coronary artery: Moderate caliber vessel with 50% tandem proximal stenoses giving rise to a subsequent OM1 and OM 2.  The OM 2 is diffusely diseased in its ostial proximal portion up to 80%.  Circumflex proper has an 80 to 90% focal lesion.  Right coronary artery: Chronic totally occluded in the proximal to mid vessel.  LVEF LUDWIG PROJECTION: 50-55%    REVIEW OF SYSTEMS:   Review of Systems   Constitutional: Positive for malaise/fatigue. Negative for chills, diaphoresis, fever and weight loss.   HENT: Negative for ear discharge, ear pain, nosebleeds and tinnitus.    Eyes: Negative for blurred vision, double vision and photophobia.   Respiratory: Negative for cough, hemoptysis, sputum production, shortness of breath and stridor.    Cardiovascular: Negative for chest pain, palpitations, orthopnea and leg swelling.   Gastrointestinal: Negative for constipation, diarrhea, heartburn, nausea and vomiting.   Genitourinary: Negative for dysuria, frequency and urgency.   Musculoskeletal: Positive for joint pain. Negative for back pain, myalgias and neck pain.   Skin: Negative for itching and rash.   Neurological: Negative for dizziness, tingling, tremors, sensory change, speech  "change, seizures, weakness and headaches.   Endo/Heme/Allergies: Negative for environmental allergies. Does not bruise/bleed easily.   Psychiatric/Behavioral: Negative for depression, hallucinations, substance abuse and suicidal ideas.         PHYSICAL EXAMINATION:   Physical Exam  Constitutional:       Appearance: He is obese.   HENT:      Head: Normocephalic and atraumatic.      Right Ear: External ear normal.      Left Ear: External ear normal.   Eyes:      General:         Right eye: No discharge.         Left eye: No discharge.      Conjunctiva/sclera: Conjunctivae normal.      Pupils: Pupils are equal, round, and reactive to light.   Neck:      Vascular: No JVD.   Cardiovascular:      Rate and Rhythm: Normal rate and regular rhythm.      Heart sounds: Normal heart sounds.   Pulmonary:      Effort: Pulmonary effort is normal. No respiratory distress.      Breath sounds: Normal breath sounds. No stridor.   Chest:      Chest wall: No tenderness.   Abdominal:      General: There is no distension.      Palpations: Abdomen is soft.      Tenderness: There is no abdominal tenderness.   Musculoskeletal:         General: No tenderness. Normal range of motion.      Cervical back: Normal range of motion and neck supple.   Skin:     General: Skin is warm and dry.      Findings: No rash.   Neurological:      Mental Status: He is alert and oriented to person, place, and time.      Gait: Gait is intact.   Psychiatric:         Mood and Affect: Mood and affect normal.       /61   Pulse 62   Temp 36.1 °C (97 °F) (Temporal)   Resp 18   Ht 1.651 m (5' 5\")   Wt 118 kg (260 lb 2.3 oz)   SpO2 97%   BMI 43.29 kg/m²        IMPRESSION:  42 yo male who presented with chest pain and elevated troponins.  Left heart cath revealed no bypass target in the distribution in the right coronary artery, 80% in stent stenosis of the LAD with a bypassable distal LAD target, and 80% circumflex. The patient's BMI is 46 and he spent 3 " months last summer and early fall at [facility] in the hospital with covid pneumonia.     PLAN:  I recommend we obtain a CT scan of the chest to rule out pulmonary fibrosis and a pulmonary preop consult to assess his risk of being able to be weaned from the ventilator following cabg.   Either way this patient will be a high operative risk for post operative pulmonary failure.     Await results of pulmonary consult and CT scan prior to making determination of whether or not to proceed with CABG here at [facility].    Sincerely,       Brandon Arroyo MD, FACS.

## 2021-12-08 NOTE — PROGRESS NOTES
Bedside report received from Liz BUSBY RN. Pt A&Ox4. On 2 liters silicone nasal cannula. Complains of pain 5/10 continuous chest pain, MD at bedside. Will medicate per MAR. POC discussed with pt. Pt verbalizes understanding. Call light and belongings within reach. Bed locked and in lowest position. Alarm and fall precautions in place. Heparin drip verified.

## 2021-12-08 NOTE — PROGRESS NOTES
Med Rec complete per pt  Allergies Reviewed  No ABX in the last 14 days    Pt takes XARELTO and ASPRIN    Pt has been out of short acting insulin for more than 1 month. Pt reports only using LANTUS.    Pt took a ONCE dose of NITROSTAT yesterday (12/07) morning.

## 2021-12-08 NOTE — PROGRESS NOTES
Notified Dr. Dolan Olman about pt having 8/10 chest pain that radiates to left arm. Pt stated this time the chest pain is worse than an hour ago. PRN SL nitro given x1. Order for Morphine, which was given with relief. VSS. Will continue to monitor.

## 2021-12-08 NOTE — PROGRESS NOTES
Patient transported to cath lab with [facility], RN. Monitor room notified. Per cath lab Charge RN will disconnect Heparin Drip at this time.

## 2021-12-08 NOTE — PROGRESS NOTES
Daily Progress Note:     Date of Service: 12/8/2021  Primary Team: [facility] IM White Team   Attending: AMY Keenan M.D.   Senior Resident: Dr. Agarwal  Intern: Dr. Conway  Contact:  [phone]    Chief Complaint:   Burning substernal chest pain with radiation to the left arm and jaw    Interval Update:  Patients chest pain initially resolved with nitroglycerin but then recurred this morning around 6 AM.  Described it as burning substernal with radiation to the left arm.  Repeat EKG did not show an evolving process.  Troponin peaked at 96 as repeat was 63.  Morphine helps with his chest pain and a decrease from a 5 to a 3.  Patient underwent left heart catheterization and has multivessel disease involving the left main coronary artery.  Cardiology is planning for CT surgery consult.  Continue with aspirin, Zetia, fenofibrate, lisinopril, metoprolol, and rosuvastatin.  He is currently on a heparin drip.  A1c is 9%.  LDL is 155.  Patient will be an optimal candidate for bariatric surgery given his BMI and comorbidities.  He also need further lipid reduction and may benefit from PCSK9 inhibitors.  Following the cath, patient is not complaining of active chest pain.    Consultants/Specialty:  Cardiology  Cardiothoracic surgery     Review of Systems:    Review of Systems   Constitutional: Negative for chills, diaphoresis and fever.   HENT: Negative for congestion and sore throat.    Respiratory: Negative for cough, shortness of breath and wheezing.    Cardiovascular: Positive for chest pain (burning substernal with radiation to the left arm and jaw). Negative for palpitations, orthopnea and leg swelling.   Gastrointestinal: Negative for abdominal pain, diarrhea, nausea and vomiting.   Skin: Negative for rash.   Neurological: Negative for focal weakness.   Psychiatric/Behavioral: The patient is not nervous/anxious.      Objective Data:   Physical Exam:   Vitals:   Temp:  [36 °C (96.8 °F)-36.9 °C (98.5 °F)] 36 °C (96.8  °F)  Pulse:  [56-85] 69  Resp:  [16-18] 18  BP: ()/(48-87) 104/69  SpO2:  [94 %-97 %] 96 %     Physical Exam  Constitutional:       General: He is not in acute distress.     Appearance: Normal appearance. He is obese. He is not ill-appearing, toxic-appearing or diaphoretic.   HENT:      Head: Normocephalic and atraumatic.      Nose: Nose normal.      Mouth/Throat:      Mouth: Mucous membranes are dry.   Eyes:      General: No scleral icterus.     Extraocular Movements: Extraocular movements intact.      Conjunctiva/sclera: Conjunctivae normal.   Cardiovascular:      Rate and Rhythm: Normal rate and regular rhythm.      Pulses: Normal pulses.      Heart sounds: Normal heart sounds. No murmur heard.  No friction rub. No gallop.    Pulmonary:      Effort: Pulmonary effort is normal. No respiratory distress.      Breath sounds: Normal breath sounds. No wheezing, rhonchi or rales.   Abdominal:      General: Bowel sounds are normal. There is no distension.      Palpations: Abdomen is soft.      Tenderness: There is no abdominal tenderness. There is no guarding or rebound.   Musculoskeletal:         General: Normal range of motion.      Cervical back: Normal range of motion and neck supple.      Right lower leg: No edema.      Left lower leg: No edema.   Skin:     General: Skin is warm.      Coloration: Skin is not jaundiced.      Findings: No lesion or rash.   Neurological:      General: No focal deficit present.      Mental Status: He is alert and oriented to person, place, and time.      Cranial Nerves: No cranial nerve deficit.      Motor: No weakness.   Psychiatric:         Mood and Affect: Mood normal.         Behavior: Behavior normal.         Thought Content: Thought content normal.         Judgment: Judgment normal.       Labs:   Recent Labs     12/07/21 2032   WBC 7.2   RBC 5.06   HEMOGLOBIN 15.2   HEMATOCRIT 45.1   MCV 89.1   MCH 30.0   RDW 44.2   PLATELETCT 342   MPV 9.7   NEUTSPOLYS 46.40   LYMPHOCYTES  41.90*   MONOCYTES 9.30   EOSINOPHILS 1.50   BASOPHILS 0.60     Recent Labs     12/07/21 2032   SODIUM 136   POTASSIUM 3.9   CHLORIDE 103   CO2 20   GLUCOSE 137*   BUN 17       Imaging:   EC-ECHOCARDIOGRAM COMPLETE W/ CONT         OUTSIDE IMAGES-DX CHEST   Final Result      CL-LEFT HEART CATHETERIZATION WITH POSSIBLE INTERVENTION    (Results Pending)       Problem Representation:     Multi vessel coronary artery stenosis (HCC)-(present on admission)  Per cath report:  1. Early ISR recently placed ostial LAD stent  2. 70% distal left main stenosis  3. Sequential 80% LCx stenoses  4.  RCA  5. LVEF 50 to 55%     Plan:  Cardiology is planning for CT surgery consult.  Continue with aspirin, Zetia, fenofibrate, lisinopril, metoprolol, and rosuvastatin.  He is currently on a heparin drip.  A1c is 9%.  LDL is 155.  Patient will be an optimal candidate for bariatric surgery given his BMI and comorbidities.  He also need further lipid reduction and may benefit from PCSK9 inhibitors.     NSTEMI (non-ST elevated myocardial infarction) ([facility])- (present on admission)  Assessment & Plan  Loaded with aspirin outside facility  Continue heparin drip per cardiology recommendations   New ECHO complete and pending results, EF 50-55% from cath report   As needed EKG if having recurrent chest pain  Cardiology on board, appreciate recs  CT surgery consult pending      History of DVT in adulthood  Assessment & Plan  Taking Xarelto at home  On heparin drip as recommended by cardiology     Diabetes ([facility])  Assessment & Plan  A1C 9% not at goal   Sliding scale insulin with hypoglycemia protocol  Will need PCP follow-up and outpatient management     Morbid obesity ([facility])  Assessment & Plan  Patient will likely be a good candidate for consideration for bariatric surgery given his BMI and other comorbidities    Hypertension   Assessment & Plan  Continue lisinopril 5 mg po daily      Hypercholesteremia (familial)   Assessment & Plan   not  at goal    rosuvastatin 40 mg every night   fenofibrate 67 mg p.o. daily  ezetimibe  10 mg p.o. daily  Consider PCSK9 inhibitors with goal LDL less than 70     History of Covid pneumonia  Patient reports having Covid pneumonia and being hospitalized for 3 months   Reports DVT as a complication of disease and has been taking Xarelto     VTE prophylaxis: therapeutic anticoagulation with heparin drip

## 2021-12-08 NOTE — ASSESSMENT & PLAN NOTE
Admit patient to telemetry  Loaded with aspirin outside facility  Continue heparin drip  Echo  Trend troponin/EKG  Cardiology on board, follow official note

## 2021-12-09 ENCOUNTER — APPOINTMENT (OUTPATIENT)
Dept: RADIOLOGY | Facility: MEDICAL CENTER | Age: 41
DRG: 281 | End: 2021-12-09
Attending: THORACIC SURGERY (CARDIOTHORACIC VASCULAR SURGERY)
Payer: MEDICAID

## 2021-12-09 LAB
ANION GAP SERPL CALC-SCNC: 11 MMOL/L (ref 7–16)
BUN SERPL-MCNC: 13 MG/DL (ref 8–22)
CALCIUM SERPL-MCNC: 8.7 MG/DL (ref 8.5–10.5)
CHLORIDE SERPL-SCNC: 101 MMOL/L (ref 96–112)
CO2 SERPL-SCNC: 21 MMOL/L (ref 20–33)
CREAT SERPL-MCNC: 0.48 MG/DL (ref 0.5–1.4)
EKG IMPRESSION: NORMAL
EKG IMPRESSION: NORMAL
ERYTHROCYTE [DISTWIDTH] IN BLOOD BY AUTOMATED COUNT: 43.9 FL (ref 35.9–50)
GLUCOSE BLD-MCNC: 172 MG/DL (ref 65–99)
GLUCOSE BLD-MCNC: 179 MG/DL (ref 65–99)
GLUCOSE BLD-MCNC: 183 MG/DL (ref 65–99)
GLUCOSE BLD-MCNC: 206 MG/DL (ref 65–99)
GLUCOSE SERPL-MCNC: 181 MG/DL (ref 65–99)
HCT VFR BLD AUTO: 43.5 % (ref 42–52)
HGB BLD-MCNC: 14.4 G/DL (ref 14–18)
LV EJECT FRACT  99904: 40
LV EJECT FRACT MOD 2C 99903: 38.28
LV EJECT FRACT MOD 4C 99902: 42.33
LV EJECT FRACT MOD BP 99901: 41.04
MAGNESIUM SERPL-MCNC: 1.8 MG/DL (ref 1.5–2.5)
MCH RBC QN AUTO: 29.7 PG (ref 27–33)
MCHC RBC AUTO-ENTMCNC: 33.1 G/DL (ref 33.7–35.3)
MCV RBC AUTO: 89.7 FL (ref 81.4–97.8)
PHOSPHATE SERPL-MCNC: 3.4 MG/DL (ref 2.5–4.5)
PLATELET # BLD AUTO: 330 K/UL (ref 164–446)
PMV BLD AUTO: 10 FL (ref 9–12.9)
POTASSIUM SERPL-SCNC: 3.6 MMOL/L (ref 3.6–5.5)
RBC # BLD AUTO: 4.85 M/UL (ref 4.7–6.1)
SODIUM SERPL-SCNC: 133 MMOL/L (ref 135–145)
UFH PPP CHRO-ACNC: 0.25 IU/ML
UFH PPP CHRO-ACNC: 0.31 IU/ML
WBC # BLD AUTO: 9.4 K/UL (ref 4.8–10.8)

## 2021-12-09 PROCEDURE — 99233 SBSQ HOSP IP/OBS HIGH 50: CPT | Mod: GC | Performed by: HOSPITALIST

## 2021-12-09 PROCEDURE — A9270 NON-COVERED ITEM OR SERVICE: HCPCS | Performed by: INTERNAL MEDICINE

## 2021-12-09 PROCEDURE — 700111 HCHG RX REV CODE 636 W/ 250 OVERRIDE (IP): Performed by: STUDENT IN AN ORGANIZED HEALTH CARE EDUCATION/TRAINING PROGRAM

## 2021-12-09 PROCEDURE — A9270 NON-COVERED ITEM OR SERVICE: HCPCS | Performed by: STUDENT IN AN ORGANIZED HEALTH CARE EDUCATION/TRAINING PROGRAM

## 2021-12-09 PROCEDURE — 700102 HCHG RX REV CODE 250 W/ 637 OVERRIDE(OP): Performed by: INTERNAL MEDICINE

## 2021-12-09 PROCEDURE — 82962 GLUCOSE BLOOD TEST: CPT

## 2021-12-09 PROCEDURE — 93306 TTE W/DOPPLER COMPLETE: CPT | Mod: 26 | Performed by: INTERNAL MEDICINE

## 2021-12-09 PROCEDURE — 700105 HCHG RX REV CODE 258: Performed by: INTERNAL MEDICINE

## 2021-12-09 PROCEDURE — 36415 COLL VENOUS BLD VENIPUNCTURE: CPT

## 2021-12-09 PROCEDURE — 99233 SBSQ HOSP IP/OBS HIGH 50: CPT | Performed by: INTERNAL MEDICINE

## 2021-12-09 PROCEDURE — 700102 HCHG RX REV CODE 250 W/ 637 OVERRIDE(OP): Performed by: STUDENT IN AN ORGANIZED HEALTH CARE EDUCATION/TRAINING PROGRAM

## 2021-12-09 PROCEDURE — 71250 CT THORAX DX C-: CPT

## 2021-12-09 PROCEDURE — 93010 ELECTROCARDIOGRAM REPORT: CPT | Performed by: INTERNAL MEDICINE

## 2021-12-09 PROCEDURE — 93005 ELECTROCARDIOGRAM TRACING: CPT | Performed by: STUDENT IN AN ORGANIZED HEALTH CARE EDUCATION/TRAINING PROGRAM

## 2021-12-09 PROCEDURE — 770020 HCHG ROOM/CARE - TELE (206)

## 2021-12-09 PROCEDURE — 85520 HEPARIN ASSAY: CPT

## 2021-12-09 PROCEDURE — 93010 ELECTROCARDIOGRAM REPORT: CPT | Mod: 76 | Performed by: INTERNAL MEDICINE

## 2021-12-09 PROCEDURE — A9270 NON-COVERED ITEM OR SERVICE: HCPCS | Performed by: NURSE PRACTITIONER

## 2021-12-09 PROCEDURE — 700102 HCHG RX REV CODE 250 W/ 637 OVERRIDE(OP): Performed by: NURSE PRACTITIONER

## 2021-12-09 RX ORDER — CHOLECALCIFEROL (VITAMIN D3) 125 MCG
10 CAPSULE ORAL NIGHTLY PRN
Status: DISCONTINUED | OUTPATIENT
Start: 2021-12-09 | End: 2021-12-19 | Stop reason: HOSPADM

## 2021-12-09 RX ORDER — MORPHINE SULFATE 4 MG/ML
1 INJECTION INTRAVENOUS
Status: DISCONTINUED | OUTPATIENT
Start: 2021-12-09 | End: 2021-12-19 | Stop reason: HOSPADM

## 2021-12-09 RX ORDER — METOPROLOL SUCCINATE 25 MG/1
25 TABLET, EXTENDED RELEASE ORAL
Status: DISCONTINUED | OUTPATIENT
Start: 2021-12-09 | End: 2021-12-19 | Stop reason: HOSPADM

## 2021-12-09 RX ADMIN — INSULIN HUMAN 3 UNITS: 100 INJECTION, SOLUTION PARENTERAL at 13:17

## 2021-12-09 RX ADMIN — NITROGLYCERIN 0.5 INCH: 20 OINTMENT TOPICAL at 01:19

## 2021-12-09 RX ADMIN — INSULIN HUMAN 3 UNITS: 100 INJECTION, SOLUTION PARENTERAL at 05:37

## 2021-12-09 RX ADMIN — NITROGLYCERIN 0.25 INCH: 20 OINTMENT TOPICAL at 17:19

## 2021-12-09 RX ADMIN — SODIUM CHLORIDE: 9 INJECTION, SOLUTION INTRAVENOUS at 13:18

## 2021-12-09 RX ADMIN — Medication 10 MG: at 21:32

## 2021-12-09 RX ADMIN — ROSUVASTATIN CALCIUM 40 MG: 20 TABLET, FILM COATED ORAL at 17:19

## 2021-12-09 RX ADMIN — HEPARIN SODIUM 20 UNITS/KG/HR: 5000 INJECTION, SOLUTION INTRAVENOUS at 09:33

## 2021-12-09 RX ADMIN — INSULIN HUMAN 3 UNITS: 100 INJECTION, SOLUTION PARENTERAL at 00:14

## 2021-12-09 RX ADMIN — FENOFIBRATE 67 MG: 67 CAPSULE ORAL at 05:24

## 2021-12-09 RX ADMIN — HEPARIN SODIUM 2000 UNITS: 1000 INJECTION, SOLUTION INTRAVENOUS; SUBCUTANEOUS at 20:08

## 2021-12-09 RX ADMIN — NITROGLYCERIN 0.25 INCH: 20 OINTMENT TOPICAL at 11:37

## 2021-12-09 RX ADMIN — ASPIRIN 325 MG ORAL TABLET 325 MG: 325 PILL ORAL at 05:24

## 2021-12-09 RX ADMIN — NITROGLYCERIN 0.4 MG: 0.4 TABLET, ORALLY DISINTEGRATING SUBLINGUAL at 07:56

## 2021-12-09 RX ADMIN — NITROGLYCERIN 0.4 MG: 0.4 TABLET, ORALLY DISINTEGRATING SUBLINGUAL at 00:14

## 2021-12-09 RX ADMIN — EZETIMIBE 10 MG: 10 TABLET ORAL at 05:24

## 2021-12-09 RX ADMIN — SODIUM CHLORIDE: 9 INJECTION, SOLUTION INTRAVENOUS at 21:32

## 2021-12-09 RX ADMIN — MORPHINE SULFATE 1 MG: 4 INJECTION INTRAVENOUS at 09:34

## 2021-12-09 RX ADMIN — METOPROLOL SUCCINATE 25 MG: 25 TABLET, EXTENDED RELEASE ORAL at 13:17

## 2021-12-09 RX ADMIN — HYDROXYZINE HYDROCHLORIDE 25 MG: 25 TABLET, FILM COATED ORAL at 20:41

## 2021-12-09 ASSESSMENT — ENCOUNTER SYMPTOMS
BLURRED VISION: 0
SEIZURES: 0
SHORTNESS OF BREATH: 0
TINGLING: 0
DOUBLE VISION: 0
COUGH: 0
DIARRHEA: 0
NECK PAIN: 0
CONSTIPATION: 0
HALLUCINATIONS: 0
SORE THROAT: 0
TREMORS: 0
BRUISES/BLEEDS EASILY: 0
PALPITATIONS: 0
SPUTUM PRODUCTION: 0
HEARTBURN: 0
DIAPHORESIS: 0
FOCAL WEAKNESS: 0
STRIDOR: 0
MYALGIAS: 0
WEAKNESS: 0
FEVER: 0
NERVOUS/ANXIOUS: 0
WEIGHT LOSS: 0
CHEST TIGHTNESS: 0
HEADACHES: 0
SENSORY CHANGE: 0
NAUSEA: 0
DIZZINESS: 0
HEMOPTYSIS: 0
WHEEZING: 0
ABDOMINAL PAIN: 0
CHILLS: 0
PHOTOPHOBIA: 0
DEPRESSION: 0
SPEECH CHANGE: 0
VOMITING: 0
BACK PAIN: 0
ORTHOPNEA: 0

## 2021-12-09 ASSESSMENT — PAIN DESCRIPTION - PAIN TYPE
TYPE: ACUTE PAIN

## 2021-12-09 ASSESSMENT — FIBROSIS 4 INDEX: FIB4 SCORE: 0.72

## 2021-12-09 ASSESSMENT — LIFESTYLE VARIABLES: SUBSTANCE_ABUSE: 0

## 2021-12-09 NOTE — PROGRESS NOTES
Report received from Veronica BAUM. Patient AxOx4, no pain, VSS. Heparin gtt verified per MAR. Patient updated on POC, all questions answered. Will continue to monitor.

## 2021-12-09 NOTE — DISCHARGE PLANNING
"Care Transition Team Assessment    LSW met with pt at bedside to complete assessment. Pt A&Ox4 and able to verify the information on the face sheet. Pt reported he lives in a trailer that has a few steps into it. Pt stated he lives alone, however his ex-girlfriend sometimes stays with him. Pt is independent with all ADLs and IADLs. Pt does not use any DME and drives himself. Pt stated he has O2 at home from his previous COVID admission, however needs to return it and does not know which company he got it from. Pt reported his father and S/O are both good supports for him if needed.    Pt is currently unemployed and reported he works \"odd jobs here and there\". Pt reported he barely made $300 last month. Pt informed this LSW that he has cut down drinking since his last admission and currently has 1-2 tall cans 4 days/week. Pt reported he also uses CBD gummies to help him sleep. Pt denies any MH concerns.    Pt stated his PCP is Jackie and was not able to remember her last name. Pt does not have an advance directive. LSW educated pt on Advance Directives and provided him with AD packet. Pt reported his dad will be able to provide transportation home upon DC.    Information Source  Orientation Level: Oriented X4  Information Given By: Patient  Informant's Name: Kyle Gonzalez  Who is responsible for making decisions for patient? : Patient    Readmission Evaluation  Is this a readmission?: No    Elopement Risk  Legal Hold: No  Ambulatory or Self Mobile in Wheelchair: Yes  Disoriented: No  Psychiatric Symptoms: None  History of Wandering: No  Elopement this Admit: No  Vocalizing Wanting to Leave: No  Displays Behaviors, Body Language Wanting to Leave: No-Not at Risk for Elopement  Elopement Risk: Not at Risk for Elopement    Interdisciplinary Discharge Planning  Lives with - Patient's Self Care Capacity: Alone and Able to Care For Self  Patient or legal guardian wants to designate a caregiver: No  Support Systems: " Family Member(s),Friends / Neighbors  Housing / Facility: Mobile Home  Durable Medical Equipment: Not Applicable    Discharge Preparedness  What is your plan after discharge?: Home with help  What are your discharge supports?: Parent,Other (comment) (ex-girlfriend)  Prior Functional Level: Ambulatory,Drives Self,Independent with Activities of Daily Living,Independent with Medication Management  Difficulity with ADLs: None  Difficulity with IADLs: None    Functional Assesment  Prior Functional Level: Ambulatory,Drives Self,Independent with Activities of Daily Living,Independent with Medication Management    Finances  Financial Barriers to Discharge: Yes  Average Monthly Income: 300 $  Prescription Coverage: Yes    Vision / Hearing Impairment  Vision Impairment : No  Hearing Impairment : No    Advance Directive  Advance Directive?: None  Advance Directive offered?: AD Booklet given    Domestic Abuse  Have you ever been the victim of abuse or violence?: No  Physical Abuse or Sexual Abuse: No  Verbal Abuse or Emotional Abuse: No  Possible Abuse/Neglect Reported to:: Not Applicable    Psychological Assessment  History of Substance Abuse: Alcohol  History of Psychiatric Problems: No  Non-compliant with Treatment: No  Newly Diagnosed Illness: No    Discharge Risks or Barriers  Discharge risks or barriers?: Substance abuse,Lives alone, no community support  Patient risk factors: Substance abuse,Lives alone and no community support    Anticipated Discharge Information  Discharge Disposition: Discharged to home/self care (01)

## 2021-12-09 NOTE — PROGRESS NOTES
Called regarding new onset chest pain of 8/10 @ approximately 22:40.  RN appropriately administered nitro tab, EKG prior to my arrival.  By my arrival @ approximately 22:43, pain had improved to 6/10. EKG did not show any ST change or T wave abnormality.  Exam:  Gen: well appearing, no acute distress  Card: RRR 3/6 systolic murmur  Pulm: CTAB    -Nitro 0.4mg tab q5min/PRN  -Morphine 1mg IV ONCE    Additional call placed at approximately 00:30.  Assessed bedside. EKG without acute changes, very similar to previous.  Likely ongoing demand ischemia 2/2 multivessel disease.  -nitro paste  -morphine 1mg IV q2h/PRN

## 2021-12-09 NOTE — PROGRESS NOTES
TR band removed, right radial site covered with gauze and Tegaderm, dressing CDI, no complaints of pain, no numbness or tingling, bilateral radial pulses 2+.

## 2021-12-09 NOTE — PROGRESS NOTES
Daily Progress Note:     Date of Service: 12/9/2021  Primary Team: [facility] IM White Team   Attending: AMY Keenan M.D.   Senior Resident: Dr. Agarwal  Intern: Dr. Conway  Contact:  [phone]    Chief Complaint:   Burning substernal chest pain with radiation to the left arm and jaw    Interval Update:  Overnight patient complaining of chest pain, given nitroglycerin paste and 0.4 mg tab as well as morphine 1 mg IV once, EKG ordered unchanged.  Patient states improvement in chest pain, but due to SBP in 90s Nitropaste was held.  Evaluation today patient has continued substernal chest pain, worse with any movement with slight numbness in bilateral hands.  Patient continued on goal-directed therapy, per cardiology will have CABG in a.m for left main and multivessel disease.     Consultants/Specialty:  Cardiology  Cardiothoracic surgery     Review of Systems:    Review of Systems   Constitutional: Negative for chills, diaphoresis and fever.   HENT: Negative for congestion and sore throat.    Respiratory: Negative for cough, shortness of breath and wheezing.    Cardiovascular: Positive for chest pain (burning substernal with radiation to the left arm and jaw). Negative for palpitations, orthopnea and leg swelling.   Gastrointestinal: Negative for abdominal pain, diarrhea, nausea and vomiting.   Skin: Negative for rash.   Neurological: Negative for focal weakness.   Psychiatric/Behavioral: The patient is not nervous/anxious.      Objective Data:   Physical Exam:   Vitals:   Temp:  [36 °C (96.8 °F)-36.9 °C (98.5 °F)] 36.3 °C (97.3 °F)  Pulse:  [56-86] 69  Resp:  [15-18] 16  BP: ()/(43-94) 91/43  SpO2:  [90 %-97 %] 96 %     Physical Exam  Constitutional:       General: He is not in acute distress.     Appearance: Normal appearance. He is obese. He is not ill-appearing, toxic-appearing or diaphoretic.   HENT:      Head: Normocephalic and atraumatic.      Nose: Nose normal.      Mouth/Throat:      Mouth: Mucous  membranes are dry.   Eyes:      General: No scleral icterus.     Extraocular Movements: Extraocular movements intact.      Conjunctiva/sclera: Conjunctivae normal.   Cardiovascular:      Rate and Rhythm: Normal rate and regular rhythm.      Pulses: Normal pulses.      Heart sounds: Normal heart sounds. No murmur heard.  No friction rub. No gallop.    Pulmonary:      Effort: Pulmonary effort is normal. No respiratory distress.      Breath sounds: Normal breath sounds. No wheezing, rhonchi or rales.   Abdominal:      General: Bowel sounds are normal. There is no distension.      Palpations: Abdomen is soft.      Tenderness: There is no abdominal tenderness. There is no guarding or rebound.   Musculoskeletal:         General: Normal range of motion.      Cervical back: Normal range of motion and neck supple.      Right lower leg: No edema.      Left lower leg: No edema.   Skin:     General: Skin is warm.      Coloration: Skin is not jaundiced.      Findings: No lesion or rash.   Neurological:      General: No focal deficit present.      Mental Status: He is alert and oriented to person, place, and time.      Cranial Nerves: No cranial nerve deficit.      Motor: No weakness.   Psychiatric:         Mood and Affect: Mood normal.         Behavior: Behavior normal.         Thought Content: Thought content normal.         Judgment: Judgment normal.       Labs:   Recent Labs     12/07/21 2032 12/08/21  2358   WBC 7.2 9.4   RBC 5.06 4.85   HEMOGLOBIN 15.2 14.4   HEMATOCRIT 45.1 43.5   MCV 89.1 89.7   MCH 30.0 29.7   RDW 44.2 43.9   PLATELETCT 342 330   MPV 9.7 10.0   NEUTSPOLYS 46.40  --    LYMPHOCYTES 41.90*  --    MONOCYTES 9.30  --    EOSINOPHILS 1.50  --    BASOPHILS 0.60  --      Recent Labs     12/07/21 2032 12/08/21  2358   SODIUM 136 133*   POTASSIUM 3.9 3.6   CHLORIDE 103 101   CO2 20 21   GLUCOSE 137* 181*   BUN 17 13       Imaging:   EC-ECHOCARDIOGRAM COMPLETE W/ CONT         OUTSIDE IMAGES-DX CHEST   Final Result       CL-LEFT HEART CATHETERIZATION WITH POSSIBLE INTERVENTION    (Results Pending)       Problem Representation:     Multi vessel coronary artery stenosis (HCC)-(present on admission)  Per cath report:  1. Early ISR recently placed ostial LAD stent  2. 70% distal left main stenosis  3. Sequential 80% LCx stenoses  4.  RCA  5. LVEF 50 to 55%     Plan:  Cardiology note, cardiology is planning for CABG in a.m.  Continue with aspirin, Zetia, fenofibrate, lisinopril, metoprolol, and rosuvastatin.  Continue heparin drip.  A1c is 9%.  LDL is 155.  Patient will be an optimal candidate for bariatric surgery given his BMI and comorbidities.  He also need further lipid reduction and may benefit from PCSK9 inhibitors.     NSTEMI (non-ST elevated myocardial infarction) ([facility])- (present on admission)  Assessment & Plan  Loaded with aspirin outside facility  Continue heparin drip per cardiology recommendations   New ECHO mildly reduced left ventricular systolic function with EF 40%, echo cannot rule out bicuspid aortic valve. EF 50-55% from cath report   As needed EKG if having recurrent chest pain  Cardiology on board, appreciate recs  CT surgery consult pending   Nitro 0.25inch for cxp     History of DVT in adulthood  Assessment & Plan  Taking Xarelto at home  On heparin drip as recommended by cardiology     Diabetes ([facility])  Assessment & Plan  A1C 9% not at goal   Sliding scale insulin with hypoglycemia protocol  Will need PCP follow-up and outpatient management     Morbid obesity ([facility])  Assessment & Plan  Patient will likely be a good candidate for consideration for bariatric surgery given his BMI and other comorbidities    Hypertension   Assessment & Plan  Continue lisinopril 5 mg po daily      Hypercholesteremia (familial)   Assessment & Plan   not at goal    rosuvastatin 40 mg every night   fenofibrate 67 mg p.o. daily  ezetimibe  10 mg p.o. daily  Consider PCSK9 inhibitors with goal LDL less than 70     History of  Covid pneumonia  Patient reports having Covid pneumonia and being hospitalized for 3 months   Reports DVT as a complication of disease and has been taking Xarelto     VTE prophylaxis: therapeutic anticoagulation with heparin drip

## 2021-12-09 NOTE — PROGRESS NOTES
Cardiology Follow Up Progress Note    Date of Service  12/9/2021    Attending Physician  AMY Keenan M.D.    Chief Complaint   Chest pain     HPI  Kyle Gonzalez is a 41 y.o. male admitted 12/7/2021 with chest pain. History of hyperlipidemia, hypertension and diabetes. History of CAD with multiple PCI and coronary stents in LCx and LAD in 10/2020.  of RCA, ischemic cardiomyopathy ef 55%.    Interim Events  12/9/2021: SB-SR 50-60s  VSS        Review of Systems  Review of Systems   Constitutional: Negative for chills and fever.   Respiratory: Negative for cough, chest tightness, shortness of breath and wheezing.    Cardiovascular: Positive for chest pain. Negative for palpitations and leg swelling.   Gastrointestinal: Negative for nausea and vomiting.   Neurological: Negative for dizziness and headaches.   All other systems reviewed and are negative.      Vital signs in last 24 hours  Temp:  [36 °C (96.8 °F)-36.9 °C (98.5 °F)] 36.4 °C (97.6 °F)  Pulse:  [60-86] 61  Resp:  [15-18] 17  BP: ()/(43-94) 118/71  SpO2:  [90 %-97 %] 96 %    Physical Exam  Physical Exam  Vitals and nursing note reviewed.   Constitutional:       Appearance: Normal appearance.   HENT:      Head: Normocephalic and atraumatic.      Mouth/Throat:      Mouth: Mucous membranes are moist.   Eyes:      Extraocular Movements: Extraocular movements intact.   Neck:      Comments: No JVD  Cardiovascular:      Rate and Rhythm: Normal rate and regular rhythm.      Pulses: Normal pulses.      Heart sounds: Normal heart sounds. No murmur heard.      Pulmonary:      Effort: Pulmonary effort is normal.      Breath sounds: Normal breath sounds.   Abdominal:      Palpations: Abdomen is soft.   Musculoskeletal:      Cervical back: Normal range of motion and neck supple.   Skin:     General: Skin is warm and dry.   Neurological:      General: No focal deficit present.      Mental Status: He is alert and oriented to person, place, and  time.   Psychiatric:         Mood and Affect: Mood normal.         Behavior: Behavior normal.         Lab Review  Lab Results   Component Value Date/Time    WBC 9.4 12/08/2021 11:58 PM    RBC 4.85 12/08/2021 11:58 PM    HEMOGLOBIN 14.4 12/08/2021 11:58 PM    HEMATOCRIT 43.5 12/08/2021 11:58 PM    MCV 89.7 12/08/2021 11:58 PM    MCH 29.7 12/08/2021 11:58 PM    MCHC 33.1 (L) 12/08/2021 11:58 PM    MPV 10.0 12/08/2021 11:58 PM      Lab Results   Component Value Date/Time    SODIUM 133 (L) 12/08/2021 11:58 PM    POTASSIUM 3.6 12/08/2021 11:58 PM    CHLORIDE 101 12/08/2021 11:58 PM    CO2 21 12/08/2021 11:58 PM    GLUCOSE 181 (H) 12/08/2021 11:58 PM    BUN 13 12/08/2021 11:58 PM    CREATININE 0.48 (L) 12/08/2021 11:58 PM      Lab Results   Component Value Date/Time    ASTSGOT 35 08/29/2021 11:26 AM    ALTSGPT 35 08/29/2021 11:26 AM     Lab Results   Component Value Date/Time    CHOLSTRLTOT 265 (H) 12/07/2021 08:32 PM     (H) 12/07/2021 08:32 PM    HDL 57 12/07/2021 08:32 PM    TRIGLYCERIDE 267 (H) 12/07/2021 08:32 PM    TROPONINT 63 (H) 12/08/2021 09:46 AM       No results for input(s): NTPROBNP in the last 72 hours.    Cardiac Imaging and Procedures Review  EKG:  NSR     Echocardiogram: 12/8/2021  CONCLUSIONS  Compared to the images of the prior study 08/13/2021, reduced systolic   function.  Mildly reduced left ventricular systolic function.  Left ventricular ejection fraction is estimated to be 40%.  Global hypokinesis.  Moderately dilated left atrium.  Cannot rule out bicuspid aortic valve.    Cardiac cath 2020:  Post-operative Diagnosis:   1.  Severely reduced left ventricle systolic function again fraction 25%  2.  70-80% ostial left anterior descending artery stenosis, IFR less than 0.6  3.  Chronic total occlusion of mid to distal right coronary artery with faint visualization of distal right coronary artery from left to right collateral  4.  Status post stenting of the ostial left anterior descending  artery with 3.5 x 8 mm Synergy drug eluting stent with no significant residual stenosis ABBY-3 flow.    Cardiac catheterization 12/8/2021  Postoperative diagnosis:  1. Early ISR recently placed ostial LAD stent  2. 70% distal left main stenosis  3. Sequential 80% LCx stenoses  4.  RCA  5. LVEF 50 to 55%   Recommendations:  Guideline directed medical therapy   Cardiovascular Risk factor modification  CABG.  If patient is deemed a poor surgical candidate at this institution I would recommend transfer for CABG at another facility which would be the standard of care for him due to early failure of stents left main disease young age and repeat revascularizations over the recent years.    Assessment/Plan  1. Unstable angina/CAD  -Troponin T peaked at 96  -Continue heparin drip  -Known CAD  -Continue  mg daily, ezetimibe 10 mg daily, fenofibrate 67 mg daily, lisinopril 5 mg daily, metoprolol 25 mg twice daily, and rosuvastatin 40 mg every evening  -CABG in a.m.    2. Ischemic cardiomyopathy:  -Euvolemic on exam  -ECHO shows EF 40%, likely bicuspid aortic valve    3. Hypertension:  -Stable    Thank you for allowing me to participate in the care of this patient.  I will continue to follow this patient    Please contact me with any questions.        LIAM Briggs  [facility]  [phone]    No future appointments.    Please note that this dictation was created using voice recognition software. I have worked with consultants from the vendor as well as technical experts from [facility] to optimize the interface. I have made every reasonable attempt to correct obvious errors, but I expect that there are errors of grammar and possibly content I did not discover before finalizing the note.

## 2021-12-09 NOTE — PROGRESS NOTES
2230 Patient reported 8/10 chest pain radiating to bilat arms, shortness of breath. VSS, nitro tab administered, STAT EKG ordered, MD Simeon called and en route to bedside. S/p nitro x1, chest pain 6/10.  2300 patient s/p nitrox2, morphine per MAR, chest pain 3/10, VSS will continue to monitor.

## 2021-12-09 NOTE — PROGRESS NOTES
Report received.  Patient awake and alert.  Reports 2/10 'soreness' he associates with chest pain experienced during evening.  Nitro patch held for SBP of 91 earlier this AM.  BP recheck is 110/52 and HR 67.  Administered SL nitro x 1.  Will reassess pain and BP after.  Discussed plan of care with patient.  Bed in low position.  Call light in reach.  Patient aware getting out of bed puts him at risk for significant increase in chest pain and agrees to use urinal.    0930 /71 - Administered PRN morphine 1 mg per order.    0950  Patient denies pain.

## 2021-12-10 LAB
ALBUMIN SERPL BCP-MCNC: 3.7 G/DL (ref 3.2–4.9)
ALBUMIN/GLOB SERPL: 1.1 G/DL
ALP SERPL-CCNC: 82 U/L (ref 30–99)
ALT SERPL-CCNC: 31 U/L (ref 2–50)
ANION GAP SERPL CALC-SCNC: 11 MMOL/L (ref 7–16)
AST SERPL-CCNC: 32 U/L (ref 12–45)
BASOPHILS # BLD AUTO: 0.5 % (ref 0–1.8)
BASOPHILS # BLD: 0.04 K/UL (ref 0–0.12)
BILIRUB SERPL-MCNC: <0.2 MG/DL (ref 0.1–1.5)
BUN SERPL-MCNC: 8 MG/DL (ref 8–22)
CALCIUM SERPL-MCNC: 8.8 MG/DL (ref 8.5–10.5)
CHLORIDE SERPL-SCNC: 105 MMOL/L (ref 96–112)
CO2 SERPL-SCNC: 19 MMOL/L (ref 20–33)
CREAT SERPL-MCNC: 0.47 MG/DL (ref 0.5–1.4)
EOSINOPHIL # BLD AUTO: 0.09 K/UL (ref 0–0.51)
EOSINOPHIL NFR BLD: 1 % (ref 0–6.9)
ERYTHROCYTE [DISTWIDTH] IN BLOOD BY AUTOMATED COUNT: 43.2 FL (ref 35.9–50)
GLOBULIN SER CALC-MCNC: 3.3 G/DL (ref 1.9–3.5)
GLUCOSE BLD-MCNC: 145 MG/DL (ref 65–99)
GLUCOSE BLD-MCNC: 165 MG/DL (ref 65–99)
GLUCOSE BLD-MCNC: 200 MG/DL (ref 65–99)
GLUCOSE BLD-MCNC: 206 MG/DL (ref 65–99)
GLUCOSE SERPL-MCNC: 165 MG/DL (ref 65–99)
HCT VFR BLD AUTO: 41 % (ref 42–52)
HGB BLD-MCNC: 13.7 G/DL (ref 14–18)
IMM GRANULOCYTES # BLD AUTO: 0.02 K/UL (ref 0–0.11)
IMM GRANULOCYTES NFR BLD AUTO: 0.2 % (ref 0–0.9)
LYMPHOCYTES # BLD AUTO: 2.4 K/UL (ref 1–4.8)
LYMPHOCYTES NFR BLD: 27.8 % (ref 22–41)
MCH RBC QN AUTO: 29.5 PG (ref 27–33)
MCHC RBC AUTO-ENTMCNC: 33.4 G/DL (ref 33.7–35.3)
MCV RBC AUTO: 88.4 FL (ref 81.4–97.8)
MONOCYTES # BLD AUTO: 0.65 K/UL (ref 0–0.85)
MONOCYTES NFR BLD AUTO: 7.5 % (ref 0–13.4)
NEUTROPHILS # BLD AUTO: 5.42 K/UL (ref 1.82–7.42)
NEUTROPHILS NFR BLD: 63 % (ref 44–72)
NRBC # BLD AUTO: 0 K/UL
NRBC BLD-RTO: 0 /100 WBC
PLATELET # BLD AUTO: 308 K/UL (ref 164–446)
PMV BLD AUTO: 9.8 FL (ref 9–12.9)
POTASSIUM SERPL-SCNC: 3.7 MMOL/L (ref 3.6–5.5)
PROT SERPL-MCNC: 7 G/DL (ref 6–8.2)
RBC # BLD AUTO: 4.64 M/UL (ref 4.7–6.1)
SODIUM SERPL-SCNC: 135 MMOL/L (ref 135–145)
UFH PPP CHRO-ACNC: 0.13 IU/ML
UFH PPP CHRO-ACNC: 0.51 IU/ML
UFH PPP CHRO-ACNC: 0.65 IU/ML
WBC # BLD AUTO: 8.6 K/UL (ref 4.8–10.8)

## 2021-12-10 PROCEDURE — A9270 NON-COVERED ITEM OR SERVICE: HCPCS | Performed by: NURSE PRACTITIONER

## 2021-12-10 PROCEDURE — A9270 NON-COVERED ITEM OR SERVICE: HCPCS | Performed by: STUDENT IN AN ORGANIZED HEALTH CARE EDUCATION/TRAINING PROGRAM

## 2021-12-10 PROCEDURE — 85025 COMPLETE CBC W/AUTO DIFF WBC: CPT

## 2021-12-10 PROCEDURE — 700111 HCHG RX REV CODE 636 W/ 250 OVERRIDE (IP): Performed by: STUDENT IN AN ORGANIZED HEALTH CARE EDUCATION/TRAINING PROGRAM

## 2021-12-10 PROCEDURE — 85520 HEPARIN ASSAY: CPT

## 2021-12-10 PROCEDURE — 36415 COLL VENOUS BLD VENIPUNCTURE: CPT

## 2021-12-10 PROCEDURE — 700102 HCHG RX REV CODE 250 W/ 637 OVERRIDE(OP): Performed by: NURSE PRACTITIONER

## 2021-12-10 PROCEDURE — 94010 BREATHING CAPACITY TEST: CPT

## 2021-12-10 PROCEDURE — 80053 COMPREHEN METABOLIC PANEL: CPT

## 2021-12-10 PROCEDURE — 99233 SBSQ HOSP IP/OBS HIGH 50: CPT | Performed by: INTERNAL MEDICINE

## 2021-12-10 PROCEDURE — 700102 HCHG RX REV CODE 250 W/ 637 OVERRIDE(OP): Performed by: STUDENT IN AN ORGANIZED HEALTH CARE EDUCATION/TRAINING PROGRAM

## 2021-12-10 PROCEDURE — 99233 SBSQ HOSP IP/OBS HIGH 50: CPT | Mod: GC | Performed by: HOSPITALIST

## 2021-12-10 PROCEDURE — 99253 IP/OBS CNSLTJ NEW/EST LOW 45: CPT | Mod: GC | Performed by: INTERNAL MEDICINE

## 2021-12-10 PROCEDURE — 99255 IP/OBS CONSLTJ NEW/EST HI 80: CPT | Performed by: THORACIC SURGERY (CARDIOTHORACIC VASCULAR SURGERY)

## 2021-12-10 PROCEDURE — 82962 GLUCOSE BLOOD TEST: CPT | Mod: 91

## 2021-12-10 PROCEDURE — 770020 HCHG ROOM/CARE - TELE (206)

## 2021-12-10 PROCEDURE — 94150 VITAL CAPACITY TEST: CPT

## 2021-12-10 RX ORDER — POTASSIUM CHLORIDE 20 MEQ/1
40 TABLET, EXTENDED RELEASE ORAL ONCE
Status: COMPLETED | OUTPATIENT
Start: 2021-12-10 | End: 2021-12-10

## 2021-12-10 RX ORDER — FENOFIBRATE 134 MG/1
134 CAPSULE ORAL DAILY
Status: DISCONTINUED | OUTPATIENT
Start: 2021-12-11 | End: 2021-12-19 | Stop reason: HOSPADM

## 2021-12-10 RX ADMIN — NITROGLYCERIN 0.25 INCH: 20 OINTMENT TOPICAL at 00:08

## 2021-12-10 RX ADMIN — Medication 10 MG: at 21:47

## 2021-12-10 RX ADMIN — FENOFIBRATE 67 MG: 67 CAPSULE ORAL at 05:04

## 2021-12-10 RX ADMIN — HYDROXYZINE HYDROCHLORIDE 25 MG: 25 TABLET, FILM COATED ORAL at 21:46

## 2021-12-10 RX ADMIN — POTASSIUM CHLORIDE 40 MEQ: 1500 TABLET, EXTENDED RELEASE ORAL at 08:27

## 2021-12-10 RX ADMIN — HEPARIN SODIUM 22 UNITS/KG/HR: 5000 INJECTION, SOLUTION INTRAVENOUS at 02:06

## 2021-12-10 RX ADMIN — INSULIN HUMAN 6 UNITS: 100 INJECTION, SOLUTION PARENTERAL at 17:17

## 2021-12-10 RX ADMIN — ASPIRIN 81 MG: 81 TABLET, COATED ORAL at 05:04

## 2021-12-10 RX ADMIN — INSULIN HUMAN 3 UNITS: 100 INJECTION, SOLUTION PARENTERAL at 00:08

## 2021-12-10 RX ADMIN — METOPROLOL SUCCINATE 25 MG: 25 TABLET, EXTENDED RELEASE ORAL at 05:04

## 2021-12-10 RX ADMIN — ROSUVASTATIN CALCIUM 40 MG: 20 TABLET, FILM COATED ORAL at 17:21

## 2021-12-10 RX ADMIN — HEPARIN SODIUM 26 UNITS/KG/HR: 5000 INJECTION, SOLUTION INTRAVENOUS at 12:42

## 2021-12-10 RX ADMIN — NITROGLYCERIN 0.25 INCH: 20 OINTMENT TOPICAL at 17:22

## 2021-12-10 RX ADMIN — NITROGLYCERIN 0.25 INCH: 20 OINTMENT TOPICAL at 05:04

## 2021-12-10 RX ADMIN — EZETIMIBE 10 MG: 10 TABLET ORAL at 05:04

## 2021-12-10 RX ADMIN — HEPARIN SODIUM 2000 UNITS: 1000 INJECTION, SOLUTION INTRAVENOUS; SUBCUTANEOUS at 03:28

## 2021-12-10 ASSESSMENT — ENCOUNTER SYMPTOMS
COUGH: 0
ORTHOPNEA: 0
FOCAL WEAKNESS: 0
ABDOMINAL PAIN: 0
VOMITING: 0
DIZZINESS: 0
SPUTUM PRODUCTION: 0
CHEST TIGHTNESS: 0
DIARRHEA: 0
WHEEZING: 0
SHORTNESS OF BREATH: 0
PALPITATIONS: 0
WEIGHT LOSS: 0
HEADACHES: 0
PND: 0
CHILLS: 0
NAUSEA: 0
NERVOUS/ANXIOUS: 0
DIAPHORESIS: 0
FEVER: 0
HEMOPTYSIS: 0
SORE THROAT: 0

## 2021-12-10 ASSESSMENT — PAIN DESCRIPTION - PAIN TYPE: TYPE: ACUTE PAIN

## 2021-12-10 ASSESSMENT — FIBROSIS 4 INDEX: FIB4 SCORE: 0.75

## 2021-12-10 ASSESSMENT — PULMONARY FUNCTION TESTS: FVC: 84.68

## 2021-12-10 NOTE — PROGRESS NOTES
Assumed care of patient at bedside report from Preeti BAUM. Updated on POC. Patient currently A & O x 4; on 1 L O2 NC; up ad erick. Denies chest pain or SOB. Call light within reach. Whiteboard updated. Fall precautions in place. Bed locked and in lowest position. All questions answered. No other needs indicated at this time.     INTERVAL EVENTs:    Underwent cath revealing diffuse multivessel disease, plan for medical management. Weaned nitro gtt, started isordil 5 tid, increased coreg to 6.25 q12h. SBP elevated overnight to 180s. Pt seen and examined at bedside this am, reports CP improved, denies palpitations or SOB.      OBJECTIVE:  Vital Signs Last 24 Hrs  T(C): 37.1 (10 Aug 2018 04:00), Max: 37.2 (09 Aug 2018 10:28)  T(F): 98.7 (10 Aug 2018 04:00), Max: 98.9 (09 Aug 2018 10:28)  HR: 75 (10 Aug 2018 06:00) (69 - 97)  BP: 161/106 (10 Aug 2018 06:00) (122/86 - 204/120)  BP(mean): 116 (10 Aug 2018 06:00) (88 - 133)  RR: 11 (10 Aug 2018 06:00) (10 - 24)  SpO2: 97% (10 Aug 2018 06:00) (95% - 100%)  I&O's Detail    09 Aug 2018 07:01  -  10 Aug 2018 07:00  --------------------------------------------------------  IN:    nitroglycerin  Infusion: 109.5 mL    Oral Fluid: 240 mL  Total IN: 349.5 mL    OUT:    Voided: 1260 mL  Total OUT: 1260 mL    Total NET: -910.5 mL      08-09 @ 07:01  -  08-10 @ 07:00  --------------------------------------------------------  IN: 349.5 mL / OUT: 1260 mL / NET: -910.5 mL        CAPILLARY BLOOD GLUCOSE    POCT Blood Glucose.: 167 mg/dL (09 Aug 2018 22:11)        PHYSICAL EXAM:  General: Alert and cooperative. No acute stress. Well developed, well nourished.   Head: Normocephalic, no mass or lesions.  Eyes: Intact visual fields. PERRL, clear conjunctiva, EOMI, no ptosis.   Neck: No lymphadenopathy, no masses, no thyromegaly. No JVD.  Respiratory: Bilateral lungs clear to auscultation, no crackles, wheezes, or rhonchi.   Cardiovascular: S1/S2 auscultated. Regular rhythm. No murmurs, rubs or gallop. No peripheral edema, cyanosis, or pallor. Extremities warm and well perfused.  Abdomen: Soft, non-tender, nondistended, no guarding or rebound tenderness. Active bowel sounds in all 4 quadrants. No masses palpated.  Extremities: No significant deformity or joint abnormality. Peripheral pulses intact. No varicosities. No peripheral edema, atrophy.   Skin: Intact, no rash. Normal color, texture and turgor with no lesions or eruptions.  Neurological: AOAx3. CN2-12 grossly intact. Strength and sensation grossly symmetric and intact throughout.  Psychiatry: Oriented to person, place, and time. Able to demonstrate good judgement and reason, without hallucinations, abnormal affect or abnormal behaviors during the examination.      TELEMETRY:   ***    EKG 8/9/18:  Rate 101 BPM  P-R Interval 176 ms  QRS Duration 94 ms  Q-T Interval 344 ms  QTC Calculation(Bezet) 446 ms  P Axis 59 degrees  R Axis 2 degrees  T Axis 176 degrees  Diagnosis: Sinus tachycardia. T wave abnormality, consider inferolateral ischemia        HOSPITAL MEDICATIONS:    MEDICATIONS  (STANDING):  aspirin enteric coated 325 milliGRAM(s) Oral daily  atorvastatin 80 milliGRAM(s) Oral at bedtime  carvedilol 6.25 milliGRAM(s) Oral every 12 hours  dextrose 5%. 1000 milliLiter(s) (50 mL/Hr) IV Continuous <Continuous>  insulin lispro (HumaLOG) corrective regimen sliding scale   SubCutaneous three times a day before meals  insulin lispro (HumaLOG) corrective regimen sliding scale   SubCutaneous at bedtime  isosorbide   dinitrate Tablet (ISORDIL) 5 milliGRAM(s) Oral three times a day  polyethylene glycol 3350 17 Gram(s) Oral daily    MEDICATIONS  (PRN):  acetaminophen   Tablet. 650 milliGRAM(s) Oral every 6 hours PRN Moderate Pain (4 - 6)        LABS:                        15.9   8.13  )-----------( 228      ( 10 Aug 2018 05:45 )             47.4     Hgb Trend: 15.9<--, 15.3<--    10 Aug 2018 05:45    135    |  100    |  11     ----------------------------<  142<H>  4.1     |  21<L>  |  0.70   09 Aug 2018 04:24    134<L>  |  96<L>  |  14     ----------------------------<  187<H>  4.2     |  23     |  0.70     Ca    8.6        10 Aug 2018 05:45  Ca    8.6        09 Aug 2018 04:24  Phos  3.4       10 Aug 2018 05:45  Phos  2.5       09 Aug 2018 04:24  Mg     1.9       10 Aug 2018 05:45  Mg     1.7       09 Aug 2018 04:24    TPro  6.8    /  Alb  3.7    /  TBili  0.6    /  DBili  x      /  AST  14     /  ALT  11     /  AlkPhos  62     10 Aug 2018 05:45  TPro  6.9    /  Alb  3.9    /  TBili  0.2    /  DBili  x      /  AST  15     /  ALT  15     /  AlkPhos  67     09 Aug 2018 04:24    Creatinine Trend: 0.70<--, 0.70<--      MICROBIOLOGY:  None                    IMAGING:    Cath 8/9/18:  The coronary circulation is right dominant.  LM:     --  Distal left main: There was a discrete 20 % stenosis.  LAD:    --  Proximal LAD: There was a tubular 20 % stenosis.  --  Mid LAD: There was a tubular 40 % stenosis.  --  Distal LAD: There was a tubular 70 % stenosis in the proximal third of the vessel segment. There was REECE grade 3 flow through the vessel (brisk flow).  --  D1: The vessel was small sized. There was a tubular 80 % stenosis in the proximal third of the vessel segment. There was REECE grade 3 flow through the vessel (brisk flow). In a second lesion, there was a discrete 90 % stenosis in the middle third of the vessel segment. There was REECE grade 3 flow through the vessel (brisk flow).  CX:   --  Mid circumflex: There was a tubular 80 % stenosis. There was REECE grade 3 flow through the vessel (brisk flow) and a small vascular territory distal to the lesion.  --  OM1: There was a discrete 80 % stenosis at the ostium of the vessel segment. There was REECE grade 3 flow through the vessel (brisk flow).    RCA:    --  Proximal RCA: There was a tubular 60 % stenosis. There was REECE grade 3 flow through the vessel (brisk flow).  --  Distal RCA: There was a discrete 40 % stenosis in the distal third of the vessel segment.  --  RPDA: There was a 100 % stenosis in the middle third of the vessel segment. There was REECE grade 0 flow through the vessel (no flow).  --  RPL1: There was a tubular 50 % stenosis in the proximal third of the vessel segment. There was REECE grade 3 flow through the vessel (brisk flow).    CXR 8/9/18: Clear lungs.    TTE pending INTERVAL EVENTs:    Underwent cath revealing diffuse multivessel disease, plan for medical management. Weaned nitro gtt, started isordil 5 tid, increased coreg to 6.25 q12h. SBP elevated overnight to 180s. Pt seen and examined at bedside this am, reports CP improved, denies palpitations or SOB.      OBJECTIVE:  Vital Signs Last 24 Hrs  T(C): 37.1 (10 Aug 2018 04:00), Max: 37.2 (09 Aug 2018 10:28)  T(F): 98.7 (10 Aug 2018 04:00), Max: 98.9 (09 Aug 2018 10:28)  HR: 75 (10 Aug 2018 06:00) (69 - 97)  BP: 161/106 (10 Aug 2018 06:00) (122/86 - 204/120)  BP(mean): 116 (10 Aug 2018 06:00) (88 - 133)  RR: 11 (10 Aug 2018 06:00) (10 - 24)  SpO2: 97% (10 Aug 2018 06:00) (95% - 100%)  I&O's Detail    09 Aug 2018 07:01  -  10 Aug 2018 07:00  --------------------------------------------------------  IN:    nitroglycerin  Infusion: 109.5 mL    Oral Fluid: 240 mL  Total IN: 349.5 mL    OUT:    Voided: 1260 mL  Total OUT: 1260 mL    Total NET: -910.5 mL      08-09 @ 07:01  -  08-10 @ 07:00  --------------------------------------------------------  IN: 349.5 mL / OUT: 1260 mL / NET: -910.5 mL        CAPILLARY BLOOD GLUCOSE    POCT Blood Glucose.: 167 mg/dL (09 Aug 2018 22:11)        PHYSICAL EXAM:  General: Alert and cooperative. No acute stress. Well developed, well nourished.   Head: Normocephalic, no mass or lesions.  Eyes: Intact visual fields. PERRL, clear conjunctiva, EOMI, no ptosis.   Neck: No lymphadenopathy, no masses, no thyromegaly. No JVD.  Respiratory: Bilateral lungs clear to auscultation, no crackles, wheezes, or rhonchi.   Cardiovascular: S1/S2 auscultated. Regular rhythm. No murmurs, rubs or gallop. No peripheral edema, cyanosis, or pallor. Extremities warm and well perfused.  Abdomen: Soft, non-tender, nondistended, no guarding or rebound tenderness. Active bowel sounds in all 4 quadrants. No masses palpated.  Extremities: No significant deformity or joint abnormality. Peripheral pulses intact. No varicosities. No peripheral edema, atrophy.   Skin: Intact, no rash. Normal color, texture and turgor with no lesions or eruptions.  Neurological: AOAx3. CN2-12 grossly intact. Strength and sensation grossly symmetric and intact throughout.  Psychiatry: Oriented to person, place, and time. Able to demonstrate good judgement and reason, without hallucinations, abnormal affect or abnormal behaviors during the examination.      TELEMETRY:  NSR w PVCs    EKG 8/9/18:  Rate 101 BPM  P-R Interval 176 ms  QRS Duration 94 ms  Q-T Interval 344 ms  QTC Calculation(Bezet) 446 ms  P Axis 59 degrees  R Axis 2 degrees  T Axis 176 degrees  Diagnosis: Sinus tachycardia. T wave abnormality, consider inferolateral ischemia      HOSPITAL MEDICATIONS:    MEDICATIONS  (STANDING):  aspirin enteric coated 325 milliGRAM(s) Oral daily  atorvastatin 80 milliGRAM(s) Oral at bedtime  carvedilol 6.25 milliGRAM(s) Oral every 12 hours  dextrose 5%. 1000 milliLiter(s) (50 mL/Hr) IV Continuous <Continuous>  insulin lispro (HumaLOG) corrective regimen sliding scale   SubCutaneous three times a day before meals  insulin lispro (HumaLOG) corrective regimen sliding scale   SubCutaneous at bedtime  isosorbide   dinitrate Tablet (ISORDIL) 5 milliGRAM(s) Oral three times a day  polyethylene glycol 3350 17 Gram(s) Oral daily    MEDICATIONS  (PRN):  acetaminophen   Tablet. 650 milliGRAM(s) Oral every 6 hours PRN Moderate Pain (4 - 6)        LABS:                        15.9   8.13  )-----------( 228      ( 10 Aug 2018 05:45 )             47.4     Hgb Trend: 15.9<--, 15.3<--    10 Aug 2018 05:45    135    |  100    |  11     ----------------------------<  142<H>  4.1     |  21<L>  |  0.70   09 Aug 2018 04:24    134<L>  |  96<L>  |  14     ----------------------------<  187<H>  4.2     |  23     |  0.70     Ca    8.6        10 Aug 2018 05:45  Ca    8.6        09 Aug 2018 04:24  Phos  3.4       10 Aug 2018 05:45  Phos  2.5       09 Aug 2018 04:24  Mg     1.9       10 Aug 2018 05:45  Mg     1.7       09 Aug 2018 04:24    TPro  6.8    /  Alb  3.7    /  TBili  0.6    /  DBili  x      /  AST  14     /  ALT  11     /  AlkPhos  62     10 Aug 2018 05:45  TPro  6.9    /  Alb  3.9    /  TBili  0.2    /  DBili  x      /  AST  15     /  ALT  15     /  AlkPhos  67     09 Aug 2018 04:24    Creatinine Trend: 0.70<--, 0.70<--      MICROBIOLOGY:  None    IMAGING:    Cath 8/9/18:  The coronary circulation is right dominant.  LM:     --  Distal left main: There was a discrete 20 % stenosis.  LAD:    --  Proximal LAD: There was a tubular 20 % stenosis.  --  Mid LAD: There was a tubular 40 % stenosis.  --  Distal LAD: There was a tubular 70 % stenosis in the proximal third of the vessel segment. There was REECE grade 3 flow through the vessel (brisk flow).  --  D1: The vessel was small sized. There was a tubular 80 % stenosis in the proximal third of the vessel segment. There was REECE grade 3 flow through the vessel (brisk flow). In a second lesion, there was a discrete 90 % stenosis in the middle third of the vessel segment. There was REECE grade 3 flow through the vessel (brisk flow).  CX:   --  Mid circumflex: There was a tubular 80 % stenosis. There was REECE grade 3 flow through the vessel (brisk flow) and a small vascular territory distal to the lesion.  --  OM1: There was a discrete 80 % stenosis at the ostium of the vessel segment. There was REECE grade 3 flow through the vessel (brisk flow).    RCA:    --  Proximal RCA: There was a tubular 60 % stenosis. There was REECE grade 3 flow through the vessel (brisk flow).  --  Distal RCA: There was a discrete 40 % stenosis in the distal third of the vessel segment.  --  RPDA: There was a 100 % stenosis in the middle third of the vessel segment. There was REECE grade 0 flow through the vessel (no flow).  --  RPL1: There was a tubular 50 % stenosis in the proximal third of the vessel segment. There was REECE grade 3 flow through the vessel (brisk flow).    CXR 8/9/18: Clear lungs.    TTE pending

## 2021-12-10 NOTE — CONSULTS
Pulmonary Consultation Note    Date of Service: 12/10/2021  Attending: Gregorio Poole M.D.  Resident: Bryce Banks MD    Chief complaint:  Chest pain    Consult reason:  Pulmonary evaluation for surgery    HPI:  41 years old male with familial hypercholesterolemia, 2 x STEMI, NSTEMI, multiple stents, presented with chest pain responding to nitroglycerin, had angio due to NSTEMI which showed multivessel disease requiring CABG. Pulmonology was consulted for perioperative risks.  Patient is doing fine, chest pain free at this time. No shortness of breath at rest or ambulation.  History of Covid pneumonia and covid interstitial lung disease, now off O2.  No cough or sputum production. No palpitations.    Family history:  Hypercholesterolemia    Review of Systems:   Review of Systems   Constitutional: Negative for chills, fever, malaise/fatigue and weight loss.   HENT: Negative for sore throat.    Respiratory: Negative for cough, hemoptysis, sputum production, shortness of breath and wheezing.    Cardiovascular: Positive for chest pain (resolved in morning). Negative for palpitations, leg swelling and PND.   Gastrointestinal: Negative for abdominal pain, diarrhea, melena, nausea and vomiting.   Genitourinary: Negative for dysuria.       Objective Data:     Vitals:   Temp:  [36.1 °C (97 °F)-36.7 °C (98.1 °F)] 36.6 °C (97.8 °F)  Pulse:  [67-82] 71  Resp:  [17-18] 18  BP: (101-127)/(55-83) 105/55  SpO2:  [91 %-96 %] 95 %    Physical Exam  Constitutional:       General: He is not in acute distress.     Appearance: He is not ill-appearing.   HENT:      Nose: No rhinorrhea.      Mouth/Throat:      Mouth: Mucous membranes are moist.      Pharynx: No posterior oropharyngeal erythema.   Eyes:      General: No scleral icterus.     Extraocular Movements: Extraocular movements intact.   Cardiovascular:      Rate and Rhythm: Normal rate and regular rhythm.      Heart sounds: Normal heart sounds. No murmur heard.        Comments: Distant heart sounds  Pulmonary:      Effort: No respiratory distress.      Breath sounds: Normal breath sounds. No wheezing, rhonchi or rales.   Abdominal:      General: Abdomen is flat. There is no distension.      Palpations: Abdomen is soft.      Tenderness: There is no abdominal tenderness. There is no guarding or rebound.   Musculoskeletal:      Right lower leg: No edema.      Left lower leg: No edema.   Skin:     Coloration: Skin is not pale.   Neurological:      Mental Status: He is oriented to person, place, and time.           Labs:   Recent Labs     12/07/21  2032 12/07/21  2040 12/08/21  2358 12/10/21  0150   RBC 5.06  --  4.85 4.64*   HEMOGLOBIN 15.2  --  14.4 13.7*   HEMATOCRIT 45.1  --  43.5 41.0*   PLATELETCT 342  --  330 308   PROTHROMBTM  --  12.0  --   --    APTT  --  27.3  --   --    INR  --  0.91  --   --      Recent Labs     12/07/21  2032 12/08/21  2358 12/10/21  0150   SODIUM 136 133* 135   POTASSIUM 3.9 3.6 3.7   CHLORIDE 103 101 105   CO2 20 21 19*   BUN 17 13 8   CREATININE 0.60 0.48* 0.47*   MAGNESIUM 2.0 1.8  --    PHOSPHORUS  --  3.4  --    CALCIUM 9.6 8.7 8.8     Recent Labs     12/07/21  2032 12/08/21  2358 12/10/21  0150   ALTSGPT  --   --  31   ASTSGOT  --   --  32   ALKPHOSPHAT  --   --  82   TBILIRUBIN  --   --  <0.2   GLUCOSE 137* 181* 165*     Recent Labs     12/07/21 2040   APTT 27.3   INR 0.91         Recent Labs     12/07/21 2032   TRIGLYCERIDE 267*   HDL 57   *     Recent Labs     12/07/21  2032 12/08/21  2358 12/10/21  0150   WBC 7.2 9.4 8.6   NEUTSPOLYS 46.40  --  63.00   LYMPHOCYTES 41.90*  --  27.80   MONOCYTES 9.30  --  7.50   EOSINOPHILS 1.50  --  1.00   BASOPHILS 0.60  --  0.50   ASTSGOT  --   --  32   ALTSGPT  --   --  31   ALKPHOSPHAT  --   --  82   TBILIRUBIN  --   --  <0.2           Imaging:   CT-CHEST (THORAX) W/O   Final Result      1.  Diffuse areas of groundglass attenuation with subpleural sparing concerning for early fibrotic changes.  Acute-subacute infection not excluded.   2.  Hepatic steatosis.      Fleischner Society pulmonary nodule recommendations:   Not Applicable         EC-ECHOCARDIOGRAM COMPLETE W/ CONT   Final Result      OUTSIDE IMAGES-DX CHEST   Final Result      CL-LEFT HEART CATHETERIZATION WITH POSSIBLE INTERVENTION    (Results Pending)       Assessment and Plan  41 years old male with history of familial hypercholesterolemia, STEMI, NSTEMI, admitted to the hospital for NSTEMI and left heart cath shows 3 vessel disease requiring CABG. Pulmonary was consulted for perioperative evaluation.    #NSTEMI  #h/o COVID pnemonia  #Ground glass opacities  #Covid related imaging changes  #Obesity, bmi 44    -Bedside spirometry shows >110 FEV1, 85% FEV1/FVC ratio. Patient is not hypoxic at rest or on ambulation.   -Given no hypoxia at rest and ambulation, normal bedside pulmonary function tests, patient has no increased risk factors for pulmonary complications of CABG surgery, except increased risk due to obesity. Therefore pulmonary risk of surgery statistically should be similar to obese normal population. I believe at this time, from pulmonary standpoint, the benefit of having CABG surgery outweighs the risks of surgery. I will defer surgical decision between the cardiothoracic surgery and primary team.  -I recommend weaning the patient from ventilator to BiPAP after surgery due to obesity.    Thank you for your consult  Available on voalte  Please see Dr. Mulligan's attestation  Discussed with Dr. Keenan and Dr. Agarwal

## 2021-12-10 NOTE — PROGRESS NOTES
Pulmonary consult    Full consult to follow  Reason for consult: Clearance for CABG - COVID long Hauler  Prolonged hospital stay with [facility] when infected with COVID    CT scan reviewed  Areas of GG which are likely related to cardiac status   The subpleural reticular thickening is suggestive of post COVID ILD    Bedside PFTs to assess the severity of restriction understanding that BMI will impact the spirometry  Will have him walk and assess desaturation  This will give us a guide of his breathing and decompensation.    There are some experiences now of risk assessment for COVID ILD pts.      D/w Dr. Arroyo full recommendation jamie.

## 2021-12-10 NOTE — DOCUMENTATION QUERY
"                                                                         [facility]                                                                       Query Response Note      PATIENT:               CHANDRIKA DAVIDSON  ACCT #:                  [id number]  MRN:                     [id number]  :                      1980  ADMIT DATE:       2021 7:19 PM  DISCH DATE:          RESPONDING  PROVIDER #:        [id number]           QUERY TEXT:    Systolic Congestive Heart Failure is documented in the Cardiology Progress Notes but has not been addressed by a hospitalist. Please document the type and acuity (includes probable or suspected).     NOTE:  If an appropriate response is not listed below, please respond with a new note.    The patient's Clinical Indicators include:  U/S Cardiac ECHO on admission noted global hypokinesis, normal diastolic function, mildly reduced LVSF, and an LVEF of 40%. Troponin T: 96 on admission. \"For HFrEF/ischemic cardiomyopathy, change metoprolol tartrate to XL 25 mg daily.  Continue lisinopril 5 mg daily\" is documented in the Cardiology Attending Addendum on 21.     Treatments include: Metoprolol, Lisinopril, U/S Cardiac ECHO, and Cardiology Consult.    Risk factors include: dx NSTEMI, dx Ischemic Cardiomyopathy, hx HTN, hx DM II, hx STEMI, hx HLD, and hx CAD.    Thank you,  Justino Aranda RN, BSN  Clinical   Connect via [employer]  Options provided:   -- Acute Systolic heart failure   -- Chronic Systolic heart failure   -- Acute on Chronic Systolic heart failure   -- Unable to determine      Query created by: Justino Aranda on 12/10/2021 7:35 AM    RESPONSE TEXT:    Chronic Systolic heart failure       QUERY TEXT:    Please clarify in documentation the relationship, if any, between NSTEMI and In-Stent Restenosis.    The patient's Clinical Indicators include:  \"Left anterior descending artery: Moderate caliber vessel wrapping around the apex notable " "for previously placed stent in its ostium with significant in-stent restenosis of 80%\" is documented in the Interventional Cardiology Procedure Note on 12/8/21.    Treatments include: ASA, Heparin gtt, Lisinopril, Metoprolol, Rosuvastatin, and Cardiology Consult.    Risk factors include: dx NSTEMI, hx CAD w/STEMI s/p Stent Placement, hx HLD, hx HTN + CHF, and hx DM II.    Thank you,  Justino Aranda RN, BSN  Clinical   Connect via [employer]  Options provided:   -- NSTEMI is due to or associated with In-Stent Restenosis   -- Unrelated to each other   -- Unable to determine      Query created by: Justino Aranda on 12/10/2021 7:39 AM    RESPONSE TEXT:    Unable to determine          Electronically signed by:  ABDIRIZAK MEDINA MD 12/10/2021 8:40 AM              "

## 2021-12-10 NOTE — PROGRESS NOTES
Call placed to [facility] to initiate transfer process to [place] per request of Dr. Arroyo and Dr. Bradshaw.

## 2021-12-10 NOTE — PROGRESS NOTES
After reviewing the CT scan, which clearly shows either ongoing covid pneumonia or more likely pulmonary fibrosis secondary to covid lung, the patient is not a candidate for CABG secondary to the high likelihood of respiratory failure in the postoperative period resulting in inability to wean from the ventilator.  This case has been discussed with my two partners, Dr Otoole and Dr Pineda and all three of us agree that the operative risk is too high to justify CABG surgery.

## 2021-12-10 NOTE — PROGRESS NOTES
Cardiology Follow Up Progress Note    Date of Service  12/10/2021    Attending Physician  AMY Keenan M.D.    Chief Complaint   Chest pain     HPI  Kyle Gonzalez is a 41 y.o. male admitted 12/7/2021 with chest pain. History of hyperlipidemia, hypertension and diabetes. History of CAD with multiple PCI and coronary stents in LCx and LAD in 10/2020.  of RCA, ischemic cardiomyopathy ef 55%.    Interim Events  12/9/2021: SB-SR 50-60s  VSS    12/10/2021: SR 60-90s  VSS  No CP today      Review of Systems  Review of Systems   Constitutional: Negative for chills and fever.   Respiratory: Negative for cough, chest tightness, shortness of breath and wheezing.    Cardiovascular: Negative for chest pain, palpitations and leg swelling.   Gastrointestinal: Negative for nausea and vomiting.   Neurological: Negative for dizziness and headaches.   All other systems reviewed and are negative.      Vital signs in last 24 hours  Temp:  [35.9 °C (96.7 °F)-36.7 °C (98.1 °F)] 36.3 °C (97.3 °F)  Pulse:  [61-81] 77  Resp:  [17-18] 17  BP: (101-127)/(64-79) 101/64  SpO2:  [93 %-96 %] 94 %    Physical Exam  Physical Exam  Vitals and nursing note reviewed.   Constitutional:       Appearance: Normal appearance.   HENT:      Head: Normocephalic and atraumatic.      Mouth/Throat:      Mouth: Mucous membranes are moist.   Eyes:      Extraocular Movements: Extraocular movements intact.   Neck:      Comments: No JVD  Cardiovascular:      Rate and Rhythm: Normal rate and regular rhythm.      Pulses: Normal pulses.      Heart sounds: Normal heart sounds. No murmur heard.      Pulmonary:      Effort: Pulmonary effort is normal.      Breath sounds: Normal breath sounds.   Abdominal:      Palpations: Abdomen is soft.   Musculoskeletal:      Cervical back: Normal range of motion and neck supple.   Skin:     General: Skin is warm and dry.   Neurological:      General: No focal deficit present.      Mental Status: He is alert  and oriented to person, place, and time.   Psychiatric:         Mood and Affect: Mood normal.         Behavior: Behavior normal.         Lab Review  Lab Results   Component Value Date/Time    WBC 8.6 12/10/2021 01:50 AM    RBC 4.64 (L) 12/10/2021 01:50 AM    HEMOGLOBIN 13.7 (L) 12/10/2021 01:50 AM    HEMATOCRIT 41.0 (L) 12/10/2021 01:50 AM    MCV 88.4 12/10/2021 01:50 AM    MCH 29.5 12/10/2021 01:50 AM    MCHC 33.4 (L) 12/10/2021 01:50 AM    MPV 9.8 12/10/2021 01:50 AM      Lab Results   Component Value Date/Time    SODIUM 135 12/10/2021 01:50 AM    POTASSIUM 3.7 12/10/2021 01:50 AM    CHLORIDE 105 12/10/2021 01:50 AM    CO2 19 (L) 12/10/2021 01:50 AM    GLUCOSE 165 (H) 12/10/2021 01:50 AM    BUN 8 12/10/2021 01:50 AM    CREATININE 0.47 (L) 12/10/2021 01:50 AM      Lab Results   Component Value Date/Time    ASTSGOT 32 12/10/2021 01:50 AM    ALTSGPT 31 12/10/2021 01:50 AM     Lab Results   Component Value Date/Time    CHOLSTRLTOT 265 (H) 12/07/2021 08:32 PM     (H) 12/07/2021 08:32 PM    HDL 57 12/07/2021 08:32 PM    TRIGLYCERIDE 267 (H) 12/07/2021 08:32 PM    TROPONINT 63 (H) 12/08/2021 09:46 AM       No results for input(s): NTPROBNP in the last 72 hours.    Cardiac Imaging and Procedures Review  EKG:  NSR     Echocardiogram: 12/8/2021  CONCLUSIONS  Compared to the images of the prior study 08/13/2021, reduced systolic   function.  Mildly reduced left ventricular systolic function.  Left ventricular ejection fraction is estimated to be 40%.  Global hypokinesis.  Moderately dilated left atrium.  Cannot rule out bicuspid aortic valve.    Cardiac cath 2020:  Post-operative Diagnosis:   1.  Severely reduced left ventricle systolic function again fraction 25%  2.  70-80% ostial left anterior descending artery stenosis, IFR less than 0.6  3.  Chronic total occlusion of mid to distal right coronary artery with faint visualization of distal right coronary artery from left to right collateral  4.  Status post  stenting of the ostial left anterior descending artery with 3.5 x 8 mm Synergy drug eluting stent with no significant residual stenosis ABBY-3 flow.    Cardiac catheterization 12/8/2021  Postoperative diagnosis:  1. Early ISR recently placed ostial LAD stent  2. 70% distal left main stenosis  3. Sequential 80% LCx stenoses  4.  RCA  5. LVEF 50 to 55%   Recommendations:  Guideline directed medical therapy   Cardiovascular Risk factor modification  CABG.  If patient is deemed a poor surgical candidate at this institution I would recommend transfer for CABG at another facility which would be the standard of care for him due to early failure of stents left main disease young age and repeat revascularizations over the recent years.    Assessment/Plan  1. Unstable angina/CAD  -Troponin T peaked at 96  -Continue heparin drip  -Known CAD  -Continue ASA 81 mg daily, ezetimibe 10 mg daily, fenofibrate 67 mg daily, lisinopril 5 mg daily, metoprolol 25 mg twice daily, and rosuvastatin 40 mg every evening  -CABG when cleared by pulmonary    2. Ischemic cardiomyopathy:  -Euvolemic on exam  -ECHO shows EF 40%, likely bicuspid aortic valve    3. Hypertension:  -Stable    Thank you for allowing me to participate in the care of this patient.  I will continue to follow this patient    Please contact me with any questions.        LIAM Briggs  [facility]  [phone]    No future appointments.    Please note that this dictation was created using voice recognition software. I have worked with consultants from the vendor as well as technical experts from [facility] to optimize the interface. I have made every reasonable attempt to correct obvious errors, but I expect that there are errors of grammar and possibly content I did not discover before finalizing the note.

## 2021-12-10 NOTE — RESPIRATORY CARE
LUNG DISEASE EVALUATION by COPD CLINICAL EDUCATOR  12/10/2021 at 9:37 AM by Tisha Osborne, RRT      bedside PFT performed. Results given to inpatient pulmonary team and placed in bedside chart.   FEV1: 2.73L, 109% predicted  FVC: 3.22L, 110% predicted  FEV1/FVC: 100%

## 2021-12-10 NOTE — PROGRESS NOTES
Monitor Summary  Rhythm: SR  Rate: 68-98  Ectopy: rare PVCs  .18 / .08 / .39

## 2021-12-10 NOTE — PROGRESS NOTES
Daily Progress Note:     Date of Service: 12/10/2021  Primary Team: [facility] IM White Team   Attending: AMY Keenan M.D.   Senior Resident: Dr. Agarwal  Intern: Dr. Conway  Contact:  [phone]    Chief Complaint:   Burning substernal chest pain with radiation to the left arm and jaw    Interval Update:  Overnight patient complaining of chest pain, given nitroglycerin paste and 0.4 mg tab as well as morphine 1 mg IV once, EKG ordered unchanged.  Patient states improvement in chest pain, but due to SBP in 90s Nitropaste was held.  Evaluation today patient has continued substernal chest pain, worse with any movement with slight numbness in bilateral hands.  Patient continued on goal-directed therapy, per cardiology will have CABG in a.m for left main and multivessel disease.     Consultants/Specialty:  Cardiology  Cardiothoracic surgery     Review of Systems:    Review of Systems   Constitutional: Negative for chills, diaphoresis and fever.   HENT: Negative for congestion and sore throat.    Respiratory: Negative for cough, shortness of breath and wheezing.    Cardiovascular: Positive for chest pain (burning substernal with radiation to the left arm and jaw). Negative for palpitations, orthopnea and leg swelling.   Gastrointestinal: Negative for abdominal pain, diarrhea, nausea and vomiting.   Skin: Negative for rash.   Neurological: Negative for focal weakness.   Psychiatric/Behavioral: The patient is not nervous/anxious.      Objective Data:   Physical Exam:   Vitals:   Temp:  [36.1 °C (97 °F)-36.7 °C (98.1 °F)] 36.6 °C (97.8 °F)  Pulse:  [67-82] 71  Resp:  [17-18] 18  BP: (101-127)/(55-83) 105/55  SpO2:  [91 %-96 %] 95 %     Physical Exam  Constitutional:       General: He is not in acute distress.     Appearance: Normal appearance. He is obese. He is not ill-appearing, toxic-appearing or diaphoretic.   HENT:      Head: Normocephalic and atraumatic.      Nose: Nose normal.      Mouth/Throat:      Mouth: Mucous  membranes are dry.   Eyes:      General: No scleral icterus.     Extraocular Movements: Extraocular movements intact.      Conjunctiva/sclera: Conjunctivae normal.   Cardiovascular:      Rate and Rhythm: Normal rate and regular rhythm.      Pulses: Normal pulses.      Heart sounds: Normal heart sounds. No murmur heard.  No friction rub. No gallop.    Pulmonary:      Effort: Pulmonary effort is normal. No respiratory distress.      Breath sounds: Normal breath sounds. No wheezing, rhonchi or rales.   Abdominal:      General: Bowel sounds are normal. There is no distension.      Palpations: Abdomen is soft.      Tenderness: There is no abdominal tenderness. There is no guarding or rebound.   Musculoskeletal:         General: Normal range of motion.      Cervical back: Normal range of motion and neck supple.      Right lower leg: No edema.      Left lower leg: No edema.   Skin:     General: Skin is warm.      Coloration: Skin is not jaundiced.      Findings: No lesion or rash.   Neurological:      General: No focal deficit present.      Mental Status: He is alert and oriented to person, place, and time.      Cranial Nerves: No cranial nerve deficit.      Motor: No weakness.   Psychiatric:         Mood and Affect: Mood normal.         Behavior: Behavior normal.         Thought Content: Thought content normal.         Judgment: Judgment normal.       Labs:   Recent Labs     12/07/21  2032 12/08/21  2358 12/10/21  0150   WBC 7.2 9.4 8.6   RBC 5.06 4.85 4.64*   HEMOGLOBIN 15.2 14.4 13.7*   HEMATOCRIT 45.1 43.5 41.0*   MCV 89.1 89.7 88.4   MCH 30.0 29.7 29.5   RDW 44.2 43.9 43.2   PLATELETCT 342 330 308   MPV 9.7 10.0 9.8   NEUTSPOLYS 46.40  --  63.00   LYMPHOCYTES 41.90*  --  27.80   MONOCYTES 9.30  --  7.50   EOSINOPHILS 1.50  --  1.00   BASOPHILS 0.60  --  0.50     Recent Labs     12/07/21  2032 12/08/21  2358 12/10/21  0150   SODIUM 136 133* 135   POTASSIUM 3.9 3.6 3.7   CHLORIDE 103 101 105   CO2 20 21 19*   GLUCOSE  137* 181* 165*   BUN 17 13 8       Imaging:   CT-CHEST (THORAX) W/O   Final Result      1.  Diffuse areas of groundglass attenuation with subpleural sparing concerning for early fibrotic changes. Acute-subacute infection not excluded.   2.  Hepatic steatosis.      Fleischner Society pulmonary nodule recommendations:   Not Applicable         EC-ECHOCARDIOGRAM COMPLETE W/ CONT   Final Result      OUTSIDE IMAGES-DX CHEST   Final Result      CL-LEFT HEART CATHETERIZATION WITH POSSIBLE INTERVENTION    (Results Pending)       Problem Representation:     Multi vessel coronary artery stenosis (HCC)-(present on admission)  Per cath report:  1. Early ISR recently placed ostial LAD stent  2. 70% distal left main stenosis  3. Sequential 80% LCx stenoses  4.  RCA  5. LVEF 50 to 55%     Plan:    Continue with aspirin, Zetia, fenofibrate, lisinopril, metoprolol, and rosuvastatin.  Continue heparin drip.  A1c is 9%.  LDL is 155.  Patient will be an optimal candidate for bariatric surgery given his BMI and comorbidities.  He also need further lipid reduction and may benefit from PCSK9 inhibitors.   - Cardiothoracic surgery concerned w/ pt pulmonary function secondary to covid lung.  Pulm cleared pt for CT surgery showing FEV1 of 2.7 L, 110%.  FEV1/FVC ratio of 85%. CT denied surgery, Initiating transfer to [place] for CABG evaluation.     NSTEMI (non-ST elevated myocardial infarction) (HCC)- (present on admission)  Assessment & Plan  Loaded with aspirin outside facility  Continue heparin drip per cardiology recommendations   New ECHO mildly reduced left ventricular systolic function with EF 40%, echo cannot rule out bicuspid aortic valve. EF 50-55% from cath report   As needed EKG if having recurrent chest pain  Cardiology on board, appreciate recs  CT surgery consult pending   Nitro 0.25inch for cxp      HFrEF secondary to ischemic Cardiomyopathy  Assessment & Plan  ECHO shows mildly reduced left ventricular systolic function  with EF 40%, echo cannot rule out bicuspid aortic valve as well as global hypokinesis, normal diastolic function.  metoprolol tartrate to XL 25 mg daily.  Continue lisinopril 5 mg daily    History of DVT in adulthood  Assessment & Plan  Taking Xarelto at home  On heparin drip as recommended by cardiology     Diabetes ([facility])  Assessment & Plan  A1C 9% not at goal   Sliding scale insulin with hypoglycemia protocol  Will need PCP follow-up and outpatient management     Morbid obesity ([facility])  Assessment & Plan  Patient will likely be a good candidate for consideration for bariatric surgery given his BMI and other comorbidities    Hypertension   Assessment & Plan  Continue lisinopril 5 mg po daily      Hypercholesteremia (familial)   Assessment & Plan   not at goal    rosuvastatin 40 mg every night   fenofibrate 67 mg p.o. daily  ezetimibe  10 mg p.o. daily  Consider PCSK9 inhibitors with goal LDL less than 70     History of Covid pneumonia  Patient reports having Covid pneumonia and being hospitalized for 3 months   Reports DVT as a complication of disease and has been taking Xarelto     VTE prophylaxis: therapeutic anticoagulation with heparin drip

## 2021-12-10 NOTE — PROGRESS NOTES
"Bedside report received from SARIKA Sanchez. POC discussed with pt; all questions answered at this time. Patient is A&O x 4 this AM and says he is feeling \"very good today.\" No complaints of pain. Tele-box is in place and monitoring. Hep is running at 26 units/kg/hr.   "

## 2021-12-11 LAB
ANION GAP SERPL CALC-SCNC: 12 MMOL/L (ref 7–16)
BUN SERPL-MCNC: 11 MG/DL (ref 8–22)
CALCIUM SERPL-MCNC: 10 MG/DL (ref 8.5–10.5)
CHLORIDE SERPL-SCNC: 102 MMOL/L (ref 96–112)
CO2 SERPL-SCNC: 23 MMOL/L (ref 20–33)
CREAT SERPL-MCNC: 0.6 MG/DL (ref 0.5–1.4)
GLUCOSE BLD-MCNC: 157 MG/DL (ref 65–99)
GLUCOSE BLD-MCNC: 182 MG/DL (ref 65–99)
GLUCOSE BLD-MCNC: 242 MG/DL (ref 65–99)
GLUCOSE BLD-MCNC: 274 MG/DL (ref 65–99)
GLUCOSE SERPL-MCNC: 155 MG/DL (ref 65–99)
MAGNESIUM SERPL-MCNC: 1.9 MG/DL (ref 1.5–2.5)
PHOSPHATE SERPL-MCNC: 4.3 MG/DL (ref 2.5–4.5)
POTASSIUM SERPL-SCNC: 3.8 MMOL/L (ref 3.6–5.5)
SODIUM SERPL-SCNC: 137 MMOL/L (ref 135–145)
UFH PPP CHRO-ACNC: 0.38 IU/ML
UFH PPP CHRO-ACNC: 0.46 IU/ML
UFH PPP CHRO-ACNC: 0.63 IU/ML
UFH PPP CHRO-ACNC: 0.71 IU/ML

## 2021-12-11 PROCEDURE — A9270 NON-COVERED ITEM OR SERVICE: HCPCS | Performed by: STUDENT IN AN ORGANIZED HEALTH CARE EDUCATION/TRAINING PROGRAM

## 2021-12-11 PROCEDURE — 99232 SBSQ HOSP IP/OBS MODERATE 35: CPT | Performed by: INTERNAL MEDICINE

## 2021-12-11 PROCEDURE — 36415 COLL VENOUS BLD VENIPUNCTURE: CPT

## 2021-12-11 PROCEDURE — 82962 GLUCOSE BLOOD TEST: CPT | Mod: 91

## 2021-12-11 PROCEDURE — 700102 HCHG RX REV CODE 250 W/ 637 OVERRIDE(OP): Performed by: STUDENT IN AN ORGANIZED HEALTH CARE EDUCATION/TRAINING PROGRAM

## 2021-12-11 PROCEDURE — 83735 ASSAY OF MAGNESIUM: CPT

## 2021-12-11 PROCEDURE — 80048 BASIC METABOLIC PNL TOTAL CA: CPT

## 2021-12-11 PROCEDURE — A9270 NON-COVERED ITEM OR SERVICE: HCPCS | Performed by: NURSE PRACTITIONER

## 2021-12-11 PROCEDURE — 700102 HCHG RX REV CODE 250 W/ 637 OVERRIDE(OP): Performed by: NURSE PRACTITIONER

## 2021-12-11 PROCEDURE — 770020 HCHG ROOM/CARE - TELE (206)

## 2021-12-11 PROCEDURE — 700102 HCHG RX REV CODE 250 W/ 637 OVERRIDE(OP): Performed by: INTERNAL MEDICINE

## 2021-12-11 PROCEDURE — A9270 NON-COVERED ITEM OR SERVICE: HCPCS | Performed by: INTERNAL MEDICINE

## 2021-12-11 PROCEDURE — 85520 HEPARIN ASSAY: CPT | Mod: 91

## 2021-12-11 PROCEDURE — 84100 ASSAY OF PHOSPHORUS: CPT

## 2021-12-11 PROCEDURE — 99232 SBSQ HOSP IP/OBS MODERATE 35: CPT | Mod: GC | Performed by: HOSPITALIST

## 2021-12-11 PROCEDURE — 700111 HCHG RX REV CODE 636 W/ 250 OVERRIDE (IP): Performed by: STUDENT IN AN ORGANIZED HEALTH CARE EDUCATION/TRAINING PROGRAM

## 2021-12-11 RX ADMIN — NITROGLYCERIN 0.25 INCH: 20 OINTMENT TOPICAL at 05:53

## 2021-12-11 RX ADMIN — ROSUVASTATIN CALCIUM 40 MG: 20 TABLET, FILM COATED ORAL at 17:49

## 2021-12-11 RX ADMIN — INSULIN HUMAN 3 UNITS: 100 INJECTION, SOLUTION PARENTERAL at 05:54

## 2021-12-11 RX ADMIN — FENOFIBRATE 134 MG: 134 CAPSULE ORAL at 05:52

## 2021-12-11 RX ADMIN — INSULIN HUMAN 9 UNITS: 100 INJECTION, SOLUTION PARENTERAL at 13:11

## 2021-12-11 RX ADMIN — ASPIRIN 81 MG: 81 TABLET, COATED ORAL at 05:52

## 2021-12-11 RX ADMIN — NITROGLYCERIN 0.25 INCH: 20 OINTMENT TOPICAL at 13:13

## 2021-12-11 RX ADMIN — NITROGLYCERIN 0.25 INCH: 20 OINTMENT TOPICAL at 18:00

## 2021-12-11 RX ADMIN — METOPROLOL SUCCINATE 25 MG: 25 TABLET, EXTENDED RELEASE ORAL at 05:52

## 2021-12-11 RX ADMIN — HEPARIN SODIUM 26 UNITS/KG/HR: 5000 INJECTION, SOLUTION INTRAVENOUS at 00:09

## 2021-12-11 RX ADMIN — HYDROXYZINE HYDROCHLORIDE 25 MG: 25 TABLET, FILM COATED ORAL at 09:33

## 2021-12-11 RX ADMIN — INSULIN HUMAN 6 UNITS: 100 INJECTION, SOLUTION PARENTERAL at 17:50

## 2021-12-11 RX ADMIN — NITROGLYCERIN 0.25 INCH: 20 OINTMENT TOPICAL at 00:09

## 2021-12-11 RX ADMIN — HEPARIN SODIUM 24 UNITS/KG/HR: 5000 INJECTION, SOLUTION INTRAVENOUS at 13:10

## 2021-12-11 RX ADMIN — INSULIN HUMAN 3 UNITS: 100 INJECTION, SOLUTION PARENTERAL at 00:12

## 2021-12-11 RX ADMIN — EZETIMIBE 10 MG: 10 TABLET ORAL at 05:52

## 2021-12-11 ASSESSMENT — ENCOUNTER SYMPTOMS
COUGH: 0
NAUSEA: 0
DIAPHORESIS: 0
CHILLS: 0
VOMITING: 0
DIARRHEA: 0
WHEEZING: 0
ORTHOPNEA: 0
SHORTNESS OF BREATH: 0
CHEST TIGHTNESS: 0
HEADACHES: 0
NERVOUS/ANXIOUS: 0
ABDOMINAL PAIN: 0
FOCAL WEAKNESS: 0
DIZZINESS: 0
SORE THROAT: 0
FEVER: 0
PALPITATIONS: 0

## 2021-12-11 ASSESSMENT — FIBROSIS 4 INDEX: FIB4 SCORE: 0.75

## 2021-12-11 NOTE — PROGRESS NOTES
Daily Progress Note:     Date of Service: 12/11/2021  Primary Team: [facility] IM White Team   Attending: AMY Keenan M.D.   Senior Resident: Dr. Agarwal  Intern: Dr. Conway  Contact:  [phone]    Chief Complaint:   Burning substernal chest pain with radiation to the left arm and jaw    ID:  41-year-old male, admitted with chest pain.  History of familial hyperlipidemia, coronary artery disease with multiple PCI coronary stents LCx and LAD in 2020.  Echo shows ischemic cardiac myopathy with EF 40%.    interval Update:  No acute events overnight, on evaluation today patient laying in bed, has no current complaints and is hemodynamically stable, patient continues to deny chest pain, shortness of breath, no lower leg edema.  Spoke to patient about cath results and importance of procedure, patient agrees to CABG.  Patient accepted by [facility] for CABG procedure, awaiting bed.        Consultants/Specialty:  Cardiology  Cardiothoracic surgery     Review of Systems:    Review of Systems   Constitutional: Negative for chills, diaphoresis and fever.   HENT: Negative for congestion and sore throat.    Respiratory: Negative for cough, shortness of breath and wheezing.    Cardiovascular: Positive for chest pain (burning substernal with radiation to the left arm and jaw). Negative for palpitations, orthopnea and leg swelling.   Gastrointestinal: Negative for abdominal pain, diarrhea, nausea and vomiting.   Skin: Negative for rash.   Neurological: Negative for focal weakness.   Psychiatric/Behavioral: The patient is not nervous/anxious.      Objective Data:   Physical Exam:   Vitals:   Temp:  [35.9 °C (96.7 °F)-36.6 °C (97.8 °F)] 35.9 °C (96.7 °F)  Pulse:  [68-83] 70  Resp:  [17-18] 17  BP: ()/(54-87) 95/54  SpO2:  [90 %-95 %] 90 %     Physical Exam  Constitutional:       General: He is not in acute distress.     Appearance: Normal appearance. He is obese. He is not ill-appearing, toxic-appearing or diaphoretic.    HENT:      Head: Normocephalic and atraumatic.      Nose: Nose normal.      Mouth/Throat:      Mouth: Mucous membranes are dry.   Eyes:      General: No scleral icterus.     Extraocular Movements: Extraocular movements intact.      Conjunctiva/sclera: Conjunctivae normal.   Cardiovascular:      Rate and Rhythm: Normal rate and regular rhythm.      Pulses: Normal pulses.      Heart sounds: Normal heart sounds. No murmur heard.  No friction rub. No gallop.    Pulmonary:      Effort: Pulmonary effort is normal. No respiratory distress.      Breath sounds: Normal breath sounds. No wheezing, rhonchi or rales.   Abdominal:      General: Bowel sounds are normal. There is no distension.      Palpations: Abdomen is soft.      Tenderness: There is no abdominal tenderness. There is no guarding or rebound.   Musculoskeletal:         General: Normal range of motion.      Cervical back: Normal range of motion and neck supple.      Right lower leg: No edema.      Left lower leg: No edema.   Skin:     General: Skin is warm.      Coloration: Skin is not jaundiced.      Findings: No lesion or rash.   Neurological:      General: No focal deficit present.      Mental Status: He is alert and oriented to person, place, and time.      Cranial Nerves: No cranial nerve deficit.      Motor: No weakness.   Psychiatric:         Mood and Affect: Mood normal.         Behavior: Behavior normal.         Thought Content: Thought content normal.         Judgment: Judgment normal.       Labs:   Recent Labs     12/08/21  2358 12/10/21  0150   WBC 9.4 8.6   RBC 4.85 4.64*   HEMOGLOBIN 14.4 13.7*   HEMATOCRIT 43.5 41.0*   MCV 89.7 88.4   MCH 29.7 29.5   RDW 43.9 43.2   PLATELETCT 330 308   MPV 10.0 9.8   NEUTSPOLYS  --  63.00   LYMPHOCYTES  --  27.80   MONOCYTES  --  7.50   EOSINOPHILS  --  1.00   BASOPHILS  --  0.50     Recent Labs     12/08/21  2358 12/10/21  0150 12/11/21  0143   SODIUM 133* 135 137   POTASSIUM 3.6 3.7 3.8   CHLORIDE 101 105 102    CO2 21 19* 23   GLUCOSE 181* 165* 155*   BUN 13 8 11       Imaging:   CT-CHEST (THORAX) W/O   Final Result      1.  Diffuse areas of groundglass attenuation with subpleural sparing concerning for early fibrotic changes. Acute-subacute infection not excluded.   2.  Hepatic steatosis.      Fleischner Society pulmonary nodule recommendations:   Not Applicable         EC-ECHOCARDIOGRAM COMPLETE W/ CONT   Final Result      OUTSIDE IMAGES-DX CHEST   Final Result      CL-LEFT HEART CATHETERIZATION WITH POSSIBLE INTERVENTION    (Results Pending)       Problem Representation:     Multi vessel coronary artery stenosis (HCC)-(present on admission)  Per cath report:  1. Early ISR recently placed ostial LAD stent  2. 70% distal left main stenosis  3. Sequential 80% LCx stenoses  4.  RCA  5. LVEF 50 to 55%     Plan:    Continue with aspirin, Zetia, fenofibrate, lisinopril, metoprolol, and rosuvastatin.  Continue heparin drip.  A1c is 9%.  LDL is 155.  Patient will be an optimal candidate for bariatric surgery given his BMI and comorbidities.  He also need further lipid reduction and may benefit from PCSK9 inhibitors.   - Cardiothoracic surgery concerned w/ pt pulmonary function secondary to covid lung.  Pulm cleared pt for CT surgery showing FEV1 of 2.7 L, 110%.  FEV1/FVC ratio of 85%. CT denied surgery, approved transfer to [place] for CABG evaluation.    NSTEMI (non-ST elevated myocardial infarction) (HCC)- (present on admission)  Assessment & Plan  Continue heparin drip per cardiology recommendations   New ECHO mildly reduced left ventricular systolic function with EF 40%, echo cannot rule out bicuspid aortic valve. EF 50-55% from cath report   As needed EKG if having recurrent chest pain  Cardiology on board, appreciate recs, see above  Nitro 0.25inch for cxp      HFrEF secondary to ischemic Cardiomyopathy  Assessment & Plan  ECHO shows mildly reduced left ventricular systolic function with EF 40%, echo cannot rule out  bicuspid aortic valve as well as global hypokinesis, normal diastolic function.  metoprolol tartrate to XL 25 mg daily.  Continue lisinopril 5 mg daily    History of DVT in adulthood  Assessment & Plan  Taking Xarelto at home  On heparin drip as recommended by cardiology     Diabetes ([facility])  Assessment & Plan  A1C 9% not at goal   Sliding scale insulin with hypoglycemia protocol  Will need PCP follow-up and outpatient management     Morbid obesity ([facility])  Assessment & Plan  Patient will likely be a good candidate for consideration for bariatric surgery given his BMI and other comorbidities    Hypertension   Assessment & Plan  Continue lisinopril 5 mg po daily      Hypercholesteremia (familial)   Assessment & Plan   not at goal    rosuvastatin 40 mg every night   fenofibrate 67 mg p.o. daily  ezetimibe  10 mg p.o. daily  Consider PCSK9 inhibitors with goal LDL less than 70     History of Covid pneumonia  Patient reports having Covid pneumonia and being hospitalized for 3 months   Reports DVT as a complication of disease and has been taking Xarelto     VTE prophylaxis: therapeutic anticoagulation with heparin drip

## 2021-12-11 NOTE — PROGRESS NOTES
Cardiology Follow Up Progress Note    Date of Service  12/11/2021    Attending Physician  AMY Keenan M.D.    Chief Complaint   Chest pain     HPI  Kyle Gonzalez is a 41 y.o. male admitted 12/7/2021 with chest pain. History of hyperlipidemia, hypertension and diabetes. History of CAD with multiple PCI and coronary stents in LCx and LAD in 10/2020.  of RCA, ischemic cardiomyopathy ef 55%.    Interim Events  12/9/2021: SB-SR 50-60s  VSS    12/10/2021: SR 60-90s  VSS  No CP today    12/11/2021: SR 90s  VSS  A bit of CP this am, better now      Review of Systems  Review of Systems   Constitutional: Negative for chills and fever.   Respiratory: Negative for cough, chest tightness, shortness of breath and wheezing.    Cardiovascular: Negative for chest pain, palpitations and leg swelling.   Gastrointestinal: Negative for nausea and vomiting.   Neurological: Negative for dizziness and headaches.   All other systems reviewed and are negative.      Vital signs in last 24 hours  Temp:  [35.9 °C (96.7 °F)-36.6 °C (97.8 °F)] 35.9 °C (96.7 °F)  Pulse:  [68-83] 70  Resp:  [17-18] 17  BP: ()/(54-87) 95/54  SpO2:  [90 %-95 %] 90 %    Physical Exam  Physical Exam  Vitals and nursing note reviewed.   Constitutional:       Appearance: Normal appearance.   HENT:      Head: Normocephalic and atraumatic.      Mouth/Throat:      Mouth: Mucous membranes are moist.   Eyes:      Extraocular Movements: Extraocular movements intact.   Neck:      Comments: No JVD  Cardiovascular:      Rate and Rhythm: Normal rate and regular rhythm.      Pulses: Normal pulses.      Heart sounds: Normal heart sounds. No murmur heard.      Pulmonary:      Effort: Pulmonary effort is normal.      Breath sounds: Normal breath sounds.   Abdominal:      Palpations: Abdomen is soft.   Musculoskeletal:      Cervical back: Normal range of motion and neck supple.   Skin:     General: Skin is warm and dry.   Neurological:      General: No  focal deficit present.      Mental Status: He is alert and oriented to person, place, and time.   Psychiatric:         Mood and Affect: Mood normal.         Behavior: Behavior normal.         Lab Review  Lab Results   Component Value Date/Time    WBC 8.6 12/10/2021 01:50 AM    RBC 4.64 (L) 12/10/2021 01:50 AM    HEMOGLOBIN 13.7 (L) 12/10/2021 01:50 AM    HEMATOCRIT 41.0 (L) 12/10/2021 01:50 AM    MCV 88.4 12/10/2021 01:50 AM    MCH 29.5 12/10/2021 01:50 AM    MCHC 33.4 (L) 12/10/2021 01:50 AM    MPV 9.8 12/10/2021 01:50 AM      Lab Results   Component Value Date/Time    SODIUM 137 12/11/2021 01:43 AM    POTASSIUM 3.8 12/11/2021 01:43 AM    CHLORIDE 102 12/11/2021 01:43 AM    CO2 23 12/11/2021 01:43 AM    GLUCOSE 155 (H) 12/11/2021 01:43 AM    BUN 11 12/11/2021 01:43 AM    CREATININE 0.60 12/11/2021 01:43 AM      Lab Results   Component Value Date/Time    ASTSGOT 32 12/10/2021 01:50 AM    ALTSGPT 31 12/10/2021 01:50 AM     Lab Results   Component Value Date/Time    CHOLSTRLTOT 265 (H) 12/07/2021 08:32 PM     (H) 12/07/2021 08:32 PM    HDL 57 12/07/2021 08:32 PM    TRIGLYCERIDE 267 (H) 12/07/2021 08:32 PM    TROPONINT 63 (H) 12/08/2021 09:46 AM       No results for input(s): NTPROBNP in the last 72 hours.    Cardiac Imaging and Procedures Review  EKG:  NSR     Echocardiogram: 12/8/2021  CONCLUSIONS  Compared to the images of the prior study 08/13/2021, reduced systolic   function.  Mildly reduced left ventricular systolic function.  Left ventricular ejection fraction is estimated to be 40%.  Global hypokinesis.  Moderately dilated left atrium.  Cannot rule out bicuspid aortic valve.    Cardiac cath 2020:  Post-operative Diagnosis:   1.  Severely reduced left ventricle systolic function again fraction 25%  2.  70-80% ostial left anterior descending artery stenosis, IFR less than 0.6  3.  Chronic total occlusion of mid to distal right coronary artery with faint visualization of distal right coronary artery from  left to right collateral  4.  Status post stenting of the ostial left anterior descending artery with 3.5 x 8 mm Synergy drug eluting stent with no significant residual stenosis ABBY-3 flow.    Cardiac catheterization 12/8/2021  Postoperative diagnosis:  1. Early ISR recently placed ostial LAD stent  2. 70% distal left main stenosis  3. Sequential 80% LCx stenoses  4.  RCA  5. LVEF 50 to 55%   Recommendations:  Guideline directed medical therapy   Cardiovascular Risk factor modification  CABG.  If patient is deemed a poor surgical candidate at this institution I would recommend transfer for CABG at another facility which would be the standard of care for him due to early failure of stents left main disease young age and repeat revascularizations over the recent years.    Assessment/Plan  1. Unstable angina/CAD  -Troponin T peaked at 96  -Continue heparin drip  -Known CAD  -Continue ASA 81 mg daily, ezetimibe 10 mg daily, fenofibrate 67 mg daily, lisinopril 5 mg daily, metoprolol 25 mg twice daily, and rosuvastatin 40 mg every evening  -CABG needed, Accepted by [facility], awaiting bed  -CD with cath films and echo at bedside    2. Ischemic cardiomyopathy:  -Euvolemic on exam  -ECHO shows EF 40%, likely bicuspid aortic valve    3. Hypertension:  -Stable    Thank you for allowing me to participate in the care of this patient.  I will continue to follow this patient    Please contact me with any questions.        LIAM Briggs  [facility]  [phone]    Future Appointments   Date Time Provider Department Center   1/13/2022 12:45 PM Jerica Joseph P.A.-C. [facility] None         Please note that this dictation was created using voice recognition software. I have worked with consultants from the vendor as well as technical experts from [facility] to optimize the interface. I have made every reasonable attempt to correct obvious errors, but I expect that there are errors of  grammar and possibly content I did not discover before finalizing the note.

## 2021-12-11 NOTE — PROGRESS NOTES
Assumed care of patient at bedside report from Jeny BAUM. Updated on POC. Patient currently A & O x 4; on RA; up ad erick. Denies chest pain or SOB. Heparin gtt continuous. Call light within reach. Whiteboard updated. Fall precautions in place. Bed locked and in lowest position. All questions answered. No other needs indicated at this time.

## 2021-12-11 NOTE — PROGRESS NOTES
Monitor Summary  Rhythm: SR  Rate: 66-80  Ectopy: rare PVCs, rare PACs  .14 / .10 / .43

## 2021-12-12 LAB
GLUCOSE BLD-MCNC: 148 MG/DL (ref 65–99)
GLUCOSE BLD-MCNC: 171 MG/DL (ref 65–99)
GLUCOSE BLD-MCNC: 208 MG/DL (ref 65–99)
GLUCOSE BLD-MCNC: 221 MG/DL (ref 65–99)
UFH PPP CHRO-ACNC: 0.45 IU/ML

## 2021-12-12 PROCEDURE — A9270 NON-COVERED ITEM OR SERVICE: HCPCS | Performed by: STUDENT IN AN ORGANIZED HEALTH CARE EDUCATION/TRAINING PROGRAM

## 2021-12-12 PROCEDURE — 700102 HCHG RX REV CODE 250 W/ 637 OVERRIDE(OP): Performed by: STUDENT IN AN ORGANIZED HEALTH CARE EDUCATION/TRAINING PROGRAM

## 2021-12-12 PROCEDURE — 99232 SBSQ HOSP IP/OBS MODERATE 35: CPT | Mod: GC | Performed by: HOSPITALIST

## 2021-12-12 PROCEDURE — 770020 HCHG ROOM/CARE - TELE (206)

## 2021-12-12 PROCEDURE — A9270 NON-COVERED ITEM OR SERVICE: HCPCS | Performed by: INTERNAL MEDICINE

## 2021-12-12 PROCEDURE — 36415 COLL VENOUS BLD VENIPUNCTURE: CPT

## 2021-12-12 PROCEDURE — 99232 SBSQ HOSP IP/OBS MODERATE 35: CPT | Performed by: INTERNAL MEDICINE

## 2021-12-12 PROCEDURE — 700102 HCHG RX REV CODE 250 W/ 637 OVERRIDE(OP): Performed by: INTERNAL MEDICINE

## 2021-12-12 PROCEDURE — 85520 HEPARIN ASSAY: CPT

## 2021-12-12 PROCEDURE — A9270 NON-COVERED ITEM OR SERVICE: HCPCS | Performed by: NURSE PRACTITIONER

## 2021-12-12 PROCEDURE — 700111 HCHG RX REV CODE 636 W/ 250 OVERRIDE (IP): Performed by: STUDENT IN AN ORGANIZED HEALTH CARE EDUCATION/TRAINING PROGRAM

## 2021-12-12 PROCEDURE — 82962 GLUCOSE BLOOD TEST: CPT | Mod: 91

## 2021-12-12 PROCEDURE — 700102 HCHG RX REV CODE 250 W/ 637 OVERRIDE(OP): Performed by: NURSE PRACTITIONER

## 2021-12-12 RX ADMIN — ROSUVASTATIN CALCIUM 40 MG: 20 TABLET, FILM COATED ORAL at 17:40

## 2021-12-12 RX ADMIN — EZETIMIBE 10 MG: 10 TABLET ORAL at 05:26

## 2021-12-12 RX ADMIN — NITROGLYCERIN 0.25 INCH: 20 OINTMENT TOPICAL at 12:01

## 2021-12-12 RX ADMIN — Medication 10 MG: at 19:44

## 2021-12-12 RX ADMIN — INSULIN HUMAN 6 UNITS: 100 INJECTION, SOLUTION PARENTERAL at 17:32

## 2021-12-12 RX ADMIN — HEPARIN SODIUM 24 UNITS/KG/HR: 5000 INJECTION, SOLUTION INTRAVENOUS at 15:17

## 2021-12-12 RX ADMIN — INSULIN HUMAN 3 UNITS: 100 INJECTION, SOLUTION PARENTERAL at 05:25

## 2021-12-12 RX ADMIN — INSULIN HUMAN 6 UNITS: 100 INJECTION, SOLUTION PARENTERAL at 12:08

## 2021-12-12 RX ADMIN — NITROGLYCERIN 0.25 INCH: 20 OINTMENT TOPICAL at 05:26

## 2021-12-12 RX ADMIN — NITROGLYCERIN 0.25 INCH: 20 OINTMENT TOPICAL at 00:16

## 2021-12-12 RX ADMIN — HEPARIN SODIUM 24 UNITS/KG/HR: 5000 INJECTION, SOLUTION INTRAVENOUS at 02:52

## 2021-12-12 RX ADMIN — METOPROLOL SUCCINATE 25 MG: 25 TABLET, EXTENDED RELEASE ORAL at 05:26

## 2021-12-12 RX ADMIN — FENOFIBRATE 134 MG: 134 CAPSULE ORAL at 05:26

## 2021-12-12 RX ADMIN — HYDROXYZINE HYDROCHLORIDE 25 MG: 25 TABLET, FILM COATED ORAL at 19:40

## 2021-12-12 RX ADMIN — NITROGLYCERIN 0.25 INCH: 20 OINTMENT TOPICAL at 17:40

## 2021-12-12 RX ADMIN — ASPIRIN 81 MG: 81 TABLET, COATED ORAL at 05:26

## 2021-12-12 ASSESSMENT — ENCOUNTER SYMPTOMS
DIARRHEA: 0
DIAPHORESIS: 0
CHILLS: 0
WHEEZING: 0
FEVER: 0
FOCAL WEAKNESS: 0
ABDOMINAL PAIN: 0
ORTHOPNEA: 0
NAUSEA: 0
VOMITING: 0
NERVOUS/ANXIOUS: 0
SORE THROAT: 0
SHORTNESS OF BREATH: 0
COUGH: 0
PALPITATIONS: 0

## 2021-12-12 ASSESSMENT — PAIN DESCRIPTION - PAIN TYPE: TYPE: ACUTE PAIN

## 2021-12-12 ASSESSMENT — FIBROSIS 4 INDEX: FIB4 SCORE: 0.75

## 2021-12-12 NOTE — PROGRESS NOTES
Monitor Summary  Rhythm: SR-ST  Rate:   Ectopy: occasional PVCs, rare PACs  .18 / .07 / .39

## 2021-12-12 NOTE — PROGRESS NOTES
Bedside report received from SARIKA Sanchez. POC discussed with pt; all questions answered at this time. Patient is alert and oriented this morning, no complaints of pain at this time.

## 2021-12-12 NOTE — PROGRESS NOTES
Assumed care of patient at bedside report from Jeny BAUM. Updated on POC. Patient currently A & O x 4; on RA; up ad erick. Denies chest pain or SOB, nitro paste applied q6h. Heparin gtt continuous. Call light within reach. Whiteboard updated. Fall precautions in place. Bed locked and in lowest position. All questions answered. No other needs indicated at this time.

## 2021-12-13 PROBLEM — I82.409 DVT (DEEP VENOUS THROMBOSIS) (HCC): Status: ACTIVE | Noted: 2021-12-13

## 2021-12-13 LAB
GLUCOSE BLD-MCNC: 151 MG/DL (ref 65–99)
GLUCOSE BLD-MCNC: 153 MG/DL (ref 65–99)
GLUCOSE BLD-MCNC: 253 MG/DL (ref 65–99)
UFH PPP CHRO-ACNC: 0.45 IU/ML

## 2021-12-13 PROCEDURE — 700111 HCHG RX REV CODE 636 W/ 250 OVERRIDE (IP): Performed by: STUDENT IN AN ORGANIZED HEALTH CARE EDUCATION/TRAINING PROGRAM

## 2021-12-13 PROCEDURE — 700102 HCHG RX REV CODE 250 W/ 637 OVERRIDE(OP): Performed by: STUDENT IN AN ORGANIZED HEALTH CARE EDUCATION/TRAINING PROGRAM

## 2021-12-13 PROCEDURE — A9270 NON-COVERED ITEM OR SERVICE: HCPCS | Performed by: NURSE PRACTITIONER

## 2021-12-13 PROCEDURE — A9270 NON-COVERED ITEM OR SERVICE: HCPCS | Performed by: INTERNAL MEDICINE

## 2021-12-13 PROCEDURE — 99233 SBSQ HOSP IP/OBS HIGH 50: CPT | Performed by: HOSPITALIST

## 2021-12-13 PROCEDURE — A9270 NON-COVERED ITEM OR SERVICE: HCPCS | Performed by: STUDENT IN AN ORGANIZED HEALTH CARE EDUCATION/TRAINING PROGRAM

## 2021-12-13 PROCEDURE — 770020 HCHG ROOM/CARE - TELE (206)

## 2021-12-13 PROCEDURE — 99232 SBSQ HOSP IP/OBS MODERATE 35: CPT | Performed by: INTERNAL MEDICINE

## 2021-12-13 PROCEDURE — 700102 HCHG RX REV CODE 250 W/ 637 OVERRIDE(OP): Performed by: INTERNAL MEDICINE

## 2021-12-13 PROCEDURE — 700102 HCHG RX REV CODE 250 W/ 637 OVERRIDE(OP): Performed by: NURSE PRACTITIONER

## 2021-12-13 PROCEDURE — 85520 HEPARIN ASSAY: CPT

## 2021-12-13 PROCEDURE — 36415 COLL VENOUS BLD VENIPUNCTURE: CPT

## 2021-12-13 PROCEDURE — 82962 GLUCOSE BLOOD TEST: CPT

## 2021-12-13 RX ORDER — AMOXICILLIN 250 MG
2 CAPSULE ORAL 2 TIMES DAILY
Status: DISCONTINUED | OUTPATIENT
Start: 2021-12-13 | End: 2021-12-19 | Stop reason: HOSPADM

## 2021-12-13 RX ORDER — POLYETHYLENE GLYCOL 3350 17 G/17G
1 POWDER, FOR SOLUTION ORAL
Status: DISCONTINUED | OUTPATIENT
Start: 2021-12-13 | End: 2021-12-19 | Stop reason: HOSPADM

## 2021-12-13 RX ORDER — INSULIN LISPRO 100 [IU]/ML
1-6 INJECTION, SOLUTION INTRAVENOUS; SUBCUTANEOUS EVERY 6 HOURS
Status: DISCONTINUED | OUTPATIENT
Start: 2021-12-13 | End: 2021-12-19 | Stop reason: HOSPADM

## 2021-12-13 RX ORDER — DEXTROSE MONOHYDRATE 25 G/50ML
50 INJECTION, SOLUTION INTRAVENOUS
Status: DISCONTINUED | OUTPATIENT
Start: 2021-12-13 | End: 2021-12-19 | Stop reason: HOSPADM

## 2021-12-13 RX ORDER — BISACODYL 10 MG
10 SUPPOSITORY, RECTAL RECTAL
Status: DISCONTINUED | OUTPATIENT
Start: 2021-12-13 | End: 2021-12-19 | Stop reason: HOSPADM

## 2021-12-13 RX ADMIN — INSULIN HUMAN 3 UNITS: 100 INJECTION, SOLUTION PARENTERAL at 00:58

## 2021-12-13 RX ADMIN — NITROGLYCERIN 0.25 INCH: 20 OINTMENT TOPICAL at 18:22

## 2021-12-13 RX ADMIN — ENOXAPARIN SODIUM 120 MG: 120 INJECTION SUBCUTANEOUS at 14:43

## 2021-12-13 RX ADMIN — HEPARIN SODIUM 24 UNITS/KG/HR: 5000 INJECTION, SOLUTION INTRAVENOUS at 03:45

## 2021-12-13 RX ADMIN — ASPIRIN 81 MG: 81 TABLET, COATED ORAL at 05:37

## 2021-12-13 RX ADMIN — FENOFIBRATE 134 MG: 134 CAPSULE ORAL at 05:37

## 2021-12-13 RX ADMIN — INSULIN HUMAN 3 UNITS: 100 INJECTION, SOLUTION PARENTERAL at 05:44

## 2021-12-13 RX ADMIN — EZETIMIBE 10 MG: 10 TABLET ORAL at 05:37

## 2021-12-13 RX ADMIN — INSULIN GLARGINE 10 UNITS: 100 INJECTION, SOLUTION SUBCUTANEOUS at 19:57

## 2021-12-13 RX ADMIN — ROSUVASTATIN CALCIUM 40 MG: 20 TABLET, FILM COATED ORAL at 18:21

## 2021-12-13 RX ADMIN — Medication 10 MG: at 20:00

## 2021-12-13 RX ADMIN — METOPROLOL SUCCINATE 25 MG: 25 TABLET, EXTENDED RELEASE ORAL at 05:37

## 2021-12-13 RX ADMIN — SENNOSIDES AND DOCUSATE SODIUM 2 TABLET: 50; 8.6 TABLET ORAL at 18:21

## 2021-12-13 RX ADMIN — NITROGLYCERIN 0.25 INCH: 20 OINTMENT TOPICAL at 12:53

## 2021-12-13 ASSESSMENT — PAIN DESCRIPTION - PAIN TYPE: TYPE: ACUTE PAIN

## 2021-12-13 ASSESSMENT — FIBROSIS 4 INDEX: FIB4 SCORE: 0.75

## 2021-12-13 NOTE — DISCHARGE PLANNING
Anticipated Discharge Disposition: Transfer to [facility]    Action: LSW completed chart review. Pt is pending transfer to [facility] for CABG. LSW sent voalte to [facility] [place]. Per [facility] is reviewing financials and they will have to reach out to Medicaid to get approval for transport.    Barriers to Discharge: [facility] acceptance/bed.    Plan: No DC needs identified at this time.

## 2021-12-13 NOTE — DISCHARGE SUMMARY
Discharge Summary    Date of Admission: 12/7/2021  Date of Discharge: No discharge date for patient encounter.  Discharging Attending: Satnam Beckham M.D.   Discharging Senior Resident: Dr. Johnson  Discharging Intern: Dr. Zafar    Reason for Admission  Multivessel CAD    Admission Date  12/7/2021    CODE STATUS  Full Code    HPI  Kyle Gonzalez is a 41M with a h/o Multivessel CAD c/b HFrEF (EF~40%, s/p stent to the L Circumflex artery and LAD in 2020), Familial Hypercholesterolemia (LDL~155, c/b Hepatic Steatosis), LUE DVT (Partial L brachial artery occlusion in secondary to L Arterial Line placement in August 2021 during an episode of COVID), IDDM2 (60 units of Glargine at night, A1c = 9%), HTN, smoking (27 pack years), and ETOH use.      Pt was admitted for NSTEMI and is s/p LHC without intervention on 12/7 due to multivessel CAD.      Hospital Course:     On 12/7, pt was admitted with typical cardiac CP and was found to have an NSTEMI (with Trop T~96). Therefore, pt was started on a heparin drip and given ASA. The next day, pt was taken to the cath lab but due to the presence of multivessel CAD that also involved the LAD, no stenting or angioplasty was performed at that time. Instead, pt was recommended a CABG. Over the next few days, pt continued to have CP, but thankfully this resolved by 12/11. By 12/13, we transitioned him off of the heparin drip and onto SubQ Enoxaparin. On 12/15, due to delays in pt's CABG, we transitioned him off of therapeutic anticoagulation and started on Clopidogrel for a few days. On 12/17, because pt had been accepted to [facility], his Clopidogrel was stopped once again. On 12/19, pt was transferred to [facility].       * NSTEMI (in the setting of severe CAD and HFrEF) - see hospital course above. There is some suspicion that pt's NSTEMI was secondary to his recent episode of COVID in August 2021. After his LHC, we simply optimized his medical therapy. After > 5 days of full dose  anticoagulation, he was transitioned to only a ppx dose of Enoxaparin. We also briefly gave him Clopidogrel (last dose on 12/17) in addition to Metoprolol Succinate, Lisinopril and nitrates. Of note, we optimized the dose of his nitrate regimen in such a way that it would minimize chest pain but also not drop his BP too much. Because of his h/o Familial Hypercholesterolemia, we continued his home therapy with Rosuvastain/Ezetimibe/Fenofibrate as well. Pt may consider outpt PCSK9 initiation.      Provoked LUE DVT (August 2021) - we held pt's home Rivaroxaban as an inpatient (pt has been on this for > 3 months, so we may not need to resume this on d/c)     IDDM2 (on 60 units of Glargine at home) - pt's hospital course was c/b by Hyperglycemia, but we were very cautious about slowly adjusting the dose of his Insulin Glargine as an inpatient. On d/c, he was needing ~24 units of Glargine qhs + SSI.       On discharge, pt denied any light-headedness or orthopnea. Pt was making urine, tolerating PO and ambulating well without assistance.      -Cardio: denies chest pain, denies palpitations  -Resp: denies SOB, denies cough, denies wheezing  -Abd: denies abd pain, denies N/V     Therefore, he is discharged in good and stable condition. He was discharged to [facility] for his CABG.     The patient met 2-midnight criteria for an inpatient stay at the time of discharge.    PHYSICAL EXAM ON DISCHARGE  Temp:  [35.8 °C (96.5 °F)-36.6 °C (97.9 °F)] 35.8 °C (96.5 °F)  Pulse:  [67-90] 76  Resp:  [16-18] 17  BP: (100-124)/(53-76) 100/74  SpO2:  [91 %-97 %] 94 %    -General: NAD, resting comfortably in bed  -Cardio: no JVD, no S3, no lower extremity edema  -Resp: lungs CTAB, no crackles or wheezing  -Abd: soft and nontender, no guarding or rebound    Discharge Date  12/19    FOLLOW UP ITEMS POST DISCHARGE  -per [facility]  -f/u on blood sugar control     DISCHARGE DIAGNOSES  Principal Problem:    NSTEMI (non-ST elevated myocardial  infarction) (HCC) POA: Yes  Active Problems:    Hypercholesteremia POA: Unknown    Hypertension POA: Unknown    Morbid obesity (HCC) POA: Unknown    Diabetes (HCC) POA: Unknown    ACS (acute coronary syndrome) (HCC) POA: Yes    History of DVT in adulthood POA: Unknown    Multi-vessel coronary artery stenosis POA: Unknown  Resolved Problems:    * No resolved hospital problems. *      FOLLOW UP  Future Appointments   Date Time Provider Department Center   1/13/2022 12:45 PM Jerica Joseph P.A.-C. [facility] None     No follow-up provider specified.    MEDICATIONS ON DISCHARGE     Medication List      START taking these medications      Instructions   enoxaparin 40 MG/0.4ML Soln inj  Commonly known as: LOVENOX   Inject 40 mg under the skin 2 times a day for 31 days.  Dose: 40 mg     insulin lispro 100 UNIT/ML Sopn injection PEN  Commonly known as: HumaLOG,AdmeLOG   Inject 2 Units under the skin 4 Times a Day,Before Meals and at Bedtime for 31 days.  Dose: 2 Units     Lantus SoloStar 100 UNIT/ML Sopn injection  Generic drug: insulin glargine  Replaces: insulin glargine 100 UNIT/ML Soln   Inject 24 Units under the skin every evening for 31 days.  Dose: 24 Units     Melatonin 10 MG Tabs   Take 10 mg by mouth at bedtime as needed (sleep) for up to 31 days.  Dose: 10 mg     metoprolol SR 25 MG Tb24  Start taking on: December 20, 2021  Commonly known as: TOPROL XL   Take 1 Tablet by mouth every day.  Dose: 25 mg        CHANGE how you take these medications      Instructions   * nitroglycerin 0.4 MG Subl  What changed: when to take this  Commonly known as: NITROSTAT   Place 1 Tablet under the tongue as needed for Chest Pain.  Dose: 0.4 mg     * nitroglycerin 2 % Oint  What changed: You were already taking a medication with the same name, and this prescription was added. Make sure you understand how and when to take each.  Commonly known as: NITRO-BID   Place 0.25 Inches on the skin every 6 hours for 31 days.  Dose: 0.25 Inch          * This list has 2 medication(s) that are the same as other medications prescribed for you. Read the directions carefully, and ask your doctor or other care provider to review them with you.            CONTINUE taking these medications      Instructions   aspirin EC 81 MG Tbec  Commonly known as: ECOTRIN   Take 1 Tab by mouth every day.  Dose: 81 mg     ezetimibe 10 MG Tabs  Commonly known as: ZETIA   Take 1 Tablet by mouth every day.  Dose: 10 mg     fenofibrate 48 MG Tabs  Commonly known as: TRICOR   Take 48 mg by mouth every morning.  Dose: 48 mg     lisinopril 5 MG Tabs  Commonly known as: PRINIVIL   Take 5 mg by mouth every morning.  Dose: 5 mg     rosuvastatin 40 MG tablet  Commonly known as: CRESTOR   Take 1 tablet by mouth every evening.  Dose: 40 mg        STOP taking these medications    ibuprofen 200 MG Tabs  Commonly known as: MOTRIN     insulin glargine 100 UNIT/ML Soln  Commonly known as: Lantus  Replaced by: Lantus SoloStar 100 UNIT/ML Sopn injection     rivaroxaban 20 MG Tabs tablet  Commonly known as: XARELTO            Allergies  No Known Allergies    DIET  Orders Placed This Encounter   Procedures   • Diet Order Diet: Consistent CHO (Diabetic)     Standing Status:   Standing     Number of Occurrences:   1     Order Specific Question:   Diet:     Answer:   Consistent CHO (Diabetic) [4]       ACTIVITY  As tolerated.  Weight bearing as tolerated    CONSULTATIONS  Dr. Hughes with Cardiology consulted.  Treatment options were discussed and plan of care agreed upon.    PROCEDURES  C on 12/8/21 with Dr. Hughes

## 2021-12-13 NOTE — ASSESSMENT & PLAN NOTE
-holding home Rivaroxaban for now (pt has been on this for > 3 months, so we may not need to resume this on d/c)

## 2021-12-13 NOTE — PROGRESS NOTES
Report received.  Patient resting in bed.  Denies pain.  Respirations unlabored.  Discussed plan of care.  Bed in low position.  Call light and personal belongings in reach.

## 2021-12-13 NOTE — PROGRESS NOTES
Daily Progress Note:     Date of Service: 12/12/2021  Primary Team: [facility] IM White Team   Attending: AMY Keenan M.D.   Senior Resident: Dr. Agarwal  Intern: Dr. Conway  Contact:  [phone]    Chief Complaint:   Burning substernal chest pain with radiation to the left arm and jaw    ID:  41-year-old male, admitted with chest pain.  History of familial hyperlipidemia, coronary artery disease with multiple PCI coronary stents LCx and LAD in 2020.  Echo shows ischemic cardiac myopathy with EF 40%.    interval Update:  No acute events overnight, on evaluation today patient up walking around, has no current complaints and is hemodynamically stable. Pt continues to deny chest pain after adjusting his dose of NTG, as well as denies shortness of breath and lower leg edema. Pt awaiting transfer to [facility] for CABG procedure.          Consultants/Specialty:  Cardiology  Cardiothoracic surgery     Review of Systems:    Review of Systems   Constitutional: Negative for chills, diaphoresis and fever.   HENT: Negative for congestion and sore throat.    Respiratory: Negative for cough, shortness of breath and wheezing.    Cardiovascular: Negative for chest pain (on NTG), palpitations, orthopnea and leg swelling.   Gastrointestinal: Negative for abdominal pain, diarrhea, nausea and vomiting.   Skin: Negative for rash.   Neurological: Negative for focal weakness.   Psychiatric/Behavioral: The patient is not nervous/anxious.      Objective Data:   Physical Exam:   Vitals:   Temp:  [35.8 °C (96.5 °F)-36.6 °C (97.9 °F)] 35.8 °C (96.5 °F)  Pulse:  [67-90] 76  Resp:  [16-17] 17  BP: (100-124)/(53-76) 100/74  SpO2:  [91 %-97 %] 94 %     Physical Exam  Constitutional:       General: He is not in acute distress.     Appearance: Normal appearance. He is obese. He is not ill-appearing, toxic-appearing or diaphoretic.   HENT:      Head: Normocephalic and atraumatic.      Nose: Nose normal.      Mouth/Throat:      Mouth: Mucous  membranes are dry.   Eyes:      General: No scleral icterus.     Extraocular Movements: Extraocular movements intact.      Conjunctiva/sclera: Conjunctivae normal.   Cardiovascular:      Rate and Rhythm: Normal rate and regular rhythm.      Pulses: Normal pulses.      Heart sounds: Normal heart sounds. No murmur heard.  No friction rub. No gallop.    Pulmonary:      Effort: Pulmonary effort is normal. No respiratory distress.      Breath sounds: Normal breath sounds. No wheezing, rhonchi or rales.   Abdominal:      General: Bowel sounds are normal. There is no distension.      Palpations: Abdomen is soft.      Tenderness: There is no abdominal tenderness. There is no guarding or rebound.   Musculoskeletal:         General: Normal range of motion.      Cervical back: Normal range of motion and neck supple.      Right lower leg: No edema.      Left lower leg: No edema.   Skin:     General: Skin is warm.      Coloration: Skin is not jaundiced.      Findings: No lesion or rash.   Neurological:      General: No focal deficit present.      Mental Status: He is alert and oriented to person, place, and time.      Cranial Nerves: No cranial nerve deficit.      Motor: No weakness.   Psychiatric:         Mood and Affect: Mood normal.         Behavior: Behavior normal.         Thought Content: Thought content normal.         Judgment: Judgment normal.       Labs:   Recent Labs     12/10/21  0150   WBC 8.6   RBC 4.64*   HEMOGLOBIN 13.7*   HEMATOCRIT 41.0*   MCV 88.4   MCH 29.5   RDW 43.2   PLATELETCT 308   MPV 9.8   NEUTSPOLYS 63.00   LYMPHOCYTES 27.80   MONOCYTES 7.50   EOSINOPHILS 1.00   BASOPHILS 0.50     Recent Labs     12/10/21  0150 12/11/21  0143   SODIUM 135 137   POTASSIUM 3.7 3.8   CHLORIDE 105 102   CO2 19* 23   GLUCOSE 165* 155*   BUN 8 11       Imaging:   CT-CHEST (THORAX) W/O   Final Result      1.  Diffuse areas of groundglass attenuation with subpleural sparing concerning for early fibrotic changes.  Acute-subacute infection not excluded.   2.  Hepatic steatosis.      Fleischner Society pulmonary nodule recommendations:   Not Applicable         EC-ECHOCARDIOGRAM COMPLETE W/ CONT   Final Result      OUTSIDE IMAGES-DX CHEST   Final Result      CL-LEFT HEART CATHETERIZATION WITH POSSIBLE INTERVENTION    (Results Pending)       Problem Representation:     Multi vessel coronary artery stenosis (HCC)-(present on admission)  Per cath report:  1. Early ISR recently placed ostial LAD stent  2. 70% distal left main stenosis  3. Sequential 80% LCx stenoses  4.  RCA  5. LVEF 50 to 55%     Plan:    Continue with aspirin, Zetia, fenofibrate, lisinopril, metoprolol, and rosuvastatin.  Continue heparin drip.  A1c is 9%.  LDL is 155.  Patient will be an optimal candidate for bariatric surgery given his BMI and comorbidities.  He also need further lipid reduction and may benefit from PCSK9 inhibitors.   - Cardiothoracic surgery concerned w/ pt pulmonary function secondary to covid lung.  Pulm cleared pt for CT surgery showing FEV1 of 2.7 L, 110%.  FEV1/FVC ratio of 85%. CT denied surgery, approved transfer to [facility] for CABG evaluation.    NSTEMI (non-ST elevated myocardial infarction) (HCC)- (present on admission)  Assessment & Plan  Continue heparin drip per cardiology recommendations   New ECHO mildly reduced left ventricular systolic function with EF 40%, echo cannot rule out bicuspid aortic valve. EF 50-55% from cath report   As needed EKG if having recurrent chest pain  Cardiology on board, appreciate recs, see above  Nitro 0.25inch for cxp      HFrEF secondary to ischemic Cardiomyopathy  Assessment & Plan  ECHO shows mildly reduced left ventricular systolic function with EF 40%, echo cannot rule out bicuspid aortic valve as well as global hypokinesis, normal diastolic function.  metoprolol tartrate to XL 25 mg daily.  Continue lisinopril 5 mg daily    History of DVT in adulthood  Assessment & Plan  Taking Xarelto at  home  On heparin drip as recommended by cardiology     Diabetes ([facility])  Assessment & Plan  A1C 9% not at goal   Sliding scale insulin with hypoglycemia protocol  Will need PCP follow-up and outpatient management     Morbid obesity ([facility])  Assessment & Plan  Patient will likely be a good candidate for consideration for bariatric surgery given his BMI and other comorbidities    Hypertension   Assessment & Plan  Continue lisinopril 5 mg po daily      Hypercholesteremia (familial)   Assessment & Plan   not at goal    rosuvastatin 40 mg every night   fenofibrate 67 mg p.o. daily  ezetimibe  10 mg p.o. daily  Consider PCSK9 inhibitors with goal LDL less than 70     History of Covid pneumonia  Patient reports having Covid pneumonia and being hospitalized for 3 months   Reports DVT as a complication of disease and has been taking Xarelto     VTE prophylaxis: therapeutic anticoagulation with heparin drip

## 2021-12-13 NOTE — PROGRESS NOTES
Began care at 1845, Report received from Jeny RN. Patient is SR on monitor, on RA O2, A&Ox4, no pain, ambulatory. Plan of care updated with the patient, no questions at this time. Initial assessment completed, orders reviewed, call light within reach, and fall precautions are in place.

## 2021-12-13 NOTE — PROGRESS NOTES
[facility] Cardiology Follow-up Note    Date of Service:    12/12/2021      Name:   Kyle Gonzalez   YOB: 1980  Age:   41 y.o.  male   MRN:   [id number]      Reason for cardiology consult: chest pain    Attending Provider: Dr. Keenan    HPI:  Kyle Gonzalez is a 41 y.o. male admitted 12/7/2021 with chest pain. History of hyperlipidemia, hypertension, morbid obesity, Covid-19 pneumonia in August 2021, and diabetes. History of CAD with multiple PCI and coronary stents in LCx and LAD in 10/2020.  of RCA, ischemic cardiomyopathy ef 55%.    Interim Events:  - Personal Telemetry interpretation: SR   - Overnight events: denies Cp  - Vitals: 's  - Labs reviewed: WNL, none today    ROS  Constitutional: denies fatigue.  Respiratory:  Denies shortness of breath, no cough.  Cardiovascular: denies chest pain.  no lower extremity edema.  Denies orthopnea or PND.  : no polyuria, no dysuria.  GI:  Denies nausea/vomiting.  No abdominal distention.  Neuro:  Denies dizziness, syncope.  Hem/lymph: Denies easy bleeding/bruising.      All other review of systems reviewed and negative.    Past medical, surgical, social, and family history reviewed and unchanged from admission except as noted in HPI.    Medications: Reviewed in MAR  Current Facility-Administered Medications   Medication Dose Frequency Provider Last Rate Last Admin   • fenofibrate micronized (LOFIBRA) capsule 134 mg  134 mg DAILY Ravindra Bradshaw M.D.   134 mg at 12/12/21 0526   • morphine 4 MG/ML injection 1 mg  1 mg Q2HRS PRN Todd Simeon M.D.   1 mg at 12/09/21 0934   • nitroglycerin (NITRO-BID) 2 % ointment 0.25 Inch  0.25 Inch Q6HRS John Paul Agarwal M.D.   0.25 Inch at 12/12/21 1201   • aspirin EC (ECOTRIN) tablet 81 mg  81 mg DAILY Rachael Zaragoza, A.P.N.   81 mg at 12/12/21 0526   • metoprolol SR (TOPROL XL) tablet 25 mg  25 mg Q DAY Rachael Zaragoza, A.P.N.   25 mg at 12/12/21 0526   • melatonin  "tablet 10 mg  10 mg HS PRN Todd Simeon M.D.   10 mg at 12/10/21 2147   • dextrose 50% (D50W) injection 50 mL  50 mL Q15 MIN PRN Diaz Morales M.D.       • hydrOXYzine HCl (ATARAX) tablet 25 mg  25 mg TID PRN John Paul Agarwal M.D.   25 mg at 12/11/21 0933   • heparin infusion 25,000 units in 500 mL 0.45% NACL  0-30 Units/kg/hr (Adjusted) Continuous Diaz Morales M.D. 40.4 mL/hr at 12/12/21 1517 24 Units/kg/hr at 12/12/21 1517   • heparin injection 2,000 Units  2,000 Units PRN Diaz Morales M.D.   2,000 Units at 12/10/21 0328   • rosuvastatin (CRESTOR) tablet 40 mg  40 mg Q EVENING Diaz Morales M.D.   40 mg at 12/11/21 1749   • ezetimibe (ZETIA) tablet 10 mg  10 mg DAILY Diaz Morales M.D.   10 mg at 12/12/21 0526   • lisinopril (PRINIVIL) tablet 5 mg  5 mg Q DAY Diaz Morales M.D.   5 mg at 12/08/21 0515   • nitroglycerin (NITROSTAT) tablet 0.4 mg  0.4 mg Q5 MIN PRN Diaz Morales M.D.   0.4 mg at 12/09/21 0756   • insulin regular (HumuLIN R,NovoLIN R) injection  3-15 Units Q6HRS Diaz Morales M.D.   6 Units at 12/12/21 1208   Last reviewed on 12/8/2021 12:24 PM by Josey Alvarado    No Known Allergies    Physical Exam  Body mass index is 43.47 kg/m². /74   Pulse 76   Temp 35.8 °C (96.5 °F) (Temporal)   Resp 17   Ht 1.651 m (5' 5\")   Wt 118 kg (261 lb 3.9 oz)   SpO2 94%    Vitals:    12/11/21 2356 12/12/21 0500 12/12/21 0811 12/12/21 1604   BP: 102/63 100/53 100/74    Pulse: 70 67 81 76   Resp: 17 16 17 17   Temp: 36.6 °C (97.9 °F) 36.4 °C (97.6 °F) 36.1 °C (96.9 °F) 35.8 °C (96.5 °F)   TempSrc: Temporal Temporal Temporal Temporal   SpO2: 97% 91% 96% 94%   Weight:       Height:        Oxygen Therapy:  Pulse Oximetry: 94 %, O2 (LPM): 0, O2 Delivery Device: None - Room Air    General: no acute distress, well appearing  Neck: no JVD, no bruits  Lungs: CTAB, normal effort. no wheezing, rales, or rhonchi  Heart: RRR, normal S1 /S2, no murmur, no rub  EXT: " no lower extremity edema, 2+ radial pulses. 2+ pedal pulses.   Abdomen: protuberant, soft, non tender, non distended  Neurological: No focal deficits, no facial asymmetry.  Normal speech  Psychiatric: Appropriate affect, alert and oriented x 3  Skin: Warm and dry extremities, no rashes    Labs (personally reviewed):     Lab Results   Component Value Date/Time    SODIUM 137 12/11/2021 01:43 AM    POTASSIUM 3.8 12/11/2021 01:43 AM    CHLORIDE 102 12/11/2021 01:43 AM    CO2 23 12/11/2021 01:43 AM    GLUCOSE 155 (H) 12/11/2021 01:43 AM    BUN 11 12/11/2021 01:43 AM    CREATININE 0.60 12/11/2021 01:43 AM     Lab Results   Component Value Date/Time    ALKPHOSPHAT 82 12/10/2021 01:50 AM    ASTSGOT 32 12/10/2021 01:50 AM    ALTSGPT 31 12/10/2021 01:50 AM    TBILIRUBIN <0.2 12/10/2021 01:50 AM      Lab Results   Component Value Date/Time    CHOLSTRLTOT 265 (H) 12/07/2021 08:32 PM     (H) 12/07/2021 08:32 PM    HDL 57 12/07/2021 08:32 PM    TRIGLYCERIDE 267 (H) 12/07/2021 08:32 PM       Cardiac Imaging and Procedures Review:      EKG 12/12/21 : My Personal interpretation reveals SR 61    Echocardiogram: 12/8/2021  CONCLUSIONS  Compared to the images of the prior study 08/13/2021, reduced systolic   function.  Mildly reduced left ventricular systolic function.  Left ventricular ejection fraction is estimated to be 40%.  Global hypokinesis.  Moderately dilated left atrium.  Cannot rule out bicuspid aortic valve.     Cardiac cath 2020:  Post-operative Diagnosis:   1.  Severely reduced left ventricle systolic function again fraction 25%  2.  70-80% ostial left anterior descending artery stenosis, IFR less than 0.6  3.  Chronic total occlusion of mid to distal right coronary artery with faint visualization of distal right coronary artery from left to right collateral  4.  Status post stenting of the ostial left anterior descending artery with 3.5 x 8 mm Synergy drug eluting stent with no significant residual stenosis ABBY-3  "flow.     Cardiac catheterization 12/8/2021  Postoperative diagnosis:  1. Early ISR recently placed ostial LAD stent  2. 70% distal left main stenosis  3. Sequential 80% LCx stenoses  4.  RCA  5. LVEF 50 to 55%   Recommendations:  Guideline directed medical therapy   Cardiovascular Risk factor modification  CABG.  If patient is deemed a poor surgical candidate at this institution I would recommend transfer for CABG at another facility which would be the standard of care for him due to early failure of stents left main disease young age and repeat revascularizations over the recent years.     Assessment and Medical Decision Making:  CAD  -Multivessel, needs CABG, cleared by pulmonary, declined by CT surgery at [facility],   -Accepted at [place], awaiting bed  -Denies chest pain with heparin drip and Nitropaste, continue  -Continue aspirin 81 mg  -Continue metoprolol SR 25 mg  -High intensity statin, rosuvastatin 40 mg    COVID ILD   -Cleared by pulmonology for elective CABG  -Pulmonary perspective \"patient is not high risk for respiratory failure post op CABG based on his mild COVID ILD ensuring ILD ventilator protocols\"    Acute systolic heart failure/ischemic cardiomyopathy  -EF 40%  -Well compensated, no signs of volume overload  -GDMT: Metoprolol XL 25 mg daily.  Lisinopril 5 mg daily    HTN  -Well-controlled  -Continue lisinopril 5 mg    Mixed hyperlipidemia  -increase fenofibrate to 134 mg daily.  Continue Crestor 40 mg and Zetia 10 mg daily.  Patient will be a candidate for Vascepa 2 g twice daily which can be addressed as an outpatient.      Thank you for allowing me to participate in this patients care.  Please contact me with any questions or concerns.    Please see Dr. Bradshaw's  attestation for additions and further recommendations.    PLEASE NOTE: Some of this dictation was created using voice recognition software. I have made every reasonable attempt to correct obvious errors, but I expect that there are " errors of grammar and possibly content that I did not discover before finalizing the note.     KIN Garcia.   [facility]  [phone]

## 2021-12-13 NOTE — PROGRESS NOTES
[facility] Cardiology Follow-up Note    Date of Service:    12/13/2021      Name:   Kyle Gonzalez   YOB: 1980  Age:   41 y.o.  male   MRN:   [id number]    Reason for cardiology consult: chest pain    Attending Provider: Dr. Beckham    HPI:  Kyle Gonzalez is a 41 y.o. male admitted 12/7/2021 with chest pain. History of hyperlipidemia, hypertension, morbid obesity, Covid-19 pneumonia in August 2021, and diabetes. History of CAD with multiple PCI and coronary stents in LCx and LAD in 10/2020.  of RCA, ischemic cardiomyopathy ef 55%.    Interim Events:  12/13/21  - Personal Telemetry interpretation: SR 64-80's, r PVC's  - Overnight events: denies CP  - Vital signs: SBP , room air  - Labs reviewed: no new labs     12/12/21  - Personal Telemetry interpretation: SR   - Overnight events: denies Cp  - Vitals: 's  - Labs reviewed: WNL, none today    ROS  Constitutional: denies fatigue.  Respiratory:  Denies shortness of breath, no cough.  Cardiovascular: denies chest pain.  no lower extremity edema.  Denies orthopnea or PND.  : no polyuria, no dysuria.  GI:  Denies nausea/vomiting.  No abdominal distention.  Neuro:  Denies dizziness, syncope.  Hem/lymph: Denies easy bleeding/bruising.      All other review of systems reviewed and negative.    Past medical, surgical, social, and family history reviewed and unchanged from admission except as noted in HPI.    Medications: Reviewed in MAR  Current Facility-Administered Medications   Medication Dose Frequency Provider Last Rate Last Admin   • insulin GLARGINE 100 unit/mL injection  10 Units Q EVENING Tee Zafar M.D.       • insulin LISPRO injection  1-6 Units Q6HRS Tee Zafar M.D.        And   • dextrose 50% (D50W) injection 50 mL  50 mL Q15 MIN PRN Tee Zafar M.D.       • fenofibrate micronized (LOFIBRA) capsule 134 mg  134 mg DAILY Ravindra Bradshaw M.D.   134 mg at 12/13/21 0537   • morphine 4 MG/ML  "injection 1 mg  1 mg Q2HRS PRN Todd Simeon M.D.   1 mg at 12/09/21 0934   • nitroglycerin (NITRO-BID) 2 % ointment 0.25 Inch  0.25 Inch Q6HRS John Paul Agarwal M.D.   0.25 Inch at 12/12/21 1740   • aspirin EC (ECOTRIN) tablet 81 mg  81 mg DAILY Rachael Zaragoza A.P.N.   81 mg at 12/13/21 0537   • metoprolol SR (TOPROL XL) tablet 25 mg  25 mg Q DAY Rachael Zaragoza A.P.N.   25 mg at 12/13/21 0537   • melatonin tablet 10 mg  10 mg HS PRN Todd Simeon M.D.   10 mg at 12/12/21 1944   • hydrOXYzine HCl (ATARAX) tablet 25 mg  25 mg TID PRN John Paul Agarwal M.D.   25 mg at 12/12/21 1940   • heparin infusion 25,000 units in 500 mL 0.45% NACL  0-30 Units/kg/hr (Adjusted) Continuous Diaz Morales M.D. 40.4 mL/hr at 12/13/21 0345 24 Units/kg/hr at 12/13/21 0345   • heparin injection 2,000 Units  2,000 Units PRN Diaz Morales M.D.   2,000 Units at 12/10/21 0328   • rosuvastatin (CRESTOR) tablet 40 mg  40 mg Q EVENING Diaz Morales M.D.   40 mg at 12/12/21 1740   • ezetimibe (ZETIA) tablet 10 mg  10 mg DAILY Diaz Morales M.D.   10 mg at 12/13/21 0537   • lisinopril (PRINIVIL) tablet 5 mg  5 mg Q DAY Diaz Morales M.D.   5 mg at 12/08/21 0515   • nitroglycerin (NITROSTAT) tablet 0.4 mg  0.4 mg Q5 MIN PRN Diaz Morales M.D.   0.4 mg at 12/09/21 0756   Last reviewed on 12/8/2021 12:24 PM by Radha G. Pavao, PhT    No Known Allergies    Physical Exam  Body mass index is 43.29 kg/m². BP (!) 97/63   Pulse 75   Temp 36.1 °C (97 °F) (Temporal)   Resp 18   Ht 1.651 m (5' 5\")   Wt 118 kg (260 lb 2.3 oz)   SpO2 98%    Vitals:    12/12/21 1925 12/13/21 0001 12/13/21 0021 12/13/21 0414   BP: 132/73 (!) 93/53 103/66 (!) 97/63   Pulse: 89 79  75   Resp: 17 18  18   Temp: 36.3 °C (97.4 °F) 36.3 °C (97.4 °F)  36.1 °C (97 °F)   TempSrc: Temporal Temporal  Temporal   SpO2: 93% 95%  98%   Weight: 118 kg (260 lb 2.3 oz)      Height:        Oxygen Therapy:  Pulse Oximetry: 98 %, O2 (LPM): 0, O2 " Delivery Device: None - Room Air    General: no acute distress, well appearing  Neck: no JVD, no bruits  Lungs: CTAB, normal effort. no wheezing, rales, or rhonchi  Heart: RRR, normal S1 /S2, no murmur, no rub  EXT: no lower extremity edema, 2+ radial pulses. 2+ pedal pulses.   Abdomen: protuberant, soft, non tender, non distended  Neurological: No focal deficits, no facial asymmetry.  Normal speech  Psychiatric: Appropriate affect, alert and oriented x 3  Skin: Warm and dry extremities, no rashes    Labs (personally reviewed):     Lab Results   Component Value Date/Time    SODIUM 137 12/11/2021 01:43 AM    POTASSIUM 3.8 12/11/2021 01:43 AM    CHLORIDE 102 12/11/2021 01:43 AM    CO2 23 12/11/2021 01:43 AM    GLUCOSE 155 (H) 12/11/2021 01:43 AM    BUN 11 12/11/2021 01:43 AM    CREATININE 0.60 12/11/2021 01:43 AM     Lab Results   Component Value Date/Time    ALKPHOSPHAT 82 12/10/2021 01:50 AM    ASTSGOT 32 12/10/2021 01:50 AM    ALTSGPT 31 12/10/2021 01:50 AM    TBILIRUBIN <0.2 12/10/2021 01:50 AM      Lab Results   Component Value Date/Time    CHOLSTRLTOT 265 (H) 12/07/2021 08:32 PM     (H) 12/07/2021 08:32 PM    HDL 57 12/07/2021 08:32 PM    TRIGLYCERIDE 267 (H) 12/07/2021 08:32 PM     Cardiac Imaging and Procedures Review:      EKG 12/12/21 : My Personal interpretation reveals SR 61    Echocardiogram: 12/8/2021  CONCLUSIONS  Compared to the images of the prior study 08/13/2021, reduced systolic   function.  Mildly reduced left ventricular systolic function.  Left ventricular ejection fraction is estimated to be 40%.  Global hypokinesis.  Moderately dilated left atrium.  Cannot rule out bicuspid aortic valve.     Cardiac cath 2020:  Post-operative Diagnosis:   1.  Severely reduced left ventricle systolic function again fraction 25%  2.  70-80% ostial left anterior descending artery stenosis, IFR less than 0.6  3.  Chronic total occlusion of mid to distal right coronary artery with faint visualization of  "distal right coronary artery from left to right collateral  4.  Status post stenting of the ostial left anterior descending artery with 3.5 x 8 mm Synergy drug eluting stent with no significant residual stenosis ABBY-3 flow.     Cardiac catheterization 12/8/2021  Postoperative diagnosis:  1. Early ISR recently placed ostial LAD stent  2. 70% distal left main stenosis  3. Sequential 80% LCx stenoses  4.  RCA  5. LVEF 50 to 55%   Recommendations:  Guideline directed medical therapy   Cardiovascular Risk factor modification  CABG.  If patient is deemed a poor surgical candidate at this institution I would recommend transfer for CABG at another facility which would be the standard of care for him due to early failure of stents left main disease young age and repeat revascularizations over the recent years.     Assessment and Medical Decision Making:  CAD  -Multivessel, needs CABG, cleared by pulmonary, declined by CT surgery at [facility],   -Accepted at [place], awaiting bed  -Denies chest pain with heparin drip and Nitropaste, continue  -OK with stoppinh heparin and placing on lovenox,  -Continue aspirin 81 mg  -Continue metoprolol SR 25 mg  -High intensity statin, rosuvastatin 40 mg    COVID ILD   -Cleared by pulmonology for elective CABG  -Pulmonary perspective \"patient is not high risk for respiratory failure post op CABG based on his mild COVID ILD ensuring ILD ventilator protocols\"    Acute systolic heart failure/ischemic cardiomyopathy  -EF 40%  -Well compensated, no signs of volume overload  -GDMT: Metoprolol XL 25 mg daily.  Lisinopril 5 mg daily    HTN  -Well-controlled  -Continue lisinopril 5 mg    Mixed hyperlipidemia  -increase fenofibrate to 134 mg daily.  Continue Crestor 40 mg and Zetia 10 mg daily.  Patient will be a candidate for Vascepa 2 g twice daily which can be addressed as an outpatient.    Thank you for allowing me to participate in this patients care. Cardiology will sign off, given no change " in current plan, if Chest pain returns while waiting fo [place] transfer re-initate Heparin and contact cardiology  Please contact me with any questions or concerns.    Please see Dr. Putnam's attestation for additions and further recommendations.    PLEASE NOTE: Some of this dictation was created using voice recognition software. I have made every reasonable attempt to correct obvious errors, but I expect that there are errors of grammar and possibly content that I did not discover before finalizing the note.     KIN Garcia.   [facility]  [phone]

## 2021-12-13 NOTE — ASSESSMENT & PLAN NOTE
(possibly secondary to recent episode of COVID in August 2021)  -s/p LHC on 12/8 without intervention   -s/p heparin drip and therapeutic Enoxaparin x7 days   -ASA/Clopidogrel + Metoprolol Succinate + Lisinopril + scheduled topical Nitrates + PRN topical Nitrates  -c/w home Rouvastatin/Ezetimibe/Fenofibrate

## 2021-12-13 NOTE — PROGRESS NOTES
Daily Progress Note    Date of Service: 12/13/2021  Primary Team: [employer] IM White Team   Attending: Satnam Beckham M.D.   Senior Resident: Dr. Johnson  Intern: Dr. Zafar  Contact:  [phone]    Chief Complaint:   NSTEMI on 12/7    Subjective    Overnight, Lisinopril was held.     Pt received 21 units of SSI over the past 24 hours. Today, pt denies any pain. Pt is making urine, tolerating PO and ambulating well without assistance.     -Cardio: denies chest pain, denies palpitations  -Resp: denies SOB, denies cough, denies wheezing  -Abd: denies abd pain, denies N/V    Objective    Vitals:   Temp:  [35.8 °C (96.5 °F)-36.4 °C (97.6 °F)] 36.4 °C (97.6 °F)  Pulse:  [70-89] 70  Resp:  [17-18] 18  BP: ()/(53-73) 114/69  SpO2:  [93 %-98 %] 93 %     -General: NAD, resting comfortably in bed  -Cardio: trace LE edema, distant heart sounds, no JVD  -Resp: lungs CTAB, no crackles or wheezing  -Abd: soft and nontender, no guarding or rebound      Data:  -Anti Xa = 0.45, wnl     -PFT = FEV1~110% predicted     Assessment/Plan    Kyle Gonzalez is a 41M with a h/o Ischemic HFrEF (EF~40%, s/p stent to the L Circumflex artery and LAD in 2020), Familial Hypercholesterolemia (LDL~155, c/b Hepatic Steatosis), LUE DVT (August 2021), IDDM2 (60 units of Glargine at night, A1c = 9%), HTN, smoking (27 pack years), and ETOH use.     Pt was admitted for NSTEMI and is s/p C without intervention on 12/7.    Hospital Course:    On 12/7, pt was admitted with typical cardiac CP and was found to have an NSTEMI (with Trop T~96). Therefore, pt was started on a heparin drip and given ASA. The next day, pt was taken to the cath lab but due to the presence of multivessel CAD that also involved the LAD, no stenting or angioplasty was performed at that time. Instead, pt was recommended a CABG. Over the next few days, pt continued to have CP, but thankfully this resolved by 12/11. By 12/13, we transitioned him off of the heparin drip and onto SubQ  Enoxaparin. Pt is currently pending Medicaid approval so that he can be transferred to [place] for a CABG. Pt may consider outpt PCSK9 initiation.     * NSTEMI (in the setting of severe CAD and HFrEF)- (present on admission)  Assessment & Plan  (possibly secondary to recent episode of COVID in August 2021)  -s/p C on 12/8 without intervention   -s/p heparin drip x7 days    -SubQ Enoxaparin 1mg/kg BID  -ASA + Metoprolol Succinate + PRN Nitrates  -c/w home Rouvastatin/Ezetimibe/Fenofibrate    LUE DVT (August 2021)  Assessment & Plan  -holding home Rivaroxaban for now (pt has been on this for > 3 months, so we may not need to resume this on d/c)  -on heparin drip as above     IDDM2 (on 60 units of Glargine at home)  Assessment & Plan  -Glargine 10 units + SSI for now     #FEN/GI  -diabetic diet  -bowel reg     Other Problems:  #Unspecified Insomnia - c/w Melatonin      -DVT ppx = already on heparin drip as above  -Code Status = Full Code  -PCP = none. PCP referral placed.      Dispo = pending transfer to [place]      Tee Zafar, PGY1  Internal Medicine Residency with [employer]     Plan has been discussed with Attending Physician.

## 2021-12-14 LAB
GLUCOSE BLD-MCNC: 161 MG/DL (ref 65–99)
GLUCOSE BLD-MCNC: 179 MG/DL (ref 65–99)
GLUCOSE BLD-MCNC: 189 MG/DL (ref 65–99)
GLUCOSE BLD-MCNC: 202 MG/DL (ref 65–99)
GLUCOSE BLD-MCNC: 260 MG/DL (ref 65–99)

## 2021-12-14 PROCEDURE — 770020 HCHG ROOM/CARE - TELE (206)

## 2021-12-14 PROCEDURE — 700102 HCHG RX REV CODE 250 W/ 637 OVERRIDE(OP): Performed by: STUDENT IN AN ORGANIZED HEALTH CARE EDUCATION/TRAINING PROGRAM

## 2021-12-14 PROCEDURE — 700111 HCHG RX REV CODE 636 W/ 250 OVERRIDE (IP): Performed by: STUDENT IN AN ORGANIZED HEALTH CARE EDUCATION/TRAINING PROGRAM

## 2021-12-14 PROCEDURE — 700102 HCHG RX REV CODE 250 W/ 637 OVERRIDE(OP): Performed by: INTERNAL MEDICINE

## 2021-12-14 PROCEDURE — A9270 NON-COVERED ITEM OR SERVICE: HCPCS | Performed by: STUDENT IN AN ORGANIZED HEALTH CARE EDUCATION/TRAINING PROGRAM

## 2021-12-14 PROCEDURE — 700102 HCHG RX REV CODE 250 W/ 637 OVERRIDE(OP): Performed by: NURSE PRACTITIONER

## 2021-12-14 PROCEDURE — A9270 NON-COVERED ITEM OR SERVICE: HCPCS | Performed by: INTERNAL MEDICINE

## 2021-12-14 PROCEDURE — 82962 GLUCOSE BLOOD TEST: CPT | Mod: 91

## 2021-12-14 PROCEDURE — A9270 NON-COVERED ITEM OR SERVICE: HCPCS | Performed by: NURSE PRACTITIONER

## 2021-12-14 PROCEDURE — 99232 SBSQ HOSP IP/OBS MODERATE 35: CPT | Performed by: HOSPITALIST

## 2021-12-14 RX ADMIN — LISINOPRIL 5 MG: 5 TABLET ORAL at 06:16

## 2021-12-14 RX ADMIN — ROSUVASTATIN CALCIUM 40 MG: 20 TABLET, FILM COATED ORAL at 18:06

## 2021-12-14 RX ADMIN — ENOXAPARIN SODIUM 120 MG: 120 INJECTION SUBCUTANEOUS at 18:06

## 2021-12-14 RX ADMIN — ENOXAPARIN SODIUM 120 MG: 120 INJECTION SUBCUTANEOUS at 06:15

## 2021-12-14 RX ADMIN — INSULIN LISPRO 1 UNITS: 100 INJECTION, SOLUTION INTRAVENOUS; SUBCUTANEOUS at 14:30

## 2021-12-14 RX ADMIN — INSULIN LISPRO 3 UNITS: 100 INJECTION, SOLUTION INTRAVENOUS; SUBCUTANEOUS at 18:10

## 2021-12-14 RX ADMIN — NITROGLYCERIN 0.25 INCH: 20 OINTMENT TOPICAL at 06:16

## 2021-12-14 RX ADMIN — ASPIRIN 81 MG: 81 TABLET, COATED ORAL at 06:16

## 2021-12-14 RX ADMIN — FENOFIBRATE 134 MG: 134 CAPSULE ORAL at 06:16

## 2021-12-14 RX ADMIN — INSULIN GLARGINE 10 UNITS: 100 INJECTION, SOLUTION SUBCUTANEOUS at 18:11

## 2021-12-14 RX ADMIN — Medication 10 MG: at 20:52

## 2021-12-14 RX ADMIN — EZETIMIBE 10 MG: 10 TABLET ORAL at 06:16

## 2021-12-14 RX ADMIN — METOPROLOL SUCCINATE 25 MG: 25 TABLET, EXTENDED RELEASE ORAL at 07:01

## 2021-12-14 RX ADMIN — INSULIN LISPRO 2 UNITS: 100 INJECTION, SOLUTION INTRAVENOUS; SUBCUTANEOUS at 06:21

## 2021-12-14 RX ADMIN — NITROGLYCERIN 0.25 INCH: 20 OINTMENT TOPICAL at 12:00

## 2021-12-14 RX ADMIN — SENNOSIDES AND DOCUSATE SODIUM 2 TABLET: 50; 8.6 TABLET ORAL at 06:16

## 2021-12-14 RX ADMIN — NITROGLYCERIN 0.25 INCH: 20 OINTMENT TOPICAL at 00:32

## 2021-12-14 RX ADMIN — INSULIN LISPRO 1 UNITS: 100 INJECTION, SOLUTION INTRAVENOUS; SUBCUTANEOUS at 00:38

## 2021-12-14 RX ADMIN — NITROGLYCERIN 0.25 INCH: 20 OINTMENT TOPICAL at 18:14

## 2021-12-14 ASSESSMENT — PAIN DESCRIPTION - PAIN TYPE
TYPE: ACUTE PAIN
TYPE: ACUTE PAIN

## 2021-12-14 ASSESSMENT — FIBROSIS 4 INDEX: FIB4 SCORE: 0.75

## 2021-12-14 NOTE — PROGRESS NOTES
Monitor Summary  Rhythm: SR  Rate: 71-93  Ectopy: O PAC, R PVC  0.16 / 0.8 / 0.35

## 2021-12-14 NOTE — PROGRESS NOTES
Daily Progress Note    Date of Service: 12/14/2021  Primary Team: [employer] IM White Team   Attending: Satnam Beckham M.D.   Senior Resident: Dr. Johnson  Intern: Dr. Zafar  Contact:  [phone]    Chief Complaint:   NSTEMI on 12/7    Subjective    Today, Lisinopril was given as pt had appropriate BP of ~140/80.     Pt received ~3 units of SSI over the past 24 hours, but he did refuse a few doses. Today, pt denies any orthopnea. Pt is making urine, tolerating PO and ambulating well without assistance.     -Cardio: denies chest pain, denies palpitations  -Resp: denies SOB, denies cough, denies wheezing  -Abd: denies abd pain, denies N/V    Objective    Vitals:   Temp:  [36.1 °C (96.9 °F)-36.8 °C (98.2 °F)] 36.6 °C (97.8 °F)  Pulse:  [68-89] 68  Resp:  [15-18] 15  BP: (109-140)/(67-83) 118/67  SpO2:  [91 %-94 %] 91 %     -General: NAD, resting comfortably in bed  -Cardio: no LE edema, distant heart sounds, no JVD, no S3  -Resp: lungs CTAB, no crackles or wheezing  -Abd: soft and nontender, no guarding or rebound      Data:  -Anti Xa = wnl   -Glu~208    -PFT = FEV1~110% predicted     Assessment/Plan    Kyle Gonzalez is a 41M with a h/o Ischemic HFrEF (EF~40%, s/p stent to the L Circumflex artery and LAD in 2020), Familial Hypercholesterolemia (LDL~155, c/b Hepatic Steatosis), LUE DVT (August 2021), IDDM2 (60 units of Glargine at night, A1c = 9%), HTN, smoking (27 pack years), and ETOH use.     Pt was admitted for NSTEMI and is s/p [facility] without intervention on 12/7.    Hospital Course:    On 12/7, pt was admitted with typical cardiac CP and was found to have an NSTEMI (with Trop T~96). Therefore, pt was started on a heparin drip and given ASA. The next day, pt was taken to the cath lab but due to the presence of multivessel CAD that also involved the LAD, no stenting or angioplasty was performed at that time. Instead, pt was recommended a CABG. Over the next few days, pt continued to have CP, but thankfully this resolved by  12/11. By 12/13, we transitioned him off of the heparin drip and onto SubQ Enoxaparin. Pt is currently pending Medicaid approval so that he can be transferred to [place] for a CABG. Pt may consider outpt PCSK9 initiation.     * NSTEMI (in the setting of severe CAD and HFrEF)- (present on admission)  Assessment & Plan  (possibly secondary to recent episode of COVID in August 2021)  -s/p LHC on 12/8 without intervention   -s/p heparin drip x7 days    -SubQ Enoxaparin 1mg/kg BID (plan to d/c on 12/15)  -ASA + Metoprolol Succinate + Lisinopril + PRN Nitrates  -c/w home Rouvastatin/Ezetimibe/Fenofibrate    LUE DVT (August 2021)  Assessment & Plan  -holding home Rivaroxaban for now (pt has been on this for > 3 months, so we may not need to resume this on d/c)  -on heparin drip as above     IDDM2 (on 60 units of Glargine at home)  Assessment & Plan  -Glargine 10 units + SSI for now     #FEN/GI  -diabetic diet  -bowel reg     Other Problems:  #Unspecified Insomnia - c/w Melatonin      -DVT ppx = already on heparin drip as above  -Code Status = Full Code  -PCP = none. PCP referral placed.      Dispo = pending transfer to [place]      Tee Zafar, PGY1  Internal Medicine Residency with [employer]     Plan has been discussed with Attending Physician.

## 2021-12-14 NOTE — PROGRESS NOTES
Bedside report received from SARIKA Corona. POC discussed with pt; all questions answered at this time.

## 2021-12-15 LAB
GLUCOSE BLD-MCNC: 164 MG/DL (ref 65–99)
GLUCOSE BLD-MCNC: 168 MG/DL (ref 65–99)
GLUCOSE BLD-MCNC: 222 MG/DL (ref 65–99)
GLUCOSE BLD-MCNC: 249 MG/DL (ref 65–99)

## 2021-12-15 PROCEDURE — A9270 NON-COVERED ITEM OR SERVICE: HCPCS | Performed by: STUDENT IN AN ORGANIZED HEALTH CARE EDUCATION/TRAINING PROGRAM

## 2021-12-15 PROCEDURE — 700102 HCHG RX REV CODE 250 W/ 637 OVERRIDE(OP): Performed by: STUDENT IN AN ORGANIZED HEALTH CARE EDUCATION/TRAINING PROGRAM

## 2021-12-15 PROCEDURE — 770020 HCHG ROOM/CARE - TELE (206)

## 2021-12-15 PROCEDURE — 700102 HCHG RX REV CODE 250 W/ 637 OVERRIDE(OP): Performed by: INTERNAL MEDICINE

## 2021-12-15 PROCEDURE — A9270 NON-COVERED ITEM OR SERVICE: HCPCS | Performed by: INTERNAL MEDICINE

## 2021-12-15 PROCEDURE — 99233 SBSQ HOSP IP/OBS HIGH 50: CPT | Performed by: HOSPITALIST

## 2021-12-15 PROCEDURE — 700111 HCHG RX REV CODE 636 W/ 250 OVERRIDE (IP): Performed by: STUDENT IN AN ORGANIZED HEALTH CARE EDUCATION/TRAINING PROGRAM

## 2021-12-15 PROCEDURE — A9270 NON-COVERED ITEM OR SERVICE: HCPCS | Performed by: NURSE PRACTITIONER

## 2021-12-15 PROCEDURE — 700102 HCHG RX REV CODE 250 W/ 637 OVERRIDE(OP): Performed by: NURSE PRACTITIONER

## 2021-12-15 PROCEDURE — 82962 GLUCOSE BLOOD TEST: CPT

## 2021-12-15 RX ORDER — CLOPIDOGREL BISULFATE 75 MG/1
75 TABLET ORAL DAILY
Status: DISCONTINUED | OUTPATIENT
Start: 2021-12-15 | End: 2021-12-17

## 2021-12-15 RX ADMIN — NITROGLYCERIN 0.25 INCH: 20 OINTMENT TOPICAL at 05:00

## 2021-12-15 RX ADMIN — INSULIN LISPRO 1 UNITS: 100 INJECTION, SOLUTION INTRAVENOUS; SUBCUTANEOUS at 05:13

## 2021-12-15 RX ADMIN — Medication 10 MG: at 21:58

## 2021-12-15 RX ADMIN — ASPIRIN 81 MG: 81 TABLET, COATED ORAL at 05:00

## 2021-12-15 RX ADMIN — ROSUVASTATIN CALCIUM 40 MG: 20 TABLET, FILM COATED ORAL at 16:45

## 2021-12-15 RX ADMIN — INSULIN LISPRO 2 UNITS: 100 INJECTION, SOLUTION INTRAVENOUS; SUBCUTANEOUS at 16:48

## 2021-12-15 RX ADMIN — INSULIN LISPRO 2 UNITS: 100 INJECTION, SOLUTION INTRAVENOUS; SUBCUTANEOUS at 11:24

## 2021-12-15 RX ADMIN — ENOXAPARIN SODIUM 120 MG: 120 INJECTION SUBCUTANEOUS at 05:00

## 2021-12-15 RX ADMIN — NITROGLYCERIN 0.25 INCH: 20 OINTMENT TOPICAL at 00:00

## 2021-12-15 RX ADMIN — EZETIMIBE 10 MG: 10 TABLET ORAL at 05:00

## 2021-12-15 RX ADMIN — INSULIN LISPRO 1 UNITS: 100 INJECTION, SOLUTION INTRAVENOUS; SUBCUTANEOUS at 00:00

## 2021-12-15 RX ADMIN — FENOFIBRATE 134 MG: 134 CAPSULE ORAL at 05:00

## 2021-12-15 RX ADMIN — NITROGLYCERIN 0.25 INCH: 20 OINTMENT TOPICAL at 11:21

## 2021-12-15 RX ADMIN — CLOPIDOGREL BISULFATE 75 MG: 75 TABLET ORAL at 11:20

## 2021-12-15 RX ADMIN — NITROGLYCERIN 0.25 INCH: 20 OINTMENT TOPICAL at 16:46

## 2021-12-15 RX ADMIN — METOPROLOL SUCCINATE 25 MG: 25 TABLET, EXTENDED RELEASE ORAL at 05:00

## 2021-12-15 ASSESSMENT — FIBROSIS 4 INDEX: FIB4 SCORE: 0.75

## 2021-12-15 NOTE — PROGRESS NOTES
Bedside report received from Bob BUSBY RN. Pt A&Ox4. No complaints of pain. POC discussed with pt. Pt verbalizes understanding. Call light and belongings within reach. Bed locked and in lowest position. Alarm and fall precautions in place.

## 2021-12-15 NOTE — PROGRESS NOTES
Daily Progress Note    Date of Service: 12/15/2021  Primary Team: [facility] IM White Team   Attending: Satnam Beckham M.D.   Senior Resident: Dr. Johnson  Intern: Dr. Zafar  Contact:  [phone]    Chief Complaint:   NSTEMI on 12/7    Subjective    Pt received ~7 units of SSI over the past 24 hours. Today, pt denies any orthopnea but he does endorse some chronic R foot pain (x8 years). Pt is making urine, tolerating PO and ambulating well without assistance.     -Cardio: denies chest pain, denies palpitations  -Resp: denies SOB, denies cough, denies wheezing  -Abd: denies abd pain, denies N/V    Objective    Vitals:   Temp:  [36.1 °C (96.9 °F)-36.7 °C (98.1 °F)] 36.7 °C (98.1 °F)  Pulse:  [69-87] 81  Resp:  [16-18] 16  BP: (103-133)/(69-76) 133/71  SpO2:  [92 %-96 %] 93 %     -General: NAD, resting comfortably in bed  -Cardio: no LE edema, distant heart sounds, no JVD, no S3  -Resp: lungs CTAB, no crackles or wheezing  -Abd: soft and nontender, no guarding or rebound      Data:  -Anti Xa = wnl   -Glu~ 164 - 260     -PFT = FEV1~110% predicted     Assessment/Plan    Kyle Gonzalez is a 41M with a h/o Ischemic HFrEF (EF~40%, s/p stent to the L Circumflex artery and LAD in 2020), Familial Hypercholesterolemia (LDL~155, c/b Hepatic Steatosis), LUE DVT (Partial L brachial artery occlusion in secondary to L Arterial Line placement in August 2021 during an episode of COVID), IDDM2 (60 units of Glargine at night, A1c = 9%), HTN, smoking (27 pack years), and ETOH use.     Pt was admitted for NSTEMI and is s/p LHC without intervention on 12/7.    Hospital Course:    On 12/7, pt was admitted with typical cardiac CP and was found to have an NSTEMI (with Trop T~96). Therefore, pt was started on a heparin drip and given ASA. The next day, pt was taken to the cath lab but due to the presence of multivessel CAD that also involved the LAD, no stenting or angioplasty was performed at that time. Instead, pt was recommended a CABG. Over the next  few days, pt continued to have CP, but thankfully this resolved by 12/11. By 12/13, we transitioned him off of the heparin drip and onto SubQ Enoxaparin. On 12/15, due to delays in pt's CABG, we transitioned him off of therapeutic anticoagulation and started on Clopidogrel. Pt is currently pending Medicaid approval so that he can be transferred to [facility] for a CABG. Pt may consider outpt PCSK9 initiation.     * NSTEMI (in the setting of severe CAD and HFrEF)- (present on admission)  Assessment & Plan  (possibly secondary to recent episode of COVID in August 2021)  -s/p LHC on 12/8 without intervention   -s/p heparin drip and therapeutic Enoxaparin x7 days   -ASA/Clopidogrel + Metoprolol Succinate + Lisinopril + PRN Nitrates  -c/w home Rouvastatin/Ezetimibe/Fenofibrate    Provoked LUE DVT (August 2021)  Assessment & Plan  -holding home Rivaroxaban for now (pt has been on this for > 3 months, so we may not need to resume this on d/c)    IDDM2 (on 60 units of Glargine at home)  Assessment & Plan  -Glargine 13 units + SSI for now     #FEN/GI  -diabetic diet  -bowel reg     Other Problems:  #Unspecified Insomnia - c/w Melatonin      -DVT ppx = Enoxaparin 40mg BID (dosed for pt's BMI)  -Code Status = Full Code  -PCP = none. PCP referral placed.      Dispo = pending transfer to [facility]      Tee Zafar, PGY1  Internal Medicine Residency with [employer]     Plan has been discussed with Attending Physician.

## 2021-12-16 LAB
GLUCOSE BLD-MCNC: 167 MG/DL (ref 65–99)
GLUCOSE BLD-MCNC: 195 MG/DL (ref 65–99)
GLUCOSE BLD-MCNC: 219 MG/DL (ref 65–99)
GLUCOSE BLD-MCNC: 220 MG/DL (ref 65–99)
SARS-COV+SARS-COV-2 AG RESP QL IA.RAPID: NOTDETECTED
SPECIMEN SOURCE: NORMAL

## 2021-12-16 PROCEDURE — A9270 NON-COVERED ITEM OR SERVICE: HCPCS | Performed by: STUDENT IN AN ORGANIZED HEALTH CARE EDUCATION/TRAINING PROGRAM

## 2021-12-16 PROCEDURE — 700102 HCHG RX REV CODE 250 W/ 637 OVERRIDE(OP): Performed by: INTERNAL MEDICINE

## 2021-12-16 PROCEDURE — 82962 GLUCOSE BLOOD TEST: CPT

## 2021-12-16 PROCEDURE — A9270 NON-COVERED ITEM OR SERVICE: HCPCS | Performed by: INTERNAL MEDICINE

## 2021-12-16 PROCEDURE — 700102 HCHG RX REV CODE 250 W/ 637 OVERRIDE(OP): Performed by: STUDENT IN AN ORGANIZED HEALTH CARE EDUCATION/TRAINING PROGRAM

## 2021-12-16 PROCEDURE — 700102 HCHG RX REV CODE 250 W/ 637 OVERRIDE(OP): Performed by: NURSE PRACTITIONER

## 2021-12-16 PROCEDURE — 700111 HCHG RX REV CODE 636 W/ 250 OVERRIDE (IP): Performed by: STUDENT IN AN ORGANIZED HEALTH CARE EDUCATION/TRAINING PROGRAM

## 2021-12-16 PROCEDURE — 99232 SBSQ HOSP IP/OBS MODERATE 35: CPT | Performed by: HOSPITALIST

## 2021-12-16 PROCEDURE — 770020 HCHG ROOM/CARE - TELE (206)

## 2021-12-16 PROCEDURE — A9270 NON-COVERED ITEM OR SERVICE: HCPCS | Performed by: NURSE PRACTITIONER

## 2021-12-16 PROCEDURE — 87426 SARSCOV CORONAVIRUS AG IA: CPT

## 2021-12-16 RX ADMIN — ASPIRIN 81 MG: 81 TABLET, COATED ORAL at 06:37

## 2021-12-16 RX ADMIN — INSULIN LISPRO 1 UNITS: 100 INJECTION, SOLUTION INTRAVENOUS; SUBCUTANEOUS at 06:48

## 2021-12-16 RX ADMIN — ENOXAPARIN SODIUM 40 MG: 40 INJECTION SUBCUTANEOUS at 06:37

## 2021-12-16 RX ADMIN — NITROGLYCERIN 0.25 INCH: 20 OINTMENT TOPICAL at 16:59

## 2021-12-16 RX ADMIN — Medication 10 MG: at 20:12

## 2021-12-16 RX ADMIN — SENNOSIDES AND DOCUSATE SODIUM 2 TABLET: 50; 8.6 TABLET ORAL at 16:57

## 2021-12-16 RX ADMIN — NITROGLYCERIN 0.25 INCH: 20 OINTMENT TOPICAL at 06:36

## 2021-12-16 RX ADMIN — INSULIN LISPRO 2 UNITS: 100 INJECTION, SOLUTION INTRAVENOUS; SUBCUTANEOUS at 16:52

## 2021-12-16 RX ADMIN — METOPROLOL SUCCINATE 25 MG: 25 TABLET, EXTENDED RELEASE ORAL at 06:37

## 2021-12-16 RX ADMIN — NITROGLYCERIN 0.25 INCH: 20 OINTMENT TOPICAL at 12:24

## 2021-12-16 RX ADMIN — INSULIN LISPRO 1 UNITS: 100 INJECTION, SOLUTION INTRAVENOUS; SUBCUTANEOUS at 00:07

## 2021-12-16 RX ADMIN — LISINOPRIL 5 MG: 5 TABLET ORAL at 06:50

## 2021-12-16 RX ADMIN — EZETIMIBE 10 MG: 10 TABLET ORAL at 06:37

## 2021-12-16 RX ADMIN — INSULIN LISPRO 2 UNITS: 100 INJECTION, SOLUTION INTRAVENOUS; SUBCUTANEOUS at 12:21

## 2021-12-16 RX ADMIN — ENOXAPARIN SODIUM 40 MG: 40 INJECTION SUBCUTANEOUS at 16:57

## 2021-12-16 RX ADMIN — SENNOSIDES AND DOCUSATE SODIUM 2 TABLET: 50; 8.6 TABLET ORAL at 06:36

## 2021-12-16 RX ADMIN — CLOPIDOGREL BISULFATE 75 MG: 75 TABLET ORAL at 06:37

## 2021-12-16 RX ADMIN — NITROGLYCERIN 0.25 INCH: 20 OINTMENT TOPICAL at 00:02

## 2021-12-16 RX ADMIN — FENOFIBRATE 134 MG: 134 CAPSULE ORAL at 06:37

## 2021-12-16 RX ADMIN — ROSUVASTATIN CALCIUM 40 MG: 20 TABLET, FILM COATED ORAL at 16:57

## 2021-12-16 NOTE — PROGRESS NOTES
Bedside report received from Chloe BUSBY RN. Pt A&Ox4. No complaints of pain. POC discussed with pt. Pt verbalizes understanding. Call light and belongings within reach. Bed locked and in lowest position. Alarm and fall precautions in place.

## 2021-12-16 NOTE — DISCHARGE PLANNING
LSW received form from [facility] and was notified it needed to be signed by pt and MD. Obtained signatures and faxed forms back to [facility].

## 2021-12-16 NOTE — PROGRESS NOTES
Daily Progress Note    Date of Service: 12/16/2021  Primary Team: [employer] IM White Team   Attending: Satnam Beckham M.D.   Senior Resident: Dr. Johnson  Intern: Dr. Zafar  Contact:  [phone]    Chief Complaint:   NSTEMI on 12/7    Subjective    This AM, pt's Lisinopril was held due to pt's SBP < 120.      Pt received ~6 units of SSI over the past 24 hours. Tele was unremarkable. Today, pt denies any orthopnea. He does endorse some lightheadedness today as well. Pt is making urine, tolerating PO and ambulating well without assistance.     -Cardio: denies chest pain, denies palpitations  -Resp: denies SOB, denies cough, denies wheezing  -Abd: denies abd pain, denies N/V    Objective    Vitals:   Temp:  [36.2 °C (97.2 °F)-36.7 °C (98.1 °F)] 36.4 °C (97.6 °F)  Pulse:  [68-87] 79  Resp:  [16-17] 16  BP: (100-149)/(64-80) 127/80  SpO2:  [92 %-95 %] 93 %     -General: NAD, resting comfortably in bed  -Cardio: trace LE edema, no JVD, no S3  -Resp: lungs CTAB, no crackles or wheezing  -Abd: soft and nontender, no guarding or rebound      Data:  -Anti Xa = wnl   -Glu~ 160 - 250   -Fasting Glucose = 195    -PFT = FEV1~110% predicted     Assessment/Plan    Kyle Gonzalez is a 41M with a h/o Ischemic HFrEF (EF~40%, s/p stent to the L Circumflex artery and LAD in 2020), Familial Hypercholesterolemia (LDL~155, c/b Hepatic Steatosis), LUE DVT (Partial L brachial artery occlusion in secondary to L Arterial Line placement in August 2021 during an episode of COVID), IDDM2 (60 units of Glargine at night, A1c = 9%), HTN, smoking (27 pack years), and ETOH use.     Pt was admitted for NSTEMI and is s/p LHC without intervention on 12/7.    Hospital Course:    On 12/7, pt was admitted with typical cardiac CP and was found to have an NSTEMI (with Trop T~96). Therefore, pt was started on a heparin drip and given ASA. The next day, pt was taken to the cath lab but due to the presence of multivessel CAD that also involved the LAD, no stenting or  angioplasty was performed at that time. Instead, pt was recommended a CABG. Over the next few days, pt continued to have CP, but thankfully this resolved by 12/11. By 12/13, we transitioned him off of the heparin drip and onto SubQ Enoxaparin. On 12/15, due to delays in pt's CABG, we transitioned him off of therapeutic anticoagulation and started on Clopidogrel. Pt's Medicaid has been approved and [facility]. Pt may consider outpt PCSK9 initiation.     * NSTEMI (in the setting of severe CAD and HFrEF)- (present on admission)  Assessment & Plan  (possibly secondary to recent episode of COVID in August 2021)  -s/p LHC on 12/8 without intervention   -s/p heparin drip and therapeutic Enoxaparin x7 days   -ASA/Clopidogrel + Metoprolol Succinate + Lisinopril + PRN Nitrates  -c/w home Rouvastatin/Ezetimibe/Fenofibrate    Provoked LUE DVT (August 2021)  Assessment & Plan  -holding home Rivaroxaban for now (pt has been on this for > 3 months, so we may not need to resume this on d/c)    IDDM2 (on 60 units of Glargine at home)  Assessment & Plan  -Glargine 16 units + SSI for now     #FEN/GI  -diabetic diet  -bowel reg     Other Problems:  #Unspecified Insomnia - c/w Melatonin      -DVT ppx = Enoxaparin 40mg BID (dosed for pt's BMI)  -Code Status = Full Code  -PCP = none. PCP referral placed.      Dispo = pending transfer to [facility]      Tee Zafar, PGY1  Internal Medicine Residency with [employer]     Plan has been discussed with Attending Physician.

## 2021-12-16 NOTE — PROGRESS NOTES
Report received from Veronica BAUM. Patient is A & O x4, room air, SR on the monitor, and denies pain. POC discussed. Safety measures in place. Patient is up self. Call light is in reach.

## 2021-12-17 LAB
GLUCOSE BLD-MCNC: 213 MG/DL (ref 65–99)
GLUCOSE BLD-MCNC: 226 MG/DL (ref 65–99)
GLUCOSE BLD-MCNC: 250 MG/DL (ref 65–99)
GLUCOSE BLD-MCNC: 276 MG/DL (ref 65–99)

## 2021-12-17 PROCEDURE — 700102 HCHG RX REV CODE 250 W/ 637 OVERRIDE(OP): Performed by: NURSE PRACTITIONER

## 2021-12-17 PROCEDURE — A9270 NON-COVERED ITEM OR SERVICE: HCPCS | Performed by: INTERNAL MEDICINE

## 2021-12-17 PROCEDURE — A9270 NON-COVERED ITEM OR SERVICE: HCPCS | Performed by: STUDENT IN AN ORGANIZED HEALTH CARE EDUCATION/TRAINING PROGRAM

## 2021-12-17 PROCEDURE — 700102 HCHG RX REV CODE 250 W/ 637 OVERRIDE(OP): Performed by: STUDENT IN AN ORGANIZED HEALTH CARE EDUCATION/TRAINING PROGRAM

## 2021-12-17 PROCEDURE — 99232 SBSQ HOSP IP/OBS MODERATE 35: CPT | Performed by: HOSPITALIST

## 2021-12-17 PROCEDURE — A9270 NON-COVERED ITEM OR SERVICE: HCPCS | Performed by: NURSE PRACTITIONER

## 2021-12-17 PROCEDURE — 82962 GLUCOSE BLOOD TEST: CPT | Mod: 91

## 2021-12-17 PROCEDURE — 700102 HCHG RX REV CODE 250 W/ 637 OVERRIDE(OP): Performed by: INTERNAL MEDICINE

## 2021-12-17 PROCEDURE — 700111 HCHG RX REV CODE 636 W/ 250 OVERRIDE (IP): Performed by: STUDENT IN AN ORGANIZED HEALTH CARE EDUCATION/TRAINING PROGRAM

## 2021-12-17 PROCEDURE — 770020 HCHG ROOM/CARE - TELE (206)

## 2021-12-17 RX ADMIN — CLOPIDOGREL BISULFATE 75 MG: 75 TABLET ORAL at 06:05

## 2021-12-17 RX ADMIN — NITROGLYCERIN 0.25 INCH: 20 OINTMENT TOPICAL at 12:00

## 2021-12-17 RX ADMIN — INSULIN LISPRO 2 UNITS: 100 INJECTION, SOLUTION INTRAVENOUS; SUBCUTANEOUS at 00:57

## 2021-12-17 RX ADMIN — INSULIN GLARGINE 18 UNITS: 100 INJECTION, SOLUTION SUBCUTANEOUS at 18:00

## 2021-12-17 RX ADMIN — ENOXAPARIN SODIUM 40 MG: 40 INJECTION SUBCUTANEOUS at 06:04

## 2021-12-17 RX ADMIN — FENOFIBRATE 134 MG: 134 CAPSULE ORAL at 06:04

## 2021-12-17 RX ADMIN — SENNOSIDES AND DOCUSATE SODIUM 2 TABLET: 50; 8.6 TABLET ORAL at 18:01

## 2021-12-17 RX ADMIN — INSULIN LISPRO 2 UNITS: 100 INJECTION, SOLUTION INTRAVENOUS; SUBCUTANEOUS at 12:00

## 2021-12-17 RX ADMIN — ROSUVASTATIN CALCIUM 40 MG: 20 TABLET, FILM COATED ORAL at 18:01

## 2021-12-17 RX ADMIN — SENNOSIDES AND DOCUSATE SODIUM 2 TABLET: 50; 8.6 TABLET ORAL at 06:05

## 2021-12-17 RX ADMIN — Medication 10 MG: at 21:12

## 2021-12-17 RX ADMIN — METOPROLOL SUCCINATE 25 MG: 25 TABLET, EXTENDED RELEASE ORAL at 06:12

## 2021-12-17 RX ADMIN — ENOXAPARIN SODIUM 40 MG: 40 INJECTION SUBCUTANEOUS at 18:01

## 2021-12-17 RX ADMIN — NITROGLYCERIN 0.25 INCH: 20 OINTMENT TOPICAL at 18:02

## 2021-12-17 RX ADMIN — ASPIRIN 81 MG: 81 TABLET, COATED ORAL at 06:05

## 2021-12-17 RX ADMIN — INSULIN LISPRO 3 UNITS: 100 INJECTION, SOLUTION INTRAVENOUS; SUBCUTANEOUS at 18:00

## 2021-12-17 RX ADMIN — NITROGLYCERIN 0.25 INCH: 20 OINTMENT TOPICAL at 00:57

## 2021-12-17 RX ADMIN — EZETIMIBE 10 MG: 10 TABLET ORAL at 06:04

## 2021-12-17 RX ADMIN — NITROGLYCERIN 0.25 INCH: 20 OINTMENT TOPICAL at 06:05

## 2021-12-17 RX ADMIN — INSULIN LISPRO 2 UNITS: 100 INJECTION, SOLUTION INTRAVENOUS; SUBCUTANEOUS at 06:14

## 2021-12-17 ASSESSMENT — FIBROSIS 4 INDEX: FIB4 SCORE: 0.75

## 2021-12-17 NOTE — PROGRESS NOTES
Daily Progress Note    Date of Service: 12/16/2021  Primary Team: [employer] IM White Team   Attending: Satnam Beckham M.D.   Senior Resident: Dr. Johnson  Intern: Dr. Zafar  Contact:  [phone]    Chief Complaint:   NSTEMI on 12/7    Subjective    Denies any shortness of breath, chest pain, bilateral pedal edema    Pt received ~8 units of SSI over the past 24 hours. Tele was unremarkable. . Pt is making urine, tolerating PO and ambulating well without assistance.     -Cardio: denies chest pain, denies palpitations  -Resp: denies SOB, denies cough, denies wheezing  -Abd: denies abd pain, denies N/V    Objective    Vitals:   Temp:  [36.2 °C (97.2 °F)-36.7 °C (98.1 °F)] 36.4 °C (97.6 °F)  Pulse:  [68-87] 79  Resp:  [16-17] 16  BP: (100-149)/(64-80) 127/80  SpO2:  [92 %-95 %] 93 %     -General: NAD, resting comfortably in bed  -Cardio: no JVD, no S3, no lower extremity edema  -Resp: lungs CTAB, no crackles or wheezing  -Abd: soft and nontender, no guarding or rebound      Data:  -Fasting Glucose = 250    -PFT = FEV1~110% predicted     Assessment/Plan    Kyle Gonzalez is a 41M with a h/o Ischemic HFrEF (EF~40%, s/p stent to the L Circumflex artery and LAD in 2020), Familial Hypercholesterolemia (LDL~155, c/b Hepatic Steatosis), LUE DVT (Partial L brachial artery occlusion in secondary to L Arterial Line placement in August 2021 during an episode of COVID), IDDM2 (60 units of Glargine at night, A1c = 9%), HTN, smoking (27 pack years), and ETOH use.     Pt was admitted for NSTEMI and is s/p C without intervention on 12/7.    Hospital Course:    On 12/7, pt was admitted with typical cardiac CP and was found to have an NSTEMI (with Trop T~96). Therefore, pt was started on a heparin drip and given ASA. The next day, pt was taken to the cath lab but due to the presence of multivessel CAD that also involved the LAD, no stenting or angioplasty was performed at that time. Instead, pt was recommended a CABG. Over the next few days, pt  continued to have CP, but thankfully this resolved by 12/11. By 12/13, we transitioned him off of the heparin drip and onto SubQ Enoxaparin. On 12/15, due to delays in pt's CABG, we transitioned him off of therapeutic anticoagulation and started on Clopidogrel. Pt has been accepted by [facility] pending bed, so he can be transferred for a CABG. Plavix stopped.  Pt may consider outpt PCSK9 initiation.     * NSTEMI (in the setting of severe CAD and HFrEF)- (present on admission)  Assessment & Plan  (possibly secondary to recent episode of COVID in August 2021)  -s/p LHC on 12/8 without intervention   -s/p heparin drip and therapeutic Enoxaparin x7 days   -ASA + Metoprolol Succinate + Lisinopril + PRN Nitrates. Clopidogrel stopped, given that he has been accepted and now he is pending bed,  -c/w home Rouvastatin/Ezetimibe/Fenofibrate    Provoked LUE DVT (August 2021)  Assessment & Plan  -holding home Rivaroxaban for now (pt has been on this for > 3 months, so we may not need to resume this on d/c)    IDDM2 (on 60 units of Glargine at home)  Assessment & Plan  -Glargine 18 units + SSI for now     #FEN/GI  -diabetic diet  -bowel reg     Other Problems:  #Unspecified Insomnia - c/w Melatonin      -DVT ppx = Enoxaparin 40mg BID (dosed for pt's BMI)  -Code Status = Full Code  -PCP = none. PCP referral placed.      Dispo = pending transfer to [facility]      Glenn Johnson, PGY3  Internal Medicine Residency with [employer]     Plan has been discussed with Attending Physician.

## 2021-12-18 LAB
GLUCOSE BLD-MCNC: 200 MG/DL (ref 65–99)
GLUCOSE BLD-MCNC: 208 MG/DL (ref 65–99)
GLUCOSE BLD-MCNC: 230 MG/DL (ref 65–99)
GLUCOSE BLD-MCNC: 240 MG/DL (ref 65–99)

## 2021-12-18 PROCEDURE — A9270 NON-COVERED ITEM OR SERVICE: HCPCS | Performed by: NURSE PRACTITIONER

## 2021-12-18 PROCEDURE — A9270 NON-COVERED ITEM OR SERVICE: HCPCS | Performed by: STUDENT IN AN ORGANIZED HEALTH CARE EDUCATION/TRAINING PROGRAM

## 2021-12-18 PROCEDURE — 700102 HCHG RX REV CODE 250 W/ 637 OVERRIDE(OP): Performed by: STUDENT IN AN ORGANIZED HEALTH CARE EDUCATION/TRAINING PROGRAM

## 2021-12-18 PROCEDURE — 82962 GLUCOSE BLOOD TEST: CPT

## 2021-12-18 PROCEDURE — 700102 HCHG RX REV CODE 250 W/ 637 OVERRIDE(OP): Performed by: NURSE PRACTITIONER

## 2021-12-18 PROCEDURE — 99232 SBSQ HOSP IP/OBS MODERATE 35: CPT | Performed by: HOSPITALIST

## 2021-12-18 PROCEDURE — A9270 NON-COVERED ITEM OR SERVICE: HCPCS | Performed by: INTERNAL MEDICINE

## 2021-12-18 PROCEDURE — 770020 HCHG ROOM/CARE - TELE (206)

## 2021-12-18 PROCEDURE — 700102 HCHG RX REV CODE 250 W/ 637 OVERRIDE(OP): Performed by: INTERNAL MEDICINE

## 2021-12-18 PROCEDURE — 700111 HCHG RX REV CODE 636 W/ 250 OVERRIDE (IP): Performed by: STUDENT IN AN ORGANIZED HEALTH CARE EDUCATION/TRAINING PROGRAM

## 2021-12-18 RX ADMIN — INSULIN LISPRO 2 UNITS: 100 INJECTION, SOLUTION INTRAVENOUS; SUBCUTANEOUS at 05:32

## 2021-12-18 RX ADMIN — ENOXAPARIN SODIUM 40 MG: 40 INJECTION SUBCUTANEOUS at 05:25

## 2021-12-18 RX ADMIN — INSULIN LISPRO 2 UNITS: 100 INJECTION, SOLUTION INTRAVENOUS; SUBCUTANEOUS at 00:13

## 2021-12-18 RX ADMIN — INSULIN LISPRO 2 UNITS: 100 INJECTION, SOLUTION INTRAVENOUS; SUBCUTANEOUS at 11:46

## 2021-12-18 RX ADMIN — FENOFIBRATE 134 MG: 134 CAPSULE ORAL at 05:26

## 2021-12-18 RX ADMIN — ENOXAPARIN SODIUM 40 MG: 40 INJECTION SUBCUTANEOUS at 16:40

## 2021-12-18 RX ADMIN — Medication 10 MG: at 20:30

## 2021-12-18 RX ADMIN — SENNOSIDES AND DOCUSATE SODIUM 2 TABLET: 50; 8.6 TABLET ORAL at 16:39

## 2021-12-18 RX ADMIN — NITROGLYCERIN 0.25 INCH: 20 OINTMENT TOPICAL at 00:18

## 2021-12-18 RX ADMIN — SENNOSIDES AND DOCUSATE SODIUM 2 TABLET: 50; 8.6 TABLET ORAL at 05:26

## 2021-12-18 RX ADMIN — NITROGLYCERIN 0.25 INCH: 20 OINTMENT TOPICAL at 11:44

## 2021-12-18 RX ADMIN — ROSUVASTATIN CALCIUM 40 MG: 20 TABLET, FILM COATED ORAL at 16:39

## 2021-12-18 RX ADMIN — LISINOPRIL 5 MG: 5 TABLET ORAL at 05:26

## 2021-12-18 RX ADMIN — ASPIRIN 81 MG: 81 TABLET, COATED ORAL at 05:26

## 2021-12-18 RX ADMIN — INSULIN LISPRO 1 UNITS: 100 INJECTION, SOLUTION INTRAVENOUS; SUBCUTANEOUS at 16:44

## 2021-12-18 RX ADMIN — NITROGLYCERIN 0.25 INCH: 20 OINTMENT TOPICAL at 16:40

## 2021-12-18 RX ADMIN — METOPROLOL SUCCINATE 25 MG: 25 TABLET, EXTENDED RELEASE ORAL at 05:26

## 2021-12-18 RX ADMIN — NITROGLYCERIN 0.25 INCH: 20 OINTMENT TOPICAL at 05:25

## 2021-12-18 RX ADMIN — EZETIMIBE 10 MG: 10 TABLET ORAL at 05:26

## 2021-12-18 ASSESSMENT — FIBROSIS 4 INDEX: FIB4 SCORE: 0.75

## 2021-12-18 NOTE — PROGRESS NOTES
Daily Progress Note    Date of Service: 12/16/2021  Primary Team: [employer] IM White Team   Attending: Satnam Beckham M.D.   Senior Resident: Dr. Johnson  Intern: Dr. Zafar  Contact:  [phone]    Chief Complaint:   NSTEMI on 12/7    Subjective    Overnight, pt' Lisinopril was held due to pt's low BP. Pt received ~9 units of SSI over the past 24 hours. Tele showed occasional PVCs.     Today, pt denies any light-headedness or orthopnea. Pt is making urine, tolerating PO and ambulating well without assistance.     -Cardio: denies chest pain, denies palpitations  -Resp: denies SOB, denies cough, denies wheezing  -Abd: denies abd pain, denies N/V    Objective    Vitals:   Temp:  [36.2 °C (97.2 °F)-36.7 °C (98.1 °F)] 36.4 °C (97.6 °F)  Pulse:  [68-87] 79  Resp:  [16-17] 16  BP: (100-149)/(64-80) 127/80  SpO2:  [92 %-95 %] 93 %     -General: NAD, resting comfortably in bed  -Cardio: no JVD, no S3, no lower extremity edema  -Resp: lungs CTAB, no crackles or wheezing  -Abd: soft and nontender, no guarding or rebound    Data:  -Glu = 195 - 276  -COVID = negative     -PFT = FEV1~110% predicted     Assessment/Plan    Kyle Gonzalez is a 41M with a h/o Ischemic HFrEF (EF~40%, s/p stent to the L Circumflex artery and LAD in 2020), Familial Hypercholesterolemia (LDL~155, c/b Hepatic Steatosis), LUE DVT (Partial L brachial artery occlusion in secondary to L Arterial Line placement in August 2021 during an episode of COVID), IDDM2 (60 units of Glargine at night, A1c = 9%), HTN, smoking (27 pack years), and ETOH use.     Pt was admitted for NSTEMI and is s/p LHC without intervention on 12/7.    Hospital Course:    On 12/7, pt was admitted with typical cardiac CP and was found to have an NSTEMI (with Trop T~96). Therefore, pt was started on a heparin drip and given ASA. The next day, pt was taken to the cath lab but due to the presence of multivessel CAD that also involved the LAD, no stenting or angioplasty was performed at that time.  Instead, pt was recommended a CABG. Over the next few days, pt continued to have CP, but thankfully this resolved by 12/11. By 12/13, we transitioned him off of the heparin drip and onto SubQ Enoxaparin. On 12/15, due to delays in pt's CABG, we transitioned him off of therapeutic anticoagulation and started on Clopidogrel for a few days. On 12/17, because pt had been accepted to [facility], his Clopidogrel was stopped once again. Pt has been accepted by [facility] for his CABG but is still pending bed availability.  Pt may consider outpt PCSK9 initiation.     * NSTEMI (in the setting of severe CAD and HFrEF)  Assessment & Plan  (possibly secondary to recent episode of COVID in August 2021)  -s/p LHC on 12/8 without intervention   -s/p heparin drip and therapeutic Enoxaparin x7 days   -ASA/Clopidogrel + Metoprolol Succinate + Lisinopril + PRN Nitrates  -c/w home Rouvastatin/Ezetimibe/Fenofibrate    Provoked LUE DVT (August 2021)  Assessment & Plan  -holding home Rivaroxaban for now (pt has been on this for > 3 months, so we may not need to resume this on d/c)    IDDM2 (on 60 units of Glargine at home)  Assessment & Plan  -Glargine 20 units + SSI for now     #FEN/GI  -diabetic diet  -bowel reg     Other Problems:  #Unspecified Insomnia - c/w Melatonin      -DVT ppx = Enoxaparin 40mg BID (dosed for pt's BMI)  -Code Status = Full Code  -PCP = none. PCP referral placed.      Dispo = pending transfer to [facility]      Tee Zafar, PGY1  Internal Medicine Residency with [employer]     Plan has been discussed with Attending Physician.

## 2021-12-18 NOTE — PROGRESS NOTES
Assumed care of pt, received bedside report from SARIKA Escobar. Pt sitting up in bed, no complaints of pain, room air, A/Ox4. Fall and safety precautions in place. Discussed POC with pt, pt verbalizes understanding. No further needs at this time.

## 2021-12-19 VITALS
BODY MASS INDEX: 43.6 KG/M2 | HEIGHT: 65 IN | WEIGHT: 261.69 LBS | HEART RATE: 99 BPM | OXYGEN SATURATION: 90 % | RESPIRATION RATE: 18 BRPM | SYSTOLIC BLOOD PRESSURE: 139 MMHG | DIASTOLIC BLOOD PRESSURE: 62 MMHG | TEMPERATURE: 97.3 F

## 2021-12-19 LAB
GLUCOSE BLD-MCNC: 161 MG/DL (ref 65–99)
GLUCOSE BLD-MCNC: 169 MG/DL (ref 65–99)
GLUCOSE BLD-MCNC: 245 MG/DL (ref 65–99)
GLUCOSE BLD-MCNC: 252 MG/DL (ref 65–99)

## 2021-12-19 PROCEDURE — 99239 HOSP IP/OBS DSCHRG MGMT >30: CPT | Performed by: HOSPITALIST

## 2021-12-19 PROCEDURE — A9270 NON-COVERED ITEM OR SERVICE: HCPCS | Performed by: NURSE PRACTITIONER

## 2021-12-19 PROCEDURE — 700102 HCHG RX REV CODE 250 W/ 637 OVERRIDE(OP): Performed by: STUDENT IN AN ORGANIZED HEALTH CARE EDUCATION/TRAINING PROGRAM

## 2021-12-19 PROCEDURE — A9270 NON-COVERED ITEM OR SERVICE: HCPCS | Performed by: INTERNAL MEDICINE

## 2021-12-19 PROCEDURE — A9270 NON-COVERED ITEM OR SERVICE: HCPCS | Performed by: STUDENT IN AN ORGANIZED HEALTH CARE EDUCATION/TRAINING PROGRAM

## 2021-12-19 PROCEDURE — 82962 GLUCOSE BLOOD TEST: CPT

## 2021-12-19 PROCEDURE — 700102 HCHG RX REV CODE 250 W/ 637 OVERRIDE(OP): Performed by: NURSE PRACTITIONER

## 2021-12-19 PROCEDURE — 700111 HCHG RX REV CODE 636 W/ 250 OVERRIDE (IP): Performed by: STUDENT IN AN ORGANIZED HEALTH CARE EDUCATION/TRAINING PROGRAM

## 2021-12-19 PROCEDURE — 700102 HCHG RX REV CODE 250 W/ 637 OVERRIDE(OP): Performed by: INTERNAL MEDICINE

## 2021-12-19 RX ORDER — METOPROLOL SUCCINATE 25 MG/1
25 TABLET, EXTENDED RELEASE ORAL DAILY
Qty: 30 TABLET | Refills: 0 | Status: SHIPPED | OUTPATIENT
Start: 2021-12-20 | End: 2022-01-13

## 2021-12-19 RX ORDER — PHENOL 1.4 %
10 AEROSOL, SPRAY (ML) MUCOUS MEMBRANE NIGHTLY PRN
Qty: 30 TABLET | Refills: 0 | Status: SHIPPED | OUTPATIENT
Start: 2021-12-19 | End: 2022-01-13

## 2021-12-19 RX ORDER — NITROGLYCERIN 0.4 MG/1
0.4 TABLET SUBLINGUAL PRN
Qty: 25 TABLET | Refills: 0 | Status: SHIPPED | OUTPATIENT
Start: 2021-12-19 | End: 2022-01-13

## 2021-12-19 RX ORDER — INSULIN LISPRO 100 [IU]/ML
2 INJECTION, SOLUTION INTRAVENOUS; SUBCUTANEOUS
Qty: 3 ML | Refills: 1 | Status: SHIPPED | OUTPATIENT
Start: 2021-12-19 | End: 2022-01-19

## 2021-12-19 RX ORDER — INSULIN GLARGINE 100 [IU]/ML
24 INJECTION, SOLUTION SUBCUTANEOUS EVERY EVENING
Qty: 9 ML | Refills: 1 | Status: SHIPPED | OUTPATIENT
Start: 2021-12-19 | End: 2022-01-19

## 2021-12-19 RX ADMIN — METOPROLOL SUCCINATE 25 MG: 25 TABLET, EXTENDED RELEASE ORAL at 04:42

## 2021-12-19 RX ADMIN — INSULIN LISPRO 2 UNITS: 100 INJECTION, SOLUTION INTRAVENOUS; SUBCUTANEOUS at 11:26

## 2021-12-19 RX ADMIN — ASPIRIN 81 MG: 81 TABLET, COATED ORAL at 04:42

## 2021-12-19 RX ADMIN — NITROGLYCERIN 0.25 INCH: 20 OINTMENT TOPICAL at 11:36

## 2021-12-19 RX ADMIN — ENOXAPARIN SODIUM 40 MG: 40 INJECTION SUBCUTANEOUS at 04:42

## 2021-12-19 RX ADMIN — NITROGLYCERIN 0.25 INCH: 20 OINTMENT TOPICAL at 17:54

## 2021-12-19 RX ADMIN — NITROGLYCERIN 0.25 INCH: 20 OINTMENT TOPICAL at 00:29

## 2021-12-19 RX ADMIN — ENOXAPARIN SODIUM 40 MG: 40 INJECTION SUBCUTANEOUS at 17:52

## 2021-12-19 RX ADMIN — NITROGLYCERIN 0.25 INCH: 20 OINTMENT TOPICAL at 04:43

## 2021-12-19 RX ADMIN — FENOFIBRATE 134 MG: 134 CAPSULE ORAL at 04:42

## 2021-12-19 RX ADMIN — EZETIMIBE 10 MG: 10 TABLET ORAL at 04:42

## 2021-12-19 RX ADMIN — INSULIN LISPRO 1 UNITS: 100 INJECTION, SOLUTION INTRAVENOUS; SUBCUTANEOUS at 00:35

## 2021-12-19 RX ADMIN — SENNOSIDES AND DOCUSATE SODIUM 2 TABLET: 50; 8.6 TABLET ORAL at 04:42

## 2021-12-19 RX ADMIN — Medication 10 MG: at 20:04

## 2021-12-19 RX ADMIN — ROSUVASTATIN CALCIUM 40 MG: 20 TABLET, FILM COATED ORAL at 17:52

## 2021-12-19 RX ADMIN — INSULIN LISPRO 1 UNITS: 100 INJECTION, SOLUTION INTRAVENOUS; SUBCUTANEOUS at 04:58

## 2021-12-19 RX ADMIN — SENNOSIDES AND DOCUSATE SODIUM 2 TABLET: 50; 8.6 TABLET ORAL at 17:52

## 2021-12-19 RX ADMIN — INSULIN LISPRO 3 UNITS: 100 INJECTION, SOLUTION INTRAVENOUS; SUBCUTANEOUS at 17:54

## 2021-12-19 ASSESSMENT — FIBROSIS 4 INDEX: FIB4 SCORE: 0.75

## 2021-12-19 ASSESSMENT — PAIN DESCRIPTION - PAIN TYPE: TYPE: ACUTE PAIN

## 2021-12-19 NOTE — PROGRESS NOTES
Daily Progress Note    Date of Service: 12/16/2021  Primary Team: [employer] IM White Team   Attending: Satnam Beckham M.D.   Senior Resident: Dr. Johnson  Intern: Dr. Zafar  Contact:  [phone]    Chief Complaint:   NSTEMI on 12/7    Subjective    Overnight, pt' Lisinopril was held due to pt's low BP. Pt received ~7 units of SSI over the past 24 hours. Tele was unremarkable.     Today, pt denies any light-headedness or orthopnea. Pt is making urine, tolerating PO and ambulating well without assistance.     -Cardio: denies chest pain, denies palpitations  -Resp: denies SOB, denies cough, denies wheezing  -Abd: denies abd pain, denies N/V    Objective    Vitals:   Temp:  [36.2 °C (97.2 °F)-36.7 °C (98.1 °F)] 36.4 °C (97.6 °F)  Pulse:  [68-87] 79  Resp:  [16-17] 16  BP: (100-149)/(64-80) 127/80  SpO2:  [92 %-95 %] 93 %     -General: NAD, resting comfortably in bed  -Cardio: no JVD, no S3, no lower extremity edema  -Resp: lungs CTAB, no crackles or wheezing  -Abd: soft and nontender, no guarding or rebound    Data:  -Glu = 169 - 276  -COVID = negative     -PFT = FEV1~110% predicted     Assessment/Plan    Kyle Gonzalez is a 41M with a h/o Ischemic HFrEF (EF~40%, s/p stent to the L Circumflex artery and LAD in 2020), Familial Hypercholesterolemia (LDL~155, c/b Hepatic Steatosis), LUE DVT (Partial L brachial artery occlusion in secondary to L Arterial Line placement in August 2021 during an episode of COVID), IDDM2 (60 units of Glargine at night, A1c = 9%), HTN, smoking (27 pack years), and ETOH use.     Pt was admitted for NSTEMI and is s/p LHC without intervention on 12/7.    Hospital Course:    On 12/7, pt was admitted with typical cardiac CP and was found to have an NSTEMI (with Trop T~96). Therefore, pt was started on a heparin drip and given ASA. The next day, pt was taken to the cath lab but due to the presence of multivessel CAD that also involved the LAD, no stenting or angioplasty was performed at that time. Instead, pt  was recommended a CABG. Over the next few days, pt continued to have CP, but thankfully this resolved by 12/11. By 12/13, we transitioned him off of the heparin drip and onto SubQ Enoxaparin. On 12/15, due to delays in pt's CABG, we transitioned him off of therapeutic anticoagulation and started on Clopidogrel for a few days. On 12/17, because pt had been accepted to [facility], his Clopidogrel was stopped once again. Pt has been accepted by [facility] for his CABG but is still pending bed availability. Otherwise, we are just adjusting pt's insulin regimen daily. Pt may consider outpt PCSK9 initiation.      * NSTEMI (in the setting of severe CAD and HFrEF)  Assessment & Plan  (possibly secondary to recent episode of COVID in August 2021)  -s/p LHC on 12/8 without intervention   -s/p heparin drip and therapeutic Enoxaparin x7 days   -ASA/Clopidogrel + Metoprolol Succinate + Lisinopril + scheduled topical Nitrates + PRN topical Nitrates  -c/w home Rouvastatin/Ezetimibe/Fenofibrate    Provoked LUE DVT (August 2021)  Assessment & Plan  -holding home Rivaroxaban for now (pt has been on this for > 3 months, so we may not need to resume this on d/c)    IDDM2 (on 60 units of Glargine at home)  Assessment & Plan  -Glargine 24 units + SSI for now     #FEN/GI  -diabetic diet  -bowel reg     Other Problems:  #Unspecified Insomnia - c/w Melatonin      -DVT ppx = Enoxaparin 40mg BID (dosed for pt's BMI)  -Code Status = Full Code  -PCP = none. PCP referral placed.      Dispo = pending transfer to [facility]      Tee Zafar, PGY1  Internal Medicine Residency with [employer]     Plan has been discussed with Attending Physician.

## 2021-12-19 NOTE — DISCHARGE PLANNING
Anticipated Discharge Disposition: Acute transfer- [facility]     Action: LSW informed by Ibis BAUM with with the transfer center that [facility] has a bed available for pt. Address- [address] [place] [address]    LSW faxed PCS form to the transfer center fax and notified [facility] of fax.   LSW able to meet with Dr. Zafar for signature for COBRA.     Per [facility] radiology disc has already been requested.     D/c summary in place. LSW able to print out transfer packet.     Await transport time confirmation.     LSW provided update to bedside Maximo BAUM (2004).     Barriers to Discharge: None     Plan: LSW to meet with pt at bedside to provide update, Await confirmed transport time     Addendum 1515  LSW met with pt at bedside to provide update. Pt already aware of transfer and has notified family. Pt aware that transportation time still pending at this time.   Pt provided a signature for COBRA.     Addendum 3516  LSW informed that [facility] BAUM that pt will be going to [facility]- [address] [place] [address]    LSW updated COBRA with address above.     Bedside Maximo BAUM aware to place radiology disc in packet to transfer with pt to [facility].

## 2021-12-19 NOTE — PROGRESS NOTES
Carlos is a 41 y.o.m h/o of ICM/HFrEF (EF~40%, s/p PCI 2020), Familial Hypercholesterolemia, provoked  LUE DVT 2/2 covid s/p tx, IDDM, 27 packyear smoker p/w NSTEMI found to have triple vessel dz deamned high risk CABG so awaiting transfer to [facility]. He has been accepted but pending bed. Given his long wait prior to acceptance we had resumed Plavix which is now held in anticipation of transfer. Last plavix dose 12/17 @ 0600. He is chest pain free and ambulatory.

## 2021-12-19 NOTE — DISCHARGE INSTRUCTIONS
Discharge Instructions    Discharged to other by ambulance with self. Discharged via ambulance, hospital escort: Yes.  Special equipment needed: Not Applicable    Be sure to schedule a follow-up appointment with your primary care doctor or any specialists as instructed.     Discharge Plan:   Diet Plan: Discussed  Activity Level: Discussed  Confirmed Follow up Appointment: Patient to Call and Schedule Appointment  Confirmed Symptoms Management: Discussed  Medication Reconciliation Updated: Yes  Influenza Vaccine Indication: Patient Refuses    I understand that a diet low in cholesterol, fat, and sodium is recommended for good health. Unless I have been given specific instructions below for another diet, I accept this instruction as my diet prescription.   Other diet: carb controlled    Special Instructions: Diagnosis:  Acute Coronary Syndrome (ACS) is a diagnosis that encompasses cardiac-related chest pain and heart attack. ACS occurs when the blood flow to the heart muscle is severely reduced or cut off completely due to a slow process called atherosclerosis.  Atherosclerosis is a disease in which the coronary arteries become narrow from a buildup of fat, cholesterol, and other substances that combine to form plaque. If the plaque breaks, a blood clot will form and block the blood flow to the heart muscle. This lack of blood flow can cause damage or death to the heart muscle which is called a heart attack or Myocardial Infarction (MI). There are two different types of MIs:  ST Elevation Myocardial Infarction or STEMI (the most severe type of heart attack) and Non-ST Elevation Myocardial Infarction or NSTEMI.    Treatment Plan:  · Cardiac Diet  - Low fat, low salt, low cholesterol   · Cardiac Rehab  - Your doctor has ordered you a referral to [facility].  Call [phone] to schedule an appointment.  · Attend my follow-up appointment with my Cardiologist.  · Take my medications as prescribed by my doctor  · Exercise  daily  · Quit Smoking, Lower my bad cholesterol and raise my good cholesterol, lower my blood pressure, Reduce stress and Control my diabetes    Medications:  Certain medications are used to treat ACS.  Remember to always take medications as prescribed and never stop talking medications unless told by your doctor.    You have been prescribed the following medicatons:    Aspirin - Aspirin is used as a blood thinning medication and you will require this medication indefinitely.  Nitroglycerine - Nitroglycerine is used to relieve chest pain.    · Is patient discharged on Warfarin / Coumadin?   No     Depression / Suicide Risk    As you are discharged from this [facility] facility, it is important to learn how to keep safe from harming yourself.    Recognize the warning signs:  · Abrupt changes in personality, positive or negative- including increase in energy   · Giving away possessions  · Change in eating patterns- significant weight changes-  positive or negative  · Change in sleeping patterns- unable to sleep or sleeping all the time   · Unwillingness or inability to communicate  · Depression  · Unusual sadness, discouragement and loneliness  · Talk of wanting to die  · Neglect of personal appearance   · Rebelliousness- reckless behavior  · Withdrawal from people/activities they love  · Confusion- inability to concentrate     If you or a loved one observes any of these behaviors or has concerns about self-harm, here's what you can do:  · Talk about it- your feelings and reasons for harming yourself  · Remove any means that you might use to hurt yourself (examples: pills, rope, extension cords, firearm)  · Get professional help from the community (Mental Health, Substance Abuse, psychological counseling)  · Do not be alone:Call your Safe Contact- someone whom you trust who will be there for you.  · Call your local CRISIS HOTLINE 126-1874 or 240-214-3514  · Call your local Children's Mobile Crisis Response Team  [facility] (778) 565-4806 or www.Cooliris.[employer]cura  · Call the toll free National Suicide Prevention Hotlines   · National Suicide Prevention Lifeline 309-858-FMWU (7235)  · National Hope Line Network 800-SUICIDE (456-4785)      Per Javier

## 2021-12-19 NOTE — CARE PLAN
Problem: Knowledge Deficit - Standard  Goal: Patient and family/care givers will demonstrate understanding of plan of care, disease process/condition, diagnostic tests and medications  Outcome: Progressing     Problem: Pain - Standard  Goal: Alleviation of pain or a reduction in pain to the patient’s comfort goal  Outcome: Progressing   The patient is Watcher - Medium risk of patient condition declining or worsening    Shift Goals  Clinical Goals: Monitor chest pain  Patient Goals: transfer to [place]  Family Goals: transfer    Progress made toward(s) clinical / shift goals:  This nurse assumed care at 0700. Patient has been up in the room and hallways with no distress noted. Patient up to shower with no complications noted. Patient awaiting transfer to [place] for further medical treatment.  Will continue to monitor and provide care.     Patient is not progressing towards the following goals:      
  Problem: Knowledge Deficit - Standard  Goal: Patient and family/care givers will demonstrate understanding of plan of care, disease process/condition, diagnostic tests and medications  Outcome: Progressing     Problem: Pain - Standard  Goal: Alleviation of pain or a reduction in pain to the patient’s comfort goal  Outcome: Progressing   The patient is Watcher - Medium risk of patient condition declining or worsening    Shift Goals  Clinical Goals: monitor for chest pain  Patient Goals: sleep, go outside  Family Goals: transfer    Progress made toward(s) clinical / shift goals:  Pt is actively participating in care.    Patient is not progressing towards the following goals:      
  Problem: Knowledge Deficit - Standard  Goal: Patient and family/care givers will demonstrate understanding of plan of care, disease process/condition, diagnostic tests and medications  Outcome: Progressing  Note: Patient educated on the plan of care, medication regimen, NPO status, and cath lab procedure. Patient verbalized understanding.     Problem: Pain - Standard  Goal: Alleviation of pain or a reduction in pain to the patient’s comfort goal  Outcome: Progressing  Flowsheets  Taken 12/8/2021 0848 by Veronica Christensen R.N.  OB Pain Intervention:   Medication - See MAR   Education   Ambulation / Increased activity   Repositioned   Rest   [place]  Taken 12/8/2021 0759 by Veronica Christensen R.N.  Pain Rating Scale (NPRS): 5  Taken 12/8/2021 0000 by Liz Webster R.N.  Non Verbal Scale:   Calm   Sleeping   Unlabored Breathing  Note: Patient complaining of 5/10 chest pain during shift change, Dr. Agarwal at bedside. Cardiology APRN [place] notified regarding chest pain, patient more comfortable at the moment, will continue to monitor. Scheduled cath lab procedure today.      The patient is Watcher - Medium risk of patient condition declining or worsening    Shift Goals: Monitor for any signs of chest pain  Clinical Goals: Cath lab procedure  Patient Goals: Go to cath lab    Progress made toward(s) clinical / shift goals:  Heparin drip and cath lab procedure today.    Patient is not progressing towards the following goals: Chest pain       
  Problem: Knowledge Deficit - Standard  Goal: Patient and family/care givers will demonstrate understanding of plan of care, disease process/condition, diagnostic tests and medications  Outcome: Progressing  Note: Patient educated on the plan of care, medication regimen, and discharge plan. [place] notified this RN they will call within an update in 48 hours on bed status. Patient notified and verbalized understanding. Requests to ambulate around hospital, page out to MD.      Problem: Pain - Standard  Goal: Alleviation of pain or a reduction in pain to the patient’s comfort goal  Outcome: Progressing  Flowsheets  Taken 12/15/2021 0800 by Veronica Christensen R.N.  Pain Rating Scale (NPRS): 0  Taken 12/8/2021 0848 by Veronica Christensen R.N.  OB Pain Intervention:   Medication - See MAR   Education   Ambulation / Increased activity   Repositioned   Rest   [employer]  Taken 12/8/2021 0000 by Liz Webster R.N.  Non Verbal Scale:   Calm   Sleeping   Unlabored Breathing  Note: Nitroglycerin scheduled. Patient states nitroglycerin has relieved any chest pain, will continue to monitor and medicate per MAR.      The patient is Watcher - Medium risk of patient condition declining or worsening    Shift Goals: Monitor for any signs of chest pain  Clinical Goals: Transfer to [place] safely  Patient Goals: Transfer to [place] and have procedure done  Family Goals: Transfer    Progress made toward(s) clinical / shift goals:  Patient ambulating the halls, no more chest pain.     Patient is not progressing towards the following goals: Awaiting for transfer to [place].       
  Problem: Knowledge Deficit - Standard  Goal: Patient and family/care givers will demonstrate understanding of plan of care, disease process/condition, diagnostic tests and medications  Outcome: Progressing  Note: Patient educated on the plan of care, medication regimen, and transfer to [place]. Patient verbalized understanding. MD notified regarding transfer needs for transfer to [place], orders being put in.      Problem: Pain - Standard  Goal: Alleviation of pain or a reduction in pain to the patient’s comfort goal  Outcome: Progressing  Flowsheets  Taken 12/15/2021 1915 by Luz Menezes R.N.  Pain Rating Scale (NPRS): 0  Taken 12/8/2021 0848 by Veronica Christensen R.N.  OB Pain Intervention:   Medication - See MAR   Education   Ambulation / Increased activity   Repositioned   Rest   [place]  Taken 12/8/2021 0000 by Liz Webster R.N.  Non Verbal Scale:   Calm   Sleeping   Unlabored Breathing  Note: Scheduled Nitroglycerin for patient, patient states pain is relived with nitroglycerin ointment, no current chest pain. Will continue to monitor.      The patient is Stable - Low risk of patient condition declining or worsening    Shift Goals: Monitor blood pressure, and plan for transfer  Clinical Goals: Monitor for any signs of chest pain  Patient Goals: Transfer safely to [place]  Family Goals: Transfer    Progress made toward(s) clinical / shift goals:  Patient ambulating the halls, no chest pain    Patient is not progressing towards the following goals: Transfer to [place]      
The patient is Stable - Low risk of patient condition declining or worsening    Shift Goals  Clinical Goals: Monitor chest pain  Patient Goals: Transfer safely to [place]  Family Goals: Transfer    Progress made toward(s) clinical / shift goals:  Denies chest pain or SOB at this time, nitro paste applied q6h.      Problem: Knowledge Deficit - Standard  Goal: Patient and family/care givers will demonstrate understanding of plan of care, disease process/condition, diagnostic tests and medications  Outcome: Progressing     Problem: Pain - Standard  Goal: Alleviation of pain or a reduction in pain to the patient’s comfort goal  Outcome: Progressing       Patient is not progressing towards the following goals: N/A      
The patient is Stable - Low risk of patient condition declining or worsening    Shift Goals  Clinical Goals: maintain safety, monitor for CP  Patient Goals: transfer to [place]  Family Goals: Transfer    Progress made toward(s) clinical / shift goals:    Problem: Knowledge Deficit - Standard  Goal: Patient and family/care givers will demonstrate understanding of plan of care, disease process/condition, diagnostic tests and medications  Outcome: Progressing     Problem: Pain - Standard  Goal: Alleviation of pain or a reduction in pain to the patient’s comfort goal  Outcome: Progressing       Patient is not progressing towards the following goals:      
The patient is Stable - Low risk of patient condition declining or worsening    Shift Goals  Clinical Goals: monitor for chest pain  Patient Goals: sleep, go outside  Family Goals: transfer    Progress made toward(s) clinical / shift goals:    Problem: Knowledge Deficit - Standard  Goal: Patient and family/care givers will demonstrate understanding of plan of care, disease process/condition, diagnostic tests and medications  Outcome: Progressing     Problem: Pain - Standard  Goal: Alleviation of pain or a reduction in pain to the patient’s comfort goal  Outcome: Progressing       Patient is not progressing towards the following goals:      
The patient is Watcher - Medium risk of patient condition declining or worsening    Shift Goals  Clinical Goals: Bed placement at [place]  Patient Goals: take a shower  Family Goals: n/a    Progress made toward(s) clinical / shift goals:    Problem: Knowledge Deficit - Standard  Goal: Patient and family/care givers will demonstrate understanding of plan of care, disease process/condition, diagnostic tests and medications  Outcome: Progressing     Problem: Pain - Standard  Goal: Alleviation of pain or a reduction in pain to the patient’s comfort goal  Outcome: Progressing       Patient is not progressing towards the following goals:      
The patient is Watcher - Medium risk of patient condition declining or worsening    Shift Goals  Clinical Goals: Chest pain management  Patient Goals: pain management  Family Goals: n/a    Problem: Knowledge Deficit - Standard  Goal: Patient and family/care givers will demonstrate understanding of plan of care, disease process/condition, diagnostic tests and medications  Outcome: Progressing     Problem: Pain - Standard  Goal: Alleviation of pain or a reduction in pain to the patient’s comfort goal  Outcome: Progressing     
The patient is Watcher - Medium risk of patient condition declining or worsening    Shift Goals  Clinical Goals: Chest pain management  Patient Goals: pain management  Family Goals: n/a    Progress made toward(s) clinical / shift goals:  Denies chest pain or SOB at this time, nitro paste applied per MAR, heparin gtt continuous.    Problem: Knowledge Deficit - Standard  Goal: Patient and family/care givers will demonstrate understanding of plan of care, disease process/condition, diagnostic tests and medications  Outcome: Progressing     Problem: Pain - Standard  Goal: Alleviation of pain or a reduction in pain to the patient’s comfort goal  Outcome: Progressing       Patient is not progressing towards the following goals: N/A      
The patient is Watcher - Medium risk of patient condition declining or worsening    Shift Goals  Clinical Goals: Chest pain management  Patient Goals: pain management  Family Goals: n/a    Progress made toward(s) clinical / shift goals:  Denies chest pain or SOB, nitro paste applied q6h.    Problem: Knowledge Deficit - Standard  Goal: Patient and family/care givers will demonstrate understanding of plan of care, disease process/condition, diagnostic tests and medications  Outcome: Progressing     Problem: Pain - Standard  Goal: Alleviation of pain or a reduction in pain to the patient’s comfort goal  Outcome: Progressing       Patient is not progressing towards the following goals: N/A      
The patient is Watcher - Medium risk of patient condition declining or worsening    Shift Goals  Clinical Goals: Heparin gtt therapeutic  Patient Goals: pain management  Family Goals: n/a    Problem: Knowledge Deficit - Standard  Goal: Patient and family/care givers will demonstrate understanding of plan of care, disease process/condition, diagnostic tests and medications  Outcome: Progressing     Problem: Pain - Standard  Goal: Alleviation of pain or a reduction in pain to the patient’s comfort goal  Outcome: Progressing     
The patient is Watcher - Medium risk of patient condition declining or worsening    Shift Goals  Clinical Goals: Heparin gtt therapeutic  Patient Goals: pain management  Family Goals: n/a    Progress made toward(s) clinical / shift goals:  Heparin gtt therapeutic, continuous at 24. Denies chest pain or SOB.     Problem: Knowledge Deficit - Standard  Goal: Patient and family/care givers will demonstrate understanding of plan of care, disease process/condition, diagnostic tests and medications  Outcome: Progressing     Problem: Pain - Standard  Goal: Alleviation of pain or a reduction in pain to the patient’s comfort goal  Outcome: Progressing       Patient is not progressing towards the following goals: N/A      
The patient is Watcher - Medium risk of patient condition declining or worsening    Shift Goals  Clinical Goals: Theraputic Hep  Patient Goals: pain management  Family Goals: n/a    Progress made toward(s) clinical / shift goals:    Problem: Knowledge Deficit - Standard  Goal: Patient and family/care givers will demonstrate understanding of plan of care, disease process/condition, diagnostic tests and medications  Outcome: Progressing     Problem: Pain - Standard  Goal: Alleviation of pain or a reduction in pain to the patient’s comfort goal  Outcome: Progressing       Patient is not progressing towards the following goals:      
The patient is Watcher - Medium risk of patient condition declining or worsening    Shift Goals  Clinical Goals: heparin gtt, remain free of chest pain  Patient Goals: restful sleep  Family Goals: n/a    Progress made toward(s) clinical / shift goals:    Problem: Knowledge Deficit - Standard  Goal: Patient and family/care givers will demonstrate understanding of plan of care, disease process/condition, diagnostic tests and medications  Outcome: Progressing     Problem: Pain - Standard  Goal: Alleviation of pain or a reduction in pain to the patient’s comfort goal  Outcome: Progressing       Patient is not progressing towards the following goals:      
The patient is Watcher - Medium risk of patient condition declining or worsening    Shift Goals  Clinical Goals: monitor chest pain  Patient Goals: take a shower  Family Goals: n/a      Problem: Knowledge Deficit - Standard  Goal: Patient and family/care givers will demonstrate understanding of plan of care, disease process/condition, diagnostic tests and medications  Outcome: Progressing     
The patient is Watcher - Medium risk of patient condition declining or worsening    Shift Goals  Clinical Goals: monitor chest pain  Patient Goals: take a shower  Family Goals: n/a    Progress made toward(s) clinical / shift goals:    Problem: Knowledge Deficit - Standard  Goal: Patient and family/care givers will demonstrate understanding of plan of care, disease process/condition, diagnostic tests and medications  Outcome: Progressing     Problem: Pain - Standard  Goal: Alleviation of pain or a reduction in pain to the patient’s comfort goal  Outcome: Progressing       Patient is not progressing towards the following goals:      
The patient is Watcher - Medium risk of patient condition declining or worsening    Shift Goals  Clinical Goals: monitor for chest pain  Patient Goals: sleep, go outside  Family Goals: transfer    Progress made toward(s) clinical / shift goals:    Problem: Knowledge Deficit - Standard  Goal: Patient and family/care givers will demonstrate understanding of plan of care, disease process/condition, diagnostic tests and medications  Outcome: Progressing  Note: White board updated with POC and care team information during bedside report.      Problem: Pain - Standard  Goal: Alleviation of pain or a reduction in pain to the patient’s comfort goal  Outcome: Progressing  Note: Pt assessed for pain regularly and medicated per MAR.  Reports effectiveness of nitropaste             
Negative

## 2021-12-19 NOTE — FLOWSHEET NOTE
Assumed care of pt, received bedside report from Court BAUM. Pt sitting up in bed, no complaints of pain, room air, A/Ox4. Fall and safety precautions in place, strip alarm in place. Discussed POC with pt, pt verbalizes understanding. No further needs at this time.

## 2021-12-20 NOTE — PROGRESS NOTES
Patient in transit to [place] via helicopter. Care flight team escorted patient off unit via gurney. Patient was sent with all belongings. Updates given to elizabeth BAUM.

## 2021-12-20 NOTE — PROGRESS NOTES
Bedside report received. Pt A&Ox4. No reports of pain. POC discussed with pt. Pt verbalizes understanding. Call light and belongings within reach. Bed locked and in lowest position.

## 2021-12-31 NOTE — PROGRESS NOTES
Per Amber at [facility], this patient never wore or returned this monitor and they have been unable to reach him.This order has  and we will no-charge this visit.

## 2022-01-12 ASSESSMENT — ENCOUNTER SYMPTOMS
SHORTNESS OF BREATH: 0
NAUSEA: 0
ORTHOPNEA: 0
PND: 0

## 2022-01-12 NOTE — PROGRESS NOTES
Chief Complaint   Patient presents with   • Congestive Heart Failure     F/V Dx: Heart failure with reduced ejection fraction (HCC)   • Coronary Artery Disease     F/V Dx: Stented coronary artery,  bare metal stent X 2   • Hypertension        Subjective:   Kyle Gonzalez is a 41 y.o. male who presents today for follow-up follow CABG surgery.    Current medical problems include premature CAD s/p PCI to RCA in 2013 (at 32yo), PCI to OM in 2017, TABATHA to LAD Oct 2020, ischemic cardiomyopathy, poorly controlled hyperlipidemia, likely FH, tobacco use, COVID pneumonia Aug 2021 with LUE DVT, IDDM    Kyle was admitted 12/7/2021 with NSTEMI, found to have in-stent restenosis of ostial LAD stent, 70% distal left main stenosis, 80% left circumflex stenosis,  of RCA. Evaluated by her cardiothoracic surgeon team who recommended transfer to outside hospital for more advanced ancillary support.  He was eventually transferred to [place] under the care of Dr Toussaint where he underwent coronary artery bypass surgery with LIMA to LAD, SVG to OM, SVG to PDA. Post cardiopulmonary bypass CLOVER revealed normal biventricular function. The patient was transferred post op to the ICU on inotropic support. Immediate post op rhythm was sinus rhythm. Patient was extubated on POD 0. He recovered well and was hemodynamically stable.  He did not have any postoperative atrial fibrillation.  Discharged POD 7.     Kyle continues to be recovering well.  He does have some sternal and chest discomfort especially when he gets out of bed.  He has been very careful about following the sternal precautions.  He did see his primary provider in [place] who noted possible infection at his vein grafting site.  He was placed on doxycycline.  He has not had any problems with his incisions.    Kyle does not have a blood pressure cuff at home.  He has been taking his medications as directed.  He has not had any bleeding complications on the aspirin and the  Plavix.  He was given 4 days of the furosemide which he has completed.   He has a scale at home, at discharge he was 260 pounds, today he is 262.  He denies any abdominal bloating, leg swelling orthopnea.  He continues to use the incentive spirometer daily.    He expresses interest in cardiac rehab, he has been using YouTube videos to help him.  He would like to attend cardiac rehab session although it would require a lot of driving as he lives approximately 1 hour away from the nearest rehab center.    His mother  at age 45 from stroke, his father had a heart attack in his early 50s. He does not know if his siblings have heart disease.     Past Medical History:   Diagnosis Date   • Diabetes (HCC)    • Heart attack (HCC)    • Hyperlipidemia    • Hypertension    • Medical non-compliance 2015         Past Surgical History:   Procedure Laterality Date   • IN TRANSCATH STENT INIT VESSEL,OPEN      x4         Family History   Problem Relation Age of Onset   • Diabetes Mother    • Heart Attack Mother    • Heart Attack Father          Social History     Tobacco Use   Smoking Status Former Smoker   • Packs/day: 1.00   • Years: 27.00   • Pack years: 27.00   • Types: Cigarettes   • Start date: 3/17/1994   • Quit date: 2021   • Years since quittin.4   Smokeless Tobacco Never Used          Social History     Substance and Sexual Activity   Alcohol Use Not Currently         No Known Allergies      Outpatient Encounter Medications as of 2022   Medication Sig Dispense Refill   • potassium chloride (KLOR-CON) 20 MEQ Pack Take 20 mEq by mouth every day.     • Polyethylene Glycol Powder      • PANTOPRAZOLE SODIUM PO Take 40 mg by mouth every day.     • clopidogrel (PLAVIX) 75 MG Tab Take 75 mg by mouth every day.     • furosemide (LASIX) 20 MG Tab Take 20 mg by mouth every day.     • acetaminophen (TYLENOL) 500 MG Tab Take 500-1,000 mg by mouth every 6 hours as needed.     • amiodarone (CORDARONE) 200 MG Tab Take  200 mg by mouth every day.     • metoprolol tartrate (LOPRESSOR) 25 MG Tab Take 1 Tablet by mouth 2 times a day. 60 Tablet 11   • insulin glargine (LANTUS SOLOSTAR) 100 UNIT/ML Solution Pen-injector injection Inject 24 Units under the skin every evening for 31 days. 9 mL 1   • enoxaparin (LOVENOX) 40 MG/0.4ML Solution inj Inject 40 mg under the skin 2 times a day for 31 days. 60 Each 1   • insulin lispro (HUMALOG,ADMELOG) 100 UNIT/ML Solution Pen-injector injection PEN Inject 2 Units under the skin 4 Times a Day,Before Meals and at Bedtime for 31 days. 3 mL 1   • aspirin EC (ECOTRIN) 81 MG Tablet Delayed Response Take 1 Tab by mouth every day. 100 Tab 3   • rosuvastatin (CRESTOR) 40 MG tablet Take 1 tablet by mouth every evening. 30 Tab 11   • doxycycline (MONODOX) 100 MG capsule      • [DISCONTINUED] glucose blood strip 1 Each by Other route. (Patient not taking: Reported on 1/13/2022)     • [DISCONTINUED] metoprolol tartrate (LOPRESSOR) 25 MG Tab Take 25 mg by mouth 2 times a day. 0.5 tab po QD     • [DISCONTINUED] nitroglycerin (NITROSTAT) 0.4 MG SL Tab Place 1 Tablet under the tongue as needed for Chest Pain. (Patient not taking: Reported on 1/13/2022) 25 Tablet 0   • [DISCONTINUED] Melatonin 10 MG Tab Take 10 mg by mouth at bedtime as needed (sleep) for up to 31 days. (Patient not taking: Reported on 1/13/2022) 30 Tablet 0   • [DISCONTINUED] metoprolol SR (TOPROL XL) 25 MG TABLET SR 24 HR Take 1 Tablet by mouth every day. 30 Tablet 0   • [DISCONTINUED] nitroglycerin (NITRO-BID) 2 % Ointment Place 0.25 Inches on the skin every 6 hours for 31 days. (Patient not taking: Reported on 1/13/2022) 30 g 1   • [DISCONTINUED] ezetimibe (ZETIA) 10 MG Tab Take 1 Tablet by mouth every day. (Patient not taking: Reported on 1/13/2022) 90 Tablet 1   • [DISCONTINUED] lisinopril (PRINIVIL) 5 MG Tab Take 5 mg by mouth every morning. (Patient not taking: Reported on 1/13/2022)     • [DISCONTINUED] fenofibrate (TRICOR) 48 MG Tab  "Take 48 mg by mouth every morning. (Patient not taking: Reported on 1/13/2022)       No facility-administered encounter medications on file as of 1/13/2022.         Review of Systems   Constitutional: Negative for chills and fever.        No unexpected weight changes   HENT: Negative for nosebleeds.    Respiratory: Negative for cough and shortness of breath.    Cardiovascular: Negative for chest pain, palpitations, orthopnea, leg swelling and PND.   Gastrointestinal: Negative for blood in stool, melena and nausea.   Genitourinary: Negative for hematuria.   Musculoskeletal:        Chest pain with getting out of bed   Neurological: Positive for dizziness.   Psychiatric/Behavioral: Negative for depression.          Objective:   /78 (BP Location: Left arm, Patient Position: Sitting, BP Cuff Size: Adult)   Pulse 99   Resp 18   Ht 1.651 m (5' 5\")   Wt 119 kg (262 lb 3.2 oz)   SpO2 96%   BMI 43.63 kg/m²        Physical Exam  Constitutional:       Comments: Obese male, BMI 43. Appears well.    Cardiovascular:      Rate and Rhythm: Regular rhythm. Tachycardia present.      Heart sounds: No murmur heard.      Pulmonary:      Effort: Pulmonary effort is normal.      Breath sounds: No wheezing or rales.   Abdominal:      General: There is no distension.   Musculoskeletal:      Right lower leg: No edema.      Left lower leg: No edema.   Skin:     General: Skin is warm and dry.      Comments: Sternal scar is well approximated with dry scabs, no erythema or exudate. Small chest tube scars are scabbed, dry, no purulence.   Small incision in right calf has faint erythema, no swelling or tenderness, no purulence. Small incision superior to right ankle, normal appearance.    Neurological:      Mental Status: He is alert. Mental status is at baseline.   Psychiatric:         Judgment: Judgment normal.         12/28/21  0605   WBC 12.4*   HGB 12.2*   HCT 36.7*   *      K 4.4   CL 99   CO2 24   BUN 16   CR 0.55* "     Post OP CLOVER Javier   Post-CPB findings and diagnosis: Procedure: CABG x3   Vasoactives: Epinephrine 0.02 mcg/kg/min, nitroglycerin and clevidipine   gtts   Rhythm: NSR     1. Normal LV size and systolic function. Estimated EF 57% by MOD. There are no RWMAs.   2. Normal RV size and systolic function.   3. The LA and RA remain unchanged. The IAS remains intact by CFD.   4. The aortic valve remains unchanged with no AS or AR.   5. There remains trace MR and no MS.   6. There remains mild TR and no HI.   7. The visualized portions of the aorta remain unchanged and are without   dissection.   8. The PAC is in good position.       Assessment:     1. Coronary artery disease involving native coronary artery of native heart without angina pectoris  Referral to Cardiac Rehab    Lipid Profile    EC-ECHOCARDIOGRAM LTD W/O CONT    Basic Metabolic Panel   2. NSTEMI (non-ST elevated myocardial infarction) (HCC)  Referral to Cardiac Rehab    Lipid Profile    EC-ECHOCARDIOGRAM LTD W/O CONT    Basic Metabolic Panel   3. Ischemic cardiomyopathy  EC-ECHOCARDIOGRAM LTD W/O CONT   4. Familial hypercholesterolemia     5. ACC/AHA stage B congestive heart failure due to ischemic cardiomyopathy (HCC)          Medical Decision Making:  Today's Assessment / Plan:   NSTEMI  S/P  CABG (LIMA to LAD, SVG to OM, SVG to PDA)  - prior stenting to ostial LAD and BMS to RCA and LCx with  of mid to distal RCA   - DAPT: Aspirin 81mg, Plavix 75mg --> continue for 6-12mo given his ACS presentation  - high intensity statin: Crestor 40mg. Recheck lipid profile in 2 months, may need to add Zetia again. Will target a LDL<55 given his age  - beta blocker: metop tartrate--> increase to 25mg bid  - Cardiac rehab: referred. Discussed attempting 1 or 2 sessions in [facility] and then taking what he learns home given it is 1 hr drive to the [facility] sessions  - surgeon follow up at end of month  - complete 30d of amio for post-op afib prophylaxis, I did not see that  he had any AF post-operatively       Ischemic Cardiomyopathy  -Postoperatively his CLOVER showed normal biventricular function.  He was not discharged on any heart failure medications.  - recheck transthoracic echo so we can have a good baseline for him.  - weight monitoring at home. He understands to call if weight gain of 3lbs in 1 day or 5lbs in 1 week    Mixed dyslipidemia  Familial Hypercholesterolemia   - continue high intensity statin, add Zetia or PCSK9 inhibitor if not able to reach an LDL less than 55     Suspected sleep apnea  -Will hold off on sleep referral for now as he seems overwhelmed with [place] follow-up and ICR.  We will address this at future visits.    RTC in 2 months after TTE and lipid profile.  Established with Dr. Bradshaw

## 2022-01-13 ENCOUNTER — OFFICE VISIT (OUTPATIENT)
Dept: CARDIOLOGY | Facility: MEDICAL CENTER | Age: 42
End: 2022-01-13
Payer: MEDICAID

## 2022-01-13 VITALS
HEIGHT: 65 IN | WEIGHT: 262.2 LBS | DIASTOLIC BLOOD PRESSURE: 78 MMHG | BODY MASS INDEX: 43.68 KG/M2 | OXYGEN SATURATION: 96 % | RESPIRATION RATE: 18 BRPM | SYSTOLIC BLOOD PRESSURE: 136 MMHG | HEART RATE: 99 BPM

## 2022-01-13 DIAGNOSIS — I25.5 ACC/AHA STAGE B CONGESTIVE HEART FAILURE DUE TO ISCHEMIC CARDIOMYOPATHY (HCC): ICD-10-CM

## 2022-01-13 DIAGNOSIS — I50.9 ACC/AHA STAGE B CONGESTIVE HEART FAILURE DUE TO ISCHEMIC CARDIOMYOPATHY (HCC): ICD-10-CM

## 2022-01-13 DIAGNOSIS — I21.4 NSTEMI (NON-ST ELEVATED MYOCARDIAL INFARCTION) (HCC): ICD-10-CM

## 2022-01-13 DIAGNOSIS — E78.01 FAMILIAL HYPERCHOLESTEROLEMIA: ICD-10-CM

## 2022-01-13 DIAGNOSIS — I25.5 ISCHEMIC CARDIOMYOPATHY: ICD-10-CM

## 2022-01-13 DIAGNOSIS — I25.10 CORONARY ARTERY DISEASE INVOLVING NATIVE CORONARY ARTERY OF NATIVE HEART WITHOUT ANGINA PECTORIS: ICD-10-CM

## 2022-01-13 PROCEDURE — 99214 OFFICE O/P EST MOD 30 MIN: CPT | Performed by: PHYSICIAN ASSISTANT

## 2022-01-13 RX ORDER — CLOPIDOGREL BISULFATE 75 MG/1
75 TABLET ORAL DAILY
COMMUNITY
End: 2022-03-24 | Stop reason: SDUPTHER

## 2022-01-13 RX ORDER — ACETAMINOPHEN 500 MG
500-1000 TABLET ORAL EVERY 6 HOURS PRN
COMMUNITY
End: 2022-09-08

## 2022-01-13 RX ORDER — AMIODARONE HYDROCHLORIDE 200 MG/1
200 TABLET ORAL DAILY
COMMUNITY
End: 2022-03-24

## 2022-01-13 RX ORDER — POLYETHYLENE GLYCOL 1450
POWDER (GRAM) MISCELLANEOUS
COMMUNITY
End: 2022-03-24

## 2022-01-13 RX ORDER — FUROSEMIDE 20 MG/1
20 TABLET ORAL DAILY
COMMUNITY
End: 2023-03-21 | Stop reason: SDUPTHER

## 2022-01-13 RX ORDER — POTASSIUM CHLORIDE 1.5 G/1.58G
20 POWDER, FOR SOLUTION ORAL DAILY
COMMUNITY
End: 2023-03-21 | Stop reason: SDUPTHER

## 2022-01-13 RX ORDER — DOXYCYCLINE 100 MG/1
CAPSULE ORAL
COMMUNITY
Start: 2022-01-04 | End: 2022-03-24

## 2022-01-13 ASSESSMENT — ENCOUNTER SYMPTOMS
COUGH: 0
BLOOD IN STOOL: 0
FEVER: 0
CHILLS: 0
PALPITATIONS: 0
DIZZINESS: 1
DEPRESSION: 0

## 2022-01-13 ASSESSMENT — FIBROSIS 4 INDEX: FIB4 SCORE: 0.75

## 2022-01-13 NOTE — PATIENT INSTRUCTIONS
Sternal precautions: No heavy lifting, pushing, or pulling >10# for 6 weeks from surgery date. Avoid hyperextension of chest or stretching arms high over your head or reaching. Refrain from movements that cause discomfort or clicking in your sternum. No driving until cleared by surgeon.    Take 1 pill (25mg) of Metoprolol tartrate in morning and night

## 2022-01-18 ENCOUNTER — TELEPHONE (OUTPATIENT)
Dept: CARDIOLOGY | Facility: MEDICAL CENTER | Age: 42
End: 2022-01-18

## 2022-01-18 DIAGNOSIS — I25.10 CORONARY ARTERY DISEASE INVOLVING NATIVE CORONARY ARTERY OF NATIVE HEART WITHOUT ANGINA PECTORIS: ICD-10-CM

## 2022-01-18 DIAGNOSIS — I21.4 NSTEMI (NON-ST ELEVATED MYOCARDIAL INFARCTION) (HCC): ICD-10-CM

## 2022-01-18 NOTE — TELEPHONE ENCOUNTER
Referral placed.   Called patient mobile number and VM is not set up, unable to leave VM.     Will try again next business day.

## 2022-01-19 NOTE — TELEPHONE ENCOUNTER
Called patient mobile number, no VM set up, unable to leave a VM.     Letter drafted and mailed to the patient.

## 2022-02-23 DIAGNOSIS — E78.00 HYPERCHOLESTEREMIA: ICD-10-CM

## 2022-02-24 RX ORDER — ROSUVASTATIN CALCIUM 40 MG/1
TABLET, COATED ORAL
Qty: 90 TABLET | Refills: 3 | Status: SHIPPED | OUTPATIENT
Start: 2022-02-24 | End: 2023-09-01 | Stop reason: SDUPTHER

## 2022-03-24 ENCOUNTER — OFFICE VISIT (OUTPATIENT)
Dept: CARDIOLOGY | Facility: MEDICAL CENTER | Age: 42
End: 2022-03-24
Payer: MEDICAID

## 2022-03-24 VITALS
DIASTOLIC BLOOD PRESSURE: 84 MMHG | WEIGHT: 286 LBS | SYSTOLIC BLOOD PRESSURE: 138 MMHG | RESPIRATION RATE: 14 BRPM | HEIGHT: 64 IN | BODY MASS INDEX: 48.83 KG/M2 | OXYGEN SATURATION: 94 % | HEART RATE: 92 BPM

## 2022-03-24 DIAGNOSIS — I50.9 ACC/AHA STAGE B CONGESTIVE HEART FAILURE DUE TO ISCHEMIC CARDIOMYOPATHY (HCC): ICD-10-CM

## 2022-03-24 DIAGNOSIS — I25.5 ISCHEMIC CARDIOMYOPATHY: ICD-10-CM

## 2022-03-24 DIAGNOSIS — Z95.1 HX OF CABG: ICD-10-CM

## 2022-03-24 DIAGNOSIS — I25.10 MULTI-VESSEL CORONARY ARTERY STENOSIS: ICD-10-CM

## 2022-03-24 DIAGNOSIS — I25.82 CORONARY ARTERY CHRONIC TOTAL OCCLUSION: ICD-10-CM

## 2022-03-24 DIAGNOSIS — E78.2 MIXED HYPERLIPIDEMIA: ICD-10-CM

## 2022-03-24 DIAGNOSIS — I10 ESSENTIAL HYPERTENSION: ICD-10-CM

## 2022-03-24 DIAGNOSIS — I25.10 CORONARY ARTERY DISEASE INVOLVING NATIVE CORONARY ARTERY OF NATIVE HEART WITHOUT ANGINA PECTORIS: ICD-10-CM

## 2022-03-24 DIAGNOSIS — I25.5 ACC/AHA STAGE B CONGESTIVE HEART FAILURE DUE TO ISCHEMIC CARDIOMYOPATHY (HCC): ICD-10-CM

## 2022-03-24 PROCEDURE — 99215 OFFICE O/P EST HI 40 MIN: CPT | Performed by: INTERNAL MEDICINE

## 2022-03-24 RX ORDER — LISINOPRIL 5 MG/1
5 TABLET ORAL DAILY
Qty: 30 TABLET | Refills: 5 | Status: SHIPPED | OUTPATIENT
Start: 2022-03-24 | End: 2022-09-08 | Stop reason: SDUPTHER

## 2022-03-24 RX ORDER — INSULIN ASPART 100 [IU]/ML
INJECTION, SOLUTION INTRAVENOUS; SUBCUTANEOUS
COMMUNITY
Start: 2022-03-02

## 2022-03-24 RX ORDER — PANTOPRAZOLE SODIUM 40 MG/1
TABLET, DELAYED RELEASE ORAL
COMMUNITY
Start: 2022-03-14 | End: 2022-09-08

## 2022-03-24 RX ORDER — BISOPROLOL FUMARATE 10 MG/1
10 TABLET, FILM COATED ORAL DAILY
Qty: 30 TABLET | Refills: 5 | Status: SHIPPED | OUTPATIENT
Start: 2022-03-24 | End: 2023-09-01

## 2022-03-24 RX ORDER — EZETIMIBE 10 MG/1
10 TABLET ORAL DAILY
Qty: 30 TABLET | Refills: 5 | Status: SHIPPED | OUTPATIENT
Start: 2022-03-24 | End: 2022-10-25

## 2022-03-24 RX ORDER — CEFTRIAXONE 1 G/1
INJECTION, POWDER, FOR SOLUTION INTRAMUSCULAR; INTRAVENOUS
COMMUNITY
Start: 2021-12-29 | End: 2022-03-24

## 2022-03-24 RX ORDER — CLOPIDOGREL BISULFATE 75 MG/1
75 TABLET ORAL DAILY
Qty: 30 TABLET | Refills: 6 | Status: SHIPPED | OUTPATIENT
Start: 2022-03-24 | End: 2023-03-21 | Stop reason: SDUPTHER

## 2022-03-24 RX ORDER — BLOOD SUGAR DIAGNOSTIC
STRIP MISCELLANEOUS
COMMUNITY
Start: 2022-03-01

## 2022-03-24 RX ORDER — INSULIN GLARGINE 100 [IU]/ML
INJECTION, SOLUTION SUBCUTANEOUS
COMMUNITY
Start: 2022-03-02

## 2022-03-24 RX ORDER — POTASSIUM CHLORIDE 20 MEQ/1
TABLET, EXTENDED RELEASE ORAL
COMMUNITY
Start: 2022-02-25 | End: 2022-03-24

## 2022-03-24 ASSESSMENT — FIBROSIS 4 INDEX: FIB4 SCORE: 0.77

## 2022-03-24 NOTE — PROGRESS NOTES
Cardiology Follow-up Consultation Note    Date of note:    3/24/2022    Primary Care Provider: ODILON Gonzalez    Name:             Kyle Gonzalez   YOB: 1980  MRN:               [id number]    Chief Complaint   Patient presents with   • Coronary Artery Disease     F/V Dx: Coronary artery disease involving native coronary artery of native heart without angina pectoris   • Palpitations       HISTORY OF PRESENT ILLNESS  Mr. Kyle Gonzalez is a 42 y.o. male who returns to see us for follow-up of CAD    Last clinic visit: 1/13/2022 with HANY Ramesh.    Pertinent History:  -Premature CAD, initial presentation with RCA STEMI in 2013.  In 2014, underwent PCI to left circumflex artery.  Was noted to have mild in-stent restenosis in the RCA at that time.  In 2017, presented with NSTEMI and underwent PCI to OM.  In October 2020, NSTEMI s/p TABATHA to the LAD  -NSTEMI 12/2021 with 70% distal LM stenosis and  of RCA.  Transfer to [facility] and underwent successful 3-v CABG with Dr. Toussaint  -Ischemic cardiomyopathy  -Hypertension  -Dyslipidemia  -Insulin-dependent diabetes  -Family history of premature CAD  -Covid pneumonia August 2021 complicated with LUE DVT    Interim History:  Since his last visit, patient reports doing well.  Has recovered from bypass surgery and is able to perform his ADLs without any chest pain, pressure or shortness of breath.  Is still awaiting clearance from CT surgery to return back to work.    Is confused about his medications.  Currently not taking Plavix for unknown reason.    Went to [facility] this past weekend when he was found to have significantly elevated blood pressure.  Is currently not taking any BP meds.    Has also gained weight with ongoing dietary indiscretion.  Is trying to control his diet but has been difficult for him.  Believes he might be depressed.      Past Medical History:   Diagnosis Date   • Diabetes (HCC)     • Heart attack (HCC)    • Hyperlipidemia    • Hypertension    • Medical non-compliance 2015         Past Surgical History:   Procedure Laterality Date   • IA TRANSCATH STENT INIT VESSEL,OPEN      x4         Current Outpatient Medications   Medication Sig Dispense Refill   • ONETOUCH ULTRA strip      • NOVOLOG FLEXPEN 100 UNIT/ML solution for injection      • LANTUS SOLOSTAR 100 UNIT/ML Solution Pen-injector injection      • pantoprazole (PROTONIX) 40 MG Tablet Delayed Response      • bisoprolol (ZEBETA) 10 MG tablet Take 1 Tablet by mouth every day. 30 Tablet 5   • clopidogrel (PLAVIX) 75 MG Tab Take 1 Tablet by mouth every day. 30 Tablet 6   • lisinopril (PRINIVIL) 5 MG Tab Take 1 Tablet by mouth every day. 30 Tablet 5   • ezetimibe (ZETIA) 10 MG Tab Take 1 Tablet by mouth every day. 30 Tablet 5   • rosuvastatin (CRESTOR) 40 MG tablet TAKE ONE TABLET BY MOUTH EVERY EVENING 90 Tablet 3   • potassium chloride (KLOR-CON) 20 MEQ Pack Take 20 mEq by mouth every day.     • furosemide (LASIX) 20 MG Tab Take 20 mg by mouth every day.     • acetaminophen (TYLENOL) 500 MG Tab Take 500-1,000 mg by mouth every 6 hours as needed.     • aspirin EC (ECOTRIN) 81 MG Tablet Delayed Response Take 1 Tab by mouth every day. 100 Tab 3     No current facility-administered medications for this visit.         No Known Allergies      Family History   Problem Relation Age of Onset   • Diabetes Mother    • Heart Attack Mother    • Heart Attack Father          Social History     Socioeconomic History   • Marital status: Single     Spouse name: Not on file   • Number of children: Not on file   • Years of education: Not on file   • Highest education level: Not on file   Occupational History   • Not on file   Tobacco Use   • Smoking status: Former Smoker     Packs/day: 1.00     Years: 27.00     Pack years: 27.00     Types: Cigarettes     Start date: 3/17/1994     Quit date: 2021     Years since quittin.6   • Smokeless tobacco:  "Never Used   Vaping Use   • Vaping Use: Never used   Substance and Sexual Activity   • Alcohol use: Not Currently   • Drug use: Yes     Types: Marijuana     Comment: occasional marijuana    • Sexual activity: Not on file   Other Topics Concern   • Not on file   Social History Narrative   • Not on file     Social Determinants of Health     Financial Resource Strain: Not on file   Food Insecurity: Not on file   Transportation Needs: Not on file   Physical Activity: Not on file   Stress: Not on file   Social Connections: Not on file   Intimate Partner Violence: Not on file   Housing Stability: Not on file         Physical Exam:  Ambulatory Vitals  /84 (BP Location: Left arm, Patient Position: Sitting, BP Cuff Size: Adult)   Pulse 92   Resp 14   Ht 1.626 m (5' 4\")   Wt (!) 130 kg (286 lb)   SpO2 94%    Oxygen Therapy:  Pulse Oximetry: 94 %  BP Readings from Last 4 Encounters:   03/24/22 138/84   01/13/22 136/78   12/19/21 139/62   08/30/21 120/77       Weight/BMI: Body mass index is 49.09 kg/m².  Wt Readings from Last 4 Encounters:   03/24/22 (!) 130 kg (286 lb)   01/13/22 119 kg (262 lb 3.2 oz)   12/19/21 119 kg (261 lb 11 oz)   08/28/21 109 kg (241 lb 2.9 oz)       GEN: Well developed, well nourished and in no acute distress.  HEART: no significant JVD, regular rate and rhythm, normal S1 and S2, no murmurs, no third heart sounds, normal cardiac palpation  LUNG: clear to auscultation bilaterally, no wheezing, no crackles, normal respiratory effort on room air  ABDOMEN: soft, obese abdomen, non-tender, non-distended, normal bowel sounds throughout  EXTREMITIES: no peripheral edema noted  VASCULAR: no significantly elevated jugular venous pressure, no carotid bruits noted, radial pulses 2+ and equal      Lab Data Review:  Lab Results   Component Value Date/Time    CHOLSTRLTOT 265 (H) 12/07/2021 08:32 PM     (H) 12/07/2021 08:32 PM    HDL 57 12/07/2021 08:32 PM    TRIGLYCERIDE 267 (H) 12/07/2021 08:32 PM "       Lab Results   Component Value Date/Time    SODIUM 137 12/11/2021 01:43 AM    POTASSIUM 3.8 12/11/2021 01:43 AM    CHLORIDE 102 12/11/2021 01:43 AM    CO2 23 12/11/2021 01:43 AM    GLUCOSE 182 12/28/2021 07:17 AM    GLUCOSE 155 (H) 12/11/2021 01:43 AM    BUN 11 12/11/2021 01:43 AM    CREATININE 0.60 12/11/2021 01:43 AM     Lab Results   Component Value Date/Time    ALKPHOSPHAT 82 12/10/2021 01:50 AM    ASTSGOT 32 12/10/2021 01:50 AM    ALTSGPT 31 12/10/2021 01:50 AM    TBILIRUBIN <0.2 12/10/2021 01:50 AM      Lab Results   Component Value Date/Time    WBC 8.6 12/10/2021 01:50 AM     Lab Results   Component Value Date/Time    HBA1C 9.3 (H) 12/20/2021 12:38 AM    HBA1C 9.0 (H) 12/07/2021 08:38 PM    HBA1C 11.3 (H) 08/06/2021 10:55 AM       Lipid Panel 3/13/2022:  Total cholesterol 366, triglycerides 329, HDL 46,       Cardiac Imaging and Procedures Review:    EKG dated 12/9/2021: My personal interpretation is sinus rhythm, inferior Q waves    Outside Echo dated 12/20/2021:   Interpretation Summary   A complete two-dimensional transthoracic echocardiogram was performed (2D, M-mode, Doppler and color flow Doppler). The study was technically difficult.   1. No significant valvular abnormalities. Inadequate TR doppler signal precludes estimation of RVSP (RAP 3mmHg).   2. Normal LV size with mildly to moderately reduced systolic function. Estimated LVEF 45% by MOD (bi-plane). Posterior hypokinesis. Not all LV wall segments are well visualized. RV not well visualized   but appears grossly normal in size with overall mildly reduced systolic function based on limited available views.   3. No prior [facility] studies for comparison.     Echo: Echocardiogram performed on 12/8/2021 was personally interpreted by me which shows mild reduced left ventricle systolic function with EF 40%    LHC (12/8/2021):   Postoperative diagnosis:  1. Early ISR recently placed ostial LAD stent  2. 70% distal left main stenosis  3. Sequential  80% LCx stenoses  4.  RCA  5. LVEF 50 to 55%      Radiology test Review:  CT chest (12/9/2021): IMPRESSION:     1.  Diffuse areas of groundglass attenuation with subpleural sparing concerning for early fibrotic changes. Acute-subacute infection not excluded.  2.  Hepatic steatosis.      Assessment & Plan     1. Coronary artery disease involving native coronary artery of native heart without angina pectoris  bisoprolol (ZEBETA) 10 MG tablet    clopidogrel (PLAVIX) 75 MG Tab    ezetimibe (ZETIA) 10 MG Tab   2. Ischemic cardiomyopathy  EC-ECHOCARDIOGRAM COMPLETE W/O CONT   3. ACC/AHA stage B congestive heart failure due to ischemic cardiomyopathy (HCC)  bisoprolol (ZEBETA) 10 MG tablet    EC-ECHOCARDIOGRAM COMPLETE W/O CONT   4. Mixed hyperlipidemia  ezetimibe (ZETIA) 10 MG Tab   5. Essential hypertension  bisoprolol (ZEBETA) 10 MG tablet    lisinopril (PRINIVIL) 5 MG Tab   6. Multi-vessel coronary artery stenosis     7. Coronary artery chronic total occlusion     8. Hx of CABG           Patient is currently unsure about his medications.  Does notice elevated blood pressure but is not on an antihypertensive medication.  Believes that he was prescribed lisinopril by his PCP but did not  the prescription.  After extensive discussion, patient is in agreement with initiating antihypertensive medications.  Discontinue metoprolol and start bisoprolol 10 mg daily along with lisinopril 5 mg daily.  Discussed goal BP less than 130/80 to which he voices understanding.      We also discussed focus on lifestyle changes and adherence to cardiac diet with low sodium and low fat/cholesterol diet.    Prescription sent for Plavix 75 mg daily.  Advised patient to continue DAPT long-term given aggressive CAD.  No bleeding concerns noted.    History of systolic heart failure/ischemic cardiomyopathy, obtain repeat echocardiogram to evaluate left ventricular systolic function.  May need to be initiated on optimal heart failure  therapy if evidence of depressed EF.    Poorly controlled lipid panel.  Continue rosuvastatin 40 mg daily along with initiation of Zetia 10 mg daily.    Encourage patient to make an appointment with cardiac rehab.    Advised patient to discuss antidepressant medication with his PCP if that is a contributing factor to dietary indiscretion and difficulty maintaining lifestyle modifications.      A total of 41 minutes of time was spent on day of encounter reviewing medical record, performing history and examination, counseling, ordering medication/test/consults and documentation.    All of patient's excellent questions were answered to the best of my knowledge and to his satisfaction.  It was a pleasure seeing Mr. Kyle Gonzalez in my clinic today. Return in about 6 weeks (around 5/5/2022). Patient is aware to call the cardiology clinic with any questions or concerns.      Ravindra Bradshaw MD  [facility]  [facility], [facility]  [address] [address]  Phone: [phone]  Fax: [phone]    Please note that this dictation was created using voice recognition software. I have made every reasonable attempt to correct obvious errors, but it is possible there are errors of grammar and possibly content that I did not discover before finalizing the note.

## 2022-05-31 ENCOUNTER — HOSPITAL ENCOUNTER (OUTPATIENT)
Dept: CARDIOLOGY | Facility: MEDICAL CENTER | Age: 42
End: 2022-05-31
Attending: PHYSICIAN ASSISTANT
Payer: MEDICAID

## 2022-05-31 DIAGNOSIS — I25.5 ISCHEMIC CARDIOMYOPATHY: ICD-10-CM

## 2022-05-31 DIAGNOSIS — I25.10 CORONARY ARTERY DISEASE INVOLVING NATIVE CORONARY ARTERY OF NATIVE HEART WITHOUT ANGINA PECTORIS: ICD-10-CM

## 2022-05-31 DIAGNOSIS — I21.4 NSTEMI (NON-ST ELEVATED MYOCARDIAL INFARCTION) (HCC): ICD-10-CM

## 2022-05-31 LAB — LV EJECT FRACT  99904: 35

## 2022-05-31 PROCEDURE — 93308 TTE F-UP OR LMTD: CPT

## 2022-05-31 PROCEDURE — 700117 HCHG RX CONTRAST REV CODE 255: Performed by: PHYSICIAN ASSISTANT

## 2022-05-31 PROCEDURE — 93308 TTE F-UP OR LMTD: CPT | Mod: 26 | Performed by: INTERNAL MEDICINE

## 2022-05-31 RX ADMIN — HUMAN ALBUMIN MICROSPHERES AND PERFLUTREN 3 ML: 10; .22 INJECTION, SOLUTION INTRAVENOUS at 15:19

## 2022-06-03 ENCOUNTER — TELEPHONE (OUTPATIENT)
Dept: CARDIOLOGY | Facility: MEDICAL CENTER | Age: 42
End: 2022-06-03
Payer: MEDICAID

## 2022-06-03 NOTE — TELEPHONE ENCOUNTER
Spoke w/ pt on 06/03/22 to r/s the n/s'd appt from 06/02/22 w/ JIMMY.    Pt is aware that JIMMY is booking out about 2-3 months, he has been r/s'd to 09/08/22, placed on a cancellation list, and will be receiving a reminder of this appt in the mail.

## 2022-09-08 ENCOUNTER — OFFICE VISIT (OUTPATIENT)
Dept: CARDIOLOGY | Facility: MEDICAL CENTER | Age: 42
End: 2022-09-08
Payer: MEDICAID

## 2022-09-08 VITALS
HEART RATE: 110 BPM | BODY MASS INDEX: 49.51 KG/M2 | HEIGHT: 64 IN | RESPIRATION RATE: 16 BRPM | DIASTOLIC BLOOD PRESSURE: 90 MMHG | WEIGHT: 290 LBS | SYSTOLIC BLOOD PRESSURE: 150 MMHG | OXYGEN SATURATION: 96 %

## 2022-09-08 DIAGNOSIS — Z79.4 TYPE 2 DIABETES MELLITUS WITH OTHER CIRCULATORY COMPLICATION, WITH LONG-TERM CURRENT USE OF INSULIN (HCC): ICD-10-CM

## 2022-09-08 DIAGNOSIS — Z95.1 HX OF CABG: ICD-10-CM

## 2022-09-08 DIAGNOSIS — I10 ESSENTIAL HYPERTENSION: ICD-10-CM

## 2022-09-08 DIAGNOSIS — I50.20 HEART FAILURE WITH REDUCED EJECTION FRACTION (HCC): ICD-10-CM

## 2022-09-08 DIAGNOSIS — E78.01 FAMILIAL HYPERCHOLESTEROLEMIA: ICD-10-CM

## 2022-09-08 DIAGNOSIS — I25.5 ACC/AHA STAGE B CONGESTIVE HEART FAILURE DUE TO ISCHEMIC CARDIOMYOPATHY (HCC): ICD-10-CM

## 2022-09-08 DIAGNOSIS — I25.10 MULTI-VESSEL CORONARY ARTERY STENOSIS: ICD-10-CM

## 2022-09-08 DIAGNOSIS — E11.59 TYPE 2 DIABETES MELLITUS WITH OTHER CIRCULATORY COMPLICATION, WITH LONG-TERM CURRENT USE OF INSULIN (HCC): ICD-10-CM

## 2022-09-08 DIAGNOSIS — I50.9 ACC/AHA STAGE B CONGESTIVE HEART FAILURE DUE TO ISCHEMIC CARDIOMYOPATHY (HCC): ICD-10-CM

## 2022-09-08 DIAGNOSIS — I25.5 ISCHEMIC CARDIOMYOPATHY: ICD-10-CM

## 2022-09-08 PROCEDURE — 99214 OFFICE O/P EST MOD 30 MIN: CPT | Performed by: INTERNAL MEDICINE

## 2022-09-08 RX ORDER — LISINOPRIL 20 MG/1
20 TABLET ORAL DAILY
Qty: 90 TABLET | Refills: 3 | Status: SHIPPED | OUTPATIENT
Start: 2022-09-08 | End: 2023-08-16 | Stop reason: SDUPTHER

## 2022-09-08 RX ORDER — METOPROLOL SUCCINATE 25 MG/1
25 TABLET, EXTENDED RELEASE ORAL DAILY
Qty: 90 TABLET | Refills: 3 | Status: SHIPPED | OUTPATIENT
Start: 2022-09-08 | End: 2023-09-01 | Stop reason: SDUPTHER

## 2022-09-08 ASSESSMENT — FIBROSIS 4 INDEX: FIB4 SCORE: 0.77

## 2022-09-08 NOTE — PROGRESS NOTES
Cardiology Follow-up Consultation Note    Date of note:    9/8/2022    Primary Care Provider: ODILON Gonzalez    Name:             Kyle Gonzalez   YOB: 1980  MRN:               [id number]    Chief Complaint   Patient presents with    Coronary Artery Disease     F/V Dx: Coronary artery disease involving native coronary artery of native heart without angina pectoris    Hypertension       HISTORY OF PRESENT ILLNESS  Mr. Kyle Gonzalez is a 42 y.o. male who returns to see us for follow-up of CAD    Last clinic visit: 3/24/2022    Pertinent History:  -Premature CAD, initial presentation with RCA STEMI in 2013.  In 2014, underwent PCI to left circumflex artery.  Was noted to have mild in-stent restenosis in the RCA at that time.  In 2017, presented with NSTEMI and underwent PCI to OM.  In October 2020, NSTEMI s/p TABATHA to the LAD  -NSTEMI 12/2021 with 70% distal LM stenosis and  of RCA.  Transfer to [facility] and underwent successful 3-v CABG with Dr. Toussaint  -Ischemic cardiomyopathy  -Hypertension  -Dyslipidemia  -Insulin-dependent diabetes  -Family history of premature CAD  -Covid pneumonia August 2021 complicated with LUE DVT    Interim History:  Since his last visit, reports doing well from a cardiovascular perspective with no chest pain or pressure.    Does feel hot and gets tired easily, hence, had to stop working.  Has also noted decreased appetite from before and is unsure if this is related to his heart or not.    Has changed his diet around with focus on vegetables, fish, plant-based protein but continues to eat eggs and red meat on occasion.      Past Medical History:   Diagnosis Date    Diabetes (HCC)     Heart attack (HCC)     Hyperlipidemia     Hypertension     Medical non-compliance 9/13/2015         Past Surgical History:   Procedure Laterality Date    [place] TRANSCATH STENT INIT VESSEL,OPEN      x4         Current Outpatient Medications   Medication Sig  Dispense Refill    lisinopril (PRINIVIL) 20 MG Tab Take 1 Tablet by mouth every day. 90 Tablet 3    metoprolol SR (TOPROL XL) 25 MG TABLET SR 24 HR Take 1 Tablet by mouth every day. 90 Tablet 3    ONETOUCH ULTRA strip       NOVOLOG FLEXPEN 100 UNIT/ML solution for injection       LANTUS SOLOSTAR 100 UNIT/ML Solution Pen-injector injection       bisoprolol (ZEBETA) 10 MG tablet Take 1 Tablet by mouth every day. 30 Tablet 5    clopidogrel (PLAVIX) 75 MG Tab Take 1 Tablet by mouth every day. 30 Tablet 6    ezetimibe (ZETIA) 10 MG Tab Take 1 Tablet by mouth every day. 30 Tablet 5    rosuvastatin (CRESTOR) 40 MG tablet TAKE ONE TABLET BY MOUTH EVERY EVENING 90 Tablet 3    potassium chloride (KLOR-CON) 20 MEQ Pack Take 20 mEq by mouth every day.      furosemide (LASIX) 20 MG Tab Take 20 mg by mouth every day.      aspirin EC (ECOTRIN) 81 MG Tablet Delayed Response Take 1 Tab by mouth every day. 100 Tab 3     No current facility-administered medications for this visit.         No Known Allergies      Family History   Problem Relation Age of Onset    Diabetes Mother     Heart Attack Mother     Heart Attack Father          Social History     Socioeconomic History    Marital status: Single     Spouse name: Not on file    Number of children: Not on file    Years of education: Not on file    Highest education level: Not on file   Occupational History    Not on file   Tobacco Use    Smoking status: Former     Packs/day: 1.00     Years: 27.00     Pack years: 27.00     Types: Cigarettes     Start date: 3/17/1994     Quit date: 2021     Years since quittin.1    Smokeless tobacco: Never   Vaping Use    Vaping Use: Never used   Substance and Sexual Activity    Alcohol use: Yes     Comment: occ    Drug use: Yes     Types: Marijuana     Comment: occasional marijuana     Sexual activity: Not on file   Other Topics Concern    Not on file   Social History Narrative    Not on file     Social Determinants of Health     Financial  "Resource Strain: Not on file   Food Insecurity: Not on file   Transportation Needs: Not on file   Physical Activity: Not on file   Stress: Not on file   Social Connections: Not on file   Intimate Partner Violence: Not on file   Housing Stability: Not on file         Physical Exam:  Ambulatory Vitals  BP (!) 150/90 (BP Location: Left arm, Patient Position: Sitting, BP Cuff Size: Large adult)   Pulse (!) 110   Resp 16   Ht 1.626 m (5' 4\")   Wt (!) 132 kg (290 lb)   SpO2 96%    Oxygen Therapy:  Pulse Oximetry: 96 %  BP Readings from Last 4 Encounters:   09/08/22 (!) 150/90   03/24/22 138/84   01/13/22 136/78   12/19/21 139/62       Weight/BMI: Body mass index is 49.78 kg/m².  Wt Readings from Last 4 Encounters:   09/08/22 (!) 132 kg (290 lb)   03/24/22 (!) 130 kg (286 lb)   01/13/22 119 kg (262 lb 3.2 oz)   12/19/21 119 kg (261 lb 11 oz)       GEN: Well developed, well nourished and in no acute distress.  HEART: no significant JVD, regular rate and rhythm, normal S1 and S2, no murmurs, no third heart sounds, normal cardiac palpation  LUNG: clear to auscultation bilaterally, no wheezing, no crackles, normal respiratory effort on room air  ABDOMEN: soft, obese abdomen, non-tender, non-distended, normal bowel sounds throughout  EXTREMITIES: no peripheral edema noted  VASCULAR: no significantly elevated jugular venous pressure, no carotid bruits noted, radial pulses 2+ and equal      Lab Data Review:  Lab Results   Component Value Date/Time    CHOLSTRLTOT 265 (H) 12/07/2021 08:32 PM     (H) 12/07/2021 08:32 PM    HDL 57 12/07/2021 08:32 PM    TRIGLYCERIDE 267 (H) 12/07/2021 08:32 PM       Lab Results   Component Value Date/Time    SODIUM 137 12/11/2021 01:43 AM    POTASSIUM 3.8 12/11/2021 01:43 AM    CHLORIDE 102 12/11/2021 01:43 AM    CO2 23 12/11/2021 01:43 AM    GLUCOSE 182 12/28/2021 07:17 AM    GLUCOSE 155 (H) 12/11/2021 01:43 AM    BUN 11 12/11/2021 01:43 AM    CREATININE 0.60 12/11/2021 01:43 AM     Lab " Results   Component Value Date/Time    ALKPHOSPHAT 82 12/10/2021 01:50 AM    ASTSGOT 32 12/10/2021 01:50 AM    ALTSGPT 31 12/10/2021 01:50 AM    TBILIRUBIN <0.2 12/10/2021 01:50 AM      Lab Results   Component Value Date/Time    WBC 8.6 12/10/2021 01:50 AM     Lab Results   Component Value Date/Time    HBA1C 9.3 (H) 12/20/2021 12:38 AM    HBA1C 9.0 (H) 12/07/2021 08:38 PM    HBA1C 11.3 (H) 08/06/2021 10:55 AM       Lipid Panel 3/13/2022:  Total cholesterol 366, triglycerides 329, HDL 46,       Cardiac Imaging and Procedures Review:    EKG dated 12/9/2021: My personal interpretation is sinus rhythm, inferior Q waves    Echo: Echocardiogram performed on 5/31/2022 was personally interpreted by me which shows moderately reduced left ventricular systolic function with EF 35%    Outside Echo dated 12/20/2021:   Interpretation Summary   A complete two-dimensional transthoracic echocardiogram was performed (2D, M-mode, Doppler and color flow Doppler). The study was technically difficult.   1. No significant valvular abnormalities. Inadequate TR doppler signal precludes estimation of RVSP (RAP 3mmHg).   2. Normal LV size with mildly to moderately reduced systolic function. Estimated LVEF 45% by MOD (bi-plane). Posterior hypokinesis. Not all LV wall segments are well visualized. RV not well visualized   but appears grossly normal in size with overall mildly reduced systolic function based on limited available views.   3. No prior [facility] studies for comparison.     Echo: Echocardiogram performed on 12/8/2021 was personally interpreted by me which shows mild reduced left ventricle systolic function with EF 40%    LHC (12/8/2021):   Postoperative diagnosis:  Early ISR recently placed ostial LAD stent  70% distal left main stenosis  Sequential 80% LCx stenoses   RCA  LVEF 50 to 55%      Radiology test Review:  CT chest (12/9/2021): IMPRESSION:     1.  Diffuse areas of groundglass attenuation with subpleural sparing  concerning for early fibrotic changes. Acute-subacute infection not excluded.  2.  Hepatic steatosis.      Assessment & Plan     1. Essential hypertension  lisinopril (PRINIVIL) 20 MG Tab    metoprolol SR (TOPROL XL) 25 MG TABLET SR 24 HR      2. Heart failure with reduced ejection fraction (HCC)  EC-ECHOCARDIOGRAM COMPLETE W/O CONT    metoprolol SR (TOPROL XL) 25 MG TABLET SR 24 HR      3. Multi-vessel coronary artery stenosis  EC-ECHOCARDIOGRAM COMPLETE W/O CONT      4. Type 2 diabetes mellitus with other circulatory complication, with long-term current use of insulin (HCC)        5. Ischemic cardiomyopathy        6. ACC/AHA stage B congestive heart failure due to ischemic cardiomyopathy (HCC)  metoprolol SR (TOPROL XL) 25 MG TABLET SR 24 HR      7. Hx of CABG        8. Familial hypercholesterolemia  Lipid Profile          Blood pressure significantly elevated in the clinic today.  Reports elevated BP at home as well.  Is currently taking lisinopril 5 mg and bisoprolol 10 mg daily.  We discussed his goal BP is less than 120/80.  Increase lisinopril to 20 mg daily and monitor BP.  We will further increase to 40 mg if unable to achieve goal which he is in agreement with.    Does have resting tachycardia.  Is currently on bisoprolol 10 mg but heart rate continues to remain above 100 bpm.  Initiate metoprolol XL 25 mg daily in an attempt to bring resting heart rate less than 80 bpm given HFrEF/ischemic cardiomyopathy.    Appear compensated from heart failure perspective.  Continue Lasix and potassium supplement.      Obtain lipid panel to monitor triglycerides and LDL.  Continue Crestor 40 mg and Zetia 10 mg daily.      Obtain repeat echocardiogram to monitor left ventricular systolic function.    Continue DAPT with aspirin and Plavix with CAD and CABG.  No bleeding concerns noted.      All of patient's excellent questions were answered to the best of my knowledge and to his satisfaction.  It was a pleasure seeing   Kyle Gonzalez in my clinic today. Return in about 3 months (around 12/8/2022). Patient is aware to call the cardiology clinic with any questions or concerns.      Ravindra Bradshaw MD  [facility], [facility]  [address] [place] [address]  Phone: [phone]  Fax: [phone]    Please note that this dictation was created using voice recognition software. I have made every reasonable attempt to correct obvious errors, but it is possible there are errors of grammar and possibly content that I did not discover before finalizing the note.

## 2022-10-21 DIAGNOSIS — E78.2 MIXED HYPERLIPIDEMIA: ICD-10-CM

## 2022-10-21 DIAGNOSIS — I25.10 CORONARY ARTERY DISEASE INVOLVING NATIVE CORONARY ARTERY OF NATIVE HEART WITHOUT ANGINA PECTORIS: ICD-10-CM

## 2022-10-21 NOTE — TELEPHONE ENCOUNTER
Is the patient due for a refill? Yes    Was the patient seen the past year? Yes    Date of last office visit: 9/8/22    Does the patient have an upcoming appointment?  No       Provider to refill:DA    Does the patients insurance require a 100 day supply?  No

## 2022-10-25 RX ORDER — EZETIMIBE 10 MG/1
TABLET ORAL
Qty: 90 TABLET | Refills: 3 | Status: SHIPPED | OUTPATIENT
Start: 2022-10-25 | End: 2023-09-01 | Stop reason: SDUPTHER

## 2023-03-21 DIAGNOSIS — I25.10 CORONARY ARTERY DISEASE INVOLVING NATIVE CORONARY ARTERY OF NATIVE HEART WITHOUT ANGINA PECTORIS: ICD-10-CM

## 2023-03-21 DIAGNOSIS — I10 ESSENTIAL HYPERTENSION: ICD-10-CM

## 2023-03-22 DIAGNOSIS — I10 ESSENTIAL HYPERTENSION: ICD-10-CM

## 2023-03-22 RX ORDER — FUROSEMIDE 20 MG/1
20 TABLET ORAL DAILY
Qty: 90 TABLET | Refills: 1 | Status: SHIPPED | OUTPATIENT
Start: 2023-03-22 | End: 2023-08-16 | Stop reason: SDUPTHER

## 2023-03-22 RX ORDER — POTASSIUM CHLORIDE 1.5 G/1.58G
20 POWDER, FOR SOLUTION ORAL DAILY
Qty: 90 EACH | Refills: 1 | Status: SHIPPED | OUTPATIENT
Start: 2023-03-22 | End: 2023-08-16 | Stop reason: SDUPTHER

## 2023-03-22 RX ORDER — CLOPIDOGREL BISULFATE 75 MG/1
75 TABLET ORAL DAILY
Qty: 90 TABLET | Refills: 1 | Status: SHIPPED | OUTPATIENT
Start: 2023-03-22 | End: 2023-09-01 | Stop reason: SDUPTHER

## 2023-03-22 NOTE — TELEPHONE ENCOUNTER
To Dr. Bradshaw - please confirm RX for KCl per refill protocol. Thank you!    BMP and Mag lab orders placed per RX refill protocol, mailed out to pt.

## 2023-03-23 ENCOUNTER — TELEPHONE (OUTPATIENT)
Dept: CARDIOLOGY | Facility: MEDICAL CENTER | Age: 43
End: 2023-03-23
Payer: MEDICAID

## 2023-03-23 NOTE — TELEPHONE ENCOUNTER
PA reina DAVIDSON Key: [id number] - Rx #: [id number] help? Call us at [phone]  Status  New(Not sent to plan)  Drug  Potassium Chloride 20MEQ packets  Form  [employer] [place] [employer] Electronic PA Form  Original Claim Info  76 34 Maximum Days Exceeded

## 2023-03-23 NOTE — TELEPHONE ENCOUNTER
PA not needed, medication available without authorization. Pt tried to fill with pharmacy too soon.    CHANDRIKA DAVIDSON Key: [id number] - PA Case ID: [id number] - Rx #: [id number] help? Call us at [phone]  Outcome  Additional Information Required  If this claim were submitted today by the pharmacy, it would reject for Early Refill. The dispensing pharmacy can contact our pharmacy help desk at [phone] for assistance with an override, if needed.  Drug  Potassium Chloride 20MEQ packets  Form  [employer] [place] [employer] Electronic PA Form  Original Claim Info  76 34 Maximum Days Exceeded

## 2023-04-05 ENCOUNTER — TELEPHONE (OUTPATIENT)
Dept: CARDIOLOGY | Facility: MEDICAL CENTER | Age: 43
End: 2023-04-05
Payer: MEDICAID

## 2023-04-05 NOTE — TELEPHONE ENCOUNTER
JIMMY    Caller: Kyle Gonzalez     Topic/issue: Patient is requesting labs ordered from 3/22/23 be sent to     [facility]  [phone]  Fax: [phone]    Callback Number: [phone]       Thank you   Cari VENTURA

## 2023-08-16 ENCOUNTER — TELEPHONE (OUTPATIENT)
Dept: CARDIOLOGY | Facility: MEDICAL CENTER | Age: 43
End: 2023-08-16
Payer: MEDICAID

## 2023-08-16 DIAGNOSIS — I10 ESSENTIAL HYPERTENSION: ICD-10-CM

## 2023-08-16 NOTE — TELEPHONE ENCOUNTER
JIMMY    Caller:  -  [facility]    Topic/issue: Wants to know if ok to change script to the tablets (they are dissolvable) for:   potassium chloride (KLOR-CON) 20 MEQ Pack [[id number]]    Callback Number: [phone]    Thank you,   Zoila MERCADO

## 2023-08-17 RX ORDER — POTASSIUM CHLORIDE 20 MEQ/1
20 TABLET, EXTENDED RELEASE ORAL DAILY
Qty: 30 TABLET | Refills: 0 | COMMUNITY
Start: 2023-08-17 | End: 2023-09-01 | Stop reason: SDUPTHER

## 2023-08-17 NOTE — TELEPHONE ENCOUNTER
Called pt [phone]  Relayed request from pharmacy. Pt reports NO issues with swallowing and states is fine to switch to tablet form instead of packet.     -------------------------------------------------------  Called [facility] [phone]  Relayed pt response. [facility] verbalized understanding and is appreciative of call back.      MAR updated    potassium chloride SA (KDUR) 20 MEQ Tab CR 30 Tablet 0/0 8/17/2023     Sig - Route: Take 1 Tablet by mouth every day. - Oral    Class: Historical Med    Notes to Pharmacy: To replace potassium chloride (KLOR-CON) 20 MEQ Pack per pt request. See tele encounter dated 8/16/23.

## 2023-08-28 DIAGNOSIS — I25.10 MULTI-VESSEL CORONARY ARTERY STENOSIS: ICD-10-CM

## 2023-08-29 ENCOUNTER — PATIENT MESSAGE (OUTPATIENT)
Dept: CARDIOLOGY | Facility: MEDICAL CENTER | Age: 43
End: 2023-08-29
Payer: MEDICAID

## 2023-09-01 PROBLEM — E78.5 DYSLIPIDEMIA: Status: ACTIVE | Noted: 2023-09-01

## 2023-09-01 PROBLEM — I50.9 ACC/AHA STAGE B CONGESTIVE HEART FAILURE DUE TO ISCHEMIC CARDIOMYOPATHY (HCC): Status: ACTIVE | Noted: 2023-09-01

## 2023-09-01 PROBLEM — Z95.1 HX OF CABG: Status: ACTIVE | Noted: 2023-09-01

## 2023-09-01 PROBLEM — I50.22 CHRONIC HFREF (HEART FAILURE WITH REDUCED EJECTION FRACTION) (HCC): Status: ACTIVE | Noted: 2023-09-01

## 2023-09-01 PROBLEM — I25.5 ACC/AHA STAGE B CONGESTIVE HEART FAILURE DUE TO ISCHEMIC CARDIOMYOPATHY (HCC): Status: ACTIVE | Noted: 2023-09-01

## 2023-09-01 PROBLEM — I25.5 ISCHEMIC CARDIOMYOPATHY: Status: ACTIVE | Noted: 2023-09-01

## 2023-09-07 NOTE — CARE PLAN
Problem: Knowledge Deficit - Standard  Goal: Patient and family/care givers will demonstrate understanding of plan of care, disease process/condition, diagnostic tests and medications  Outcome: Not Progressing     Problem: Psychosocial  Goal: Patient's level of anxiety will decrease  Outcome: Not Progressing  Goal: Patient's ability to verbalize feelings about condition will improve  Outcome: Not Progressing  Goal: Patient's ability to re-evaluate and adapt role responsibilities will improve  Outcome: Not Progressing  Goal: Patient and family will demonstrate ability to cope with life altering diagnosis and/or procedure  Outcome: Not Progressing  Goal: Spiritual and cultural needs incorporated into hospitalization  Outcome: Not Progressing     Problem: Communication  Goal: The ability to communicate needs accurately and effectively will improve  Outcome: Not Progressing     Problem: Discharge Barriers/Planning  Goal: Patient's continuum of care needs are met  Outcome: Not Progressing     Problem: Hemodynamics  Goal: Patient's hemodynamics, fluid balance and neurologic status will be stable or improve  Outcome: Not Progressing     Problem: Respiratory  Goal: Patient will achieve/maintain optimum respiratory ventilation and gas exchange  Outcome: Not Progressing     Problem: Fluid Volume  Goal: Fluid volume balance will be maintained  Outcome: Not Progressing     Problem: Dysphagia  Goal: Dysphagia will improve  Outcome: Not Progressing     Problem: Risk for Aspiration  Goal: Patient's risk for aspiration will be absent or decrease  Outcome: Not Progressing     Problem: Nutrition  Goal: Patient's nutritional and fluid intake will be adequate or improve  Outcome: Not Progressing  Goal: Enteral nutrition will be maintained or improve  Outcome: Not Progressing  Goal: Enteral nutrition will be maintained or improve  Outcome: Not Progressing     Problem: Urinary Elimination  Goal: Establish and maintain regular urinary  DAILY PROGRESS NOTE    ADMISSION DATE:  8/31/2023  DATE:  9/7/2023  CURRENT HOSPITAL DAY:  Hospital Day: 8       ACTIVE PROBLEMS:    Active Hospital Problems    Diagnosis    • Achalasia    • Esophageal candidiasis (CMD)    • Esophageal stricture    • Protein-calorie malnutrition, severe (CMD)    • Dysphagia        SUMMARY STATEMENT:    Darrick Asher is a 20 year old male patient with a history of nonverbal autism and seizures admitted with progressive Dysphagia [R13.10] and chronic weight loss in the setting of esophageal candidiasis and severe esophagitis.      INTERVAL HISTORY:    Vital signs stable. Advanced to bolus feeds yesterday and discontinued PPN, had multiple episodes of emesis so feeds were put back to continuous. This morning, patient walking around and looking better overall.     Live video/phone  service used? No    MEDICATIONS:    Current Facility-Administered Medications   Medication Dose Route Frequency Provider Last Rate Last Admin   • ondansetron ORAL (ZOFRAN) 4 MG/5ML oral solution 4 mg  4 mg Per NG Tube BID PRN Shyann La DO   4 mg at 09/06/23 1649   • LORazepam (ATIVAN) injection 2 mg  2 mg Intravenous On Call PRN Sergio Aponte MD       • fluconazole (DIFLUCAN) 40 MG/ML suspension 200 mg  200 mg Oral Daily Shyann La DO   200 mg at 09/07/23 0927   • lidocaine (ANECREAM/LMX) 4 % cream 1 application.  1 application. Topical PRN Lauren Posada DO       • sodium chloride (PF) 0.9 % injection 0.6-4.6 mL  0.6-4.6 mL Intravenous PRN Lauren Posada DO       • sodium chloride 0.9 % flush bag 25 mL  25 mL Intravenous PRN Lauren Posada DO       • sodium chloride (PF) 0.9 % injection 0.5-10 mL  0.5-10 mL Intravenous PRN Lauren Posada DO       • acetaminophen (TYLENOL) 160 MG/5ML suspension 601.6 mg  15 mg/kg (Dosing Weight) Oral Q6H PRN Shyann La DO   601.6 mg at 09/03/23 0923   • OXcarbazepine (TRILEPTAL) 300 MG/5ML suspension 600 mg  600 mg Oral  Daily Shyann La DO   600 mg at 09/07/23 0927   • OXcarbazepine (TRILEPTAL) 300 MG/5ML suspension 900 mg  900 mg Oral Nightly Shyann La DO   900 mg at 09/06/23 1811   • risperiDONE (RisperDAL) 1 MG/ML oral solution 0.25 mg  0.25 mg Oral BID Shyann La, DO   0.25 mg at 09/07/23 0600   • valproic acid (DEPAKENE) solution 750 mg  750 mg Oral BID Shyann La, DO   750 mg at 09/07/23 0600   • glycopyrrolate 1 MG/5ML oral solution 0.8 mg  0.8 mg Oral BID Shyann La DO   0.8 mg at 09/07/23 0600   • pantoprazole (PROTONIX) 40 MG/20ML (compounded) suspension 40 mg  40 mg Oral Daily Lauren Posada DO   40 mg at 09/07/23 0927   • polyethylene glycol (MIRALAX) packet 17 g  17 g Oral Daily Sergio Aponte MD   17 g at 09/07/23 0927          OBJECTIVE:      Vitals:  Vitals with min/max:      Vital Last Value 24 Hour Range   Temperature 97.9 °F (36.6 °C) (09/07/23 0750) Temp  Min: 97.3 °F (36.3 °C)  Max: 98.4 °F (36.9 °C)   Pulse 96 (09/07/23 0750) Pulse  Min: 68  Max: 96   Respiratory 16 (09/07/23 0750) Resp  Min: 16  Max: 20   Non-Invasive  Blood Pressure 117/71 (09/07/23 0750) BP  Min: 110/63  Max: 118/75   Pulse Oximetry 99 % (09/07/23 0750) SpO2  Min: 97 %  Max: 100 %   Arterial   Blood Pressure   No data recorded        INTAKE/OUTPUT:      Intake/Output Summary (Last 24 hours) at 9/7/2023 0949  Last data filed at 9/7/2023 0900  Gross per 24 hour   Intake 2161.25 ml   Output 2425 ml   Net -263.75 ml         PHYSICAL EXAM:    Physical Exam  Constitutional:       General: He is not in acute distress.     Appearance: Normal appearance.   HENT:      Head: Normocephalic and atraumatic.      Right Ear: External ear normal.      Left Ear: External ear normal.      Nose: Nose normal.      Comments: NG in place     Mouth/Throat:      Mouth: Mucous membranes are moist.      Pharynx: Oropharynx is clear.      Neck: Normal range of motion.   Eyes:      Extraocular Movements: Extraocular movements  output  Outcome: Not Progressing     Problem: Bowel Elimination  Goal: Establish and maintain regular bowel function  Outcome: Not Progressing     Problem: Gastrointestinal Irritability  Goal: Nausea and vomiting will be absent or improve  Outcome: Not Progressing  Goal: Diarrhea will be absent or improved  Outcome: Not Progressing     Problem: Mobility  Goal: Patient's capacity to carry out activities will improve  Outcome: Not Progressing     Problem: Self Care  Goal: Patient will have the ability to perform ADLs independently or with assistance (bathe, groom, dress, toilet and feed)  Outcome: Not Progressing     Problem: Infection - Standard  Goal: Patient will remain free from infection  Outcome: Not Progressing     Problem: Wound/ / Incision Healing  Goal: Patient's wound/surgical incision will decrease in size and heals properly  Outcome: Not Progressing   The patient is Stable - Low risk of patient condition declining or worsening    Shift Goals  Clinical Goals: Maintain stable O2 level  Patient Goals: decrease O2 demand  Family Goals: go home    Progress made toward(s) clinical / shift goals:      Patient is not progressing towards the following goals: patient emains on 6 Liters of O2 to stay above 90% tonight he stayed around 88-94%. He requires increase oxygen when up and is having a longer and harder time to recover.       Problem: Knowledge Deficit - Standard  Goal: Patient and family/care givers will demonstrate understanding of plan of care, disease process/condition, diagnostic tests and medications  Outcome: Not Progressing     Problem: Psychosocial  Goal: Patient's level of anxiety will decrease  Outcome: Not Progressing  Goal: Patient's ability to verbalize feelings about condition will improve  Outcome: Not Progressing  Goal: Patient's ability to re-evaluate and adapt role responsibilities will improve  Outcome: Not Progressing  Goal: Patient and family will demonstrate ability to cope with life  altering diagnosis and/or procedure  Outcome: Not Progressing  Goal: Spiritual and cultural needs incorporated into hospitalization  Outcome: Not Progressing     Problem: Communication  Goal: The ability to communicate needs accurately and effectively will improve  Outcome: Not Progressing     Problem: Discharge Barriers/Planning  Goal: Patient's continuum of care needs are met  Outcome: Not Progressing     Problem: Hemodynamics  Goal: Patient's hemodynamics, fluid balance and neurologic status will be stable or improve  Outcome: Not Progressing     Problem: Respiratory  Goal: Patient will achieve/maintain optimum respiratory ventilation and gas exchange  Outcome: Not Progressing     Problem: Fluid Volume  Goal: Fluid volume balance will be maintained  Outcome: Not Progressing     Problem: Dysphagia  Goal: Dysphagia will improve  Outcome: Not Progressing     Problem: Risk for Aspiration  Goal: Patient's risk for aspiration will be absent or decrease  Outcome: Not Progressing     Problem: Nutrition  Goal: Patient's nutritional and fluid intake will be adequate or improve  Outcome: Not Progressing  Goal: Enteral nutrition will be maintained or improve  Outcome: Not Progressing  Goal: Enteral nutrition will be maintained or improve  Outcome: Not Progressing     Problem: Urinary Elimination  Goal: Establish and maintain regular urinary output  Outcome: Not Progressing     Problem: Bowel Elimination  Goal: Establish and maintain regular bowel function  Outcome: Not Progressing     Problem: Gastrointestinal Irritability  Goal: Nausea and vomiting will be absent or improve  Outcome: Not Progressing  Goal: Diarrhea will be absent or improved  Outcome: Not Progressing     Problem: Mobility  Goal: Patient's capacity to carry out activities will improve  Outcome: Not Progressing     Problem: Self Care  Goal: Patient will have the ability to perform ADLs independently or with assistance (bathe, groom, dress, toilet and  intact.      Conjunctiva/sclera: Conjunctivae normal.   Cardiovascular:      Rate and Rhythm: Normal rate and regular rhythm.      Pulses: Normal pulses.      Heart sounds: Normal heart sounds.   Pulmonary:      Effort: Pulmonary effort is normal.      Breath sounds: Normal breath sounds.   Abdominal:      General: Abdomen is flat. Bowel sounds are normal.      Palpations: Abdomen is soft.      Tenderness: There is no abdominal tenderness. There is no guarding or rebound.   Musculoskeletal:         General: No swelling.      Right lower leg: No edema.      Left lower leg: No edema.   Skin:     General: Skin is warm and dry.      Capillary Refill: Capillary refill takes less than 2 seconds.   Neurological:      General: No focal deficit present.      Mental Status: He is alert and oriented to person, place, and time.   Psychiatric:         Mood and Affect: Mood normal.         Behavior: Behavior normal.          LABORATORY DATA:    Admission on 08/31/2023   Component Date Value Ref Range Status   • Sodium 08/31/2023 141  135 - 145 mmol/L Final   • Potassium 08/31/2023 4.5  3.4 - 5.1 mmol/L Final   • Chloride 08/31/2023 107  97 - 110 mmol/L Final   • Carbon Dioxide 08/31/2023 29  21 - 32 mmol/L Final   • Anion Gap 08/31/2023 10  7 - 19 mmol/L Final   • Glucose 08/31/2023 85  70 - 99 mg/dL Final   • BUN 08/31/2023 13  6 - 20 mg/dL Final   • Creatinine 08/31/2023 0.58 (L)  0.67 - 1.17 mg/dL Final   • Glomerular Filtration Rate 08/31/2023 >90  >=60 Final    eGFR results = or >60 mL/min/1.73m2 = Normal kidney function. Estimated GFR calculated using the CKD-EPI-R (2021) equation that does not include race in the creatinine calculation.   • BUN/Cr 08/31/2023 22  7 - 25 Final   • Calcium 08/31/2023 8.8  8.4 - 10.2 mg/dL Final   • Bilirubin, Total 08/31/2023 0.3  0.2 - 1.0 mg/dL Final   • GOT/AST 08/31/2023 28  <=37 Units/L Final   • GPT/ALT 08/31/2023 25  <64 Units/L Final   • Alkaline Phosphatase 08/31/2023 69  55 - 220  Units/L Final   • Albumin 08/31/2023 3.7  3.6 - 5.1 g/dL Final   • Protein, Total 08/31/2023 7.3  6.4 - 8.2 g/dL Final   • Globulin 08/31/2023 3.6  2.0 - 4.0 g/dL Final   • A/G Ratio 08/31/2023 1.0  1.0 - 2.4 Final   • C-Reactive Protein 08/31/2023 0.3  <=1.0 mg/dL Final   • RBC Sedimentation Rate 08/31/2023 1  0 - 20 mm/hr Final   • WBC 08/31/2023 6.4  4.2 - 11.0 K/mcL Final   • RBC 08/31/2023 3.94 (L)  4.50 - 5.90 mil/mcL Final   • HGB 08/31/2023 13.2  13.0 - 17.0 g/dL Final   • HCT 08/31/2023 38.3 (L)  39.0 - 51.0 % Final   • MCV 08/31/2023 97.2  78.0 - 100.0 fl Final   • MCH 08/31/2023 33.5  26.0 - 34.0 pg Final   • MCHC 08/31/2023 34.5  32.0 - 36.5 g/dL Final   • RDW-CV 08/31/2023 11.6  11.0 - 15.0 % Final   • RDW-SD 08/31/2023 41.9  39.0 - 50.0 fL Final   • PLT 08/31/2023 111 (L)  140 - 450 K/mcL Final   • NRBC 08/31/2023 0  <=0 /100 WBC Final   • Neutrophil, Percent 08/31/2023 76  % Final   • Lymphocytes, Percent 08/31/2023 16  % Final   • Mono, Percent 08/31/2023 8  % Final   • Eosinophils, Percent 08/31/2023 0  % Final   • Basophils, Percent 08/31/2023 0  % Final   • Immature Granulocytes 08/31/2023 0  % Final   • Absolute Neutrophils 08/31/2023 4.9  1.8 - 8.0 K/mcL Final   • Absolute Lymphocytes 08/31/2023 1.0 (L)  1.2 - 5.2 K/mcL Final   • Absolute Monocytes 08/31/2023 0.5  0.3 - 0.9 K/mcL Final   • Absolute Eosinophils  08/31/2023 0.0  0.0 - 0.5 K/mcL Final   • Absolute Basophils 08/31/2023 0.0  0.0 - 0.3 K/mcL Final   • Absolute Immature Granulocytes 08/31/2023 0.0  0.0 - 0.2 K/mcL Final   • Sodium 09/05/2023 140  135 - 145 mmol/L Final   • Potassium 09/05/2023 4.0  3.4 - 5.1 mmol/L Final   • Chloride 09/05/2023 106  97 - 110 mmol/L Final   • Carbon Dioxide 09/05/2023 30  21 - 32 mmol/L Final   • Anion Gap 09/05/2023 8  7 - 19 mmol/L Final   • Glucose 09/05/2023 105 (H)  70 - 99 mg/dL Final   • BUN 09/05/2023 3 (L)  6 - 20 mg/dL Final   • Creatinine 09/05/2023 0.49 (L)  0.67 - 1.17 mg/dL Final   •  feed)  Outcome: Not Progressing     Problem: Infection - Standard  Goal: Patient will remain free from infection  Outcome: Not Progressing     Problem: Wound/ / Incision Healing  Goal: Patient's wound/surgical incision will decrease in size and heals properly  Outcome: Not Progressing      Glomerular Filtration Rate 09/05/2023 >90  >=60 Final    eGFR results = or >60 mL/min/1.73m2 = Normal kidney function. Estimated GFR calculated using the CKD-EPI-R (2021) equation that does not include race in the creatinine calculation.   • BUN/Cr 09/05/2023 6 (L)  7 - 25 Final   • Calcium 09/05/2023 9.1  8.4 - 10.2 mg/dL Final   • Bilirubin, Total 09/05/2023 0.4  0.2 - 1.0 mg/dL Final   • GOT/AST 09/05/2023 26  <=37 Units/L Final   • GPT/ALT 09/05/2023 22  <64 Units/L Final   • Alkaline Phosphatase 09/05/2023 55  55 - 220 Units/L Final   • Albumin 09/05/2023 3.3 (L)  3.6 - 5.1 g/dL Final   • Protein, Total 09/05/2023 6.4  6.4 - 8.2 g/dL Final   • Globulin 09/05/2023 3.1  2.0 - 4.0 g/dL Final   • A/G Ratio 09/05/2023 1.1  1.0 - 2.4 Final   • Magnesium 09/05/2023 1.8  1.7 - 2.4 mg/dL Final   • Phosphorus 09/05/2023 2.6 (L)  2.7 - 4.7 mg/dL Final   • Sodium 09/06/2023 138  135 - 145 mmol/L Final   • Potassium 09/06/2023 3.9  3.4 - 5.1 mmol/L Final   • Chloride 09/06/2023 105  97 - 110 mmol/L Final   • Carbon Dioxide 09/06/2023 29  21 - 32 mmol/L Final   • Anion Gap 09/06/2023 8  7 - 19 mmol/L Final   • Glucose 09/06/2023 125 (H)  70 - 99 mg/dL Final   • BUN 09/06/2023 8  6 - 20 mg/dL Final   • Creatinine 09/06/2023 0.56 (L)  0.67 - 1.17 mg/dL Final   • Glomerular Filtration Rate 09/06/2023 >90  >=60 Final    eGFR results = or >60 mL/min/1.73m2 = Normal kidney function. Estimated GFR calculated using the CKD-EPI-R (2021) equation that does not include race in the creatinine calculation.   • BUN/Cr 09/06/2023 14  7 - 25 Final   • Calcium 09/06/2023 8.7  8.4 - 10.2 mg/dL Final   • Bilirubin, Total 09/06/2023 0.2  0.2 - 1.0 mg/dL Final   • GOT/AST 09/06/2023 21  <=37 Units/L Final   • GPT/ALT 09/06/2023 23  <64 Units/L Final   • Alkaline Phosphatase 09/06/2023 59  55 - 220 Units/L Final   • Albumin 09/06/2023 3.3 (L)  3.6 - 5.1 g/dL Final   • Protein, Total 09/06/2023 6.7  6.4 - 8.2 g/dL Final   • Globulin 09/06/2023 3.4   2.0 - 4.0 g/dL Final   • A/G Ratio 09/06/2023 1.0  1.0 - 2.4 Final   • Magnesium 09/06/2023 1.9  1.7 - 2.4 mg/dL Final   • Phosphorus 09/06/2023 2.7  2.7 - 4.7 mg/dL Final        IMAGING STUDIES:    FL NASOGASTRIC TUBE PLACEMENT   Final Result   Weighted enteric tube terminates in the stomach.      Electronically Signed by: CRYSTAL ADHIKARI M.D.    Signed on: 9/5/2023 3:02 PM    Workstation ID: [id number]      FL ESOPHAGRAM SINGLE CONTRAST   Final Result      Abnormal motility throughout esophagus with to and fro motion with contrast   hung in the esophagus with very little contrast trickled past the GE   junction associated with some mucosal irregularity. Differential   considerations would include stricture at the GE junction secondary to   inflammation and achalasia.      Electronically Signed by: DEL GANT M.D.    Signed on: 9/1/2023 3:42 PM    Workstation ID: [id number]      Manometry Esophageal    (Results Pending)                                ASSESSMENT:  Principal Problem:    Dysphagia  Active Problems:    Protein-calorie malnutrition, severe (CMD)    Esophageal candidiasis (CMD)    Esophageal stricture    Achalasia    Darrick is a 20 year old male with a history of autism spectrum disorder (nonverbal, wears diapers) and seizure disorder presenting with acute on chronic decreased PO intake, poor weight gain, and progressive esophageal dysphagia. He is still struggling to take adequate PO and is severely malnourished, on mIVF and supplementing orally with Ensure plus as tolerated. EGD 8/21 showed esophageal candidiasis and severe esophagitis, esophagram 9/1 showed abnormal esophageal motility secondary to possible stricture and/or achalasia. Esophageal manometry confirmed manometry, so Dr. Alvarez will help family schedule an oral endoscopy to cut the muscle at GE junction at [facility] as an outpatient, likely in a couple of weeks. While inpatient will continue to work on NG feeds and adjust per  GI/nutrition recs.    Active Problems:  1.) Poor PO tolerance  2.) Severe malnutrition  3.) Dysphagia   4.) Seizure disorder    PLAN:  Neuro/Pain:  - Tylenol 600 mg q6h PRN  - Continue home oxcarbazepine 600 mg morning, 900 mg nightly and valproic acid 750 mg BID   - Continue Risperidone 0.25 mg BID  - Seizure precautions    Resp:  - Stable on room air  - Monitor episodes of hyperventilation/holding breath.    CV:  - Monitor HR and BP    FEN/GI:  - Pediatric GI consulted, appreciate recs        - Treat oral candidiasis with fluconazole 400 mg one time, then 200 mg daily for 20 days        - Plan for outpatient procedure at [facility] to cut lower esophageal sphincter. Dr. Alvarez is helping arrange this        - Home on NG feeds until then   - Nutrition on consult        - Jevity 1.2 250 ml 6 times daily, bolus feeds over 2 hours with 50 ml free water flush before and after feeds        - Monitor on bolus feeds, adjust rate and volume as tolerated        - Mom to receive NG teaching today  - Continue home glycopyrrolate 0.8 mg BID   - Protonix 40 mg daily suspension  - Suppository this afternoon for constipation. Continue miralax  - Speech therapy consulted    ID:  - No active concerns at this time    Heme/Onc:  - No active concerns at this time    VTE: 0  Lines: Invasive Lines  Invasive Line LDA's: Peripheral IV. Function, utilization, and necessity addressed/discussed on rounds  Dispo: pending improvement in malnutrition status and GI workup    Patient and plan discussed with family, nursing staff, and attending physician (Dr. Winters)    Shyann La,   Pediatrics PGY-1  Available by [employer]

## 2024-09-26 ENCOUNTER — APPOINTMENT (OUTPATIENT)
Dept: RADIOLOGY | Facility: MEDICAL CENTER | Age: 44
End: 2024-09-26
Attending: EMERGENCY MEDICINE
Payer: MEDICAID

## 2024-09-26 ENCOUNTER — HOSPITAL ENCOUNTER (INPATIENT)
Facility: MEDICAL CENTER | Age: 44
LOS: 1 days | End: 2024-09-28
Attending: EMERGENCY MEDICINE | Admitting: STUDENT IN AN ORGANIZED HEALTH CARE EDUCATION/TRAINING PROGRAM
Payer: MEDICAID

## 2024-09-26 DIAGNOSIS — K21.9 GASTROESOPHAGEAL REFLUX DISEASE WITHOUT ESOPHAGITIS: ICD-10-CM

## 2024-09-26 DIAGNOSIS — I50.20 HEART FAILURE WITH REDUCED EJECTION FRACTION (HCC): ICD-10-CM

## 2024-09-26 DIAGNOSIS — R07.9 CHEST PAIN, UNSPECIFIED TYPE: ICD-10-CM

## 2024-09-26 DIAGNOSIS — I50.22 CHRONIC HFREF (HEART FAILURE WITH REDUCED EJECTION FRACTION) (HCC): ICD-10-CM

## 2024-09-26 DIAGNOSIS — I50.1 CARDIOGENIC PULMONARY EDEMA (HCC): ICD-10-CM

## 2024-09-26 DIAGNOSIS — R79.89 ELEVATED TROPONIN: ICD-10-CM

## 2024-09-26 DIAGNOSIS — E78.5 DYSLIPIDEMIA: ICD-10-CM

## 2024-09-26 DIAGNOSIS — I21.4 NSTEMI (NON-ST ELEVATED MYOCARDIAL INFARCTION) (HCC): ICD-10-CM

## 2024-09-26 DIAGNOSIS — I10 ESSENTIAL HYPERTENSION: ICD-10-CM

## 2024-09-26 DIAGNOSIS — E11.59 TYPE 2 DIABETES MELLITUS WITH OTHER CIRCULATORY COMPLICATION, WITH LONG-TERM CURRENT USE OF INSULIN (HCC): ICD-10-CM

## 2024-09-26 DIAGNOSIS — I25.10 MULTI-VESSEL CORONARY ARTERY STENOSIS: ICD-10-CM

## 2024-09-26 DIAGNOSIS — I25.5 ACC/AHA STAGE B CONGESTIVE HEART FAILURE DUE TO ISCHEMIC CARDIOMYOPATHY (HCC): ICD-10-CM

## 2024-09-26 DIAGNOSIS — Z79.4 INSULIN DEPENDENT TYPE 2 DIABETES MELLITUS (HCC): ICD-10-CM

## 2024-09-26 DIAGNOSIS — I25.5 ISCHEMIC CARDIOMYOPATHY: ICD-10-CM

## 2024-09-26 DIAGNOSIS — Z79.4 TYPE 2 DIABETES MELLITUS WITH OTHER CIRCULATORY COMPLICATION, WITH LONG-TERM CURRENT USE OF INSULIN (HCC): ICD-10-CM

## 2024-09-26 DIAGNOSIS — I25.10 CORONARY ARTERY DISEASE INVOLVING NATIVE CORONARY ARTERY OF NATIVE HEART WITHOUT ANGINA PECTORIS: ICD-10-CM

## 2024-09-26 DIAGNOSIS — Z95.5 STENTED CORONARY ARTERY: ICD-10-CM

## 2024-09-26 DIAGNOSIS — E11.9 INSULIN DEPENDENT TYPE 2 DIABETES MELLITUS (HCC): ICD-10-CM

## 2024-09-26 DIAGNOSIS — I50.9 ACC/AHA STAGE B CONGESTIVE HEART FAILURE DUE TO ISCHEMIC CARDIOMYOPATHY (HCC): ICD-10-CM

## 2024-09-26 LAB
BASOPHILS # BLD AUTO: 0.5 % (ref 0–1.8)
BASOPHILS # BLD: 0.05 K/UL (ref 0–0.12)
EKG IMPRESSION: NORMAL
EOSINOPHIL # BLD AUTO: 0.14 K/UL (ref 0–0.51)
EOSINOPHIL NFR BLD: 1.5 % (ref 0–6.9)
ERYTHROCYTE [DISTWIDTH] IN BLOOD BY AUTOMATED COUNT: 45.5 FL (ref 35.9–50)
HCT VFR BLD AUTO: 46.5 % (ref 42–52)
HGB BLD-MCNC: 15.7 G/DL (ref 14–18)
IMM GRANULOCYTES # BLD AUTO: 0.03 K/UL (ref 0–0.11)
IMM GRANULOCYTES NFR BLD AUTO: 0.3 % (ref 0–0.9)
LYMPHOCYTES # BLD AUTO: 3.03 K/UL (ref 1–4.8)
LYMPHOCYTES NFR BLD: 32.5 % (ref 22–41)
MCH RBC QN AUTO: 31.7 PG (ref 27–33)
MCHC RBC AUTO-ENTMCNC: 33.8 G/DL (ref 32.3–36.5)
MCV RBC AUTO: 93.9 FL (ref 81.4–97.8)
MONOCYTES # BLD AUTO: 0.89 K/UL (ref 0–0.85)
MONOCYTES NFR BLD AUTO: 9.6 % (ref 0–13.4)
NEUTROPHILS # BLD AUTO: 5.17 K/UL (ref 1.82–7.42)
NEUTROPHILS NFR BLD: 55.6 % (ref 44–72)
NRBC # BLD AUTO: 0 K/UL
NRBC BLD-RTO: 0 /100 WBC (ref 0–0.2)
PLATELET # BLD AUTO: 322 K/UL (ref 164–446)
PMV BLD AUTO: 9.7 FL (ref 9–12.9)
RBC # BLD AUTO: 4.95 M/UL (ref 4.7–6.1)
WBC # BLD AUTO: 9.3 K/UL (ref 4.8–10.8)

## 2024-09-26 PROCEDURE — 93005 ELECTROCARDIOGRAM TRACING: CPT | Performed by: EMERGENCY MEDICINE

## 2024-09-26 PROCEDURE — 85025 COMPLETE CBC W/AUTO DIFF WBC: CPT

## 2024-09-26 PROCEDURE — 36415 COLL VENOUS BLD VENIPUNCTURE: CPT

## 2024-09-26 PROCEDURE — 99285 EMERGENCY DEPT VISIT HI MDM: CPT

## 2024-09-26 PROCEDURE — 93005 ELECTROCARDIOGRAM TRACING: CPT

## 2024-09-26 PROCEDURE — 71045 X-RAY EXAM CHEST 1 VIEW: CPT

## 2024-09-26 PROCEDURE — 96374 THER/PROPH/DIAG INJ IV PUSH: CPT

## 2024-09-26 ASSESSMENT — PAIN DESCRIPTION - PAIN TYPE: TYPE: ACUTE PAIN

## 2024-09-27 ENCOUNTER — HOSPITAL ENCOUNTER (OUTPATIENT)
Dept: RADIOLOGY | Facility: MEDICAL CENTER | Age: 44
End: 2024-09-27

## 2024-09-27 PROBLEM — Z91.148 NON COMPLIANCE W MEDICATION REGIMEN: Status: ACTIVE | Noted: 2024-09-27

## 2024-09-27 PROBLEM — R73.9 HYPERGLYCEMIA: Status: ACTIVE | Noted: 2024-09-27

## 2024-09-27 PROBLEM — Z79.4 INSULIN DEPENDENT TYPE 2 DIABETES MELLITUS (HCC): Status: ACTIVE | Noted: 2024-09-27

## 2024-09-27 PROBLEM — E11.9 INSULIN DEPENDENT TYPE 2 DIABETES MELLITUS (HCC): Status: ACTIVE | Noted: 2024-09-27

## 2024-09-27 LAB
ALBUMIN SERPL BCP-MCNC: 4 G/DL (ref 3.2–4.9)
ALBUMIN/GLOB SERPL: 1.1 G/DL
ALP SERPL-CCNC: 130 U/L (ref 30–99)
ALT SERPL-CCNC: 18 U/L (ref 2–50)
ANION GAP SERPL CALC-SCNC: 16 MMOL/L (ref 7–16)
AST SERPL-CCNC: 18 U/L (ref 12–45)
BILIRUB SERPL-MCNC: <0.2 MG/DL (ref 0.1–1.5)
BUN SERPL-MCNC: 11 MG/DL (ref 8–22)
CALCIUM ALBUM COR SERPL-MCNC: 9.5 MG/DL (ref 8.5–10.5)
CALCIUM SERPL-MCNC: 9.5 MG/DL (ref 8.5–10.5)
CHLORIDE SERPL-SCNC: 99 MMOL/L (ref 96–112)
CO2 SERPL-SCNC: 21 MMOL/L (ref 20–33)
CREAT SERPL-MCNC: 0.66 MG/DL (ref 0.5–1.4)
EST. AVERAGE GLUCOSE BLD GHB EST-MCNC: 217 MG/DL
GFR SERPLBLD CREATININE-BSD FMLA CKD-EPI: 119 ML/MIN/1.73 M 2
GLOBULIN SER CALC-MCNC: 3.6 G/DL (ref 1.9–3.5)
GLUCOSE BLD STRIP.AUTO-MCNC: 167 MG/DL (ref 65–99)
GLUCOSE BLD STRIP.AUTO-MCNC: 183 MG/DL (ref 65–99)
GLUCOSE BLD STRIP.AUTO-MCNC: 195 MG/DL (ref 65–99)
GLUCOSE BLD STRIP.AUTO-MCNC: 196 MG/DL (ref 65–99)
GLUCOSE SERPL-MCNC: 188 MG/DL (ref 65–99)
HBA1C MFR BLD: 9.2 % (ref 4–5.6)
NT-PROBNP SERPL IA-MCNC: 305 PG/ML (ref 0–125)
POTASSIUM SERPL-SCNC: 4 MMOL/L (ref 3.6–5.5)
PROT SERPL-MCNC: 7.6 G/DL (ref 6–8.2)
SODIUM SERPL-SCNC: 136 MMOL/L (ref 135–145)
TROPONIN T SERPL-MCNC: 31 NG/L (ref 6–19)
TROPONIN T SERPL-MCNC: 36 NG/L (ref 6–19)
TROPONIN T SERPL-MCNC: 41 NG/L (ref 6–19)

## 2024-09-27 PROCEDURE — 80053 COMPREHEN METABOLIC PANEL: CPT

## 2024-09-27 PROCEDURE — A9270 NON-COVERED ITEM OR SERVICE: HCPCS | Performed by: STUDENT IN AN ORGANIZED HEALTH CARE EDUCATION/TRAINING PROGRAM

## 2024-09-27 PROCEDURE — 700111 HCHG RX REV CODE 636 W/ 250 OVERRIDE (IP): Performed by: STUDENT IN AN ORGANIZED HEALTH CARE EDUCATION/TRAINING PROGRAM

## 2024-09-27 PROCEDURE — 700111 HCHG RX REV CODE 636 W/ 250 OVERRIDE (IP): Mod: JZ

## 2024-09-27 PROCEDURE — 99222 1ST HOSP IP/OBS MODERATE 55: CPT | Mod: 25 | Performed by: INTERNAL MEDICINE

## 2024-09-27 PROCEDURE — 99406 BEHAV CHNG SMOKING 3-10 MIN: CPT | Performed by: INTERNAL MEDICINE

## 2024-09-27 PROCEDURE — 96374 THER/PROPH/DIAG INJ IV PUSH: CPT

## 2024-09-27 PROCEDURE — 36415 COLL VENOUS BLD VENIPUNCTURE: CPT

## 2024-09-27 PROCEDURE — 83880 ASSAY OF NATRIURETIC PEPTIDE: CPT

## 2024-09-27 PROCEDURE — 82962 GLUCOSE BLOOD TEST: CPT | Mod: 91

## 2024-09-27 PROCEDURE — 84484 ASSAY OF TROPONIN QUANT: CPT

## 2024-09-27 PROCEDURE — 700102 HCHG RX REV CODE 250 W/ 637 OVERRIDE(OP): Performed by: STUDENT IN AN ORGANIZED HEALTH CARE EDUCATION/TRAINING PROGRAM

## 2024-09-27 PROCEDURE — 99285 EMERGENCY DEPT VISIT HI MDM: CPT

## 2024-09-27 PROCEDURE — 83036 HEMOGLOBIN GLYCOSYLATED A1C: CPT

## 2024-09-27 PROCEDURE — 99223 1ST HOSP IP/OBS HIGH 75: CPT | Mod: 25 | Performed by: STUDENT IN AN ORGANIZED HEALTH CARE EDUCATION/TRAINING PROGRAM

## 2024-09-27 PROCEDURE — 770020 HCHG ROOM/CARE - TELE (206)

## 2024-09-27 PROCEDURE — 99407 BEHAV CHNG SMOKING > 10 MIN: CPT | Performed by: STUDENT IN AN ORGANIZED HEALTH CARE EDUCATION/TRAINING PROGRAM

## 2024-09-27 RX ORDER — DEXTROSE MONOHYDRATE 25 G/50ML
25 INJECTION, SOLUTION INTRAVENOUS
Status: DISCONTINUED | OUTPATIENT
Start: 2024-09-27 | End: 2024-09-28 | Stop reason: HOSPADM

## 2024-09-27 RX ORDER — ACETAMINOPHEN 325 MG/1
650 TABLET ORAL EVERY 6 HOURS PRN
Status: DISCONTINUED | OUTPATIENT
Start: 2024-09-27 | End: 2024-09-28 | Stop reason: HOSPADM

## 2024-09-27 RX ORDER — METOPROLOL SUCCINATE 25 MG/1
25 TABLET, EXTENDED RELEASE ORAL DAILY
Status: DISCONTINUED | OUTPATIENT
Start: 2024-09-27 | End: 2024-09-28 | Stop reason: HOSPADM

## 2024-09-27 RX ORDER — PROCHLORPERAZINE EDISYLATE 5 MG/ML
5-10 INJECTION INTRAMUSCULAR; INTRAVENOUS EVERY 4 HOURS PRN
Status: DISCONTINUED | OUTPATIENT
Start: 2024-09-27 | End: 2024-09-28 | Stop reason: HOSPADM

## 2024-09-27 RX ORDER — EZETIMIBE 10 MG/1
10 TABLET ORAL DAILY
Status: DISCONTINUED | OUTPATIENT
Start: 2024-09-28 | End: 2024-09-28 | Stop reason: HOSPADM

## 2024-09-27 RX ORDER — LISINOPRIL 20 MG/1
20 TABLET ORAL DAILY
Status: DISCONTINUED | OUTPATIENT
Start: 2024-09-27 | End: 2024-09-28 | Stop reason: HOSPADM

## 2024-09-27 RX ORDER — PROMETHAZINE HYDROCHLORIDE 25 MG/1
12.5-25 SUPPOSITORY RECTAL EVERY 4 HOURS PRN
Status: DISCONTINUED | OUTPATIENT
Start: 2024-09-27 | End: 2024-09-28 | Stop reason: HOSPADM

## 2024-09-27 RX ORDER — CLOPIDOGREL BISULFATE 75 MG/1
300 TABLET ORAL ONCE
Status: COMPLETED | OUTPATIENT
Start: 2024-09-28 | End: 2024-09-28

## 2024-09-27 RX ORDER — CLOPIDOGREL BISULFATE 75 MG/1
75 TABLET ORAL DAILY
Status: DISCONTINUED | OUTPATIENT
Start: 2024-09-27 | End: 2024-09-27

## 2024-09-27 RX ORDER — INSULIN LISPRO 100 [IU]/ML
1-6 INJECTION, SOLUTION INTRAVENOUS; SUBCUTANEOUS EVERY 6 HOURS
Status: DISCONTINUED | OUTPATIENT
Start: 2024-09-27 | End: 2024-09-28 | Stop reason: HOSPADM

## 2024-09-27 RX ORDER — PROMETHAZINE HYDROCHLORIDE 25 MG/1
12.5-25 TABLET ORAL EVERY 4 HOURS PRN
Status: DISCONTINUED | OUTPATIENT
Start: 2024-09-27 | End: 2024-09-28 | Stop reason: HOSPADM

## 2024-09-27 RX ORDER — ONDANSETRON 4 MG/1
4 TABLET, ORALLY DISINTEGRATING ORAL EVERY 4 HOURS PRN
Status: DISCONTINUED | OUTPATIENT
Start: 2024-09-27 | End: 2024-09-28 | Stop reason: HOSPADM

## 2024-09-27 RX ORDER — ENOXAPARIN SODIUM 100 MG/ML
40 INJECTION SUBCUTANEOUS DAILY
Status: DISCONTINUED | OUTPATIENT
Start: 2024-09-27 | End: 2024-09-28 | Stop reason: HOSPADM

## 2024-09-27 RX ORDER — ONDANSETRON 2 MG/ML
4 INJECTION INTRAMUSCULAR; INTRAVENOUS EVERY 4 HOURS PRN
Status: DISCONTINUED | OUTPATIENT
Start: 2024-09-27 | End: 2024-09-28 | Stop reason: HOSPADM

## 2024-09-27 RX ORDER — LABETALOL HYDROCHLORIDE 5 MG/ML
10 INJECTION, SOLUTION INTRAVENOUS EVERY 4 HOURS PRN
Status: DISCONTINUED | OUTPATIENT
Start: 2024-09-27 | End: 2024-09-28 | Stop reason: HOSPADM

## 2024-09-27 RX ORDER — ROSUVASTATIN CALCIUM 20 MG/1
40 TABLET, COATED ORAL EVERY EVENING
Status: DISCONTINUED | OUTPATIENT
Start: 2024-09-27 | End: 2024-09-28 | Stop reason: HOSPADM

## 2024-09-27 RX ORDER — MORPHINE SULFATE 4 MG/ML
4 INJECTION INTRAVENOUS EVERY 4 HOURS PRN
Status: DISCONTINUED | OUTPATIENT
Start: 2024-09-27 | End: 2024-09-28 | Stop reason: HOSPADM

## 2024-09-27 RX ORDER — CLOPIDOGREL BISULFATE 75 MG/1
75 TABLET ORAL DAILY
Status: DISCONTINUED | OUTPATIENT
Start: 2024-09-29 | End: 2024-09-28 | Stop reason: HOSPADM

## 2024-09-27 RX ORDER — ASPIRIN 81 MG/1
81 TABLET ORAL DAILY
Status: DISCONTINUED | OUTPATIENT
Start: 2024-09-28 | End: 2024-09-28 | Stop reason: HOSPADM

## 2024-09-27 RX ADMIN — METOPROLOL SUCCINATE 25 MG: 25 TABLET, EXTENDED RELEASE ORAL at 14:25

## 2024-09-27 RX ADMIN — MORPHINE SULFATE 4 MG: 4 INJECTION INTRAVENOUS at 20:30

## 2024-09-27 RX ADMIN — INSULIN LISPRO 2 UNITS: 100 INJECTION, SOLUTION INTRAVENOUS; SUBCUTANEOUS at 23:51

## 2024-09-27 RX ADMIN — LISINOPRIL 20 MG: 20 TABLET ORAL at 12:09

## 2024-09-27 RX ADMIN — MORPHINE SULFATE 4 MG: 4 INJECTION INTRAVENOUS at 11:06

## 2024-09-27 RX ADMIN — INSULIN LISPRO 1 UNITS: 100 INJECTION, SOLUTION INTRAVENOUS; SUBCUTANEOUS at 17:37

## 2024-09-27 RX ADMIN — ENOXAPARIN SODIUM 40 MG: 100 INJECTION SUBCUTANEOUS at 20:30

## 2024-09-27 RX ADMIN — ROSUVASTATIN CALCIUM 40 MG: 20 TABLET, FILM COATED ORAL at 17:26

## 2024-09-27 SDOH — ECONOMIC STABILITY: TRANSPORTATION INSECURITY
IN THE PAST 12 MONTHS, HAS LACK OF RELIABLE TRANSPORTATION KEPT YOU FROM MEDICAL APPOINTMENTS, MEETINGS, WORK OR FROM GETTING THINGS NEEDED FOR DAILY LIVING?: YES

## 2024-09-27 SDOH — ECONOMIC STABILITY: TRANSPORTATION INSECURITY
IN THE PAST 12 MONTHS, HAS THE LACK OF TRANSPORTATION KEPT YOU FROM MEDICAL APPOINTMENTS OR FROM GETTING MEDICATIONS?: YES

## 2024-09-27 ASSESSMENT — SOCIAL DETERMINANTS OF HEALTH (SDOH)
WITHIN THE PAST 12 MONTHS, YOU WORRIED THAT YOUR FOOD WOULD RUN OUT BEFORE YOU GOT THE MONEY TO BUY MORE: NEVER TRUE
WITHIN THE PAST 12 MONTHS, THE FOOD YOU BOUGHT JUST DIDN'T LAST AND YOU DIDN'T HAVE MONEY TO GET MORE: NEVER TRUE
WITHIN THE LAST YEAR, HAVE YOU BEEN HUMILIATED OR EMOTIONALLY ABUSED IN OTHER WAYS BY YOUR PARTNER OR EX-PARTNER?: NO
WITHIN THE LAST YEAR, HAVE YOU BEEN AFRAID OF YOUR PARTNER OR EX-PARTNER?: NO
WITHIN THE LAST YEAR, HAVE YOU BEEN KICKED, HIT, SLAPPED, OR OTHERWISE PHYSICALLY HURT BY YOUR PARTNER OR EX-PARTNER?: NO
IN THE PAST 12 MONTHS, HAS THE ELECTRIC, GAS, OIL, OR WATER COMPANY THREATENED TO SHUT OFF SERVICE IN YOUR HOME?: ALREADY SHUT OFF
WITHIN THE LAST YEAR, HAVE TO BEEN RAPED OR FORCED TO HAVE ANY KIND OF SEXUAL ACTIVITY BY YOUR PARTNER OR EX-PARTNER?: NO

## 2024-09-27 ASSESSMENT — COGNITIVE AND FUNCTIONAL STATUS - GENERAL
MOBILITY SCORE: 24
SUGGESTED CMS G CODE MODIFIER MOBILITY: CH
SUGGESTED CMS G CODE MODIFIER DAILY ACTIVITY: CH
DAILY ACTIVITIY SCORE: 24

## 2024-09-27 ASSESSMENT — HEART SCORE
HISTORY: MODERATELY SUSPICIOUS
AGE: <45
TROPONIN: 1-3 TIMES NORMAL LIMIT
TROPONIN: 1-3 TIMES NORMAL LIMIT
HEART SCORE: 4
ECG: NORMAL
AGE: <45
RISK FACTORS: >2 RISK FACTORS OR HX OF ATHEROSCLEROTIC DISEASE
ECG: NORMAL
HISTORY: MODERATELY SUSPICIOUS
HEART SCORE: 4
RISK FACTORS: >2 RISK FACTORS OR HX OF ATHEROSCLEROTIC DISEASE

## 2024-09-27 ASSESSMENT — LIFESTYLE VARIABLES
ALCOHOL_USE: NO
TOTAL SCORE: 0
HOW MANY TIMES IN THE PAST YEAR HAVE YOU HAD 5 OR MORE DRINKS IN A DAY: 4
AVERAGE NUMBER OF DAYS PER WEEK YOU HAVE A DRINK CONTAINING ALCOHOL: 3
HAVE YOU EVER FELT YOU SHOULD CUT DOWN ON YOUR DRINKING: NO
DOES PATIENT WANT TO STOP DRINKING: NO
HAVE PEOPLE ANNOYED YOU BY CRITICIZING YOUR DRINKING: NO
EVER HAD A DRINK FIRST THING IN THE MORNING TO STEADY YOUR NERVES TO GET RID OF A HANGOVER: NO
TOTAL SCORE: 0
ON A TYPICAL DAY WHEN YOU DRINK ALCOHOL HOW MANY DRINKS DO YOU HAVE: 2
EVER FELT BAD OR GUILTY ABOUT YOUR DRINKING: NO
TOTAL SCORE: 0
CONSUMPTION TOTAL: POSITIVE

## 2024-09-27 ASSESSMENT — PAIN DESCRIPTION - PAIN TYPE
TYPE: ACUTE PAIN

## 2024-09-27 ASSESSMENT — FIBROSIS 4 INDEX: FIB4 SCORE: 0.57

## 2024-09-27 NOTE — ASSESSMENT & PLAN NOTE
11 minutes spent on tobacco cessation.  Patient currently smokes several cigarettes a day.  He was counseled on benefits of tobacco cessation, and encouraged to stop smoking, especially with his cardiac history.  Patient understands the risks of smoking and the potential to worsen his cardiac disease, and states that he will try to quit.  He was offered nicotine placement therapy, to which at this time he declines.

## 2024-09-27 NOTE — PROGRESS NOTES
Pt received from ED. Tele monitor in place. VSS. Pt oriented to room. Educated on use of the call light. Pt demonstrated use of the call light. Discussed POC. All questions answered.        Fall Risk Score: NO RISK  Fall risk interventions in place: Provide patient/family education based on risk assessment and Educate patient/family to call staff for assistance when getting out of bed  Bed type: Regular (Juan Score less than 17 interventions in place)  Patient on cardiac monitor: Yes  IVF/IV medications: Not Applicable   Oxygen: Room Air  Bedside sitter: Not Applicable   Isolation: Not applicable

## 2024-09-27 NOTE — ED TRIAGE NOTES
Chief Complaint   Patient presents with    Chest Pain     BIB [facility] from [place] as an NSTEMI transfer. Pt was seen for acute midsternal non-radiating CP, troponin initially 27 - repeat 70. Denies current CP or SOB. Hx CABG and multiple stents.       Pt received ASA, lovenox, 4mg morphine, 0.4 nitro PTA.

## 2024-09-27 NOTE — ED NOTES
Bedside report received from off going RN/tech: Suha, assumed care of patient.  POC discussed with patient. Call light within reach, all needs addressed at this time.       Fall risk interventions in place: Move the patient closer to the nurse's station and Keep floor surfaces clean and dry (all applicable per [facility] Fall risk assessment)   Continuous monitoring: Cardiac Leads, Pulse Ox, or Blood Pressure  IVF/IV medications: Not Applicable   Oxygen: Room Air  Bedside sitter: Not Applicable   Isolation: Not Applicable

## 2024-09-27 NOTE — ED NOTES
Unable to obtain med rec at this time    Patient stated he is taking his medications but unable to provide medication names. Patient states it has been about a month since he last took his medications, patient also reports he took his medications today. Unable to clarify last doses with patient    Pharmacy: [facility]. [phone]    Wendy Reddy CPhT

## 2024-09-27 NOTE — ED NOTES
Bedside report to SARIKA Donohue. Plan of care discussed with patient. Bed locked and in lowest position. Call light available and within reach. Appropriate fall precautions in place. No distress noted. This RN removed from care.

## 2024-09-27 NOTE — ASSESSMENT & PLAN NOTE
Updated A1c 9%  Continue Sliding-scale  Per chart used to be on insulin. Is not currently taking insulin

## 2024-09-27 NOTE — H&P
Hospital Medicine History & Physical Note    Date of Service  9/27/2024    Primary Care Physician  JEZ Gonzalez.    Consultants  None    Code Status  Full Code    Chief Complaint  Chief Complaint   Patient presents with    Chest Pain     BIB [facility] from [place] as an NSTEMI transfer. Pt was seen for acute midsternal non-radiating CP, troponin initially 27 - repeat 70. Denies current CP or SOB. Hx CABG and multiple stents.        History of Presenting Illness  Kyle Gonzalez is a 44 y.o. male who presented 9/26/2024 with chest pain. Patient has a history of CAD status post stent x 2 and triple bypass. Patient went to take a nap this evening, and woke up with a dull left-sided chest pain that lasted for about 2 hours.  He endorses mild shortness of breath and dry cough.  He denies associated diaphoresis nausea or vomiting. He continues to smoke cigarettes.  He admits to missing several doses of his medications.  Patient went outside facility ER for further evaluation.    At outside facility ER, initial troponin negative, next troponin 79.  Normal less than 60.  CTPA negative for pulmonary embolism.  Patient loaded with aspirin and given therapeutic Lovenox and transferred to [facility] for further evaluation.    I discussed the plan of care with patient.    Review of Systems  ROS    Past Medical History   has a past medical history of Diabetes (HCC), Heart attack (HCC), Hyperlipidemia, Hypertension, and Medical non-compliance (9/13/2015).    Surgical History   has a past surgical history that includes pr transcath stent init vessel,open.     Family History  family history includes Diabetes in his mother; Heart Attack in his father and mother.   Family history reviewed with patient. There is no family history that is pertinent to the chief complaint.     Social History   reports that he quit smoking about 3 years ago. His smoking use included cigarettes. He started smoking about 30 years ago.  He has a 27.4 pack-year smoking history. He has never used smokeless tobacco. He reports current alcohol use. He reports current drug use. Drug: Marijuana.    Allergies  No Known Allergies    Medications  Prior to Admission Medications   Prescriptions Last Dose Informant Patient Reported? Taking?   Evolocumab (REPATHA) 140 MG/ML Solution Auto-injector SubQ injection pen   No No   Sig: Inject 1 mL under the skin every 14 days.   LANTUS SOLOSTAR 100 UNIT/ML Solution Pen-injector injection   Yes No   NOVOLOG FLEXPEN 100 UNIT/ML solution for injection   Yes No   ONETOUCH ULTRA strip   Yes No   aspirin EC (ECOTRIN) 81 MG Tablet Delayed Response  Patient No No   Sig: Take 1 Tab by mouth every day.   clopidogrel (PLAVIX) 75 MG Tab   No No   Sig: Take 1 Tablet by mouth every day.   ezetimibe (ZETIA) 10 MG Tab   No No   Sig: Take 1 Tablet by mouth every day.   furosemide (LASIX) 20 MG Tab   No No   Sig: Take 1 Tablet by mouth every day.   lisinopril (PRINIVIL) 20 MG Tab   No No   Sig: Take 1 Tablet by mouth every day.   metoprolol SR (TOPROL XL) 25 MG TABLET SR 24 HR   No No   Sig: Take 1 Tablet by mouth every day.   pantoprazole (PROTONIX) 40 MG Tablet Delayed Response   No No   Sig: Take 1 Tablet by mouth every day.   potassium chloride SA (KDUR) 20 MEQ Tab CR   No No   Sig: Take 1 Tablet by mouth every day.   rosuvastatin (CRESTOR) 40 MG tablet   No No   Sig: Take 1 Tablet by mouth every evening.      Facility-Administered Medications: None       Physical Exam  Temp:  [36.2 °C (97.2 °F)] 36.2 °C (97.2 °F)  Pulse:  [] 97  Resp:  [18] 18  BP: (100-118)/(61-70) 100/61  SpO2:  [93 %-94 %] 94 %  Blood Pressure: 100/61   Temperature: 36.2 °C (97.2 °F)   Pulse: 97   Respiration: 18   Pulse Oximetry: 94 %       Physical Exam  Constitutional:       Appearance: Normal appearance. He is obese.   HENT:      Head: Normocephalic.      Nose: Nose normal.      Mouth/Throat:      Mouth: Mucous membranes are moist.   Eyes:       Pupils: Pupils are equal, round, and reactive to light.   Cardiovascular:      Rate and Rhythm: Regular rhythm. Tachycardia present.      Pulses: Normal pulses.   Pulmonary:      Effort: Pulmonary effort is normal.      Breath sounds: Normal breath sounds.   Abdominal:      General: Abdomen is flat. Bowel sounds are normal.      Palpations: Abdomen is soft.   Musculoskeletal:         General: Normal range of motion.      Cervical back: Neck supple.   Skin:     General: Skin is warm.   Neurological:      General: No focal deficit present.      Mental Status: He is alert and oriented to person, place, and time. Mental status is at baseline.   Psychiatric:         Mood and Affect: Mood normal.         Behavior: Behavior normal.         Thought Content: Thought content normal.         Judgment: Judgment normal.         Laboratory:  Recent Labs     09/26/24  2300   WBC 9.3   RBC 4.95   HEMOGLOBIN 15.7   HEMATOCRIT 46.5   MCV 93.9   MCH 31.7   MCHC 33.8   RDW 45.5   PLATELETCT 322   MPV 9.7     Recent Labs     09/27/24  0011   SODIUM 136   POTASSIUM 4.0   CHLORIDE 99   CO2 21   GLUCOSE 188*   BUN 11   CREATININE 0.66   CALCIUM 9.5     Recent Labs     09/27/24  0011   ALTSGPT 18   ASTSGOT 18   ALKPHOSPHAT 130*   TBILIRUBIN <0.2   GLUCOSE 188*         Recent Labs     09/27/24  0011   NTPROBNP 305*         Recent Labs     09/27/24  0011   TROPONINT 41*       Imaging:  DX-CHEST-PORTABLE (1 VIEW)   Final Result         1.  Scattered hazy bilateral pulmonary opacities, appearance suggesting subtle infiltrate.   2.  Cardiomegaly          EKG:  I have personally reviewed the images and compared with prior images.    Assessment/Plan:  Justification for Admission Status  I anticipate this patient will require at least two midnights for appropriate medical management, necessitating inpatient admission because patient has NSTEMI    Patient will need a Telemetry bed on MEDICAL service .  The need is secondary to NSTEMI.    * NSTEMI  (in the setting of severe CAD and HFrEF)- (present on admission)  Assessment & Plan  Patient with history of CAD status post 2 stents in triple bypass presents for chest pain  Initial troponin negative and 79 on the second troponin at outside hospital.  Loaded with aspirin and given Lovenox at about 9 PM at outside hospital and transferred to [facility] negative for pulmonary embolism at outside hospital  EKG showing normal sinus rhythm with no ischemic changes  Echocardiogram  Cardiology consult in a.m.    Hyperglycemia  Assessment & Plan  Sliding-scale      Tobacco abuse  Assessment & Plan  11 minutes spent on tobacco cessation.  Patient currently smokes several cigarettes a day.  He was counseled on benefits of tobacco cessation, and encouraged to stop smoking, especially with his cardiac history.  Patient understands the risks of smoking and the potential to worsen his cardiac disease, and states that he will try to quit.  He was offered nicotine placement therapy, to which at this time he declines.    Essential hypertension- (present on admission)  Assessment & Plan  Resume home medications    Hypercholesteremia- (present on admission)  Assessment & Plan  Lipitor        VTE prophylaxis: therapeutic anticoagulation with Lovenox

## 2024-09-27 NOTE — PROGRESS NOTES
4 Eyes Skin Assessment Completed by SARIKA Tavares and SARIKA Hernandez.    Head WDL  Ears WDL  Nose WDL  Mouth WDL  Neck WDL  Breast/Chest Scar old CABG scar  Shoulder Blades WDL  Spine WDL  (R) Arm/Elbow/Hand WDL  (L) Arm/Elbow/Hand WDL  Abdomen WDL  Groin WDL  Scrotum/Coccyx/Buttocks Redness, Scar, and Scab scab to R buttock, eczema noted  (R) Leg WDL  (L) Leg WDL  (R) Heel/Foot/Toe Redness/dry/cracked  (L) Heel/Foot/Toe Redness/dry/cracked          Devices In Places Tele Box and Pulse Ox      Interventions In Place pillows in use for support/positioning    Possible Skin Injury No    Pictures Uploaded Into Epic N/A  Wound Consult Placed N/A  RN Wound Prevention Protocol Ordered No

## 2024-09-27 NOTE — ED NOTES
Pt appears to be resting in gurney, RR even and unlabored, NAD. Pt updated on POC and has no additional requests at this time.

## 2024-09-27 NOTE — ASSESSMENT & PLAN NOTE
Patient with history of CAD status post 2 stents in triple bypass presents for chest pain  Initial troponin negative and 79 on the second troponin at outside hospital.  Loaded with aspirin and given Lovenox at about 9 PM at outside hospital and [facility] pulmonary [facility]ism at outside hospital  EKG showing normal sinus rhythm with no ischemic changes  Echocardiogram ordered   Cardiology consulted

## 2024-09-27 NOTE — ED PROVIDER NOTES
ED Provider Note    CHIEF COMPLAINT  Chief Complaint   Patient presents with    Chest Pain     BIB [facility] [place] as an NSTEMI transfer. Pt was seen for acute midsternal non-radiating CP, troponin initially 27 - repeat 70. Denies current CP or SOB. Hx CABG and multiple stents.        EXTERNAL RECORDS REVIEWED  Inpatient Notes reviewed discharge summary [facility] dated December 20, 2021.  History of severe coronary artery disease and CHF NYHA class II.  and Outpatient Notes reviewed office visit progress note dated September 1, 2023 by Dr. Hill of cardiology.  Patient seen in consultation in the clinic for follow-up of coronary artery disease.  History of RCA ST elevation MI in 2013.  Has history of multivessel disease and three-vessel CABG with Dr. Toussaint.  Reviewed [facility] emergency department note dated today by .  Patient seen for chest pain, no ST elevation on EKG, serial cardiac enzymes were elevating and diagnosed with non-ST elevation MI and transferred to this facility for further evaluation and management.    HPI/ROS  LIMITATION TO HISTORY   Select: : None  OUTSIDE HISTORIAN(S):  EMS patient found to have elevated troponins, history of coronary artery disease    Kyle Gonzalez is a 44 y.o. male who presents for evaluation of acute chest pain.  Relates history of coronary artery disease, endorses pain began today.  Describes it as pressure-like, localized to the center of the chest with radiation to both upper extremities.  No dyspnea, no nausea, no diaphoresis, no dizziness.  Notes he had somewhat similar pain a few weeks ago that was more mild and self resolved.  Pain today woke him from sleep and given this persistence he went to be assessed.  Patient treated with nitroglycerin, aspirin, morphine, and Lovenox at [facility] emergency department.  Found to have elevated troponin of 79 from initial of 27.    PAST MEDICAL HISTORY   has a past medical history of Diabetes  (HCC), Heart attack (HCC), Hyperlipidemia, Hypertension, and Medical non-compliance (9/13/2015).    SURGICAL HISTORY   has a past surgical history that includes transcath stent init vessel,open.    FAMILY HISTORY  Family History   Problem Relation Age of Onset    Diabetes Mother     Heart Attack Mother     Heart Attack Father        SOCIAL HISTORY  Social History     Tobacco Use    Smoking status: Former     Current packs/day: 0.00     Average packs/day: 1 pack/day for 27.4 years (27.4 ttl pk-yrs)     Types: Cigarettes     Start date: 3/17/1994     Quit date: 8/1/2021     Years since quitting: 3.1    Smokeless tobacco: Never   Vaping Use    Vaping status: Never Used   Substance and Sexual Activity    Alcohol use: Yes     Comment: occ    Drug use: Yes     Types: Marijuana     Comment: occasional marijuana     Sexual activity: Not on file       CURRENT MEDICATIONS  Home Medications       Reviewed by Josey Armas (Pharmacy Tech) on 09/27/24 at 0230  Med List Status: Unable to Obtain     Medication Last Dose Status   aspirin EC (ECOTRIN) 81 MG Tablet Delayed Response  Active   clopidogrel (PLAVIX) 75 MG Tab  Active   Evolocumab (REPATHA) 140 MG/ML Solution Auto-injector SubQ injection pen  Active   ezetimibe (ZETIA) 10 MG Tab  Active   furosemide (LASIX) 20 MG Tab  Active   LANTUS SOLOSTAR 100 UNIT/ML Solution Pen-injector injection  Active   lisinopril (PRINIVIL) 20 MG Tab  Active   metoprolol SR (TOPROL XL) 25 MG TABLET SR 24 HR  Active   NOVOLOG FLEXPEN 100 UNIT/ML solution for injection  Active   ONETOUCH ULTRA strip  Active   pantoprazole (PROTONIX) 40 MG Tablet Delayed Response  Active   potassium chloride SA (KDUR) 20 MEQ Tab CR  Active   rosuvastatin (CRESTOR) 40 MG tablet  Active                  Audit from Redirected Encounters    **Home medications have not yet been reviewed for this encounter**         ALLERGIES  No Known Allergies    PHYSICAL EXAM  VITAL SIGNS: BP 92/59   Pulse 83   Temp 36.2 °C  "(97.2 °F) (Temporal)   Resp 20   Ht 1.651 m (5' 5\")   Wt 99.8 kg (220 lb)   SpO2 91%   BMI 36.61 kg/m²    General: Alert, no acute distress  Skin: Warm, dry, normal for ethnicity  Head: Normocephalic, atraumatic  Neck: Trachea midline, no tenderness  Eye: PERRL, normal conjunctiva  ENMT: Oral mucosa moist, no pharyngeal erythema or exudate  Cardiovascular: Regular rate and rhythm, No murmur, Normal peripheral perfusion  Respiratory: Lungs CTA, respirations are non-labored, breath sounds are equal  Gastrointestinal: Soft, nontender, non distended  Musculoskeletal: Trace pretibial edema, no deformity  Neurological: Alert and oriented to person, place, time, and situation  Lymphatics: No lymphadenopathy  Psychiatric: Cooperative, appropriate mood & affect     EKG/LABS  Results for orders placed or performed during the hospital encounter of 09/26/24   CBC with Differential   Result Value Ref Range    WBC 9.3 4.8 - 10.8 K/uL    RBC 4.95 4.70 - 6.10 M/uL    Hemoglobin 15.7 14.0 - 18.0 g/dL    Hematocrit 46.5 42.0 - 52.0 %    MCV 93.9 81.4 - 97.8 fL    MCH 31.7 27.0 - 33.0 pg    MCHC 33.8 32.3 - 36.5 g/dL    RDW 45.5 35.9 - 50.0 fL    Platelet Count 322 164 - 446 K/uL    MPV 9.7 9.0 - 12.9 fL    Neutrophils-Polys 55.60 44.00 - 72.00 %    Lymphocytes 32.50 22.00 - 41.00 %    Monocytes 9.60 0.00 - 13.40 %    Eosinophils 1.50 0.00 - 6.90 %    Basophils 0.50 0.00 - 1.80 %    Immature Granulocytes 0.30 0.00 - 0.90 %    Nucleated RBC 0.00 0.00 - 0.20 /100 WBC    Neutrophils (Absolute) 5.17 1.82 - 7.42 K/uL    Lymphs (Absolute) 3.03 1.00 - 4.80 K/uL    Monos (Absolute) 0.89 (H) 0.00 - 0.85 K/uL    Eos (Absolute) 0.14 0.00 - 0.51 K/uL    Baso (Absolute) 0.05 0.00 - 0.12 K/uL    Immature Granulocytes (abs) 0.03 0.00 - 0.11 K/uL    NRBC (Absolute) 0.00 K/uL   Troponins in two (2) hours   Result Value Ref Range    Troponin T 36 (H) 6 - 19 ng/L   Comp Metabolic Panel   Result Value Ref Range    Sodium 136 135 - 145 mmol/L    " Potassium 4.0 3.6 - 5.5 mmol/L    Chloride 99 96 - 112 mmol/L    Co2 21 20 - 33 mmol/L    Anion Gap 16.0 7.0 - 16.0    Glucose 188 (H) 65 - 99 mg/dL    Bun 11 8 - 22 mg/dL    Creatinine 0.66 0.50 - 1.40 mg/dL    Calcium 9.5 8.5 - 10.5 mg/dL    Correct Calcium 9.5 8.5 - 10.5 mg/dL    AST(SGOT) 18 12 - 45 U/L    ALT(SGPT) 18 2 - 50 U/L    Alkaline Phosphatase 130 (H) 30 - 99 U/L    Total Bilirubin <0.2 0.1 - 1.5 mg/dL    Albumin 4.0 3.2 - 4.9 g/dL    Total Protein 7.6 6.0 - 8.2 g/dL    Globulin 3.6 (H) 1.9 - 3.5 g/dL    A-G Ratio 1.1 g/dL   TROPONIN   Result Value Ref Range    Troponin T 41 (H) 6 - 19 ng/L   proBrain Natriuretic Peptide, NT   Result Value Ref Range    NT-proBNP 305 (H) 0 - 125 pg/mL   ESTIMATED GFR   Result Value Ref Range    GFR (CKD-EPI) 119 >60 mL/min/1.73 m 2   HEMOGLOBIN A1C   Result Value Ref Range    Glycohemoglobin 9.2 (H) 4.0 - 5.6 %    Est Avg Glucose 217 mg/dL   EKG   Result Value Ref Range    Report       [facility] Emergency Dept.    Test Date:  2024  Pt Name:    CHANDRIKA DAVIDSON             Department: ER  MRN:        [id number]                      Room:        11  Gender:     Male                         Technician: [id number]  :        1980                   Requested By:ER TRIAGE PROTOCOL  Order #:    [id number]                    Reading MD: SUSAN KAY MD    Measurements  Intervals                                Axis  Rate:       94                           P:          23  MS:         149                          QRS:        -6  QRSD:       87                           T:          44  QT:         354  QTc:        443    Interpretive Statements  Sinus rhythm  Ventricular premature complex  Probable left atrial enlargement  Left ventricular hypertrophy  Inferior infarct, old  Compared to ECG 2021 00:31:37  Ventricular premature complex(es) now present  Left ventricular hypertrophy now present  Myocardial infarct finding still present    Electronically Signed On 09- 23:11:54 PDT by SUSAN KAY MD        I have independently interpreted this EKG    RADIOLOGY/PROCEDURES   I have independently interpreted the diagnostic imaging associated with this visit and am waiting the final reading from the radiologist.   My preliminary interpretation is as follows: Mild pulmonary edema, cardiomegaly noted    Radiologist interpretation:  DX-CHEST-PORTABLE (1 VIEW)   Final Result         1.  Scattered hazy bilateral pulmonary opacities, appearance suggesting subtle infiltrate.   2.  Cardiomegaly      EC-ECHOCARDIOGRAM COMPLETE W/O CONT    (Results Pending)       COURSE & MEDICAL DECISION MAKING    ASSESSMENT, COURSE AND PLAN  Care Narrative: 44-year-old gentleman with history of coronary artery disease status post 2 coronary artery bypass grafting presents for evaluation of acute chest discomfort.  Reassuringly at this point he is chest pain free, however troponin is persistently elevated.  Thankfully it is actually coming down slightly from the outlying facility.  Heart score nonetheless is 4, moderate risk stratification.  He also has evidence of subtle pulmonary edema on the x-ray and given elevated BNP I suspect this is cardiogenic.  Given concern for acute coronary syndrome plan is for admission.  As above he has already been treated with aspirin, nitroglycerin, morphine, and Lovenox at the outlying facility.    CHEST PAIN:   HEART Score for Major Cardiac Events  HEART Score     History: Moderately suspicious  ECG: Normal  Age: <45  Risk Factors: >2 risk factors or hx of atherosclerotic disease  Troponin: 1-3 times normal limit    Heart Score: 4    Total Score   0-3 Points = Low Score, risk of MACE 0.9-1.7%.  4-6 Points = Moderate Score, risk of MACE 12-16.6%  7-10 Points = High Score, risk of MACE 50-65%    ED OBS: Yes; I am placing the patient in to an observation status due to a diagnostic uncertainty as well as therapeutic intensity.  "Patient placed in observation status at 11:11 PM, 9/26/2024.     Observation plan is as follows: Patient currently pain-free, has been medicated with aspirin, Lovenox, nitroglycerin prior to arrival.  Repeat troponin as well as BNP will be obtained.  Differential diagnosis includes but not restricted to acute coronary syndrome, non-ST elevation MI, unstable angina    0008: Still awaiting troponin at this time.    0126: Troponin is indeed elevated, thankfully not markedly so.  Heart score is 4, plan is for admission, will page hospitalist.    0136: I have heard back from the hospitalist Dr. Donahue, he concurs with plan of care thus far accepts the patient to his service for admission.    0138: Patient reassessed, resting comfortably in no acute distress.    Upon Reevaluation, the patient's condition has: not improved; and will be escalated to hospitalization.    Patient discharged from ED Observation status at 0150 (Time) 9/27/24 (Date).       Patient Vitals for the past 24 hrs:   BP Temp Temp src Pulse Resp SpO2 Height Weight   09/27/24 0003 100/61 -- -- -- -- -- -- --   09/26/24 2306 107/64 -- -- 97 -- 94 % -- --   09/26/24 2303 118/70 36.2 °C (97.2 °F) Temporal 95 18 94 % -- --   09/26/24 2301 -- -- -- -- -- -- 1.651 m (5' 5\") 99.8 kg (220 lb)   09/26/24 2256 118/70 -- -- 100 -- 93 % -- --        ADDITIONAL PROBLEMS MANAGED  Chest pain with history of coronary artery disease and elevated troponin    DISPOSITION AND DISCUSSIONS  I have discussed management of the patient with the following physicians and JACKSON's: Consulted with hospitalist Dr. Morales    Discussion of management with other Hospitals in [place] or appropriate source(s): None     Escalation of care considered, and ultimately not performed:NA    Barriers to care at this time, including but not limited to:  NA .     Decision tools and prescription drugs considered including, but not limited to:  Heart score is 4, moderate risk stratification .    FINAL DIAGNOSIS  1. Chest " pain, unspecified type    2. Elevated troponin    3. Cardiogenic pulmonary edema (HCC)         Electronically signed by: Elen Sims M.D., 9/26/2024 11:09 PM

## 2024-09-27 NOTE — CONSULTS
Reason for Consult:  Asked by Dr Margareth Stoner* to see this patient with  CP  Patient's PCP: JEZ Gonzalez.    CC:   Chief Complaint   Patient presents with    Chest Pain     BIB [facility] from [place] as an NSTEMI transfer. Pt was seen for acute midsternal non-radiating CP, troponin initially 27 - repeat 70. Denies current CP or SOB. Hx CABG and multiple stents.        HPI:  42 y.o. male with a remarkable history of very early coronary disease at age 31 had stenting to the RCA then he had multiple stenting after that he did continue to smoke but eventually required surgery for left main stenosis and had a RCA  he followed up with  At [facility] and lives in [place].  Unfortunately has had significant depression and had been off of his medicines he has been on and off cigarettes since his CABG in 2021 and recently has been smoking a lot.  He realized this was harmful was getting back on his medications but had developed chest pain present outside hospital found indeterminate troponin transferred here for evaluation here he has indeterminant troponin start heparin is feeling much better.     Pertinent History:  -Premature CAD, initial presentation with RCA STEMI in 2013.  In 2014, underwent PCI to left circumflex artery.  Was noted to have mild in-stent restenosis in the RCA at that time.  In 2017, presented with NSTEMI and underwent PCI to OM.  In October 2020, NSTEMI s/p TABATHA to the LAD  -NSTEMI 12/2021 with 70% distal LM stenosis and  of RCA.  Transfer to [facility] and underwent successful 3-v CABG with Dr. Toussaint    Medications / Drug list prior to admission:  No current facility-administered medications on file prior to encounter.     Current Outpatient Medications on File Prior to Encounter   Medication Sig Dispense Refill    ezetimibe (ZETIA) 10 MG Tab Take 1 Tablet by mouth every day. 90 Tablet 3    metoprolol SR (TOPROL XL) 25 MG TABLET SR 24 HR Take 1 Tablet by mouth every  day. 90 Tablet 3    rosuvastatin (CRESTOR) 40 MG tablet Take 1 Tablet by mouth every evening. 90 Tablet 3    potassium chloride SA (KDUR) 20 MEQ Tab CR Take 1 Tablet by mouth every day. 90 Tablet 3    pantoprazole (PROTONIX) 40 MG Tablet Delayed Response Take 1 Tablet by mouth every day. 90 Tablet 3    furosemide (LASIX) 20 MG Tab Take 1 Tablet by mouth every day. 90 Tablet 1    lisinopril (PRINIVIL) 20 MG Tab Take 1 Tablet by mouth every day. 90 Tablet 3       Current list of administered Medications:    Current Facility-Administered Medications:     acetaminophen (Tylenol) tablet 650 mg, 650 mg, Oral, Q6HRS PRN, Diaz Morales M.D.    labetalol (Normodyne/Trandate) injection 10 mg, 10 mg, Intravenous, Q4HRS PRN, Diaz Morales M.D.    ondansetron (Zofran) syringe/vial injection 4 mg, 4 mg, Intravenous, Q4HRS PRN, Diaz Morales M.D.    ondansetron (Zofran ODT) dispertab 4 mg, 4 mg, Oral, Q4HRS PRN, Diaz Morales M.D.    promethazine (Phenergan) tablet 12.5-25 mg, 12.5-25 mg, Oral, Q4HRS PRN, Diaz Morales M.D.    promethazine (Phenergan) suppository 12.5-25 mg, 12.5-25 mg, Rectal, Q4HRS PRN, Diaz Morales M.D.    prochlorperazine (Compazine) injection 5-10 mg, 5-10 mg, Intravenous, Q4HRS PRN, Diaz Morales M.D.    insulin lispro (HumaLOG,AdmeLOG) subcutaneous injection, 1-6 Units, Subcutaneous, Q6HRS **AND** POC blood glucose manual result, , , Q6H **AND** NOTIFY MD and PharmD, , , Once **AND** Administer 20 grams of glucose (approximately 8 ounces of fruit juice) every 15 minutes PRN FSBG less than 70 mg/dL, , , PRN **AND** dextrose 50% (D50W) injection 25 g, 25 g, Intravenous, Q15 MIN PRN, Diaz Morales M.D.    morphine 4 MG/ML injection 4 mg, 4 mg, Intravenous, Q4HRS PRN, Diaz Morales M.D., 4 mg at 09/27/24 1106    [START ON 9/28/2024] aspirin EC tablet 81 mg, 81 mg, Oral, DAILY, Diaz Morales M.D.    [START ON 9/28/2024] ezetimibe (Zetia) tablet 10 mg, 10 mg,  Oral, DAILY, Diaz Morales M.D.    lisinopril (Prinivil) tablet 20 mg, 20 mg, Oral, DAILY, Diaz Morales M.D., 20 mg at 09/27/24 1209    metoprolol SR (Toprol XL) tablet 25 mg, 25 mg, Oral, DAILY, Diaz Morales M.D.    rosuvastatin (Crestor) tablet 40 mg, 40 mg, Oral, Q EVENING, Diaz Morales M.D.    Past Medical History:   Diagnosis Date    Diabetes (HCC)     Heart attack (HCC)     Hyperlipidemia     Hypertension     Medical non-compliance 9/13/2015       Past Surgical History:   Procedure Laterality Date    WI TRANSCATH STENT INIT VESSEL,OPEN      x4       Family History   Problem Relation Age of Onset    Diabetes Mother     Heart Attack Mother     Heart Attack Father      Patient family history was personally reviewed, no pertinent family history to current presentation    Social History     Tobacco Use    Smoking status: Former     Current packs/day: 0.00     Average packs/day: 1 pack/day for 27.4 years (27.4 ttl pk-yrs)     Types: Cigarettes     Start date: 3/17/1994     Quit date: 8/1/2021     Years since quitting: 3.1    Smokeless tobacco: Never   Vaping Use    Vaping status: Never Used   Substance Use Topics    Alcohol use: Yes     Comment: occ    Drug use: Yes     Types: Marijuana     Comment: occasional marijuana        ALLERGIES:  No Known Allergies    Review of systems:  A complete review of symptoms was reviewed with patient. This is reviewed in H&P and PMH. ALL OTHERS reviewed and negative    Physical exam:  Patient Vitals for the past 24 hrs:   BP Temp Temp src Pulse Resp SpO2 Height Weight   09/27/24 1233 111/64 -- -- 76 16 92 % -- --   09/27/24 1204 136/74 -- -- -- -- -- -- --   09/27/24 1203 -- -- -- 81 -- 93 % -- --   09/27/24 1133 110/82 -- -- 86 -- 91 % -- --   09/27/24 1104 117/74 -- -- 78 16 93 % -- --   09/27/24 1003 93/51 -- -- 80 15 93 % -- --   09/27/24 0933 102/58 -- -- 79 -- 91 % -- --   09/27/24 0903 101/58 -- -- 71 18 95 % -- --   09/27/24 0833 117/59 -- -- 98 -- 95  "% -- --   24 0803 123/69 -- -- 81 16 94 % -- --   24 0733 124/74 -- -- 81 -- 93 % -- --   24 0703 118/75 -- -- 79 16 94 % -- --   24 0633 132/76 -- -- 83 -- 91 % -- --   24 0603 126/80 -- -- 81 -- 92 % -- --   24 0530 120/73 -- -- 86 17 92 % -- --   24 0503 105/58 -- -- 79 20 90 % -- --   24 0434 118/72 -- -- 84 -- 91 % -- --   24 0403 98/58 -- -- -- -- -- -- --   24 0402 -- -- -- 81 18 89 % -- --   24 0333 105/76 -- -- 86 18 92 % -- --   24 0303 112/72 -- -- 87 16 93 % -- --   24 0233 106/68 -- -- 89 18 92 % -- --   24 0203 109/66 -- -- -- 15 98 % -- --   24 0133 92/59 -- -- 83 20 91 % -- --   24 0104 116/66 -- -- 86 -- 93 % -- --   24 0033 91/53 -- -- 92 16 90 % -- --   24 0003 100/61 -- -- -- -- -- -- --   24 2306 107/64 -- -- 97 -- 94 % -- --   24 2303 118/70 36.2 °C (97.2 °F) Temporal 95 18 94 % -- --   24 -- -- -- -- -- -- 1.651 m (5' 5\") 99.8 kg (220 lb)   24 118/70 -- -- 100 -- 93 % -- --     General: No acute distress.   EYES: no jaundice  HEENT: OP clear   Neck:  No JVD.   CVS:  [ ] RRR.   Resp: Normal respiratory effort,   Abdomen: ND,  Skin: Grossly nothing acute no obvious rashes  Neurological: Alert, Moves all extremities  Extremities:   [  no edema. No cyanosis.       Data:  Laboratory studies personally reviewed by me:  Recent Results (from the past 24 hour(s))   EKG    Collection Time: 24 10:59 PM   Result Value Ref Range    Report       [facility] Emergency Dept.    Test Date:  2024  Pt Name:    CHANDRIKA DAVIDSON             Department: ER  MRN:        [id number]                      Room:        11  Gender:     Male                         Technician: [id number]  :        1980                   Requested By:ER TRIAGE PROTOCOL  Order #:    [id number]                    [place] MD: SUSAN VELAZQUEZ" MD RAHUL    Measurements  Intervals                                Axis  Rate:       94                           P:          23  MN:         149                          QRS:        -6  QRSD:       87                           T:          44  QT:         354  QTc:        443    Interpretive Statements  Sinus rhythm  Ventricular premature complex  Probable left atrial enlargement  Left ventricular hypertrophy  Inferior infarct, old  Compared to ECG 12/09/2021 00:31:37  Ventricular premature complex(es) now present  Left ventricular hypertrophy now present  Myocardial infarct finding still present   Electronically Signed On 09- 23:11:54 PDT by SUSAN KAY MD     CBC with Differential    Collection Time: 09/26/24 11:00 PM   Result Value Ref Range    WBC 9.3 4.8 - 10.8 K/uL    RBC 4.95 4.70 - 6.10 M/uL    Hemoglobin 15.7 14.0 - 18.0 g/dL    Hematocrit 46.5 42.0 - 52.0 %    MCV 93.9 81.4 - 97.8 fL    MCH 31.7 27.0 - 33.0 pg    MCHC 33.8 32.3 - 36.5 g/dL    RDW 45.5 35.9 - 50.0 fL    Platelet Count 322 164 - 446 K/uL    MPV 9.7 9.0 - 12.9 fL    Neutrophils-Polys 55.60 44.00 - 72.00 %    Lymphocytes 32.50 22.00 - 41.00 %    Monocytes 9.60 0.00 - 13.40 %    Eosinophils 1.50 0.00 - 6.90 %    Basophils 0.50 0.00 - 1.80 %    Immature Granulocytes 0.30 0.00 - 0.90 %    Nucleated RBC 0.00 0.00 - 0.20 /100 WBC    Neutrophils (Absolute) 5.17 1.82 - 7.42 K/uL    Lymphs (Absolute) 3.03 1.00 - 4.80 K/uL    Monos (Absolute) 0.89 (H) 0.00 - 0.85 K/uL    Eos (Absolute) 0.14 0.00 - 0.51 K/uL    Baso (Absolute) 0.05 0.00 - 0.12 K/uL    Immature Granulocytes (abs) 0.03 0.00 - 0.11 K/uL    NRBC (Absolute) 0.00 K/uL   Comp Metabolic Panel    Collection Time: 09/27/24 12:11 AM   Result Value Ref Range    Sodium 136 135 - 145 mmol/L    Potassium 4.0 3.6 - 5.5 mmol/L    Chloride 99 96 - 112 mmol/L    Co2 21 20 - 33 mmol/L    Anion Gap 16.0 7.0 - 16.0    Glucose 188 (H) 65 - 99 mg/dL    Bun 11 8 - 22 mg/dL    Creatinine  0.66 0.50 - 1.40 mg/dL    Calcium 9.5 8.5 - 10.5 mg/dL    Correct Calcium 9.5 8.5 - 10.5 mg/dL    AST(SGOT) 18 12 - 45 U/L    ALT(SGPT) 18 2 - 50 U/L    Alkaline Phosphatase 130 (H) 30 - 99 U/L    Total Bilirubin <0.2 0.1 - 1.5 mg/dL    Albumin 4.0 3.2 - 4.9 g/dL    Total Protein 7.6 6.0 - 8.2 g/dL    Globulin 3.6 (H) 1.9 - 3.5 g/dL    A-G Ratio 1.1 g/dL   TROPONIN    Collection Time: 09/27/24 12:11 AM   Result Value Ref Range    Troponin T 41 (H) 6 - 19 ng/L   proBrain Natriuretic Peptide, NT    Collection Time: 09/27/24 12:11 AM   Result Value Ref Range    NT-proBNP 305 (H) 0 - 125 pg/mL   ESTIMATED GFR    Collection Time: 09/27/24 12:11 AM   Result Value Ref Range    GFR (CKD-EPI) 119 >60 mL/min/1.73 m 2   Troponins in two (2) hours    Collection Time: 09/27/24  1:47 AM   Result Value Ref Range    Troponin T 36 (H) 6 - 19 ng/L   HEMOGLOBIN A1C    Collection Time: 09/27/24  1:47 AM   Result Value Ref Range    Glycohemoglobin 9.2 (H) 4.0 - 5.6 %    Est Avg Glucose 217 mg/dL   POCT glucose device results    Collection Time: 09/27/24  4:56 AM   Result Value Ref Range    POC Glucose, Blood 196 (H) 65 - 99 mg/dL   TROPONIN    Collection Time: 09/27/24  5:20 AM   Result Value Ref Range    Troponin T 31 (H) 6 - 19 ng/L   POCT glucose device results    Collection Time: 09/27/24  6:00 AM   Result Value Ref Range    POC Glucose, Blood 195 (H) 65 - 99 mg/dL   POCT glucose device results    Collection Time: 09/27/24 12:03 PM   Result Value Ref Range    POC Glucose, Blood 183 (H) 65 - 99 mg/dL       Imaging:  OUTSIDE IMAGES-CT CHEST   Final Result      DX-CHEST-PORTABLE (1 VIEW)   Final Result         1.  Scattered hazy bilateral pulmonary opacities, appearance suggesting subtle infiltrate.   2.  Cardiomegaly      EC-ECHOCARDIOGRAM COMPLETE W/O CONT    (Results Pending)           EKG tracings personally reviewed by me  SR with poor RWP and nonspecific repol changes not present in 2021        Echocardiogram images personally  reviewed by me show from 5/2022 moderately reduced EF with abnormal septal motion and global hypokinesis.      Cardiac Catheterization report     12/8/2021  11:39 AM     Referring MD: Dr. Kidd     Indication/Preoperative diagnosis:  Unstable angina  Precocious CAD status post repeat multivessel PCI     Postoperative diagnosis:  Early ISR recently placed ostial LAD stent  70% distal left main stenosis  Sequential 80% LCx stenoses   RCA  LVEF 50 to 55%        CT shows 3 vessel PCI        All pertinent features of laboratory and imaging reviewed including primary images where applicable      Principal Problem:    NSTEMI (in the setting of severe CAD and HFrEF) (POA: Yes)  Active Problems:    Hypercholesteremia (POA: Yes)    Essential hypertension (POA: Yes)    Tobacco abuse (POA: Unknown)    Insulin dependent type 2 diabetes mellitus (HCC) (POA: Unknown)    Non compliance w medication regimen (POA: Unknown)      Overview: Pt reports he has not been compliant with his medications 2/2 depression       and financial issues  Resolved Problems:    * No resolved hospital problems. *      Assessment / Plan:  NSTEMI demand ischemia in the setting of medication nonadherence  Add Plavix loading and daily dose  No evidence of ACS so would just continue medical therapy with close follow-up he is quite possible he needs stenting in the future for his coronary disease but like to see better medical adherence and tobacco cessation  Typically would only recommend stenting in the setting of refractory angina or certainly STEMI with a focus on medical therapy for NSTEMI      Tobacco abuse  I spent5 minutes of face to face counseling on the vital need for smoking cessation.  We discussed pharmacotherapy measures for quiting including nicotine replacement.  He has not tried Chantix but with his depression I worry about the possibility of taking also unlikely covered by his insurance, encouraged him to use nicotine replacement we  specifically discussed that ongoing tobacco abuse really increases the risk of atherosclerosis development of his vein grafts and in-stent restenosis of his prior stenting.    Type 2 diabetes insulin-dependent  Encouraged him to work with primary care or endocrinology to optimize his diabetes regiment for better result    He can discharge tomorrow      I personally discussed his case with  Dr Margareth Stoner*    Future Appointments   Date Time Provider Department Center   10/15/2024  1:45 PM REBEKAH Bowling None         It is my pleasure to participate in the care of Mr. Gonzalez.  Please do not hesitate to contact me with questions or concerns.    Virgilio Camara MD PhD [facility]  Cardiologist [facility]    9/27/2024    Please note that this dictation was created using voice recognition software. There may be errors I did not discover before finalizing the note.

## 2024-09-27 NOTE — ED NOTES
Med Rec complete per patient and pharmacy   Allergies reviewed  Antibiotics in the past 30 days:no  Anticoagulant in past 14 days:no  Pharmacy patient utilizes:[facility] in [place]    Pt states he hasn't injected the medication to lose weight in 6-7 months. Pt states he doesn't inject insulin (removed from med rec)    Pt states he takes a total of 7 medications but unable to verify names and strengths    Per pharmacy medications on med rec were last dispensed on 06/2024 for a month supply. Lisinopril, Metoprolol, and Lasix were dispensed for a 3 month supply in 06/2024    Pt states he stopped taking Atarax 25 mg TID PRN x AX for anxiety a while ago because he became dizzy. Pt states he took 2 days worth of Naltrexone 50 mg back in July and stopped taking it because he didn't like it.

## 2024-09-27 NOTE — ED NOTES
Daughter, Patricia, called for an update. Verified with patient that daughter can have medical information. Update on POC given to daughter.

## 2024-09-27 NOTE — ED NOTES
Report from Natasha BAUM. Pt appears to be resting in gupooja, RR even and unlabored, NAD. Pt updated on POC and has no additional requests at this time.

## 2024-09-27 NOTE — ED NOTES
Assumed care, pt is on bed awake and coherent, denies chest pain or sob at this time. Pt is attached to cardiac monitor, call bell in reach, bed in low position.

## 2024-09-28 ENCOUNTER — PHARMACY VISIT (OUTPATIENT)
Dept: PHARMACY | Facility: MEDICAL CENTER | Age: 44
End: 2024-09-28
Payer: COMMERCIAL

## 2024-09-28 VITALS
RESPIRATION RATE: 16 BRPM | WEIGHT: 225.97 LBS | HEART RATE: 84 BPM | DIASTOLIC BLOOD PRESSURE: 69 MMHG | OXYGEN SATURATION: 94 % | SYSTOLIC BLOOD PRESSURE: 105 MMHG | BODY MASS INDEX: 37.65 KG/M2 | TEMPERATURE: 97.9 F | HEIGHT: 65 IN

## 2024-09-28 LAB
ANION GAP SERPL CALC-SCNC: 14 MMOL/L (ref 7–16)
BUN SERPL-MCNC: 17 MG/DL (ref 8–22)
CALCIUM SERPL-MCNC: 8.9 MG/DL (ref 8.5–10.5)
CHLORIDE SERPL-SCNC: 100 MMOL/L (ref 96–112)
CHOLEST SERPL-MCNC: 395 MG/DL (ref 100–199)
CO2 SERPL-SCNC: 20 MMOL/L (ref 20–33)
CREAT SERPL-MCNC: 0.62 MG/DL (ref 0.5–1.4)
ERYTHROCYTE [DISTWIDTH] IN BLOOD BY AUTOMATED COUNT: 45.2 FL (ref 35.9–50)
GFR SERPLBLD CREATININE-BSD FMLA CKD-EPI: 121 ML/MIN/1.73 M 2
GLUCOSE BLD STRIP.AUTO-MCNC: 190 MG/DL (ref 65–99)
GLUCOSE BLD STRIP.AUTO-MCNC: 201 MG/DL (ref 65–99)
GLUCOSE SERPL-MCNC: 177 MG/DL (ref 65–99)
HCT VFR BLD AUTO: 47.8 % (ref 42–52)
HDLC SERPL-MCNC: 39 MG/DL
HGB BLD-MCNC: 16.3 G/DL (ref 14–18)
LDLC SERPL CALC-MCNC: ABNORMAL MG/DL
MAGNESIUM SERPL-MCNC: 2 MG/DL (ref 1.5–2.5)
MCH RBC QN AUTO: 31.8 PG (ref 27–33)
MCHC RBC AUTO-ENTMCNC: 34.1 G/DL (ref 32.3–36.5)
MCV RBC AUTO: 93.4 FL (ref 81.4–97.8)
PLATELET # BLD AUTO: 303 K/UL (ref 164–446)
PMV BLD AUTO: 9.5 FL (ref 9–12.9)
POTASSIUM SERPL-SCNC: 4.2 MMOL/L (ref 3.6–5.5)
RBC # BLD AUTO: 5.12 M/UL (ref 4.7–6.1)
SODIUM SERPL-SCNC: 134 MMOL/L (ref 135–145)
TRIGL SERPL-MCNC: 506 MG/DL (ref 0–149)
WBC # BLD AUTO: 8.6 K/UL (ref 4.8–10.8)

## 2024-09-28 PROCEDURE — 83735 ASSAY OF MAGNESIUM: CPT

## 2024-09-28 PROCEDURE — A9270 NON-COVERED ITEM OR SERVICE: HCPCS | Performed by: INTERNAL MEDICINE

## 2024-09-28 PROCEDURE — 80048 BASIC METABOLIC PNL TOTAL CA: CPT

## 2024-09-28 PROCEDURE — 85027 COMPLETE CBC AUTOMATED: CPT

## 2024-09-28 PROCEDURE — A9270 NON-COVERED ITEM OR SERVICE: HCPCS | Performed by: STUDENT IN AN ORGANIZED HEALTH CARE EDUCATION/TRAINING PROGRAM

## 2024-09-28 PROCEDURE — 82962 GLUCOSE BLOOD TEST: CPT

## 2024-09-28 PROCEDURE — 99232 SBSQ HOSP IP/OBS MODERATE 35: CPT | Mod: FS | Performed by: INTERNAL MEDICINE

## 2024-09-28 PROCEDURE — 700102 HCHG RX REV CODE 250 W/ 637 OVERRIDE(OP): Performed by: STUDENT IN AN ORGANIZED HEALTH CARE EDUCATION/TRAINING PROGRAM

## 2024-09-28 PROCEDURE — 80061 LIPID PANEL: CPT

## 2024-09-28 PROCEDURE — 700102 HCHG RX REV CODE 250 W/ 637 OVERRIDE(OP): Performed by: INTERNAL MEDICINE

## 2024-09-28 PROCEDURE — RXMED WILLOW AMBULATORY MEDICATION CHARGE

## 2024-09-28 RX ORDER — FUROSEMIDE 20 MG/1
20 TABLET ORAL
Qty: 14 TABLET | Refills: 0 | Status: SHIPPED | OUTPATIENT
Start: 2024-09-28

## 2024-09-28 RX ORDER — EZETIMIBE 10 MG/1
10 TABLET ORAL
Qty: 90 TABLET | Refills: 3 | Status: SHIPPED | OUTPATIENT
Start: 2024-09-28

## 2024-09-28 RX ORDER — LISINOPRIL 20 MG/1
20 TABLET ORAL DAILY
Qty: 90 TABLET | Refills: 3 | Status: SHIPPED | OUTPATIENT
Start: 2024-09-28

## 2024-09-28 RX ORDER — PANTOPRAZOLE SODIUM 40 MG/1
40 TABLET, DELAYED RELEASE ORAL DAILY
Qty: 90 TABLET | Refills: 3 | Status: SHIPPED | OUTPATIENT
Start: 2024-09-28

## 2024-09-28 RX ORDER — ROSUVASTATIN CALCIUM 40 MG/1
40 TABLET, COATED ORAL EVERY EVENING
Qty: 90 TABLET | Refills: 3 | Status: SHIPPED | OUTPATIENT
Start: 2024-09-28

## 2024-09-28 RX ORDER — ASPIRIN 81 MG/1
81 TABLET ORAL DAILY
Qty: 100 TABLET | Refills: 0 | Status: SHIPPED | OUTPATIENT
Start: 2024-09-28

## 2024-09-28 RX ORDER — METOPROLOL SUCCINATE 25 MG/1
25 TABLET, EXTENDED RELEASE ORAL DAILY
Qty: 90 TABLET | Refills: 3 | Status: SHIPPED | OUTPATIENT
Start: 2024-09-28

## 2024-09-28 RX ORDER — CLOPIDOGREL BISULFATE 75 MG/1
75 TABLET ORAL DAILY
Qty: 30 TABLET | Refills: 3 | Status: SHIPPED | OUTPATIENT
Start: 2024-09-29

## 2024-09-28 RX ADMIN — ASPIRIN 81 MG: 81 TABLET, COATED ORAL at 05:57

## 2024-09-28 RX ADMIN — CLOPIDOGREL BISULFATE 300 MG: 75 TABLET ORAL at 01:08

## 2024-09-28 RX ADMIN — INSULIN LISPRO 1 UNITS: 100 INJECTION, SOLUTION INTRAVENOUS; SUBCUTANEOUS at 06:10

## 2024-09-28 RX ADMIN — EZETIMIBE 10 MG: 10 TABLET ORAL at 05:57

## 2024-09-28 ASSESSMENT — ENCOUNTER SYMPTOMS
CHOKING: 0
COUGH: 0
SHORTNESS OF BREATH: 0
CHEST TIGHTNESS: 0
APNEA: 0
STRIDOR: 0
WHEEZING: 0

## 2024-09-28 ASSESSMENT — PAIN DESCRIPTION - PAIN TYPE: TYPE: ACUTE PAIN

## 2024-09-28 ASSESSMENT — FIBROSIS 4 INDEX: FIB4 SCORE: 0.57

## 2024-09-28 NOTE — HOSPITAL COURSE
Kyle Gonzalez is a 44 y.o. male who presented 9/26/2024 with chest pain. Patient has a history of CAD status post stent x 2 and triple bypass and has been unfortunately missing many doses of his home medications secondary to depression and financial issues. Pt had acute onset chest pain that woke him up from sleep, lasting about 2 hours.  He endorses mild shortness of breath and dry cough.  He denies associated diaphoresis nausea or vomiting. He continues to smoke cigarettes.  Patient went outside facility ER for further evaluation.     At outside facility ER, CTPA negative for pulmonary embolism. Work up showed elevated trops, non ischemic ST elevation changes on EKG concerning for possible ACS picture Patient loaded with aspirin and given therapeutic Lovenox and transferred to [facility] for further evaluation. In [facility], pt received hemodynamically stable and cardiology was consulted. Clopidogrel was added to his regimen. Recommended medical therapy with close follow up as quite possibly will need stenting in the future but cardiology would like to see better medical adherence and tobacco cessation first.

## 2024-09-28 NOTE — DISCHARGE INSTRUCTIONS
Please make sure to schedule an appointment with your PCP. [facility] details provided if you would like to go there.   Please make sure to go to your scheduled appointment with the cardiologist     Weigh yourself daily. If you are gaining 3lbs/day or 5lbs/week, please take one water pill (Lasix) as needed. When this happens, please call your cardiologist or your PCP to discuss the next steps.         HF Patient Discharge Instructions  Monitor your weight daily, and maintain a weight chart, to track your weight changes.   Activity as tolerated, unless your Doctor has ordered otherwise.   Follow a low fat, low cholesterol, low salt diet unless instructed otherwise by your Doctor. Read the labels on the back of food products and track your intake of fat, cholesterol and salt.   Fluid Restriction Yes. If a Fluid Restriction has been ordered by your Doctor, measure fluids with a measuring cup to ensure that you are not exceeding the restriction. 2000 mL/day  No smoking.  Oxygen No. If your Doctor has ordered that you wear Oxygen at home, it is important to wear it as ordered.  Did you receive an explanation from staff on the importance of taking each of your medications and why it is necessary to keep taking them unless your doctor says to stop? Yes  Were all of your questions answered about how to manage your heart failure and what to do if you have increased signs and symptoms after you go home? Yes  Do you feel like your heart failure care team involved you in the care treatment plan and allowed you to make decisions regarding your care while in the hospital and addressed any discharge needs you might have? Yes    See the educational handout provided at discharge for more information on monitoring your daily weight, activity and diet. This also explains more about Heart Failure, symptoms of a flare-up and some of the tests that you have undergone.     Warning Signs of a Flare-Up include:  Swelling in the ankles or  lower legs.  Shortness of breath, while at rest, or while doing normal activities.   Shortness of breath at night when in bed, or coughing in bed.   Requiring more pillows to sleep at night, or needing to sit up at night to sleep.  Feeling weak, dizzy or fatigued.     When to call your Doctor:  Call your Primary Care Physician or Cardiologist if:   You experience any pain radiating to your jaw or neck.  You have any difficulty breathing.  You experience weight gain of 3 lbs in a day or 5 lbs in a week.   You feel any palpitations or irregular heartbeats.  You become dizzy or lose consciousness.   If you have had an angiogram or had a pacemaker or AICD placed, and experience:  Bleeding, drainage or swelling at the surgical / puncture site.  Fever greater than 100.0 F  Shock from internal defibrillator.  Cool and / or numb extremities.     Please access the [facility] My HF Guide/Heart Failure Interactive Workbook:   [email or web address]

## 2024-09-28 NOTE — PROGRESS NOTES
Hospital Medicine Daily Progress Note    Date of Service  9/27/2024    Chief Complaint  Kyle Gonzalez is a 44 y.o. male admitted 9/26/2024 with chest pain    Hospital Course  Kyle Gonzalez is a 44 y.o. male who presented 9/26/2024 with chest pain. Patient has a history of CAD status post stent x 2 and triple bypass. Patient went to take a nap this evening, and woke up with a dull left-sided chest pain that lasted for about 2 hours.  He endorses mild shortness of breath and dry cough.  He denies associated diaphoresis nausea or vomiting. He continues to smoke cigarettes.  He admits to missing several doses of his medications.  Patient went outside facility ER for further evaluation.     At outside facility ER, initial troponin negative, next troponin 79.  Normal less than 60.  CTPA negative for pulmonary embolism.  Patient loaded with aspirin and given therapeutic Lovenox and transferred to [facility] for further evaluation.    Interval Problem Update    Pt reports chest pain is not currently present.   Cardiology consulted  Awaiting echo.     I have discussed this patient's plan of care and discharge plan at IDT rounds today with Case Management, Nursing, Nursing leadership, and other members of the IDT team.    Consultants/Specialty  cardiology    Code Status  Full Code    Disposition  The patient is not medically cleared for discharge to home or a post-acute facility.      I have placed the appropriate orders for post-discharge needs.    Review of Systems  ROS     Physical Exam  Temp:  [36.2 °C (97.2 °F)-36.3 °C (97.3 °F)] 36.3 °C (97.3 °F)  Pulse:  [] 103  Resp:  [15-20] 18  BP: ()/(51-82) 120/70  SpO2:  [89 %-98 %] 96 %    Physical Exam  HENT:      Mouth/Throat:      Comments: Dental caries+  Cardiovascular:      Pulses: Normal pulses.      Heart sounds: Normal heart sounds.   Pulmonary:      Effort: Pulmonary effort is normal.      Breath sounds: Normal breath sounds.    Abdominal:      General: Abdomen is flat. Bowel sounds are normal.      Palpations: Abdomen is soft.         Fluids    Intake/Output Summary (Last 24 hours) at 9/27/2024 1828  Last data filed at 9/27/2024 1400  Gross per 24 hour   Intake 460 ml   Output --   Net 460 ml        Laboratory  Recent Labs     09/26/24  2300   WBC 9.3   RBC 4.95   HEMOGLOBIN 15.7   HEMATOCRIT 46.5   MCV 93.9   MCH 31.7   MCHC 33.8   RDW 45.5   PLATELETCT 322   MPV 9.7     Recent Labs     09/27/24  0011   SODIUM 136   POTASSIUM 4.0   CHLORIDE 99   CO2 21   GLUCOSE 188*   BUN 11   CREATININE 0.66   CALCIUM 9.5                   Assessment/Plan  * NSTEMI (in the setting of severe CAD and HFrEF)- (present on admission)  Assessment & Plan  Patient with history of CAD status post 2 stents in triple bypass presents for chest pain  Initial troponin negative and 79 on the second troponin at outside hospital.  Loaded with aspirin and given Lovenox at about 9 PM at outside hospital and transferred to [facility] negative for pulmonary embolism at outside hospital  EKG showing normal sinus rhythm with no ischemic changes  Echocardiogram ordered   Cardiology consulted    Insulin dependent type 2 diabetes mellitus (HCC)  Assessment & Plan  Updated A1c 9%  Continue Sliding-scale  Per chart used to be on insulin. Is not currently taking insulin     Tobacco abuse  Assessment & Plan  11 minutes spent on tobacco cessation.  Patient currently smokes several cigarettes a day.  He was counseled on benefits of tobacco cessation, and encouraged to stop smoking, especially with his cardiac history.  Patient understands the risks of smoking and the potential to worsen his cardiac disease, and states that he will try to quit.  He was offered nicotine placement therapy, to which at this time he declines.    Essential hypertension- (present on admission)  Assessment & Plan  Resume home medications    Hypercholesteremia- (present on admission)  Assessment &  Plan  Lipitor         VTE prophylaxis:    enoxaparin ppx      I have performed a physical exam and reviewed and updated ROS and Plan today (9/27/2024). In review of yesterday's note (9/26/2024), there are no changes except as documented above.

## 2024-09-28 NOTE — CARE PLAN
The patient is Stable - Low risk of patient condition declining or worsening    Shift Goals  Clinical Goals: Monitor for chest pain, VS/Labs, Echo in AM  Patient Goals: sleep, watch movie  Family Goals: updates about d/c    Progress made toward(s) clinical / shift goals:    Problem: Optimal Care for the Acute Coronary Syndrome Patient  Goal: Optimal Care for the Acute Coronary Syndrome Patient  Outcome: Progressing     Problem: Optimal Care for the Acute Coronary Syndrome Patient  Goal: Potential Interventions for the Acute Coronary Syndrome Patient  Outcome: Progressing       Patient is not progressing towards the following goals:

## 2024-09-28 NOTE — PROGRESS NOTES
Bedside report received from off going RN: Mary assumed care of patient.     Fall Risk Score: NO RISK  Fall risk interventions in place: Provide patient/family education based on risk assessment, Educate patient/family to call staff for assistance when getting out of bed, Place fall precaution signage outside patient door, and Place patient in room close to nursing station  Bed type: Regular (Ujan Score less than 17 interventions in place)  Patient on cardiac monitor: Yes  IVF/IV medications: Not Applicable   Oxygen: Room Air  Bedside sitter: Not Applicable   Isolation: Not applicable

## 2024-09-28 NOTE — PROGRESS NOTES
Patient being discharged. Pt educated on discharge instructions and new prescriptions, verbalized understanding. Follow up appointment made with Heart and vascular health . PIV removed, monitor checked in. Patient going discharge lounge  via wheelchair

## 2024-09-28 NOTE — PROGRESS NOTES
Cardiology Follow Up Progress Note    Date of Service  9/28/2024    Attending Physician  Margareth Stoner*    Chief Complaint     Chest pain    HPI  Kyle Gonzalez is a 44 y.o. male admitted 9/26/2024 as a transfer from [place] with chest pain concern for NSTEMI.    PMH: RCA STEMI 2013, PCI LCx 2014, PCI to OM 2017, PCI LAD 2020, CABG x3v  in 2021 ([place]) poorly controlled insulin-dependent type 2 diabetes, ongoing tobacco use, hypertension, poorly controlled hyperlipidemia      Interim Events    No overnight cardiac events  Telemetry-SR, PVCs    Denies having recurrent chest pain.  Continue optimal guided medical therapy.  Close follow-up in cardiology office.    Review of Systems  Review of Systems   Respiratory:  Negative for apnea, cough, choking, chest tightness, shortness of breath, wheezing and stridor.        Vital signs in last 24 hours  Temp:  [36.1 °C (97 °F)-36.6 °C (97.9 °F)] 36.6 °C (97.9 °F)  Pulse:  [] 84  Resp:  [16-18] 16  BP: ()/(52-82) 105/69  SpO2:  [91 %-97 %] 94 %    Physical Exam  Physical Exam  Cardiovascular:      Rate and Rhythm: Normal rate and regular rhythm.      Pulses: Normal pulses.   Pulmonary:      Effort: Pulmonary effort is normal.   Skin:     General: Skin is warm.   Neurological:      Mental Status: He is alert. Mental status is at baseline.   Psychiatric:         Mood and Affect: Mood normal.         Lab Review  Lab Results   Component Value Date/Time    WBC 8.6 09/28/2024 02:54 AM    RBC 5.12 09/28/2024 02:54 AM    HEMOGLOBIN 16.3 09/28/2024 02:54 AM    HEMATOCRIT 47.8 09/28/2024 02:54 AM    MCV 93.4 09/28/2024 02:54 AM    MCH 31.8 09/28/2024 02:54 AM    MCHC 34.1 09/28/2024 02:54 AM    MPV 9.5 09/28/2024 02:54 AM      Lab Results   Component Value Date/Time    SODIUM 134 (L) 09/28/2024 02:54 AM    POTASSIUM 4.2 09/28/2024 02:54 AM    CHLORIDE 100 09/28/2024 02:54 AM    CO2 20 09/28/2024 02:54 AM    GLUCOSE 177 (H)  09/28/2024 02:54 AM    BUN 17 09/28/2024 02:54 AM    CREATININE 0.62 09/28/2024 02:54 AM      Lab Results   Component Value Date/Time    ASTSGOT 18 09/27/2024 12:11 AM    ALTSGPT 18 09/27/2024 12:11 AM     Lab Results   Component Value Date/Time    CHOLSTRLTOT 395 (H) 09/28/2024 02:54 AM    LDL see below 09/28/2024 02:54 AM    HDL 39 (A) 09/28/2024 02:54 AM    TRIGLYCERIDE 506 (H) 09/28/2024 02:54 AM    TROPONINT 31 (H) 09/27/2024 05:20 AM       Recent Labs     09/27/24  0011   NTPROBNP 305*       Cardiac Imaging and Procedures Review  EKG: SR, PVCs      Echocardiogram:    ONCLUSIONS  Compared to the images of the prior study  12/8/21, there has been no   significant change.      Moderately reduced left ventricular systolic function.  The left ventricular ejection fraction is visually estimated to be 35%.  Global hypokinesis with regional variation.  Patient tachycardic throughout exam.             Imaging  Chest X-Ray:       1.  Scattered hazy bilateral pulmonary opacities, appearance suggesting subtle infiltrate.  2.  Cardiomegaly          Assessment/Plan    # NSTEMI type II demand ischemia, medication nonadherence.  # Ischemic cardiomyopathy LVEF 35 to 40%.  # Mixed hyperlipidemia, poorly controlled.  # Type 2 diabetes, A1c 9.2  # Ongoing tobacco use  # Noncompliant with medical therapy, patient reports due to financial issues and depression.  # BMI 38        -Denies having recurrent chest pain.  -Hemodynamically stable.  -Recommend optimal guided medical therapy.  -Consulted at length on tobacco cessation.  -DAPT aspirin 81 and Plavix 75 mg along with Crestor 40 mg.  -Continue lisinopril 20, Toprol-XL 25.  -Close follow-up in cardiology office, will arrange.      I personally spent a total of 15 minutes which includes face-to-face time and non-face-to-face time spent on preparing to see the patient, reviewing hospital notes and tests, obtaining history from the patient, performing a medically appropriate exam,  counseling and educating the patient, ordering medications/tests/procedures/referrals as clinically indicated, and documenting information in the electronic medical record.       Thank you for allowing me to participate in the care of this patient.  Cardiology will sign off on this patient    Please contact me with any questions.    JEZ Narayanan.   Cardiologist, [facility]  [phone]

## 2024-09-28 NOTE — PROGRESS NOTES
Bedside report received from off going RN/tech: David assumed care of patient.     Fall Risk Score: NO RISK  Fall risk interventions in place: Place yellow fall risk ID band on patient, Provide patient/family education based on risk assessment, Educate patient/family to call staff for assistance when getting out of bed, Place fall precaution signage outside patient door, Utilize bed/chair fall alarm, and Bed alarm connected correctly  Bed type: Regular (Juan Score less than 17 interventions in place)  Patient on cardiac monitor: Yes  IVF/IV medications: Not Applicable   Oxygen: Room Air  Bedside sitter: Not Applicable   Isolation: Not applicable

## 2024-09-28 NOTE — PROGRESS NOTES
"Dc instructions reviewed including the importance of following up with Cardiovascular. Discussed with patient EF and NSTEMI. Patient states he has had several heart attacks. Discussed following an appropriate diet, exercise, HF exacerbation systems and what to monitor for. Discussed weight tomer and medications he will start taking. Discussed life style changes which patient stated \"not going to lie I'm smoking the moment I get out of here\". Discussed the importance of follow up care and changes for optimizing health. Meds to beds pending delivery.   "

## 2024-09-29 PROCEDURE — 99239 HOSP IP/OBS DSCHRG MGMT >30: CPT

## 2024-09-30 NOTE — DISCHARGE SUMMARY
Discharge Summary    CHIEF COMPLAINT ON ADMISSION  Chief Complaint   Patient presents with    Chest Pain     [facility]. Pt was seen for acute midsternal non-radiating CP, troponin initially 27 - repeat 70. Denies current CP or SOB. Hx CABG and multiple stents.        Reason for Admission  ems     Admission Date  9/26/2024    CODE STATUS  Prior    HPI & HOSPITAL COURSE    Kyle Gonzalez is a 44 y.o. male who presented 9/26/2024 with chest pain. Patient has a history of CAD status post stent x 2 and triple bypass and has been unfortunately missing many doses of his home medications secondary to depression and financial issues. Pt had acute onset chest pain that woke him up from sleep, lasting about 2 hours.  He endorses mild shortness of breath and dry cough.  He denies associated diaphoresis nausea or vomiting. He continues to smoke cigarettes.  Patient went outside facility ER for further evaluation.     At outside facility ER, CTPA negative for pulmonary embolism. Work up showed elevated trops, non ischemic ST elevation changes on EKG concerning for possible ACS picture Patient loaded with aspirin and given therapeutic Lovenox and transferred to [facility] for further evaluation. In [facility], pt received hemodynamically stable and cardiology was consulted. Clopidogrel was added to his regimen. Recommended medical therapy with close follow up as quite possibly will need stenting in the future but cardiology would like to see better medical adherence and tobacco cessation first.     Therefore, he is discharged in good and stable condition to home with close outpatient follow-up.    The patient met 2-midnight criteria for an inpatient stay at the time of discharge.    Discharge Date  9/28/2024    FOLLOW UP ITEMS POST DISCHARGE  Cardiology follow up    DISCHARGE DIAGNOSES  Principal Problem:    NSTEMI (in the setting of severe CAD and HFrEF) (POA: Yes)  Active  Problems:    Hypercholesteremia (POA: Yes)    Essential hypertension (POA: Yes)    Tobacco abuse (POA: Unknown)    Insulin dependent type 2 diabetes mellitus (HCC) (POA: Unknown)    Non compliance w medication regimen (POA: Unknown)      Overview: Pt reports he has not been compliant with his medications 2/2 depression       and financial issues  Resolved Problems:    * No resolved hospital problems. *      FOLLOW UP  Future Appointments   Date Time Provider Department Center   10/15/2024  1:45 PM REBEKAH Bowling None     [facility]  [address] [facility] [address]  [phone]  Follow up      ODILON Gonzalez  [address] [facility] [id number] [phone]            MEDICATIONS ON DISCHARGE     Medication List        START taking these medications        Instructions   Aspirin Low Dose 81 MG EC tablet  Generic drug: aspirin   Take 1 Tablet by mouth every day.  Dose: 81 mg     clopidogrel 75 MG Tabs  Commonly known as: Plavix   Take 1 Tablet by mouth every day.  Dose: 75 mg     Jardiance 10 MG Tabs tablet  Generic drug: Empagliflozin   Take 1 Tablet by mouth every day.  Dose: 10 mg     metFORMIN 500 MG Tabs  Commonly known as: Glucophage   Take 1 Tablet by mouth 2 times a day with meals.  Dose: 500 mg            CHANGE how you take these medications        Instructions   furosemide 20 MG Tabs  What changed:   when to take this  reasons to take this  Commonly known as: Lasix   Take 1 Tablet by mouth as needed (Weigh yourself daily. Please take one pill if you are gaining 3lbs in a day or 5lbs in a week and call your provider for further instructions).  Dose: 20 mg            CONTINUE taking these medications        Instructions   ezetimibe 10 MG Tabs  Commonly known as: Zetia   Take 1 Tablet by mouth every day.  Dose: 10 mg     lisinopril 20 MG Tabs  Commonly known as: Prinivil   Take 1 Tablet by mouth every day.  Dose: 20 mg     metoprolol SR 25 MG  Tb24  Commonly known as: Toprol XL   Take 1 Tablet by mouth every day.  Dose: 25 mg     pantoprazole 40 MG Tbec  Commonly known as: Protonix   Take 1 Tablet by mouth every day.  Dose: 40 mg     rosuvastatin 40 MG tablet  Commonly known as: Crestor   Take 1 Tablet by mouth every evening.  Dose: 40 mg            STOP taking these medications      potassium chloride SA 20 MEQ Tbcr  Commonly known as: Kdur              Allergies  No Known Allergies    DIET  No orders of the defined types were placed in this encounter.      ACTIVITY  As tolerated.  Weight bearing as tolerated    CONSULTATIONS  Cardio    PROCEDURES  None    LABORATORY  Lab Results   Component Value Date    SODIUM 134 (L) 09/28/2024    POTASSIUM 4.2 09/28/2024    CHLORIDE 100 09/28/2024    CO2 20 09/28/2024    GLUCOSE 177 (H) 09/28/2024    BUN 17 09/28/2024    CREATININE 0.62 09/28/2024        Lab Results   Component Value Date    WBC 8.6 09/28/2024    HEMOGLOBIN 16.3 09/28/2024    HEMATOCRIT 47.8 09/28/2024    PLATELETCT 303 09/28/2024        Total time of the discharge process exceeds >35 minutes.

## 2025-06-10 ENCOUNTER — TELEPHONE (OUTPATIENT)
Dept: HEALTH INFORMATION MANAGEMENT | Facility: OTHER | Age: 45
End: 2025-06-10